# Patient Record
Sex: FEMALE | Race: WHITE | Employment: OTHER | URBAN - METROPOLITAN AREA
[De-identification: names, ages, dates, MRNs, and addresses within clinical notes are randomized per-mention and may not be internally consistent; named-entity substitution may affect disease eponyms.]

---

## 2018-09-10 ENCOUNTER — HOSPITAL ENCOUNTER (INPATIENT)
Age: 58
LOS: 2 days | Discharge: HOME | DRG: 189 | End: 2018-09-12
Attending: EMERGENCY MEDICINE | Admitting: INTERNAL MEDICINE
Payer: COMMERCIAL

## 2018-09-10 ENCOUNTER — APPOINTMENT (OUTPATIENT)
Dept: GENERAL RADIOLOGY | Age: 58
DRG: 189 | End: 2018-09-10
Payer: COMMERCIAL

## 2018-09-10 DIAGNOSIS — J96.01 ACUTE RESPIRATORY FAILURE WITH HYPOXIA AND HYPERCARBIA (HCC): ICD-10-CM

## 2018-09-10 DIAGNOSIS — J96.02 ACUTE RESPIRATORY FAILURE WITH HYPOXIA AND HYPERCARBIA (HCC): ICD-10-CM

## 2018-09-10 DIAGNOSIS — J44.1 COPD EXACERBATION (HCC): Primary | ICD-10-CM

## 2018-09-10 DIAGNOSIS — J44.9 CHRONIC OBSTRUCTIVE PULMONARY DISEASE, UNSPECIFIED COPD TYPE (HCC): ICD-10-CM

## 2018-09-10 PROBLEM — E03.9 ACQUIRED HYPOTHYROIDISM: Status: ACTIVE | Noted: 2018-09-10

## 2018-09-10 PROBLEM — Z72.0 TOBACCO ABUSE: Status: ACTIVE | Noted: 2018-09-10

## 2018-09-10 LAB
A/G RATIO: 1.6 (ref 1.1–2.2)
ALBUMIN SERPL-MCNC: 4.1 G/DL (ref 3.4–5)
ALP BLD-CCNC: 93 U/L (ref 40–129)
ALT SERPL-CCNC: 28 U/L (ref 10–40)
ANION GAP SERPL CALCULATED.3IONS-SCNC: 9 MMOL/L (ref 3–16)
AST SERPL-CCNC: 25 U/L (ref 15–37)
BASE EXCESS ARTERIAL: 3.2 MMOL/L (ref -3–3)
BASOPHILS ABSOLUTE: 0.1 K/UL (ref 0–0.2)
BASOPHILS RELATIVE PERCENT: 0.9 %
BILIRUB SERPL-MCNC: 0.4 MG/DL (ref 0–1)
BUN BLDV-MCNC: 17 MG/DL (ref 7–20)
CALCIUM SERPL-MCNC: 8.9 MG/DL (ref 8.3–10.6)
CARBOXYHEMOGLOBIN ARTERIAL: 1.8 % (ref 0–1.5)
CHLORIDE BLD-SCNC: 93 MMOL/L (ref 99–110)
CO2: 30 MMOL/L (ref 21–32)
CREAT SERPL-MCNC: <0.5 MG/DL (ref 0.6–1.2)
D DIMER: <200 NG/ML DDU (ref 0–229)
EOSINOPHILS ABSOLUTE: 0.4 K/UL (ref 0–0.6)
EOSINOPHILS RELATIVE PERCENT: 2.6 %
GFR AFRICAN AMERICAN: >60
GFR NON-AFRICAN AMERICAN: >60
GLOBULIN: 2.6 G/DL
GLUCOSE BLD-MCNC: 105 MG/DL (ref 70–99)
HCO3 ARTERIAL: 29.2 MMOL/L (ref 21–29)
HCT VFR BLD CALC: 37 % (ref 36–48)
HEMOGLOBIN, ART, EXTENDED: 12.9 G/DL (ref 12–16)
HEMOGLOBIN: 12.9 G/DL (ref 12–16)
LYMPHOCYTES ABSOLUTE: 2.6 K/UL (ref 1–5.1)
LYMPHOCYTES RELATIVE PERCENT: 17.2 %
MCH RBC QN AUTO: 32.8 PG (ref 26–34)
MCHC RBC AUTO-ENTMCNC: 34.9 G/DL (ref 31–36)
MCV RBC AUTO: 94.1 FL (ref 80–100)
METHEMOGLOBIN ARTERIAL: 0.3 %
MONOCYTES ABSOLUTE: 1 K/UL (ref 0–1.3)
MONOCYTES RELATIVE PERCENT: 6.6 %
NEUTROPHILS ABSOLUTE: 10.9 K/UL (ref 1.7–7.7)
NEUTROPHILS RELATIVE PERCENT: 72.7 %
O2 CONTENT ARTERIAL: 17 ML/DL
O2 SAT, ARTERIAL: 95.8 %
O2 THERAPY: ABNORMAL
PCO2 ARTERIAL: 50.4 MMHG (ref 35–45)
PDW BLD-RTO: 13.7 % (ref 12.4–15.4)
PH ARTERIAL: 7.38 (ref 7.35–7.45)
PLATELET # BLD: 275 K/UL (ref 135–450)
PMV BLD AUTO: 7.7 FL (ref 5–10.5)
PO2 ARTERIAL: 82.5 MMHG (ref 75–108)
POTASSIUM REFLEX MAGNESIUM: 3.6 MMOL/L (ref 3.5–5.1)
RBC # BLD: 3.93 M/UL (ref 4–5.2)
SODIUM BLD-SCNC: 132 MMOL/L (ref 136–145)
TCO2 ARTERIAL: 30.8 MMOL/L
TOTAL PROTEIN: 6.7 G/DL (ref 6.4–8.2)
TROPONIN: <0.01 NG/ML
WBC # BLD: 15 K/UL (ref 4–11)

## 2018-09-10 PROCEDURE — 2580000003 HC RX 258: Performed by: INTERNAL MEDICINE

## 2018-09-10 PROCEDURE — 36600 WITHDRAWAL OF ARTERIAL BLOOD: CPT

## 2018-09-10 PROCEDURE — 6360000002 HC RX W HCPCS: Performed by: INTERNAL MEDICINE

## 2018-09-10 PROCEDURE — 85025 COMPLETE CBC W/AUTO DIFF WBC: CPT

## 2018-09-10 PROCEDURE — 2700000000 HC OXYGEN THERAPY PER DAY

## 2018-09-10 PROCEDURE — 6370000000 HC RX 637 (ALT 250 FOR IP): Performed by: INTERNAL MEDICINE

## 2018-09-10 PROCEDURE — 94761 N-INVAS EAR/PLS OXIMETRY MLT: CPT

## 2018-09-10 PROCEDURE — 80053 COMPREHEN METABOLIC PANEL: CPT

## 2018-09-10 PROCEDURE — 96374 THER/PROPH/DIAG INJ IV PUSH: CPT

## 2018-09-10 PROCEDURE — 6360000002 HC RX W HCPCS: Performed by: EMERGENCY MEDICINE

## 2018-09-10 PROCEDURE — 84484 ASSAY OF TROPONIN QUANT: CPT

## 2018-09-10 PROCEDURE — 93005 ELECTROCARDIOGRAM TRACING: CPT | Performed by: EMERGENCY MEDICINE

## 2018-09-10 PROCEDURE — 99254 IP/OBS CNSLTJ NEW/EST MOD 60: CPT | Performed by: INTERNAL MEDICINE

## 2018-09-10 PROCEDURE — 94664 DEMO&/EVAL PT USE INHALER: CPT

## 2018-09-10 PROCEDURE — 94640 AIRWAY INHALATION TREATMENT: CPT

## 2018-09-10 PROCEDURE — 93010 ELECTROCARDIOGRAM REPORT: CPT | Performed by: INTERNAL MEDICINE

## 2018-09-10 PROCEDURE — 85379 FIBRIN DEGRADATION QUANT: CPT

## 2018-09-10 PROCEDURE — 99223 1ST HOSP IP/OBS HIGH 75: CPT | Performed by: INTERNAL MEDICINE

## 2018-09-10 PROCEDURE — 71046 X-RAY EXAM CHEST 2 VIEWS: CPT

## 2018-09-10 PROCEDURE — 82803 BLOOD GASES ANY COMBINATION: CPT

## 2018-09-10 PROCEDURE — 94150 VITAL CAPACITY TEST: CPT

## 2018-09-10 PROCEDURE — 99285 EMERGENCY DEPT VISIT HI MDM: CPT

## 2018-09-10 PROCEDURE — 6370000000 HC RX 637 (ALT 250 FOR IP): Performed by: EMERGENCY MEDICINE

## 2018-09-10 PROCEDURE — 1200000000 HC SEMI PRIVATE

## 2018-09-10 RX ORDER — PREDNISONE 10 MG/1
TABLET ORAL
Status: ON HOLD | COMMUNITY
Start: 2018-09-08 | End: 2018-09-12 | Stop reason: HOSPADM

## 2018-09-10 RX ORDER — AZITHROMYCIN 250 MG/1
250 TABLET, FILM COATED ORAL DAILY
Status: DISCONTINUED | OUTPATIENT
Start: 2018-09-11 | End: 2018-09-10

## 2018-09-10 RX ORDER — IPRATROPIUM BROMIDE AND ALBUTEROL SULFATE 2.5; .5 MG/3ML; MG/3ML
2 SOLUTION RESPIRATORY (INHALATION) ONCE
Status: COMPLETED | OUTPATIENT
Start: 2018-09-10 | End: 2018-09-10

## 2018-09-10 RX ORDER — METHYLPREDNISOLONE SODIUM SUCCINATE 40 MG/ML
40 INJECTION, POWDER, LYOPHILIZED, FOR SOLUTION INTRAMUSCULAR; INTRAVENOUS EVERY 12 HOURS
Status: DISCONTINUED | OUTPATIENT
Start: 2018-09-10 | End: 2018-09-12 | Stop reason: HOSPADM

## 2018-09-10 RX ORDER — SODIUM CHLORIDE 0.9 % (FLUSH) 0.9 %
10 SYRINGE (ML) INJECTION PRN
Status: DISCONTINUED | OUTPATIENT
Start: 2018-09-10 | End: 2018-09-12 | Stop reason: HOSPADM

## 2018-09-10 RX ORDER — IPRATROPIUM BROMIDE AND ALBUTEROL SULFATE 2.5; .5 MG/3ML; MG/3ML
1 SOLUTION RESPIRATORY (INHALATION)
Status: DISCONTINUED | OUTPATIENT
Start: 2018-09-10 | End: 2018-09-10

## 2018-09-10 RX ORDER — ALBUTEROL SULFATE 2.5 MG/3ML
2.5 SOLUTION RESPIRATORY (INHALATION)
Status: DISCONTINUED | OUTPATIENT
Start: 2018-09-10 | End: 2018-09-12 | Stop reason: HOSPADM

## 2018-09-10 RX ORDER — MULTIVITAMIN
1 TABLET ORAL
Status: ON HOLD | COMMUNITY
End: 2019-12-25

## 2018-09-10 RX ORDER — DOXYCYCLINE HYCLATE 100 MG
100 TABLET ORAL EVERY 12 HOURS SCHEDULED
Status: DISCONTINUED | OUTPATIENT
Start: 2018-09-10 | End: 2018-09-12 | Stop reason: HOSPADM

## 2018-09-10 RX ORDER — MONTELUKAST SODIUM 10 MG/1
10 TABLET ORAL NIGHTLY
Status: ON HOLD | COMMUNITY
End: 2019-12-25

## 2018-09-10 RX ORDER — ALBUTEROL SULFATE 90 UG/1
2 AEROSOL, METERED RESPIRATORY (INHALATION) EVERY 6 HOURS PRN
COMMUNITY
End: 2019-01-17 | Stop reason: SDUPTHER

## 2018-09-10 RX ORDER — SODIUM CHLORIDE 9 MG/ML
INJECTION, SOLUTION INTRAVENOUS CONTINUOUS
Status: DISCONTINUED | OUTPATIENT
Start: 2018-09-10 | End: 2018-09-10

## 2018-09-10 RX ORDER — SODIUM CHLORIDE 0.9 % (FLUSH) 0.9 %
10 SYRINGE (ML) INJECTION EVERY 12 HOURS SCHEDULED
Status: DISCONTINUED | OUTPATIENT
Start: 2018-09-10 | End: 2018-09-12 | Stop reason: HOSPADM

## 2018-09-10 RX ORDER — IPRATROPIUM BROMIDE AND ALBUTEROL SULFATE 2.5; .5 MG/3ML; MG/3ML
1 SOLUTION RESPIRATORY (INHALATION) EVERY 4 HOURS
Status: DISCONTINUED | OUTPATIENT
Start: 2018-09-10 | End: 2018-09-11

## 2018-09-10 RX ORDER — FLUTICASONE PROPIONATE 50 MCG
1 SPRAY, SUSPENSION (ML) NASAL DAILY
Status: ON HOLD | COMMUNITY
End: 2020-01-06 | Stop reason: HOSPADM

## 2018-09-10 RX ORDER — ACETAMINOPHEN 325 MG/1
650 TABLET ORAL EVERY 4 HOURS PRN
Status: DISCONTINUED | OUTPATIENT
Start: 2018-09-10 | End: 2018-09-12 | Stop reason: HOSPADM

## 2018-09-10 RX ORDER — AMOXICILLIN AND CLAVULANATE POTASSIUM 500; 125 MG/1; MG/1
1 TABLET, FILM COATED ORAL
Status: ON HOLD | COMMUNITY
Start: 2018-09-08 | End: 2018-09-12 | Stop reason: HOSPADM

## 2018-09-10 RX ORDER — METHYLPREDNISOLONE SODIUM SUCCINATE 125 MG/2ML
125 INJECTION, POWDER, LYOPHILIZED, FOR SOLUTION INTRAMUSCULAR; INTRAVENOUS ONCE
Status: COMPLETED | OUTPATIENT
Start: 2018-09-10 | End: 2018-09-10

## 2018-09-10 RX ORDER — NICOTINE 21 MG/24HR
1 PATCH, TRANSDERMAL 24 HOURS TRANSDERMAL DAILY
Status: DISCONTINUED | OUTPATIENT
Start: 2018-09-10 | End: 2018-09-12 | Stop reason: HOSPADM

## 2018-09-10 RX ADMIN — METHYLPREDNISOLONE SODIUM SUCCINATE 125 MG: 125 INJECTION, POWDER, FOR SOLUTION INTRAMUSCULAR; INTRAVENOUS at 05:48

## 2018-09-10 RX ADMIN — ENOXAPARIN SODIUM 40 MG: 100 INJECTION SUBCUTANEOUS at 09:47

## 2018-09-10 RX ADMIN — IPRATROPIUM BROMIDE AND ALBUTEROL SULFATE 1 AMPULE: .5; 3 SOLUTION RESPIRATORY (INHALATION) at 14:52

## 2018-09-10 RX ADMIN — Medication 10 ML: at 19:39

## 2018-09-10 RX ADMIN — LEVOTHYROXINE SODIUM 137 MCG: 112 TABLET ORAL at 09:47

## 2018-09-10 RX ADMIN — DOXYCYCLINE HYCLATE 100 MG: 100 TABLET, COATED ORAL at 09:47

## 2018-09-10 RX ADMIN — IPRATROPIUM BROMIDE AND ALBUTEROL SULFATE 1 AMPULE: .5; 3 SOLUTION RESPIRATORY (INHALATION) at 22:41

## 2018-09-10 RX ADMIN — METHYLPREDNISOLONE SODIUM SUCCINATE 40 MG: 40 INJECTION, POWDER, FOR SOLUTION INTRAMUSCULAR; INTRAVENOUS at 17:41

## 2018-09-10 RX ADMIN — IPRATROPIUM BROMIDE AND ALBUTEROL SULFATE 2 AMPULE: .5; 3 SOLUTION RESPIRATORY (INHALATION) at 05:49

## 2018-09-10 RX ADMIN — IPRATROPIUM BROMIDE AND ALBUTEROL SULFATE 1 AMPULE: .5; 3 SOLUTION RESPIRATORY (INHALATION) at 18:36

## 2018-09-10 RX ADMIN — Medication 10 ML: at 09:47

## 2018-09-10 RX ADMIN — DOXYCYCLINE HYCLATE 100 MG: 100 TABLET, COATED ORAL at 19:39

## 2018-09-10 RX ADMIN — IPRATROPIUM BROMIDE AND ALBUTEROL SULFATE 1 AMPULE: .5; 3 SOLUTION RESPIRATORY (INHALATION) at 10:44

## 2018-09-10 ASSESSMENT — PAIN SCALES - GENERAL: PAINLEVEL_OUTOF10: 0

## 2018-09-10 NOTE — CONSULTS
SYSTEMS:  Constitutional: Negative for fever  HENT: Negative for sore throat  Eyes: Negative for redness   Respiratory: + for dyspnea, cough  Cardiovascular: Negative for chest pain  Gastrointestinal: Negative for vomiting, diarrhea   Genitourinary: Negative for hematuria   Musculoskeletal: Negative for arthralgias   Skin: Negative for rash  Neurological: Negative for syncope  Hematological: Negative for adenopathy  Psychiatric/Behavorial: Negative for anxiety    PHYSICAL EXAM:  Blood pressure 116/63, pulse 90, temperature 96.9 °F (36.1 °C), temperature source Oral, resp. rate 16, height 5' 4\" (1.626 m), weight 110 lb (49.9 kg), SpO2 92 %.' on 2l NC  Gen: No distress. Eyes: PERRL. No sclera icterus. No conjunctival injection. ENT: No discharge. Pharynx clear. Neck: Trachea midline. No obvious mass. Resp: few accessory muscle use. No crackles. bilateral wheezes. No rhonchi. No dullness on percussion. CV: Regular rate. Regular rhythm. No murmur or rub. No edema. Peripheral pulses are 2+. Capillary refill is less than 3 seconds. GI: Non-tender. Non-distended. No hernia. Skin: Warm and dry. No nodule on exposed extremities. Lymph: No cervical LAD. No supraclavicular LAD. M/S: No cyanosis. No joint deformity. No clubbing. Neuro: Awake. Alert. Moves all four extremities. Psych: Oriented x 3. No anxiety. LABS:  CBC:   Recent Labs      09/10/18   0529   WBC  15.0*   HGB  12.9   HCT  37.0   MCV  94.1   PLT  275     BMP:   Recent Labs      09/10/18   0529   NA  132*   K  3.6   CL  93*   CO2  30   BUN  17   CREATININE  <0.5*     LIVER PROFILE:   Recent Labs      09/10/18   0529   AST  25   ALT  28   BILITOT  0.4   ALKPHOS  93     PT/INR: No results for input(s): PROTIME, INR in the last 72 hours. APTT: No results for input(s): APTT in the last 72 hours.   UA:No results for input(s): NITRITE, COLORU, PHUR, LABCAST, WBCUA, RBCUA, MUCUS, TRICHOMONAS, YEAST, BACTERIA, CLARITYU, SPECGRAV, LEUKOCYTESUR,

## 2018-09-10 NOTE — PROGRESS NOTES
Bedside report and transfer of care given to McConnells, [facility].

## 2018-09-10 NOTE — ED PROVIDER NOTES
 No narrative on file     Current Facility-Administered Medications   Medication Dose Route Frequency Provider Last Rate Last Dose    azithromycin (ZITHROMAX) 500 mg in D5W 250ml addavial  500 mg Intravenous Once Livier Roe MD         Current Outpatient Prescriptions   Medication Sig Dispense Refill    montelukast (SINGULAIR) 10 MG tablet Take 10 mg by mouth nightly      [facility] RX METHYLPREDNISOLONE, MEDROL DOSPACK, TAPER       albuterol sulfate  (90 Base) MCG/ACT inhaler Inhale 2 puffs into the lungs every 6 hours as needed for Wheezing      amoxicillin-clavulanate (AUGMENTIN) 500-125 MG per tablet Take 1 tablet by mouth      vitamin D-3 (CHOLECALCIFEROL) 5000 units TABS Take by mouth      fluticasone (FLONASE) 50 MCG/ACT nasal spray 1 spray by Nasal route      Multiple Vitamin (MULTIVITAMIN) tablet Take 1 tablet by mouth      predniSONE (DELTASONE) 10 MG tablet 6 TABS (ALL AT ONCE) FOR ONE DAY, THEN DECREASE ONE TAB/DAY UNTIL GONE      levothyroxine (SYNTHROID) 137 MCG tablet Take 137 mcg by mouth daily       Allergies   Allergen Reactions    Codeine        REVIEW OF SYSTEMS  10 systems reviewed, pertinent positives per HPI otherwise noted to be negative. PHYSICAL EXAM  BP (!) 155/72   Pulse 81   Temp 97.6 °F (36.4 °C) (Oral)   Resp 27   Ht 5' 4\" (1.626 m)   Wt 110 lb (49.9 kg)   LMP  (LMP Unknown)   SpO2 (!) 89%   BMI 18.88 kg/m²    GENERAL APPEARANCE: Awake and alert. Cooperative. Mild respiratory distress. HENT: Normocephalic. Atraumatic. Mucous membranes are moist.  No drooling or stridor. NECK: Supple. EYES: PERRL. EOM's grossly intact. HEART/CHEST: RRR. No murmurs. 2+ radial and DP pulses bilaterally. LUNGS: Respirations mildly labored. Diffuse expiratory wheezes throughout. .  Fair air exchange. ABDOMEN: No tenderness. Soft. Non-distended. No masses. No organomegaly. No guarding or rebound. MUSCULOSKELETAL: No extremity edema. Compartments soft. No deformity. 7.450    pCO2, Arterial 50.4 (H) 35.0 - 45.0 mmHg    pO2, Arterial 82.5 75.0 - 108.0 mmHg    HCO3, Arterial 29.2 (H) 21.0 - 29.0 mmol/L    Base Excess, Arterial 3.2 (H) -3.0 - 3.0 mmol/L    Hemoglobin, Art, Extended 12.9 12.0 - 16.0 g/dL    O2 Sat, Arterial 95.8 >92 %    Carboxyhgb, Arterial 1.8 (H) 0.0 - 1.5 %    Methemoglobin, Arterial 0.3 <1.5 %    TCO2, Arterial 30.8 Not Established mmol/L    O2 Content, Arterial 17 Not Established mL/dL    O2 Therapy Unknown        ECG  The Ekg interpreted by me shows  normal sinus rhythm with a rate of 72  Axis is   Normal  QTc is  normal  Intervals and Durations are unremarkable. ST Segments: nonspecific changes  No prior ECG available for comparison. RADIOLOGY  Xr Chest Standard (2 Vw)    Result Date: 9/10/2018  EXAMINATION: TWO VIEWS OF THE CHEST 9/10/2018 5:17 am COMPARISON: Chest radiograph 09/12/2014 HISTORY: ORDERING SYSTEM PROVIDED HISTORY: cough, dyspnea TECHNOLOGIST PROVIDED HISTORY: Reason for exam:->cough, dyspnea Ordering Physician Provided Reason for Exam: difficulty breathing Acuity: Acute Type of Exam: Subsequent/Follow-up Additional signs and symptoms: SOB, hx of COPD, emphysema, smoker, recent GB surg x 2 wks ago FINDINGS: Hyperinflated lungs with areas of decreased lung markings, such as in the right upper lung zone. Bronchial walls are thickened. Normal heart size and mediastinal contours. No pneumothorax or pleural effusion. No focal airspace consolidation. No acute osseous abnormality. Emphysema with COPD. Bronchial wall thickening is presumably smoking-related, although superimposed acute bronchitis could be present in the appropriate context. No consolidative airspace disease. ED COURSE/MDM  Patient seen and evaluated. Old records reviewed. Labs and imaging reviewed and results discussed with patient. Patient presenting for evaluation of symptoms was consistent with acute COPD exacerbation.   Initial oxygen saturations in the 70s which improved with 2 L of supplemental oxygen via nasal cannula. She feels subjectively improved after 2 DuoNeb breathing treatments. She is still requiring oxygen to maintain oxygen saturation in the 90s. She is also given IV Solu-Medrol will be continued on antibiotics and started on IV azithromycin. She previously been prescribed Augmentin by her PCP. Given her recent surgery did obtain a d-dimer although had a low suspicion for PE is underlying cause her symptoms. D-dimer is negative. EKG showed no acute ischemic changes and troponin was negative. I spoke with Dr. Sharda Benoit. We thoroughly discussed the history, physical exam, laboratory and imaging studies, as well as, emergency department course. Based upon that discussion, we've decided to admit Denae Corea for further observation and evaluation of Emily Mcneal's dyspnea. As I have deemed necessary from their history, physical, and studies, I have considered and evaluated Denae Corea for the following diagnoses:  ACUTE CORONARY SYNDROME, CHRONIC OBSTRUCTIVE PULMONARY DISEASE, CONGESTIVE HEART FAILURE, PERICARDIAL TAMPONADE, PNEUMONIA, PNEUMOTHORAX, PULMONARY EMBOLISM, SEPSIS, and THORACIC DISSECTION. During the patient's ED course, the patient was given:  Medications   azithromycin (ZITHROMAX) 500 mg in D5W 250ml addavial (not administered)   ipratropium-albuterol (DUONEB) nebulizer solution 2 ampule (2 ampules Inhalation Given 9/10/18 0549)   methylPREDNISolone sodium (SOLU-MEDROL) injection 125 mg (125 mg Intravenous Given 9/10/18 0548)        CLINICAL IMPRESSION  1. COPD exacerbation ([facility] [address]    2. Acute respiratory failure with hypoxia and hypercarbia (HCC)        Blood pressure (!) 155/72, pulse 81, temperature 97.6 °F (36.4 °C), temperature source Oral, resp. rate 27, height 5' 4\" (1.626 m), weight 110 lb (49.9 kg), SpO2 (!) 89 %. DISPOSITION  Denae Corea was admitted in stable condition.         DISCLAIMER: This chart was created using Dragon dictation software. Efforts were made by me to ensure accuracy, however some errors may be present due to limitations of this technology and occasionally words are not transcribed correctly.         Kim Narayanan MD  09/10/18 [id number]

## 2018-09-10 NOTE — FLOWSHEET NOTE
09/10/18 0713   Vital Signs   Temp 96.9 °F (36.1 °C)   Temp Source Oral   Pulse 90   Heart Rate Source Monitor   Resp 16   /63   Level of Consciousness 0   MEWS Score 1   Oxygen Therapy   SpO2 92 %   O2 Device Nasal cannula   O2 Flow Rate (L/min) 2 L/min   Patient admitted to  in stable condition. Oriented to room 222 bed 1 with family at side. Admissions questions to be complete by Zoë Donohue RN. Assessment and medications to follow by primary RN.

## 2018-09-11 LAB
ANION GAP SERPL CALCULATED.3IONS-SCNC: 6 MMOL/L (ref 3–16)
BASOPHILS ABSOLUTE: 0 K/UL (ref 0–0.2)
BASOPHILS RELATIVE PERCENT: 0.1 %
BUN BLDV-MCNC: 13 MG/DL (ref 7–20)
CALCIUM SERPL-MCNC: 9.1 MG/DL (ref 8.3–10.6)
CHLORIDE BLD-SCNC: 100 MMOL/L (ref 99–110)
CO2: 33 MMOL/L (ref 21–32)
CREAT SERPL-MCNC: <0.5 MG/DL (ref 0.6–1.2)
EOSINOPHILS ABSOLUTE: 0 K/UL (ref 0–0.6)
EOSINOPHILS RELATIVE PERCENT: 0 %
GFR AFRICAN AMERICAN: >60
GFR NON-AFRICAN AMERICAN: >60
GLUCOSE BLD-MCNC: 104 MG/DL (ref 70–99)
HCT VFR BLD CALC: 34.7 % (ref 36–48)
HEMOGLOBIN: 11.8 G/DL (ref 12–16)
LYMPHOCYTES ABSOLUTE: 1.5 K/UL (ref 1–5.1)
LYMPHOCYTES RELATIVE PERCENT: 8.6 %
MCH RBC QN AUTO: 32.3 PG (ref 26–34)
MCHC RBC AUTO-ENTMCNC: 34.1 G/DL (ref 31–36)
MCV RBC AUTO: 94.7 FL (ref 80–100)
MONOCYTES ABSOLUTE: 1 K/UL (ref 0–1.3)
MONOCYTES RELATIVE PERCENT: 5.9 %
NEUTROPHILS ABSOLUTE: 14.7 K/UL (ref 1.7–7.7)
NEUTROPHILS RELATIVE PERCENT: 85.4 %
PDW BLD-RTO: 14.2 % (ref 12.4–15.4)
PLATELET # BLD: 307 K/UL (ref 135–450)
PMV BLD AUTO: 7.8 FL (ref 5–10.5)
POTASSIUM REFLEX MAGNESIUM: 4.1 MMOL/L (ref 3.5–5.1)
RBC # BLD: 3.67 M/UL (ref 4–5.2)
SODIUM BLD-SCNC: 139 MMOL/L (ref 136–145)
WBC # BLD: 17.3 K/UL (ref 4–11)

## 2018-09-11 PROCEDURE — 94150 VITAL CAPACITY TEST: CPT

## 2018-09-11 PROCEDURE — 94640 AIRWAY INHALATION TREATMENT: CPT

## 2018-09-11 PROCEDURE — 94668 MNPJ CHEST WALL SBSQ: CPT

## 2018-09-11 PROCEDURE — 6370000000 HC RX 637 (ALT 250 FOR IP): Performed by: INTERNAL MEDICINE

## 2018-09-11 PROCEDURE — 6360000002 HC RX W HCPCS: Performed by: INTERNAL MEDICINE

## 2018-09-11 PROCEDURE — 94667 MNPJ CHEST WALL 1ST: CPT

## 2018-09-11 PROCEDURE — 85025 COMPLETE CBC W/AUTO DIFF WBC: CPT

## 2018-09-11 PROCEDURE — 80048 BASIC METABOLIC PNL TOTAL CA: CPT

## 2018-09-11 PROCEDURE — 36415 COLL VENOUS BLD VENIPUNCTURE: CPT

## 2018-09-11 PROCEDURE — 1200000000 HC SEMI PRIVATE

## 2018-09-11 PROCEDURE — 2580000003 HC RX 258: Performed by: INTERNAL MEDICINE

## 2018-09-11 PROCEDURE — 94761 N-INVAS EAR/PLS OXIMETRY MLT: CPT

## 2018-09-11 PROCEDURE — 99232 SBSQ HOSP IP/OBS MODERATE 35: CPT | Performed by: INTERNAL MEDICINE

## 2018-09-11 PROCEDURE — 99233 SBSQ HOSP IP/OBS HIGH 50: CPT | Performed by: INTERNAL MEDICINE

## 2018-09-11 PROCEDURE — 2700000000 HC OXYGEN THERAPY PER DAY

## 2018-09-11 RX ORDER — IPRATROPIUM BROMIDE AND ALBUTEROL SULFATE 2.5; .5 MG/3ML; MG/3ML
1 SOLUTION RESPIRATORY (INHALATION)
Status: DISCONTINUED | OUTPATIENT
Start: 2018-09-11 | End: 2018-09-12 | Stop reason: HOSPADM

## 2018-09-11 RX ADMIN — METHYLPREDNISOLONE SODIUM SUCCINATE 40 MG: 40 INJECTION, POWDER, FOR SOLUTION INTRAMUSCULAR; INTRAVENOUS at 17:12

## 2018-09-11 RX ADMIN — Medication 10 ML: at 08:42

## 2018-09-11 RX ADMIN — IPRATROPIUM BROMIDE AND ALBUTEROL SULFATE 1 AMPULE: .5; 3 SOLUTION RESPIRATORY (INHALATION) at 07:38

## 2018-09-11 RX ADMIN — LEVOTHYROXINE SODIUM 137 MCG: 112 TABLET ORAL at 05:29

## 2018-09-11 RX ADMIN — Medication 10 ML: at 19:45

## 2018-09-11 RX ADMIN — ENOXAPARIN SODIUM 40 MG: 100 INJECTION SUBCUTANEOUS at 08:42

## 2018-09-11 RX ADMIN — IPRATROPIUM BROMIDE AND ALBUTEROL SULFATE 1 AMPULE: .5; 3 SOLUTION RESPIRATORY (INHALATION) at 19:08

## 2018-09-11 RX ADMIN — IPRATROPIUM BROMIDE AND ALBUTEROL SULFATE 1 AMPULE: .5; 3 SOLUTION RESPIRATORY (INHALATION) at 11:29

## 2018-09-11 RX ADMIN — IPRATROPIUM BROMIDE AND ALBUTEROL SULFATE 1 AMPULE: .5; 3 SOLUTION RESPIRATORY (INHALATION) at 15:37

## 2018-09-11 RX ADMIN — DOXYCYCLINE HYCLATE 100 MG: 100 TABLET, COATED ORAL at 19:44

## 2018-09-11 RX ADMIN — IPRATROPIUM BROMIDE AND ALBUTEROL SULFATE 1 AMPULE: .5; 3 SOLUTION RESPIRATORY (INHALATION) at 22:46

## 2018-09-11 RX ADMIN — DOXYCYCLINE HYCLATE 100 MG: 100 TABLET, COATED ORAL at 08:42

## 2018-09-11 RX ADMIN — METHYLPREDNISOLONE SODIUM SUCCINATE 40 MG: 40 INJECTION, POWDER, FOR SOLUTION INTRAMUSCULAR; INTRAVENOUS at 05:29

## 2018-09-11 RX ADMIN — IPRATROPIUM BROMIDE AND ALBUTEROL SULFATE 1 AMPULE: .5; 3 SOLUTION RESPIRATORY (INHALATION) at 03:00

## 2018-09-11 ASSESSMENT — PAIN SCALES - GENERAL: PAINLEVEL_OUTOF10: 0

## 2018-09-11 NOTE — PLAN OF CARE
Problem:  Activity:  Goal: Fatigue will decrease  Fatigue will decrease   Outcome: Ongoing      Problem: Cardiac:  Goal: Hemodynamic stability will improve  Hemodynamic stability will improve   Outcome: Ongoing      Problem: Coping:  Goal: Level of anxiety will decrease  Level of anxiety will decrease   Outcome: Ongoing    Goal: Ability to cope will improve  Ability to cope will improve   Outcome: Ongoing    Goal: Ability to establish a method of communication will improve  Ability to establish a method of communication will improve   Outcome: Ongoing      Problem: Nutritional:  Goal: Consumption of the prescribed amount of daily calories will improve  Consumption of the prescribed amount of daily calories will improve   Outcome: Ongoing      Problem: Respiratory:  Goal: Ability to maintain a clear airway will improve  Ability to maintain a clear airway will improve   Outcome: Ongoing    Goal: Ability to maintain adequate ventilation will improve  Ability to maintain adequate ventilation will improve   Outcome: Ongoing    Goal: Complications related to the disease process, condition or treatment will be avoided or minimized  Complications related to the disease process, condition or treatment will be avoided or minimized   Outcome: Ongoing      Problem: Skin Integrity:  Goal: Risk for impaired skin integrity will decrease  Risk for impaired skin integrity will decrease   Outcome: Ongoing    Goal: Will show no infection signs and symptoms  Will show no infection signs and symptoms   Outcome: Ongoing    Goal: Absence of new skin breakdown  Absence of new skin breakdown   Outcome: Ongoing

## 2018-09-11 NOTE — PROGRESS NOTES
non-productive = 0    Vital Signs   /62   Pulse 102   Temp 97 °F (36.1 °C) (Oral)   Resp 18   Ht 5' 4\" (1.626 m)   Wt 110 lb (49.9 kg)   LMP  (LMP Unknown)   SpO2 96%   BMI 18.88 kg/m²   SPO2 (COPD values may differ): 88-89% on room air or greater than 92% on FiO2 28- 35% = 2    Peak Flow (asthma only): not applicable = 0    RSI: 57-30 = Q6H or QID and Q4HPRN for dyspnea        Plan       Goals: treat/prevent duspnea    Patient/caregiver was educated on the proper method of use for Respiratory Care Devices:  Yes      Level of patient/caregiver understanding able to:   [] Verbalize understanding   [] Demonstrate understanding       [] Teach back        [] Needs reinforcement       []  No available caregiver               []  Other:     Response to education:  Good     Is patient being placed on Home Treatment Regimen? No     Does the patient have everything they need prior to discharge? No     Comments: patient assessed    Plan of Care: change to q4 w/a    Electronically signed by Koko Neil RCP on 9/11/2018 at 3:58 PM    Respiratory Protocol Guidelines     1. Assessment and treatment by Respiratory Therapy will be initiated for medication and therapeutic interventions upon initiation of aerosolized medication. 2. Physician will be contacted for respiratory rate (RR) greater than 35 breaths per minute. Therapy will be held for heart rate (HR) greater than 140 beats per minute, pending direction from physician. 3. Bronchodilators will be administered via Metered Dose Inhaler (MDI) with spacer when the following criteria are met:  a. Alert and cooperative     b. HR < 140 bpm  c. RR < 30 bpm                d. Can demonstrate a 2-3 second inspiratory hold  4. Bronchodilators will be administered via Hand Held Nebulizer [facility]) to patients when ANY of the following criteria are met  a. Incognizant or uncooperative          b. Patients treated with HHN at Home        c.  Unable to demonstrate proper use

## 2018-09-11 NOTE — PROGRESS NOTES
Progress Note    Admit Date:  9/10/2018    Subjective:  Ms. Divya Walker feels much improved. Minimal cough. Requiring 3L O2. No other complaints at this time. Objective:   Patient Vitals for the past 4 hrs:   BP Temp Temp src Pulse Resp SpO2   09/11/18 0841 (!) 111/57 96.8 °F (36 °C) Oral 111 18 92 %   09/11/18 0740 - - - - 18 90 %          Intake/Output Summary (Last 24 hours) at 09/11/18 1112  Last data filed at 09/11/18 0338   Gross per 24 hour   Intake              750 ml   Output                0 ml   Net              750 ml       Physical Exam:  Gen: No distress. Alert. Pleasant  female, thin body habitus  Eyes: No sclera icterus. No conjunctival injection. Neck: Trachea midline. Resp: No accessory muscle use. No crackles. No wheezes. No rhonchi. Diminished BS throughout, On 3L NC  CV: Regular rate. Regular rhythm. No murmur. No rub. No edema. Capillary Refill: Brisk,< 3 seconds   Peripheral Pulses: +2 palpable, equal bilaterally   GI: Non-tender. Non-distended. No masses. No organomegaly. Normal bowel sounds. No hernia. Skin: Warm and dry. No nodule on exposed extremities. No rash on exposed extremities. M/S: No cyanosis. No joint deformity. No clubbing. Neuro: Awake. Grossly nonfocal    Psych: Oriented x 3. No anxiety or agitation.      Scheduled Meds:   levothyroxine  137 mcg Oral QAM AC    sodium chloride flush  10 mL Intravenous 2 times per day    enoxaparin  40 mg Subcutaneous Daily    methylPREDNISolone  40 mg Intravenous Q12H    nicotine  1 patch Transdermal Daily    doxycycline hyclate  100 mg Oral 2 times per day    ipratropium-albuterol  1 ampule Inhalation Q4H       Continuous Infusions:      PRN Meds:  sodium chloride flush, magnesium hydroxide, albuterol, prochlorperazine, acetaminophen      Data:  CBC: Recent Labs      09/10/18   0529  09/11/18   0546   WBC  15.0*  17.3*   HGB  12.9  11.8*   HCT  37.0  34.7*   MCV  94.1  94.7   PLT  275  307     BMP: Recent Labs reactive  - monitor --> repeat 17.3 (on steroids)     Elevated Blood Pressure  - does not take BP meds at home  - poss 2/2 above  - BP improving  - monitor     Hypothyroidism  - hx of Grave's disease s/p radioactive iodine  - cont Synthroid     S/p Cholecystectomy  - recently had on 8/28/2018     Tobacco Abuse  - Recommended cessation       DVT Prophylaxis: Lovenox  Diet: DIET GENERAL;  Code Status: Full Code      Lorena Canales PA-C 11:16 AM 9/11/2018    PARAMESWARAN Jewel Goltz 9/11/2018 11:33 AM

## 2018-09-12 ENCOUNTER — TELEPHONE (OUTPATIENT)
Dept: PULMONOLOGY | Age: 58
End: 2018-09-12

## 2018-09-12 VITALS
TEMPERATURE: 97.9 F | SYSTOLIC BLOOD PRESSURE: 110 MMHG | HEIGHT: 64 IN | DIASTOLIC BLOOD PRESSURE: 56 MMHG | WEIGHT: 110 LBS | RESPIRATION RATE: 18 BRPM | HEART RATE: 91 BPM | OXYGEN SATURATION: 94 % | BODY MASS INDEX: 18.78 KG/M2

## 2018-09-12 LAB
EKG ATRIAL RATE: 72 BPM
EKG DIAGNOSIS: NORMAL
EKG P AXIS: 89 DEGREES
EKG P-R INTERVAL: 126 MS
EKG Q-T INTERVAL: 412 MS
EKG QRS DURATION: 102 MS
EKG QTC CALCULATION (BAZETT): 451 MS
EKG R AXIS: 86 DEGREES
EKG T AXIS: 76 DEGREES
EKG VENTRICULAR RATE: 72 BPM

## 2018-09-12 PROCEDURE — 94640 AIRWAY INHALATION TREATMENT: CPT

## 2018-09-12 PROCEDURE — 6360000002 HC RX W HCPCS: Performed by: INTERNAL MEDICINE

## 2018-09-12 PROCEDURE — 6370000000 HC RX 637 (ALT 250 FOR IP): Performed by: INTERNAL MEDICINE

## 2018-09-12 PROCEDURE — 99232 SBSQ HOSP IP/OBS MODERATE 35: CPT | Performed by: INTERNAL MEDICINE

## 2018-09-12 PROCEDURE — 94761 N-INVAS EAR/PLS OXIMETRY MLT: CPT

## 2018-09-12 PROCEDURE — 2700000000 HC OXYGEN THERAPY PER DAY

## 2018-09-12 PROCEDURE — 94668 MNPJ CHEST WALL SBSQ: CPT

## 2018-09-12 PROCEDURE — 99239 HOSP IP/OBS DSCHRG MGMT >30: CPT | Performed by: INTERNAL MEDICINE

## 2018-09-12 PROCEDURE — 2580000003 HC RX 258: Performed by: INTERNAL MEDICINE

## 2018-09-12 RX ORDER — DOXYCYCLINE HYCLATE 100 MG
100 TABLET ORAL EVERY 12 HOURS SCHEDULED
Qty: 8 TABLET | Refills: 0 | Status: SHIPPED | OUTPATIENT
Start: 2018-09-12 | End: 2018-09-16

## 2018-09-12 RX ORDER — ALBUTEROL SULFATE 2.5 MG/3ML
2.5 SOLUTION RESPIRATORY (INHALATION) EVERY 6 HOURS PRN
Qty: 120 EACH | Refills: 3 | Status: SHIPPED | OUTPATIENT
Start: 2018-09-12 | End: 2019-01-18 | Stop reason: SDUPTHER

## 2018-09-12 RX ORDER — PREDNISONE 10 MG/1
TABLET ORAL
Qty: 30 TABLET | Refills: 0 | Status: SHIPPED | OUTPATIENT
Start: 2018-09-12 | End: 2018-12-25

## 2018-09-12 RX ADMIN — IPRATROPIUM BROMIDE AND ALBUTEROL SULFATE 1 AMPULE: .5; 3 SOLUTION RESPIRATORY (INHALATION) at 15:18

## 2018-09-12 RX ADMIN — IPRATROPIUM BROMIDE AND ALBUTEROL SULFATE 1 AMPULE: .5; 3 SOLUTION RESPIRATORY (INHALATION) at 06:45

## 2018-09-12 RX ADMIN — Medication 10 ML: at 08:55

## 2018-09-12 RX ADMIN — METHYLPREDNISOLONE SODIUM SUCCINATE 40 MG: 40 INJECTION, POWDER, FOR SOLUTION INTRAMUSCULAR; INTRAVENOUS at 05:51

## 2018-09-12 RX ADMIN — ENOXAPARIN SODIUM 40 MG: 100 INJECTION SUBCUTANEOUS at 08:54

## 2018-09-12 RX ADMIN — DOXYCYCLINE HYCLATE 100 MG: 100 TABLET, COATED ORAL at 08:54

## 2018-09-12 RX ADMIN — IPRATROPIUM BROMIDE AND ALBUTEROL SULFATE 1 AMPULE: .5; 3 SOLUTION RESPIRATORY (INHALATION) at 11:07

## 2018-09-12 RX ADMIN — LEVOTHYROXINE SODIUM 137 MCG: 112 TABLET ORAL at 05:50

## 2018-09-12 ASSESSMENT — PAIN SCALES - GENERAL: PAINLEVEL_OUTOF10: 0

## 2018-09-12 NOTE — PROGRESS NOTES
Pt resting. Resp e/e. Shift assessment completed and charted. No needs. Will monitor.  Cheryl Fernando

## 2018-09-12 NOTE — TELEPHONE ENCOUNTER
Inpatient Notes   Received:  Today   Message Contents   MD Marc Jameson MA             Please arrange f/u with PFTs in 2 months

## 2018-09-12 NOTE — PROGRESS NOTES
O2 Sat at rest on room air is 86  O2 Sat with activity on room air is 82 Just to sitting position  O2 Sat at rest with oxygen @ 2  lpm is 92%. O2 Sat with activity with oxygen @2 lpm is 91%.

## 2018-09-12 NOTE — PROGRESS NOTES
Pulmonary Progress Note    CC: shortness of breath     Subjective:   Patient feels better. She would like to go home. Intake/Output Summary (Last 24 hours) at 09/12/18 1123  Last data filed at 09/12/18 0836   Gross per 24 hour   Intake              860 ml   Output                0 ml   Net              860 ml       Exam:   /71   Pulse 100   Temp 97.5 °F (36.4 °C) (Oral)   Resp 16   Ht 5' 4\" (1.626 m)   Wt 110 lb (49.9 kg)   LMP  (LMP Unknown)   SpO2 94%   BMI 18.88 kg/m²  on 1L  Gen: No distress. Alert. Eyes: PERRL. No sclera icterus. No conjunctival injection. ENT: No discharge. Pharynx clear. Neck: Trachea midline. Normal thyroid. Resp: No accessory muscle use. No crackles. few wheezes. No rhonchi. No dullness on percussion. CV: Regular rate. Regular rhythm. No murmur or rub. No edema. GI: Non-tender. Non-distended. No masses. No organomegaly. Normal bowel sounds. No hernia. Skin: Warm and dry. No nodule on exposed extremities. No rash on exposed extremities. Lymph: No cervical LAD. No supraclavicular LAD. M/S: No cyanosis. No joint deformity. No clubbing. Neuro: Awake. Moves all extremities. CN grossly intact. Psych: Oriented x 3. No anxiety or agitation.      Scheduled Meds:   ipratropium-albuterol  1 ampule Inhalation Q4H WA    levothyroxine  137 mcg Oral QAM AC    sodium chloride flush  10 mL Intravenous 2 times per day    enoxaparin  40 mg Subcutaneous Daily    methylPREDNISolone  40 mg Intravenous Q12H    nicotine  1 patch Transdermal Daily    doxycycline hyclate  100 mg Oral 2 times per day     Continuous Infusions:    PRN Meds:  sodium chloride flush, magnesium hydroxide, albuterol, prochlorperazine, acetaminophen    Labs:  CBC:   Recent Labs      09/10/18   0529  09/11/18   0546   WBC  15.0*  17.3*   HGB  12.9  11.8*   HCT  37.0  34.7*   MCV  94.1  94.7   PLT  275  307     BMP:   Recent Labs      09/10/18   0529  09/11/18   0546   NA  132*  139   K  3.6  4.1

## 2018-09-12 NOTE — PLAN OF CARE
Problem:  Activity:  Goal: Fatigue will decrease  Fatigue will decrease   Outcome: Ongoing      Problem: Cardiac:  Goal: Hemodynamic stability will improve  Hemodynamic stability will improve   Outcome: Ongoing      Problem: Coping:  Goal: Level of anxiety will decrease  Level of anxiety will decrease   Outcome: Ongoing    Goal: Ability to cope will improve  Ability to cope will improve   Outcome: Ongoing    Goal: Ability to establish a method of communication will improve  Ability to establish a method of communication will improve   Outcome: Ongoing      Problem: Respiratory:  Goal: Ability to maintain a clear airway will improve  Ability to maintain a clear airway will improve   Outcome: Ongoing    Goal: Ability to maintain adequate ventilation will improve  Ability to maintain adequate ventilation will improve   Outcome: Ongoing    Goal: Complications related to the disease process, condition or treatment will be avoided or minimized  Complications related to the disease process, condition or treatment will be avoided or minimized   Outcome: Ongoing      Problem: Skin Integrity:  Goal: Risk for impaired skin integrity will decrease  Risk for impaired skin integrity will decrease   Outcome: Ongoing

## 2018-09-13 NOTE — DISCHARGE SUMMARY
Name:  Amina Akbar  Room:  6624/2833-78  MRN:    [id number]    Discharge Summary      This discharge summary is in conjunction with a complete physical exam done on the day of discharge. Attending Physician:  Rhoda Mcneill     Discharging Physician: Rhoda Mcneill       Admit: 9/10/2018  Discharge:  9/12/2018    Diagnoses this Admission    Principal Problem:    Acute respiratory failure with hypoxia and hypercarbia (HCC)  Active Problems:    COPD exacerbation ([address]    Tobacco abuse    Acquired hypothyroidism  Resolved Problems:    * No resolved hospital problems. *          Procedures (Please Review Full Report for Details)      none    Consults      Pulmonary    HPI:    The patient is a 59 y.o. female with a PMH of COPD and Hypothyroidism who presented to [facility] ED with complaint of worsening SOB and cough since Thursday. Pt states her brother in law was sick with bronchitis and she had been around him. She states her cough is non-productive. No hemoptysis or fevers. She did call her PCP who called in Prednisone and Augmentin over the weekend. She states she took this as well as used her Flonase and Mucinex with minimal relief. Pt decided to come to the ER because she was having worsening NICKERSON. She does not wear O2 at home. Endorses symptoms of cough, SOB, diarrhea and nasal congestion but denies any chest pain, abdominal pain, dysuria, and vomiting.     Pt does smoke and currently smokes under 1 ppd. She has been smoking in total for ~ 40 years. Physical Exam at Discharge:  BP (!) 110/56   Pulse 91   Temp 97.9 °F (36.6 °C) (Oral)   Resp 18   Ht 5' 4\" (1.626 m)   Wt 110 lb (49.9 kg)   LMP  (LMP Unknown)   SpO2 94%   BMI 18.88 kg/m²      Gen: No distress. Alert. Eyes: PERRL. No sclera icterus. No conjunctival injection. ENT: No discharge. Pharynx clear. Neck: Trachea midline. Normal thyroid. Resp: No accessory muscle use. No crackles. mild wheezes. No rhonchi.  No dullness on percussion. CV: Regular rate. Regular rhythm. No murmur or rub. No edema. Hospital Course    COPD AE  Acute Hypoxic Respiratory Failure  - c/o increasing SOB and cough  - does not wear O2, now requiring 2L to maintain sats (POA)  - CXR: bronchial wall thickening  - d- dimer <200  - Admitted to Med Surg  - supp O2, wean as tolerated  - resp cx  - Doxy D#3, IV Solumedrol, HHN - [address] duoneb & PRN albuterol  - Pulm consult - apprec recs  -  Sent home on PO prednisone/Doxy and 2 L of oxygen. I personally discussed oxygen requirements face to face with patient.     Leukocytosis  - 15  - likely reactive  - monitor --> repeat 17.3 (on steroids)     Elevated Blood Pressure  - does not take BP meds at home  - poss 2/2 above  - BP improving  - monitor     Hypothyroidism  - hx of Grave's disease s/p radioactive iodine  - cont Synthroid     S/p Cholecystectomy  - recently had on 8/28/2018     Tobacco Abuse  - Recommended cessation      5. O2 Sat at rest on room air is 86  O2 Sat with activity on room air is 82 Just to sitting position  O2 Sat at rest with oxygen @ 2  lpm is 92%. O2 Sat with activity with oxygen @2 lpm is 91%. CBC:   Recent Labs      09/11/18   0546   WBC  17.3*   HGB  11.8*   HCT  34.7*   MCV  94.7   PLT  307     BMP:   Recent Labs      09/11/18   0546   NA  139   K  4.1   CL  100   CO2  33*   BUN  13   CREATININE  <0.5*         XR CHEST STANDARD (2 VW)   Final Result   Emphysema with COPD. Bronchial wall thickening is presumably smoking-related, although   superimposed acute bronchitis could be present in the appropriate context. No consolidative airspace disease.               Discharge Medications     Medication List      START taking these medications    doxycycline hyclate 100 MG tablet  Commonly known as:  VIBRA-TABS  Take 1 tablet by mouth every 12 hours for 4 days     Fluticasone-Umeclidin-Vilant 100-62.5-25 MCG/INH Aepb  Commonly known as:  TRELEGY ELLIPTA  Inhale 1 Package into the lungs

## 2018-09-19 NOTE — TELEPHONE ENCOUNTER
Called pt, number has been d/c. Office has been unable to reach pt to schedule hospital f/u in 2 months with PFT.     Hospital consult Dr. Laina Burleson 9/12/18:    ASSESSMENT:  ·  Acute respiratory failure with hypoxemia  · COPD with AE  · Ongoing tobacco abuse     PLAN:  · Switch to oral prednisone 40 mg daily, then taper   · Inhaled bronchodilators   · Antibiotics (doxycycline D3)  · Supplemental oxygen to maintain SaO2 >92%; wean as tolerated  · Smoking cessation counseling  · Recommend outpatient PFTs

## 2018-12-25 ENCOUNTER — HOSPITAL ENCOUNTER (INPATIENT)
Age: 58
LOS: 9 days | Discharge: HOME HEALTH | DRG: 193 | End: 2019-01-03
Attending: EMERGENCY MEDICINE | Admitting: INTERNAL MEDICINE
Payer: COMMERCIAL

## 2018-12-25 ENCOUNTER — APPOINTMENT (OUTPATIENT)
Dept: GENERAL RADIOLOGY | Age: 58
DRG: 193 | End: 2018-12-25
Payer: COMMERCIAL

## 2018-12-25 DIAGNOSIS — J44.1 COPD EXACERBATION (HCC): ICD-10-CM

## 2018-12-25 DIAGNOSIS — J96.01 ACUTE RESPIRATORY FAILURE WITH HYPOXIA AND HYPERCARBIA (HCC): ICD-10-CM

## 2018-12-25 DIAGNOSIS — J18.9 PNEUMONIA DUE TO ORGANISM: Primary | ICD-10-CM

## 2018-12-25 DIAGNOSIS — J96.02 ACUTE RESPIRATORY FAILURE WITH HYPOXIA AND HYPERCARBIA (HCC): ICD-10-CM

## 2018-12-25 PROBLEM — J96.22 ACUTE ON CHRONIC RESPIRATORY FAILURE WITH HYPOXIA AND HYPERCAPNIA (HCC): Status: ACTIVE | Noted: 2018-12-25

## 2018-12-25 PROBLEM — J15.9 COMMUNITY ACQUIRED BACTERIAL PNEUMONIA: Status: ACTIVE | Noted: 2018-12-25

## 2018-12-25 PROBLEM — J96.21 ACUTE ON CHRONIC RESPIRATORY FAILURE WITH HYPOXIA AND HYPERCAPNIA (HCC): Status: ACTIVE | Noted: 2018-12-25

## 2018-12-25 LAB
A/G RATIO: 1.5 (ref 1.1–2.2)
ALBUMIN SERPL-MCNC: 4.2 G/DL (ref 3.4–5)
ALP BLD-CCNC: 94 U/L (ref 40–129)
ALT SERPL-CCNC: 126 U/L (ref 10–40)
ANION GAP SERPL CALCULATED.3IONS-SCNC: 9 MMOL/L (ref 3–16)
AST SERPL-CCNC: 94 U/L (ref 15–37)
BASE EXCESS ARTERIAL: -2 MMOL/L (ref -3–3)
BASOPHILS ABSOLUTE: 0.1 K/UL (ref 0–0.2)
BASOPHILS RELATIVE PERCENT: 1.1 %
BILIRUB SERPL-MCNC: <0.2 MG/DL (ref 0–1)
BUN BLDV-MCNC: 9 MG/DL (ref 7–20)
CALCIUM SERPL-MCNC: 9.1 MG/DL (ref 8.3–10.6)
CARBOXYHEMOGLOBIN ARTERIAL: 2.1 % (ref 0–1.5)
CHLORIDE BLD-SCNC: 101 MMOL/L (ref 99–110)
CO2: 27 MMOL/L (ref 21–32)
CREAT SERPL-MCNC: 0.6 MG/DL (ref 0.6–1.2)
EKG ATRIAL RATE: 95 BPM
EKG DIAGNOSIS: NORMAL
EKG P AXIS: 89 DEGREES
EKG P-R INTERVAL: 136 MS
EKG Q-T INTERVAL: 364 MS
EKG QRS DURATION: 90 MS
EKG QTC CALCULATION (BAZETT): 457 MS
EKG R AXIS: 85 DEGREES
EKG T AXIS: 76 DEGREES
EKG VENTRICULAR RATE: 95 BPM
EOSINOPHILS ABSOLUTE: 0.2 K/UL (ref 0–0.6)
EOSINOPHILS RELATIVE PERCENT: 2.4 %
GFR AFRICAN AMERICAN: >60
GFR NON-AFRICAN AMERICAN: >60
GLOBULIN: 2.8 G/DL
GLUCOSE BLD-MCNC: 113 MG/DL (ref 70–99)
HCO3 ARTERIAL: 25.7 MMOL/L (ref 21–29)
HCT VFR BLD CALC: 42.1 % (ref 36–48)
HEMOGLOBIN, ART, EXTENDED: 13.5 G/DL (ref 12–16)
HEMOGLOBIN: 14 G/DL (ref 12–16)
LACTIC ACID: 0.6 MMOL/L (ref 0.4–2)
LYMPHOCYTES ABSOLUTE: 1.4 K/UL (ref 1–5.1)
LYMPHOCYTES RELATIVE PERCENT: 14.3 %
MCH RBC QN AUTO: 32.3 PG (ref 26–34)
MCHC RBC AUTO-ENTMCNC: 33.3 G/DL (ref 31–36)
MCV RBC AUTO: 96.9 FL (ref 80–100)
METHEMOGLOBIN ARTERIAL: 0.4 %
MONOCYTES ABSOLUTE: 0.8 K/UL (ref 0–1.3)
MONOCYTES RELATIVE PERCENT: 8.2 %
NEUTROPHILS ABSOLUTE: 7.4 K/UL (ref 1.7–7.7)
NEUTROPHILS RELATIVE PERCENT: 74 %
O2 CONTENT ARTERIAL: 18 ML/DL
O2 SAT, ARTERIAL: 95.8 %
O2 THERAPY: ABNORMAL
PCO2 ARTERIAL: 56.9 MMHG (ref 35–45)
PDW BLD-RTO: 12.8 % (ref 12.4–15.4)
PH ARTERIAL: 7.27 (ref 7.35–7.45)
PLATELET # BLD: 211 K/UL (ref 135–450)
PMV BLD AUTO: 7.8 FL (ref 5–10.5)
PO2 ARTERIAL: 91 MMHG (ref 75–108)
POTASSIUM SERPL-SCNC: 3.8 MMOL/L (ref 3.5–5.1)
RAPID INFLUENZA  B AGN: NEGATIVE
RAPID INFLUENZA A AGN: NEGATIVE
RBC # BLD: 4.34 M/UL (ref 4–5.2)
SODIUM BLD-SCNC: 137 MMOL/L (ref 136–145)
TCO2 ARTERIAL: 27.5 MMOL/L
TOTAL PROTEIN: 7 G/DL (ref 6.4–8.2)
TROPONIN: <0.01 NG/ML
WBC # BLD: 10 K/UL (ref 4–11)

## 2018-12-25 PROCEDURE — 2580000003 HC RX 258: Performed by: INTERNAL MEDICINE

## 2018-12-25 PROCEDURE — 80053 COMPREHEN METABOLIC PANEL: CPT

## 2018-12-25 PROCEDURE — 99223 1ST HOSP IP/OBS HIGH 75: CPT | Performed by: INTERNAL MEDICINE

## 2018-12-25 PROCEDURE — 84484 ASSAY OF TROPONIN QUANT: CPT

## 2018-12-25 PROCEDURE — 94664 DEMO&/EVAL PT USE INHALER: CPT

## 2018-12-25 PROCEDURE — 94660 CPAP INITIATION&MGMT: CPT

## 2018-12-25 PROCEDURE — 99291 CRITICAL CARE FIRST HOUR: CPT | Performed by: INTERNAL MEDICINE

## 2018-12-25 PROCEDURE — 6370000000 HC RX 637 (ALT 250 FOR IP): Performed by: INTERNAL MEDICINE

## 2018-12-25 PROCEDURE — 96374 THER/PROPH/DIAG INJ IV PUSH: CPT

## 2018-12-25 PROCEDURE — 82803 BLOOD GASES ANY COMBINATION: CPT

## 2018-12-25 PROCEDURE — 87804 INFLUENZA ASSAY W/OPTIC: CPT

## 2018-12-25 PROCEDURE — 85025 COMPLETE CBC W/AUTO DIFF WBC: CPT

## 2018-12-25 PROCEDURE — 71045 X-RAY EXAM CHEST 1 VIEW: CPT

## 2018-12-25 PROCEDURE — 6360000002 HC RX W HCPCS: Performed by: EMERGENCY MEDICINE

## 2018-12-25 PROCEDURE — 6370000000 HC RX 637 (ALT 250 FOR IP): Performed by: EMERGENCY MEDICINE

## 2018-12-25 PROCEDURE — 99291 CRITICAL CARE FIRST HOUR: CPT

## 2018-12-25 PROCEDURE — 93005 ELECTROCARDIOGRAM TRACING: CPT | Performed by: EMERGENCY MEDICINE

## 2018-12-25 PROCEDURE — 6360000002 HC RX W HCPCS: Performed by: INTERNAL MEDICINE

## 2018-12-25 PROCEDURE — 94762 N-INVAS EAR/PLS OXIMTRY CONT: CPT

## 2018-12-25 PROCEDURE — 83605 ASSAY OF LACTIC ACID: CPT

## 2018-12-25 PROCEDURE — 2700000000 HC OXYGEN THERAPY PER DAY

## 2018-12-25 PROCEDURE — 2000000000 HC ICU R&B

## 2018-12-25 PROCEDURE — 94640 AIRWAY INHALATION TREATMENT: CPT

## 2018-12-25 PROCEDURE — 93010 ELECTROCARDIOGRAM REPORT: CPT | Performed by: INTERNAL MEDICINE

## 2018-12-25 RX ORDER — DEXAMETHASONE SODIUM PHOSPHATE 4 MG/ML
10 INJECTION, SOLUTION INTRA-ARTICULAR; INTRALESIONAL; INTRAMUSCULAR; INTRAVENOUS; SOFT TISSUE ONCE
Status: COMPLETED | OUTPATIENT
Start: 2018-12-25 | End: 2018-12-25

## 2018-12-25 RX ORDER — CHOLECALCIFEROL (VITAMIN D3) 125 MCG
500 CAPSULE ORAL DAILY
Status: ON HOLD | COMMUNITY
End: 2019-12-25

## 2018-12-25 RX ORDER — ONDANSETRON 2 MG/ML
4 INJECTION INTRAMUSCULAR; INTRAVENOUS EVERY 6 HOURS PRN
Status: DISCONTINUED | OUTPATIENT
Start: 2018-12-25 | End: 2019-01-03 | Stop reason: HOSPADM

## 2018-12-25 RX ORDER — ALBUTEROL SULFATE 2.5 MG/3ML
2.5 SOLUTION RESPIRATORY (INHALATION)
Status: DISCONTINUED | OUTPATIENT
Start: 2018-12-25 | End: 2019-01-03 | Stop reason: HOSPADM

## 2018-12-25 RX ORDER — SODIUM CHLORIDE 0.9 % (FLUSH) 0.9 %
10 SYRINGE (ML) INJECTION EVERY 12 HOURS SCHEDULED
Status: DISCONTINUED | OUTPATIENT
Start: 2018-12-25 | End: 2019-01-03 | Stop reason: HOSPADM

## 2018-12-25 RX ORDER — IPRATROPIUM BROMIDE AND ALBUTEROL SULFATE 2.5; .5 MG/3ML; MG/3ML
1 SOLUTION RESPIRATORY (INHALATION)
Status: DISCONTINUED | OUTPATIENT
Start: 2018-12-25 | End: 2019-01-03 | Stop reason: HOSPADM

## 2018-12-25 RX ORDER — SODIUM CHLORIDE 0.9 % (FLUSH) 0.9 %
10 SYRINGE (ML) INJECTION PRN
Status: DISCONTINUED | OUTPATIENT
Start: 2018-12-25 | End: 2019-01-03 | Stop reason: HOSPADM

## 2018-12-25 RX ORDER — ALBUTEROL SULFATE 2.5 MG/3ML
2.5 SOLUTION RESPIRATORY (INHALATION)
Status: DISCONTINUED | OUTPATIENT
Start: 2018-12-25 | End: 2018-12-25

## 2018-12-25 RX ORDER — NICOTINE 21 MG/24HR
1 PATCH, TRANSDERMAL 24 HOURS TRANSDERMAL DAILY
Status: DISCONTINUED | OUTPATIENT
Start: 2018-12-25 | End: 2019-01-03 | Stop reason: HOSPADM

## 2018-12-25 RX ORDER — SODIUM CHLORIDE 9 MG/ML
INJECTION, SOLUTION INTRAVENOUS CONTINUOUS
Status: DISCONTINUED | OUTPATIENT
Start: 2018-12-25 | End: 2018-12-27

## 2018-12-25 RX ORDER — HYDROCODONE BITARTRATE AND ACETAMINOPHEN 5; 325 MG/1; MG/1
1 TABLET ORAL EVERY 6 HOURS PRN
Status: DISCONTINUED | OUTPATIENT
Start: 2018-12-25 | End: 2019-01-03 | Stop reason: HOSPADM

## 2018-12-25 RX ORDER — FLUTICASONE PROPIONATE 50 MCG
1 SPRAY, SUSPENSION (ML) NASAL DAILY
Status: DISCONTINUED | OUTPATIENT
Start: 2018-12-25 | End: 2019-01-03 | Stop reason: HOSPADM

## 2018-12-25 RX ORDER — MONTELUKAST SODIUM 10 MG/1
10 TABLET ORAL NIGHTLY
Status: DISCONTINUED | OUTPATIENT
Start: 2018-12-25 | End: 2019-01-03 | Stop reason: HOSPADM

## 2018-12-25 RX ORDER — ACETAMINOPHEN 325 MG/1
650 TABLET ORAL EVERY 4 HOURS PRN
Status: DISCONTINUED | OUTPATIENT
Start: 2018-12-25 | End: 2019-01-03 | Stop reason: HOSPADM

## 2018-12-25 RX ORDER — LEVOFLOXACIN 5 MG/ML
500 INJECTION, SOLUTION INTRAVENOUS EVERY 24 HOURS
Status: DISCONTINUED | OUTPATIENT
Start: 2018-12-25 | End: 2019-01-03

## 2018-12-25 RX ORDER — IPRATROPIUM BROMIDE AND ALBUTEROL SULFATE 2.5; .5 MG/3ML; MG/3ML
1 SOLUTION RESPIRATORY (INHALATION)
Status: DISCONTINUED | OUTPATIENT
Start: 2018-12-25 | End: 2018-12-25

## 2018-12-25 RX ORDER — IPRATROPIUM BROMIDE AND ALBUTEROL SULFATE 2.5; .5 MG/3ML; MG/3ML
2 SOLUTION RESPIRATORY (INHALATION) ONCE
Status: COMPLETED | OUTPATIENT
Start: 2018-12-25 | End: 2018-12-25

## 2018-12-25 RX ORDER — IPRATROPIUM BROMIDE AND ALBUTEROL SULFATE 2.5; .5 MG/3ML; MG/3ML
1 SOLUTION RESPIRATORY (INHALATION) ONCE
Status: COMPLETED | OUTPATIENT
Start: 2018-12-25 | End: 2018-12-25

## 2018-12-25 RX ORDER — METHYLPREDNISOLONE SODIUM SUCCINATE 40 MG/ML
40 INJECTION, POWDER, LYOPHILIZED, FOR SOLUTION INTRAMUSCULAR; INTRAVENOUS EVERY 12 HOURS
Status: DISCONTINUED | OUTPATIENT
Start: 2018-12-25 | End: 2019-01-02

## 2018-12-25 RX ADMIN — DEXAMETHASONE SODIUM PHOSPHATE 10 MG: 4 INJECTION, SOLUTION INTRA-ARTICULAR; INTRALESIONAL; INTRAMUSCULAR; INTRAVENOUS; SOFT TISSUE at 02:10

## 2018-12-25 RX ADMIN — METHYLPREDNISOLONE SODIUM SUCCINATE 40 MG: 40 INJECTION, POWDER, FOR SOLUTION INTRAMUSCULAR; INTRAVENOUS at 06:46

## 2018-12-25 RX ADMIN — LEVOTHYROXINE SODIUM 137 MCG: 112 TABLET ORAL at 12:04

## 2018-12-25 RX ADMIN — SODIUM CHLORIDE: 9 INJECTION, SOLUTION INTRAVENOUS at 06:42

## 2018-12-25 RX ADMIN — MUPIROCIN: 20 OINTMENT TOPICAL at 19:54

## 2018-12-25 RX ADMIN — IPRATROPIUM BROMIDE AND ALBUTEROL SULFATE 1 AMPULE: .5; 3 SOLUTION RESPIRATORY (INHALATION) at 07:23

## 2018-12-25 RX ADMIN — Medication 10 ML: at 19:53

## 2018-12-25 RX ADMIN — IPRATROPIUM BROMIDE AND ALBUTEROL SULFATE 2 AMPULE: .5; 3 SOLUTION RESPIRATORY (INHALATION) at 02:10

## 2018-12-25 RX ADMIN — IPRATROPIUM BROMIDE AND ALBUTEROL SULFATE 1 AMPULE: .5; 3 SOLUTION RESPIRATORY (INHALATION) at 03:37

## 2018-12-25 RX ADMIN — ACETAMINOPHEN 650 MG: 325 TABLET ORAL at 18:58

## 2018-12-25 RX ADMIN — FLUTICASONE PROPIONATE 1 SPRAY: 50 SPRAY, METERED NASAL at 12:04

## 2018-12-25 RX ADMIN — MONTELUKAST SODIUM 10 MG: 10 TABLET, COATED ORAL at 19:54

## 2018-12-25 RX ADMIN — Medication 10 ML: at 07:40

## 2018-12-25 RX ADMIN — SODIUM CHLORIDE: 9 INJECTION, SOLUTION INTRAVENOUS at 19:52

## 2018-12-25 RX ADMIN — IPRATROPIUM BROMIDE AND ALBUTEROL SULFATE 1 AMPULE: .5; 3 SOLUTION RESPIRATORY (INHALATION) at 11:06

## 2018-12-25 RX ADMIN — IPRATROPIUM BROMIDE AND ALBUTEROL SULFATE 1 AMPULE: .5; 3 SOLUTION RESPIRATORY (INHALATION) at 19:28

## 2018-12-25 RX ADMIN — MUPIROCIN: 20 OINTMENT TOPICAL at 12:04

## 2018-12-25 RX ADMIN — METHYLPREDNISOLONE SODIUM SUCCINATE 40 MG: 40 INJECTION, POWDER, FOR SOLUTION INTRAMUSCULAR; INTRAVENOUS at 18:27

## 2018-12-25 RX ADMIN — IPRATROPIUM BROMIDE AND ALBUTEROL SULFATE 1 AMPULE: .5; 3 SOLUTION RESPIRATORY (INHALATION) at 14:30

## 2018-12-25 RX ADMIN — ENOXAPARIN SODIUM 40 MG: 100 INJECTION SUBCUTANEOUS at 12:04

## 2018-12-25 RX ADMIN — ACETAMINOPHEN 650 MG: 325 TABLET ORAL at 14:10

## 2018-12-25 RX ADMIN — LEVOFLOXACIN 500 MG: 5 INJECTION, SOLUTION INTRAVENOUS at 06:46

## 2018-12-25 ASSESSMENT — PAIN SCALES - GENERAL
PAINLEVEL_OUTOF10: 7
PAINLEVEL_OUTOF10: 0
PAINLEVEL_OUTOF10: 5
PAINLEVEL_OUTOF10: 5

## 2018-12-26 LAB
A/G RATIO: 1.9 (ref 1.1–2.2)
ALBUMIN SERPL-MCNC: 3.9 G/DL (ref 3.4–5)
ALP BLD-CCNC: 75 U/L (ref 40–129)
ALT SERPL-CCNC: 98 U/L (ref 10–40)
ANION GAP SERPL CALCULATED.3IONS-SCNC: 8 MMOL/L (ref 3–16)
AST SERPL-CCNC: 72 U/L (ref 15–37)
BASOPHILS ABSOLUTE: 0 K/UL (ref 0–0.2)
BASOPHILS RELATIVE PERCENT: 0.2 %
BILIRUB SERPL-MCNC: <0.2 MG/DL (ref 0–1)
BUN BLDV-MCNC: 16 MG/DL (ref 7–20)
CALCIUM SERPL-MCNC: 8.5 MG/DL (ref 8.3–10.6)
CHLORIDE BLD-SCNC: 97 MMOL/L (ref 99–110)
CO2: 26 MMOL/L (ref 21–32)
CREAT SERPL-MCNC: <0.5 MG/DL (ref 0.6–1.2)
EOSINOPHILS ABSOLUTE: 0 K/UL (ref 0–0.6)
EOSINOPHILS RELATIVE PERCENT: 0 %
GFR AFRICAN AMERICAN: >60
GFR NON-AFRICAN AMERICAN: >60
GLOBULIN: 2.1 G/DL
GLUCOSE BLD-MCNC: 123 MG/DL (ref 70–99)
HCT VFR BLD CALC: 37.8 % (ref 36–48)
HEMOGLOBIN: 12.6 G/DL (ref 12–16)
LYMPHOCYTES ABSOLUTE: 0.8 K/UL (ref 1–5.1)
LYMPHOCYTES RELATIVE PERCENT: 6.8 %
MCH RBC QN AUTO: 32.4 PG (ref 26–34)
MCHC RBC AUTO-ENTMCNC: 33.3 G/DL (ref 31–36)
MCV RBC AUTO: 97.5 FL (ref 80–100)
MONOCYTES ABSOLUTE: 0.7 K/UL (ref 0–1.3)
MONOCYTES RELATIVE PERCENT: 6.6 %
NEUTROPHILS ABSOLUTE: 9.7 K/UL (ref 1.7–7.7)
NEUTROPHILS RELATIVE PERCENT: 86.4 %
PDW BLD-RTO: 13 % (ref 12.4–15.4)
PLATELET # BLD: 197 K/UL (ref 135–450)
PMV BLD AUTO: 8.2 FL (ref 5–10.5)
POTASSIUM REFLEX MAGNESIUM: 4.8 MMOL/L (ref 3.5–5.1)
RBC # BLD: 3.87 M/UL (ref 4–5.2)
SODIUM BLD-SCNC: 131 MMOL/L (ref 136–145)
TOTAL PROTEIN: 6 G/DL (ref 6.4–8.2)
WBC # BLD: 11.2 K/UL (ref 4–11)

## 2018-12-26 PROCEDURE — 6360000002 HC RX W HCPCS: Performed by: INTERNAL MEDICINE

## 2018-12-26 PROCEDURE — 2700000000 HC OXYGEN THERAPY PER DAY

## 2018-12-26 PROCEDURE — 99291 CRITICAL CARE FIRST HOUR: CPT | Performed by: INTERNAL MEDICINE

## 2018-12-26 PROCEDURE — 6370000000 HC RX 637 (ALT 250 FOR IP): Performed by: INTERNAL MEDICINE

## 2018-12-26 PROCEDURE — 99232 SBSQ HOSP IP/OBS MODERATE 35: CPT | Performed by: INTERNAL MEDICINE

## 2018-12-26 PROCEDURE — 94640 AIRWAY INHALATION TREATMENT: CPT

## 2018-12-26 PROCEDURE — 80053 COMPREHEN METABOLIC PANEL: CPT

## 2018-12-26 PROCEDURE — 2000000000 HC ICU R&B

## 2018-12-26 PROCEDURE — 36415 COLL VENOUS BLD VENIPUNCTURE: CPT

## 2018-12-26 PROCEDURE — 85025 COMPLETE CBC W/AUTO DIFF WBC: CPT

## 2018-12-26 PROCEDURE — 2580000003 HC RX 258: Performed by: INTERNAL MEDICINE

## 2018-12-26 PROCEDURE — 94762 N-INVAS EAR/PLS OXIMTRY CONT: CPT

## 2018-12-26 PROCEDURE — 94660 CPAP INITIATION&MGMT: CPT

## 2018-12-26 RX ADMIN — IPRATROPIUM BROMIDE AND ALBUTEROL SULFATE 1 AMPULE: .5; 3 SOLUTION RESPIRATORY (INHALATION) at 00:17

## 2018-12-26 RX ADMIN — HYDROCODONE BITARTRATE AND ACETAMINOPHEN 1 TABLET: 5; 325 TABLET ORAL at 19:53

## 2018-12-26 RX ADMIN — FLUTICASONE PROPIONATE 1 SPRAY: 50 SPRAY, METERED NASAL at 08:28

## 2018-12-26 RX ADMIN — HYDROCODONE BITARTRATE AND ACETAMINOPHEN 1 TABLET: 5; 325 TABLET ORAL at 04:42

## 2018-12-26 RX ADMIN — METHYLPREDNISOLONE SODIUM SUCCINATE 40 MG: 40 INJECTION, POWDER, FOR SOLUTION INTRAMUSCULAR; INTRAVENOUS at 18:27

## 2018-12-26 RX ADMIN — IPRATROPIUM BROMIDE AND ALBUTEROL SULFATE 1 AMPULE: .5; 3 SOLUTION RESPIRATORY (INHALATION) at 19:20

## 2018-12-26 RX ADMIN — Medication 10 ML: at 08:28

## 2018-12-26 RX ADMIN — ENOXAPARIN SODIUM 40 MG: 100 INJECTION SUBCUTANEOUS at 08:28

## 2018-12-26 RX ADMIN — ONDANSETRON 4 MG: 2 INJECTION INTRAMUSCULAR; INTRAVENOUS at 19:53

## 2018-12-26 RX ADMIN — HYDROCODONE BITARTRATE AND ACETAMINOPHEN 1 TABLET: 5; 325 TABLET ORAL at 11:39

## 2018-12-26 RX ADMIN — LEVOFLOXACIN 500 MG: 5 INJECTION, SOLUTION INTRAVENOUS at 06:32

## 2018-12-26 RX ADMIN — METHYLPREDNISOLONE SODIUM SUCCINATE 40 MG: 40 INJECTION, POWDER, FOR SOLUTION INTRAMUSCULAR; INTRAVENOUS at 06:32

## 2018-12-26 RX ADMIN — LEVOTHYROXINE SODIUM 137 MCG: 112 TABLET ORAL at 08:28

## 2018-12-26 RX ADMIN — IPRATROPIUM BROMIDE AND ALBUTEROL SULFATE 1 AMPULE: .5; 3 SOLUTION RESPIRATORY (INHALATION) at 06:55

## 2018-12-26 RX ADMIN — MONTELUKAST SODIUM 10 MG: 10 TABLET, COATED ORAL at 19:53

## 2018-12-26 RX ADMIN — Medication 10 ML: at 19:55

## 2018-12-26 RX ADMIN — IPRATROPIUM BROMIDE AND ALBUTEROL SULFATE 1 AMPULE: .5; 3 SOLUTION RESPIRATORY (INHALATION) at 15:15

## 2018-12-26 RX ADMIN — IPRATROPIUM BROMIDE AND ALBUTEROL SULFATE 1 AMPULE: .5; 3 SOLUTION RESPIRATORY (INHALATION) at 11:03

## 2018-12-26 RX ADMIN — MUPIROCIN: 20 OINTMENT TOPICAL at 19:54

## 2018-12-26 RX ADMIN — SODIUM CHLORIDE: 9 INJECTION, SOLUTION INTRAVENOUS at 19:53

## 2018-12-26 RX ADMIN — MUPIROCIN: 20 OINTMENT TOPICAL at 08:29

## 2018-12-26 RX ADMIN — SODIUM CHLORIDE: 9 INJECTION, SOLUTION INTRAVENOUS at 06:33

## 2018-12-26 RX ADMIN — IPRATROPIUM BROMIDE AND ALBUTEROL SULFATE 1 AMPULE: .5; 3 SOLUTION RESPIRATORY (INHALATION) at 23:27

## 2018-12-26 ASSESSMENT — PAIN SCALES - GENERAL
PAINLEVEL_OUTOF10: 9
PAINLEVEL_OUTOF10: 9
PAINLEVEL_OUTOF10: 8
PAINLEVEL_OUTOF10: 7
PAINLEVEL_OUTOF10: 6

## 2018-12-26 ASSESSMENT — PAIN DESCRIPTION - ORIENTATION: ORIENTATION: LOWER

## 2018-12-26 ASSESSMENT — PAIN DESCRIPTION - DESCRIPTORS: DESCRIPTORS: ACHING

## 2018-12-26 ASSESSMENT — PAIN DESCRIPTION - PAIN TYPE: TYPE: CHRONIC PAIN

## 2018-12-26 ASSESSMENT — PAIN DESCRIPTION - LOCATION: LOCATION: BACK

## 2018-12-27 PROCEDURE — 6360000002 HC RX W HCPCS: Performed by: INTERNAL MEDICINE

## 2018-12-27 PROCEDURE — 99232 SBSQ HOSP IP/OBS MODERATE 35: CPT | Performed by: INTERNAL MEDICINE

## 2018-12-27 PROCEDURE — 2700000000 HC OXYGEN THERAPY PER DAY

## 2018-12-27 PROCEDURE — 97162 PT EVAL MOD COMPLEX 30 MIN: CPT

## 2018-12-27 PROCEDURE — 2000000000 HC ICU R&B

## 2018-12-27 PROCEDURE — 6370000000 HC RX 637 (ALT 250 FOR IP)

## 2018-12-27 PROCEDURE — 97530 THERAPEUTIC ACTIVITIES: CPT

## 2018-12-27 PROCEDURE — G8978 MOBILITY CURRENT STATUS: HCPCS

## 2018-12-27 PROCEDURE — 99291 CRITICAL CARE FIRST HOUR: CPT | Performed by: INTERNAL MEDICINE

## 2018-12-27 PROCEDURE — 6370000000 HC RX 637 (ALT 250 FOR IP): Performed by: INTERNAL MEDICINE

## 2018-12-27 PROCEDURE — 97535 SELF CARE MNGMENT TRAINING: CPT

## 2018-12-27 PROCEDURE — 2580000003 HC RX 258: Performed by: INTERNAL MEDICINE

## 2018-12-27 PROCEDURE — 94640 AIRWAY INHALATION TREATMENT: CPT

## 2018-12-27 PROCEDURE — G8979 MOBILITY GOAL STATUS: HCPCS

## 2018-12-27 PROCEDURE — 97166 OT EVAL MOD COMPLEX 45 MIN: CPT

## 2018-12-27 PROCEDURE — G8988 SELF CARE GOAL STATUS: HCPCS

## 2018-12-27 PROCEDURE — G8987 SELF CARE CURRENT STATUS: HCPCS

## 2018-12-27 PROCEDURE — 94660 CPAP INITIATION&MGMT: CPT

## 2018-12-27 RX ORDER — DIMETHICONE, OXYBENZONE, AND PADIMATE O 2; 2.5; 6.6 G/100G; G/100G; G/100G
STICK TOPICAL
Status: COMPLETED
Start: 2018-12-27 | End: 2018-12-27

## 2018-12-27 RX ADMIN — HYDROCODONE BITARTRATE AND ACETAMINOPHEN 1 TABLET: 5; 325 TABLET ORAL at 03:58

## 2018-12-27 RX ADMIN — LEVOTHYROXINE SODIUM 137 MCG: 112 TABLET ORAL at 08:58

## 2018-12-27 RX ADMIN — MUPIROCIN: 20 OINTMENT TOPICAL at 20:49

## 2018-12-27 RX ADMIN — Medication 10 ML: at 20:49

## 2018-12-27 RX ADMIN — IPRATROPIUM BROMIDE AND ALBUTEROL SULFATE 1 AMPULE: .5; 3 SOLUTION RESPIRATORY (INHALATION) at 07:53

## 2018-12-27 RX ADMIN — MUPIROCIN: 20 OINTMENT TOPICAL at 08:58

## 2018-12-27 RX ADMIN — ENOXAPARIN SODIUM 40 MG: 100 INJECTION SUBCUTANEOUS at 08:58

## 2018-12-27 RX ADMIN — HYDROCODONE BITARTRATE AND ACETAMINOPHEN 1 TABLET: 5; 325 TABLET ORAL at 23:05

## 2018-12-27 RX ADMIN — IPRATROPIUM BROMIDE AND ALBUTEROL SULFATE 1 AMPULE: .5; 3 SOLUTION RESPIRATORY (INHALATION) at 15:06

## 2018-12-27 RX ADMIN — ONDANSETRON 4 MG: 2 INJECTION INTRAMUSCULAR; INTRAVENOUS at 03:58

## 2018-12-27 RX ADMIN — LEVOFLOXACIN 500 MG: 5 INJECTION, SOLUTION INTRAVENOUS at 06:11

## 2018-12-27 RX ADMIN — HYDROCODONE BITARTRATE AND ACETAMINOPHEN 1 TABLET: 5; 325 TABLET ORAL at 11:16

## 2018-12-27 RX ADMIN — IPRATROPIUM BROMIDE AND ALBUTEROL SULFATE 1 AMPULE: .5; 3 SOLUTION RESPIRATORY (INHALATION) at 12:01

## 2018-12-27 RX ADMIN — IPRATROPIUM BROMIDE AND ALBUTEROL SULFATE 1 AMPULE: .5; 3 SOLUTION RESPIRATORY (INHALATION) at 19:51

## 2018-12-27 RX ADMIN — Medication 10 ML: at 08:58

## 2018-12-27 RX ADMIN — MONTELUKAST SODIUM 10 MG: 10 TABLET, COATED ORAL at 20:49

## 2018-12-27 RX ADMIN — ACETAMINOPHEN 650 MG: 325 TABLET ORAL at 07:07

## 2018-12-27 RX ADMIN — METHYLPREDNISOLONE SODIUM SUCCINATE 40 MG: 40 INJECTION, POWDER, FOR SOLUTION INTRAMUSCULAR; INTRAVENOUS at 18:11

## 2018-12-27 RX ADMIN — METHYLPREDNISOLONE SODIUM SUCCINATE 40 MG: 40 INJECTION, POWDER, FOR SOLUTION INTRAMUSCULAR; INTRAVENOUS at 06:11

## 2018-12-27 RX ADMIN — FLUTICASONE PROPIONATE 1 SPRAY: 50 SPRAY, METERED NASAL at 09:10

## 2018-12-27 RX ADMIN — Medication: at 09:09

## 2018-12-27 RX ADMIN — IPRATROPIUM BROMIDE AND ALBUTEROL SULFATE 1 AMPULE: .5; 3 SOLUTION RESPIRATORY (INHALATION) at 23:53

## 2018-12-27 ASSESSMENT — PAIN DESCRIPTION - PROGRESSION: CLINICAL_PROGRESSION: GRADUALLY WORSENING

## 2018-12-27 ASSESSMENT — PAIN DESCRIPTION - LOCATION: LOCATION: HIP;BACK

## 2018-12-27 ASSESSMENT — PAIN DESCRIPTION - DESCRIPTORS: DESCRIPTORS: ACHING

## 2018-12-27 ASSESSMENT — PAIN DESCRIPTION - PAIN TYPE: TYPE: CHRONIC PAIN

## 2018-12-27 ASSESSMENT — PAIN DESCRIPTION - ORIENTATION: ORIENTATION: RIGHT;LEFT;LOWER

## 2018-12-27 ASSESSMENT — PAIN SCALES - GENERAL
PAINLEVEL_OUTOF10: 9
PAINLEVEL_OUTOF10: 6
PAINLEVEL_OUTOF10: 9
PAINLEVEL_OUTOF10: 3
PAINLEVEL_OUTOF10: 7
PAINLEVEL_OUTOF10: 0
PAINLEVEL_OUTOF10: 0
PAINLEVEL_OUTOF10: 5

## 2018-12-27 ASSESSMENT — PAIN DESCRIPTION - ONSET: ONSET: ON-GOING

## 2018-12-27 ASSESSMENT — PAIN DESCRIPTION - FREQUENCY: FREQUENCY: CONTINUOUS

## 2018-12-28 ENCOUNTER — APPOINTMENT (OUTPATIENT)
Dept: GENERAL RADIOLOGY | Age: 58
DRG: 193 | End: 2018-12-28
Payer: COMMERCIAL

## 2018-12-28 LAB
ALBUMIN SERPL-MCNC: 3.5 G/DL (ref 3.4–5)
ALP BLD-CCNC: 65 U/L (ref 40–129)
ALT SERPL-CCNC: 76 U/L (ref 10–40)
ANION GAP SERPL CALCULATED.3IONS-SCNC: 5 MMOL/L (ref 3–16)
AST SERPL-CCNC: 45 U/L (ref 15–37)
BASE EXCESS ARTERIAL: 10 MMOL/L (ref -3–3)
BILIRUB SERPL-MCNC: <0.2 MG/DL (ref 0–1)
BILIRUBIN DIRECT: <0.2 MG/DL (ref 0–0.3)
BILIRUBIN, INDIRECT: ABNORMAL MG/DL (ref 0–1)
BUN BLDV-MCNC: 21 MG/DL (ref 7–20)
CALCIUM SERPL-MCNC: 8.6 MG/DL (ref 8.3–10.6)
CARBOXYHEMOGLOBIN ARTERIAL: 0.7 % (ref 0–1.5)
CHLORIDE BLD-SCNC: 95 MMOL/L (ref 99–110)
CO2: 33 MMOL/L (ref 21–32)
CREAT SERPL-MCNC: <0.5 MG/DL (ref 0.6–1.2)
EKG ATRIAL RATE: 117 BPM
EKG DIAGNOSIS: NORMAL
EKG P AXIS: 90 DEGREES
EKG P-R INTERVAL: 124 MS
EKG Q-T INTERVAL: 324 MS
EKG QRS DURATION: 84 MS
EKG QTC CALCULATION (BAZETT): 451 MS
EKG R AXIS: 89 DEGREES
EKG T AXIS: 84 DEGREES
EKG VENTRICULAR RATE: 117 BPM
GFR AFRICAN AMERICAN: >60
GFR NON-AFRICAN AMERICAN: >60
GLUCOSE BLD-MCNC: 123 MG/DL (ref 70–99)
HCO3 ARTERIAL: 38.1 MMOL/L (ref 21–29)
HCT VFR BLD CALC: 38.1 % (ref 36–48)
HEMOGLOBIN, ART, EXTENDED: 12.7 G/DL (ref 12–16)
HEMOGLOBIN: 12.7 G/DL (ref 12–16)
MAGNESIUM: 2.2 MG/DL (ref 1.8–2.4)
MCH RBC QN AUTO: 31.9 PG (ref 26–34)
MCHC RBC AUTO-ENTMCNC: 33.3 G/DL (ref 31–36)
MCV RBC AUTO: 95.7 FL (ref 80–100)
METHEMOGLOBIN ARTERIAL: 0.3 %
O2 CONTENT ARTERIAL: 18 ML/DL
O2 SAT, ARTERIAL: 98.1 %
O2 THERAPY: ABNORMAL
PCO2 ARTERIAL: 69.3 MMHG (ref 35–45)
PDW BLD-RTO: 12.7 % (ref 12.4–15.4)
PH ARTERIAL: 7.36 (ref 7.35–7.45)
PHOSPHORUS: 1.6 MG/DL (ref 2.5–4.9)
PLATELET # BLD: 193 K/UL (ref 135–450)
PMV BLD AUTO: 8.4 FL (ref 5–10.5)
PO2 ARTERIAL: 120.1 MMHG (ref 75–108)
POTASSIUM SERPL-SCNC: 4.2 MMOL/L (ref 3.5–5.1)
RBC # BLD: 3.98 M/UL (ref 4–5.2)
SODIUM BLD-SCNC: 133 MMOL/L (ref 136–145)
TCO2 ARTERIAL: 40.2 MMOL/L
TOTAL PROTEIN: 5.9 G/DL (ref 6.4–8.2)
WBC # BLD: 10.3 K/UL (ref 4–11)

## 2018-12-28 PROCEDURE — 80076 HEPATIC FUNCTION PANEL: CPT

## 2018-12-28 PROCEDURE — 6370000000 HC RX 637 (ALT 250 FOR IP): Performed by: INTERNAL MEDICINE

## 2018-12-28 PROCEDURE — 93010 ELECTROCARDIOGRAM REPORT: CPT | Performed by: INTERNAL MEDICINE

## 2018-12-28 PROCEDURE — 6360000002 HC RX W HCPCS: Performed by: INTERNAL MEDICINE

## 2018-12-28 PROCEDURE — 2700000000 HC OXYGEN THERAPY PER DAY

## 2018-12-28 PROCEDURE — 71045 X-RAY EXAM CHEST 1 VIEW: CPT

## 2018-12-28 PROCEDURE — 85027 COMPLETE CBC AUTOMATED: CPT

## 2018-12-28 PROCEDURE — 2580000003 HC RX 258: Performed by: INTERNAL MEDICINE

## 2018-12-28 PROCEDURE — 83735 ASSAY OF MAGNESIUM: CPT

## 2018-12-28 PROCEDURE — 97535 SELF CARE MNGMENT TRAINING: CPT

## 2018-12-28 PROCEDURE — 99232 SBSQ HOSP IP/OBS MODERATE 35: CPT | Performed by: INTERNAL MEDICINE

## 2018-12-28 PROCEDURE — 99291 CRITICAL CARE FIRST HOUR: CPT | Performed by: INTERNAL MEDICINE

## 2018-12-28 PROCEDURE — 2500000003 HC RX 250 WO HCPCS: Performed by: INTERNAL MEDICINE

## 2018-12-28 PROCEDURE — 2000000000 HC ICU R&B

## 2018-12-28 PROCEDURE — 97530 THERAPEUTIC ACTIVITIES: CPT

## 2018-12-28 PROCEDURE — 36415 COLL VENOUS BLD VENIPUNCTURE: CPT

## 2018-12-28 PROCEDURE — 94640 AIRWAY INHALATION TREATMENT: CPT

## 2018-12-28 PROCEDURE — 80048 BASIC METABOLIC PNL TOTAL CA: CPT

## 2018-12-28 PROCEDURE — 93005 ELECTROCARDIOGRAM TRACING: CPT | Performed by: INTERNAL MEDICINE

## 2018-12-28 PROCEDURE — 94762 N-INVAS EAR/PLS OXIMTRY CONT: CPT

## 2018-12-28 PROCEDURE — 94660 CPAP INITIATION&MGMT: CPT

## 2018-12-28 PROCEDURE — 84100 ASSAY OF PHOSPHORUS: CPT

## 2018-12-28 PROCEDURE — 82803 BLOOD GASES ANY COMBINATION: CPT

## 2018-12-28 RX ORDER — LORAZEPAM 2 MG/ML
0.5 INJECTION INTRAMUSCULAR EVERY 4 HOURS PRN
Status: DISCONTINUED | OUTPATIENT
Start: 2018-12-28 | End: 2019-01-02

## 2018-12-28 RX ORDER — DILTIAZEM HYDROCHLORIDE 5 MG/ML
10 INJECTION INTRAVENOUS ONCE
Status: DISCONTINUED | OUTPATIENT
Start: 2018-12-28 | End: 2018-12-28

## 2018-12-28 RX ADMIN — HYDROCODONE BITARTRATE AND ACETAMINOPHEN 1 TABLET: 5; 325 TABLET ORAL at 11:05

## 2018-12-28 RX ADMIN — IPRATROPIUM BROMIDE AND ALBUTEROL SULFATE 1 AMPULE: .5; 3 SOLUTION RESPIRATORY (INHALATION) at 19:37

## 2018-12-28 RX ADMIN — MUPIROCIN: 20 OINTMENT TOPICAL at 20:09

## 2018-12-28 RX ADMIN — SODIUM PHOSPHATE, MONOBASIC, MONOHYDRATE 30 MMOL: 276; 142 INJECTION, SOLUTION INTRAVENOUS at 11:01

## 2018-12-28 RX ADMIN — Medication 10 ML: at 07:48

## 2018-12-28 RX ADMIN — METHYLPREDNISOLONE SODIUM SUCCINATE 40 MG: 40 INJECTION, POWDER, FOR SOLUTION INTRAMUSCULAR; INTRAVENOUS at 18:55

## 2018-12-28 RX ADMIN — ENOXAPARIN SODIUM 40 MG: 100 INJECTION SUBCUTANEOUS at 07:47

## 2018-12-28 RX ADMIN — MUPIROCIN: 20 OINTMENT TOPICAL at 07:49

## 2018-12-28 RX ADMIN — LORAZEPAM 0.5 MG: 2 INJECTION INTRAMUSCULAR; INTRAVENOUS at 11:42

## 2018-12-28 RX ADMIN — DILTIAZEM HYDROCHLORIDE 5 MG/HR: 5 INJECTION INTRAVENOUS at 16:49

## 2018-12-28 RX ADMIN — IPRATROPIUM BROMIDE AND ALBUTEROL SULFATE 1 AMPULE: .5; 3 SOLUTION RESPIRATORY (INHALATION) at 07:34

## 2018-12-28 RX ADMIN — METHYLPREDNISOLONE SODIUM SUCCINATE 40 MG: 40 INJECTION, POWDER, FOR SOLUTION INTRAMUSCULAR; INTRAVENOUS at 06:06

## 2018-12-28 RX ADMIN — IPRATROPIUM BROMIDE AND ALBUTEROL SULFATE 1 AMPULE: .5; 3 SOLUTION RESPIRATORY (INHALATION) at 11:11

## 2018-12-28 RX ADMIN — Medication 10 ML: at 20:05

## 2018-12-28 RX ADMIN — MONTELUKAST SODIUM 10 MG: 10 TABLET, COATED ORAL at 20:04

## 2018-12-28 RX ADMIN — IPRATROPIUM BROMIDE AND ALBUTEROL SULFATE 1 AMPULE: .5; 3 SOLUTION RESPIRATORY (INHALATION) at 15:39

## 2018-12-28 RX ADMIN — LEVOFLOXACIN 500 MG: 5 INJECTION, SOLUTION INTRAVENOUS at 06:06

## 2018-12-28 RX ADMIN — HYDROCODONE BITARTRATE AND ACETAMINOPHEN 1 TABLET: 5; 325 TABLET ORAL at 20:04

## 2018-12-28 RX ADMIN — IPRATROPIUM BROMIDE AND ALBUTEROL SULFATE 1 AMPULE: .5; 3 SOLUTION RESPIRATORY (INHALATION) at 23:20

## 2018-12-28 RX ADMIN — FLUTICASONE PROPIONATE 1 SPRAY: 50 SPRAY, METERED NASAL at 07:49

## 2018-12-28 ASSESSMENT — PAIN DESCRIPTION - ONSET: ONSET: ON-GOING

## 2018-12-28 ASSESSMENT — PAIN SCALES - GENERAL
PAINLEVEL_OUTOF10: 0
PAINLEVEL_OUTOF10: 7
PAINLEVEL_OUTOF10: 6
PAINLEVEL_OUTOF10: 5
PAINLEVEL_OUTOF10: 0

## 2018-12-28 ASSESSMENT — PAIN DESCRIPTION - DESCRIPTORS: DESCRIPTORS: ACHING

## 2018-12-28 ASSESSMENT — PAIN DESCRIPTION - PROGRESSION: CLINICAL_PROGRESSION: GRADUALLY WORSENING

## 2018-12-28 ASSESSMENT — PAIN DESCRIPTION - PAIN TYPE: TYPE: CHRONIC PAIN

## 2018-12-28 ASSESSMENT — PAIN DESCRIPTION - LOCATION: LOCATION: HIP;BACK

## 2018-12-28 ASSESSMENT — PAIN DESCRIPTION - FREQUENCY: FREQUENCY: CONTINUOUS

## 2018-12-28 ASSESSMENT — PAIN DESCRIPTION - ORIENTATION: ORIENTATION: RIGHT;LEFT;LOWER

## 2018-12-29 LAB
ANION GAP SERPL CALCULATED.3IONS-SCNC: 7 MMOL/L (ref 3–16)
BUN BLDV-MCNC: 22 MG/DL (ref 7–20)
CALCIUM SERPL-MCNC: 8.7 MG/DL (ref 8.3–10.6)
CHLORIDE BLD-SCNC: 95 MMOL/L (ref 99–110)
CO2: 37 MMOL/L (ref 21–32)
CREAT SERPL-MCNC: <0.5 MG/DL (ref 0.6–1.2)
GFR AFRICAN AMERICAN: >60
GFR NON-AFRICAN AMERICAN: >60
GLUCOSE BLD-MCNC: 131 MG/DL (ref 70–99)
HCT VFR BLD CALC: 38.6 % (ref 36–48)
HEMOGLOBIN: 12.8 G/DL (ref 12–16)
MAGNESIUM: 2.3 MG/DL (ref 1.8–2.4)
MCH RBC QN AUTO: 32.3 PG (ref 26–34)
MCHC RBC AUTO-ENTMCNC: 33.1 G/DL (ref 31–36)
MCV RBC AUTO: 97.5 FL (ref 80–100)
PDW BLD-RTO: 12.8 % (ref 12.4–15.4)
PHOSPHORUS: 2.5 MG/DL (ref 2.5–4.9)
PLATELET # BLD: 224 K/UL (ref 135–450)
PMV BLD AUTO: 8.1 FL (ref 5–10.5)
POTASSIUM SERPL-SCNC: 4.4 MMOL/L (ref 3.5–5.1)
RBC # BLD: 3.96 M/UL (ref 4–5.2)
SODIUM BLD-SCNC: 139 MMOL/L (ref 136–145)
TROPONIN: <0.01 NG/ML
WBC # BLD: 9.9 K/UL (ref 4–11)

## 2018-12-29 PROCEDURE — 6360000002 HC RX W HCPCS: Performed by: INTERNAL MEDICINE

## 2018-12-29 PROCEDURE — 97530 THERAPEUTIC ACTIVITIES: CPT

## 2018-12-29 PROCEDURE — 99291 CRITICAL CARE FIRST HOUR: CPT | Performed by: INTERNAL MEDICINE

## 2018-12-29 PROCEDURE — 94640 AIRWAY INHALATION TREATMENT: CPT

## 2018-12-29 PROCEDURE — 6370000000 HC RX 637 (ALT 250 FOR IP): Performed by: INTERNAL MEDICINE

## 2018-12-29 PROCEDURE — 80048 BASIC METABOLIC PNL TOTAL CA: CPT

## 2018-12-29 PROCEDURE — 2500000003 HC RX 250 WO HCPCS: Performed by: INTERNAL MEDICINE

## 2018-12-29 PROCEDURE — 2580000003 HC RX 258: Performed by: INTERNAL MEDICINE

## 2018-12-29 PROCEDURE — 2700000000 HC OXYGEN THERAPY PER DAY

## 2018-12-29 PROCEDURE — 2000000000 HC ICU R&B

## 2018-12-29 PROCEDURE — 84100 ASSAY OF PHOSPHORUS: CPT

## 2018-12-29 PROCEDURE — 85027 COMPLETE CBC AUTOMATED: CPT

## 2018-12-29 PROCEDURE — 36415 COLL VENOUS BLD VENIPUNCTURE: CPT

## 2018-12-29 PROCEDURE — 83735 ASSAY OF MAGNESIUM: CPT

## 2018-12-29 PROCEDURE — 84484 ASSAY OF TROPONIN QUANT: CPT

## 2018-12-29 PROCEDURE — 94762 N-INVAS EAR/PLS OXIMTRY CONT: CPT

## 2018-12-29 PROCEDURE — 99232 SBSQ HOSP IP/OBS MODERATE 35: CPT | Performed by: INTERNAL MEDICINE

## 2018-12-29 RX ORDER — SODIUM CHLORIDE 9 MG/ML
INJECTION, SOLUTION INTRAVENOUS CONTINUOUS
Status: DISCONTINUED | OUTPATIENT
Start: 2018-12-29 | End: 2018-12-30

## 2018-12-29 RX ADMIN — Medication 10 ML: at 20:14

## 2018-12-29 RX ADMIN — LEVOTHYROXINE SODIUM 137 MCG: 112 TABLET ORAL at 08:59

## 2018-12-29 RX ADMIN — SODIUM CHLORIDE: 9 INJECTION, SOLUTION INTRAVENOUS at 13:58

## 2018-12-29 RX ADMIN — DILTIAZEM HYDROCHLORIDE 5 MG/HR: 5 INJECTION INTRAVENOUS at 08:44

## 2018-12-29 RX ADMIN — METHYLPREDNISOLONE SODIUM SUCCINATE 40 MG: 40 INJECTION, POWDER, FOR SOLUTION INTRAMUSCULAR; INTRAVENOUS at 05:45

## 2018-12-29 RX ADMIN — FLUTICASONE PROPIONATE 1 SPRAY: 50 SPRAY, METERED NASAL at 08:59

## 2018-12-29 RX ADMIN — HYDROCODONE BITARTRATE AND ACETAMINOPHEN 1 TABLET: 5; 325 TABLET ORAL at 19:15

## 2018-12-29 RX ADMIN — HYDROCODONE BITARTRATE AND ACETAMINOPHEN 1 TABLET: 5; 325 TABLET ORAL at 06:13

## 2018-12-29 RX ADMIN — LEVOFLOXACIN 500 MG: 5 INJECTION, SOLUTION INTRAVENOUS at 05:45

## 2018-12-29 RX ADMIN — MONTELUKAST SODIUM 10 MG: 10 TABLET, COATED ORAL at 20:14

## 2018-12-29 RX ADMIN — IPRATROPIUM BROMIDE AND ALBUTEROL SULFATE 1 AMPULE: .5; 3 SOLUTION RESPIRATORY (INHALATION) at 14:49

## 2018-12-29 RX ADMIN — LORAZEPAM 0.5 MG: 2 INJECTION INTRAMUSCULAR; INTRAVENOUS at 03:11

## 2018-12-29 RX ADMIN — IPRATROPIUM BROMIDE AND ALBUTEROL SULFATE 1 AMPULE: .5; 3 SOLUTION RESPIRATORY (INHALATION) at 19:03

## 2018-12-29 RX ADMIN — IPRATROPIUM BROMIDE AND ALBUTEROL SULFATE 1 AMPULE: .5; 3 SOLUTION RESPIRATORY (INHALATION) at 06:42

## 2018-12-29 RX ADMIN — Medication 10 ML: at 08:44

## 2018-12-29 RX ADMIN — ENOXAPARIN SODIUM 40 MG: 100 INJECTION SUBCUTANEOUS at 08:44

## 2018-12-29 RX ADMIN — METHYLPREDNISOLONE SODIUM SUCCINATE 40 MG: 40 INJECTION, POWDER, FOR SOLUTION INTRAMUSCULAR; INTRAVENOUS at 18:20

## 2018-12-29 RX ADMIN — MUPIROCIN: 20 OINTMENT TOPICAL at 08:46

## 2018-12-29 RX ADMIN — MUPIROCIN: 20 OINTMENT TOPICAL at 20:14

## 2018-12-29 RX ADMIN — IPRATROPIUM BROMIDE AND ALBUTEROL SULFATE 1 AMPULE: .5; 3 SOLUTION RESPIRATORY (INHALATION) at 23:11

## 2018-12-29 RX ADMIN — IPRATROPIUM BROMIDE AND ALBUTEROL SULFATE 1 AMPULE: .5; 3 SOLUTION RESPIRATORY (INHALATION) at 11:30

## 2018-12-29 RX ADMIN — HYDROCODONE BITARTRATE AND ACETAMINOPHEN 1 TABLET: 5; 325 TABLET ORAL at 12:17

## 2018-12-29 ASSESSMENT — PAIN SCALES - GENERAL
PAINLEVEL_OUTOF10: 8
PAINLEVEL_OUTOF10: 8
PAINLEVEL_OUTOF10: 0
PAINLEVEL_OUTOF10: 5
PAINLEVEL_OUTOF10: 7

## 2018-12-30 LAB
ANION GAP SERPL CALCULATED.3IONS-SCNC: 5 MMOL/L (ref 3–16)
BUN BLDV-MCNC: 24 MG/DL (ref 7–20)
CALCIUM SERPL-MCNC: 9 MG/DL (ref 8.3–10.6)
CHLORIDE BLD-SCNC: 95 MMOL/L (ref 99–110)
CO2: 35 MMOL/L (ref 21–32)
CREAT SERPL-MCNC: <0.5 MG/DL (ref 0.6–1.2)
GFR AFRICAN AMERICAN: >60
GFR NON-AFRICAN AMERICAN: >60
GLUCOSE BLD-MCNC: 115 MG/DL (ref 70–99)
GLUCOSE BLD-MCNC: 128 MG/DL (ref 70–99)
GLUCOSE BLD-MCNC: 136 MG/DL (ref 70–99)
GLUCOSE BLD-MCNC: 147 MG/DL (ref 70–99)
HCT VFR BLD CALC: 40.3 % (ref 36–48)
HEMOGLOBIN: 13.4 G/DL (ref 12–16)
MAGNESIUM: 2.1 MG/DL (ref 1.8–2.4)
MCH RBC QN AUTO: 32.2 PG (ref 26–34)
MCHC RBC AUTO-ENTMCNC: 33.1 G/DL (ref 31–36)
MCV RBC AUTO: 97.1 FL (ref 80–100)
PDW BLD-RTO: 12.6 % (ref 12.4–15.4)
PERFORMED ON: ABNORMAL
PHOSPHORUS: 2.8 MG/DL (ref 2.5–4.9)
PLATELET # BLD: 222 K/UL (ref 135–450)
PMV BLD AUTO: 7.9 FL (ref 5–10.5)
POTASSIUM SERPL-SCNC: 4.2 MMOL/L (ref 3.5–5.1)
RBC # BLD: 4.15 M/UL (ref 4–5.2)
SODIUM BLD-SCNC: 135 MMOL/L (ref 136–145)
WBC # BLD: 9.2 K/UL (ref 4–11)

## 2018-12-30 PROCEDURE — 94640 AIRWAY INHALATION TREATMENT: CPT

## 2018-12-30 PROCEDURE — 99232 SBSQ HOSP IP/OBS MODERATE 35: CPT | Performed by: INTERNAL MEDICINE

## 2018-12-30 PROCEDURE — 6360000002 HC RX W HCPCS: Performed by: INTERNAL MEDICINE

## 2018-12-30 PROCEDURE — 6370000000 HC RX 637 (ALT 250 FOR IP): Performed by: INTERNAL MEDICINE

## 2018-12-30 PROCEDURE — 83735 ASSAY OF MAGNESIUM: CPT

## 2018-12-30 PROCEDURE — 36415 COLL VENOUS BLD VENIPUNCTURE: CPT

## 2018-12-30 PROCEDURE — 99233 SBSQ HOSP IP/OBS HIGH 50: CPT | Performed by: INTERNAL MEDICINE

## 2018-12-30 PROCEDURE — 94762 N-INVAS EAR/PLS OXIMTRY CONT: CPT

## 2018-12-30 PROCEDURE — 84100 ASSAY OF PHOSPHORUS: CPT

## 2018-12-30 PROCEDURE — 80048 BASIC METABOLIC PNL TOTAL CA: CPT

## 2018-12-30 PROCEDURE — 97530 THERAPEUTIC ACTIVITIES: CPT

## 2018-12-30 PROCEDURE — 83036 HEMOGLOBIN GLYCOSYLATED A1C: CPT

## 2018-12-30 PROCEDURE — 85027 COMPLETE CBC AUTOMATED: CPT

## 2018-12-30 PROCEDURE — 2580000003 HC RX 258: Performed by: INTERNAL MEDICINE

## 2018-12-30 PROCEDURE — 94660 CPAP INITIATION&MGMT: CPT

## 2018-12-30 PROCEDURE — 2700000000 HC OXYGEN THERAPY PER DAY

## 2018-12-30 PROCEDURE — 2000000000 HC ICU R&B

## 2018-12-30 RX ORDER — DEXTROSE MONOHYDRATE 50 MG/ML
100 INJECTION, SOLUTION INTRAVENOUS PRN
Status: DISCONTINUED | OUTPATIENT
Start: 2018-12-30 | End: 2019-01-03 | Stop reason: HOSPADM

## 2018-12-30 RX ORDER — DEXTROSE MONOHYDRATE 25 G/50ML
12.5 INJECTION, SOLUTION INTRAVENOUS PRN
Status: DISCONTINUED | OUTPATIENT
Start: 2018-12-30 | End: 2019-01-03 | Stop reason: HOSPADM

## 2018-12-30 RX ORDER — NICOTINE POLACRILEX 4 MG
15 LOZENGE BUCCAL PRN
Status: DISCONTINUED | OUTPATIENT
Start: 2018-12-30 | End: 2019-01-03 | Stop reason: HOSPADM

## 2018-12-30 RX ADMIN — LORAZEPAM 0.5 MG: 2 INJECTION INTRAMUSCULAR; INTRAVENOUS at 10:40

## 2018-12-30 RX ADMIN — ENOXAPARIN SODIUM 40 MG: 100 INJECTION SUBCUTANEOUS at 08:13

## 2018-12-30 RX ADMIN — IPRATROPIUM BROMIDE AND ALBUTEROL SULFATE 1 AMPULE: .5; 3 SOLUTION RESPIRATORY (INHALATION) at 10:46

## 2018-12-30 RX ADMIN — METHYLPREDNISOLONE SODIUM SUCCINATE 40 MG: 40 INJECTION, POWDER, FOR SOLUTION INTRAMUSCULAR; INTRAVENOUS at 06:06

## 2018-12-30 RX ADMIN — LORAZEPAM 0.5 MG: 2 INJECTION INTRAMUSCULAR; INTRAVENOUS at 21:55

## 2018-12-30 RX ADMIN — SODIUM CHLORIDE: 9 INJECTION, SOLUTION INTRAVENOUS at 04:54

## 2018-12-30 RX ADMIN — HYDROCODONE BITARTRATE AND ACETAMINOPHEN 1 TABLET: 5; 325 TABLET ORAL at 04:54

## 2018-12-30 RX ADMIN — IPRATROPIUM BROMIDE AND ALBUTEROL SULFATE 1 AMPULE: .5; 3 SOLUTION RESPIRATORY (INHALATION) at 22:21

## 2018-12-30 RX ADMIN — IPRATROPIUM BROMIDE AND ALBUTEROL SULFATE 1 AMPULE: .5; 3 SOLUTION RESPIRATORY (INHALATION) at 18:00

## 2018-12-30 RX ADMIN — LEVOFLOXACIN 500 MG: 5 INJECTION, SOLUTION INTRAVENOUS at 06:06

## 2018-12-30 RX ADMIN — Medication 10 ML: at 08:12

## 2018-12-30 RX ADMIN — LEVOTHYROXINE SODIUM 137 MCG: 112 TABLET ORAL at 08:11

## 2018-12-30 RX ADMIN — LORAZEPAM 0.5 MG: 2 INJECTION INTRAMUSCULAR; INTRAVENOUS at 15:01

## 2018-12-30 RX ADMIN — IPRATROPIUM BROMIDE AND ALBUTEROL SULFATE 1 AMPULE: .5; 3 SOLUTION RESPIRATORY (INHALATION) at 06:48

## 2018-12-30 RX ADMIN — Medication 10 ML: at 21:46

## 2018-12-30 RX ADMIN — METHYLPREDNISOLONE SODIUM SUCCINATE 40 MG: 40 INJECTION, POWDER, FOR SOLUTION INTRAMUSCULAR; INTRAVENOUS at 18:45

## 2018-12-30 RX ADMIN — MONTELUKAST SODIUM 10 MG: 10 TABLET, COATED ORAL at 21:46

## 2018-12-30 RX ADMIN — IPRATROPIUM BROMIDE AND ALBUTEROL SULFATE 1 AMPULE: .5; 3 SOLUTION RESPIRATORY (INHALATION) at 14:57

## 2018-12-30 RX ADMIN — FLUTICASONE PROPIONATE 1 SPRAY: 50 SPRAY, METERED NASAL at 08:11

## 2018-12-30 ASSESSMENT — PAIN SCALES - GENERAL
PAINLEVEL_OUTOF10: 5
PAINLEVEL_OUTOF10: 0

## 2018-12-31 PROBLEM — E44.1 MILD PROTEIN-CALORIE MALNUTRITION (HCC): Status: ACTIVE | Noted: 2018-12-31

## 2018-12-31 LAB
ANION GAP SERPL CALCULATED.3IONS-SCNC: 4 MMOL/L (ref 3–16)
BASE EXCESS ARTERIAL: 12 MMOL/L (ref -3–3)
BUN BLDV-MCNC: 23 MG/DL (ref 7–20)
CALCIUM SERPL-MCNC: 9.2 MG/DL (ref 8.3–10.6)
CARBOXYHEMOGLOBIN ARTERIAL: 1.3 % (ref 0–1.5)
CHLORIDE BLD-SCNC: 96 MMOL/L (ref 99–110)
CO2: 37 MMOL/L (ref 21–32)
CREAT SERPL-MCNC: <0.5 MG/DL (ref 0.6–1.2)
ESTIMATED AVERAGE GLUCOSE: 114 MG/DL
GFR AFRICAN AMERICAN: >60
GFR NON-AFRICAN AMERICAN: >60
GLUCOSE BLD-MCNC: 109 MG/DL (ref 70–99)
GLUCOSE BLD-MCNC: 117 MG/DL (ref 70–99)
GLUCOSE BLD-MCNC: 126 MG/DL (ref 70–99)
GLUCOSE BLD-MCNC: 134 MG/DL (ref 70–99)
GLUCOSE BLD-MCNC: 140 MG/DL (ref 70–99)
HBA1C MFR BLD: 5.6 %
HCO3 ARTERIAL: 38.5 MMOL/L (ref 21–29)
HCT VFR BLD CALC: 41.2 % (ref 36–48)
HEMOGLOBIN, ART, EXTENDED: 13.7 G/DL (ref 12–16)
HEMOGLOBIN: 13.5 G/DL (ref 12–16)
LV EF: 58 %
LVEF MODALITY: NORMAL
MAGNESIUM: 2.1 MG/DL (ref 1.8–2.4)
MCH RBC QN AUTO: 31.8 PG (ref 26–34)
MCHC RBC AUTO-ENTMCNC: 32.7 G/DL (ref 31–36)
MCV RBC AUTO: 97.2 FL (ref 80–100)
METHEMOGLOBIN ARTERIAL: 0.3 %
O2 CONTENT ARTERIAL: 19 ML/DL
O2 SAT, ARTERIAL: 97.3 %
O2 THERAPY: ABNORMAL
PCO2 ARTERIAL: 57.4 MMHG (ref 35–45)
PDW BLD-RTO: 12.8 % (ref 12.4–15.4)
PERFORMED ON: ABNORMAL
PH ARTERIAL: 7.44 (ref 7.35–7.45)
PHOSPHORUS: 2.9 MG/DL (ref 2.5–4.9)
PLATELET # BLD: 241 K/UL (ref 135–450)
PMV BLD AUTO: 8.3 FL (ref 5–10.5)
PO2 ARTERIAL: 94.8 MMHG (ref 75–108)
POTASSIUM SERPL-SCNC: 4.4 MMOL/L (ref 3.5–5.1)
RBC # BLD: 4.24 M/UL (ref 4–5.2)
SODIUM BLD-SCNC: 137 MMOL/L (ref 136–145)
TCO2 ARTERIAL: 40.2 MMOL/L
WBC # BLD: 11.7 K/UL (ref 4–11)

## 2018-12-31 PROCEDURE — 99291 CRITICAL CARE FIRST HOUR: CPT | Performed by: INTERNAL MEDICINE

## 2018-12-31 PROCEDURE — 85027 COMPLETE CBC AUTOMATED: CPT

## 2018-12-31 PROCEDURE — 6360000002 HC RX W HCPCS: Performed by: INTERNAL MEDICINE

## 2018-12-31 PROCEDURE — 97110 THERAPEUTIC EXERCISES: CPT

## 2018-12-31 PROCEDURE — 6370000000 HC RX 637 (ALT 250 FOR IP): Performed by: INTERNAL MEDICINE

## 2018-12-31 PROCEDURE — 2000000000 HC ICU R&B

## 2018-12-31 PROCEDURE — 80048 BASIC METABOLIC PNL TOTAL CA: CPT

## 2018-12-31 PROCEDURE — 94660 CPAP INITIATION&MGMT: CPT

## 2018-12-31 PROCEDURE — 94664 DEMO&/EVAL PT USE INHALER: CPT

## 2018-12-31 PROCEDURE — 94762 N-INVAS EAR/PLS OXIMTRY CONT: CPT

## 2018-12-31 PROCEDURE — 84100 ASSAY OF PHOSPHORUS: CPT

## 2018-12-31 PROCEDURE — 83735 ASSAY OF MAGNESIUM: CPT

## 2018-12-31 PROCEDURE — 2580000003 HC RX 258: Performed by: INTERNAL MEDICINE

## 2018-12-31 PROCEDURE — 2700000000 HC OXYGEN THERAPY PER DAY

## 2018-12-31 PROCEDURE — 94640 AIRWAY INHALATION TREATMENT: CPT

## 2018-12-31 PROCEDURE — 82803 BLOOD GASES ANY COMBINATION: CPT

## 2018-12-31 PROCEDURE — 93306 TTE W/DOPPLER COMPLETE: CPT

## 2018-12-31 PROCEDURE — 99232 SBSQ HOSP IP/OBS MODERATE 35: CPT | Performed by: INTERNAL MEDICINE

## 2018-12-31 PROCEDURE — 36415 COLL VENOUS BLD VENIPUNCTURE: CPT

## 2018-12-31 RX ADMIN — IPRATROPIUM BROMIDE AND ALBUTEROL SULFATE 1 AMPULE: .5; 3 SOLUTION RESPIRATORY (INHALATION) at 11:20

## 2018-12-31 RX ADMIN — INSULIN LISPRO 2 UNITS: 100 INJECTION, SOLUTION INTRAVENOUS; SUBCUTANEOUS at 16:35

## 2018-12-31 RX ADMIN — Medication 10 ML: at 21:59

## 2018-12-31 RX ADMIN — IPRATROPIUM BROMIDE AND ALBUTEROL SULFATE 1 AMPULE: .5; 3 SOLUTION RESPIRATORY (INHALATION) at 22:50

## 2018-12-31 RX ADMIN — IPRATROPIUM BROMIDE AND ALBUTEROL SULFATE 1 AMPULE: .5; 3 SOLUTION RESPIRATORY (INHALATION) at 14:55

## 2018-12-31 RX ADMIN — IPRATROPIUM BROMIDE AND ALBUTEROL SULFATE 1 AMPULE: .5; 3 SOLUTION RESPIRATORY (INHALATION) at 19:17

## 2018-12-31 RX ADMIN — LORAZEPAM 0.5 MG: 2 INJECTION INTRAMUSCULAR; INTRAVENOUS at 18:01

## 2018-12-31 RX ADMIN — METHYLPREDNISOLONE SODIUM SUCCINATE 40 MG: 40 INJECTION, POWDER, FOR SOLUTION INTRAMUSCULAR; INTRAVENOUS at 07:18

## 2018-12-31 RX ADMIN — FLUTICASONE PROPIONATE 1 SPRAY: 50 SPRAY, METERED NASAL at 08:30

## 2018-12-31 RX ADMIN — METHYLPREDNISOLONE SODIUM SUCCINATE 40 MG: 40 INJECTION, POWDER, FOR SOLUTION INTRAMUSCULAR; INTRAVENOUS at 18:29

## 2018-12-31 RX ADMIN — MONTELUKAST SODIUM 10 MG: 10 TABLET, COATED ORAL at 20:24

## 2018-12-31 RX ADMIN — LEVOFLOXACIN 500 MG: 5 INJECTION, SOLUTION INTRAVENOUS at 07:17

## 2018-12-31 RX ADMIN — LORAZEPAM 0.5 MG: 2 INJECTION INTRAMUSCULAR; INTRAVENOUS at 21:59

## 2018-12-31 RX ADMIN — IPRATROPIUM BROMIDE AND ALBUTEROL SULFATE 1 AMPULE: .5; 3 SOLUTION RESPIRATORY (INHALATION) at 07:09

## 2018-12-31 RX ADMIN — ENOXAPARIN SODIUM 40 MG: 100 INJECTION SUBCUTANEOUS at 08:29

## 2018-12-31 RX ADMIN — LEVOTHYROXINE SODIUM 137 MCG: 112 TABLET ORAL at 08:29

## 2018-12-31 RX ADMIN — Medication 10 ML: at 08:29

## 2018-12-31 RX ADMIN — Medication 10 ML: at 20:24

## 2018-12-31 RX ADMIN — Medication 10 ML: at 07:18

## 2018-12-31 ASSESSMENT — PAIN DESCRIPTION - FREQUENCY: FREQUENCY: INTERMITTENT

## 2018-12-31 ASSESSMENT — PAIN DESCRIPTION - ONSET: ONSET: GRADUAL

## 2018-12-31 ASSESSMENT — PAIN DESCRIPTION - PAIN TYPE: TYPE: ACUTE PAIN

## 2018-12-31 ASSESSMENT — PAIN DESCRIPTION - DESCRIPTORS: DESCRIPTORS: SPASM

## 2018-12-31 ASSESSMENT — PAIN SCALES - GENERAL
PAINLEVEL_OUTOF10: 0
PAINLEVEL_OUTOF10: 2

## 2018-12-31 ASSESSMENT — PAIN DESCRIPTION - LOCATION: LOCATION: LEG

## 2018-12-31 ASSESSMENT — PAIN DESCRIPTION - ORIENTATION: ORIENTATION: UPPER;ANTERIOR

## 2019-01-01 LAB
ANION GAP SERPL CALCULATED.3IONS-SCNC: 4 MMOL/L (ref 3–16)
BUN BLDV-MCNC: 21 MG/DL (ref 7–20)
CALCIUM SERPL-MCNC: 9.2 MG/DL (ref 8.3–10.6)
CHLORIDE BLD-SCNC: 96 MMOL/L (ref 99–110)
CO2: 38 MMOL/L (ref 21–32)
CREAT SERPL-MCNC: <0.5 MG/DL (ref 0.6–1.2)
GFR AFRICAN AMERICAN: >60
GFR NON-AFRICAN AMERICAN: >60
GLUCOSE BLD-MCNC: 122 MG/DL (ref 70–99)
GLUCOSE BLD-MCNC: 136 MG/DL (ref 70–99)
GLUCOSE BLD-MCNC: 141 MG/DL (ref 70–99)
GLUCOSE BLD-MCNC: 148 MG/DL (ref 70–99)
GLUCOSE BLD-MCNC: 98 MG/DL (ref 70–99)
HCT VFR BLD CALC: 41.5 % (ref 36–48)
HEMOGLOBIN: 13.7 G/DL (ref 12–16)
MAGNESIUM: 2.1 MG/DL (ref 1.8–2.4)
MCH RBC QN AUTO: 31.5 PG (ref 26–34)
MCHC RBC AUTO-ENTMCNC: 33 G/DL (ref 31–36)
MCV RBC AUTO: 95.5 FL (ref 80–100)
PDW BLD-RTO: 12.8 % (ref 12.4–15.4)
PERFORMED ON: ABNORMAL
PERFORMED ON: NORMAL
PHOSPHORUS: 3.4 MG/DL (ref 2.5–4.9)
PLATELET # BLD: 229 K/UL (ref 135–450)
PMV BLD AUTO: 8.5 FL (ref 5–10.5)
POTASSIUM SERPL-SCNC: 3.9 MMOL/L (ref 3.5–5.1)
RBC # BLD: 4.35 M/UL (ref 4–5.2)
SODIUM BLD-SCNC: 138 MMOL/L (ref 136–145)
WBC # BLD: 11.1 K/UL (ref 4–11)

## 2019-01-01 PROCEDURE — 6370000000 HC RX 637 (ALT 250 FOR IP): Performed by: INTERNAL MEDICINE

## 2019-01-01 PROCEDURE — 80048 BASIC METABOLIC PNL TOTAL CA: CPT

## 2019-01-01 PROCEDURE — 6360000002 HC RX W HCPCS: Performed by: INTERNAL MEDICINE

## 2019-01-01 PROCEDURE — 2060000000 HC ICU INTERMEDIATE R&B

## 2019-01-01 PROCEDURE — 94667 MNPJ CHEST WALL 1ST: CPT

## 2019-01-01 PROCEDURE — 84100 ASSAY OF PHOSPHORUS: CPT

## 2019-01-01 PROCEDURE — 83735 ASSAY OF MAGNESIUM: CPT

## 2019-01-01 PROCEDURE — 99233 SBSQ HOSP IP/OBS HIGH 50: CPT | Performed by: INTERNAL MEDICINE

## 2019-01-01 PROCEDURE — 99232 SBSQ HOSP IP/OBS MODERATE 35: CPT | Performed by: INTERNAL MEDICINE

## 2019-01-01 PROCEDURE — 2580000003 HC RX 258: Performed by: INTERNAL MEDICINE

## 2019-01-01 PROCEDURE — 94640 AIRWAY INHALATION TREATMENT: CPT

## 2019-01-01 PROCEDURE — 36415 COLL VENOUS BLD VENIPUNCTURE: CPT

## 2019-01-01 PROCEDURE — 85027 COMPLETE CBC AUTOMATED: CPT

## 2019-01-01 PROCEDURE — 94668 MNPJ CHEST WALL SBSQ: CPT

## 2019-01-01 PROCEDURE — 94660 CPAP INITIATION&MGMT: CPT

## 2019-01-01 RX ORDER — GUAIFENESIN 600 MG/1
600 TABLET, EXTENDED RELEASE ORAL 2 TIMES DAILY
Status: DISCONTINUED | OUTPATIENT
Start: 2019-01-01 | End: 2019-01-03 | Stop reason: HOSPADM

## 2019-01-01 RX ADMIN — INSULIN LISPRO 2 UNITS: 100 INJECTION, SOLUTION INTRAVENOUS; SUBCUTANEOUS at 11:54

## 2019-01-01 RX ADMIN — Medication 10 ML: at 07:01

## 2019-01-01 RX ADMIN — GUAIFENESIN 600 MG: 600 TABLET, EXTENDED RELEASE ORAL at 22:40

## 2019-01-01 RX ADMIN — FLUTICASONE PROPIONATE 1 SPRAY: 50 SPRAY, METERED NASAL at 08:47

## 2019-01-01 RX ADMIN — ENOXAPARIN SODIUM 40 MG: 100 INJECTION SUBCUTANEOUS at 08:46

## 2019-01-01 RX ADMIN — IPRATROPIUM BROMIDE AND ALBUTEROL SULFATE 1 AMPULE: .5; 3 SOLUTION RESPIRATORY (INHALATION) at 11:09

## 2019-01-01 RX ADMIN — IPRATROPIUM BROMIDE AND ALBUTEROL SULFATE 1 AMPULE: .5; 3 SOLUTION RESPIRATORY (INHALATION) at 14:39

## 2019-01-01 RX ADMIN — ALBUTEROL SULFATE 2.5 MG: 2.5 SOLUTION RESPIRATORY (INHALATION) at 09:40

## 2019-01-01 RX ADMIN — HYDROCODONE BITARTRATE AND ACETAMINOPHEN 1 TABLET: 5; 325 TABLET ORAL at 19:49

## 2019-01-01 RX ADMIN — IPRATROPIUM BROMIDE AND ALBUTEROL SULFATE 1 AMPULE: .5; 3 SOLUTION RESPIRATORY (INHALATION) at 22:42

## 2019-01-01 RX ADMIN — LORAZEPAM 0.5 MG: 2 INJECTION INTRAMUSCULAR; INTRAVENOUS at 09:53

## 2019-01-01 RX ADMIN — LEVOTHYROXINE SODIUM 137 MCG: 112 TABLET ORAL at 08:46

## 2019-01-01 RX ADMIN — GUAIFENESIN 600 MG: 600 TABLET, EXTENDED RELEASE ORAL at 10:48

## 2019-01-01 RX ADMIN — METHYLPREDNISOLONE SODIUM SUCCINATE 40 MG: 40 INJECTION, POWDER, FOR SOLUTION INTRAMUSCULAR; INTRAVENOUS at 17:57

## 2019-01-01 RX ADMIN — Medication 10 ML: at 17:57

## 2019-01-01 RX ADMIN — IPRATROPIUM BROMIDE AND ALBUTEROL SULFATE 1 AMPULE: .5; 3 SOLUTION RESPIRATORY (INHALATION) at 06:44

## 2019-01-01 RX ADMIN — Medication 10 ML: at 08:46

## 2019-01-01 RX ADMIN — METHYLPREDNISOLONE SODIUM SUCCINATE 40 MG: 40 INJECTION, POWDER, FOR SOLUTION INTRAMUSCULAR; INTRAVENOUS at 07:01

## 2019-01-01 RX ADMIN — LORAZEPAM 0.5 MG: 2 INJECTION INTRAMUSCULAR; INTRAVENOUS at 22:43

## 2019-01-01 RX ADMIN — MONTELUKAST SODIUM 10 MG: 10 TABLET, COATED ORAL at 22:40

## 2019-01-01 RX ADMIN — LEVOFLOXACIN 500 MG: 5 INJECTION, SOLUTION INTRAVENOUS at 07:01

## 2019-01-01 RX ADMIN — IPRATROPIUM BROMIDE AND ALBUTEROL SULFATE 1 AMPULE: .5; 3 SOLUTION RESPIRATORY (INHALATION) at 19:35

## 2019-01-01 RX ADMIN — Medication 10 ML: at 22:44

## 2019-01-01 ASSESSMENT — PAIN SCALES - GENERAL
PAINLEVEL_OUTOF10: 3
PAINLEVEL_OUTOF10: 6

## 2019-01-02 LAB
ANION GAP SERPL CALCULATED.3IONS-SCNC: 5 MMOL/L (ref 3–16)
BUN BLDV-MCNC: 21 MG/DL (ref 7–20)
CALCIUM SERPL-MCNC: 8.9 MG/DL (ref 8.3–10.6)
CHLORIDE BLD-SCNC: 99 MMOL/L (ref 99–110)
CO2: 36 MMOL/L (ref 21–32)
CREAT SERPL-MCNC: <0.5 MG/DL (ref 0.6–1.2)
GFR AFRICAN AMERICAN: >60
GFR NON-AFRICAN AMERICAN: >60
GLUCOSE BLD-MCNC: 107 MG/DL (ref 70–99)
GLUCOSE BLD-MCNC: 110 MG/DL (ref 70–99)
GLUCOSE BLD-MCNC: 115 MG/DL (ref 70–99)
GLUCOSE BLD-MCNC: 144 MG/DL (ref 70–99)
GLUCOSE BLD-MCNC: 95 MG/DL (ref 70–99)
PERFORMED ON: ABNORMAL
PERFORMED ON: NORMAL
POTASSIUM SERPL-SCNC: 4.1 MMOL/L (ref 3.5–5.1)
SODIUM BLD-SCNC: 140 MMOL/L (ref 136–145)

## 2019-01-02 PROCEDURE — 6370000000 HC RX 637 (ALT 250 FOR IP): Performed by: INTERNAL MEDICINE

## 2019-01-02 PROCEDURE — 2060000000 HC ICU INTERMEDIATE R&B

## 2019-01-02 PROCEDURE — 94668 MNPJ CHEST WALL SBSQ: CPT

## 2019-01-02 PROCEDURE — 36415 COLL VENOUS BLD VENIPUNCTURE: CPT

## 2019-01-02 PROCEDURE — 6360000002 HC RX W HCPCS: Performed by: INTERNAL MEDICINE

## 2019-01-02 PROCEDURE — 94761 N-INVAS EAR/PLS OXIMETRY MLT: CPT

## 2019-01-02 PROCEDURE — 2580000003 HC RX 258: Performed by: INTERNAL MEDICINE

## 2019-01-02 PROCEDURE — 99232 SBSQ HOSP IP/OBS MODERATE 35: CPT | Performed by: INTERNAL MEDICINE

## 2019-01-02 PROCEDURE — 94640 AIRWAY INHALATION TREATMENT: CPT

## 2019-01-02 PROCEDURE — 2700000000 HC OXYGEN THERAPY PER DAY

## 2019-01-02 PROCEDURE — 97530 THERAPEUTIC ACTIVITIES: CPT

## 2019-01-02 PROCEDURE — 80048 BASIC METABOLIC PNL TOTAL CA: CPT

## 2019-01-02 RX ORDER — PREDNISONE 20 MG/1
40 TABLET ORAL DAILY
Status: DISCONTINUED | OUTPATIENT
Start: 2019-01-03 | End: 2019-01-03 | Stop reason: HOSPADM

## 2019-01-02 RX ORDER — SODIUM CHLORIDE 9 MG/ML
INJECTION, SOLUTION INTRAVENOUS
Status: DISPENSED
Start: 2019-01-02 | End: 2019-01-02

## 2019-01-02 RX ORDER — LORAZEPAM 2 MG/ML
0.5 INJECTION INTRAMUSCULAR EVERY 6 HOURS PRN
Status: DISCONTINUED | OUTPATIENT
Start: 2019-01-02 | End: 2019-01-03 | Stop reason: HOSPADM

## 2019-01-02 RX ADMIN — IPRATROPIUM BROMIDE AND ALBUTEROL SULFATE 1 AMPULE: .5; 3 SOLUTION RESPIRATORY (INHALATION) at 19:09

## 2019-01-02 RX ADMIN — Medication 10 ML: at 20:57

## 2019-01-02 RX ADMIN — IPRATROPIUM BROMIDE AND ALBUTEROL SULFATE 1 AMPULE: .5; 3 SOLUTION RESPIRATORY (INHALATION) at 23:21

## 2019-01-02 RX ADMIN — LEVOTHYROXINE SODIUM 137 MCG: 112 TABLET ORAL at 08:57

## 2019-01-02 RX ADMIN — GUAIFENESIN 600 MG: 600 TABLET, EXTENDED RELEASE ORAL at 20:57

## 2019-01-02 RX ADMIN — LORAZEPAM 0.5 MG: 2 INJECTION, SOLUTION INTRAMUSCULAR; INTRAVENOUS at 19:22

## 2019-01-02 RX ADMIN — GUAIFENESIN 600 MG: 600 TABLET, EXTENDED RELEASE ORAL at 08:57

## 2019-01-02 RX ADMIN — MONTELUKAST SODIUM 10 MG: 10 TABLET, COATED ORAL at 20:57

## 2019-01-02 RX ADMIN — IPRATROPIUM BROMIDE AND ALBUTEROL SULFATE 1 AMPULE: .5; 3 SOLUTION RESPIRATORY (INHALATION) at 15:41

## 2019-01-02 RX ADMIN — Medication 10 ML: at 08:58

## 2019-01-02 RX ADMIN — ENOXAPARIN SODIUM 40 MG: 100 INJECTION SUBCUTANEOUS at 08:57

## 2019-01-02 RX ADMIN — IPRATROPIUM BROMIDE AND ALBUTEROL SULFATE 1 AMPULE: .5; 3 SOLUTION RESPIRATORY (INHALATION) at 07:12

## 2019-01-02 RX ADMIN — METHYLPREDNISOLONE SODIUM SUCCINATE 40 MG: 40 INJECTION, POWDER, FOR SOLUTION INTRAMUSCULAR; INTRAVENOUS at 06:37

## 2019-01-02 RX ADMIN — IPRATROPIUM BROMIDE AND ALBUTEROL SULFATE 1 AMPULE: .5; 3 SOLUTION RESPIRATORY (INHALATION) at 10:30

## 2019-01-02 RX ADMIN — FLUTICASONE PROPIONATE 1 SPRAY: 50 SPRAY, METERED NASAL at 08:58

## 2019-01-02 RX ADMIN — INSULIN LISPRO 2 UNITS: 100 INJECTION, SOLUTION INTRAVENOUS; SUBCUTANEOUS at 08:58

## 2019-01-02 RX ADMIN — LEVOFLOXACIN 500 MG: 5 INJECTION, SOLUTION INTRAVENOUS at 06:37

## 2019-01-03 VITALS
OXYGEN SATURATION: 92 % | WEIGHT: 113.4 LBS | HEART RATE: 90 BPM | HEIGHT: 64 IN | RESPIRATION RATE: 20 BRPM | BODY MASS INDEX: 19.36 KG/M2 | TEMPERATURE: 97.9 F | DIASTOLIC BLOOD PRESSURE: 72 MMHG | SYSTOLIC BLOOD PRESSURE: 122 MMHG

## 2019-01-03 LAB
GLUCOSE BLD-MCNC: 108 MG/DL (ref 70–99)
GLUCOSE BLD-MCNC: 94 MG/DL (ref 70–99)
PERFORMED ON: ABNORMAL
PERFORMED ON: NORMAL

## 2019-01-03 PROCEDURE — 6370000000 HC RX 637 (ALT 250 FOR IP): Performed by: INTERNAL MEDICINE

## 2019-01-03 PROCEDURE — 94668 MNPJ CHEST WALL SBSQ: CPT

## 2019-01-03 PROCEDURE — 94761 N-INVAS EAR/PLS OXIMETRY MLT: CPT

## 2019-01-03 PROCEDURE — 94660 CPAP INITIATION&MGMT: CPT

## 2019-01-03 PROCEDURE — 99232 SBSQ HOSP IP/OBS MODERATE 35: CPT | Performed by: INTERNAL MEDICINE

## 2019-01-03 PROCEDURE — G0008 ADMIN INFLUENZA VIRUS VAC: HCPCS | Performed by: INTERNAL MEDICINE

## 2019-01-03 PROCEDURE — 90686 IIV4 VACC NO PRSV 0.5 ML IM: CPT | Performed by: INTERNAL MEDICINE

## 2019-01-03 PROCEDURE — 2700000000 HC OXYGEN THERAPY PER DAY

## 2019-01-03 PROCEDURE — 2580000003 HC RX 258: Performed by: INTERNAL MEDICINE

## 2019-01-03 PROCEDURE — 6360000002 HC RX W HCPCS: Performed by: INTERNAL MEDICINE

## 2019-01-03 PROCEDURE — 94640 AIRWAY INHALATION TREATMENT: CPT

## 2019-01-03 PROCEDURE — 99238 HOSP IP/OBS DSCHRG MGMT 30/<: CPT | Performed by: INTERNAL MEDICINE

## 2019-01-03 RX ORDER — NICOTINE 21 MG/24HR
1 PATCH, TRANSDERMAL 24 HOURS TRANSDERMAL DAILY
Qty: 30 PATCH | Refills: 0 | Status: SHIPPED | OUTPATIENT
Start: 2019-01-04 | End: 2019-05-07 | Stop reason: CLARIF

## 2019-01-03 RX ORDER — PREDNISONE 10 MG/1
TABLET ORAL
Qty: 30 TABLET | Refills: 0 | Status: SHIPPED | OUTPATIENT
Start: 2019-01-03 | End: 2019-02-12

## 2019-01-03 RX ORDER — GUAIFENESIN 600 MG/1
600 TABLET, EXTENDED RELEASE ORAL 2 TIMES DAILY
Qty: 14 TABLET | Refills: 0 | Status: SHIPPED | OUTPATIENT
Start: 2019-01-03 | End: 2019-01-10

## 2019-01-03 RX ADMIN — INFLUENZA A VIRUS A/MICHIGAN/45/2015 X-275 (H1N1) ANTIGEN (FORMALDEHYDE INACTIVATED), INFLUENZA A VIRUS A/SINGAPORE/INFIMH-16-0019/2016 IVR-186 (H3N2) ANTIGEN (FORMALDEHYDE INACTIVATED), INFLUENZA B VIRUS B/PHUKET/3073/2013 ANTIGEN (FORMALDEHYDE INACTIVATED), AND INFLUENZA B VIRUS B/MARYLAND/15/2016 BX-69A ANTIGEN (FORMALDEHYDE INACTIVATED) 0.5 ML: 15; 15; 15; 15 INJECTION, SUSPENSION INTRAMUSCULAR at 16:06

## 2019-01-03 RX ADMIN — LEVOFLOXACIN 500 MG: 5 INJECTION, SOLUTION INTRAVENOUS at 06:48

## 2019-01-03 RX ADMIN — PREDNISONE 40 MG: 20 TABLET ORAL at 09:25

## 2019-01-03 RX ADMIN — ACETAMINOPHEN 650 MG: 325 TABLET ORAL at 06:54

## 2019-01-03 RX ADMIN — IPRATROPIUM BROMIDE AND ALBUTEROL SULFATE 1 AMPULE: .5; 3 SOLUTION RESPIRATORY (INHALATION) at 10:50

## 2019-01-03 RX ADMIN — FLUTICASONE PROPIONATE 1 SPRAY: 50 SPRAY, METERED NASAL at 09:26

## 2019-01-03 RX ADMIN — Medication 10 ML: at 09:24

## 2019-01-03 RX ADMIN — IPRATROPIUM BROMIDE AND ALBUTEROL SULFATE 1 AMPULE: .5; 3 SOLUTION RESPIRATORY (INHALATION) at 07:21

## 2019-01-03 RX ADMIN — LEVOTHYROXINE SODIUM 137 MCG: 112 TABLET ORAL at 09:25

## 2019-01-03 RX ADMIN — ENOXAPARIN SODIUM 40 MG: 100 INJECTION SUBCUTANEOUS at 09:25

## 2019-01-03 RX ADMIN — GUAIFENESIN 600 MG: 600 TABLET, EXTENDED RELEASE ORAL at 09:25

## 2019-01-03 ASSESSMENT — PAIN SCALES - GENERAL: PAINLEVEL_OUTOF10: 3

## 2019-01-08 ENCOUNTER — OFFICE VISIT (OUTPATIENT)
Dept: PULMONOLOGY | Age: 59
End: 2019-01-08
Payer: COMMERCIAL

## 2019-01-08 VITALS
WEIGHT: 116 LBS | HEART RATE: 92 BPM | TEMPERATURE: 98.4 F | RESPIRATION RATE: 18 BRPM | HEIGHT: 64 IN | SYSTOLIC BLOOD PRESSURE: 110 MMHG | BODY MASS INDEX: 19.81 KG/M2 | DIASTOLIC BLOOD PRESSURE: 60 MMHG | OXYGEN SATURATION: 93 %

## 2019-01-08 DIAGNOSIS — J96.11 CHRONIC RESPIRATORY FAILURE WITH HYPOXIA (HCC): ICD-10-CM

## 2019-01-08 DIAGNOSIS — J44.9 COPD SUGGESTED BY INITIAL EVALUATION (HCC): ICD-10-CM

## 2019-01-08 DIAGNOSIS — Z72.0 TOBACCO ABUSE: Primary | ICD-10-CM

## 2019-01-08 PROCEDURE — 99214 OFFICE O/P EST MOD 30 MIN: CPT | Performed by: INTERNAL MEDICINE

## 2019-01-10 ENCOUNTER — HOSPITAL ENCOUNTER (OUTPATIENT)
Dept: PULMONOLOGY | Age: 59
Discharge: HOME | End: 2019-01-10
Payer: COMMERCIAL

## 2019-01-10 DIAGNOSIS — J44.9 COPD SUGGESTED BY INITIAL EVALUATION (HCC): ICD-10-CM

## 2019-01-10 PROCEDURE — 94729 DIFFUSING CAPACITY: CPT

## 2019-01-10 PROCEDURE — 94640 AIRWAY INHALATION TREATMENT: CPT

## 2019-01-10 PROCEDURE — 94726 PLETHYSMOGRAPHY LUNG VOLUMES: CPT

## 2019-01-10 PROCEDURE — 94664 DEMO&/EVAL PT USE INHALER: CPT

## 2019-01-10 PROCEDURE — 94618 PULMONARY STRESS TESTING: CPT

## 2019-01-10 PROCEDURE — 6360000002 HC RX W HCPCS: Performed by: INTERNAL MEDICINE

## 2019-01-10 PROCEDURE — 94060 EVALUATION OF WHEEZING: CPT

## 2019-01-10 RX ORDER — ALBUTEROL SULFATE 2.5 MG/3ML
2.5 SOLUTION RESPIRATORY (INHALATION) ONCE
Status: COMPLETED | OUTPATIENT
Start: 2019-01-10 | End: 2019-01-10

## 2019-01-10 RX ADMIN — ALBUTEROL SULFATE 2.5 MG: 2.5 SOLUTION RESPIRATORY (INHALATION) at 10:11

## 2019-01-17 ENCOUNTER — OFFICE VISIT (OUTPATIENT)
Dept: PULMONOLOGY | Age: 59
End: 2019-01-17
Payer: COMMERCIAL

## 2019-01-17 VITALS
TEMPERATURE: 97.6 F | HEIGHT: 64 IN | BODY MASS INDEX: 20.49 KG/M2 | HEART RATE: 83 BPM | OXYGEN SATURATION: 95 % | SYSTOLIC BLOOD PRESSURE: 100 MMHG | RESPIRATION RATE: 16 BRPM | DIASTOLIC BLOOD PRESSURE: 62 MMHG | WEIGHT: 120 LBS

## 2019-01-17 DIAGNOSIS — J96.11 CHRONIC RESPIRATORY FAILURE WITH HYPOXIA (HCC): ICD-10-CM

## 2019-01-17 DIAGNOSIS — J44.9 COPD, SEVERE (HCC): Primary | ICD-10-CM

## 2019-01-17 DIAGNOSIS — Z72.0 TOBACCO ABUSE: ICD-10-CM

## 2019-01-17 PROCEDURE — 99214 OFFICE O/P EST MOD 30 MIN: CPT | Performed by: INTERNAL MEDICINE

## 2019-01-17 RX ORDER — ALBUTEROL SULFATE 90 UG/1
2 AEROSOL, METERED RESPIRATORY (INHALATION) EVERY 6 HOURS PRN
Qty: 1 INHALER | Refills: 3 | Status: SHIPPED | OUTPATIENT
Start: 2019-01-17

## 2019-01-18 ENCOUNTER — TELEPHONE (OUTPATIENT)
Dept: PULMONOLOGY | Age: 59
End: 2019-01-18

## 2019-01-21 RX ORDER — ALBUTEROL SULFATE 2.5 MG/3ML
2.5 SOLUTION RESPIRATORY (INHALATION) EVERY 6 HOURS PRN
Qty: 120 EACH | Refills: 3 | Status: SHIPPED | OUTPATIENT
Start: 2019-01-21 | End: 2020-06-19 | Stop reason: SDUPTHER

## 2019-01-28 ENCOUNTER — TELEPHONE (OUTPATIENT)
Dept: PULMONOLOGY | Age: 59
End: 2019-01-28

## 2019-01-28 DIAGNOSIS — J44.9 CHRONIC OBSTRUCTIVE PULMONARY DISEASE, UNSPECIFIED COPD TYPE (HCC): Primary | ICD-10-CM

## 2019-01-30 ENCOUNTER — HOSPITAL ENCOUNTER (OUTPATIENT)
Dept: PULMONOLOGY | Age: 59
Discharge: HOME | End: 2019-01-30
Payer: COMMERCIAL

## 2019-01-30 DIAGNOSIS — J44.9 CHRONIC OBSTRUCTIVE PULMONARY DISEASE, UNSPECIFIED COPD TYPE (HCC): ICD-10-CM

## 2019-01-30 PROCEDURE — 94618 PULMONARY STRESS TESTING: CPT

## 2019-02-12 ENCOUNTER — HOSPITAL ENCOUNTER (EMERGENCY)
Age: 59
Discharge: HOME | End: 2019-02-12
Payer: COMMERCIAL

## 2019-02-12 ENCOUNTER — APPOINTMENT (OUTPATIENT)
Dept: GENERAL RADIOLOGY | Age: 59
End: 2019-02-12
Payer: COMMERCIAL

## 2019-02-12 ENCOUNTER — TELEPHONE (OUTPATIENT)
Dept: PULMONOLOGY | Age: 59
End: 2019-02-12

## 2019-02-12 VITALS
HEART RATE: 95 BPM | DIASTOLIC BLOOD PRESSURE: 64 MMHG | RESPIRATION RATE: 18 BRPM | TEMPERATURE: 98.5 F | SYSTOLIC BLOOD PRESSURE: 119 MMHG | BODY MASS INDEX: 20.49 KG/M2 | OXYGEN SATURATION: 94 % | WEIGHT: 120 LBS | HEIGHT: 64 IN

## 2019-02-12 DIAGNOSIS — J44.1 COPD EXACERBATION (HCC): Primary | ICD-10-CM

## 2019-02-12 LAB
A/G RATIO: 1.3 (ref 1.1–2.2)
ALBUMIN SERPL-MCNC: 4 G/DL (ref 3.4–5)
ALP BLD-CCNC: 79 U/L (ref 40–129)
ALT SERPL-CCNC: 30 U/L (ref 10–40)
ANION GAP SERPL CALCULATED.3IONS-SCNC: 12 MMOL/L (ref 3–16)
AST SERPL-CCNC: 36 U/L (ref 15–37)
BASE EXCESS VENOUS: 1.2 MMOL/L (ref -3–3)
BASOPHILS ABSOLUTE: 0.1 K/UL (ref 0–0.2)
BASOPHILS RELATIVE PERCENT: 1.1 %
BILIRUB SERPL-MCNC: 0.5 MG/DL (ref 0–1)
BUN BLDV-MCNC: 11 MG/DL (ref 7–20)
CALCIUM SERPL-MCNC: 9.4 MG/DL (ref 8.3–10.6)
CARBOXYHEMOGLOBIN: 3.1 % (ref 0–1.5)
CHLORIDE BLD-SCNC: 99 MMOL/L (ref 99–110)
CO2: 28 MMOL/L (ref 21–32)
CREAT SERPL-MCNC: 0.6 MG/DL (ref 0.6–1.2)
EKG ATRIAL RATE: 88 BPM
EKG DIAGNOSIS: NORMAL
EKG P AXIS: 84 DEGREES
EKG P-R INTERVAL: 136 MS
EKG Q-T INTERVAL: 364 MS
EKG QRS DURATION: 80 MS
EKG QTC CALCULATION (BAZETT): 440 MS
EKG R AXIS: 85 DEGREES
EKG T AXIS: 77 DEGREES
EKG VENTRICULAR RATE: 88 BPM
EOSINOPHILS ABSOLUTE: 0.5 K/UL (ref 0–0.6)
EOSINOPHILS RELATIVE PERCENT: 5.2 %
GFR AFRICAN AMERICAN: >60
GFR NON-AFRICAN AMERICAN: >60
GLOBULIN: 3.2 G/DL
GLUCOSE BLD-MCNC: 104 MG/DL (ref 70–99)
HCO3 VENOUS: 25 MMOL/L (ref 23–29)
HCT VFR BLD CALC: 37.7 % (ref 36–48)
HEMOGLOBIN: 12.8 G/DL (ref 12–16)
LYMPHOCYTES ABSOLUTE: 2.1 K/UL (ref 1–5.1)
LYMPHOCYTES RELATIVE PERCENT: 20.5 %
MCH RBC QN AUTO: 31.7 PG (ref 26–34)
MCHC RBC AUTO-ENTMCNC: 33.8 G/DL (ref 31–36)
MCV RBC AUTO: 93.5 FL (ref 80–100)
METHEMOGLOBIN VENOUS: 0.1 %
MONOCYTES ABSOLUTE: 1 K/UL (ref 0–1.3)
MONOCYTES RELATIVE PERCENT: 10 %
NEUTROPHILS ABSOLUTE: 6.4 K/UL (ref 1.7–7.7)
NEUTROPHILS RELATIVE PERCENT: 63.2 %
O2 CONTENT, VEN: 18 VOL %
O2 SAT, VEN: 98 %
O2 THERAPY: ABNORMAL
PCO2, VEN: 37 MMHG (ref 40–50)
PDW BLD-RTO: 13 % (ref 12.4–15.4)
PH VENOUS: 7.45 (ref 7.35–7.45)
PLATELET # BLD: 306 K/UL (ref 135–450)
PMV BLD AUTO: 7.7 FL (ref 5–10.5)
PO2, VEN: 109.7 MMHG (ref 25–40)
POTASSIUM REFLEX MAGNESIUM: 3.9 MMOL/L (ref 3.5–5.1)
PRO-BNP: 54 PG/ML (ref 0–124)
RBC # BLD: 4.04 M/UL (ref 4–5.2)
SODIUM BLD-SCNC: 139 MMOL/L (ref 136–145)
TCO2 CALC VENOUS: 26 MMOL/L
TOTAL PROTEIN: 7.2 G/DL (ref 6.4–8.2)
TROPONIN: <0.01 NG/ML
WBC # BLD: 10.1 K/UL (ref 4–11)

## 2019-02-12 PROCEDURE — 71046 X-RAY EXAM CHEST 2 VIEWS: CPT

## 2019-02-12 PROCEDURE — 82803 BLOOD GASES ANY COMBINATION: CPT

## 2019-02-12 PROCEDURE — 6360000002 HC RX W HCPCS: Performed by: PHYSICIAN ASSISTANT

## 2019-02-12 PROCEDURE — 85025 COMPLETE CBC W/AUTO DIFF WBC: CPT

## 2019-02-12 PROCEDURE — 93010 ELECTROCARDIOGRAM REPORT: CPT | Performed by: INTERNAL MEDICINE

## 2019-02-12 PROCEDURE — 83880 ASSAY OF NATRIURETIC PEPTIDE: CPT

## 2019-02-12 PROCEDURE — 6370000000 HC RX 637 (ALT 250 FOR IP): Performed by: PHYSICIAN ASSISTANT

## 2019-02-12 PROCEDURE — 96374 THER/PROPH/DIAG INJ IV PUSH: CPT

## 2019-02-12 PROCEDURE — 80053 COMPREHEN METABOLIC PANEL: CPT

## 2019-02-12 PROCEDURE — 84484 ASSAY OF TROPONIN QUANT: CPT

## 2019-02-12 PROCEDURE — 99285 EMERGENCY DEPT VISIT HI MDM: CPT

## 2019-02-12 PROCEDURE — 93005 ELECTROCARDIOGRAM TRACING: CPT | Performed by: PHYSICIAN ASSISTANT

## 2019-02-12 RX ORDER — IPRATROPIUM BROMIDE AND ALBUTEROL SULFATE 2.5; .5 MG/3ML; MG/3ML
1 SOLUTION RESPIRATORY (INHALATION) ONCE
Status: COMPLETED | OUTPATIENT
Start: 2019-02-12 | End: 2019-02-12

## 2019-02-12 RX ORDER — ALBUTEROL SULFATE 2.5 MG/3ML
5 SOLUTION RESPIRATORY (INHALATION) ONCE
Status: COMPLETED | OUTPATIENT
Start: 2019-02-12 | End: 2019-02-12

## 2019-02-12 RX ORDER — METHYLPREDNISOLONE SODIUM SUCCINATE 125 MG/2ML
80 INJECTION, POWDER, LYOPHILIZED, FOR SOLUTION INTRAMUSCULAR; INTRAVENOUS ONCE
Status: COMPLETED | OUTPATIENT
Start: 2019-02-12 | End: 2019-02-12

## 2019-02-12 RX ORDER — PREDNISONE 10 MG/1
40 TABLET ORAL DAILY
Qty: 20 TABLET | Refills: 0 | Status: SHIPPED | OUTPATIENT
Start: 2019-02-12 | End: 2019-02-17

## 2019-02-12 RX ADMIN — IPRATROPIUM BROMIDE AND ALBUTEROL SULFATE 1 AMPULE: .5; 3 SOLUTION RESPIRATORY (INHALATION) at 17:20

## 2019-02-12 RX ADMIN — ALBUTEROL SULFATE 5 MG: 2.5 SOLUTION RESPIRATORY (INHALATION) at 17:20

## 2019-02-12 RX ADMIN — METHYLPREDNISOLONE SODIUM SUCCINATE 80 MG: 125 INJECTION, POWDER, FOR SOLUTION INTRAMUSCULAR; INTRAVENOUS at 17:21

## 2019-02-12 ASSESSMENT — PAIN DESCRIPTION - LOCATION: LOCATION: BACK;CHEST

## 2019-02-12 ASSESSMENT — PAIN SCALES - GENERAL: PAINLEVEL_OUTOF10: 4

## 2019-02-15 ENCOUNTER — OFFICE VISIT (OUTPATIENT)
Dept: PULMONOLOGY | Age: 59
End: 2019-02-15
Payer: COMMERCIAL

## 2019-02-15 VITALS
RESPIRATION RATE: 18 BRPM | HEART RATE: 97 BPM | BODY MASS INDEX: 21.34 KG/M2 | WEIGHT: 125 LBS | OXYGEN SATURATION: 96 % | SYSTOLIC BLOOD PRESSURE: 110 MMHG | TEMPERATURE: 97.7 F | HEIGHT: 64 IN | DIASTOLIC BLOOD PRESSURE: 60 MMHG

## 2019-02-15 DIAGNOSIS — J96.11 CHRONIC RESPIRATORY FAILURE WITH HYPOXIA (HCC): ICD-10-CM

## 2019-02-15 DIAGNOSIS — J44.1 COPD EXACERBATION (HCC): Primary | ICD-10-CM

## 2019-02-15 DIAGNOSIS — Z72.0 TOBACCO ABUSE: ICD-10-CM

## 2019-02-15 PROCEDURE — 99214 OFFICE O/P EST MOD 30 MIN: CPT | Performed by: INTERNAL MEDICINE

## 2019-05-07 ENCOUNTER — OFFICE VISIT (OUTPATIENT)
Dept: PULMONOLOGY | Age: 59
End: 2019-05-07
Payer: COMMERCIAL

## 2019-05-07 VITALS
HEART RATE: 87 BPM | TEMPERATURE: 97.9 F | WEIGHT: 128 LBS | SYSTOLIC BLOOD PRESSURE: 106 MMHG | HEIGHT: 64 IN | OXYGEN SATURATION: 94 % | DIASTOLIC BLOOD PRESSURE: 62 MMHG | BODY MASS INDEX: 21.85 KG/M2 | RESPIRATION RATE: 18 BRPM

## 2019-05-07 DIAGNOSIS — J44.9 COPD, SEVERE (HCC): Primary | ICD-10-CM

## 2019-05-07 DIAGNOSIS — J96.11 CHRONIC RESPIRATORY FAILURE WITH HYPOXIA (HCC): ICD-10-CM

## 2019-05-07 DIAGNOSIS — Z72.0 TOBACCO ABUSE: ICD-10-CM

## 2019-05-07 PROCEDURE — 99214 OFFICE O/P EST MOD 30 MIN: CPT | Performed by: INTERNAL MEDICINE

## 2019-05-07 NOTE — PATIENT INSTRUCTIONS
Remember to bring all pulmonary medications to your next appointment with the office. Please keep all of your future appointments scheduled by [facility] office. Out of respect for other patients and providers, you may be asked to reschedule your appointment if you arrive later than your scheduled appointment time. Appointments cancelled less than 24hrs in advance will be considered a no show. Patients with three missed appointments within 1 year or four missed appointments within 2 years can be dismissed from the practice.

## 2019-05-07 NOTE — PROGRESS NOTES
[facility] Pulmonary and Critical Care Specialists    Outpatient Follow Up Note    CHIEF COMPLAINT : shortness of breath     HPI:  The patient is a 58 yo woman with hx of COPD, tobacco abuse and asthma who [facility] with progressively worsening shortness of breath and associated productive cough. The patient was hospitalized for a COPD exacerbation in 9/18. She smoked a pack per day x 40 years. Since last clinic visit, the patient went on vacation in [place] and well. Respiratory infection. She went to a local urgent care was given azithromycin and steroids. She is currently starting to feel better. She has not smoked in 5 months. There are no changes to past medical history, family history, social history or review of systems(except as noted in the history section) since prior note (all reviewed with patient).     Current Medications:    Current Outpatient Medications:     albuterol (PROVENTIL) (2.5 MG/3ML) 0.083% nebulizer solution, Take 3 mLs by nebulization every 6 hours as needed for Wheezing, Disp: 120 each, Rfl: 3    albuterol sulfate  (90 Base) MCG/ACT inhaler, Inhale 2 puffs into the lungs every 6 hours as needed for Wheezing, Disp: 1 Inhaler, Rfl: 3    tiotropium (SPIRIVA RESPIMAT) 2.5 MCG/ACT AERS inhaler, Inhale 2 puffs into the lungs daily, Disp: 4 g, Rfl: 3    vitamin B-12 (CYANOCOBALAMIN) 500 MCG tablet, Take 500 mcg by mouth daily, Disp: , Rfl:     montelukast (SINGULAIR) 10 MG tablet, Take 10 mg by mouth nightly, Disp: , Rfl:     vitamin D-3 (CHOLECALCIFEROL) 5000 units TABS, Take by mouth, Disp: , Rfl:     fluticasone (FLONASE) 50 MCG/ACT nasal spray, 1 spray by Nasal route, Disp: , Rfl:     Multiple Vitamin (MULTIVITAMIN) tablet, Take 1 tablet by mouth, Disp: , Rfl:     levothyroxine (SYNTHROID) 137 MCG tablet, Take 137 mcg by mouth daily, Disp: , Rfl:         Objective:   PHYSICAL EXAM:        VITALS:  /62   Pulse 87   Temp 97.9 °F (36.6 °C) (Oral) Resp 18   Ht 5' 4.25\" (1.632 m)   Wt 128 lb (58.1 kg)   LMP  (LMP Unknown)   SpO2 94% Comment: ra  BMI 21.80 kg/m²     Gen: In no acute distress. Alert. Thin. Comfortable. NCAT. Eyes: PERRL. No sclera icterus. No conjunctival injection. ENT: No ocular or auricular discharge. Oropharynx clear. Neck: Trachea midline. Normal thyroid. Resp: No accessory muscle use. No crackles. No wheezes. No rhonchi. No dullness on percussion. Clear to auscultation bilaterally. CV: Regular rate. Regular rhythm. No murmur or rub. Normal S1 and S2. No edema. GI: Abdomen non-tender. Non-distended. Skin: Warm and dry. No nodules on exposed extremities. No rash on exposed extremities. Lymph: No cervical LAD. No supraclavicular LAD. M/S: No cyanosis. No synovitis or joint deformity. No clubbing. Neuro: Cranial nerves are grossly intact. Moving all extremities. Motor and sensation grossly intact. Psych: Oriented x 3. No anxiety or agitation. DATA:    LABS:      PFTs 1/10/19  FVC 1.69 (51%) FEV1 0.68 (26%) FEV1/FVC ratio  40%  TLC 6.92 (133%) DLCO 8.70 (37%) + BD response  6mwt 1060 feet, low sat 89%    6mwt 1/31/19  980 feet, low sat 86% improved with 1l NC      IMAGING:      I personally reviewed and interpreted the following today in the office :     CXR (dated 12/28/18: The heart, mediastinum and pulmonary vascularity are normal.  The lungs are lucent and hyperinflated.  Pattern evidence of underlying COPD.  No acute airspace abnormality.  There are no significant skeletal findings          Assessment:   1. Tobacco abuse     2. COPD severe   3. Chronic respiratory failure with hypoxia (HCC)      Plan:   - Continue inhaled bronchodilator therapy. spiriva respimat; continue albuterol mdi/nebs-  portable neb machine  -1L of O2 with exertion based upon 6mwt 1/19; ok to not use with rest  - Patient is up to date with Pneumococcal vaccine and Influenza vaccines.   - Pulmonary rehab in future  - Advised to

## 2019-05-15 ENCOUNTER — HOSPITAL ENCOUNTER (EMERGENCY)
Age: 59
Discharge: HOME | End: 2019-05-15
Attending: EMERGENCY MEDICINE
Payer: COMMERCIAL

## 2019-05-15 ENCOUNTER — APPOINTMENT (OUTPATIENT)
Dept: GENERAL RADIOLOGY | Age: 59
End: 2019-05-15
Payer: COMMERCIAL

## 2019-05-15 VITALS
HEART RATE: 95 BPM | RESPIRATION RATE: 22 BRPM | SYSTOLIC BLOOD PRESSURE: 102 MMHG | OXYGEN SATURATION: 93 % | WEIGHT: 128 LBS | TEMPERATURE: 97.7 F | HEIGHT: 64 IN | DIASTOLIC BLOOD PRESSURE: 67 MMHG | BODY MASS INDEX: 21.85 KG/M2

## 2019-05-15 DIAGNOSIS — J44.1 COPD EXACERBATION (HCC): Primary | ICD-10-CM

## 2019-05-15 LAB
A/G RATIO: 1.4 (ref 1.1–2.2)
ALBUMIN SERPL-MCNC: 4.1 G/DL (ref 3.4–5)
ALP BLD-CCNC: 95 U/L (ref 40–129)
ALT SERPL-CCNC: 85 U/L (ref 10–40)
ANION GAP SERPL CALCULATED.3IONS-SCNC: 10 MMOL/L (ref 3–16)
AST SERPL-CCNC: 63 U/L (ref 15–37)
BASOPHILS ABSOLUTE: 0 K/UL (ref 0–0.2)
BASOPHILS RELATIVE PERCENT: 0.2 %
BILIRUB SERPL-MCNC: 0.3 MG/DL (ref 0–1)
BUN BLDV-MCNC: 10 MG/DL (ref 7–20)
CALCIUM SERPL-MCNC: 9 MG/DL (ref 8.3–10.6)
CHLORIDE BLD-SCNC: 100 MMOL/L (ref 99–110)
CO2: 26 MMOL/L (ref 21–32)
CREAT SERPL-MCNC: 0.6 MG/DL (ref 0.6–1.2)
EOSINOPHILS ABSOLUTE: 0.7 K/UL (ref 0–0.6)
EOSINOPHILS RELATIVE PERCENT: 7.3 %
GFR AFRICAN AMERICAN: >60
GFR NON-AFRICAN AMERICAN: >60
GLOBULIN: 3 G/DL
GLUCOSE BLD-MCNC: 113 MG/DL (ref 70–99)
HCT VFR BLD CALC: 42.3 % (ref 36–48)
HEMOGLOBIN: 14.2 G/DL (ref 12–16)
LYMPHOCYTES ABSOLUTE: 2.3 K/UL (ref 1–5.1)
LYMPHOCYTES RELATIVE PERCENT: 24.6 %
MCH RBC QN AUTO: 31.5 PG (ref 26–34)
MCHC RBC AUTO-ENTMCNC: 33.6 G/DL (ref 31–36)
MCV RBC AUTO: 93.8 FL (ref 80–100)
MONOCYTES ABSOLUTE: 0.8 K/UL (ref 0–1.3)
MONOCYTES RELATIVE PERCENT: 8.5 %
NEUTROPHILS ABSOLUTE: 5.6 K/UL (ref 1.7–7.7)
NEUTROPHILS RELATIVE PERCENT: 59.4 %
PDW BLD-RTO: 13.8 % (ref 12.4–15.4)
PLATELET # BLD: 264 K/UL (ref 135–450)
PMV BLD AUTO: 7.9 FL (ref 5–10.5)
POTASSIUM SERPL-SCNC: 4.1 MMOL/L (ref 3.5–5.1)
RBC # BLD: 4.5 M/UL (ref 4–5.2)
SODIUM BLD-SCNC: 136 MMOL/L (ref 136–145)
TOTAL PROTEIN: 7.1 G/DL (ref 6.4–8.2)
TROPONIN: <0.01 NG/ML
WBC # BLD: 9.5 K/UL (ref 4–11)

## 2019-05-15 PROCEDURE — 71045 X-RAY EXAM CHEST 1 VIEW: CPT

## 2019-05-15 PROCEDURE — 96361 HYDRATE IV INFUSION ADD-ON: CPT

## 2019-05-15 PROCEDURE — 84484 ASSAY OF TROPONIN QUANT: CPT

## 2019-05-15 PROCEDURE — 94644 CONT INHLJ TX 1ST HOUR: CPT

## 2019-05-15 PROCEDURE — 93005 ELECTROCARDIOGRAM TRACING: CPT | Performed by: EMERGENCY MEDICINE

## 2019-05-15 PROCEDURE — 96375 TX/PRO/DX INJ NEW DRUG ADDON: CPT

## 2019-05-15 PROCEDURE — 2580000003 HC RX 258: Performed by: EMERGENCY MEDICINE

## 2019-05-15 PROCEDURE — 85025 COMPLETE CBC W/AUTO DIFF WBC: CPT

## 2019-05-15 PROCEDURE — 96365 THER/PROPH/DIAG IV INF INIT: CPT

## 2019-05-15 PROCEDURE — 99285 EMERGENCY DEPT VISIT HI MDM: CPT

## 2019-05-15 PROCEDURE — 80053 COMPREHEN METABOLIC PANEL: CPT

## 2019-05-15 PROCEDURE — 6360000002 HC RX W HCPCS: Performed by: EMERGENCY MEDICINE

## 2019-05-15 RX ORDER — MAGNESIUM SULFATE 1 G/100ML
1 INJECTION INTRAVENOUS ONCE
Status: COMPLETED | OUTPATIENT
Start: 2019-05-15 | End: 2019-05-15

## 2019-05-15 RX ORDER — ALBUTEROL SULFATE 2.5 MG/3ML
15 SOLUTION RESPIRATORY (INHALATION) ONCE
Status: COMPLETED | OUTPATIENT
Start: 2019-05-15 | End: 2019-05-15

## 2019-05-15 RX ORDER — PREDNISONE 20 MG/1
40 TABLET ORAL DAILY
Qty: 14 TABLET | Refills: 0 | Status: SHIPPED | OUTPATIENT
Start: 2019-05-15 | End: 2019-05-22

## 2019-05-15 RX ORDER — DEXAMETHASONE SODIUM PHOSPHATE 10 MG/ML
10 INJECTION INTRAMUSCULAR; INTRAVENOUS ONCE
Status: COMPLETED | OUTPATIENT
Start: 2019-05-15 | End: 2019-05-15

## 2019-05-15 RX ORDER — 0.9 % SODIUM CHLORIDE 0.9 %
1000 INTRAVENOUS SOLUTION INTRAVENOUS ONCE
Status: COMPLETED | OUTPATIENT
Start: 2019-05-15 | End: 2019-05-15

## 2019-05-15 RX ORDER — MAGNESIUM SULFATE 1 G/100ML
INJECTION INTRAVENOUS
Status: COMPLETED
Start: 2019-05-15 | End: 2019-05-15

## 2019-05-15 RX ADMIN — SODIUM CHLORIDE 1000 ML: 9 INJECTION, SOLUTION INTRAVENOUS at 04:57

## 2019-05-15 RX ADMIN — DEXAMETHASONE SODIUM PHOSPHATE 10 MG: 10 INJECTION INTRAMUSCULAR; INTRAVENOUS at 04:57

## 2019-05-15 RX ADMIN — ALBUTEROL SULFATE 15 MG/HR: 2.5 SOLUTION RESPIRATORY (INHALATION) at 05:09

## 2019-05-15 RX ADMIN — MAGNESIUM SULFATE HEPTAHYDRATE 1 G: 1 INJECTION, SOLUTION INTRAVENOUS at 06:01

## 2019-05-15 ASSESSMENT — PAIN DESCRIPTION - LOCATION: LOCATION: CHEST

## 2019-05-15 ASSESSMENT — PAIN SCALES - GENERAL: PAINLEVEL_OUTOF10: 4

## 2019-05-15 ASSESSMENT — PAIN DESCRIPTION - DESCRIPTORS: DESCRIPTORS: BURNING

## 2019-05-15 NOTE — ED TRIAGE NOTES
Pt brought in by ems on 4 lpm, turned down to 2 lpm nc 02 on transfer as per home baseline but pt only running 87% on 2ltr, increased back to 4 lpm nc02 and md notified.

## 2019-05-15 NOTE — ED NOTES
Patient reports good improvement of s/s after medication administration. Findings and plan of care discussed with patient at bedside by provider. Pt verbalizes understanding of instructions as discussed by provider. Written discharge instructions, rx x 1 provided, pt in agreement with plan of care, denies other needs at this time.      Duncan Ho RN  05/15/19 0798

## 2019-05-15 NOTE — ED PROVIDER NOTES
Triage Chief Complaint:   Shortness of Breath (wears 2 tr nc02 per home routine, 94% on ems arrival while wearing home 02.  ems gave 1 duoneb for wheezing.)      VAMSI Lopez is a 59 y.o. female that presents with shortness of breath. Patient has long-standing COPD and is on 2 L of oxygen at home but was unable to keep her oxygen saturation above 90 and was symptomatic. She's just off prednisone for 2 weeks. She has not had a fever but she did have a burning sensation in her chest and epigastrium. She does not have a history of heart disease and her only other ongoing medical problem is hypothyroidism on L thyroxine. ROS:  Review of systems was reviewed for 10 systems and is otherwise negative except as in the [address]    Past Medical History:   Diagnosis Date    Asthma     COPD (chronic obstructive pulmonary disease) ([facility] [address]     Thyroid disease      Past Surgical History:   Procedure Laterality Date     SECTION      x2    CHOLECYSTECTOMY       History reviewed. No pertinent family history.   Social History     Socioeconomic History    Marital status:      Spouse name: Not on file    Number of children: Not on file    Years of education: Not on file    Highest education level: Not on file   Occupational History    Not on file   Social Needs    Financial resource strain: Not on file    Food insecurity:     Worry: Not on file     Inability: Not on file    Transportation needs:     Medical: Not on file     Non-medical: Not on file   Tobacco Use    Smoking status: Former Smoker     Packs/day: 0.50     Years: 44.00     Pack years: 22.00     Types: Cigarettes     Last attempt to quit: 2018     Years since quittin.3    Smokeless tobacco: Never Used   Substance and Sexual Activity    Alcohol use: Yes     Comment: rarely    Drug use: No    Sexual activity: Not Currently   Lifestyle    Physical activity:     Days per week: Not on file     Minutes per session: Not on file    Stress: Not on file   Relationships    Social connections:     Talks on phone: Not on file     Gets together: Not on file     Attends Alevism service: Not on file     Active member of club or organization: Not on file     Attends meetings of clubs or organizations: Not on file     Relationship status: Not on file    Intimate partner violence:     Fear of current or ex partner: Not on file     Emotionally abused: Not on file     Physically abused: Not on file     Forced sexual activity: Not on file   Other Topics Concern    Not on file   Social History Narrative    Not on file     Current Facility-Administered Medications   Medication Dose Route Frequency Provider Last Rate Last Dose    magnesium sulfate 1 g in dextrose 5% 100 mL IVPB  1 g Intravenous Once Justus Hernandez  mL/hr at 05/15/19 0601 1 g at 05/15/19 0601     Current Outpatient Medications   Medication Sig Dispense Refill    predniSONE (DELTASONE) 20 MG tablet Take 2 tablets by mouth daily for 7 days 14 tablet 0    albuterol (PROVENTIL) (2.5 MG/3ML) 0.083% nebulizer solution Take 3 mLs by nebulization every 6 hours as needed for Wheezing 120 each 3    albuterol sulfate  (90 Base) MCG/ACT inhaler Inhale 2 puffs into the lungs every 6 hours as needed for Wheezing 1 Inhaler 3    montelukast (SINGULAIR) 10 MG tablet Take 10 mg by mouth nightly      tiotropium (SPIRIVA RESPIMAT) 2.5 MCG/ACT AERS inhaler Inhale 2 puffs into the lungs daily 4 g 3    vitamin B-12 (CYANOCOBALAMIN) 500 MCG tablet Take 500 mcg by mouth daily      vitamin D-3 (CHOLECALCIFEROL) 5000 units TABS Take by mouth      fluticasone (FLONASE) 50 MCG/ACT nasal spray 1 spray by Nasal route      Multiple Vitamin (MULTIVITAMIN) tablet Take 1 tablet by mouth      levothyroxine (SYNTHROID) 137 MCG tablet Take 137 mcg by mouth daily       Allergies   Allergen Reactions    Codeine Swelling     Nursing Notes Reviewed    Physical Exam:  ED Triage Vitals [05/15/19 0418]   Enc Vitals Group      BP (!) 148/80      Pulse 109      Resp 20      Temp 97.7 °F (36.5 °C)      Temp Source Oral      SpO2 92 %      Weight 128 lb (58.1 kg)      Height 5' 4\" (1.626 m)      Head Circumference       Peak Flow       Pain Score       Pain Loc       Pain Edu? Excl. in [address]? GENERAL APPEARANCE: A well-developed well-nourished pleasant elderly edentulous female in moderate respiratory distress   HEAD: Normocephalic, atraumatic  EYES: Sclera anicteric.no conjunctival injection,  ENT: Mucous membranes moist, no nasal discharge, pharynx clear, no stridor, NECK: Supple, no meningismus, no adenopathy, no JVD  HEART: RRR without rubs murmurs or gallops  PULSES: 2+ and equal carotid, radial, femoral, dorsalis pedis  CHEST: No chest wall pain to palpation  LUNGS:  End expiratory wheezing with only fair overall air movement and some retraction and accessory muscle use   ABDOMEN: Soft, non-tender to palpation, no guarding or rebound. , no mass or distention and no hepatosplenomegaly. EXTREMITIES: No acute deformities, no peripheral edema, no cord no erythema no unilateral leg swelling  SKIN: Warm and dry.  Normal color, no rash,  capillary refill less than 2 seconds  MENTAL STATUS: Alert, oriented, interactive,       Nursing note and vital signs reviewed     I have reviewed and interpreted all of the currently available lab results from this visit (if applicable):  Results for orders placed or performed during the hospital encounter of 05/15/19   CBC Auto Differential   Result Value Ref Range    WBC 9.5 4.0 - 11.0 K/uL    RBC 4.50 4.00 - 5.20 M/uL    Hemoglobin 14.2 12.0 - 16.0 g/dL    Hematocrit 42.3 36.0 - 48.0 %    MCV 93.8 80.0 - 100.0 fL    MCH 31.5 26.0 - 34.0 pg    MCHC 33.6 31.0 - 36.0 g/dL    RDW 13.8 12.4 - 15.4 %    Platelets 625 858 - 594 K/uL    MPV 7.9 5.0 - 10.5 fL    Neutrophils % 59.4 %    Lymphocytes % 24.6 %    Monocytes % 8.5 %    Eosinophils % 7.3 %    Basophils % 0.2 % Neutrophils # 5.6 1.7 - 7.7 K/uL    Lymphocytes # 2.3 1.0 - 5.1 K/uL    Monocytes # 0.8 0.0 - 1.3 K/uL    Eosinophils # 0.7 (H) 0.0 - 0.6 K/uL    Basophils # 0.0 0.0 - 0.2 K/uL   Comprehensive Metabolic Panel   Result Value Ref Range    Sodium 136 136 - 145 mmol/L    Potassium 4.1 3.5 - 5.1 mmol/L    Chloride 100 99 - 110 mmol/L    CO2 26 21 - 32 mmol/L    Anion Gap 10 3 - 16    Glucose 113 (H) 70 - 99 mg/dL    BUN 10 7 - 20 mg/dL    CREATININE 0.6 0.6 - 1.2 mg/dL    GFR Non-African American >60 >60    GFR African American >60 >60    Calcium 9.0 8.3 - 10.6 mg/dL    Total Protein 7.1 6.4 - 8.2 g/dL    Alb 4.1 3.4 - 5.0 g/dL    Albumin/Globulin Ratio 1.4 1.1 - 2.2    Total Bilirubin 0.3 0.0 - 1.0 mg/dL    Alkaline Phosphatase 95 40 - 129 U/L    ALT 85 (H) 10 - 40 U/L    AST 63 (H) 15 - 37 U/L    Globulin 3.0 g/dL   Troponin   Result Value Ref Range    Troponin <0.01 <0.01 ng/mL        Radiographs (if obtained):  ? Radiologist's Report Reviewed:  XR CHEST PORTABLE   Final Result   Emphysema with COPD. Bronchial wall thickening is presumably smoking-related although could   reflect acute bronchitis. No focal airspace consolidation. ? Discussed with Radiologist:     ? The following radiograph was interpreted by myself in the absence of a radiologist:     EKG (if obtained): (All EKG's are interpreted by myself in the absence of a cardiologist)  EKG demonstrates a normal sinus rhythm with normal axis intervals and ST-T wave segments and no evidence of ischemia infarction or arrhythmia heart rate of 98 and much like her previous EKGs    MDM:   Patient with a COPD exacerbation presents to the ER for evaluation. I have offered her admission but she prefers to be treated as an outpatient. She has improved in the emergency department with steroids and a hour long albuterol treatment.   She'll be discharged with prednisone and I have instructed her to follow-up with her family physician in one to 2 days but she is to return to the emergency department if problems develop as evidenced by worsening shortness of breath fever or chest pain or other concerning symptoms. Patient is reliable has long-standing COPD and understands these instructions    Final Impression:  1. COPD exacerbation [facility])        Critical Care:       Disposition referral (if applicable):  No follow-up provider specified. Disposition medications (if applicable):  New Prescriptions    PREDNISONE (DELTASONE) 20 MG TABLET    Take 2 tablets by mouth daily for 7 days       Comment: Please note this report has been produced using speech recognition software and may contain errors related to that system including errors in grammar, punctuation, and spelling, as well as words and phrases that may be inappropriate. If there are any questions or concerns please feel free to contact the dictating provider for clarification.       (Please note that portions of this note may have been completed with a voice recognition program. Efforts were made to edit the dictations but occasionally words are mis-transcribed.)    MD Ronald Barrientos., MD  05/15/19 6101

## 2019-05-16 LAB
EKG ATRIAL RATE: 98 BPM
EKG DIAGNOSIS: NORMAL
EKG P AXIS: 83 DEGREES
EKG P-R INTERVAL: 128 MS
EKG Q-T INTERVAL: 348 MS
EKG QRS DURATION: 84 MS
EKG QTC CALCULATION (BAZETT): 444 MS
EKG R AXIS: 88 DEGREES
EKG T AXIS: 65 DEGREES
EKG VENTRICULAR RATE: 98 BPM

## 2019-05-16 PROCEDURE — 93010 ELECTROCARDIOGRAM REPORT: CPT | Performed by: INTERNAL MEDICINE

## 2019-06-18 ENCOUNTER — TELEPHONE (OUTPATIENT)
Dept: PULMONOLOGY | Age: 59
End: 2019-06-18

## 2019-06-18 RX ORDER — DOXYCYCLINE HYCLATE 100 MG/1
100 CAPSULE ORAL 2 TIMES DAILY
Qty: 14 CAPSULE | Refills: 0 | Status: SHIPPED | OUTPATIENT
Start: 2019-06-18 | End: 2019-06-25

## 2019-06-18 RX ORDER — PREDNISONE 10 MG/1
TABLET ORAL
Qty: 30 TABLET | Refills: 0 | Status: SHIPPED | OUTPATIENT
Start: 2019-06-18 | End: 2019-06-28

## 2019-06-18 NOTE — TELEPHONE ENCOUNTER
9/13/2017  left  implanted port accessed using a 20 gauge non-coring needle primed with 0.9% sodium chloride.  Good blood return obtained.  Blood obtained and sent to lab. Flushed with 20ml 0.9% sodium chloride. Implanted port site is not sensitive to touch, not swollen, not warm and not reddened.  Patient tolerated procedure well.     OK for prednisone taper and doxy x 7 days. Call back if not better.

## 2019-07-01 RX ORDER — TIOTROPIUM BROMIDE INHALATION SPRAY 3.12 UG/1
SPRAY, METERED RESPIRATORY (INHALATION)
Qty: 1 INHALER | Refills: 5 | Status: SHIPPED | OUTPATIENT
Start: 2019-07-01 | End: 2019-07-24 | Stop reason: ALTCHOICE

## 2019-07-23 ENCOUNTER — TELEPHONE (OUTPATIENT)
Dept: PULMONOLOGY | Age: 59
End: 2019-07-23

## 2019-07-23 DIAGNOSIS — R05.9 COUGH: ICD-10-CM

## 2019-07-23 DIAGNOSIS — R06.02 SHORTNESS OF BREATH: Primary | ICD-10-CM

## 2019-07-24 ENCOUNTER — HOSPITAL ENCOUNTER (OUTPATIENT)
Age: 59
Discharge: HOME | End: 2019-07-24
Payer: COMMERCIAL

## 2019-07-24 ENCOUNTER — OFFICE VISIT (OUTPATIENT)
Dept: PULMONOLOGY | Age: 59
End: 2019-07-24
Payer: COMMERCIAL

## 2019-07-24 ENCOUNTER — HOSPITAL ENCOUNTER (OUTPATIENT)
Dept: GENERAL RADIOLOGY | Age: 59
Discharge: HOME | End: 2019-07-24
Payer: COMMERCIAL

## 2019-07-24 VITALS
HEIGHT: 64 IN | HEART RATE: 98 BPM | SYSTOLIC BLOOD PRESSURE: 128 MMHG | WEIGHT: 134 LBS | BODY MASS INDEX: 22.88 KG/M2 | DIASTOLIC BLOOD PRESSURE: 66 MMHG | OXYGEN SATURATION: 93 %

## 2019-07-24 DIAGNOSIS — J96.11 CHRONIC RESPIRATORY FAILURE WITH HYPOXIA (HCC): ICD-10-CM

## 2019-07-24 DIAGNOSIS — J44.9 COPD, SEVERE (HCC): Primary | ICD-10-CM

## 2019-07-24 DIAGNOSIS — R05.9 COUGH: ICD-10-CM

## 2019-07-24 DIAGNOSIS — R06.02 SHORTNESS OF BREATH: ICD-10-CM

## 2019-07-24 DIAGNOSIS — Z72.0 TOBACCO ABUSE: ICD-10-CM

## 2019-07-24 DIAGNOSIS — J44.1 COPD EXACERBATION (HCC): ICD-10-CM

## 2019-07-24 PROCEDURE — 99214 OFFICE O/P EST MOD 30 MIN: CPT | Performed by: INTERNAL MEDICINE

## 2019-07-24 PROCEDURE — 71046 X-RAY EXAM CHEST 2 VIEWS: CPT

## 2019-07-24 RX ORDER — DOXYCYCLINE HYCLATE 100 MG
100 TABLET ORAL 2 TIMES DAILY
Qty: 14 TABLET | Refills: 0 | Status: SHIPPED | OUTPATIENT
Start: 2019-07-24 | End: 2019-07-31

## 2019-07-24 RX ORDER — IPRATROPIUM BROMIDE AND ALBUTEROL SULFATE 2.5; .5 MG/3ML; MG/3ML
3 SOLUTION RESPIRATORY (INHALATION) PRN
Status: ON HOLD | COMMUNITY
Start: 2019-05-31 | End: 2019-12-25

## 2019-07-24 RX ORDER — PREDNISONE 10 MG/1
TABLET ORAL
Qty: 30 TABLET | Refills: 0 | Status: SHIPPED | OUTPATIENT
Start: 2019-07-24 | End: 2019-08-05

## 2019-07-24 NOTE — PROGRESS NOTES
[facility] Pulmonary and Critical Care Specialists    Outpatient Follow Up Note    CHIEF COMPLAINT : shortness of breath     HPI:  The patient is a 58 yo woman with hx of COPD, tobacco abuse and asthma who presented to [facility] ER in 12/18 with progressively worsening shortness of breath and associated productive cough. The patient was hospitalized for a COPD exacerbation in 9/18. She smoked a pack per day x 40 years. Since last clinic visit, the patient has felt terrible over the last 1 to 2 weeks. She has worsening shortness of breath associated with wheezing and a productive cough. Her sputum is described as being white-yellow in color. She denies any associated hemoptysis. There are no changes to past medical history, family history, social history or review of systems(except as noted in the history section) since prior note (all reviewed with patient).     Current Medications:    Current Outpatient Medications:     Fluticasone-Umeclidin-Vilant (TRELEGY ELLIPTA) 100-62.5-25 MCG/INH AEPB, Inhale 1 puff into the lungs daily, Disp: , Rfl:     ipratropium-albuterol (DUONEB) 0.5-2.5 (3) MG/3ML SOLN nebulizer solution, Inhale 3 mLs into the lungs as needed, Disp: , Rfl:     albuterol sulfate  (90 Base) MCG/ACT inhaler, Inhale 2 puffs into the lungs every 6 hours as needed for Wheezing, Disp: 1 Inhaler, Rfl: 3    vitamin B-12 (CYANOCOBALAMIN) 500 MCG tablet, Take 500 mcg by mouth daily, Disp: , Rfl:     montelukast (SINGULAIR) 10 MG tablet, Take 10 mg by mouth nightly, Disp: , Rfl:     vitamin D-3 (CHOLECALCIFEROL) 5000 units TABS, Take by mouth, Disp: , Rfl:     fluticasone (FLONASE) 50 MCG/ACT nasal spray, 1 spray by Nasal route, Disp: , Rfl:     Multiple Vitamin (MULTIVITAMIN) tablet, Take 1 tablet by mouth, Disp: , Rfl:     levothyroxine (SYNTHROID) 137 MCG tablet, Take 137 mcg by mouth daily, Disp: , Rfl:     albuterol (PROVENTIL) (2.5 MG/3ML) 0.083% nebulizer solution, Take 3 mLs by nebulization every 6 hours as needed for Wheezing (Patient not taking: Reported on 7/24/2019), Disp: 120 each, Rfl: 3        Objective:   PHYSICAL EXAM:        VITALS:  /66 (Site: Left Upper Arm, Position: Sitting, Cuff Size: Medium Adult)   Pulse 98   Ht 5' 4\" (1.626 m)   Wt 134 lb (60.8 kg)   LMP  (LMP Unknown)   SpO2 93% Comment: 2 L O2  BMI 23.00 kg/m²     Gen: In no acute distress. Alert. Thin. Comfortable. NCAT. Eyes: PERRL. No sclera icterus. No conjunctival injection. ENT: No ocular or auricular discharge. Oropharynx clear. Neck: Trachea midline. Normal thyroid. Resp: No accessory muscle use. No crackles. Scattered bilateral wheezes. No rhonchi. No dullness on percussion. Clear to auscultation bilaterally. CV: Regular rate. Regular rhythm. No murmur or rub. Normal S1 and S2. No edema. GI: Abdomen non-tender. Non-distended. Skin: Warm and dry. No nodules on exposed extremities. No rash on exposed extremities. Lymph: No cervical LAD. No supraclavicular LAD. M/S: No cyanosis. No synovitis or joint deformity. No clubbing. Neuro: Cranial nerves are grossly intact. Moving all extremities. Motor and sensation grossly intact. Psych: Oriented x 3. No anxiety or agitation. DATA:    LABS:      PFTs 1/10/19  FVC 1.69 (51%) FEV1 0.68 (26%) FEV1/FVC ratio  40%  TLC 6.92 (133%) DLCO 8.70 (37%) + BD response  6mwt 1060 feet, low sat 89%    6mwt 1/31/19  980 feet, low sat 86% improved with 1l NC      IMAGING:      I personally reviewed and interpreted the following today in the office :     CXR 7/24/19: hyperinflated, no focal airspace disease    CXR (dated 12/28/18: The heart, mediastinum and pulmonary vascularity are normal.  The lungs are lucent and hyperinflated.  Pattern evidence of underlying COPD.  No acute airspace abnormality.  There are no significant skeletal findings          Assessment:   1. Tobacco abuse     2. COPD severe   3.  Chronic respiratory failure with hypoxia (HCC)    - COPD

## 2019-08-20 ENCOUNTER — TELEPHONE (OUTPATIENT)
Dept: PULMONOLOGY | Age: 59
End: 2019-08-20

## 2019-09-13 ENCOUNTER — HOSPITAL ENCOUNTER (EMERGENCY)
Age: 59
Discharge: HOME | End: 2019-09-13
Attending: EMERGENCY MEDICINE
Payer: COMMERCIAL

## 2019-09-13 ENCOUNTER — APPOINTMENT (OUTPATIENT)
Dept: GENERAL RADIOLOGY | Age: 59
End: 2019-09-13
Payer: COMMERCIAL

## 2019-09-13 VITALS
WEIGHT: 134 LBS | BODY MASS INDEX: 22.88 KG/M2 | RESPIRATION RATE: 23 BRPM | HEART RATE: 101 BPM | TEMPERATURE: 98.4 F | HEIGHT: 64 IN | OXYGEN SATURATION: 96 % | DIASTOLIC BLOOD PRESSURE: 70 MMHG | SYSTOLIC BLOOD PRESSURE: 121 MMHG

## 2019-09-13 DIAGNOSIS — J44.1 COPD EXACERBATION (HCC): Primary | ICD-10-CM

## 2019-09-13 LAB
A/G RATIO: 1.7 (ref 1.1–2.2)
ALBUMIN SERPL-MCNC: 4.3 G/DL (ref 3.4–5)
ALP BLD-CCNC: 63 U/L (ref 40–129)
ALT SERPL-CCNC: 20 U/L (ref 10–40)
ANION GAP SERPL CALCULATED.3IONS-SCNC: 10 MMOL/L (ref 3–16)
AST SERPL-CCNC: 31 U/L (ref 15–37)
BASOPHILS ABSOLUTE: 0 K/UL (ref 0–0.2)
BASOPHILS RELATIVE PERCENT: 0.3 %
BILIRUB SERPL-MCNC: 0.4 MG/DL (ref 0–1)
BUN BLDV-MCNC: 13 MG/DL (ref 7–20)
CALCIUM SERPL-MCNC: 9.9 MG/DL (ref 8.3–10.6)
CHLORIDE BLD-SCNC: 96 MMOL/L (ref 99–110)
CO2: 28 MMOL/L (ref 21–32)
CREAT SERPL-MCNC: 0.6 MG/DL (ref 0.6–1.2)
EOSINOPHILS ABSOLUTE: 0.7 K/UL (ref 0–0.6)
EOSINOPHILS RELATIVE PERCENT: 7.1 %
GFR AFRICAN AMERICAN: >60
GFR NON-AFRICAN AMERICAN: >60
GLOBULIN: 2.5 G/DL
GLUCOSE BLD-MCNC: 124 MG/DL (ref 70–99)
HCT VFR BLD CALC: 38.9 % (ref 36–48)
HEMOGLOBIN: 13.2 G/DL (ref 12–16)
LACTIC ACID: 0.7 MMOL/L (ref 0.4–2)
LYMPHOCYTES ABSOLUTE: 2.2 K/UL (ref 1–5.1)
LYMPHOCYTES RELATIVE PERCENT: 22.2 %
MCH RBC QN AUTO: 31.3 PG (ref 26–34)
MCHC RBC AUTO-ENTMCNC: 33.9 G/DL (ref 31–36)
MCV RBC AUTO: 92.6 FL (ref 80–100)
MONOCYTES ABSOLUTE: 0.8 K/UL (ref 0–1.3)
MONOCYTES RELATIVE PERCENT: 7.8 %
NEUTROPHILS ABSOLUTE: 6.2 K/UL (ref 1.7–7.7)
NEUTROPHILS RELATIVE PERCENT: 62.6 %
PDW BLD-RTO: 13.4 % (ref 12.4–15.4)
PLATELET # BLD: 240 K/UL (ref 135–450)
PMV BLD AUTO: 7.9 FL (ref 5–10.5)
POTASSIUM SERPL-SCNC: 3.9 MMOL/L (ref 3.5–5.1)
RBC # BLD: 4.2 M/UL (ref 4–5.2)
SODIUM BLD-SCNC: 134 MMOL/L (ref 136–145)
TOTAL PROTEIN: 6.8 G/DL (ref 6.4–8.2)
TROPONIN: <0.01 NG/ML
WBC # BLD: 9.9 K/UL (ref 4–11)

## 2019-09-13 PROCEDURE — 83605 ASSAY OF LACTIC ACID: CPT

## 2019-09-13 PROCEDURE — 6370000000 HC RX 637 (ALT 250 FOR IP): Performed by: EMERGENCY MEDICINE

## 2019-09-13 PROCEDURE — 85025 COMPLETE CBC W/AUTO DIFF WBC: CPT

## 2019-09-13 PROCEDURE — 6360000002 HC RX W HCPCS: Performed by: EMERGENCY MEDICINE

## 2019-09-13 PROCEDURE — 99285 EMERGENCY DEPT VISIT HI MDM: CPT

## 2019-09-13 PROCEDURE — 6370000000 HC RX 637 (ALT 250 FOR IP)

## 2019-09-13 PROCEDURE — 84484 ASSAY OF TROPONIN QUANT: CPT

## 2019-09-13 PROCEDURE — 80053 COMPREHEN METABOLIC PANEL: CPT

## 2019-09-13 PROCEDURE — 96374 THER/PROPH/DIAG INJ IV PUSH: CPT

## 2019-09-13 PROCEDURE — 71045 X-RAY EXAM CHEST 1 VIEW: CPT

## 2019-09-13 RX ORDER — PREDNISONE 20 MG/1
TABLET ORAL
Qty: 27 TABLET | Refills: 0 | Status: ON HOLD | OUTPATIENT
Start: 2019-09-13 | End: 2019-12-25

## 2019-09-13 RX ORDER — IPRATROPIUM BROMIDE AND ALBUTEROL SULFATE 2.5; .5 MG/3ML; MG/3ML
SOLUTION RESPIRATORY (INHALATION)
Status: COMPLETED
Start: 2019-09-13 | End: 2019-09-13

## 2019-09-13 RX ORDER — DEXAMETHASONE SODIUM PHOSPHATE 10 MG/ML
10 INJECTION INTRAMUSCULAR; INTRAVENOUS ONCE
Status: COMPLETED | OUTPATIENT
Start: 2019-09-13 | End: 2019-09-13

## 2019-09-13 RX ORDER — IPRATROPIUM BROMIDE AND ALBUTEROL SULFATE 2.5; .5 MG/3ML; MG/3ML
1 SOLUTION RESPIRATORY (INHALATION) ONCE
Status: COMPLETED | OUTPATIENT
Start: 2019-09-13 | End: 2019-09-13

## 2019-09-13 RX ORDER — DOXYCYCLINE 100 MG/1
100 TABLET ORAL 2 TIMES DAILY
Qty: 20 TABLET | Refills: 0 | Status: SHIPPED | OUTPATIENT
Start: 2019-09-13 | End: 2019-09-23

## 2019-09-13 RX ADMIN — DEXAMETHASONE SODIUM PHOSPHATE 10 MG: 10 INJECTION, SOLUTION INTRAMUSCULAR; INTRAVENOUS at 20:54

## 2019-09-13 RX ADMIN — IPRATROPIUM BROMIDE AND ALBUTEROL SULFATE 3 ML: .5; 3 SOLUTION RESPIRATORY (INHALATION) at 23:11

## 2019-09-13 RX ADMIN — IPRATROPIUM BROMIDE AND ALBUTEROL SULFATE 1 AMPULE: .5; 3 SOLUTION RESPIRATORY (INHALATION) at 20:54

## 2019-09-14 NOTE — ED NOTES
Patient provided with discharge instructions and any prescriptions. Follow-up reviewed with patient/family. No further questions verbalized at this time. Vital signs and patient stable upon discharge.         Steven Cameron RN  09/13/19 [id number]

## 2019-11-12 ENCOUNTER — OFFICE VISIT (OUTPATIENT)
Dept: PULMONOLOGY | Age: 59
End: 2019-11-12
Payer: COMMERCIAL

## 2019-11-12 VITALS
BODY MASS INDEX: 23.18 KG/M2 | DIASTOLIC BLOOD PRESSURE: 64 MMHG | RESPIRATION RATE: 16 BRPM | HEART RATE: 94 BPM | HEIGHT: 64 IN | OXYGEN SATURATION: 94 % | WEIGHT: 135.8 LBS | SYSTOLIC BLOOD PRESSURE: 126 MMHG

## 2019-11-12 DIAGNOSIS — Z72.0 TOBACCO ABUSE: ICD-10-CM

## 2019-11-12 DIAGNOSIS — J44.9 COPD, SEVERE (HCC): Primary | ICD-10-CM

## 2019-11-12 DIAGNOSIS — J96.11 CHRONIC RESPIRATORY FAILURE WITH HYPOXIA (HCC): ICD-10-CM

## 2019-11-12 PROCEDURE — 99213 OFFICE O/P EST LOW 20 MIN: CPT | Performed by: INTERNAL MEDICINE

## 2019-11-12 RX ORDER — PREDNISONE 1 MG/1
5 TABLET ORAL DAILY
Status: ON HOLD | COMMUNITY
End: 2020-01-06 | Stop reason: HOSPADM

## 2019-12-25 ENCOUNTER — HOSPITAL ENCOUNTER (INPATIENT)
Age: 59
LOS: 12 days | Discharge: HOME | DRG: 207 | End: 2020-01-06
Attending: EMERGENCY MEDICINE | Admitting: INTERNAL MEDICINE
Payer: COMMERCIAL

## 2019-12-25 ENCOUNTER — APPOINTMENT (OUTPATIENT)
Dept: GENERAL RADIOLOGY | Age: 59
DRG: 207 | End: 2019-12-25
Payer: COMMERCIAL

## 2019-12-25 LAB
A/G RATIO: 1.9 (ref 1.1–2.2)
ALBUMIN SERPL-MCNC: 4.5 G/DL (ref 3.4–5)
ALP BLD-CCNC: 70 U/L (ref 40–129)
ALT SERPL-CCNC: 17 U/L (ref 10–40)
ANION GAP SERPL CALCULATED.3IONS-SCNC: 10 MMOL/L (ref 3–16)
AST SERPL-CCNC: 27 U/L (ref 15–37)
BASE EXCESS ARTERIAL: 1.1 MMOL/L (ref -3–3)
BASE EXCESS VENOUS: 1.4 MMOL/L (ref -3–3)
BASOPHILS ABSOLUTE: 0.2 K/UL (ref 0–0.2)
BASOPHILS RELATIVE PERCENT: 1.1 %
BILIRUB SERPL-MCNC: 0.4 MG/DL (ref 0–1)
BUN BLDV-MCNC: 11 MG/DL (ref 7–20)
CALCIUM SERPL-MCNC: 10.4 MG/DL (ref 8.3–10.6)
CARBOXYHEMOGLOBIN ARTERIAL: 1.8 % (ref 0–1.5)
CARBOXYHEMOGLOBIN: 3.3 % (ref 0–1.5)
CHLORIDE BLD-SCNC: 95 MMOL/L (ref 99–110)
CO2: 28 MMOL/L (ref 21–32)
CREAT SERPL-MCNC: <0.5 MG/DL (ref 0.6–1.2)
EOSINOPHILS ABSOLUTE: 0.1 K/UL (ref 0–0.6)
EOSINOPHILS RELATIVE PERCENT: 0.6 %
GFR AFRICAN AMERICAN: >60
GFR NON-AFRICAN AMERICAN: >60
GLOBULIN: 2.4 G/DL
GLUCOSE BLD-MCNC: 144 MG/DL (ref 70–99)
HCO3 ARTERIAL: 28.7 MMOL/L (ref 21–29)
HCO3 VENOUS: 29.6 MMOL/L (ref 23–29)
HCT VFR BLD CALC: 38.2 % (ref 36–48)
HEMOGLOBIN, ART, EXTENDED: 12.8 G/DL (ref 12–16)
HEMOGLOBIN: 12.9 G/DL (ref 12–16)
LACTIC ACID, SEPSIS: 0.5 MMOL/L (ref 0.4–1.9)
LYMPHOCYTES ABSOLUTE: 1.6 K/UL (ref 1–5.1)
LYMPHOCYTES RELATIVE PERCENT: 11.6 %
MCH RBC QN AUTO: 31.4 PG (ref 26–34)
MCHC RBC AUTO-ENTMCNC: 33.8 G/DL (ref 31–36)
MCV RBC AUTO: 92.9 FL (ref 80–100)
METHEMOGLOBIN ARTERIAL: 0.4 %
METHEMOGLOBIN VENOUS: 0.3 %
MONOCYTES ABSOLUTE: 0.4 K/UL (ref 0–1.3)
MONOCYTES RELATIVE PERCENT: 2.8 %
NEUTROPHILS ABSOLUTE: 12 K/UL (ref 1.7–7.7)
NEUTROPHILS RELATIVE PERCENT: 83.9 %
O2 CONTENT ARTERIAL: 18 ML/DL
O2 CONTENT, VEN: 19 VOL %
O2 SAT, ARTERIAL: 97 %
O2 SAT, VEN: 99 %
O2 THERAPY: ABNORMAL
O2 THERAPY: ABNORMAL
PCO2 ARTERIAL: 59.5 MMHG (ref 35–45)
PCO2, VEN: 62.9 MMHG (ref 40–50)
PDW BLD-RTO: 13.2 % (ref 12.4–15.4)
PH ARTERIAL: 7.3 (ref 7.35–7.45)
PH VENOUS: 7.29 (ref 7.35–7.45)
PLATELET # BLD: 292 K/UL (ref 135–450)
PMV BLD AUTO: 8 FL (ref 5–10.5)
PO2 ARTERIAL: 102.8 MMHG (ref 75–108)
PO2, VEN: 193.1 MMHG (ref 25–40)
POTASSIUM REFLEX MAGNESIUM: 4.1 MMOL/L (ref 3.5–5.1)
PRO-BNP: 93 PG/ML (ref 0–124)
RBC # BLD: 4.11 M/UL (ref 4–5.2)
SODIUM BLD-SCNC: 133 MMOL/L (ref 136–145)
TCO2 ARTERIAL: 30.5 MMOL/L
TCO2 CALC VENOUS: 32 MMOL/L
TOTAL PROTEIN: 6.9 G/DL (ref 6.4–8.2)
TROPONIN: <0.01 NG/ML
WBC # BLD: 14.2 K/UL (ref 4–11)

## 2019-12-25 PROCEDURE — 94660 CPAP INITIATION&MGMT: CPT

## 2019-12-25 PROCEDURE — 6370000000 HC RX 637 (ALT 250 FOR IP): Performed by: INTERNAL MEDICINE

## 2019-12-25 PROCEDURE — 87040 BLOOD CULTURE FOR BACTERIA: CPT

## 2019-12-25 PROCEDURE — 6360000002 HC RX W HCPCS: Performed by: EMERGENCY MEDICINE

## 2019-12-25 PROCEDURE — 93005 ELECTROCARDIOGRAM TRACING: CPT | Performed by: EMERGENCY MEDICINE

## 2019-12-25 PROCEDURE — 2500000003 HC RX 250 WO HCPCS: Performed by: INTERNAL MEDICINE

## 2019-12-25 PROCEDURE — 71045 X-RAY EXAM CHEST 1 VIEW: CPT

## 2019-12-25 PROCEDURE — 2000000000 HC ICU R&B

## 2019-12-25 PROCEDURE — 99223 1ST HOSP IP/OBS HIGH 75: CPT | Performed by: INTERNAL MEDICINE

## 2019-12-25 PROCEDURE — 36600 WITHDRAWAL OF ARTERIAL BLOOD: CPT

## 2019-12-25 PROCEDURE — 96365 THER/PROPH/DIAG IV INF INIT: CPT

## 2019-12-25 PROCEDURE — 99285 EMERGENCY DEPT VISIT HI MDM: CPT

## 2019-12-25 PROCEDURE — 80053 COMPREHEN METABOLIC PANEL: CPT

## 2019-12-25 PROCEDURE — 99291 CRITICAL CARE FIRST HOUR: CPT | Performed by: INTERNAL MEDICINE

## 2019-12-25 PROCEDURE — 85025 COMPLETE CBC W/AUTO DIFF WBC: CPT

## 2019-12-25 PROCEDURE — 82803 BLOOD GASES ANY COMBINATION: CPT

## 2019-12-25 PROCEDURE — 2580000003 HC RX 258: Performed by: INTERNAL MEDICINE

## 2019-12-25 PROCEDURE — 87150 DNA/RNA AMPLIFIED PROBE: CPT

## 2019-12-25 PROCEDURE — 2500000003 HC RX 250 WO HCPCS: Performed by: EMERGENCY MEDICINE

## 2019-12-25 PROCEDURE — 2700000000 HC OXYGEN THERAPY PER DAY

## 2019-12-25 PROCEDURE — 96361 HYDRATE IV INFUSION ADD-ON: CPT

## 2019-12-25 PROCEDURE — 96375 TX/PRO/DX INJ NEW DRUG ADDON: CPT

## 2019-12-25 PROCEDURE — 94761 N-INVAS EAR/PLS OXIMETRY MLT: CPT

## 2019-12-25 PROCEDURE — 84484 ASSAY OF TROPONIN QUANT: CPT

## 2019-12-25 PROCEDURE — 83880 ASSAY OF NATRIURETIC PEPTIDE: CPT

## 2019-12-25 PROCEDURE — 2580000003 HC RX 258: Performed by: EMERGENCY MEDICINE

## 2019-12-25 PROCEDURE — 6360000002 HC RX W HCPCS: Performed by: INTERNAL MEDICINE

## 2019-12-25 PROCEDURE — 83605 ASSAY OF LACTIC ACID: CPT

## 2019-12-25 PROCEDURE — 94640 AIRWAY INHALATION TREATMENT: CPT

## 2019-12-25 RX ORDER — SODIUM CHLORIDE 0.9 % (FLUSH) 0.9 %
10 SYRINGE (ML) INJECTION EVERY 12 HOURS SCHEDULED
Status: DISCONTINUED | OUTPATIENT
Start: 2019-12-25 | End: 2019-12-25 | Stop reason: SDUPTHER

## 2019-12-25 RX ORDER — IPRATROPIUM BROMIDE AND ALBUTEROL SULFATE 2.5; .5 MG/3ML; MG/3ML
1 SOLUTION RESPIRATORY (INHALATION)
Status: DISCONTINUED | OUTPATIENT
Start: 2019-12-25 | End: 2019-12-25

## 2019-12-25 RX ORDER — ACETAMINOPHEN 325 MG/1
650 TABLET ORAL EVERY 4 HOURS PRN
Status: DISCONTINUED | OUTPATIENT
Start: 2019-12-25 | End: 2020-01-06 | Stop reason: HOSPADM

## 2019-12-25 RX ORDER — DOXYCYCLINE HYCLATE 100 MG
100 TABLET ORAL EVERY 12 HOURS
Status: DISCONTINUED | OUTPATIENT
Start: 2019-12-26 | End: 2019-12-27

## 2019-12-25 RX ORDER — 0.9 % SODIUM CHLORIDE 0.9 %
1000 INTRAVENOUS SOLUTION INTRAVENOUS ONCE
Status: COMPLETED | OUTPATIENT
Start: 2019-12-25 | End: 2019-12-25

## 2019-12-25 RX ORDER — ALBUTEROL SULFATE 2.5 MG/3ML
7.5 SOLUTION RESPIRATORY (INHALATION) ONCE
Status: COMPLETED | OUTPATIENT
Start: 2019-12-25 | End: 2019-12-25

## 2019-12-25 RX ORDER — IPRATROPIUM BROMIDE AND ALBUTEROL SULFATE 2.5; .5 MG/3ML; MG/3ML
1 SOLUTION RESPIRATORY (INHALATION) EVERY 4 HOURS PRN
Status: DISCONTINUED | OUTPATIENT
Start: 2019-12-25 | End: 2020-01-01

## 2019-12-25 RX ORDER — SODIUM CHLORIDE 0.9 % (FLUSH) 0.9 %
10 SYRINGE (ML) INJECTION EVERY 12 HOURS SCHEDULED
Status: DISCONTINUED | OUTPATIENT
Start: 2019-12-25 | End: 2020-01-04 | Stop reason: SDUPTHER

## 2019-12-25 RX ORDER — METHYLPREDNISOLONE SODIUM SUCCINATE 40 MG/ML
40 INJECTION, POWDER, LYOPHILIZED, FOR SOLUTION INTRAMUSCULAR; INTRAVENOUS EVERY 12 HOURS
Status: COMPLETED | OUTPATIENT
Start: 2019-12-26 | End: 2019-12-27

## 2019-12-25 RX ORDER — PREDNISONE 20 MG/1
40 TABLET ORAL DAILY
Status: DISCONTINUED | OUTPATIENT
Start: 2019-12-28 | End: 2019-12-28

## 2019-12-25 RX ORDER — IPRATROPIUM BROMIDE AND ALBUTEROL SULFATE 2.5; .5 MG/3ML; MG/3ML
1 SOLUTION RESPIRATORY (INHALATION) EVERY 4 HOURS
Status: DISCONTINUED | OUTPATIENT
Start: 2019-12-25 | End: 2019-12-26

## 2019-12-25 RX ORDER — NICOTINE 21 MG/24HR
1 PATCH, TRANSDERMAL 24 HOURS TRANSDERMAL DAILY
Status: DISCONTINUED | OUTPATIENT
Start: 2019-12-25 | End: 2020-01-06 | Stop reason: HOSPADM

## 2019-12-25 RX ORDER — ONDANSETRON 2 MG/ML
4 INJECTION INTRAMUSCULAR; INTRAVENOUS EVERY 6 HOURS PRN
Status: DISCONTINUED | OUTPATIENT
Start: 2019-12-25 | End: 2020-01-06 | Stop reason: HOSPADM

## 2019-12-25 RX ORDER — MONTELUKAST SODIUM 10 MG/1
10 TABLET ORAL NIGHTLY
Status: DISCONTINUED | OUTPATIENT
Start: 2019-12-25 | End: 2020-01-06 | Stop reason: HOSPADM

## 2019-12-25 RX ORDER — METHYLPREDNISOLONE SODIUM SUCCINATE 125 MG/2ML
125 INJECTION, POWDER, LYOPHILIZED, FOR SOLUTION INTRAMUSCULAR; INTRAVENOUS ONCE
Status: COMPLETED | OUTPATIENT
Start: 2019-12-25 | End: 2019-12-25

## 2019-12-25 RX ORDER — LEVOTHYROXINE SODIUM 0.12 MG/1
125 TABLET ORAL DAILY
Status: ON HOLD | COMMUNITY
End: 2020-01-06 | Stop reason: HOSPADM

## 2019-12-25 RX ORDER — SODIUM CHLORIDE 0.9 % (FLUSH) 0.9 %
10 SYRINGE (ML) INJECTION PRN
Status: DISCONTINUED | OUTPATIENT
Start: 2019-12-25 | End: 2020-01-04 | Stop reason: SDUPTHER

## 2019-12-25 RX ORDER — SODIUM CHLORIDE 0.9 % (FLUSH) 0.9 %
10 SYRINGE (ML) INJECTION PRN
Status: DISCONTINUED | OUTPATIENT
Start: 2019-12-25 | End: 2019-12-25 | Stop reason: SDUPTHER

## 2019-12-25 RX ADMIN — MONTELUKAST SODIUM 10 MG: 10 TABLET, COATED ORAL at 21:06

## 2019-12-25 RX ADMIN — DOXYCYCLINE HYCLATE 100 MG: 100 TABLET, COATED ORAL at 23:37

## 2019-12-25 RX ADMIN — IPRATROPIUM BROMIDE AND ALBUTEROL SULFATE 1 AMPULE: .5; 3 SOLUTION RESPIRATORY (INHALATION) at 19:07

## 2019-12-25 RX ADMIN — ACETAMINOPHEN 650 MG: 325 TABLET ORAL at 21:06

## 2019-12-25 RX ADMIN — DEXMEDETOMIDINE 0.2 MCG/KG/HR: 100 INJECTION, SOLUTION, CONCENTRATE INTRAVENOUS at 20:29

## 2019-12-25 RX ADMIN — Medication 10 ML: at 21:10

## 2019-12-25 RX ADMIN — IPRATROPIUM BROMIDE 0.5 MG: 0.5 SOLUTION RESPIRATORY (INHALATION) at 10:12

## 2019-12-25 RX ADMIN — IPRATROPIUM BROMIDE AND ALBUTEROL SULFATE 1 AMPULE: .5; 3 SOLUTION RESPIRATORY (INHALATION) at 11:46

## 2019-12-25 RX ADMIN — IPRATROPIUM BROMIDE AND ALBUTEROL SULFATE 1 AMPULE: .5; 3 SOLUTION RESPIRATORY (INHALATION) at 14:30

## 2019-12-25 RX ADMIN — ALBUTEROL SULFATE 7.5 MG: 2.5 SOLUTION RESPIRATORY (INHALATION) at 09:56

## 2019-12-25 RX ADMIN — ENOXAPARIN SODIUM 40 MG: 40 INJECTION SUBCUTANEOUS at 13:43

## 2019-12-25 RX ADMIN — SODIUM CHLORIDE 1000 ML: 9 INJECTION, SOLUTION INTRAVENOUS at 10:22

## 2019-12-25 RX ADMIN — ACETAMINOPHEN 650 MG: 325 TABLET ORAL at 15:57

## 2019-12-25 RX ADMIN — IPRATROPIUM BROMIDE AND ALBUTEROL SULFATE 1 AMPULE: .5; 3 SOLUTION RESPIRATORY (INHALATION) at 14:31

## 2019-12-25 RX ADMIN — Medication 10 ML: at 16:50

## 2019-12-25 RX ADMIN — IPRATROPIUM BROMIDE AND ALBUTEROL SULFATE 1 AMPULE: .5; 3 SOLUTION RESPIRATORY (INHALATION) at 22:54

## 2019-12-25 RX ADMIN — DOXYCYCLINE 100 MG: 100 INJECTION, POWDER, LYOPHILIZED, FOR SOLUTION INTRAVENOUS at 11:03

## 2019-12-25 RX ADMIN — NITROGLYCERIN 0.5 INCH: 20 OINTMENT TOPICAL at 23:37

## 2019-12-25 RX ADMIN — METHYLPREDNISOLONE SODIUM SUCCINATE 125 MG: 125 INJECTION, POWDER, FOR SOLUTION INTRAMUSCULAR; INTRAVENOUS at 09:56

## 2019-12-25 RX ADMIN — MUPIROCIN: 20 OINTMENT TOPICAL at 21:16

## 2019-12-25 ASSESSMENT — PAIN DESCRIPTION - ONSET
ONSET: PROGRESSIVE
ONSET: ON-GOING

## 2019-12-25 ASSESSMENT — PAIN DESCRIPTION - LOCATION
LOCATION: CHEST
LOCATION: BACK
LOCATION: CHEST;BACK

## 2019-12-25 ASSESSMENT — PAIN DESCRIPTION - ORIENTATION
ORIENTATION: MID
ORIENTATION: UPPER;MID;LOWER

## 2019-12-25 ASSESSMENT — PAIN SCALES - GENERAL
PAINLEVEL_OUTOF10: 7
PAINLEVEL_OUTOF10: 10
PAINLEVEL_OUTOF10: 0
PAINLEVEL_OUTOF10: 7
PAINLEVEL_OUTOF10: 3
PAINLEVEL_OUTOF10: 4
PAINLEVEL_OUTOF10: 5

## 2019-12-25 ASSESSMENT — PAIN DESCRIPTION - DESCRIPTORS
DESCRIPTORS: ACHING
DESCRIPTORS: BURNING

## 2019-12-25 ASSESSMENT — PAIN DESCRIPTION - FREQUENCY
FREQUENCY: INTERMITTENT
FREQUENCY: INTERMITTENT

## 2019-12-25 ASSESSMENT — PAIN DESCRIPTION - PROGRESSION
CLINICAL_PROGRESSION: GRADUALLY WORSENING
CLINICAL_PROGRESSION: GRADUALLY WORSENING

## 2019-12-25 ASSESSMENT — PAIN DESCRIPTION - PAIN TYPE
TYPE: ACUTE PAIN

## 2019-12-25 NOTE — PROGRESS NOTES
3    Breath Sounds: Absent bilaterally and/or with wheezes = 3    Sputum   ,  ,    Cough: Strong, productive = 1    Vital Signs   BP (!) 153/77   Pulse 107   Temp 98.2 °F (36.8 °C)   Resp 19   Ht 5' 4\" (1.626 m)   Wt 130 lb (59 kg)   LMP  (LMP Unknown)   SpO2 98%   BMI 22.31 kg/m²   SPO2 (COPD values may differ): 86-87% on room air or greater than 92% on FiO2 35- 50% = 3    Peak Flow (asthma only): not applicable = 0    RSI: 52-93 = Q4WA (every four hours while awake) and Q4hrs PRN        Plan       Goals: medication delivery    Patient/caregiver was educated on the proper method of use for Respiratory Care Devices:  Yes      Level of patient/caregiver understanding able to:   ? Verbalize understanding   ? Demonstrate understanding       ? Teach back        ? Needs reinforcement       ? No available caregiver               ? Other:     Response to education:  Excellent     Is patient being placed on Home Treatment Regimen? Yes    Does the patient have everything they need prior to discharge? NA     Comments: Chart and home meds reviewed    Plan of Care: Change the patient to [facility]    Electronically signed by Chester Robles RCP on 12/25/2019 at 11:49 AM    Respiratory Protocol Guidelines     1. Assessment and treatment by Respiratory Therapy will be initiated for medication and therapeutic interventions upon initiation of aerosolized medication. 2. Physician will be contacted for respiratory rate (RR) greater than 35 breaths per minute. Therapy will be held for heart rate (HR) greater than 140 beats per minute, pending direction from physician. 3. Bronchodilators will be administered via Metered Dose Inhaler (MDI) with spacer when the following criteria are met:  a. Alert and cooperative     b. HR < 140 bpm  c. RR < 30 bpm                d. Can demonstrate a 2-3 second inspiratory hold  4.  Bronchodilators will be administered via Hand Held Nebulizer [facility]) to patients when ANY of the following criteria are met  a. Incognizant or uncooperative          b. Patients treated with HHN at Home        c. Unable to demonstrate proper use of MDI with spacer     d. RR > 30 bpm   5. Bronchodilators will be delivered via Metered Dose Inhaler (MDI), HHN, Aerogen to intubated patients on mechanical ventilation. 6. Inhalation medication orders will be delivered and/or substituted as outlined below. Aerosolized Medications Ordering and Administration Guidelines:    1. All Medications will be ordered by a physician, and their frequency and/or modality will be adjusted as defined by the patients Respiratory Severity Index (RSI) score. 2. If the patient does not have documented COPD, consider discontinuing anticholinergics when RSI is less than 9.  3. If the bronchospasm worsens (increased RSI), then the bronchodilator frequency can be increased to a maximum of every 4 hours. If greater than every 4 hours is required, the physician will be contacted. 4. If the bronchospasm improves, the frequency of the bronchodilator can be decreased, based on the patient's RSI, but not less than home treatment regimen frequency. 5. Bronchodilator(s) will be discontinued if patient has a RSI less than 9 and has received no scheduled or as needed treatment for 72  Hrs. Patients Ordered on a Mucolytic Agent:    1. Must always be administered with a bronchodilator. 2. Discontinue if patient experiences worsened bronchospasm, or secretions have lessened to the point that the patient is able to clear them with a cough. Anti-inflammatory and Combination Medications:    1. If the patient lacks prior history of lung disease, is not using inhaled anti-inflammatory medication at home, and lacks wheezing by examination or by history for at least 24 hours, contact physician for possible discontinuation.

## 2019-12-25 NOTE — H&P
Negative for fever, chills + fatigue   HENT: Negative for sore throat   Eyes: Negative for redness   Respiratory: + dyspnea, cough   Cardiovascular: + chest pain   Gastrointestinal: Negative for vomiting, + diarrhea   Genitourinary: Negative for hematuria, dysuria  Musculoskeletal: Negative for arthralgias   Skin: Negative for rash   Neurological: Negative for syncope       PHYSICAL EXAM:    BP (!) 153/77   Pulse 107   Temp 98.2 °F (36.8 °C)   Resp 25   Ht 5' 4\" (1.626 m)   Wt 130 lb (59 kg)   LMP  (LMP Unknown)   SpO2 99%   BMI 22.31 kg/m²     Gen: In distress. Alert. Eyes: PERRL. No sclera icterus. No conjunctival injection. ENT: No discharge. Pharynx clear. Neck: No JVD. No Carotid Bruit. Trachea midline. Resp: + accessory muscle use, tachypnea. No crackles. + prolonged expiratory wheezes. No rhonchi. CV: Tachycardic rate. Regular rhythm. No murmur. No rub. Trace BLE edema. GI: Non-tender. Non-distended. Normal bowel sounds. No hernia. Skin: Warm and dry. No nodule on exposed extremities. No rash on exposed extremities. M/S: No cyanosis. No joint deformity. No clubbing. Neuro: Awake. Grossly nonfocal    Psych: Oriented x 3. No anxiety or agitation. I Pamela Stuart have reviewed the chart on Jeny Rothman and personally interviewed and examined patient, reviewed the data (labs and imaging) and after discussion with my PA formulated the plan. Agree with note with the following edits. HPI:     I reviewed the patient's Past Medical History, Past Surgical History, Medications, and Allergies. 62 y.o. female with severe COPD, asthma, chronic hypoxic respiratory failure (on 2 L O2) and hypothyroidism who presents to [facility] with c/o shortness of breath. She states she was not feeling well last night and felt like she was coming down with something. She took some Mucinex and woke up feeling short of breath this morning at approximately 0230.   She reports her cough was productive, now non-productive. Denies fevers, chills, abdominal pain, nausea, vomiting. Presented with severe sob , asked for bipap and then transferred to iCU     Pt seen in resp distress on bipap but awake and oriented      General:   Elderly female, up in bed, Awake, alert and oriented. Appears to be not in moderate distress   Accessory muscle use noted   Mucous Membranes:  Pink , anicteric  Neck: No JVD, no carotid bruit, no thyromegaly  Chest: diminished in bases, scattered wheeze  Cardiovascular:  RRR S1S2 heard, no murmurs or gallops  Abdomen:  Soft, undistended, non tender, no organomegaly, BS present  Extremities:trace edema  Distal pulses well felt  Neurological : grossly normal                  CBC:   Recent Labs     12/25/19  1010   WBC 14.2*   HGB 12.9   HCT 38.2   MCV 92.9        BMP:   Recent Labs     12/25/19  1010   *   K 4.1   CL 95*   CO2 28   BUN 11   CREATININE <0.5*     LIVER PROFILE:   Recent Labs     12/25/19  1010   AST 27   ALT 17   BILITOT 0.4   ALKPHOS 70       CARDIAC ENZYMES  Recent Labs     12/25/19  1010   TROPONINI <0.01       CULTURES  Blood- pending  Sputum- pending    EKG:  I have reviewed the EKG with the following interpretation:   N/A    RADIOLOGY  XR CHEST PORTABLE   Final Result   COPD               Active Problems:    COPD exacerbation (HCC)  Resolved Problems:    * No resolved hospital problems. *        ASSESSMENT/PLAN:    Acute on chronic hypoxic/hypercapnic respiratory failure  - patient presented with shortness of breath. Also c/o chest pain  - workup consisted with COPD exacerbation  - admitted to ICU on BiPAP. Pulmonology consultation obtained  - repeat ABG in 2 hours. COPD exacerbation  - IV solu-medrol. Doxy D#1.  - Scheduled/PRN Duonebs  - continue Singulair  - F/w Dr. Desi Horner.      SIRS  - due to above  - leukocytosis, tachypnea, tachycardia  - treat as above and monitor for improvement    Hypothyroidism  - home dose of synthroid 137 mcg Tobacco Dependence  - Recommended cessation    DVT Prophylaxis: Lovenox    Diet: Diet NPO Effective Now  Code Status: Full Code    Avis MACIELP-C  12/25/2019     Agree with above  Changes made to note    Ayaan Ramsay MD 12/25/2019 12:15 PM

## 2019-12-25 NOTE — PROGRESS NOTES
4 Eyes Skin Assessment     The patient is being assess for   Admission    I agree that 2 RN's have performed a thorough Head to Toe Skin Assessment on the patient. ALL assessment sites listed below have been assessed. Areas assessed by both nurses:   [x]   Head, Face, and Ears   [x]   Shoulders, Back, and Chest, Abdomen  [x]   Arms, Elbows, and Hands   [x]   Coccyx, Sacrum, and Ischium  [x]   Legs, Feet, and Heels        Scattered bruising     **SHARE this note so that the co-signing nurse is able to place an eSignature**    Co-signer eSignature: {Esignature:[id number]}    Does the Patient have Skin Breakdown?   No          Ron Prevention initiated:  NA   Wound Care Orders initiated:  NA      [facility] nurse consulted for Pressure Injury (Stage 3,4, Unstageable, DTI, NWPT, Complex wounds)and New or Established Ostomies:  NA      Primary Nurse eSignature: Electronically signed by Janes Whitmore RN on 12/25/19 at 12:23 PM

## 2019-12-25 NOTE — ED PROVIDER NOTES
[address] ED  EMERGENCY DEPARTMENT ENCOUNTER      Pt Name: Satira Meigs  MRN: [id number]  [place] 1960  Date of evaluation: 2019  Provider: Angelita Macdonald DO    CHIEF COMPLAINT       Chief Complaint   Patient presents with    Shortness of Breath     Arrived via EMS for shortness of breath. Usually on 2L at home. Arrived on 4L and HHN treatment. Has been on predinsone but not improving. Requesting Bipap. HISTORY OF PRESENT ILLNESS   (Location/Symptom, Timing/Onset, Context/Setting, Quality, Duration, Modifying Factors, Severity)  Note limiting factors. Satira Meigs is a 62 y.o. female COPD on 2 L nasal cannula at home and smokes 5 cigarettes a day who presents to the emergency department with complaint of worsening shortness of breath for the past 2 days. She reports a productive cough but denies fever, night sweats, chills. Reports some chest tightness but denies any chest pain. Denies nausea or diaphoresis. Has been using inhalers at home with little relief. Was given nebulizer treatments and increase oxygen to 4 L nasal cannula upon arrival.  When asking patient about BiPAP she reports she would like it at this time for her work of breathing. Denies history of intubations for breathing difficulty. Nursing Notes were reviewed.       PAST MEDICAL HISTORY     Past Medical History:   Diagnosis Date    Asthma     COPD (chronic obstructive pulmonary disease) ([facility] [address]     Thyroid disease          SURGICAL HISTORY       Past Surgical History:   Procedure Laterality Date     SECTION      x2    CHOLECYSTECTOMY           CURRENT MEDICATIONS       Previous Medications    ALBUTEROL (PROVENTIL) (2.5 MG/3ML) 0.083% NEBULIZER SOLUTION    Take 3 mLs by nebulization every 6 hours as needed for Wheezing    ALBUTEROL SULFATE  (90 BASE) MCG/ACT INHALER    Inhale 2 puffs into the lungs every 6 hours as needed for Wheezing    FLUTICASONE (FLONASE) 50 MCG/ACT NASAL SPRAY    1 spray by Nasal route    FLUTICASONE PROPIONATE (FLOVENT DISKUS) 250 MCG/BLIST AEPB INHALER    Inhale 2 puffs into the lungs daily    FLUTICASONE-UMECLIDIN-VILANT (TRELEGY ELLIPTA) 100-62.5-25 MCG/INH AEPB    Inhale 1 puff into the lungs daily    IPRATROPIUM-ALBUTEROL (DUONEB) 0.5-2.5 (3) MG/3ML SOLN NEBULIZER SOLUTION    Inhale 3 mLs into the lungs as needed    LEVOTHYROXINE (SYNTHROID) 137 MCG TABLET    Take 137 mcg by mouth daily    MONTELUKAST (SINGULAIR) 10 MG TABLET    Take 10 mg by mouth nightly    MULTIPLE VITAMIN (MULTIVITAMIN) TABLET    Take 1 tablet by mouth    PREDNISONE (DELTASONE) 20 MG TABLET    Take 3 tablets daily for 6 days, then 2 tablets daily for 3 days, then 1 tablets daily for 3 days    PREDNISONE (DELTASONE) 5 MG TABLET    Take 5 mg by mouth daily    UMECLIDINIUM-VILANTEROL (ANORO ELLIPTA) 62.5-25 MCG/INH AEPB INHALER    Inhale 1 puff into the lungs daily    VITAMIN B-12 (CYANOCOBALAMIN) 500 MCG TABLET    Take 500 mcg by mouth daily    VITAMIN D-3 (CHOLECALCIFEROL) 5000 UNITS TABS    Take by mouth       ALLERGIES     Codeine    FAMILY HISTORY     History reviewed. No pertinent family history.        SOCIAL HISTORY       Social History     Socioeconomic History    Marital status:      Spouse name: None    Number of children: None    Years of education: None    Highest education level: None   Occupational History    None   Social Needs    Financial resource strain: None    Food insecurity:     Worry: None     Inability: None    Transportation needs:     Medical: None     Non-medical: None   Tobacco Use    Smoking status: Former Smoker     Packs/day: 0.25     Years: 44.00     Pack years: 11.00     Types: Cigarettes     Last attempt to quit: 2018     Years since quittin.0    Smokeless tobacco: Never Used   Substance and Sexual Activity    Alcohol use: Yes     Comment: rarely    Drug use: No    Sexual activity: Not Currently   Lifestyle    Physical activity:     Days per week: None     Minutes per session: None    Stress: None   Relationships    Social connections:     Talks on phone: None     Gets together: None     Attends [employer] service: None     Active member of club or organization: None     Attends meetings of clubs or organizations: None     Relationship status: None    Intimate partner violence:     Fear of current or ex partner: None     Emotionally abused: None     Physically abused: None     Forced sexual activity: None   Other Topics Concern    None   Social History Narrative    None       SCREENINGS               Review of Systems  Constitutional:  Denies fever, chills  Eyes: denies eye problems  HEENT: denies sore throat or ear pain  Respiratory: Reports cough, shortness of breath, wheezing. Cardiovascular: denies chest pain, palpitations  GI: denies abdominal pain, nausea, vomiting, or diarrhea  Musculoskeletal: denies joint pain  Skin: denies rash  Neurologic: denies focal weakness or sensory changes    Except as noted above the remainder of the review of systems was reviewed and negative. PHYSICAL EXAM    (up to 7 for level 4, 8 or more for level 5)     ED Triage Vitals [12/25/19 0952]   BP Temp Temp src Pulse Resp SpO2 Height Weight   (!) 206/86 98.2 °F (36.8 °C) -- 112 20 96 % 5' 4\" (1.626 m) 130 lb (59 kg)       General appearance: well-developed, well-nourished, no acute distress, nontoxic appearance  HENT: normocephalic, atraumatic, oropharynx moist, nares patent  Eyes: PERRLA, EOMI, conjunctiva normal  Neck: normal range of motion, no tenderness, trachea midline, no stridor  Respiratory: Mild to moderate accessory muscle use, expiratory wheezing bilaterally  Cardiovascular: normal heart rate, normal rhythm  GI: nontender, bowel sounds normal, soft, nondistended, no pulsatile masses  Musculoskeletal: intact distal pulses, no clubbing, cyanosis, or edema.   Good range of motion  Integument: warm, dry, no erythema, no rash, < 2 second cap refill  Neurologic: alert and oriented ×3, no focal deficits appreciated    DIAGNOSTIC RESULTS     EKG: Sinus tachycardia, rate 103, normal QRS, normal QT, no ST depression or elevation, normal T wave    All EKG's are interpreted by the Emergency Department Physician who either signs or Co-signs this chart in the absence of a cardiologist.      RADIOLOGY:   Interpretation per the Radiologist below, if available at the time of this note:    XR CHEST PORTABLE   Final Result   COPD               LABS:  Labs Reviewed   CBC WITH AUTO DIFFERENTIAL - Abnormal; Notable for the following components:       Result Value    WBC 14.2 (*)     Neutrophils Absolute 12.0 (*)     All other components within normal limits    Narrative:     Performed at:  [place] [address],  [facility]   Phone [phone]   COMPREHENSIVE METABOLIC PANEL W/ REFLEX TO MG FOR LOW K - Abnormal; Notable for the following components:    Sodium 133 (*)     Chloride 95 (*)     Glucose 144 (*)     CREATININE <0.5 (*)     All other components within normal limits    Narrative:     Performed at:  [place] [address],  [facility]   Phone [phone]   BLOOD GAS, VENOUS - Abnormal; Notable for the following components:    pH, Melquiades 7.290 (*)     pCO2, Melquiades 62.9 (*)     pO2, Melquiades 193.1 (*)     HCO3, Venous 29.6 (*)     Carboxyhemoglobin 3.3 (*)     All other components within normal limits    Narrative:     Performed at:  [place] [address],  [facility]   Phone [phone]   CULTURE BLOOD #1   CULTURE BLOOD #2   TROPONIN    Narrative:     Performed at:  [place] [address],  [facility]   Phone [phone]   BRAIN NATRIURETIC PEPTIDE    Narrative:     Performed at:  [facility]) - [place] [id number],  [facility] signed)  Attending Emergency Physician      Rebeca Landis,   12/25/19 1109

## 2019-12-25 NOTE — PROGRESS NOTES
Admission assessment complete. Patient is A/Ox4. VSS. Patient is awake in bed. Family is at bedside. Patient is currently on BiPAP, 16/5 40%. Lung sounds with expiratory wheezes. Patient tachypneic at rest, RRs high 20s to 30s. Patient states she feels comfortable on BiPAP. Heart rhythm ST on bedside monitor, HRs low to mid 100s. Bowel sounds active x4. Abdomen is soft and non tender. Scattered bruising present. +1 pitting edema to BLE. Patient with no complaints of pain at this time. Patient and family deny any further needs. Call light explained and in reach. Bed alarm set. Will continue to monitor. Quinten Primrose, SHAYYN, RN.

## 2019-12-25 NOTE — PROGRESS NOTES
Patient arrived to ICU-6 in stable condition on BiPAP 16/5 40%. Patient wiped with chlorhexidine wipes per protocol, repositioned in bed HOB 90 per patient request for comfort.

## 2019-12-25 NOTE — CONSULTS
Patient is being seen at the request of Dr. Ty Sharma for a consultation for Acute on chronic respiratory failure with hypoxemia and hypercapnia      HISTORY OF PRESENT ILLNESS: Patient is a 55-year-old woman with a past medical history of severe COPD, chronic respiratory failure with hypoxemia, asthma, tobacco abuse who presented to [facility] emergency department with worsening shortness of breath over the last 2 days. Yesterday evening she woke up with a productive cough and took Mucinex. She continued to have difficulty breathing and thus was brought to the emergency department. She typically wears 2 L of supplemental oxygen but when she arrived to the ER she was noted to be hypoxemic. She was placed on noninvasive positive pressure ventilation and admitted to the ICU for further care. Patient continues to smoke approximately 5 cigarettes a day. She is currently a limited historian secondary to respiratory distress. PAST MEDICAL HISTORY:  Past Medical History:   Diagnosis Date    Asthma     COPD (chronic obstructive pulmonary disease) ([address]     Thyroid disease      PAST SURGICAL HISTORY:  Past Surgical History:   Procedure Laterality Date     SECTION      x2    CHOLECYSTECTOMY         FAMILY HISTORY:  family history is not on file. SOCIAL HISTORY:   reports that she has been smoking cigarettes. She has a 22.00 pack-year smoking history. She has never used smokeless tobacco.    Scheduled Meds:   sodium chloride flush  10 mL Intravenous 2 times per day    doxycycline (VIBRAMYCIN) IV  100 mg Intravenous Once     Continuous Infusions:    PRN Meds:  sodium chloride flush    ALLERGIES:  Patient is allergic to codeine.     REVIEW OF SYSTEMS:  Constitutional: Negative for fever  HENT: Negative for sore throat  Eyes: Negative for redness   Respiratory: + for dyspnea, + cough  Cardiovascular: Negative for chest pain  Gastrointestinal: Negative for vomiting, diarrhea   Genitourinary: Negative for hematuria   Musculoskeletal: Negative for arthralgias   Skin: Negative for rash  Neurological: Negative for syncope  Hematological: Negative for adenopathy  Psychiatric/Behavorial: Negative for anxiety    PHYSICAL EXAM:  Blood pressure (!) 153/77, pulse 107, temperature 98.2 °F (36.8 °C), resp. rate 25, height 5' 4\" (1.626 m), weight 130 lb (59 kg), SpO2 99 %.' on bipap 40%  Gen: Moderate respiratory distress. Normocephalic, atraumatic. Eyes: PERRL. No sclera icterus. No conjunctival injection. ENT: No discharge. Pharynx clear. Neck: Trachea midline. No obvious mass. Resp: + accessory muscle use. No crackles. few wheezes. No rhonchi. No dullness on percussion. Poor air movement  CV: Tachy rate. Regular rhythm. No murmur or rub. +1 bilateral LE edema. GI: Non-tender. Non-distended. No hernia. Skin: Warm and dry. No nodule on exposed extremities. Lymph: No cervical LAD. No supraclavicular LAD. M/S: No cyanosis. No joint deformity. No clubbing. Neuro: Awake. Alert. Moves all four extremities. LABS:  CBC:   Recent Labs     12/25/19  1010   WBC 14.2*   HGB 12.9   HCT 38.2   MCV 92.9        BMP:   Recent Labs     12/25/19  1010   *   K 4.1   CL 95*   CO2 28   BUN 11   CREATININE <0.5*     LIVER PROFILE:   Recent Labs     12/25/19  1010   AST 27   ALT 17   BILITOT 0.4   ALKPHOS 70     PT/INR: No results for input(s): PROTIME, INR in the last 72 hours. APTT: No results for input(s): APTT in the last 72 hours. UA:No results for input(s): NITRITE, COLORU, PHUR, LABCAST, WBCUA, RBCUA, MUCUS, TRICHOMONAS, YEAST, BACTERIA, CLARITYU, SPECGRAV, LEUKOCYTESUR, UROBILINOGEN, BILIRUBINUR, BLOODU, GLUCOSEU, AMORPHOUS in the last 72 hours. Invalid input(s): KETONESU  No results for input(s): PHART, PLU9DDI, PO2ART in the last 72 hours.     Chest imaging was reviewed by me and showed:  CXR: Hyperinflated without focal airspace consolidation    ASSESSMENT:    Acute on chronic respiratory failure with hypoxemia and hypercapnia  · COPD with acute exacerbation  · Ongoing tobacco abuse    PLAN:  · Non-invasive positive pressure ventilation per my orders. The ventilator was adjusted by me at the bedside for unstable, life threatening respiratory failure. Wean oxygen as tolerated. · IV steroids  · Doxycycline  · Inhaled bronchodilators  · Nicotine patch 14 mg  · Smoking cessation counseling  · icu care- bactroban and lovenox  · D/w family at bedside      Patient is critically ill with acute respiratory failure. We will adjust NIPPV as above. Critical care time spent reviewing labs/films, examining patient, collaborating with other physicians but excluding procedures for life threatening organ failure is 32 minutes.

## 2019-12-26 ENCOUNTER — APPOINTMENT (OUTPATIENT)
Dept: GENERAL RADIOLOGY | Age: 59
DRG: 207 | End: 2019-12-26
Payer: COMMERCIAL

## 2019-12-26 LAB
ANION GAP SERPL CALCULATED.3IONS-SCNC: 9 MMOL/L (ref 3–16)
BASE EXCESS ARTERIAL: 0.9 MMOL/L (ref -3–3)
BASE EXCESS VENOUS: 0.8 MMOL/L (ref -3–3)
BASOPHILS ABSOLUTE: 0 K/UL (ref 0–0.2)
BASOPHILS RELATIVE PERCENT: 0.2 %
BUN BLDV-MCNC: 22 MG/DL (ref 7–20)
CALCIUM SERPL-MCNC: 9.1 MG/DL (ref 8.3–10.6)
CARBOXYHEMOGLOBIN ARTERIAL: 1.2 % (ref 0–1.5)
CARBOXYHEMOGLOBIN: 1.8 % (ref 0–1.5)
CHLORIDE BLD-SCNC: 96 MMOL/L (ref 99–110)
CO2: 28 MMOL/L (ref 21–32)
CREAT SERPL-MCNC: 0.7 MG/DL (ref 0.6–1.2)
EKG ATRIAL RATE: 103 BPM
EKG ATRIAL RATE: 76 BPM
EKG ATRIAL RATE: 84 BPM
EKG ATRIAL RATE: 96 BPM
EKG DIAGNOSIS: NORMAL
EKG P AXIS: 87 DEGREES
EKG P AXIS: 87 DEGREES
EKG P AXIS: 89 DEGREES
EKG P AXIS: 90 DEGREES
EKG P-R INTERVAL: 124 MS
EKG P-R INTERVAL: 134 MS
EKG P-R INTERVAL: 138 MS
EKG P-R INTERVAL: 142 MS
EKG Q-T INTERVAL: 350 MS
EKG Q-T INTERVAL: 372 MS
EKG Q-T INTERVAL: 402 MS
EKG Q-T INTERVAL: 402 MS
EKG QRS DURATION: 88 MS
EKG QRS DURATION: 92 MS
EKG QRS DURATION: 94 MS
EKG QRS DURATION: 96 MS
EKG QTC CALCULATION (BAZETT): 452 MS
EKG QTC CALCULATION (BAZETT): 458 MS
EKG QTC CALCULATION (BAZETT): 469 MS
EKG QTC CALCULATION (BAZETT): 475 MS
EKG R AXIS: 84 DEGREES
EKG R AXIS: 85 DEGREES
EKG R AXIS: 86 DEGREES
EKG R AXIS: 87 DEGREES
EKG T AXIS: 70 DEGREES
EKG T AXIS: 72 DEGREES
EKG T AXIS: 79 DEGREES
EKG T AXIS: 85 DEGREES
EKG VENTRICULAR RATE: 103 BPM
EKG VENTRICULAR RATE: 76 BPM
EKG VENTRICULAR RATE: 84 BPM
EKG VENTRICULAR RATE: 96 BPM
EOSINOPHILS ABSOLUTE: 0 K/UL (ref 0–0.6)
EOSINOPHILS RELATIVE PERCENT: 0 %
GFR AFRICAN AMERICAN: >60
GFR NON-AFRICAN AMERICAN: >60
GLUCOSE BLD-MCNC: 141 MG/DL (ref 70–99)
GLUCOSE BLD-MCNC: 145 MG/DL (ref 70–99)
HCO3 ARTERIAL: 29.2 MMOL/L (ref 21–29)
HCO3 VENOUS: 28.3 MMOL/L (ref 23–29)
HCT VFR BLD CALC: 37.6 % (ref 36–48)
HEMOGLOBIN, ART, EXTENDED: 12.4 G/DL (ref 12–16)
HEMOGLOBIN: 12.3 G/DL (ref 12–16)
INR BLD: 1.03 (ref 0.86–1.14)
LV EF: 58 %
LVEF MODALITY: NORMAL
LYMPHOCYTES ABSOLUTE: 1.8 K/UL (ref 1–5.1)
LYMPHOCYTES RELATIVE PERCENT: 10.3 %
MCH RBC QN AUTO: 31 PG (ref 26–34)
MCHC RBC AUTO-ENTMCNC: 32.7 G/DL (ref 31–36)
MCV RBC AUTO: 94.9 FL (ref 80–100)
METHEMOGLOBIN ARTERIAL: 0.3 %
METHEMOGLOBIN VENOUS: 0.2 %
MONOCYTES ABSOLUTE: 1.3 K/UL (ref 0–1.3)
MONOCYTES RELATIVE PERCENT: 7.4 %
NEUTROPHILS ABSOLUTE: 14.4 K/UL (ref 1.7–7.7)
NEUTROPHILS RELATIVE PERCENT: 82.1 %
O2 CONTENT ARTERIAL: 17 ML/DL
O2 CONTENT, VEN: 17 VOL %
O2 SAT, ARTERIAL: 94.9 %
O2 SAT, VEN: 97 %
O2 THERAPY: ABNORMAL
O2 THERAPY: ABNORMAL
PCO2 ARTERIAL: 65.1 MMHG (ref 35–45)
PCO2, VEN: 59.6 MMHG (ref 40–50)
PDW BLD-RTO: 13.1 % (ref 12.4–15.4)
PERFORMED ON: ABNORMAL
PH ARTERIAL: 7.27 (ref 7.35–7.45)
PH VENOUS: 7.29 (ref 7.35–7.45)
PLATELET # BLD: 284 K/UL (ref 135–450)
PMV BLD AUTO: 8.5 FL (ref 5–10.5)
PO2 ARTERIAL: 85.9 MMHG (ref 75–108)
PO2, VEN: 108.3 MMHG (ref 25–40)
POTASSIUM REFLEX MAGNESIUM: 5 MMOL/L (ref 3.5–5.1)
PROTHROMBIN TIME: 12 SEC (ref 10–13.2)
RBC # BLD: 3.96 M/UL (ref 4–5.2)
SODIUM BLD-SCNC: 133 MMOL/L (ref 136–145)
TCO2 ARTERIAL: 31.2 MMOL/L
TCO2 CALC VENOUS: 30 MMOL/L
TROPONIN: 0.03 NG/ML
TROPONIN: 0.06 NG/ML
TROPONIN: 0.07 NG/ML
WBC # BLD: 17.5 K/UL (ref 4–11)

## 2019-12-26 PROCEDURE — 82803 BLOOD GASES ANY COMBINATION: CPT

## 2019-12-26 PROCEDURE — 5A1955Z RESPIRATORY VENTILATION, GREATER THAN 96 CONSECUTIVE HOURS: ICD-10-PCS | Performed by: INTERNAL MEDICINE

## 2019-12-26 PROCEDURE — 93010 ELECTROCARDIOGRAM REPORT: CPT | Performed by: INTERNAL MEDICINE

## 2019-12-26 PROCEDURE — 31500 INSERT EMERGENCY AIRWAY: CPT | Performed by: INTERNAL MEDICINE

## 2019-12-26 PROCEDURE — C1751 CATH, INF, PER/CENT/MIDLINE: HCPCS

## 2019-12-26 PROCEDURE — 6370000000 HC RX 637 (ALT 250 FOR IP): Performed by: INTERNAL MEDICINE

## 2019-12-26 PROCEDURE — 85610 PROTHROMBIN TIME: CPT

## 2019-12-26 PROCEDURE — 85025 COMPLETE CBC W/AUTO DIFF WBC: CPT

## 2019-12-26 PROCEDURE — 36415 COLL VENOUS BLD VENIPUNCTURE: CPT

## 2019-12-26 PROCEDURE — 0BH18EZ INSERTION OF ENDOTRACHEAL AIRWAY INTO TRACHEA, VIA NATURAL OR ARTIFICIAL OPENING ENDOSCOPIC: ICD-10-PCS | Performed by: INTERNAL MEDICINE

## 2019-12-26 PROCEDURE — 99291 CRITICAL CARE FIRST HOUR: CPT | Performed by: INTERNAL MEDICINE

## 2019-12-26 PROCEDURE — 94750 HC PULMONARY COMPLIANCE STUDY: CPT

## 2019-12-26 PROCEDURE — 2700000000 HC OXYGEN THERAPY PER DAY

## 2019-12-26 PROCEDURE — 94640 AIRWAY INHALATION TREATMENT: CPT

## 2019-12-26 PROCEDURE — 6360000002 HC RX W HCPCS: Performed by: INTERNAL MEDICINE

## 2019-12-26 PROCEDURE — 94002 VENT MGMT INPAT INIT DAY: CPT

## 2019-12-26 PROCEDURE — 2580000003 HC RX 258: Performed by: INTERNAL MEDICINE

## 2019-12-26 PROCEDURE — 99233 SBSQ HOSP IP/OBS HIGH 50: CPT | Performed by: INTERNAL MEDICINE

## 2019-12-26 PROCEDURE — 93005 ELECTROCARDIOGRAM TRACING: CPT | Performed by: INTERNAL MEDICINE

## 2019-12-26 PROCEDURE — 36573 INSJ PICC RS&I 5 YR+: CPT

## 2019-12-26 PROCEDURE — 2500000003 HC RX 250 WO HCPCS: Performed by: INTERNAL MEDICINE

## 2019-12-26 PROCEDURE — 84484 ASSAY OF TROPONIN QUANT: CPT

## 2019-12-26 PROCEDURE — 71045 X-RAY EXAM CHEST 1 VIEW: CPT

## 2019-12-26 PROCEDURE — 99254 IP/OBS CNSLTJ NEW/EST MOD 60: CPT | Performed by: INTERNAL MEDICINE

## 2019-12-26 PROCEDURE — 2000000000 HC ICU R&B

## 2019-12-26 PROCEDURE — 94761 N-INVAS EAR/PLS OXIMETRY MLT: CPT

## 2019-12-26 PROCEDURE — 80048 BASIC METABOLIC PNL TOTAL CA: CPT

## 2019-12-26 PROCEDURE — 02HV33Z INSERTION OF INFUSION DEVICE INTO SUPERIOR VENA CAVA, PERCUTANEOUS APPROACH: ICD-10-PCS | Performed by: INTERNAL MEDICINE

## 2019-12-26 PROCEDURE — 93306 TTE W/DOPPLER COMPLETE: CPT

## 2019-12-26 RX ORDER — KETOROLAC TROMETHAMINE 30 MG/ML
30 INJECTION, SOLUTION INTRAMUSCULAR; INTRAVENOUS EVERY 6 HOURS PRN
Status: ACTIVE | OUTPATIENT
Start: 2019-12-26 | End: 2019-12-31

## 2019-12-26 RX ORDER — MIDAZOLAM HYDROCHLORIDE 1 MG/ML
2 INJECTION INTRAMUSCULAR; INTRAVENOUS
Status: DISCONTINUED | OUTPATIENT
Start: 2019-12-26 | End: 2020-01-02

## 2019-12-26 RX ORDER — NITROGLYCERIN 0.4 MG/1
0.4 TABLET SUBLINGUAL EVERY 5 MIN PRN
Status: DISCONTINUED | OUTPATIENT
Start: 2019-12-26 | End: 2020-01-06 | Stop reason: HOSPADM

## 2019-12-26 RX ORDER — ALBUTEROL SULFATE 90 UG/1
6 AEROSOL, METERED RESPIRATORY (INHALATION) EVERY 4 HOURS
Status: DISCONTINUED | OUTPATIENT
Start: 2019-12-26 | End: 2020-01-01

## 2019-12-26 RX ORDER — MIDAZOLAM HYDROCHLORIDE 1 MG/ML
2 INJECTION INTRAMUSCULAR; INTRAVENOUS EVERY 10 MIN PRN
Status: DISPENSED | OUTPATIENT
Start: 2019-12-26 | End: 2019-12-26

## 2019-12-26 RX ORDER — SODIUM CHLORIDE 0.9 % (FLUSH) 0.9 %
10 SYRINGE (ML) INJECTION EVERY 12 HOURS SCHEDULED
Status: DISCONTINUED | OUTPATIENT
Start: 2019-12-26 | End: 2020-01-06 | Stop reason: HOSPADM

## 2019-12-26 RX ORDER — PROPOFOL 10 MG/ML
10 INJECTION, EMULSION INTRAVENOUS CONTINUOUS
Status: DISCONTINUED | OUTPATIENT
Start: 2019-12-26 | End: 2020-01-02

## 2019-12-26 RX ORDER — SODIUM CHLORIDE 9 MG/ML
INJECTION, SOLUTION INTRAVENOUS ONCE
Status: COMPLETED | OUTPATIENT
Start: 2019-12-26 | End: 2019-12-26

## 2019-12-26 RX ORDER — PROPOFOL 10 MG/ML
INJECTION, EMULSION INTRAVENOUS
Status: DISPENSED
Start: 2019-12-26 | End: 2019-12-26

## 2019-12-26 RX ORDER — SODIUM CHLORIDE 0.9 % (FLUSH) 0.9 %
SYRINGE (ML) INJECTION
Status: DISPENSED
Start: 2019-12-26 | End: 2019-12-26

## 2019-12-26 RX ORDER — CHLORHEXIDINE GLUCONATE 0.12 MG/ML
15 RINSE ORAL 2 TIMES DAILY
Status: DISCONTINUED | OUTPATIENT
Start: 2019-12-26 | End: 2020-01-02

## 2019-12-26 RX ORDER — ALBUTEROL SULFATE 90 UG/1
4 AEROSOL, METERED RESPIRATORY (INHALATION) EVERY 4 HOURS PRN
Status: DISCONTINUED | OUTPATIENT
Start: 2019-12-26 | End: 2019-12-26

## 2019-12-26 RX ORDER — LIDOCAINE HYDROCHLORIDE 10 MG/ML
5 INJECTION, SOLUTION EPIDURAL; INFILTRATION; INTRACAUDAL; PERINEURAL ONCE
Status: DISCONTINUED | OUTPATIENT
Start: 2019-12-26 | End: 2020-01-06 | Stop reason: HOSPADM

## 2019-12-26 RX ORDER — ATORVASTATIN CALCIUM 40 MG/1
40 TABLET, FILM COATED ORAL NIGHTLY
Status: DISCONTINUED | OUTPATIENT
Start: 2019-12-26 | End: 2020-01-06 | Stop reason: HOSPADM

## 2019-12-26 RX ORDER — FENTANYL CITRATE 50 UG/ML
25 INJECTION, SOLUTION INTRAMUSCULAR; INTRAVENOUS
Status: DISCONTINUED | OUTPATIENT
Start: 2019-12-26 | End: 2020-01-02

## 2019-12-26 RX ORDER — KETOROLAC TROMETHAMINE 30 MG/ML
INJECTION, SOLUTION INTRAMUSCULAR; INTRAVENOUS
Status: DISPENSED
Start: 2019-12-26 | End: 2019-12-26

## 2019-12-26 RX ORDER — SODIUM CHLORIDE 0.9 % (FLUSH) 0.9 %
10 SYRINGE (ML) INJECTION PRN
Status: DISCONTINUED | OUTPATIENT
Start: 2019-12-26 | End: 2020-01-06 | Stop reason: HOSPADM

## 2019-12-26 RX ADMIN — CHLORHEXIDINE GLUCONATE 0.12% ORAL RINSE 15 ML: 1.2 LIQUID ORAL at 11:12

## 2019-12-26 RX ADMIN — Medication 10 ML: at 20:27

## 2019-12-26 RX ADMIN — Medication 6 PUFF: at 10:34

## 2019-12-26 RX ADMIN — SODIUM CHLORIDE: 9 INJECTION, SOLUTION INTRAVENOUS at 11:13

## 2019-12-26 RX ADMIN — ASPIRIN 325 MG: 325 TABLET, DELAYED RELEASE ORAL at 07:39

## 2019-12-26 RX ADMIN — DOXYCYCLINE HYCLATE 100 MG: 100 TABLET, COATED ORAL at 16:00

## 2019-12-26 RX ADMIN — FENTANYL CITRATE 25 MCG: 50 INJECTION INTRAMUSCULAR; INTRAVENOUS at 22:16

## 2019-12-26 RX ADMIN — DEXMEDETOMIDINE 1.1 MCG/KG/HR: 100 INJECTION, SOLUTION, CONCENTRATE INTRAVENOUS at 02:30

## 2019-12-26 RX ADMIN — Medication 6 PUFF: at 19:00

## 2019-12-26 RX ADMIN — CHLORHEXIDINE GLUCONATE 0.12% ORAL RINSE 15 ML: 1.2 LIQUID ORAL at 20:26

## 2019-12-26 RX ADMIN — DEXMEDETOMIDINE 1.2 MCG/KG/HR: 100 INJECTION, SOLUTION, CONCENTRATE INTRAVENOUS at 21:59

## 2019-12-26 RX ADMIN — Medication 6 PUFF: at 15:32

## 2019-12-26 RX ADMIN — MIDAZOLAM HYDROCHLORIDE 2 MG: 1 INJECTION, SOLUTION INTRAMUSCULAR; INTRAVENOUS at 21:36

## 2019-12-26 RX ADMIN — Medication 6 PUFF: at 22:48

## 2019-12-26 RX ADMIN — PROPOFOL 20 MCG/KG/MIN: 10 INJECTION, EMULSION INTRAVENOUS at 16:00

## 2019-12-26 RX ADMIN — KETOROLAC TROMETHAMINE 30 MG: 30 INJECTION, SOLUTION INTRAMUSCULAR at 05:09

## 2019-12-26 RX ADMIN — METHYLPREDNISOLONE SODIUM SUCCINATE 40 MG: 40 INJECTION, POWDER, FOR SOLUTION INTRAMUSCULAR; INTRAVENOUS at 07:59

## 2019-12-26 RX ADMIN — DEXMEDETOMIDINE 1.4 MCG/KG/HR: 100 INJECTION, SOLUTION, CONCENTRATE INTRAVENOUS at 10:12

## 2019-12-26 RX ADMIN — PROPOFOL 20 MCG/KG/MIN: 10 INJECTION, EMULSION INTRAVENOUS at 10:12

## 2019-12-26 RX ADMIN — MUPIROCIN: 20 OINTMENT TOPICAL at 20:26

## 2019-12-26 RX ADMIN — MUPIROCIN: 20 OINTMENT TOPICAL at 08:00

## 2019-12-26 RX ADMIN — MIDAZOLAM HYDROCHLORIDE 2 MG: 1 INJECTION, SOLUTION INTRAMUSCULAR; INTRAVENOUS at 22:45

## 2019-12-26 RX ADMIN — METHYLPREDNISOLONE SODIUM SUCCINATE 40 MG: 40 INJECTION, POWDER, FOR SOLUTION INTRAMUSCULAR; INTRAVENOUS at 20:27

## 2019-12-26 RX ADMIN — DEXMEDETOMIDINE 1.4 MCG/KG/HR: 100 INJECTION, SOLUTION, CONCENTRATE INTRAVENOUS at 16:00

## 2019-12-26 RX ADMIN — IPRATROPIUM BROMIDE AND ALBUTEROL SULFATE 1 AMPULE: .5; 3 SOLUTION RESPIRATORY (INHALATION) at 02:44

## 2019-12-26 RX ADMIN — LEVOTHYROXINE SODIUM 137 MCG: 25 TABLET ORAL at 06:26

## 2019-12-26 RX ADMIN — NITROGLYCERIN 0.4 MG: 0.4 TABLET SUBLINGUAL at 07:39

## 2019-12-26 RX ADMIN — DOXYCYCLINE HYCLATE 100 MG: 100 TABLET, COATED ORAL at 23:34

## 2019-12-26 RX ADMIN — ENOXAPARIN SODIUM 60 MG: 60 INJECTION SUBCUTANEOUS at 20:27

## 2019-12-26 RX ADMIN — MONTELUKAST SODIUM 10 MG: 10 TABLET, COATED ORAL at 20:28

## 2019-12-26 RX ADMIN — IPRATROPIUM BROMIDE AND ALBUTEROL SULFATE 1 AMPULE: .5; 3 SOLUTION RESPIRATORY (INHALATION) at 07:12

## 2019-12-26 RX ADMIN — ENOXAPARIN SODIUM 40 MG: 40 INJECTION SUBCUTANEOUS at 07:59

## 2019-12-26 RX ADMIN — Medication 10 ML: at 07:58

## 2019-12-26 RX ADMIN — ATORVASTATIN CALCIUM 40 MG: 40 TABLET, FILM COATED ORAL at 20:28

## 2019-12-26 RX ADMIN — MIDAZOLAM HYDROCHLORIDE 2 MG: 1 INJECTION, SOLUTION INTRAMUSCULAR; INTRAVENOUS at 10:54

## 2019-12-26 ASSESSMENT — PAIN SCALES - GENERAL
PAINLEVEL_OUTOF10: 0
PAINLEVEL_OUTOF10: 8
PAINLEVEL_OUTOF10: 4

## 2019-12-26 ASSESSMENT — PULMONARY FUNCTION TESTS
PIF_VALUE: 34
PIF_VALUE: 32
PIF_VALUE: 34
PIF_VALUE: 42

## 2019-12-26 NOTE — CONSULTS
5 mg by mouth daily   Yes Historical Provider, MD   fluticasone propionate (FLOVENT DISKUS) 250 MCG/BLIST AEPB inhaler Inhale 2 puffs into the lungs 2 times daily    Yes Historical Provider, MD   umeclidinium-vilanterol (ANORO ELLIPTA) 62.5-25 MCG/INH AEPB inhaler Inhale 1 puff into the lungs daily   Yes Historical Provider, MD   Fluticasone-Umeclidin-Vilant (TRELEGY ELLIPTA) 100-62.5-25 MCG/INH AEPB Inhale 1 puff into the lungs daily 7/5/19  Yes Historical Provider, MD   albuterol (PROVENTIL) (2.5 MG/3ML) 0.083% nebulizer solution Take 3 mLs by nebulization every 6 hours as needed for Wheezing 1/21/19  Yes PAN Dominguez - CNP   albuterol sulfate  (90 Base) MCG/ACT inhaler Inhale 2 puffs into the lungs every 6 hours as needed for Wheezing 1/17/19  Yes Bekah Ornelas MD   fluticasone (FLONASE) 50 MCG/ACT nasal spray 1 spray by Nasal route daily    Yes Historical Provider, MD        Allergies:  Codeine     Review of Systems:   All 14 point review of symptoms completed. Pertinent positives identified in the HPI, all other review of symptoms negative as below.       Physical Examination:    Vitals:    12/26/19 1000   BP: 99/60   Pulse: 67   Resp: 12   Temp:    SpO2: 100%    Weight: 130 lb (59 kg)         General Appearance:  Intubated and sedated   Head:  Normocephalic, without obvious abnormality, atraumatic   Eyes:  PERRL, conjunctiva/corneas clear       Nose: Nares normal, no drainage or sinus tenderness   Throat: Lips, mucosa, and tongue normal   Neck: Supple, symmetrical, trachea midline, no adenopathy, thyroid: not enlarged, symmetric, no tenderness/mass/nodules, no carotid bruit or JVD       Lungs:   +diffuse expiratory wheezing   Chest Wall:  No tenderness or deformity   Heart:  Regular rate and rhythm, S1, S2 SOFT, no murmur, rub or gallop   Abdomen:   Soft, non-tender, bowel sounds active all four quadrants,  no masses, no organomegaly           Extremities: Extremities normal, atraumatic, no cyanosis or edema   Pulses: 2+ and symmetric   Skin: Skin color, texture, turgor normal, no rashes or lesions   Pysch: N/A   Neurologic: Non-focal        Labs  CBC:   Lab Results   Component Value Date    WBC 17.5 12/26/2019    RBC 3.96 12/26/2019    HGB 12.3 12/26/2019    HCT 37.6 12/26/2019    MCV 94.9 12/26/2019    RDW 13.1 12/26/2019     12/26/2019     CMP:    Lab Results   Component Value Date     12/26/2019    K 5.0 12/26/2019    CL 96 12/26/2019    CO2 28 12/26/2019    BUN 22 12/26/2019    CREATININE 0.7 12/26/2019    GFRAA >60 12/26/2019    AGRATIO 1.9 12/25/2019    LABGLOM >60 12/26/2019    GLUCOSE 145 12/26/2019    PROT 6.9 12/25/2019    CALCIUM 9.1 12/26/2019    BILITOT 0.4 12/25/2019    ALKPHOS 70 12/25/2019    AST 27 12/25/2019    ALT 17 12/25/2019     PT/INR:  No results found for: PTINR  Lab Results   Component Value Date    TROPONINI 0.07 (H) 12/26/2019   EKG 12/26/19   Normal sinus rhythmST elevation, consider early repolarizationAbnormal ECGWhen compared with ECG of 26-DEC-2019 07:41, (unconfirmed)No significant change was foundConfirmed by OZIEL Spencer MD ([id number]) on 12/26/2019 8:00:26 AM    EKG 12/26/19   Normal sinus rhythmST elevation, consider early repolarizationAbnormal ECGConfirmed by Terry FAJARDO MD ([id number]) on 12/26/2019 7:59:21 AM      ECHO 12/31/18 Summary   TDS   Global left ventricular systolic function is normal with ejection fraction   estimated from 55 % to 60 %. No regional wall motion abnormalities are noted. Mild-to-moderate tricuspid regurgitation. IVC is normal in size (< 2.1 cm) and collapses > 50% with respiration   consistent with normal RA pressure (3 mmHg    Assessment:  Rhett Ramsey is a 62 y.o. patient who presented to [facility] 12/25/19 with c/o SOB. Not history obtained from chart as patient intubated/sedated and no family at bedside. She has PMH of severe COPD on 2L home [place] O2, Asthma, thyroid disease, and tobacco abuse.  No cardiac history to my

## 2019-12-26 NOTE — PROGRESS NOTES
Pulmonary & Critical Care Medicine ICU Progress Note  CC:Acute respiratory failure with hypoxemia    Events of Last 24 hours: Patient wore bipap overnight, started on precedex, complained of chest pain. Invasive Lines:   IV Line: pivs    MV:  NIPPV     / / /FiO2 : 35 %  Recent Labs     12/25/19  1425   PHART 7.301*   FWT3MRZ 59.5*   PO2ART 102.8       IV:   dexmedetomidine (PRECEDEX) IV infusion 1.2 mcg/kg/hr (12/26/19 0532)       EXAM:  /74   Pulse 74   Temp 98.3 °F (36.8 °C) (Axillary)   Resp 20   Ht 5' 4\" (1.626 m)   Wt 130 lb (59 kg)   LMP  (LMP Unknown)   SpO2 98%   BMI 22.31 kg/m²  on bipap 35%  Tmax: 98.3F  CVP:      Intake/Output Summary (Last 24 hours) at 12/26/2019 0641  Last data filed at 12/26/2019 0000  Gross per 24 hour   Intake 52 ml   Output 125 ml   Net -73 ml     Gen: Moderate respiratory distress. Normocephalic, atraumatic. Eyes: PERRL. No sclera icterus. No conjunctival injection. ENT: No discharge. Pharynx clear. Neck: Trachea midline. No obvious mass. Resp: + accessory muscle use. No crackles. Scattered bilateral wheezes. No rhonchi. No dullness on percussion. CV: Tachy rate. Regular rhythm. No murmur or rub. +1 bilateral LE edema. GI: Non-tender. Non-distended. No hernia. Skin: Warm and dry. No nodule on exposed extremities. Lymph: No cervical LAD. No supraclavicular LAD. M/S: No cyanosis. No joint deformity. No clubbing. Neuro: Fatigued appearing. Moves all four extremities.      Medications:   ketorolac        levothyroxine  137 mcg Oral Daily    montelukast  10 mg Oral Nightly    sodium chloride flush  10 mL Intravenous 2 times per day    enoxaparin  40 mg Subcutaneous Daily    methylPREDNISolone  40 mg Intravenous Q12H    Followed by   Arabella Estrada ON 12/28/2019] predniSONE  40 mg Oral Daily    doxycycline hyclate  100 mg Oral Q12H    ipratropium-albuterol  1 ampule Inhalation Q4H    mupirocin   Nasal BID    nicotine  1 patch Transdermal Daily PRN Meds:  ketorolac, sodium chloride flush, magnesium hydroxide, ondansetron, acetaminophen, ipratropium-albuterol    Results:  CBC:   Recent Labs     12/25/19  1010 12/26/19  0431   WBC 14.2* 17.5*   HGB 12.9 12.3   HCT 38.2 37.6   MCV 92.9 94.9    284     BMP:   Recent Labs     12/25/19  1010 12/26/19  0431   * 133*   K 4.1 5.0   CL 95* 96*   CO2 28 28   BUN 11 22*   CREATININE <0.5* 0.7     LIVER PROFILE:   Recent Labs     12/25/19  1010   AST 27   ALT 17   BILITOT 0.4   ALKPHOS 70     PT/INR: No results for input(s): PROTIME, INR in the last 72 hours. APTT: No results for input(s): APTT in the last 72 hours. UA:No results for input(s): NITRITE, COLORU, PHUR, LABCAST, WBCUA, RBCUA, MUCUS, TRICHOMONAS, YEAST, BACTERIA, CLARITYU, SPECGRAV, LEUKOCYTESUR, UROBILINOGEN, BILIRUBINUR, BLOODU, GLUCOSEU, AMORPHOUS in the last 72 hours. Invalid input(s): KETONESU    Cultures:  Bld: ngtd    Films:  CXR: Hyperinflated without focal airspace consolidation       Ekgs reviewed and showed new twi in leads v1-v2, early repolarization similar in other leads; no clear ST segment deviation    ASSESSMENT:  ·   Acute on chronic respiratory failure with hypoxemia and hypercapnia  · COPD with acute exacerbation  · Ongoing tobacco abuse  · Chest pain- troponin mildly elevate at 0.06     PLAN:  · Patient intubated during rounds for worsening hypercarbia. · Mechanical ventilation per my orders. The ventilator was adjusted by me at the bedside for unstable, life threatening respiratory failure. Wean oxygen as tolerated.    · Propofol gtt for sedation  · Daily SAT/SBT when improved and meets criteria per protocol  · IV steroids  · Doxycycline  · Inhaled bronchodilators  · Nicotine patch 14 mg  · Smoking cessation counseling  · Curly Verona given  · Serial troponin  · Cardiology consulted  · icu care- bactroban and lovenox      Critical care time spent reviewing labs/films, examining patient, collaborating with other

## 2019-12-26 NOTE — PROGRESS NOTES
Nutrition Assessment    Type and Reason for Visit: Initial, Consult(consult for TF ordering and management)    Nutrition Recommendations:   1. TF recommendations - Vital AF 1.2 (\"semi-elemental\" TF formula name in Epic) with a goal rate of 50 ml/hr x 20 hours. Start with 20 ml/hr and increase by 15 ml every 4 hours, as tolerated by patient, until goal rate can be achieved and maintained. Water flushes, 30 ml every 6 hours or per MD guidance. 2. Monitor TF start, rate, intake, and tolerance + water flushes. 3. Monitor vent status, sedation type/amount, and plan of care. 4. Monitor nutrition-related labs, bowel function, and weight trends - please obtain an actual, current weight for this patient. Nutrition Assessment: patient is nutritionally compromised AEB SOB and decreased appetite/po intake PTA and she is at risk for further compromise d/t NPO status r/t being intubated this am after rounds; will provide EN recommendations so TF can be started when appropriate    Malnutrition Assessment:  · Malnutrition Status: At risk for malnutrition  · Context: Acute illness or injury  · Findings of the 6 clinical characteristics of malnutrition (Minimum of 2 out of 6 clinical characteristics is required to make the diagnosis of moderate or severe Protein Calorie Malnutrition based on AND/ASPEN Guidelines):  1. Energy Intake-Less than or equal to 75% of estimated energy requirement, (x 2 days PTA and 1 day admission)    2. Weight Loss-Unable to assess, in 1 year  3. Fat Loss-Unable to assess,    4. Muscle Loss-Unable to assess,    5. Fluid Accumulation-Mild fluid accumulation, Extremities(BLE + 1 pitting edema )  6.   Strength-Not measured    Nutrition Risk Level: High    Nutrient Needs:  · Estimated Daily Total Kcal: 1180 - 1475 kcals based on 20-25 kcals/kg/CBW  · Estimated Daily Protein (g): 71 - 77 g protein based on 1.2-1.3 g/kg/CBW  · Estimated Daily Total Fluid (ml/day): 1100 - 1500 ml     Nutrition Diagnosis:   · Problem: Inadequate oral intake  · Etiology: related to Impaired respiratory function-inability to consume food, Insufficient energy/nutrient consumption     Signs and symptoms:  as evidenced by NPO status due to medical condition, Intubation, Lab values    Objective Information:  · Nutrition-Focused Physical Findings: patient was sitting up in her bed and supported on her right side with pillows and the back of the bed this am; she had on bipap but still had SOB/difficulty breathing so she was intubated after rounds today; abdomen is soft, non-tender, and bowel sounds are active x 4; she c/o diffuse chest pains today; BLE + 1 pitting edema noted; patient was diagnosed with mild PCM in Dec. 2018; patient's skin color is appropriate for ethnicity; patient has upper dentures; + scattered bruising noted  · Wound Type: None  · Current Nutrition Therapies:  · Oral Diet Orders: NPO   · Oral Diet intake: NPO  · Oral Nutrition Supplement (ONS) Orders: None  · ONS intake: NPO  · Anthropometric Measures:  · Ht: 5' 4\" (162.6 cm)   · Current Body Wt: 130 lb (59 kg)(unsure whether actual or stated)  · Admission Body Wt: 130 lb (59 kg)(unsure whether actual or stated)  · Usual Body Wt: (120's to 130's more recently)  · Weight Change:  unable to assess - unsure whether CBW is actual or stated weight   · Ideal Body Wt: 120 lb (54.4 kg), % Ideal Body 108%  · BMI Classification: BMI 18.5 - 24.9 Normal Weight    Nutrition Interventions:   Continue NPO, Start Tube Feeding  Continued Inpatient Monitoring, Coordination of Care, Coordination of Community Care    Nutrition Evaluation:   · Evaluation: Goals set   · Goals: patient will tolerate Vital AF 1.2 at goal rate of 50 ml/hr x 20 hours without GI distress, without s/s of aspiration, and without additional lab/fluid disturbances; weight will remain stable during remainder of admission    · Monitoring: Nutrition Progression, TF Intake, TF Tolerance, Skin Integrity,

## 2019-12-26 NOTE — PROCEDURES
ENDOTRACHEAL INTUBATION    INDICATION: Life threatening respiratory failure    TIME OUT: taken    SEDATION: etomidate and fentanyl, succinylcholine    PROCEDURE: Using video laryngoscopy, the vocal cords were well visualized and a 7.5 mm endotracheal tube was place directly through the cords. Good breath sounds auscultated bilaterally without sounds over abdomen. Good return of CO2 on the monitor. CXR is pending.

## 2019-12-26 NOTE — CARE COORDINATION
Case Management Assessment  Initial Evaluation    Date/Time of Evaluation: 12/26/2019 10:34 AM  Assessment Completed by: Milagro Antunez    Patient Name: Monica Damon  YOB: 1960  Diagnosis: COPD exacerbation ([address] [J44.1]  Date / Time: 12/25/2019  9:47 AM  Admission status/Date:12/25/19 Chart Reviewed: Yes      Patient Interviewed: No   Family Interviewed:  Yes - souse      Hospitalization in the last 30 days:  No    Contacts  :     Relationship to Patient:   Phone Number:    Alternate Contact:     Relationship to Patient:     Phone Number:    Met with:    Current PCP Patsy [address] required for SNF : Y, N        3 night stay required - Y, N    ADLS  Support Systems: Spouse/Significant Other, Children, Family Members  Transportation: self    Meal Preparation: self    Housing  Home Environment: house  Steps: 3  Plans to Return to Present Housing: Yes  Other Identified Issues: [employer]  Currently active with [address] : No  Type of Home Care Services: None  Passport/Waiver : No  :                      Phone Number:    Passport/Waiver Services: [address] Provider: Mariah  Equipment: n/aWalker___Cane___RTS___ BSC___Shower Chair___  02_x_ at ___2_Liter(s)---Uses__Con't______HHN_x__ CPAP___ BiPap___ Hospital Bed___W/C____Other________      Has Home O2 in place on admit:  Yes  Informed of need to bring portable home O2 tank on day of discharge for nursing to connect prior to leaving:   Yes  Verbalized agreement/Understanding:   Yes    Community Service Affiliation  Dialysis:  No    · Name:  · Location  · Dialysis Schedule:  · Phone:   · Fax: Outpatient PT/OT: No    Cancer Center: No     CHF Clinic: No     Pulmonary Rehab: No  Pain Clinic: No  Community Mental Health: No    Wound Clinic: No     Other: n/a    DISCHARGE PLAN:   Intubated on vent today. from home with spouse. Plan return.  Has

## 2019-12-26 NOTE — PROGRESS NOTES
IM Progress Note    Admit Date:  12/25/2019  1    Interval history:  Admitted for copd exacerbation   Placed on bipap, troponin bump overnight, intubated this am for worsening dyspnea and abg     Subjective:  Ms. Arabella Morris seen on vent, sedated     Objective:   /74   Pulse 74   Temp 98.3 °F (36.8 °C) (Axillary)   Resp 22   Ht 5' 4\" (1.626 m)   Wt 130 lb (59 kg)   LMP  (LMP Unknown)   SpO2 96%   BMI 22.31 kg/m²       Intake/Output Summary (Last 24 hours) at 12/26/2019 0756  Last data filed at 12/26/2019 7972  Gross per 24 hour   Intake 178 ml   Output 125 ml   Net 53 ml       Physical Exam:    General:   Elderly female,sedated on vent,   Oral ett and OG noted  Mucous Membranes:  Pink , anicteric  Neck: No JVD, no carotid bruit, no thyromegaly  Chest: diminished in bases, scattered wheeze  Cardiovascular:  RRR S1S2 heard, no murmurs or gallops  Abdomen:  Soft, undistended, non tender, no organomegaly, BS present  Extremities:no edema  Distal pulses well felt  Neurological : sedated        Medications:   Scheduled Medications:    ketorolac        aspirin  325 mg Oral Daily    levothyroxine  137 mcg Oral Daily    montelukast  10 mg Oral Nightly    sodium chloride flush  10 mL Intravenous 2 times per day    enoxaparin  40 mg Subcutaneous Daily    methylPREDNISolone  40 mg Intravenous Q12H    Followed by   Chele Chávez ON 12/28/2019] predniSONE  40 mg Oral Daily    doxycycline hyclate  100 mg Oral Q12H    ipratropium-albuterol  1 ampule Inhalation Q4H    mupirocin   Nasal BID    nicotine  1 patch Transdermal Daily     I   dexmedetomidine (PRECEDEX) IV infusion 1.2 mcg/kg/hr (12/26/19 0532)     ketorolac, nitroGLYCERIN, sodium chloride flush, magnesium hydroxide, ondansetron, acetaminophen, ipratropium-albuterol    Lab Data:  Recent Labs     12/25/19  1010 12/26/19  0431   WBC 14.2* 17.5*   HGB 12.9 12.3   HCT 38.2 37.6   MCV 92.9 94.9    284     Recent Labs     12/25/19  1010 12/26/19  0431   NA

## 2019-12-26 NOTE — PROGRESS NOTES
12/25/19 1912   NIV Type   $NIV $Daily Charge   Skin Protection for O2 Device Yes   Equipment Type v60   Mode Bilevel   Mask Type Full face mask   Mask Size Medium   Settings/Measurements   IPAP 18 cmH20   CPAP/EPAP 5 cmH2O   Rate Ordered 12   Resp 29   FiO2  35 %   I Time/ I Time % 1 s   Vt Exhaled 564 mL   Minute Volume 20 Liters   Mask Leak (lpm) 45 lpm   Comfort Level Poor   Using Accessory Muscles Yes   SpO2 93   Alarm Settings   Alarms On Y   Press Low Alarm 12 cmH2O   High Pressure Alarm 22 cmH2O   Apnea (secs) 20 secs   Resp Rate Low Alarm 12   High Respiratory Rate 40 br/min

## 2019-12-26 NOTE — PLAN OF CARE
Nutrition Problem: Inadequate oral intake  Intervention: Food and/or Nutrient Delivery: Continue NPO, Start Tube Feeding  Nutritional Goals: patient will tolerate Vital AF 1.2 at goal rate of 50 ml/hr x 20 hours without GI distress, without s/s of aspiration, and without additional lab/fluid disturbances; weight will remain stable during remainder of admission

## 2019-12-26 NOTE — PROGRESS NOTES
Discussed PICC line placement with spouse, discussed risk vs benefits. Spouse agreeable at this time. Consent obtained at this time.

## 2019-12-27 ENCOUNTER — APPOINTMENT (OUTPATIENT)
Dept: GENERAL RADIOLOGY | Age: 59
DRG: 207 | End: 2019-12-27
Payer: COMMERCIAL

## 2019-12-27 LAB
ANION GAP SERPL CALCULATED.3IONS-SCNC: 6 MMOL/L (ref 3–16)
BASE EXCESS ARTERIAL: 0.1 MMOL/L (ref -3–3)
BUN BLDV-MCNC: 29 MG/DL (ref 7–20)
CALCIUM SERPL-MCNC: 8.1 MG/DL (ref 8.3–10.6)
CARBOXYHEMOGLOBIN ARTERIAL: 0.1 % (ref 0–1.5)
CHLORIDE BLD-SCNC: 98 MMOL/L (ref 99–110)
CO2: 28 MMOL/L (ref 21–32)
CREAT SERPL-MCNC: <0.5 MG/DL (ref 0.6–1.2)
GFR AFRICAN AMERICAN: >60
GFR NON-AFRICAN AMERICAN: >60
GLUCOSE BLD-MCNC: 134 MG/DL (ref 70–99)
GLUCOSE BLD-MCNC: 139 MG/DL (ref 70–99)
HCO3 ARTERIAL: 27.6 MMOL/L (ref 21–29)
HCT VFR BLD CALC: 38 % (ref 36–48)
HEMOGLOBIN, ART, EXTENDED: 13.1 G/DL (ref 12–16)
HEMOGLOBIN: 12.4 G/DL (ref 12–16)
MCH RBC QN AUTO: 31.3 PG (ref 26–34)
MCHC RBC AUTO-ENTMCNC: 32.7 G/DL (ref 31–36)
MCV RBC AUTO: 95.8 FL (ref 80–100)
METHEMOGLOBIN ARTERIAL: 0.3 %
O2 CONTENT ARTERIAL: 17 ML/DL
O2 SAT, ARTERIAL: 92.6 %
O2 THERAPY: ABNORMAL
PCO2 ARTERIAL: 57.6 MMHG (ref 35–45)
PDW BLD-RTO: 13.9 % (ref 12.4–15.4)
PERFORMED ON: ABNORMAL
PH ARTERIAL: 7.3 (ref 7.35–7.45)
PLATELET # BLD: 243 K/UL (ref 135–450)
PMV BLD AUTO: 8.3 FL (ref 5–10.5)
PO2 ARTERIAL: 72 MMHG (ref 75–108)
POTASSIUM SERPL-SCNC: 4.9 MMOL/L (ref 3.5–5.1)
RBC # BLD: 3.97 M/UL (ref 4–5.2)
REPORT: NORMAL
SODIUM BLD-SCNC: 132 MMOL/L (ref 136–145)
TCO2 ARTERIAL: 29.3 MMOL/L
WBC # BLD: 17.2 K/UL (ref 4–11)

## 2019-12-27 PROCEDURE — 2580000003 HC RX 258: Performed by: INTERNAL MEDICINE

## 2019-12-27 PROCEDURE — 36592 COLLECT BLOOD FROM PICC: CPT

## 2019-12-27 PROCEDURE — 85027 COMPLETE CBC AUTOMATED: CPT

## 2019-12-27 PROCEDURE — 87070 CULTURE OTHR SPECIMN AEROBIC: CPT

## 2019-12-27 PROCEDURE — 94750 HC PULMONARY COMPLIANCE STUDY: CPT

## 2019-12-27 PROCEDURE — 87205 SMEAR GRAM STAIN: CPT

## 2019-12-27 PROCEDURE — 6360000002 HC RX W HCPCS: Performed by: INTERNAL MEDICINE

## 2019-12-27 PROCEDURE — 99291 CRITICAL CARE FIRST HOUR: CPT | Performed by: INTERNAL MEDICINE

## 2019-12-27 PROCEDURE — 6370000000 HC RX 637 (ALT 250 FOR IP): Performed by: INTERNAL MEDICINE

## 2019-12-27 PROCEDURE — 71045 X-RAY EXAM CHEST 1 VIEW: CPT

## 2019-12-27 PROCEDURE — 36600 WITHDRAWAL OF ARTERIAL BLOOD: CPT

## 2019-12-27 PROCEDURE — 94761 N-INVAS EAR/PLS OXIMETRY MLT: CPT

## 2019-12-27 PROCEDURE — 99232 SBSQ HOSP IP/OBS MODERATE 35: CPT | Performed by: INTERNAL MEDICINE

## 2019-12-27 PROCEDURE — 2700000000 HC OXYGEN THERAPY PER DAY

## 2019-12-27 PROCEDURE — 99233 SBSQ HOSP IP/OBS HIGH 50: CPT | Performed by: INTERNAL MEDICINE

## 2019-12-27 PROCEDURE — 2500000003 HC RX 250 WO HCPCS: Performed by: INTERNAL MEDICINE

## 2019-12-27 PROCEDURE — 2000000000 HC ICU R&B

## 2019-12-27 PROCEDURE — 94003 VENT MGMT INPAT SUBQ DAY: CPT

## 2019-12-27 PROCEDURE — 94640 AIRWAY INHALATION TREATMENT: CPT

## 2019-12-27 PROCEDURE — 80048 BASIC METABOLIC PNL TOTAL CA: CPT

## 2019-12-27 PROCEDURE — 82803 BLOOD GASES ANY COMBINATION: CPT

## 2019-12-27 RX ORDER — ASPIRIN 81 MG/1
81 TABLET ORAL DAILY
Status: DISCONTINUED | OUTPATIENT
Start: 2019-12-28 | End: 2020-01-06 | Stop reason: HOSPADM

## 2019-12-27 RX ORDER — SODIUM CHLORIDE, SODIUM LACTATE, POTASSIUM CHLORIDE, CALCIUM CHLORIDE 600; 310; 30; 20 MG/100ML; MG/100ML; MG/100ML; MG/100ML
INJECTION, SOLUTION INTRAVENOUS CONTINUOUS
Status: DISCONTINUED | OUTPATIENT
Start: 2019-12-27 | End: 2019-12-30

## 2019-12-27 RX ORDER — 0.9 % SODIUM CHLORIDE 0.9 %
1000 INTRAVENOUS SOLUTION INTRAVENOUS ONCE
Status: COMPLETED | OUTPATIENT
Start: 2019-12-27 | End: 2019-12-27

## 2019-12-27 RX ORDER — SODIUM CHLORIDE 9 MG/ML
INJECTION, SOLUTION INTRAVENOUS
Status: DISPENSED
Start: 2019-12-27 | End: 2019-12-27

## 2019-12-27 RX ADMIN — Medication 10 ML: at 08:00

## 2019-12-27 RX ADMIN — SODIUM CHLORIDE, POTASSIUM CHLORIDE, SODIUM LACTATE AND CALCIUM CHLORIDE: 600; 310; 30; 20 INJECTION, SOLUTION INTRAVENOUS at 16:56

## 2019-12-27 RX ADMIN — Medication 6 PUFF: at 22:43

## 2019-12-27 RX ADMIN — Medication 6 PUFF: at 07:14

## 2019-12-27 RX ADMIN — MIDAZOLAM HYDROCHLORIDE 2 MG: 1 INJECTION, SOLUTION INTRAMUSCULAR; INTRAVENOUS at 09:30

## 2019-12-27 RX ADMIN — DEXMEDETOMIDINE 1.4 MCG/KG/HR: 100 INJECTION, SOLUTION, CONCENTRATE INTRAVENOUS at 18:07

## 2019-12-27 RX ADMIN — SODIUM CHLORIDE, POTASSIUM CHLORIDE, SODIUM LACTATE AND CALCIUM CHLORIDE: 600; 310; 30; 20 INJECTION, SOLUTION INTRAVENOUS at 06:18

## 2019-12-27 RX ADMIN — DOXYCYCLINE 100 MG: 100 INJECTION, POWDER, LYOPHILIZED, FOR SOLUTION INTRAVENOUS at 21:46

## 2019-12-27 RX ADMIN — ATORVASTATIN CALCIUM 40 MG: 40 TABLET, FILM COATED ORAL at 21:42

## 2019-12-27 RX ADMIN — DEXMEDETOMIDINE 1.4 MCG/KG/HR: 100 INJECTION, SOLUTION, CONCENTRATE INTRAVENOUS at 12:41

## 2019-12-27 RX ADMIN — Medication 6 PUFF: at 18:56

## 2019-12-27 RX ADMIN — MONTELUKAST SODIUM 10 MG: 10 TABLET, COATED ORAL at 21:42

## 2019-12-27 RX ADMIN — ENOXAPARIN SODIUM 60 MG: 60 INJECTION SUBCUTANEOUS at 21:42

## 2019-12-27 RX ADMIN — ENOXAPARIN SODIUM 60 MG: 60 INJECTION SUBCUTANEOUS at 08:06

## 2019-12-27 RX ADMIN — CHLORHEXIDINE GLUCONATE 0.12% ORAL RINSE 15 ML: 1.2 LIQUID ORAL at 08:01

## 2019-12-27 RX ADMIN — DEXMEDETOMIDINE 1.4 MCG/KG/HR: 100 INJECTION, SOLUTION, CONCENTRATE INTRAVENOUS at 07:18

## 2019-12-27 RX ADMIN — Medication 6 PUFF: at 03:16

## 2019-12-27 RX ADMIN — DEXMEDETOMIDINE 1.4 MCG/KG/HR: 100 INJECTION, SOLUTION, CONCENTRATE INTRAVENOUS at 01:56

## 2019-12-27 RX ADMIN — PROPOFOL 35 MCG/KG/MIN: 10 INJECTION, EMULSION INTRAVENOUS at 02:05

## 2019-12-27 RX ADMIN — MIDAZOLAM HYDROCHLORIDE 2 MG: 1 INJECTION, SOLUTION INTRAMUSCULAR; INTRAVENOUS at 05:05

## 2019-12-27 RX ADMIN — SODIUM CHLORIDE 1000 ML: 9 INJECTION, SOLUTION INTRAVENOUS at 00:34

## 2019-12-27 RX ADMIN — DEXMEDETOMIDINE 1.4 MCG/KG/HR: 100 INJECTION, SOLUTION, CONCENTRATE INTRAVENOUS at 23:21

## 2019-12-27 RX ADMIN — MIDAZOLAM HYDROCHLORIDE 2 MG: 1 INJECTION, SOLUTION INTRAMUSCULAR; INTRAVENOUS at 02:17

## 2019-12-27 RX ADMIN — CHLORHEXIDINE GLUCONATE 0.12% ORAL RINSE 15 ML: 1.2 LIQUID ORAL at 21:41

## 2019-12-27 RX ADMIN — IPRATROPIUM BROMIDE AND ALBUTEROL SULFATE 1 AMPULE: .5; 3 SOLUTION RESPIRATORY (INHALATION) at 11:46

## 2019-12-27 RX ADMIN — PROPOFOL 45 MCG/KG/MIN: 10 INJECTION, EMULSION INTRAVENOUS at 18:07

## 2019-12-27 RX ADMIN — Medication 6 PUFF: at 11:49

## 2019-12-27 RX ADMIN — METHYLPREDNISOLONE SODIUM SUCCINATE 40 MG: 40 INJECTION, POWDER, FOR SOLUTION INTRAMUSCULAR; INTRAVENOUS at 08:07

## 2019-12-27 RX ADMIN — LEVOTHYROXINE SODIUM 137 MCG: 25 TABLET ORAL at 08:08

## 2019-12-27 RX ADMIN — MIDAZOLAM HYDROCHLORIDE 2 MG: 1 INJECTION, SOLUTION INTRAMUSCULAR; INTRAVENOUS at 00:03

## 2019-12-27 RX ADMIN — Medication 6 PUFF: at 15:28

## 2019-12-27 RX ADMIN — PROPOFOL 45 MCG/KG/MIN: 10 INJECTION, EMULSION INTRAVENOUS at 06:27

## 2019-12-27 RX ADMIN — MUPIROCIN: 20 OINTMENT TOPICAL at 08:01

## 2019-12-27 RX ADMIN — Medication 6 PUFF: at 11:48

## 2019-12-27 RX ADMIN — MIDAZOLAM HYDROCHLORIDE 2 MG: 1 INJECTION, SOLUTION INTRAMUSCULAR; INTRAVENOUS at 01:17

## 2019-12-27 RX ADMIN — ASPIRIN 325 MG: 325 TABLET, DELAYED RELEASE ORAL at 08:07

## 2019-12-27 RX ADMIN — MUPIROCIN: 20 OINTMENT TOPICAL at 21:42

## 2019-12-27 RX ADMIN — FENTANYL CITRATE 50 MCG/HR: 0.05 INJECTION, SOLUTION INTRAMUSCULAR; INTRAVENOUS at 10:12

## 2019-12-27 RX ADMIN — METHYLPREDNISOLONE SODIUM SUCCINATE 40 MG: 40 INJECTION, POWDER, FOR SOLUTION INTRAMUSCULAR; INTRAVENOUS at 21:41

## 2019-12-27 RX ADMIN — DOXYCYCLINE HYCLATE 100 MG: 100 TABLET, COATED ORAL at 11:43

## 2019-12-27 RX ADMIN — Medication 10 ML: at 21:47

## 2019-12-27 RX ADMIN — FENTANYL CITRATE 25 MCG: 50 INJECTION INTRAMUSCULAR; INTRAVENOUS at 02:13

## 2019-12-27 ASSESSMENT — PULMONARY FUNCTION TESTS
PIF_VALUE: 35
PIF_VALUE: 40
PIF_VALUE: 39
PIF_VALUE: 35
PIF_VALUE: 35
PIF_VALUE: 36
PIF_VALUE: 37
PIF_VALUE: 33
PIF_VALUE: 34
PIF_VALUE: 33
PIF_VALUE: 34
PIF_VALUE: 39
PIF_VALUE: 33
PIF_VALUE: 38
PIF_VALUE: 37
PIF_VALUE: 36
PIF_VALUE: 38
PIF_VALUE: 37
PIF_VALUE: 37

## 2019-12-27 ASSESSMENT — PAIN SCALES - GENERAL
PAINLEVEL_OUTOF10: 0
PAINLEVEL_OUTOF10: 0
PAINLEVEL_OUTOF10: 2
PAINLEVEL_OUTOF10: 0
PAINLEVEL_OUTOF10: 0
PAINLEVEL_OUTOF10: 2

## 2019-12-27 NOTE — PROGRESS NOTES
Pt resting quietly in bed with all lines and monitoring devices in place. Vitals and SpO2 stable.   No changes noted in exam. Continue current plan of care Deshawn Ibarra RN

## 2019-12-27 NOTE — PROGRESS NOTES
Shift assessment completed and documented in doc flow sheet. Pt is sedated on vent with a RASS of -2 resting quietly in bed. VSS, SB in the 50's on monitor. PIV's and PICC WNL. Baugh catheter with stat lock draining yellow urine. OG WNL and clamped. Bilateral soft wrist restraints in place for patient safety. Bed is in lowest position with side rails up and wheels locked. Will monitor.

## 2019-12-27 NOTE — PROGRESS NOTES
IM Progress Note    Admit Date:  12/25/2019  2    Interval history:  Admitted for copd exacerbation   Placed on bipap, troponin bump overnight, intubated for worsening dyspnea and abg   picc placed     Subjective:  Ms. Noa Sidhu seen on vent, sedated     Objective:   BP (!) 100/57   Pulse 65   Temp 98.4 °F (36.9 °C) (Oral)   Resp 16   Ht 5' 4\" (1.626 m)   Wt 130 lb (59 kg)   LMP  (LMP Unknown)   SpO2 98%   BMI 22.31 kg/m²       Intake/Output Summary (Last 24 hours) at 12/27/2019 0753  Last data filed at 12/27/2019 0600  Gross per 24 hour   Intake 2763 ml   Output 1322 ml   Net 1441 ml       Physical Exam:    General:   Elderly female,sedated on vent,   Oral ett and OG noted  Mucous Membranes:  Pink , anicteric  Neck: No JVD, no carotid bruit, no thyromegaly  Chest: diminished in bases, scattered wheeze exp  Cardiovascular:  RRR S1S2 heard, no murmurs or gallops  Abdomen:  Soft, undistended, non tender, no organomegaly, BS present  Extremities:no edema  Distal pulses well felt  Neurological : sedated        Medications:   Scheduled Medications:    aspirin  325 mg Oral Daily    chlorhexidine  15 mL Mouth/Throat BID    lidocaine 1 % injection  5 mL Intradermal Once    sodium chloride flush  10 mL Intravenous 2 times per day    albuterol sulfate HFA  6 puff Inhalation Q4H    And    ipratropium  6 puff Inhalation Q4H    enoxaparin  1 mg/kg Subcutaneous BID    atorvastatin  40 mg Oral Nightly    levothyroxine  137 mcg Oral Daily    montelukast  10 mg Oral Nightly    sodium chloride flush  10 mL Intravenous 2 times per day    methylPREDNISolone  40 mg Intravenous Q12H    Followed by   Mohamud Porter ON 12/28/2019] predniSONE  40 mg Oral Daily    doxycycline hyclate  100 mg Oral Q12H    mupirocin   Nasal BID    nicotine  1 patch Transdermal Daily     I   lactated ringers 100 mL/hr at 12/27/19 0618    propofol 45 mcg/kg/min (12/27/19 0627)    dexmedetomidine (PRECEDEX) IV infusion 1.4 mcg/kg/hr (12/27/19 vent issues resolve     SIRS  - due to above  - leukocytosis, tachypnea, tachycardia  - treat as above and monitor for improvement     Hypothyroidism  - home dose of synthroid 137 mcg      Tobacco Dependence  - Recommended cessation     DVT Prophylaxis: Lovenox     Diet: Diet NPO Effective Now  Can start TF  Code Status: Full Code      Mojgan Fuentes MD 12/27/2019 7:53 AM

## 2019-12-27 NOTE — PROGRESS NOTES
Shift assessment completed per flowsheet. Pt on bipap but very anxious, redirected to keep bipap on.  at bedside. VSS at this time. Monitoring closely.

## 2019-12-27 NOTE — PROGRESS NOTES
[address] Daily Progress Note      Admit Date:  2019    Subjective:  Ms. Doren Crigler is seen for the first time by me today   She is sedated and on ventilator with ET tube in place  Unable to provide history, she however is active smoker with no prior heart disease. [place]: per Dr Katalina Sanchez who did initial consult as follows  History of Present Illness:  Ronni Figueroa is a 62 y.o. patient who presented to [facility] with c/o SOB. history obtained from chart as patient intubated/sedated and no family at bedside. She has PMH of severe COPD on 2L home NC O2, Asthma, thyroid disease, and tobacco abuse. No cardiac history to my knowledge. Most recent ECHO 18 showed normal EF=55-60%; no wall abnls; Mild-to-mod TR. Presented with worsening SOB and wearing 4L NC oxygen. Did not feel good and took OTC mucinex. Woke up with cough, diarrhea, and worsened SOB. Initially placed on Bipap but eventually intubated  AM and admitted to ICU with respiratory failure. I reviewed 4 EKG's which show NSR and ST elevation c/w early repolarization. Note Kapil <0.01, 0.06, 0.07; BNP=93. Note initial BP in ER significantly elevated 206/86mmHg. She cannot give ROS.  I have been asked to provide consultation regarding further management and testing.     ROS:  NA    Past Medical History:   Diagnosis Date    Asthma     COPD (chronic obstructive pulmonary disease) [address] [address]     Thyroid disease      Past Surgical History:   Procedure Laterality Date     SECTION      x2    CHOLECYSTECTOMY         Objective:   BP (!) 100/57   Pulse 65   Temp 98.4 °F (36.9 °C) (Oral)   Resp 16   Ht 5' 4\" (1.626 m)   Wt 130 lb (59 kg)   LMP  (LMP Unknown)   SpO2 98%   BMI 22.31 kg/m²       Intake/Output Summary (Last 24 hours) at 2019 0804  Last data filed at 2019 0752  Gross per 24 hour   Intake 2763 ml   Output 1352 ml   Net 1411 ml       TELEMETRY:sinus     Physical Exam:  General: No Respiratory distress, appears well developed and well nourished. Eyes:  Sclera nonicteric  Nose/Sinuses:  negative findings: nose shows no deformity, asymmetry, or inflammation, nasal mucosa normal, septum midline with no perforation or bleeding  Back:  no pain to palpation  Joint:  no active joint inflammation  Musculoskeletal:  negative  Skin:  Warm and dry  Neck:  Negative for JVD and Carotid Bruits. Chest:  bilat diffuse exp rhonchi  to auscultation, respiration easy  Cardiovascular:  RRR, S1S2 normal, no murmur, no rub or thrill.   Abdomen:  Soft normal liver and spleen  Extremities:   No edema, clubbing, cyanosis,  Pulses:  pedal pulses are normal.  Neuro: grossly normal    Medications:    aspirin  325 mg Oral Daily    chlorhexidine  15 mL Mouth/Throat BID    lidocaine 1 % injection  5 mL Intradermal Once    sodium chloride flush  10 mL Intravenous 2 times per day    albuterol sulfate HFA  6 puff Inhalation Q4H    And    ipratropium  6 puff Inhalation Q4H    enoxaparin  1 mg/kg Subcutaneous BID    atorvastatin  40 mg Oral Nightly    levothyroxine  137 mcg Oral Daily    montelukast  10 mg Oral Nightly    sodium chloride flush  10 mL Intravenous 2 times per day    methylPREDNISolone  40 mg Intravenous Q12H    Followed by   Romayne Daring ON 12/28/2019] predniSONE  40 mg Oral Daily    doxycycline hyclate  100 mg Oral Q12H    mupirocin   Nasal BID    nicotine  1 patch Transdermal Daily      lactated ringers 100 mL/hr at 12/27/19 0618    propofol 45 mcg/kg/min (12/27/19 0627)    dexmedetomidine (PRECEDEX) IV infusion 1.4 mcg/kg/hr (12/27/19 0718)     ketorolac, nitroGLYCERIN, midazolam, fentanNYL, sodium chloride flush, perflutren lipid microspheres, sodium chloride flush, magnesium hydroxide, ondansetron, acetaminophen, ipratropium-albuterol    Lab Data:  CBC:   Recent Labs     12/25/19  1010 12/26/19  0431 12/27/19  0435   WBC 14.2* 17.5* 17.2*   HGB 12.9 12.3 12.4   HCT 38.2 37.6 38.0   MCV 92.9 94.9 95.8    284

## 2019-12-27 NOTE — PROGRESS NOTES
Patient double stacking vent, peak pressuring and shaking head. PRN Fentanyl given and Propofol gtt increased to 25 mcg/kg/min. Will monitor.

## 2019-12-27 NOTE — PROGRESS NOTES
Pulmonary & Critical Care Medicine ICU Progress Note  CC:Acute respiratory failure with hypoxemia    Events of Last 24 hours: Patient with decreased urine output. Precedex 1.4 and propofol 45. She required frequent prn sedation/analgesia. Invasive Lines:   IV Line: PICC 12/26    MV:  12/26  Vent Mode: AC/VC Rate Set: 16 bmp/Vt Ordered: 500 mL/ /FiO2 : 30 %  Recent Labs     12/26/19  0930 12/27/19  0438   PHART 7.270* 7.298*   [id number] 65.1* 57.6*   PO2ART 85.9 72.0*       IV:   propofol 45 mcg/kg/min (12/27/19 0508)    dexmedetomidine (PRECEDEX) IV infusion 1.4 mcg/kg/hr (12/27/19 0156)       EXAM:  BP 95/68   Pulse 66   Temp 97.8 °F (36.6 °C) (Axillary)   Resp 16   Ht 5' 4\" (1.626 m)   Wt 130 lb (59 kg)   LMP  (LMP Unknown)   SpO2 96%   BMI 22.31 kg/m²  on vent  Tmax: 97.8F  CVP:      Intake/Output Summary (Last 24 hours) at 12/27/2019 0609  Last data filed at 12/27/2019 0400  Gross per 24 hour   Intake 2810 ml   Output 1172 ml   Net 1638 ml     Gen: MIld distress. Normocephalic, atraumatic. Eyes: PERRL. No sclera icterus. No conjunctival injection. ENT: No discharge. Pharynx clear. Neck: Trachea midline. No obvious mass. Resp: + accessory muscle use. No crackles. Scattered bilateral wheezes. No rhonchi. No dullness on percussion. CV: Tachy rate. Regular rhythm. No murmur or rub. +1 bilateral LE edema. GI: Non-tender. Non-distended. No hernia. Skin: Warm and dry. No nodule on exposed extremities. Lymph: No cervical LAD. No supraclavicular LAD. M/S: No cyanosis. No joint deformity. No clubbing. Neuro: sedated. Moves all four extremities.  Does not follow commands    Medications:   aspirin  325 mg Oral Daily    chlorhexidine  15 mL Mouth/Throat BID    lidocaine 1 % injection  5 mL Intradermal Once    sodium chloride flush  10 mL Intravenous 2 times per day    albuterol sulfate HFA  6 puff Inhalation Q4H    And    ipratropium  6 puff Inhalation Q4H    enoxaparin  1 mg/kg

## 2019-12-27 NOTE — PROGRESS NOTES
BP has been running low 40/38'L systolic throughout day, she received a 1L NS bolus around 11 am. Attempted to wean sedation down to help BP and patient became agitated and alarming vent so increased sedation back up. UOP low only 60 ml out last 5hrs. Dr. Salinas Frank. 1L bolus over 2 hrs ordered. Will monitor.

## 2019-12-27 NOTE — PROGRESS NOTES
Patient continues to alarm vent, Peak pressures and double stacking. PRN Versed and Fentanyl given and Propofol gtt increased to 40 mcg/kg/min. Will monitor.

## 2019-12-27 NOTE — PROGRESS NOTES
Precedex gtt decreased to 1.3 mcg/kg/hr for bradycardia and hypotention. RASS -2. Will continue to decreased gtt till improved.

## 2019-12-27 NOTE — PROGRESS NOTES
12/27/19 0317   Vent Information   Vent Type 840   Vent Mode AC/VC   Vt Ordered 500 mL   Rate Set 16 bmp   Peak Flow 55 L/min   FiO2  30 %   Sensitivity 2   PEEP/CPAP 5   Humidification Source Heated wire   Humidification Temp 36.9   Circuit Condensation Drained   Vent Patient Data   Peak Inspiratory Pressure 40 cmH2O   Mean Airway Pressure 14 cmH20   Rate Measured 16 br/min   Vt Exhaled 528 mL   Minute Volume 8.45 Liters   I:E Ratio 1:2.8   Cough/Sputum   Sputum How Obtained Endotracheal;Suctioned   Cough Non-productive   Sputum Amount None   Spontaneous Breathing Trial (SBT) RT Doc   Pulse 63   SpO2 97 %   Breath Sounds   Right Upper Lobe Inspiratory Wheezes; Expiratory Wheezes   Right Middle Lobe Inspiratory Wheezes; Expiratory Wheezes   Right Lower Lobe Inspiratory Wheezes; Expiratory Wheezes   Left Upper Lobe Inspiratory Wheezes; Expiratory Wheezes   Left Lower Lobe Inspiratory Wheezes; Expiratory Wheezes   Additional Respiratory  Assessments   Resp 16   Position Semi-Woods's   Alarm Settings   High Pressure Alarm 45 cmH2O   Low Minute Volume Alarm 5 L/min   Apnea (secs) 20 secs   High Respiratory Rate 35 br/min   Low Exhaled Vt  400 mL   Patient Observation   Observations ETT SIZE 7.5, SECURED AT 23 LIP LINE. AMBU BAG AT HEAD OF BED. WATER BAG GOOD.

## 2019-12-27 NOTE — PROGRESS NOTES
Pt medicated with PRN versed dose for sl restlessness and she is not synchronous with cyrus Tello RN

## 2019-12-28 ENCOUNTER — APPOINTMENT (OUTPATIENT)
Dept: GENERAL RADIOLOGY | Age: 59
DRG: 207 | End: 2019-12-28
Payer: COMMERCIAL

## 2019-12-28 LAB
ANION GAP SERPL CALCULATED.3IONS-SCNC: 8 MMOL/L (ref 3–16)
BASE EXCESS ARTERIAL: 4.7 MMOL/L (ref -3–3)
BASOPHILS ABSOLUTE: 0 K/UL (ref 0–0.2)
BASOPHILS RELATIVE PERCENT: 0.2 %
BUN BLDV-MCNC: 26 MG/DL (ref 7–20)
CALCIUM SERPL-MCNC: 8.1 MG/DL (ref 8.3–10.6)
CARBOXYHEMOGLOBIN ARTERIAL: 1 % (ref 0–1.5)
CHLORIDE BLD-SCNC: 97 MMOL/L (ref 99–110)
CO2: 28 MMOL/L (ref 21–32)
CREAT SERPL-MCNC: <0.5 MG/DL (ref 0.6–1.2)
EOSINOPHILS ABSOLUTE: 0 K/UL (ref 0–0.6)
EOSINOPHILS RELATIVE PERCENT: 0 %
GFR AFRICAN AMERICAN: >60
GFR NON-AFRICAN AMERICAN: >60
GLUCOSE BLD-MCNC: 128 MG/DL (ref 70–99)
GLUCOSE BLD-MCNC: 152 MG/DL (ref 70–99)
HCO3 ARTERIAL: 29.8 MMOL/L (ref 21–29)
HCT VFR BLD CALC: 38.3 % (ref 36–48)
HEMOGLOBIN, ART, EXTENDED: 13.2 G/DL (ref 12–16)
HEMOGLOBIN: 13.1 G/DL (ref 12–16)
LYMPHOCYTES ABSOLUTE: 0.7 K/UL (ref 1–5.1)
LYMPHOCYTES RELATIVE PERCENT: 5.2 %
MCH RBC QN AUTO: 32.5 PG (ref 26–34)
MCHC RBC AUTO-ENTMCNC: 34.1 G/DL (ref 31–36)
MCV RBC AUTO: 95.3 FL (ref 80–100)
METHEMOGLOBIN ARTERIAL: 0.2 %
MONOCYTES ABSOLUTE: 0.5 K/UL (ref 0–1.3)
MONOCYTES RELATIVE PERCENT: 4.2 %
NEUTROPHILS ABSOLUTE: 11.4 K/UL (ref 1.7–7.7)
NEUTROPHILS RELATIVE PERCENT: 90.4 %
O2 CONTENT ARTERIAL: 18 ML/DL
O2 SAT, ARTERIAL: 96.7 %
O2 THERAPY: ABNORMAL
PCO2 ARTERIAL: 46.2 MMHG (ref 35–45)
PDW BLD-RTO: 13.7 % (ref 12.4–15.4)
PERFORMED ON: ABNORMAL
PH ARTERIAL: 7.43 (ref 7.35–7.45)
PLATELET # BLD: 234 K/UL (ref 135–450)
PMV BLD AUTO: 8.6 FL (ref 5–10.5)
PO2 ARTERIAL: 86.4 MMHG (ref 75–108)
POTASSIUM SERPL-SCNC: 4.7 MMOL/L (ref 3.5–5.1)
RBC # BLD: 4.02 M/UL (ref 4–5.2)
SODIUM BLD-SCNC: 133 MMOL/L (ref 136–145)
TCO2 ARTERIAL: 31.3 MMOL/L
WBC # BLD: 12.6 K/UL (ref 4–11)

## 2019-12-28 PROCEDURE — 6370000000 HC RX 637 (ALT 250 FOR IP): Performed by: INTERNAL MEDICINE

## 2019-12-28 PROCEDURE — 94750 HC PULMONARY COMPLIANCE STUDY: CPT

## 2019-12-28 PROCEDURE — 94761 N-INVAS EAR/PLS OXIMETRY MLT: CPT

## 2019-12-28 PROCEDURE — 80048 BASIC METABOLIC PNL TOTAL CA: CPT

## 2019-12-28 PROCEDURE — 2580000003 HC RX 258: Performed by: INTERNAL MEDICINE

## 2019-12-28 PROCEDURE — 6360000002 HC RX W HCPCS: Performed by: INTERNAL MEDICINE

## 2019-12-28 PROCEDURE — 2500000003 HC RX 250 WO HCPCS: Performed by: INTERNAL MEDICINE

## 2019-12-28 PROCEDURE — 36592 COLLECT BLOOD FROM PICC: CPT

## 2019-12-28 PROCEDURE — 94003 VENT MGMT INPAT SUBQ DAY: CPT

## 2019-12-28 PROCEDURE — 94640 AIRWAY INHALATION TREATMENT: CPT

## 2019-12-28 PROCEDURE — C9113 INJ PANTOPRAZOLE SODIUM, VIA: HCPCS | Performed by: INTERNAL MEDICINE

## 2019-12-28 PROCEDURE — 82803 BLOOD GASES ANY COMBINATION: CPT

## 2019-12-28 PROCEDURE — 99291 CRITICAL CARE FIRST HOUR: CPT | Performed by: INTERNAL MEDICINE

## 2019-12-28 PROCEDURE — 2700000000 HC OXYGEN THERAPY PER DAY

## 2019-12-28 PROCEDURE — 71045 X-RAY EXAM CHEST 1 VIEW: CPT

## 2019-12-28 PROCEDURE — 2000000000 HC ICU R&B

## 2019-12-28 PROCEDURE — 85025 COMPLETE CBC W/AUTO DIFF WBC: CPT

## 2019-12-28 RX ORDER — PANTOPRAZOLE SODIUM 40 MG/10ML
40 INJECTION, POWDER, LYOPHILIZED, FOR SOLUTION INTRAVENOUS DAILY
Status: DISCONTINUED | OUTPATIENT
Start: 2019-12-28 | End: 2020-01-06 | Stop reason: HOSPADM

## 2019-12-28 RX ORDER — METHYLPREDNISOLONE SODIUM SUCCINATE 40 MG/ML
40 INJECTION, POWDER, LYOPHILIZED, FOR SOLUTION INTRAMUSCULAR; INTRAVENOUS EVERY 12 HOURS
Status: DISCONTINUED | OUTPATIENT
Start: 2019-12-28 | End: 2020-01-05 | Stop reason: CLARIF

## 2019-12-28 RX ADMIN — CHLORHEXIDINE GLUCONATE 0.12% ORAL RINSE 15 ML: 1.2 LIQUID ORAL at 21:16

## 2019-12-28 RX ADMIN — LEVOTHYROXINE SODIUM 137 MCG: 25 TABLET ORAL at 07:56

## 2019-12-28 RX ADMIN — Medication 6 PUFF: at 19:06

## 2019-12-28 RX ADMIN — Medication 6 PUFF: at 23:19

## 2019-12-28 RX ADMIN — Medication 10 ML: at 21:30

## 2019-12-28 RX ADMIN — Medication 6 PUFF: at 12:03

## 2019-12-28 RX ADMIN — Medication 10 ML: at 21:17

## 2019-12-28 RX ADMIN — MUPIROCIN: 20 OINTMENT TOPICAL at 21:25

## 2019-12-28 RX ADMIN — MUPIROCIN: 20 OINTMENT TOPICAL at 08:05

## 2019-12-28 RX ADMIN — PROPOFOL 45 MCG/KG/MIN: 10 INJECTION, EMULSION INTRAVENOUS at 01:14

## 2019-12-28 RX ADMIN — Medication 6 PUFF: at 15:35

## 2019-12-28 RX ADMIN — ATORVASTATIN CALCIUM 40 MG: 40 TABLET, FILM COATED ORAL at 21:17

## 2019-12-28 RX ADMIN — Medication 6 PUFF: at 15:36

## 2019-12-28 RX ADMIN — METHYLPREDNISOLONE SODIUM SUCCINATE 40 MG: 40 INJECTION, POWDER, FOR SOLUTION INTRAMUSCULAR; INTRAVENOUS at 21:17

## 2019-12-28 RX ADMIN — FENTANYL CITRATE 50 MCG/HR: 0.05 INJECTION, SOLUTION INTRAMUSCULAR; INTRAVENOUS at 20:03

## 2019-12-28 RX ADMIN — MONTELUKAST SODIUM 10 MG: 10 TABLET, COATED ORAL at 21:16

## 2019-12-28 RX ADMIN — PROPOFOL 45 MCG/KG/MIN: 10 INJECTION, EMULSION INTRAVENOUS at 06:21

## 2019-12-28 RX ADMIN — SODIUM CHLORIDE, POTASSIUM CHLORIDE, SODIUM LACTATE AND CALCIUM CHLORIDE: 600; 310; 30; 20 INJECTION, SOLUTION INTRAVENOUS at 04:11

## 2019-12-28 RX ADMIN — FENTANYL CITRATE 50 MCG/HR: 0.05 INJECTION, SOLUTION INTRAMUSCULAR; INTRAVENOUS at 00:17

## 2019-12-28 RX ADMIN — DEXMEDETOMIDINE 1.4 MCG/KG/HR: 100 INJECTION, SOLUTION, CONCENTRATE INTRAVENOUS at 04:00

## 2019-12-28 RX ADMIN — PROPOFOL 45 MCG/KG/MIN: 10 INJECTION, EMULSION INTRAVENOUS at 12:50

## 2019-12-28 RX ADMIN — Medication 6 PUFF: at 03:06

## 2019-12-28 RX ADMIN — PROPOFOL 45 MCG/KG/MIN: 10 INJECTION, EMULSION INTRAVENOUS at 16:55

## 2019-12-28 RX ADMIN — DOXYCYCLINE 100 MG: 100 INJECTION, POWDER, LYOPHILIZED, FOR SOLUTION INTRAVENOUS at 09:40

## 2019-12-28 RX ADMIN — DEXMEDETOMIDINE 1.4 MCG/KG/HR: 100 INJECTION, SOLUTION, CONCENTRATE INTRAVENOUS at 10:54

## 2019-12-28 RX ADMIN — DOXYCYCLINE 100 MG: 100 INJECTION, POWDER, LYOPHILIZED, FOR SOLUTION INTRAVENOUS at 21:36

## 2019-12-28 RX ADMIN — METHYLPREDNISOLONE SODIUM SUCCINATE 40 MG: 40 INJECTION, POWDER, FOR SOLUTION INTRAMUSCULAR; INTRAVENOUS at 09:40

## 2019-12-28 RX ADMIN — ASPIRIN 81 MG: 81 TABLET, COATED ORAL at 07:52

## 2019-12-28 RX ADMIN — PREDNISONE 40 MG: 20 TABLET ORAL at 07:52

## 2019-12-28 RX ADMIN — Medication 6 PUFF: at 07:40

## 2019-12-28 RX ADMIN — DEXMEDETOMIDINE 1.4 MCG/KG/HR: 100 INJECTION, SOLUTION, CONCENTRATE INTRAVENOUS at 16:14

## 2019-12-28 RX ADMIN — Medication 10 ML: at 08:05

## 2019-12-28 RX ADMIN — CHLORHEXIDINE GLUCONATE 0.12% ORAL RINSE 15 ML: 1.2 LIQUID ORAL at 08:05

## 2019-12-28 RX ADMIN — ENOXAPARIN SODIUM 60 MG: 60 INJECTION SUBCUTANEOUS at 07:52

## 2019-12-28 RX ADMIN — MIDAZOLAM HYDROCHLORIDE 2 MG: 1 INJECTION, SOLUTION INTRAMUSCULAR; INTRAVENOUS at 10:05

## 2019-12-28 RX ADMIN — DEXMEDETOMIDINE 1.4 MCG/KG/HR: 100 INJECTION, SOLUTION, CONCENTRATE INTRAVENOUS at 22:32

## 2019-12-28 RX ADMIN — Medication 6 PUFF: at 07:41

## 2019-12-28 RX ADMIN — Medication 10 ML: at 07:53

## 2019-12-28 RX ADMIN — ENOXAPARIN SODIUM 60 MG: 60 INJECTION SUBCUTANEOUS at 21:17

## 2019-12-28 RX ADMIN — SODIUM CHLORIDE, POTASSIUM CHLORIDE, SODIUM LACTATE AND CALCIUM CHLORIDE: 600; 310; 30; 20 INJECTION, SOLUTION INTRAVENOUS at 14:46

## 2019-12-28 RX ADMIN — FENTANYL CITRATE 25 MCG: 50 INJECTION INTRAMUSCULAR; INTRAVENOUS at 10:33

## 2019-12-28 RX ADMIN — Medication 6 PUFF: at 19:07

## 2019-12-28 RX ADMIN — PANTOPRAZOLE SODIUM 40 MG: 40 INJECTION, POWDER, FOR SOLUTION INTRAVENOUS at 09:40

## 2019-12-28 ASSESSMENT — PULMONARY FUNCTION TESTS
PIF_VALUE: 41
PIF_VALUE: 35
PIF_VALUE: 39
PIF_VALUE: 36
PIF_VALUE: 35
PIF_VALUE: 34
PIF_VALUE: 39
PIF_VALUE: 35
PIF_VALUE: 35
PIF_VALUE: 37
PIF_VALUE: 35
PIF_VALUE: 41
PIF_VALUE: 37
PIF_VALUE: 38
PIF_VALUE: 37
PIF_VALUE: 32
PIF_VALUE: 40
PIF_VALUE: 31
PIF_VALUE: 33
PIF_VALUE: 34
PIF_VALUE: 33
PIF_VALUE: 35
PIF_VALUE: 34
PIF_VALUE: 41
PIF_VALUE: 39
PIF_VALUE: 34

## 2019-12-28 ASSESSMENT — PAIN SCALES - GENERAL
PAINLEVEL_OUTOF10: 0

## 2019-12-28 NOTE — PROGRESS NOTES
consisted with COPD exacerbation  - admitted to ICU on BiPAP. worsened with dyspnea and hypercarbic acidosis, now on vent.  Pulmonology consultation obtained  - imaging with no acute infiltrates  - blood cx with likely contamination    COPD exacerbation  - IV solu-medrol to prednisone .  Doxy D3  - Scheduled/PRN Duonebs  - continue Singulair  - F/w Dr. Patt Spurling.      Chest pain and abn EKG  - elevated troponins  - likely demand ischemia   - started on lovenox full dose, BB and nitrates if BP tolerates  - start ASA and statins  - ECHO with normal EF and no RWMA , cards consulted, planned for stress test once vent issues resolve     SIRS  - due to above  - leukocytosis, tachypnea, tachycardia  - treat as above and monitor for improvement     Hypothyroidism  - home dose of synthroid 137 mcg      Tobacco Dependence  - Recommended cessation       DVT Prophylaxis: Lovenox  Diet: Diet NPO Effective Now  Code Status: Full Code    PT/OT Eval Status: ordered    Dispo - cont care in ICU    Sonja De La Paz MD

## 2019-12-28 NOTE — PROGRESS NOTES
Shift assessment done and documented. Patient resting quietly on ventilator sedated with fentanyl,propofol and precedex gtt's with RASS -2. Vitals and SpO2 are stable. Family at bedside updated. Continues with bilateral soft wrist restraints to protect all lines and tubes. Will continue to monitor.

## 2019-12-28 NOTE — PROGRESS NOTES
Pulmonary & Critical Care Medicine ICU Progress Note  CC:Acute respiratory failure with hypoxemia    Events of Last 24 hours: Patient on SBT. OGT to suction. Precedex 1.4, fentanyl 50 and propofol 45. Invasive Lines:   IV Line: PICC 12/26    MV:  12/26  Vent Mode: AC/VC Rate Set: 16 bmp/Vt Ordered: 550 mL/ /FiO2 : 30 %  Recent Labs     12/27/19  0438 12/28/19  0450   PHART 7.298* 7.428   NTS7MCM 57.6* 46.2*   PO2ART 72.0* 86.4       IV:   lactated ringers 100 mL/hr at 12/28/19 0411    fentaNYL (SUBLIMAZE) infusion 50 mcg/hr (12/28/19 0017)    propofol 45 mcg/kg/min (12/28/19 0621)    dexmedetomidine (PRECEDEX) IV infusion 1.4 mcg/kg/hr (12/28/19 0400)       EXAM:  /75   Pulse 52   Temp 98.6 °F (37 °C) (Oral)   Resp 16   Ht 5' 4\" (1.626 m)   Wt 138 lb 10.7 oz (62.9 kg)   LMP  (LMP Unknown)   SpO2 99%   BMI 23.80 kg/m²  on vent  Tmax: 98.6F  CVP:      Intake/Output Summary (Last 24 hours) at 12/28/2019 0626  Last data filed at 12/28/2019 0500  Gross per 24 hour   Intake 3030 ml   Output 1635 ml   Net 1395 ml     Gen: MIld distress. Normocephalic, atraumatic. Eyes: PERRL. No sclera icterus. No conjunctival injection. ENT: No discharge. Pharynx clear. Neck: Trachea midline. No obvious mass. Resp: + accessory muscle use. No crackles. Scattered bilateral wheezes. No rhonchi. No dullness on percussion. Prolonged expiration. CV: Tachy rate. Regular rhythm. No murmur or rub. +1 bilateral LE edema. GI: Non-tender. Non-distended. No hernia. Skin: Warm and dry. No nodule on exposed extremities. Lymph: No cervical LAD. No supraclavicular LAD. M/S: No cyanosis. No joint deformity. No clubbing. Neuro: awake, eyes open. Moves all four extremities.  t follows commands    Medications:   aspirin  81 mg Oral Daily    doxycycline (VIBRAMYCIN) IV  100 mg Intravenous Q12H    chlorhexidine  15 mL Mouth/Throat BID    lidocaine 1 % injection  5 mL Intradermal Once    sodium chloride flush

## 2019-12-28 NOTE — PROGRESS NOTES
sp02 down to 81%, 100% boost and increased to 40%.  Patient back to a/c with previous settings and 30%

## 2019-12-28 NOTE — PROGRESS NOTES
Report given to Esau Curran and care of this patient transferred. 4 Eyes Skin Assessment     The patient is being assess for   Shift Handoff    I agree that 2 RN's have performed a thorough Head to Toe Skin Assessment on the patient. ALL assessment sites listed below have been assessed. Areas assessed by both nurses:   [x]   Head, Face, and Ears   [x]   Shoulders, Back, and Chest, Abdomen  [x]   Arms, Elbows, and Hands   [x]   Coccyx, Sacrum, and Ischium  [x]   Legs, Feet, and Heels  Scattered bruising          Co-signer eSignature: Electronically signed by El Joe RN on 12/29/19 at 12:14 PM    Does the Patient have Skin Breakdown?   No          Ron Prevention initiated:  Yes   Wound Care Orders initiated:  NA      [facility] nurse consulted for Pressure Injury (Stage 3,4, Unstageable, DTI, NWPT, Complex wounds)and New or Established Ostomies:  CARIN      Primary Nurse eSignature: Electronically signed by Daya Ontiveros RN on 12/28/19 at 7:24 AM

## 2019-12-28 NOTE — PLAN OF CARE
Nonviolent/Non-Self-Destructive Behavior:  Goal: Absence of restraint indications  Description  Absence of restraint indications  Outcome: Ongoing  Goal: Absence of restraint-related injury  Description  Absence of restraint-related injury  Outcome: Ongoing

## 2019-12-28 NOTE — PROGRESS NOTES
Fentanyl 15 cc wasted due to bag change witnesses by Ian Tarango.    Electronically signed by Estanislao Hamman, RN on 12/28/2019 at 7:25 AM

## 2019-12-28 NOTE — PROGRESS NOTES
Care rounds completed with Dr Christie Kc and multidisciplinary team.  Reviewed labs, meds, VS (temp/HR/RR), I/O's, assessment, & plan of care for today. See progress note & new orders for details. Pt with episodes of desaturation and increased WOB. She is alert and following commands. VO to place back on A/C and Diprivan and fentanyl gtts restarted- see MAR. Dr Christie Kc updates family at bedside.   Mg Fish RN

## 2019-12-29 ENCOUNTER — APPOINTMENT (OUTPATIENT)
Dept: GENERAL RADIOLOGY | Age: 59
DRG: 207 | End: 2019-12-29
Payer: COMMERCIAL

## 2019-12-29 LAB
ANION GAP SERPL CALCULATED.3IONS-SCNC: 8 MMOL/L (ref 3–16)
BASE EXCESS ARTERIAL: 7 (ref -3–3)
BASOPHILS ABSOLUTE: 0 K/UL (ref 0–0.2)
BASOPHILS RELATIVE PERCENT: 0.1 %
BLOOD CULTURE, ROUTINE: NORMAL
BUN BLDV-MCNC: 21 MG/DL (ref 7–20)
CALCIUM SERPL-MCNC: 8.7 MG/DL (ref 8.3–10.6)
CHLORIDE BLD-SCNC: 100 MMOL/L (ref 99–110)
CO2: 30 MMOL/L (ref 21–32)
CREAT SERPL-MCNC: <0.5 MG/DL (ref 0.6–1.2)
CULTURE, BLOOD 2: ABNORMAL
CULTURE, BLOOD 2: ABNORMAL
CULTURE, RESPIRATORY: NORMAL
EOSINOPHILS ABSOLUTE: 0 K/UL (ref 0–0.6)
EOSINOPHILS RELATIVE PERCENT: 0 %
GFR AFRICAN AMERICAN: >60
GFR NON-AFRICAN AMERICAN: >60
GLUCOSE BLD-MCNC: 125 MG/DL (ref 70–99)
GLUCOSE BLD-MCNC: 136 MG/DL (ref 70–99)
GLUCOSE BLD-MCNC: 145 MG/DL (ref 70–99)
GRAM STAIN RESULT: NORMAL
HCO3 ARTERIAL: 31 MMOL/L (ref 21–29)
HCT VFR BLD CALC: 37.8 % (ref 36–48)
HEMOGLOBIN: 12.5 G/DL (ref 12–16)
LYMPHOCYTES ABSOLUTE: 0.7 K/UL (ref 1–5.1)
LYMPHOCYTES RELATIVE PERCENT: 5.8 %
MCH RBC QN AUTO: 31.4 PG (ref 26–34)
MCHC RBC AUTO-ENTMCNC: 33.1 G/DL (ref 31–36)
MCV RBC AUTO: 94.9 FL (ref 80–100)
MONOCYTES ABSOLUTE: 0.4 K/UL (ref 0–1.3)
MONOCYTES RELATIVE PERCENT: 3.1 %
NEUTROPHILS ABSOLUTE: 11.1 K/UL (ref 1.7–7.7)
NEUTROPHILS RELATIVE PERCENT: 91 %
O2 SAT, ARTERIAL: 98 % (ref 93–100)
ORGANISM: ABNORMAL
PCO2 ARTERIAL: 47.3 MM HG (ref 35–45)
PDW BLD-RTO: 14.1 % (ref 12.4–15.4)
PERFORMED ON: ABNORMAL
PH ARTERIAL: 7.42 (ref 7.35–7.45)
PLATELET # BLD: 235 K/UL (ref 135–450)
PMV BLD AUTO: 8.5 FL (ref 5–10.5)
PO2 ARTERIAL: 106.5 MM HG (ref 75–108)
POC SAMPLE TYPE: ABNORMAL
POTASSIUM SERPL-SCNC: 4.4 MMOL/L (ref 3.5–5.1)
RBC # BLD: 3.98 M/UL (ref 4–5.2)
SODIUM BLD-SCNC: 138 MMOL/L (ref 136–145)
WBC # BLD: 12.1 K/UL (ref 4–11)

## 2019-12-29 PROCEDURE — 2500000003 HC RX 250 WO HCPCS: Performed by: INTERNAL MEDICINE

## 2019-12-29 PROCEDURE — 94750 HC PULMONARY COMPLIANCE STUDY: CPT

## 2019-12-29 PROCEDURE — 6370000000 HC RX 637 (ALT 250 FOR IP): Performed by: INTERNAL MEDICINE

## 2019-12-29 PROCEDURE — 2580000003 HC RX 258: Performed by: INTERNAL MEDICINE

## 2019-12-29 PROCEDURE — 99291 CRITICAL CARE FIRST HOUR: CPT | Performed by: INTERNAL MEDICINE

## 2019-12-29 PROCEDURE — 80048 BASIC METABOLIC PNL TOTAL CA: CPT

## 2019-12-29 PROCEDURE — 71045 X-RAY EXAM CHEST 1 VIEW: CPT

## 2019-12-29 PROCEDURE — 85025 COMPLETE CBC W/AUTO DIFF WBC: CPT

## 2019-12-29 PROCEDURE — 2000000000 HC ICU R&B

## 2019-12-29 PROCEDURE — 94640 AIRWAY INHALATION TREATMENT: CPT

## 2019-12-29 PROCEDURE — C9113 INJ PANTOPRAZOLE SODIUM, VIA: HCPCS | Performed by: INTERNAL MEDICINE

## 2019-12-29 PROCEDURE — 94761 N-INVAS EAR/PLS OXIMETRY MLT: CPT

## 2019-12-29 PROCEDURE — 94003 VENT MGMT INPAT SUBQ DAY: CPT

## 2019-12-29 PROCEDURE — 2700000000 HC OXYGEN THERAPY PER DAY

## 2019-12-29 PROCEDURE — 82803 BLOOD GASES ANY COMBINATION: CPT

## 2019-12-29 PROCEDURE — 6360000002 HC RX W HCPCS: Performed by: INTERNAL MEDICINE

## 2019-12-29 RX ADMIN — Medication 6 PUFF: at 22:57

## 2019-12-29 RX ADMIN — CHLORHEXIDINE GLUCONATE 0.12% ORAL RINSE 15 ML: 1.2 LIQUID ORAL at 07:39

## 2019-12-29 RX ADMIN — METHYLPREDNISOLONE SODIUM SUCCINATE 40 MG: 40 INJECTION, POWDER, FOR SOLUTION INTRAMUSCULAR; INTRAVENOUS at 09:22

## 2019-12-29 RX ADMIN — Medication 10 ML: at 21:03

## 2019-12-29 RX ADMIN — PROPOFOL 1.4 MCG/KG/MIN: 10 INJECTION, EMULSION INTRAVENOUS at 00:00

## 2019-12-29 RX ADMIN — SODIUM CHLORIDE, POTASSIUM CHLORIDE, SODIUM LACTATE AND CALCIUM CHLORIDE: 600; 310; 30; 20 INJECTION, SOLUTION INTRAVENOUS at 01:50

## 2019-12-29 RX ADMIN — PROPOFOL 45 MCG/KG/MIN: 10 INJECTION, EMULSION INTRAVENOUS at 12:27

## 2019-12-29 RX ADMIN — DEXMEDETOMIDINE 1.4 MCG/KG/HR: 100 INJECTION, SOLUTION, CONCENTRATE INTRAVENOUS at 09:21

## 2019-12-29 RX ADMIN — IPRATROPIUM BROMIDE AND ALBUTEROL SULFATE 1 AMPULE: .5; 3 SOLUTION RESPIRATORY (INHALATION) at 15:05

## 2019-12-29 RX ADMIN — LEVOTHYROXINE SODIUM 137 MCG: 25 TABLET ORAL at 07:41

## 2019-12-29 RX ADMIN — ASPIRIN 81 MG: 81 TABLET, COATED ORAL at 07:41

## 2019-12-29 RX ADMIN — Medication 10 ML: at 07:43

## 2019-12-29 RX ADMIN — Medication 6 PUFF: at 03:59

## 2019-12-29 RX ADMIN — Medication 10 ML: at 21:04

## 2019-12-29 RX ADMIN — DOXYCYCLINE 100 MG: 100 INJECTION, POWDER, LYOPHILIZED, FOR SOLUTION INTRAVENOUS at 09:24

## 2019-12-29 RX ADMIN — IPRATROPIUM BROMIDE AND ALBUTEROL SULFATE 1 AMPULE: .5; 3 SOLUTION RESPIRATORY (INHALATION) at 11:03

## 2019-12-29 RX ADMIN — MONTELUKAST SODIUM 10 MG: 10 TABLET, COATED ORAL at 21:04

## 2019-12-29 RX ADMIN — FENTANYL CITRATE 25 MCG: 50 INJECTION INTRAMUSCULAR; INTRAVENOUS at 10:24

## 2019-12-29 RX ADMIN — DEXMEDETOMIDINE 1.4 MCG/KG/HR: 100 INJECTION, SOLUTION, CONCENTRATE INTRAVENOUS at 20:21

## 2019-12-29 RX ADMIN — METHYLPREDNISOLONE SODIUM SUCCINATE 40 MG: 40 INJECTION, POWDER, FOR SOLUTION INTRAMUSCULAR; INTRAVENOUS at 21:04

## 2019-12-29 RX ADMIN — ENOXAPARIN SODIUM 40 MG: 40 INJECTION SUBCUTANEOUS at 07:42

## 2019-12-29 RX ADMIN — PROPOFOL 45 MCG/KG/MIN: 10 INJECTION, EMULSION INTRAVENOUS at 03:49

## 2019-12-29 RX ADMIN — MUPIROCIN: 20 OINTMENT TOPICAL at 07:42

## 2019-12-29 RX ADMIN — CHLORHEXIDINE GLUCONATE 0.12% ORAL RINSE 15 ML: 1.2 LIQUID ORAL at 21:04

## 2019-12-29 RX ADMIN — MIDAZOLAM HYDROCHLORIDE 2 MG: 1 INJECTION, SOLUTION INTRAMUSCULAR; INTRAVENOUS at 10:24

## 2019-12-29 RX ADMIN — DOXYCYCLINE 100 MG: 100 INJECTION, POWDER, LYOPHILIZED, FOR SOLUTION INTRAVENOUS at 22:12

## 2019-12-29 RX ADMIN — ATORVASTATIN CALCIUM 40 MG: 40 TABLET, FILM COATED ORAL at 21:04

## 2019-12-29 RX ADMIN — Medication 6 PUFF: at 18:58

## 2019-12-29 RX ADMIN — MUPIROCIN: 20 OINTMENT TOPICAL at 21:04

## 2019-12-29 RX ADMIN — FENTANYL CITRATE 50 MCG/HR: 0.05 INJECTION, SOLUTION INTRAMUSCULAR; INTRAVENOUS at 19:22

## 2019-12-29 RX ADMIN — SODIUM CHLORIDE, POTASSIUM CHLORIDE, SODIUM LACTATE AND CALCIUM CHLORIDE: 600; 310; 30; 20 INJECTION, SOLUTION INTRAVENOUS at 17:09

## 2019-12-29 RX ADMIN — PANTOPRAZOLE SODIUM 40 MG: 40 INJECTION, POWDER, FOR SOLUTION INTRAVENOUS at 07:41

## 2019-12-29 RX ADMIN — DEXMEDETOMIDINE 1.4 MCG/KG/HR: 100 INJECTION, SOLUTION, CONCENTRATE INTRAVENOUS at 03:38

## 2019-12-29 RX ADMIN — DEXMEDETOMIDINE 1.4 MCG/KG/HR: 100 INJECTION, SOLUTION, CONCENTRATE INTRAVENOUS at 14:36

## 2019-12-29 RX ADMIN — IPRATROPIUM BROMIDE AND ALBUTEROL SULFATE 1 AMPULE: .5; 3 SOLUTION RESPIRATORY (INHALATION) at 07:16

## 2019-12-29 ASSESSMENT — PULMONARY FUNCTION TESTS
PIF_VALUE: 32
PIF_VALUE: 33
PIF_VALUE: 34
PIF_VALUE: 31
PIF_VALUE: 31
PIF_VALUE: 12
PIF_VALUE: 32
PIF_VALUE: 11
PIF_VALUE: 31
PIF_VALUE: 31
PIF_VALUE: 11
PIF_VALUE: 32
PIF_VALUE: 32
PIF_VALUE: 33
PIF_VALUE: 39
PIF_VALUE: 11
PIF_VALUE: 26
PIF_VALUE: 29
PIF_VALUE: 32
PIF_VALUE: 33
PIF_VALUE: 30
PIF_VALUE: 41
PIF_VALUE: 34
PIF_VALUE: 31
PIF_VALUE: 31
PIF_VALUE: 34
PIF_VALUE: 35
PIF_VALUE: 6.8
PIF_VALUE: 32
PIF_VALUE: 35

## 2019-12-29 ASSESSMENT — PAIN SCALES - GENERAL
PAINLEVEL_OUTOF10: 0

## 2019-12-29 NOTE — PROGRESS NOTES
Pt placed on SBT5/5 12/29/19 0844   Vent Information   Vt Ordered 550 mL   Rate Set 0 bmp   Peak Flow 65 L/min   Pressure Support 5 cmH20   FiO2  30 %   Sensitivity 2   PEEP/CPAP 5   I Time/ I Time % 0 s   Vent Patient Data   High Peep/I Pressure 0   Peak Inspiratory Pressure 11 cmH2O   Mean Airway Pressure 7.5 cmH20   Rate Measured 17 br/min   Vt Exhaled 368 mL   Minute Volume 6.36 Liters   I:E Ratio 1:2.30   Spontaneous Breathing Trial (SBT) RT Doc   Pulse 65   SpO2 93 %   Additional Respiratory  Assessments   Resp 14   Alarm Settings   High Pressure Alarm 50 cmH2O   Low Minute Volume Alarm 4 L/min   High Respiratory Rate 35 br/min

## 2019-12-29 NOTE — PROGRESS NOTES
Care rounds completed with Dr Mariela Herrera and multidisciplinary team.  Reviewed labs, meds, VS (temp/HR/RR), I/O's, assessment, & plan of care for today. See progress note & new orders for details. Pt currently on SBT and RR 24-26 min. O2 had to be increased to 45% for desaturation to 84-85%.  Tootie Cramer RN

## 2019-12-29 NOTE — PROGRESS NOTES
High risk vesicant drug infusing:  ___n_______    Multiple incompatible medications infusing:  __y_______    CVP Monitoring:  ______n___    Extremely difficult IV access challenge:  _____y___    Continued need for central line access:  _____y_____    Addressed with physician:  _____y___    RIGHT PATIENT, RIGHT TIME, RIGHT LINE    Stefany Martin RN

## 2019-12-29 NOTE — PROGRESS NOTES
Pulmonary & Critical Care Medicine ICU Progress Note  CC:Acute respiratory failure with hypoxemia    Events of Last 24 hours: Patient doing well on SBT. Precedex 1.4, fentanyl 50 and propofol 45. Invasive Lines:   IV Line: PICC 12/26    MV:  12/26  Vent Mode: AC/VC Rate Set: 16 bmp/Vt Ordered: 550 mL/ /FiO2 : 30 %  Recent Labs     12/28/19  0450 12/29/19  0449   PHART 7.428 7.425   LFT6ISF 46.2* 47.3*   PO2ART 86.4 106.5       IV:   lactated ringers 100 mL/hr at 12/29/19 0150    fentaNYL (SUBLIMAZE) infusion 50 mcg/hr (12/28/19 2003)    propofol 45 mcg/kg/min (12/29/19 0349)    dexmedetomidine (PRECEDEX) IV infusion 1.4 mcg/kg/hr (12/29/19 0338)       EXAM:  /73   Pulse 50   Temp 97.7 °F (36.5 °C) (Axillary)   Resp 19   Ht 5' 4\" (1.626 m)   Wt 136 lb 3.9 oz (61.8 kg)   LMP  (LMP Unknown)   SpO2 95%   BMI 23.39 kg/m²  on vent  Tmax: 98.7F  CVP:      Intake/Output Summary (Last 24 hours) at 12/29/2019 0643  Last data filed at 12/29/2019 0000  Gross per 24 hour   Intake 2694 ml   Output 1718 ml   Net 976 ml     Gen: MIld distress. Normocephalic, atraumatic. Eyes: PERRL. No sclera icterus. No conjunctival injection. ENT: No discharge. Pharynx clear. Neck: Trachea midline. No obvious mass. Resp: No accessory muscle use. No crackles. few bilateral wheezes. No rhonchi. No dullness on percussion. CV: Tachy rate. Regular rhythm. No murmur or rub. +1 bilateral LE edema. GI: Non-tender. Non-distended. No hernia. Skin: Warm and dry. No nodule on exposed extremities. Lymph: No cervical LAD. No supraclavicular LAD. M/S: No cyanosis. No joint deformity. No clubbing. Neuro: awake, eyes open. Moves all four extremities.  she follows commands    Medications:   pantoprazole  40 mg Intravenous Daily    methylPREDNISolone  40 mg Intravenous Q12H    aspirin  81 mg Oral Daily    doxycycline (VIBRAMYCIN) IV  100 mg Intravenous Q12H    chlorhexidine  15 mL Mouth/Throat BID    lidocaine 1 % injection  5 mL Intradermal Once    sodium chloride flush  10 mL Intravenous 2 times per day    albuterol sulfate HFA  6 puff Inhalation Q4H    And    ipratropium  6 puff Inhalation Q4H    enoxaparin  1 mg/kg Subcutaneous BID    atorvastatin  40 mg Oral Nightly    levothyroxine  137 mcg Oral Daily    montelukast  10 mg Oral Nightly    sodium chloride flush  10 mL Intravenous 2 times per day    mupirocin   Nasal BID    nicotine  1 patch Transdermal Daily     PRN Meds:  ketorolac, nitroGLYCERIN, midazolam, fentanNYL, sodium chloride flush, perflutren lipid microspheres, sodium chloride flush, magnesium hydroxide, ondansetron, acetaminophen, ipratropium-albuterol    Results:  CBC:   Recent Labs     12/27/19 0435 12/28/19 0455 12/29/19 0455   WBC 17.2* 12.6* 12.1*   HGB 12.4 13.1 12.5   HCT 38.0 38.3 37.8   MCV 95.8 95.3 94.9    234 235     BMP:   Recent Labs     12/27/19 0435 12/28/19 0455 12/29/19 0455   * 133* 138   K 4.9 4.7 4.4   CL 98* 97* 100   CO2 28 28 30   BUN 29* 26* 21*   CREATININE <0.5* <0.5* <0.5*     LIVER PROFILE:   No results for input(s): AST, ALT, LIPASE, BILIDIR, BILITOT, ALKPHOS in the last 72 hours. Invalid input(s): AMYLASE,  ALB  PT/INR:   No results for input(s): PROTIME, INR in the last 72 hours. APTT: No results for input(s): APTT in the last 72 hours. UA:No results for input(s): NITRITE, COLORU, PHUR, LABCAST, WBCUA, RBCUA, MUCUS, TRICHOMONAS, YEAST, BACTERIA, CLARITYU, SPECGRAV, LEUKOCYTESUR, UROBILINOGEN, BILIRUBINUR, BLOODU, GLUCOSEU, AMORPHOUS in the last 72 hours.     Invalid input(s): KETONESU    Cultures:  Bld: GPC 1/2    Films:  CXR 12/28: stable; PICC in place,  Hyperinflated without focal airspace consolidation       ASSESSMENT:  ·   Acute on chronic respiratory failure with hypoxemia and hypercapnia  · COPD with acute exacerbation  · Ongoing tobacco abuse  · Chest pain- troponin mildly elevate at 0.07  · Oliguria - improving     PLAN:  · Patient

## 2019-12-29 NOTE — PROGRESS NOTES
Pt with eyes closed after fentanyl and diprivan gtts restarted but she is not synched with vent and is frequently exhaling during vent breath causing high PIP alarms.  Pt will be medicated with PRN versed and fentanyl - see BRIEN Woodruff RN

## 2019-12-29 NOTE — PROGRESS NOTES
FiO2 increased to 45% for desat to 85%       12/29/19 0855   Vent Information   Vt Ordered 550 mL   Rate Set 0 bmp   Peak Flow 65 L/min   Pressure Support 5 cmH20   FiO2  45 %   Sensitivity 2   PEEP/CPAP 5   I Time/ I Time % 0 s   Vent Patient Data   High Peep/I Pressure 0   Peak Inspiratory Pressure 11 cmH2O   Mean Airway Pressure 7.7 cmH20   Rate Measured 19 br/min   Vt Exhaled 442 mL   Minute Volume 7.93 Liters   I:E Ratio 1:2.30   Spontaneous Breathing Trial (SBT) RT Doc   Pulse 71   SpO2 97 %   Additional Respiratory  Assessments   Resp 19   Alarm Settings   High Pressure Alarm 50 cmH2O   Low Minute Volume Alarm 4 L/min   High Respiratory Rate 35 br/min

## 2019-12-29 NOTE — PROGRESS NOTES
Pt continues to rest quietly and no further changes noted in exam. Vitals and SpO2 stable. All lines and monitoring devices remain in place. Pt repositioned in bed. Continue current plan of care.  Trenton Johns RN

## 2019-12-29 NOTE — PROGRESS NOTES
Pt now synchronized with vent after PRN doses of versed and fentanyl given. Vitals and SpO2 stable.  Continue current plan of care Mitchel Payan RN

## 2019-12-29 NOTE — PROGRESS NOTES
Shift assessment done and documented. Patient is resting quietly on ventilator with no distress noted sedated with fentanyl,precedex,and propofol. RASS-2. Sinus tania on monitor b/p stable. SpO2 100%  on ventilator on 30 % oxygen. OG to low intermittent suction draining brown/green fluid. cdontinues with bilateral soft wrist restraints to protect all lines and tubes, will continue to monitor current plans of care and goals.

## 2019-12-29 NOTE — PROGRESS NOTES
Report given to Esau Curran and care of this patient transferred. 4 Eyes Skin Assessment     The patient is being assess for   Shift Handoff    I agree that 2 RN's have performed a thorough Head to Toe Skin Assessment on the patient. ALL assessment sites listed below have been assessed. Areas assessed by both nurses:   [x]   Head, Face, and Ears   [x]   Shoulders, Back, and Chest, Abdomen  [x]   Arms, Elbows, and Hands   [x]   Coccyx, Sacrum, and Ischium  [x]   Legs, Feet, and Heels      Co-signer eSignature: Electronically signed by Sarah Pandya RN on 12/29/19 at 9:55 AM    Does the Patient have Skin Breakdown?   No          Ron Prevention initiated:  Yes   Wound Care Orders initiated:  No      WOC nurse consulted for Pressure Injury (Stage 3,4, Unstageable, DTI, NWPT, Complex wounds)and New or Established Ostomies:  NA      Primary Nurse eSignature: Electronically signed by Rudy Pierre RN on 12/29/19 at 7:10 AM

## 2019-12-29 NOTE — PROGRESS NOTES
Reassessment done with no changes noted from previous assessment. Vitals and SpO2 are stable. Daughter at bedside now and update was given.

## 2019-12-30 ENCOUNTER — APPOINTMENT (OUTPATIENT)
Dept: GENERAL RADIOLOGY | Age: 59
DRG: 207 | End: 2019-12-30
Payer: COMMERCIAL

## 2019-12-30 PROBLEM — E44.1 MILD PROTEIN-CALORIE MALNUTRITION (HCC): Chronic | Status: ACTIVE | Noted: 2018-12-31

## 2019-12-30 LAB
ANION GAP SERPL CALCULATED.3IONS-SCNC: 4 MMOL/L (ref 3–16)
BASE EXCESS ARTERIAL: 6.1 MMOL/L (ref -3–3)
BASOPHILS ABSOLUTE: 0 K/UL (ref 0–0.2)
BASOPHILS RELATIVE PERCENT: 0.3 %
BUN BLDV-MCNC: 20 MG/DL (ref 7–20)
CALCIUM SERPL-MCNC: 8.4 MG/DL (ref 8.3–10.6)
CARBOXYHEMOGLOBIN ARTERIAL: 0.5 % (ref 0–1.5)
CHLORIDE BLD-SCNC: 101 MMOL/L (ref 99–110)
CO2: 31 MMOL/L (ref 21–32)
CREAT SERPL-MCNC: <0.5 MG/DL (ref 0.6–1.2)
EOSINOPHILS ABSOLUTE: 0 K/UL (ref 0–0.6)
EOSINOPHILS RELATIVE PERCENT: 0 %
GFR AFRICAN AMERICAN: >60
GFR NON-AFRICAN AMERICAN: >60
GLUCOSE BLD-MCNC: 108 MG/DL (ref 70–99)
GLUCOSE BLD-MCNC: 119 MG/DL (ref 70–99)
GLUCOSE BLD-MCNC: 127 MG/DL (ref 70–99)
GLUCOSE BLD-MCNC: 135 MG/DL (ref 70–99)
GLUCOSE BLD-MCNC: 143 MG/DL (ref 70–99)
GLUCOSE BLD-MCNC: 144 MG/DL (ref 70–99)
GLUCOSE BLD-MCNC: 92 MG/DL (ref 70–99)
HCO3 ARTERIAL: 30.7 MMOL/L (ref 21–29)
HCT VFR BLD CALC: 37.2 % (ref 36–48)
HEMOGLOBIN, ART, EXTENDED: 12.6 G/DL (ref 12–16)
HEMOGLOBIN: 12.4 G/DL (ref 12–16)
LYMPHOCYTES ABSOLUTE: 0.6 K/UL (ref 1–5.1)
LYMPHOCYTES RELATIVE PERCENT: 5.5 %
MCH RBC QN AUTO: 31.8 PG (ref 26–34)
MCHC RBC AUTO-ENTMCNC: 33.4 G/DL (ref 31–36)
MCV RBC AUTO: 95.2 FL (ref 80–100)
METHEMOGLOBIN ARTERIAL: 0.3 %
MONOCYTES ABSOLUTE: 0.4 K/UL (ref 0–1.3)
MONOCYTES RELATIVE PERCENT: 3.2 %
NEUTROPHILS ABSOLUTE: 10.5 K/UL (ref 1.7–7.7)
NEUTROPHILS RELATIVE PERCENT: 91 %
O2 CONTENT ARTERIAL: 17 ML/DL
O2 SAT, ARTERIAL: 96.8 %
O2 THERAPY: ABNORMAL
PCO2 ARTERIAL: 44.6 MMHG (ref 35–45)
PDW BLD-RTO: 13.7 % (ref 12.4–15.4)
PERFORMED ON: ABNORMAL
PERFORMED ON: NORMAL
PH ARTERIAL: 7.46 (ref 7.35–7.45)
PLATELET # BLD: 211 K/UL (ref 135–450)
PMV BLD AUTO: 8.8 FL (ref 5–10.5)
PO2 ARTERIAL: 85.6 MMHG (ref 75–108)
POTASSIUM SERPL-SCNC: 4.3 MMOL/L (ref 3.5–5.1)
RBC # BLD: 3.91 M/UL (ref 4–5.2)
SODIUM BLD-SCNC: 136 MMOL/L (ref 136–145)
TCO2 ARTERIAL: 32.1 MMOL/L
TRIGL SERPL-MCNC: 184 MG/DL (ref 0–150)
WBC # BLD: 11.6 K/UL (ref 4–11)

## 2019-12-30 PROCEDURE — 2700000000 HC OXYGEN THERAPY PER DAY

## 2019-12-30 PROCEDURE — 2500000003 HC RX 250 WO HCPCS: Performed by: INTERNAL MEDICINE

## 2019-12-30 PROCEDURE — 2000000000 HC ICU R&B

## 2019-12-30 PROCEDURE — 85025 COMPLETE CBC W/AUTO DIFF WBC: CPT

## 2019-12-30 PROCEDURE — 6370000000 HC RX 637 (ALT 250 FOR IP): Performed by: INTERNAL MEDICINE

## 2019-12-30 PROCEDURE — 99232 SBSQ HOSP IP/OBS MODERATE 35: CPT | Performed by: INTERNAL MEDICINE

## 2019-12-30 PROCEDURE — 84478 ASSAY OF TRIGLYCERIDES: CPT

## 2019-12-30 PROCEDURE — 94003 VENT MGMT INPAT SUBQ DAY: CPT

## 2019-12-30 PROCEDURE — 94750 HC PULMONARY COMPLIANCE STUDY: CPT

## 2019-12-30 PROCEDURE — 94640 AIRWAY INHALATION TREATMENT: CPT

## 2019-12-30 PROCEDURE — 2580000003 HC RX 258: Performed by: INTERNAL MEDICINE

## 2019-12-30 PROCEDURE — 80048 BASIC METABOLIC PNL TOTAL CA: CPT

## 2019-12-30 PROCEDURE — 36600 WITHDRAWAL OF ARTERIAL BLOOD: CPT

## 2019-12-30 PROCEDURE — 82803 BLOOD GASES ANY COMBINATION: CPT

## 2019-12-30 PROCEDURE — 99291 CRITICAL CARE FIRST HOUR: CPT | Performed by: INTERNAL MEDICINE

## 2019-12-30 PROCEDURE — 71045 X-RAY EXAM CHEST 1 VIEW: CPT

## 2019-12-30 PROCEDURE — C9113 INJ PANTOPRAZOLE SODIUM, VIA: HCPCS | Performed by: INTERNAL MEDICINE

## 2019-12-30 PROCEDURE — 94761 N-INVAS EAR/PLS OXIMETRY MLT: CPT

## 2019-12-30 PROCEDURE — 6360000002 HC RX W HCPCS: Performed by: INTERNAL MEDICINE

## 2019-12-30 PROCEDURE — 36415 COLL VENOUS BLD VENIPUNCTURE: CPT

## 2019-12-30 PROCEDURE — 36592 COLLECT BLOOD FROM PICC: CPT

## 2019-12-30 RX ORDER — POLYETHYLENE GLYCOL 3350 17 G/17G
17 POWDER, FOR SOLUTION ORAL EVERY EVENING
Status: COMPLETED | OUTPATIENT
Start: 2019-12-30 | End: 2019-12-30

## 2019-12-30 RX ORDER — DEXTROSE MONOHYDRATE 25 G/50ML
12.5 INJECTION, SOLUTION INTRAVENOUS PRN
Status: DISCONTINUED | OUTPATIENT
Start: 2019-12-30 | End: 2020-01-06 | Stop reason: HOSPADM

## 2019-12-30 RX ORDER — DEXTROSE MONOHYDRATE 50 MG/ML
100 INJECTION, SOLUTION INTRAVENOUS PRN
Status: DISCONTINUED | OUTPATIENT
Start: 2019-12-30 | End: 2020-01-06 | Stop reason: HOSPADM

## 2019-12-30 RX ORDER — NICOTINE POLACRILEX 4 MG
15 LOZENGE BUCCAL PRN
Status: DISCONTINUED | OUTPATIENT
Start: 2019-12-30 | End: 2020-01-06 | Stop reason: HOSPADM

## 2019-12-30 RX ORDER — POLYETHYLENE GLYCOL 3350 17 G/17G
17 POWDER, FOR SOLUTION ORAL DAILY
Status: DISCONTINUED | OUTPATIENT
Start: 2019-12-30 | End: 2020-01-06 | Stop reason: HOSPADM

## 2019-12-30 RX ORDER — FUROSEMIDE 10 MG/ML
20 INJECTION INTRAMUSCULAR; INTRAVENOUS ONCE
Status: COMPLETED | OUTPATIENT
Start: 2019-12-30 | End: 2019-12-30

## 2019-12-30 RX ADMIN — Medication 10 ML: at 10:19

## 2019-12-30 RX ADMIN — MUPIROCIN: 20 OINTMENT TOPICAL at 10:31

## 2019-12-30 RX ADMIN — MONTELUKAST SODIUM 10 MG: 10 TABLET, COATED ORAL at 20:25

## 2019-12-30 RX ADMIN — Medication 6 PUFF: at 15:27

## 2019-12-30 RX ADMIN — PROPOFOL 45 MCG/KG/MIN: 10 INJECTION, EMULSION INTRAVENOUS at 03:43

## 2019-12-30 RX ADMIN — METHYLPREDNISOLONE SODIUM SUCCINATE 40 MG: 40 INJECTION, POWDER, FOR SOLUTION INTRAMUSCULAR; INTRAVENOUS at 20:25

## 2019-12-30 RX ADMIN — LEVOTHYROXINE SODIUM 137 MCG: 25 TABLET ORAL at 07:50

## 2019-12-30 RX ADMIN — ATORVASTATIN CALCIUM 40 MG: 40 TABLET, FILM COATED ORAL at 20:25

## 2019-12-30 RX ADMIN — POLYETHYLENE GLYCOL (3350) 17 G: 17 POWDER, FOR SOLUTION ORAL at 18:23

## 2019-12-30 RX ADMIN — DOXYCYCLINE 100 MG: 100 INJECTION, POWDER, LYOPHILIZED, FOR SOLUTION INTRAVENOUS at 10:19

## 2019-12-30 RX ADMIN — FENTANYL CITRATE 25 MCG/HR: 0.05 INJECTION, SOLUTION INTRAMUSCULAR; INTRAVENOUS at 23:30

## 2019-12-30 RX ADMIN — Medication 6 PUFF: at 22:58

## 2019-12-30 RX ADMIN — Medication 6 PUFF: at 11:29

## 2019-12-30 RX ADMIN — Medication 10 ML: at 20:25

## 2019-12-30 RX ADMIN — POLYETHYLENE GLYCOL (3350) 17 G: 17 POWDER, FOR SOLUTION ORAL at 10:19

## 2019-12-30 RX ADMIN — CHLORHEXIDINE GLUCONATE 0.12% ORAL RINSE 15 ML: 1.2 LIQUID ORAL at 20:25

## 2019-12-30 RX ADMIN — DEXMEDETOMIDINE 1.2 MCG/KG/HR: 100 INJECTION, SOLUTION, CONCENTRATE INTRAVENOUS at 08:11

## 2019-12-30 RX ADMIN — ASPIRIN 81 MG: 81 TABLET, COATED ORAL at 08:04

## 2019-12-30 RX ADMIN — FUROSEMIDE 20 MG: 10 INJECTION, SOLUTION INTRAMUSCULAR; INTRAVENOUS at 10:19

## 2019-12-30 RX ADMIN — Medication 6 PUFF: at 02:45

## 2019-12-30 RX ADMIN — Medication 6 PUFF: at 07:04

## 2019-12-30 RX ADMIN — CHLORHEXIDINE GLUCONATE 0.12% ORAL RINSE 15 ML: 1.2 LIQUID ORAL at 07:59

## 2019-12-30 RX ADMIN — DEXMEDETOMIDINE 1.2 MCG/KG/HR: 100 INJECTION, SOLUTION, CONCENTRATE INTRAVENOUS at 14:16

## 2019-12-30 RX ADMIN — DEXMEDETOMIDINE 1.2 MCG/KG/HR: 100 INJECTION, SOLUTION, CONCENTRATE INTRAVENOUS at 20:26

## 2019-12-30 RX ADMIN — FENTANYL CITRATE 25 MCG/HR: 0.05 INJECTION, SOLUTION INTRAMUSCULAR; INTRAVENOUS at 17:43

## 2019-12-30 RX ADMIN — DEXMEDETOMIDINE 1.4 MCG/KG/HR: 100 INJECTION, SOLUTION, CONCENTRATE INTRAVENOUS at 01:48

## 2019-12-30 RX ADMIN — MUPIROCIN: 20 OINTMENT TOPICAL at 20:29

## 2019-12-30 RX ADMIN — Medication 10 ML: at 10:20

## 2019-12-30 RX ADMIN — Medication 6 PUFF: at 19:02

## 2019-12-30 RX ADMIN — PANTOPRAZOLE SODIUM 40 MG: 40 INJECTION, POWDER, FOR SOLUTION INTRAVENOUS at 07:59

## 2019-12-30 RX ADMIN — DOXYCYCLINE 100 MG: 100 INJECTION, POWDER, LYOPHILIZED, FOR SOLUTION INTRAVENOUS at 21:50

## 2019-12-30 RX ADMIN — ENOXAPARIN SODIUM 40 MG: 40 INJECTION SUBCUTANEOUS at 07:59

## 2019-12-30 RX ADMIN — MIDAZOLAM HYDROCHLORIDE 2 MG: 1 INJECTION, SOLUTION INTRAMUSCULAR; INTRAVENOUS at 21:59

## 2019-12-30 RX ADMIN — METHYLPREDNISOLONE SODIUM SUCCINATE 40 MG: 40 INJECTION, POWDER, FOR SOLUTION INTRAMUSCULAR; INTRAVENOUS at 07:58

## 2019-12-30 ASSESSMENT — PULMONARY FUNCTION TESTS
PIF_VALUE: 29
PIF_VALUE: 36
PIF_VALUE: 34
PIF_VALUE: 35
PIF_VALUE: 37
PIF_VALUE: 26
PIF_VALUE: 34
PIF_VALUE: 29
PIF_VALUE: 26
PIF_VALUE: 41
PIF_VALUE: 32
PIF_VALUE: 11
PIF_VALUE: 33
PIF_VALUE: 33
PIF_VALUE: 30
PIF_VALUE: 36
PIF_VALUE: 26
PIF_VALUE: 35
PIF_VALUE: 37
PIF_VALUE: 27
PIF_VALUE: 35
PIF_VALUE: 34
PIF_VALUE: 34
PIF_VALUE: 25

## 2019-12-30 ASSESSMENT — PAIN SCALES - GENERAL
PAINLEVEL_OUTOF10: 0

## 2019-12-30 NOTE — FLOWSHEET NOTE
Attempted to visit with Pt and/or family. Pt intubated and not awake; no family present. Left Spiritual Care note, informing them of our availability for support.     Gregor Hatch   Associate       12/30/19 6424   Encounter Summary   Services provided to: Patient not available   Referral/Consult From: Donte Perera Visiting   (12/30 Pt intubated, no family, left BC)

## 2019-12-30 NOTE — PROGRESS NOTES
RESPIRATORY THERAPY ASSESSMENT    Name:  Silvia Gonsalez  Medical Record Number:  [id number]  Age: 62 y.o. Gender: female  : 1960  Today's Date:  2019  Room:  [facility]-    Assessment     Is the patient being admitted for a COPD or Asthma exacerbation? Yes   (If yes the patient will be seen every 4 hours for the first 24 hours and then reassessed)    Patient Admission Diagnosis      Allergies  Allergies   Allergen Reactions    Codeine Swelling       Minimum Predicted Vital Capacity:                Actual Vital Capacity:                     Pulmonary History:COPD and Asthma  Home Oxygen Therapy:  2 liters/min via nasal cannula  Home Respiratory Therapy:Albuterol, Fluticasone Furoate , Umeclidinium Bromide and Vilanterol/Fluticasone Furoate   Current Respiratory Therapy:  Alb/atrovent q 4  Treatment Type: MDI  Medications: Albuterol    Respiratory Severity Index(RSI)   Patients with orders for inhalation medications, oxygen, or any therapeutic treatment modality will be placed on Respiratory Protocol. They will be assessed with the first treatment and at least every 72 hours thereafter. The following severity scale will be used to determine frequency of treatment intervention.     Smoking History: Severe Exacerbation = 4    Social History  Social History     Tobacco Use    Smoking status: Current Every Day Smoker     Packs/day: 0.50     Years: 44.00     Pack years: 22.00     Types: Cigarettes    Smokeless tobacco: Never Used   Substance Use Topics    Alcohol use: Yes     Comment: rarely    Drug use: No       Recent Surgical History: None = 0  Past Surgical History  Past Surgical History:   Procedure Laterality Date     SECTION      x2    CHOLECYSTECTOMY         Level of Consciousness: Comatose = 4    Level of Activity: Bedridden, unresponsive or quadriplegic = 4    Respiratory Pattern: Regular Pattern; RR 8-20 = 0    Breath Sounds: Absent bilaterally and/or with wheezes = (HHN) to patients when ANY of the following criteria are met  a. Incognizant or uncooperative          b. Patients treated with HHN at Home        c. Unable to demonstrate proper use of MDI with spacer     d. RR > 30 bpm   5. Bronchodilators will be delivered via Metered Dose Inhaler (MDI), HHN, Aerogen to intubated patients on mechanical ventilation. 6. Inhalation medication orders will be delivered and/or substituted as outlined below. Aerosolized Medications Ordering and Administration Guidelines:    1. All Medications will be ordered by a physician, and their frequency and/or modality will be adjusted as defined by the patients Respiratory Severity Index (RSI) score. 2. If the patient does not have documented COPD, consider discontinuing anticholinergics when RSI is less than 9.  3. If the bronchospasm worsens (increased RSI), then the bronchodilator frequency can be increased to a maximum of every 4 hours. If greater than every 4 hours is required, the physician will be contacted. 4. If the bronchospasm improves, the frequency of the bronchodilator can be decreased, based on the patient's RSI, but not less than home treatment regimen frequency. 5. Bronchodilator(s) will be discontinued if patient has a RSI less than 9 and has received no scheduled or as needed treatment for 72  Hrs. Patients Ordered on a Mucolytic Agent:    1. Must always be administered with a bronchodilator. 2. Discontinue if patient experiences worsened bronchospasm, or secretions have lessened to the point that the patient is able to clear them with a cough. Anti-inflammatory and Combination Medications:    1. If the patient lacks prior history of lung disease, is not using inhaled anti-inflammatory medication at home, and lacks wheezing by examination or by history for at least 24 hours, contact physician for possible discontinuation.

## 2019-12-30 NOTE — CARE COORDINATION
INTERDISCIPLINARY PLAN OF CARE CONFERENCE    Date/Time: 12/30/2019 2:29 PM  Completed by: Johnice Schlatter, Case Management      Patient Name:  Melly Alvarez  YOB: 1960  Admitting Diagnosis: COPD exacerbation ([address] [J44.1]     Admit Date/Time:  12/25/2019  9:47 AM    Chart reviewed. Interdisciplinary team met to discuss patient progress and discharge plans. Disciplines included Case Management, Nursing, and Dietitian. Current Status:Inpt status    PT/OT recommendation:n/a    Anticipated Discharge Date: TBD  Expected D/C Disposition:  Home  Confirmed plan with patient and/or family Yes  Discharge Plan Comments: Lives with spouse and planned to return home. + Home O2 -[facility]. PT/OT when able.  +CM following       Home O2 in place on admit: Yes  Pt informed of need to bring portable home O2 tank on day of discharge for nursing to connect prior to leaving:  reinforce  Verbalized agreement/Understanding:  reinforce

## 2019-12-30 NOTE — PROGRESS NOTES
4 Eyes Skin Assessment     The patient is being assess for   Shift Handoff    I agree that 2 RN's have performed a thorough Head to Toe Skin Assessment on the patient. ALL assessment sites listed below have been assessed. Areas assessed by both nurses:   [x]   Head, Face, and Ears   [x]   Shoulders, Back, and Chest, Abdomen  [x]   Arms, Elbows, and Hands   [x]   Coccyx, Sacrum, and Ischium  [x]   Legs, Feet, and Heels        Few scattered bruises     Co-signer eSignature: Electronically signed by Mayra Salvador RN on 12/29/19 at 9:42 PM    Does the Patient have Skin Breakdown?   No          Ron Prevention initiated:  Yes   Wound Care Orders initiated:  No      WOC nurse consulted for Pressure Injury (Stage 3,4, Unstageable, DTI, NWPT, Complex wounds)and New or Established Ostomies:  NA      Primary Nurse eSignature: Electronically signed by Winnie Tello RN on 12/29/19 at 7:26 PM

## 2019-12-30 NOTE — PROGRESS NOTES
12/29/19 1858   Vent Information   $Ventilation $Subsequent Day   Skin Assessment Clean, dry, & intact   Vent Type 840   Vent Mode AC/VC   Vt Ordered 550 mL   Rate Set 16 bmp   Peak Flow 70 L/min   Pressure Support 0 cmH20   FiO2  30 %   Sensitivity 2   PEEP/CPAP 5   I Time/ I Time % 0 s   Cuff Pressure (cm H2O) 35 cm H2O   Humidification Source Heated wire   Humidification Temp 36.9   Circuit Condensation Drained   Vent Patient Data   High Peep/I Pressure 0   Peak Inspiratory Pressure 31 cmH2O   Mean Airway Pressure 11 cmH20   Rate Measured 16 br/min   Vt Exhaled 581 mL   Minute Volume 9.29 Liters   I:E Ratio 1:3.40   Plateau Pressure 20 ESR11   Static Compliance 39 mL/cmH2O   Dynamic Compliance 22 mL/cmH2O   Cough/Sputum   Sputum How Obtained Endotracheal;Suctioned   Cough Productive   Sputum Amount Moderate   Sputum Color Creamy   Tenacity Thick   Spontaneous Breathing Trial (SBT) RT Doc   Pulse 55   SpO2 97 %   Breath Sounds   Right Upper Lobe Expiratory Wheezes   Right Middle Lobe Expiratory Wheezes   Right Lower Lobe Expiratory Wheezes   Left Upper Lobe Expiratory Wheezes   Left Lower Lobe Expiratory Wheezes   Additional Respiratory  Assessments   Resp 16   Position Semi-Woods's   Alarm Settings   High Pressure Alarm 50 cmH2O   Low Minute Volume Alarm 5 L/min   Apnea (secs) 20 secs   High Respiratory Rate 35 br/min   Patient Observation   Observations ETT SIZE 7.5, SECURED AT 23 LIP LINE. AMBU BAG AT HEAD OF BED. WATER BAG GOOD. ETT (adult)   No Placement Date or Time found.    Tube Size: 7.5 mm  Location: Oral  Secured at: 23 cm   Secured at 23 cm   Measured From Lips   ET Placement Right   Secured By Commercial tube mccarthy   Site Condition Dry

## 2019-12-30 NOTE — PROGRESS NOTES
ABG drawn from R radial. x1 attempt(s). Site prepped per policy and procedure. Modified Chung's test positive. Pressure held to site after procedure for five minutes. No hematoma present. Pulse present after procedure. Pt tolerated procedure very well. Specimen sent to lab.

## 2019-12-30 NOTE — PROGRESS NOTES
Patient back to a/c due to sp02 dropping and hr increasing.  Patient was placed on 45% fi02 and +ps but sp02 remained below 85%, back to a/c and recovered

## 2019-12-30 NOTE — PROGRESS NOTES
hypoxia and hypercapnia (HCC) [J96.01, J96.02]    Tobacco abuse [Z72.0]       Acute on chronic hypoxic/hypercapnic respiratory failure  - patient presented with shortness of breath.  Also c/o chest pain  - workup consisted with COPD exacerbation  - admitted to ICU on BiPAP. worsened with dyspnea and hypercarbic acidosis, now on vent.  Pulmonology consultation obtained  - imaging with no acute infiltrates     COPD exacerbation  - IV solu-medrol to prednisone .  On Doxy   - Scheduled/PRN Duonebs  - continue Singulair  - F/w Dr. Tiff Alcantar.      Chest pain and abn EKG  - elevated troponins  - likely demand ischemia   - started on lovenox full dose, BB and nitrates if BP tolerates  - start ASA and statins  - ECHO with normal EF and no RWMA , cards consulted, planned for stress test once extubated     SIRS  - due to above  - leukocytosis, tachypnea, tachycardia  - treat as above and monitor for improvement     Hypothyroidism  - home dose of synthroid 137 mcg      Tobacco Dependence  - Recommend cessation after extubation        DVT Prophylaxis: Lovenox  Diet: DIET TUBE FEED CONTINUOUS/CYCLIC NPO; Semi-elemental; Orogastric; Continuous; 10  Code Status: Full Code    PT/OT Eval Status: ordered    Dispo - cont care in ICU    Debby Kincaid MD

## 2019-12-30 NOTE — FLOWSHEET NOTE
12/30/19 0748   Vitals   Temp 96.2 °F (35.7 °C)   Temp Source Axillary   Heart Rate Source Monitor   /79   BP Location Left upper arm   BP Upper/Lower Upper   Patient Position Semi fowlers   Level of Consciousness 2   Cardiac Rhythm SB   Pain Assessment   RASS Score -2   CPOT (Critical Patient)   CPOT (Critical Patient) Facial Expression 0   CPOT (Critical Patient) Body Movement 0   CPOT (Critical Patient) Muscle Tension 0   CPOT (Critical Patient) Compiance with Vent 0   Score CPOT (Vent) 0   CPOT (Critial Patient) Vent Status Intubated   Patient resting quietly in bed. No s/s of distress noted. Shift assessment complete, see flow sheet. Denies needs at this time. Call light within reach. Will continue to monitor.

## 2019-12-30 NOTE — PLAN OF CARE
Problem: Falls - Risk of:  Goal: Will remain free from falls  Description  Will remain free from falls  Outcome: Ongoing  Goal: Absence of physical injury  Description  Absence of physical injury  Outcome: Ongoing     Problem: Risk for Impaired Skin Integrity  Goal: Tissue integrity - skin and mucous membranes  Description  Structural intactness and normal physiological function of skin and  mucous membranes. Outcome: Ongoing     Problem: Discharge Planning:  Goal: Discharged to appropriate level of care  Description  Discharged to appropriate level of care  Outcome: Ongoing     Problem:  Activity Intolerance:  Goal: Ability to tolerate increased activity will improve  Description  Ability to tolerate increased activity will improve  Outcome: Ongoing     Problem: Airway Clearance - Ineffective:  Goal: Ability to maintain a clear airway will improve  Description  Ability to maintain a clear airway will improve  Outcome: Ongoing     Problem: Breathing Pattern - Ineffective:  Goal: Ability to achieve and maintain a regular respiratory rate will improve  Description  Ability to achieve and maintain a regular respiratory rate will improve  Outcome: Ongoing     Problem: Gas Exchange - Impaired:  Goal: Levels of oxygenation will improve  Description  Levels of oxygenation will improve  Outcome: Ongoing     Problem: Tobacco Use:  Goal: Inpatient tobacco use cessation counseling participation  Description  Inpatient tobacco use cessation counseling participation  Outcome: Ongoing     Problem: Pain:  Goal: Pain level will decrease  Description  Pain level will decrease  Outcome: Ongoing  Goal: Control of acute pain  Description  Control of acute pain  Outcome: Ongoing  Goal: Control of chronic pain  Description  Control of chronic pain  Outcome: Ongoing     Problem: Nutrition  Goal: Optimal nutrition therapy  12/30/2019 1706 by Dino Cline RN  Outcome: Ongoing  12/30/2019 1218 by Dinh Resendez RD,

## 2019-12-30 NOTE — PROGRESS NOTES
Pulmonary & Critical Care Medicine ICU Progress Note    CC: Acute respiratory failure with hypoxemia, COPD, intubated in ICU after failing BiPAP    Events of Last 24 hours: Failed SBT with increased desaturation  after 30 minutes   Fentanyl, propofol, precedex    Invasive Lines: IV: 2019 PICC right brachial    MV: 2019  Vent Mode: AC/VC Rate Set: 16 bmp/Vt Ordered: 550 mL/ /FiO2 : 30 %  Recent Labs     19  0449 19  0540   PHART 7.425 7.456*   NSH9AVV 47.3* 44.6   PO2ART 106.5 85.6       IV:   lactated ringers 50 mL/hr at 19 1709    fentaNYL (SUBLIMAZE) infusion 50 mcg/hr (19 192)    propofol 45 mcg/kg/min (19 0343)    dexmedetomidine (PRECEDEX) IV infusion 1.2 mcg/kg/hr (19 5542)       Vitals:  Blood pressure (!) 150/80, pulse (!) 45, temperature 97.7 °F (36.5 °C), temperature source Axillary, resp. rate 16, height 5' 4\" (1.626 m), weight 136 lb 3.9 oz (61.8 kg), SpO2 99 %. on vent  Temp  Av.7 °F (36.5 °C)  Min: 97.2 °F (36.2 °C)  Max: 98 °F (36.7 °C)    Intake/Output Summary (Last 24 hours) at 2019 0643  Last data filed at 2019 0500  Gross per 24 hour   Intake 1661 ml   Output 1958 ml   Net -297 ml     EXAM:  General: intubated, ill appearing    Eyes: PERRL. No sclera icterus. No conjunctival injection. ENT: No discharge. Pharynx with ETT. Neck: Trachea midline. Normal thyroid. Resp: No accessory muscle use. No crackles. No wheezing. No rhonchi. No dullness on percussion. CV: Regular rate. Regular rhythm. No mumur or rub. All 4 extremity 1+ edema. GI: Non-tender. Mildly distended abdomen. No masses. No organomegaly. Normal bowel sounds. No hernia. Skin: Warm and dry. No nodule on exposed extremities. No rash on exposed extremities. Lymph: No cervical LAD. No supraclavicular LAD. M/S: No cyanosis. No joint deformity. No clubbing. Neuro: Sedated, following commands.  Patellar reflexes are symmetric  Psych: Unable to obtain because the patient is non-communicative     Scheduled Meds:   enoxaparin  40 mg Subcutaneous Daily    pantoprazole  40 mg Intravenous Daily    methylPREDNISolone  40 mg Intravenous Q12H    aspirin  81 mg Oral Daily    doxycycline (VIBRAMYCIN) IV  100 mg Intravenous Q12H    chlorhexidine  15 mL Mouth/Throat BID    lidocaine 1 % injection  5 mL Intradermal Once    sodium chloride flush  10 mL Intravenous 2 times per day    albuterol sulfate HFA  6 puff Inhalation Q4H    And    ipratropium  6 puff Inhalation Q4H    atorvastatin  40 mg Oral Nightly    levothyroxine  137 mcg Oral Daily    montelukast  10 mg Oral Nightly    sodium chloride flush  10 mL Intravenous 2 times per day    mupirocin   Nasal BID    nicotine  1 patch Transdermal Daily     PRN Meds:  ketorolac, nitroGLYCERIN, midazolam, fentanNYL, sodium chloride flush, perflutren lipid microspheres, sodium chloride flush, magnesium hydroxide, ondansetron, acetaminophen, ipratropium-albuterol    Results:  CBC:   Recent Labs     12/28/19  0455 12/29/19  0455 12/30/19  0530   WBC 12.6* 12.1* 11.6*   HGB 13.1 12.5 12.4   HCT 38.3 37.8 37.2   MCV 95.3 94.9 95.2    235 211     BMP:   Recent Labs     12/28/19  0455 12/29/19  0455 12/30/19  0530   * 138 136   K 4.7 4.4 4.3   CL 97* 100 101   CO2 28 30 31   BUN 26* 21* 20   CREATININE <0.5* <0.5* <0.5*     LIVER PROFILE: No results for input(s): AST, ALT, LIPASE, BILIDIR, BILITOT, ALKPHOS in the last 72 hours. Invalid input(s): AMYLASE,  ALB    Cultures:  12/25/2019 blood no growth  12/25/2019 blood micrococcus  12/27/2019 tracheal aspirate NRF  Films:  CXR was reviewed by me and it showed   12/30/2019 ET tube and PICC okay, no focal airspace disease    ASSESSMENT:  · Acute on chronic hypoxemic and hypercapnic respiratory failure, normally on 2 L home oxygen  · COPD with acute exacerbation  · Chest pain  · Ongoing tobacco abuse    PLAN:  Mechanical ventilation as per my orders.   The ventilator was adjusted by me at the bedside for unstable, life threatening respiratory failure. IV Propofol for sedation, target RASS -2, with daily spontaneous awakening trial.  Fentanyl for pain  Head of bed 30 degrees or higher at all times  · Daily spontaneous breathing trial once PEEP less than 8, FiO2 less than 55%. Supplemental oxygen to maintain SaO2 >92%; wean as tolerated    IV solumedrol with plan to convert to oral prednisone with improvement  Inhaled bronchodilators   Doxycycline D#6  Cardiology has seen, recommended Duane Halon after resolution of acute illness  Tobacco cessation  Prophylaxis: Bactroban, Lovenox    Total critical care time caring for this patient with life threatening, unstable organ failure, including direct patient contact, management of life support systems, review of data including imaging and labs, discussions with other team members and physicians is 34 minutes so far today, excluding procedures.

## 2019-12-30 NOTE — PROGRESS NOTES
Malnutrition based on AND/ASPEN Guidelines):  1. Energy Intake-Less than or equal to 50% of estimated energy requirement, Greater than or equal to 5 days    2. Weight Loss-1-2% loss(- 2# or 1.4% weight loss ), in 1 week  3. Fat Loss-No significant subcutaneous fat loss,    4. Muscle Loss-No significant muscle mass loss,    5. Fluid Accumulation-No significant fluid accumulation, Extremities(BLE + 1 pitting edema )  6.   Strength-Not measured    Nutrition Risk Level: High    Nutrition Needs:  · Estimated Daily Total Kcal: 1180 - 1475 kcals based on 20-25 kcals/kg/CBW  · Estimated Daily Protein (g): 71 - 77 g protein based on 1.2-1.3 g/kg/CBW  · Estimated Daily Fluid (ml/day): 1100 - 1500 ml     Nutrition Diagnosis:   · Problem: Inadequate oral intake  · Etiology: related to Impaired respiratory function-inability to consume food, Insufficient energy/nutrient consumption     Signs and symptoms:  as evidenced by NPO status due to medical condition, Intubation, Lab values    Objective Information:  · Nutrition-Focused Physical Findings: patient remains intubated and sedated on propofol at 20 mcg x 24 hours; bowel regimen added this am - miralax daily but 2x today; patient was calm, appropriately sedated, and able to follow commands this am during rounds; her abdomen is a little distended - she has not had a BM x 5 days; TG check ordered; low-dose SSI started this am; patient was resting upon entering her room this afternoon but she woke up when this RD touched her hand for NFPE; both hands were cold so this RD covered them up with her blankets; patient reported that she did not feel constipated denied nausea when asked; explained that a bowel regimen was started this am since patient had not had a BM x 5 days admission; bilateral hands appeared swollen and bruised; patient's eyes kept rolling back in her head when she had her eyes open during assessment  · Wound Type: None  · Current Nutrition Therapies:  · Oral

## 2019-12-30 NOTE — PROGRESS NOTES
Hospitalist Progress Note      PCP: Cris Mary MD    Date of Admission: 12/25/2019    Subjective: cont to be intubated, sedated , failed SBT    Medications:  Reviewed    Infusion Medications    lactated ringers 50 mL/hr at 12/29/19 1709    fentaNYL (SUBLIMAZE) infusion 50 mcg/hr (12/29/19 1922)    propofol 45 mcg/kg/min (12/30/19 0343)    dexmedetomidine (PRECEDEX) IV infusion 1.2 mcg/kg/hr (12/30/19 0616)     Scheduled Medications    enoxaparin  40 mg Subcutaneous Daily    pantoprazole  40 mg Intravenous Daily    methylPREDNISolone  40 mg Intravenous Q12H    aspirin  81 mg Oral Daily    doxycycline (VIBRAMYCIN) IV  100 mg Intravenous Q12H    chlorhexidine  15 mL Mouth/Throat BID    lidocaine 1 % injection  5 mL Intradermal Once    sodium chloride flush  10 mL Intravenous 2 times per day    albuterol sulfate HFA  6 puff Inhalation Q4H    And    ipratropium  6 puff Inhalation Q4H    atorvastatin  40 mg Oral Nightly    levothyroxine  137 mcg Oral Daily    montelukast  10 mg Oral Nightly    sodium chloride flush  10 mL Intravenous 2 times per day    mupirocin   Nasal BID    nicotine  1 patch Transdermal Daily     PRN Meds: ketorolac, nitroGLYCERIN, midazolam, fentanNYL, sodium chloride flush, perflutren lipid microspheres, sodium chloride flush, magnesium hydroxide, ondansetron, acetaminophen, ipratropium-albuterol      Intake/Output Summary (Last 24 hours) at 12/30/2019 0748  Last data filed at 12/30/2019 0748  Gross per 24 hour   Intake 2603 ml   Output 2145 ml   Net 458 ml       Physical Exam Performed:    /79   Pulse (!) 45   Temp 97.7 °F (36.5 °C) (Axillary)   Resp 16   Ht 5' 4\" (1.626 m)   Wt 136 lb 3.9 oz (61.8 kg)   LMP  (LMP Unknown)   SpO2 99%   BMI 23.39 kg/m²     General:   Elderly female,sedated on vent,   Oral ett and OG noted  Mucous Membranes:  Pink , anicteric  Neck: No JVD, no carotid bruit, no thyromegaly  Chest: diminished in bases, scattered hypercapnia (HCC) [J96.21, J96.22]    COPD exacerbation (HCC) [J44.1]    Acute respiratory failure with hypoxia and hypercapnia (HCC) [J96.01, J96.02]    Tobacco abuse [Z72.0]       Acute on chronic hypoxic/hypercapnic respiratory failure  - patient presented with shortness of breath.  Also c/o chest pain  - workup consisted with COPD exacerbation  - admitted to ICU on BiPAP. worsened with dyspnea and hypercarbic acidosis, now on vent.  Pulmonology consultation obtained  - imaging with no acute infiltrates     COPD exacerbation  - IV solu-medrol to prednisone .  On Doxy   - Scheduled/PRN Duonebs, remains on vent  - continue Singulair  - F/w Dr. Desi Horner.   - ongoing weaning trials     Chest pain and abn EKG  - elevated troponins  - likely demand ischemia   - started on lovenox full dose, BB and nitrates if BP tolerates  - start ASA and statins  - ECHO with normal EF and no RWMA , cards consulted, planned for stress test once extubated     SIRS  - due to above  - leukocytosis, tachypnea, tachycardia  - treat as above and monitor for improvement     Hypothyroidism  - home dose of synthroid 137 mcg      Tobacco Dependence  - Recommend cessation after extubation        DVT Prophylaxis: Lovenox  Diet: DIET TUBE FEED CONTINUOUS/CYCLIC NPO; Semi-elemental; Orogastric; Continuous; 10  Code Status: Full Code    PT/OT Eval Status: ordered    Dispo - cont care in ICU    Eleazar Thurman MD

## 2019-12-30 NOTE — PROGRESS NOTES
42 cc fentanyl wasted w/ Attila Kapoor RN.   Quynh Zaman RN, BSN  Electronically signed by Marino Stout RN on 12/30/2019 at 5:52 PM

## 2019-12-30 NOTE — PLAN OF CARE
Nutrition Problem: Inadequate oral intake  Intervention: Food and/or Nutrient Delivery: Continue NPO, Continue current Tube Feeding  Nutritional Goals: patient will tolerate Vital AF 1.2 at goal rate of 50 ml/hr x 20 hours without GI distress, without s/s of aspiration, and without additional lab/fluid disturbances; weight will remain stable during remainder of admission

## 2019-12-30 NOTE — PROGRESS NOTES
12/29/19 2257   Vent Information   Skin Assessment Clean, dry, & intact   Vent Type 840   Vent Mode AC/VC   Vt Ordered 550 mL   Rate Set 16 bmp   Peak Flow 70 L/min   Pressure Support 0 cmH20   FiO2  30 %   Sensitivity 2   PEEP/CPAP 5   I Time/ I Time % 0 s   Humidification Source Heated wire   Humidification Temp 36.9   Circuit Condensation Drained   Vent Patient Data   High Peep/I Pressure 0   Peak Inspiratory Pressure 35 cmH2O   Mean Airway Pressure 12 cmH20   Rate Measured 16 br/min   Vt Exhaled 580 mL   Minute Volume 9.29 Liters   I:E Ratio 1:3.40   Cough/Sputum   Sputum How Obtained Endotracheal;Suctioned   Cough Productive   Sputum Amount Moderate   Sputum Color Creamy   Tenacity Thick   Spontaneous Breathing Trial (SBT) RT Doc   Pulse (!) 47   SpO2 98 %   Breath Sounds   Right Upper Lobe Expiratory Wheezes   Right Middle Lobe Expiratory Wheezes   Right Lower Lobe Expiratory Wheezes   Left Upper Lobe Expiratory Wheezes   Left Lower Lobe Expiratory Wheezes   Additional Respiratory  Assessments   Resp 16   Position Semi-Woods's   Alarm Settings   High Pressure Alarm 50 cmH2O   Low Minute Volume Alarm 5 L/min   Apnea (secs) 20 secs   High Respiratory Rate 35 br/min   Patient Observation   Observations ETT SIZE 7.5, SECURED AT 23 LIP LINE. AMBU BAG AT HEAD OF BED. WATER BAG GOOD. ETT (adult)   No Placement Date or Time found.    Tube Size: 7.5 mm  Location: Oral  Secured at: 23 cm   Secured at 23 cm   Measured From Lips   ET Placement Right   Secured By Commercial tube [email or web address]   Site Condition Dry

## 2019-12-31 ENCOUNTER — APPOINTMENT (OUTPATIENT)
Dept: GENERAL RADIOLOGY | Age: 59
DRG: 207 | End: 2019-12-31
Payer: COMMERCIAL

## 2019-12-31 LAB
ANION GAP SERPL CALCULATED.3IONS-SCNC: 6 MMOL/L (ref 3–16)
BASE EXCESS ARTERIAL: 8.1 MMOL/L (ref -3–3)
BASOPHILS ABSOLUTE: 0.1 K/UL (ref 0–0.2)
BASOPHILS RELATIVE PERCENT: 0.8 %
BUN BLDV-MCNC: 29 MG/DL (ref 7–20)
CALCIUM SERPL-MCNC: 8.5 MG/DL (ref 8.3–10.6)
CARBOXYHEMOGLOBIN ARTERIAL: 0.8 % (ref 0–1.5)
CHLORIDE BLD-SCNC: 96 MMOL/L (ref 99–110)
CO2: 32 MMOL/L (ref 21–32)
CREAT SERPL-MCNC: <0.5 MG/DL (ref 0.6–1.2)
EOSINOPHILS ABSOLUTE: 0 K/UL (ref 0–0.6)
EOSINOPHILS RELATIVE PERCENT: 0.1 %
GFR AFRICAN AMERICAN: >60
GFR NON-AFRICAN AMERICAN: >60
GLUCOSE BLD-MCNC: 114 MG/DL (ref 70–99)
GLUCOSE BLD-MCNC: 120 MG/DL (ref 70–99)
GLUCOSE BLD-MCNC: 122 MG/DL (ref 70–99)
GLUCOSE BLD-MCNC: 138 MG/DL (ref 70–99)
GLUCOSE BLD-MCNC: 143 MG/DL (ref 70–99)
HCO3 ARTERIAL: 34.2 MMOL/L (ref 21–29)
HCT VFR BLD CALC: 35.4 % (ref 36–48)
HEMOGLOBIN, ART, EXTENDED: 12 G/DL (ref 12–16)
HEMOGLOBIN: 11.6 G/DL (ref 12–16)
LYMPHOCYTES ABSOLUTE: 1.4 K/UL (ref 1–5.1)
LYMPHOCYTES RELATIVE PERCENT: 8.3 %
MCH RBC QN AUTO: 31.3 PG (ref 26–34)
MCHC RBC AUTO-ENTMCNC: 32.7 G/DL (ref 31–36)
MCV RBC AUTO: 95.7 FL (ref 80–100)
METHEMOGLOBIN ARTERIAL: 0.3 %
MONOCYTES ABSOLUTE: 1 K/UL (ref 0–1.3)
MONOCYTES RELATIVE PERCENT: 6.3 %
NEUTROPHILS ABSOLUTE: 13.9 K/UL (ref 1.7–7.7)
NEUTROPHILS RELATIVE PERCENT: 84.5 %
O2 CONTENT ARTERIAL: 16 ML/DL
O2 SAT, ARTERIAL: 94.2 %
O2 THERAPY: ABNORMAL
PCO2 ARTERIAL: 54.5 MMHG (ref 35–45)
PDW BLD-RTO: 13.9 % (ref 12.4–15.4)
PERFORMED ON: ABNORMAL
PH ARTERIAL: 7.42 (ref 7.35–7.45)
PLATELET # BLD: 231 K/UL (ref 135–450)
PMV BLD AUTO: 9.1 FL (ref 5–10.5)
PO2 ARTERIAL: 71.2 MMHG (ref 75–108)
POTASSIUM SERPL-SCNC: 4.2 MMOL/L (ref 3.5–5.1)
RBC # BLD: 3.7 M/UL (ref 4–5.2)
SODIUM BLD-SCNC: 134 MMOL/L (ref 136–145)
TCO2 ARTERIAL: 35.8 MMOL/L
WBC # BLD: 16.4 K/UL (ref 4–11)

## 2019-12-31 PROCEDURE — 6360000002 HC RX W HCPCS: Performed by: INTERNAL MEDICINE

## 2019-12-31 PROCEDURE — 2700000000 HC OXYGEN THERAPY PER DAY

## 2019-12-31 PROCEDURE — 6370000000 HC RX 637 (ALT 250 FOR IP): Performed by: INTERNAL MEDICINE

## 2019-12-31 PROCEDURE — 2580000003 HC RX 258: Performed by: INTERNAL MEDICINE

## 2019-12-31 PROCEDURE — 2500000003 HC RX 250 WO HCPCS: Performed by: INTERNAL MEDICINE

## 2019-12-31 PROCEDURE — 87077 CULTURE AEROBIC IDENTIFY: CPT

## 2019-12-31 PROCEDURE — 99291 CRITICAL CARE FIRST HOUR: CPT | Performed by: INTERNAL MEDICINE

## 2019-12-31 PROCEDURE — 99232 SBSQ HOSP IP/OBS MODERATE 35: CPT | Performed by: INTERNAL MEDICINE

## 2019-12-31 PROCEDURE — 82803 BLOOD GASES ANY COMBINATION: CPT

## 2019-12-31 PROCEDURE — 94761 N-INVAS EAR/PLS OXIMETRY MLT: CPT

## 2019-12-31 PROCEDURE — 85025 COMPLETE CBC W/AUTO DIFF WBC: CPT

## 2019-12-31 PROCEDURE — 94640 AIRWAY INHALATION TREATMENT: CPT

## 2019-12-31 PROCEDURE — C9113 INJ PANTOPRAZOLE SODIUM, VIA: HCPCS | Performed by: INTERNAL MEDICINE

## 2019-12-31 PROCEDURE — 87070 CULTURE OTHR SPECIMN AEROBIC: CPT

## 2019-12-31 PROCEDURE — 2000000000 HC ICU R&B

## 2019-12-31 PROCEDURE — 87205 SMEAR GRAM STAIN: CPT

## 2019-12-31 PROCEDURE — 94003 VENT MGMT INPAT SUBQ DAY: CPT

## 2019-12-31 PROCEDURE — 71045 X-RAY EXAM CHEST 1 VIEW: CPT

## 2019-12-31 PROCEDURE — 87186 SC STD MICRODIL/AGAR DIL: CPT

## 2019-12-31 PROCEDURE — 80048 BASIC METABOLIC PNL TOTAL CA: CPT

## 2019-12-31 RX ORDER — FUROSEMIDE 10 MG/ML
20 INJECTION INTRAMUSCULAR; INTRAVENOUS ONCE
Status: COMPLETED | OUTPATIENT
Start: 2019-12-31 | End: 2019-12-31

## 2019-12-31 RX ADMIN — DOXYCYCLINE 100 MG: 100 INJECTION, POWDER, LYOPHILIZED, FOR SOLUTION INTRAVENOUS at 22:44

## 2019-12-31 RX ADMIN — CEFEPIME 2 G: 2 INJECTION, POWDER, FOR SOLUTION INTRAVENOUS at 20:51

## 2019-12-31 RX ADMIN — CHLORHEXIDINE GLUCONATE 0.12% ORAL RINSE 15 ML: 1.2 LIQUID ORAL at 20:51

## 2019-12-31 RX ADMIN — POLYETHYLENE GLYCOL (3350) 17 G: 17 POWDER, FOR SOLUTION ORAL at 07:59

## 2019-12-31 RX ADMIN — Medication 10 ML: at 08:59

## 2019-12-31 RX ADMIN — MUPIROCIN: 20 OINTMENT TOPICAL at 08:59

## 2019-12-31 RX ADMIN — IPRATROPIUM BROMIDE AND ALBUTEROL SULFATE 1 AMPULE: .5; 3 SOLUTION RESPIRATORY (INHALATION) at 11:34

## 2019-12-31 RX ADMIN — PROPOFOL 35 MCG/KG/MIN: 10 INJECTION, EMULSION INTRAVENOUS at 22:44

## 2019-12-31 RX ADMIN — Medication 10 ML: at 20:53

## 2019-12-31 RX ADMIN — MUPIROCIN: 20 OINTMENT TOPICAL at 21:04

## 2019-12-31 RX ADMIN — VANCOMYCIN HYDROCHLORIDE 1000 MG: 1 INJECTION, POWDER, LYOPHILIZED, FOR SOLUTION INTRAVENOUS at 13:42

## 2019-12-31 RX ADMIN — DEXMEDETOMIDINE 1.2 MCG/KG/HR: 100 INJECTION, SOLUTION, CONCENTRATE INTRAVENOUS at 09:01

## 2019-12-31 RX ADMIN — IPRATROPIUM BROMIDE AND ALBUTEROL SULFATE 1 AMPULE: .5; 3 SOLUTION RESPIRATORY (INHALATION) at 15:28

## 2019-12-31 RX ADMIN — ENOXAPARIN SODIUM 40 MG: 40 INJECTION SUBCUTANEOUS at 07:58

## 2019-12-31 RX ADMIN — METHYLPREDNISOLONE SODIUM SUCCINATE 40 MG: 40 INJECTION, POWDER, FOR SOLUTION INTRAMUSCULAR; INTRAVENOUS at 07:57

## 2019-12-31 RX ADMIN — Medication 6 PUFF: at 22:58

## 2019-12-31 RX ADMIN — MONTELUKAST SODIUM 10 MG: 10 TABLET, COATED ORAL at 20:53

## 2019-12-31 RX ADMIN — CHLORHEXIDINE GLUCONATE 0.12% ORAL RINSE 15 ML: 1.2 LIQUID ORAL at 07:58

## 2019-12-31 RX ADMIN — DEXMEDETOMIDINE 1.2 MCG/KG/HR: 100 INJECTION, SOLUTION, CONCENTRATE INTRAVENOUS at 21:51

## 2019-12-31 RX ADMIN — Medication 6 PUFF: at 02:30

## 2019-12-31 RX ADMIN — METHYLPREDNISOLONE SODIUM SUCCINATE 40 MG: 40 INJECTION, POWDER, FOR SOLUTION INTRAMUSCULAR; INTRAVENOUS at 20:51

## 2019-12-31 RX ADMIN — DEXMEDETOMIDINE 1.2 MCG/KG/HR: 100 INJECTION, SOLUTION, CONCENTRATE INTRAVENOUS at 02:58

## 2019-12-31 RX ADMIN — IPRATROPIUM BROMIDE AND ALBUTEROL SULFATE 1 AMPULE: .5; 3 SOLUTION RESPIRATORY (INHALATION) at 07:27

## 2019-12-31 RX ADMIN — MIDAZOLAM HYDROCHLORIDE 2 MG: 1 INJECTION, SOLUTION INTRAMUSCULAR; INTRAVENOUS at 21:30

## 2019-12-31 RX ADMIN — FUROSEMIDE 20 MG: 10 INJECTION, SOLUTION INTRAMUSCULAR; INTRAVENOUS at 10:45

## 2019-12-31 RX ADMIN — DEXMEDETOMIDINE 1.2 MCG/KG/HR: 100 INJECTION, SOLUTION, CONCENTRATE INTRAVENOUS at 15:34

## 2019-12-31 RX ADMIN — DOXYCYCLINE 100 MG: 100 INJECTION, POWDER, LYOPHILIZED, FOR SOLUTION INTRAVENOUS at 09:04

## 2019-12-31 RX ADMIN — ASPIRIN 81 MG: 81 TABLET, COATED ORAL at 07:57

## 2019-12-31 RX ADMIN — PROPOFOL 30 MCG/KG/MIN: 10 INJECTION, EMULSION INTRAVENOUS at 01:08

## 2019-12-31 RX ADMIN — LEVOTHYROXINE SODIUM 137 MCG: 25 TABLET ORAL at 07:57

## 2019-12-31 RX ADMIN — ATORVASTATIN CALCIUM 40 MG: 40 TABLET, FILM COATED ORAL at 20:53

## 2019-12-31 RX ADMIN — PANTOPRAZOLE SODIUM 40 MG: 40 INJECTION, POWDER, FOR SOLUTION INTRAVENOUS at 07:57

## 2019-12-31 RX ADMIN — IPRATROPIUM BROMIDE AND ALBUTEROL SULFATE 1 AMPULE: .5; 3 SOLUTION RESPIRATORY (INHALATION) at 19:28

## 2019-12-31 RX ADMIN — CEFEPIME 2 G: 2 INJECTION, POWDER, FOR SOLUTION INTRAVENOUS at 12:59

## 2019-12-31 ASSESSMENT — PULMONARY FUNCTION TESTS
PIF_VALUE: 27
PIF_VALUE: 22
PIF_VALUE: 36
PIF_VALUE: 35
PIF_VALUE: 24
PIF_VALUE: 27
PIF_VALUE: 25
PIF_VALUE: 27
PIF_VALUE: 29
PIF_VALUE: 23
PIF_VALUE: 25
PIF_VALUE: 11
PIF_VALUE: 23
PIF_VALUE: 35
PIF_VALUE: 37
PIF_VALUE: 24
PIF_VALUE: 27
PIF_VALUE: 26
PIF_VALUE: 23
PIF_VALUE: 25
PIF_VALUE: 41
PIF_VALUE: 42
PIF_VALUE: 24
PIF_VALUE: 28
PIF_VALUE: 44
PIF_VALUE: 24
PIF_VALUE: 23

## 2019-12-31 ASSESSMENT — PAIN SCALES - GENERAL: PAINLEVEL_OUTOF10: 0

## 2019-12-31 NOTE — PROGRESS NOTES
Report given to [facility] for transfer of pt care.   Addie Abbott RN, BSN

## 2019-12-31 NOTE — PROGRESS NOTES
Auto peep 8cwp noted, Total peeep 13cwp         12/31/19 1530   Vent Information   Vt Ordered 450 mL   Rate Set 16 bmp   Peak Flow 70 L/min   Pressure Support 0 cmH20   FiO2  35 %   Sensitivity 2   PEEP/CPAP 5   I Time/ I Time % 0 s   Humidification Temp 37   Vent Patient Data   High Peep/I Pressure 0   Peak Inspiratory Pressure 25 cmH2O   Mean Airway Pressure 9.4 cmH20   Rate Measured 17 br/min   Vt Exhaled 466 mL   Minute Volume 7.66 Liters   I:E Ratio 1:4.00   Total PEEP 13 cmH20   Auto PEEP 8 cmH20   Cough/Sputum   Sputum How Obtained Suctioned;Endotracheal   Cough Productive   Sputum Amount Moderate   Sputum Color Yellow   Tenacity Purulent   Spontaneous Breathing Trial (SBT) RT Doc   Pulse 71   SpO2 98 %   Breath Sounds   Right Upper Lobe End Expiratory Wheezes   Right Middle Lobe Diminished   Right Lower Lobe Diminished   Left Upper Lobe End Expiratory Wheezes   Left Lower Lobe Diminished   Additional Respiratory  Assessments   Resp 16   Position Semi-Woods's   Oral Care Completed? Yes   Oral Care Mouth suctioned   Subglottic Suction Done? Yes   Alarm Settings   High Pressure Alarm 50 cmH2O   Low Minute Volume Alarm 5 L/min   High Respiratory Rate 35 br/min   ETT (adult)   No Placement Date or Time found.    Tube Size: 7.5 mm  Location: Oral  Secured at: 23 cm   Secured at 23 cm   Measured From [address] By Commercial tube [email or web address]   Site Condition Dry

## 2019-12-31 NOTE — FLOWSHEET NOTE
12/31/19 1041   Encounter Summary   Services provided to: Patient and family together   Referral/Consult From: Rounding   Complexity of Encounter Moderate   Length of Encounter 15 minutes   Spiritual Assessment Completed Yes   Spiritual/Temple   Type Spiritual support   Assessment Calm; Approachable   Intervention Active listening;Explored feelings, thoughts, concerns;Explored coping resources;Nurtured hope   Outcome Comfort;Expressed gratitude     Routine visit to Pt and family member; coping well; no ongoing spiritual care needs at this time.

## 2019-12-31 NOTE — PLAN OF CARE
Problem: Falls - Risk of:  Goal: Will remain free from falls  Description  Will remain free from falls  12/31/2019 0411 by Carlos Manuel Hicks RN  Outcome: Ongoing  12/30/2019 1706 by Sage You RN  Outcome: Ongoing  Goal: Absence of physical injury  Description  Absence of physical injury  12/31/2019 0411 by Carlos Manuel Hicks RN  Outcome: Ongoing  12/30/2019 1706 by Sage You RN  Outcome: Ongoing     Problem: Risk for Impaired Skin Integrity  Goal: Tissue integrity - skin and mucous membranes  Description  Structural intactness and normal physiological function of skin and  mucous membranes. 12/31/2019 0411 by Carlos Manuel Hicks RN  Outcome: Ongoing  12/30/2019 1706 by Sage You RN  Outcome: Ongoing     Problem: Discharge Planning:  Goal: Discharged to appropriate level of care  Description  Discharged to appropriate level of care  12/31/2019 0411 by Carlos Manuel Hicks RN  Outcome: Ongoing  12/30/2019 1706 by Sage You RN  Outcome: Ongoing     Problem:  Activity Intolerance:  Goal: Ability to tolerate increased activity will improve  Description  Ability to tolerate increased activity will improve  12/31/2019 0411 by Carlos Manuel Hicks RN  Outcome: Ongoing  12/30/2019 1706 by Sage You RN  Outcome: Ongoing     Problem: Airway Clearance - Ineffective:  Goal: Ability to maintain a clear airway will improve  Description  Ability to maintain a clear airway will improve  12/31/2019 0411 by Carlos Manuel Hicks RN  Outcome: Ongoing  12/30/2019 1706 by Sage You RN  Outcome: Ongoing     Problem: Breathing Pattern - Ineffective:  Goal: Ability to achieve and maintain a regular respiratory rate will improve  Description  Ability to achieve and maintain a regular respiratory rate will improve  12/31/2019 0411 by Carlos Manuel Hicks RN  Outcome: Ongoing  12/30/2019 1706 by Sage You RN  Outcome: Ongoing     Problem: Gas Exchange - Impaired:  Goal: Levels of oxygenation will improve  Description  Levels of oxygenation will improve  12/31/2019 0411 by Alesha Valdivia RN  Outcome: Ongoing  12/30/2019 1706 by William Moise RN  Outcome: Ongoing     Problem: Tobacco Use:  Goal: Inpatient tobacco use cessation counseling participation  Description  Inpatient tobacco use cessation counseling participation  12/30/2019 1706 by William Moise RN  Outcome: Ongoing     Problem: Pain:  Goal: Pain level will decrease  Description  Pain level will decrease  12/31/2019 0411 by Alesha Valdivia RN  Outcome: Ongoing  12/30/2019 1706 by William Moise RN  Outcome: Ongoing  Goal: Control of acute pain  Description  Control of acute pain  12/31/2019 0411 by Alesha Valdivia RN  Outcome: Ongoing  12/30/2019 1706 by William Moise RN  Outcome: Ongoing  Goal: Control of chronic pain  Description  Control of chronic pain  12/31/2019 0411 by Alesha Valdivia RN  Outcome: Ongoing  12/30/2019 1706 by William Moise RN  Outcome: Ongoing     Problem: Nutrition  Goal: Optimal nutrition therapy  12/31/2019 0411 by Alesha Valdivia RN  Outcome: Ongoing  12/30/2019 1706 by William Moise RN  Outcome: Ongoing  Goal: Understanding of nutritional guidelines  12/31/2019 0411 by Alesha Valdivia RN  Outcome: Ongoing  12/30/2019 1706 by William Moise RN  Outcome: Ongoing     Problem: Restraint Use - Nonviolent/Non-Self-Destructive Behavior:  Goal: Absence of restraint indications  Description  Absence of restraint indications  12/31/2019 0411 by Alesha Valdivia RN  Outcome: Ongoing  12/30/2019 1706 by William Moise RN  Outcome: Ongoing  Goal: Absence of restraint-related injury  Description  Absence of restraint-related injury  12/31/2019 0411 by Alesha Valdivia RN  Outcome: Ongoing  12/30/2019 1706 by William Moise RN  Outcome: Ongoing

## 2019-12-31 NOTE — PROGRESS NOTES
obtain because the patient is non-communicative     Scheduled Meds:   polyethylene glycol  17 g Oral Daily    insulin lispro  0-6 Units Subcutaneous TID WC    enoxaparin  40 mg Subcutaneous Daily    pantoprazole  40 mg Intravenous Daily    methylPREDNISolone  40 mg Intravenous Q12H    aspirin  81 mg Oral Daily    doxycycline (VIBRAMYCIN) IV  100 mg Intravenous Q12H    chlorhexidine  15 mL Mouth/Throat BID    lidocaine 1 % injection  5 mL Intradermal Once    sodium chloride flush  10 mL Intravenous 2 times per day    albuterol sulfate HFA  6 puff Inhalation Q4H    And    ipratropium  6 puff Inhalation Q4H    atorvastatin  40 mg Oral Nightly    levothyroxine  137 mcg Oral Daily    montelukast  10 mg Oral Nightly    sodium chloride flush  10 mL Intravenous 2 times per day    mupirocin   Nasal BID    nicotine  1 patch Transdermal Daily     PRN Meds:  glucose, dextrose, glucagon (rDNA), dextrose, nitroGLYCERIN, midazolam, fentanNYL, sodium chloride flush, perflutren lipid microspheres, sodium chloride flush, magnesium hydroxide, ondansetron, acetaminophen, ipratropium-albuterol    Results:  CBC:   Recent Labs     12/29/19  0455 12/30/19  0530   WBC 12.1* 11.6*   HGB 12.5 12.4   HCT 37.8 37.2   MCV 94.9 95.2    211     BMP:   Recent Labs     12/29/19  0455 12/30/19  0530    136   K 4.4 4.3    101   CO2 30 31   BUN 21* 20   CREATININE <0.5* <0.5*     LIVER PROFILE: No results for input(s): AST, ALT, LIPASE, BILIDIR, BILITOT, ALKPHOS in the last 72 hours. Invalid input(s):   AMYLASE,  ALB    Cultures:  12/25/2019 blood no growth  12/25/2019 blood micrococcus  12/27/2019 tracheal aspirate NRF  Films:  CXR was reviewed by me and it showed   12/30/2019 ET tube and PICC okay, no focal airspace disease    ASSESSMENT:  · Acute on chronic hypoxemic and hypercapnic respiratory failure, normally on 2 L home oxygen  · COPD with acute exacerbation  · HCAP  · Chest pain  · Ongoing tobacco abuse    PLAN:  Mechanical ventilation as per my orders. The ventilator was adjusted by me at the bedside for unstable, life threatening respiratory failure. IV Propofol for sedation, target RASS -2, with daily spontaneous awakening trial.  Fentanyl for pain  Head of bed 30 degrees or higher at all times  · Daily spontaneous breathing trial   IV solumedrol   Inhaled bronchodilators   Doxycycline D#7/7; Change to CEF/VANC based upon rising WBC and worsening secretions  Tracheal aspirate today was obtained prior to starting antibiotics  Cardiology has seen, recommended Amaryllis Galeazzi after resolution of acute illness  Tobacco cessation  Prophylaxis: Bactroban, Lovenox    Total critical care time caring for this patient with life threatening, unstable organ failure, including direct patient contact, management of life support systems, review of data including imaging and labs, discussions with other team members and physicians is 35 minutes so far today, excluding procedures.

## 2019-12-31 NOTE — PROGRESS NOTES
4 Eyes Skin Assessment     The patient is being assess for   Shift Handoff    I agree that 2 RN's have performed a thorough Head to Toe Skin Assessment on the patient. ALL assessment sites listed below have been assessed. Areas assessed by both nurses:   [x]   Head, Face, and Ears   [x]   Shoulders, Back, and Chest, Abdomen  [x]   Arms, Elbows, and Hands   [x]   Coccyx, Sacrum, and Ischium  [x]   Legs, Feet, and Heels        Scattered bruising   Heel boots on    **SHARE this note so that the co-signing nurse is able to place an eSignature**    Co-signer eSignature: Electronically signed by Claude Mahmood RN on 12/30/19 at 11:52 PM    Does the Patient have Skin Breakdown?   No          Ron Prevention initiated:  Yes   Wound Care Orders initiated:  No      [facility] nurse consulted for Pressure Injury (Stage 3,4, Unstageable, DTI, NWPT, Complex wounds)and New or Established Ostomies:  No      Primary Nurse eSignature: Electronically signed by Pilar Billy RN on 12/30/19 at 10:59 PM

## 2019-12-31 NOTE — PROGRESS NOTES
Wasted 70ml fentanyl gtt with Roma Bernal RN.    Electronically signed by Nisha Sunshine RN on 12/31/2019 at 12:13 AM

## 2019-12-31 NOTE — PROGRESS NOTES
Assessment unchanged. VSS. Pt remains intubated and sedated. PICC WNL. TF infusing. Baugh in place for strict I/o. See MAR for fentanyl, propofol & precedex rates. Pts family at bedside. Pt repositioned q2h & lateral rotation therapy on. Will closely monitor.   Kyara Yuen RN, BSN

## 2019-12-31 NOTE — PROGRESS NOTES
12/30/19 1903   Vent Information   Vent Mode AC/VC   Vt Ordered 450 mL   Rate Set 16 bmp   Peak Flow 70 L/min   FiO2  30 %   PEEP/CPAP 5   Vent Patient Data   Peak Inspiratory Pressure 26 cmH2O   Mean Airway Pressure 9.7 cmH20   Rate Measured 18 br/min   Vt Exhaled 413 mL   Minute Volume 8.44 Liters   I:E Ratio 1:3.30   Plateau Pressure 23 [id number]   Static Compliance 23 mL/cmH2O   Dynamic Compliance 20 mL/cmH2O   Cough/Sputum   Cough Productive   Sputum Amount Small   Sputum Color Yellow   Tenacity Thick   Spontaneous Breathing Trial (SBT) RT Doc   Pulse 69   SpO2 97 %

## 2019-12-31 NOTE — PROGRESS NOTES
vent,   Oral ett and OG noted  Mucous Membranes:  Pink , anicteric  Neck: No JVD, no carotid bruit, no thyromegaly  Chest: diminished in bases, no wheeze  Cardiovascular:  RRR S1S2 heard, no murmurs or gallops  Abdomen:  Soft, undistended, non tender, no organomegaly, BS present  Extremities:no edema  Distal pulses well felt  Neurological : sedated    Labs:   Recent Labs     12/29/19  0455 12/30/19  0530 12/31/19  0627   WBC 12.1* 11.6* 16.4*   HGB 12.5 12.4 11.6*   HCT 37.8 37.2 35.4*    211 231     Recent Labs     12/29/19  0455 12/30/19  0530 12/31/19  0627    136 134*   K 4.4 4.3 4.2    101 96*   CO2 30 31 32   BUN 21* 20 29*   CREATININE <0.5* <0.5* <0.5*   CALCIUM 8.7 8.4 8.5     No results for input(s): AST, ALT, BILIDIR, BILITOT, ALKPHOS in the last 72 hours. No results for input(s): INR in the last 72 hours. No results for input(s): Tray Emily in the last 72 hours. Urinalysis:    No results found for: Ashely Pitch, BACTERIA, RBCUA, BLOODU, [address], [address] [place] [address]    Radiology:  XR CHEST PORTABLE   Final Result   Supportive tubing is stable. Remain clear. Removed lungs remain clear. XR CHEST PORTABLE   Final Result   Stable life support device positioning. Clear lungs. XR CHEST PORTABLE   Final Result   Stable life support device positioning. Blunted costophrenic sulci may represent pleural reflections as opposed to   small effusions given hyperinflation. No focal airspace disease. XR CHEST PORTABLE   Final Result   Stable appearance of the chest with no acute finding. XR CHEST PORTABLE   Final Result   Interval placement of right-sided PICC line. The lungs remain clear. IR PICC WO SQ PORT/PUMP > 5 YEARS   Final Result      XR CHEST PORTABLE   Final Result   1. Endotracheal tube terminating 7.5 cm the alley.          XR CHEST PORTABLE   Final Result   COPD         XR CHEST PORTABLE    (Results Pending) Assessment/Plan:    Active Hospital Problems    Diagnosis    Chest pain on breathing [R07.1]    Mild protein-calorie malnutrition (HCC) [E44.1]    Acute exacerbation of chronic obstructive pulmonary disease (COPD) (HCC) [J44.1]    Elevated troponin [R79.89]    Acute on chronic respiratory failure with hypoxia and hypercapnia (HCC) [J96.21, J96.22]    COPD exacerbation (HCC) [J44.1]    Acute respiratory failure with hypoxia and hypercapnia (HCC) [J96.01, J96.02]    Tobacco abuse [Z72.0]       Acute on chronic hypoxic/hypercapnic respiratory failure  - patient presented with shortness of breath.  Also c/o chest pain  - workup consisted with COPD exacerbation  - admitted to ICU on BiPAP. worsened with dyspnea and hypercarbic acidosis, now on vent.  Pulmonology consultation obtained  - imaging with no acute infiltrates     COPD exacerbation  - IV solu-medrol to prednisone . On Doxy   - Scheduled/PRN Duonebs, remains on vent  - continue Singulair  - F/w Dr. Jennifer Lang.   - ongoing weaning trials     Chest pain and abn EKG  - elevated troponins  - likely demand ischemia   - started on lovenox full dose, BB and nitrates if BP tolerates  - start ASA and statins  - ECHO with normal EF and no RWMA , cards consulted, planned for stress test once extubated     SIRS  - due to above  - leukocytosis, tachypnea, tachycardia  - treat as above and monitor for improvement     Hypothyroidism  - home dose of synthroid 137 mcg      Tobacco Dependence  - Recommend cessation after extubation       DVT Prophylaxis: Lovenox  Diet: DIET TUBE FEED CONTINUOUS/CYCLIC NPO; Semi-elemental (Vital AF 1.2 );  Orogastric; Continuous; 10; 50; 20  Code Status: Full Code    PT/OT Eval Status: ordered    Dispo - cont care in ICU   weaning per April Ron MD

## 2019-12-31 NOTE — PROGRESS NOTES
Pt wide awake and restless. Propofol gtt increased up to 30 mcg/kg/min. Mouth care performed, pt motioning mouth is dry. Pt repositioned in bed. No further needs at this time.  Norberto Collins RN

## 2019-12-31 NOTE — PROGRESS NOTES
Care rounds completed with Dr Abena Flynn and multidisciplinary team. Pt awake, following commands and relays that she still feels SOB. Reviewed labs, meds, VS (temp/HR/RR), I/O's, assessment, & plan of care for today. See progress note & new orders for details.  Deshawn Search RN

## 2020-01-01 ENCOUNTER — APPOINTMENT (OUTPATIENT)
Dept: GENERAL RADIOLOGY | Age: 60
DRG: 207 | End: 2020-01-01
Payer: COMMERCIAL

## 2020-01-01 LAB
ANION GAP SERPL CALCULATED.3IONS-SCNC: 4 MMOL/L (ref 3–16)
BASE EXCESS ARTERIAL: 9.4 MMOL/L (ref -3–3)
BASOPHILS ABSOLUTE: 0 K/UL (ref 0–0.2)
BASOPHILS RELATIVE PERCENT: 0 %
BUN BLDV-MCNC: 27 MG/DL (ref 7–20)
CALCIUM SERPL-MCNC: 8.7 MG/DL (ref 8.3–10.6)
CARBOXYHEMOGLOBIN ARTERIAL: 0.6 % (ref 0–1.5)
CHLORIDE BLD-SCNC: 93 MMOL/L (ref 99–110)
CO2: 36 MMOL/L (ref 21–32)
CREAT SERPL-MCNC: <0.5 MG/DL (ref 0.6–1.2)
EOSINOPHILS ABSOLUTE: 0 K/UL (ref 0–0.6)
EOSINOPHILS RELATIVE PERCENT: 0 %
GFR AFRICAN AMERICAN: >60
GFR NON-AFRICAN AMERICAN: >60
GLUCOSE BLD-MCNC: 104 MG/DL (ref 70–99)
GLUCOSE BLD-MCNC: 130 MG/DL (ref 70–99)
GLUCOSE BLD-MCNC: 133 MG/DL (ref 70–99)
GLUCOSE BLD-MCNC: 147 MG/DL (ref 70–99)
HCO3 ARTERIAL: 36.2 MMOL/L (ref 21–29)
HCT VFR BLD CALC: 33.1 % (ref 36–48)
HEMOGLOBIN, ART, EXTENDED: 11.4 G/DL (ref 12–16)
HEMOGLOBIN: 11 G/DL (ref 12–16)
LYMPHOCYTES ABSOLUTE: 0.8 K/UL (ref 1–5.1)
LYMPHOCYTES RELATIVE PERCENT: 5 %
MCH RBC QN AUTO: 31.7 PG (ref 26–34)
MCHC RBC AUTO-ENTMCNC: 33.3 G/DL (ref 31–36)
MCV RBC AUTO: 95.3 FL (ref 80–100)
METHEMOGLOBIN ARTERIAL: 0.2 %
MONOCYTES ABSOLUTE: 1.1 K/UL (ref 0–1.3)
MONOCYTES RELATIVE PERCENT: 7 %
NEUTROPHILS ABSOLUTE: 14.3 K/UL (ref 1.7–7.7)
NEUTROPHILS RELATIVE PERCENT: 88 %
O2 CONTENT ARTERIAL: 15 ML/DL
O2 SAT, ARTERIAL: 95.3 %
O2 THERAPY: ABNORMAL
PCO2 ARTERIAL: 61.1 MMHG (ref 35–45)
PDW BLD-RTO: 13.8 % (ref 12.4–15.4)
PERFORMED ON: ABNORMAL
PH ARTERIAL: 7.39 (ref 7.35–7.45)
PLATELET # BLD: 203 K/UL (ref 135–450)
PLATELET SLIDE REVIEW: ADEQUATE
PMV BLD AUTO: 8.4 FL (ref 5–10.5)
PO2 ARTERIAL: 79.3 MMHG (ref 75–108)
POTASSIUM SERPL-SCNC: 4.5 MMOL/L (ref 3.5–5.1)
RBC # BLD: 3.48 M/UL (ref 4–5.2)
SLIDE REVIEW: ABNORMAL
SODIUM BLD-SCNC: 133 MMOL/L (ref 136–145)
TCO2 ARTERIAL: 38.1 MMOL/L
WBC # BLD: 16.2 K/UL (ref 4–11)

## 2020-01-01 PROCEDURE — 6360000002 HC RX W HCPCS: Performed by: INTERNAL MEDICINE

## 2020-01-01 PROCEDURE — 2580000003 HC RX 258: Performed by: INTERNAL MEDICINE

## 2020-01-01 PROCEDURE — 99291 CRITICAL CARE FIRST HOUR: CPT | Performed by: INTERNAL MEDICINE

## 2020-01-01 PROCEDURE — C9113 INJ PANTOPRAZOLE SODIUM, VIA: HCPCS | Performed by: INTERNAL MEDICINE

## 2020-01-01 PROCEDURE — 99232 SBSQ HOSP IP/OBS MODERATE 35: CPT | Performed by: INTERNAL MEDICINE

## 2020-01-01 PROCEDURE — 6370000000 HC RX 637 (ALT 250 FOR IP): Performed by: INTERNAL MEDICINE

## 2020-01-01 PROCEDURE — 94750 HC PULMONARY COMPLIANCE STUDY: CPT

## 2020-01-01 PROCEDURE — 94640 AIRWAY INHALATION TREATMENT: CPT

## 2020-01-01 PROCEDURE — 94761 N-INVAS EAR/PLS OXIMETRY MLT: CPT

## 2020-01-01 PROCEDURE — 85025 COMPLETE CBC W/AUTO DIFF WBC: CPT

## 2020-01-01 PROCEDURE — 71045 X-RAY EXAM CHEST 1 VIEW: CPT

## 2020-01-01 PROCEDURE — 94003 VENT MGMT INPAT SUBQ DAY: CPT

## 2020-01-01 PROCEDURE — 2500000003 HC RX 250 WO HCPCS: Performed by: INTERNAL MEDICINE

## 2020-01-01 PROCEDURE — 82803 BLOOD GASES ANY COMBINATION: CPT

## 2020-01-01 PROCEDURE — 2000000000 HC ICU R&B

## 2020-01-01 PROCEDURE — 80048 BASIC METABOLIC PNL TOTAL CA: CPT

## 2020-01-01 PROCEDURE — 2700000000 HC OXYGEN THERAPY PER DAY

## 2020-01-01 RX ORDER — IPRATROPIUM BROMIDE AND ALBUTEROL SULFATE 2.5; .5 MG/3ML; MG/3ML
1 SOLUTION RESPIRATORY (INHALATION) EVERY 4 HOURS
Status: DISCONTINUED | OUTPATIENT
Start: 2020-01-01 | End: 2020-01-04

## 2020-01-01 RX ORDER — FUROSEMIDE 10 MG/ML
20 INJECTION INTRAMUSCULAR; INTRAVENOUS ONCE
Status: COMPLETED | OUTPATIENT
Start: 2020-01-01 | End: 2020-01-01

## 2020-01-01 RX ADMIN — POLYETHYLENE GLYCOL (3350) 17 G: 17 POWDER, FOR SOLUTION ORAL at 07:51

## 2020-01-01 RX ADMIN — ATORVASTATIN CALCIUM 40 MG: 40 TABLET, FILM COATED ORAL at 20:15

## 2020-01-01 RX ADMIN — IPRATROPIUM BROMIDE AND ALBUTEROL SULFATE 1 AMPULE: .5; 3 SOLUTION RESPIRATORY (INHALATION) at 02:54

## 2020-01-01 RX ADMIN — IPRATROPIUM BROMIDE AND ALBUTEROL SULFATE 1 AMPULE: .5; 3 SOLUTION RESPIRATORY (INHALATION) at 18:47

## 2020-01-01 RX ADMIN — CEFEPIME 2 G: 2 INJECTION, POWDER, FOR SOLUTION INTRAVENOUS at 14:01

## 2020-01-01 RX ADMIN — MONTELUKAST SODIUM 10 MG: 10 TABLET, COATED ORAL at 20:16

## 2020-01-01 RX ADMIN — ASPIRIN 81 MG: 81 TABLET, COATED ORAL at 07:50

## 2020-01-01 RX ADMIN — IPRATROPIUM BROMIDE AND ALBUTEROL SULFATE 1 AMPULE: .5; 3 SOLUTION RESPIRATORY (INHALATION) at 10:51

## 2020-01-01 RX ADMIN — IPRATROPIUM BROMIDE AND ALBUTEROL SULFATE 1 AMPULE: .5; 3 SOLUTION RESPIRATORY (INHALATION) at 06:50

## 2020-01-01 RX ADMIN — CHLORHEXIDINE GLUCONATE 0.12% ORAL RINSE 15 ML: 1.2 LIQUID ORAL at 20:16

## 2020-01-01 RX ADMIN — MUPIROCIN: 20 OINTMENT TOPICAL at 07:52

## 2020-01-01 RX ADMIN — METHYLPREDNISOLONE SODIUM SUCCINATE 40 MG: 40 INJECTION, POWDER, FOR SOLUTION INTRAMUSCULAR; INTRAVENOUS at 07:51

## 2020-01-01 RX ADMIN — DEXMEDETOMIDINE 1.2 MCG/KG/HR: 100 INJECTION, SOLUTION, CONCENTRATE INTRAVENOUS at 10:22

## 2020-01-01 RX ADMIN — Medication 10 ML: at 20:16

## 2020-01-01 RX ADMIN — ENOXAPARIN SODIUM 40 MG: 40 INJECTION SUBCUTANEOUS at 07:51

## 2020-01-01 RX ADMIN — PROPOFOL 35 MCG/KG/MIN: 10 INJECTION, EMULSION INTRAVENOUS at 22:34

## 2020-01-01 RX ADMIN — DEXMEDETOMIDINE 1.2 MCG/KG/HR: 100 INJECTION, SOLUTION, CONCENTRATE INTRAVENOUS at 04:49

## 2020-01-01 RX ADMIN — DEXMEDETOMIDINE 1.2 MCG/KG/HR: 100 INJECTION, SOLUTION, CONCENTRATE INTRAVENOUS at 16:14

## 2020-01-01 RX ADMIN — IPRATROPIUM BROMIDE AND ALBUTEROL SULFATE 1 AMPULE: .5; 3 SOLUTION RESPIRATORY (INHALATION) at 22:56

## 2020-01-01 RX ADMIN — LEVOTHYROXINE SODIUM 137 MCG: 25 TABLET ORAL at 07:50

## 2020-01-01 RX ADMIN — PANTOPRAZOLE SODIUM 40 MG: 40 INJECTION, POWDER, FOR SOLUTION INTRAVENOUS at 07:51

## 2020-01-01 RX ADMIN — CHLORHEXIDINE GLUCONATE 0.12% ORAL RINSE 15 ML: 1.2 LIQUID ORAL at 07:50

## 2020-01-01 RX ADMIN — Medication 10 ML: at 07:51

## 2020-01-01 RX ADMIN — VANCOMYCIN HYDROCHLORIDE 1000 MG: 1 INJECTION, POWDER, LYOPHILIZED, FOR SOLUTION INTRAVENOUS at 00:56

## 2020-01-01 RX ADMIN — PROPOFOL 35 MCG/KG/MIN: 10 INJECTION, EMULSION INTRAVENOUS at 16:53

## 2020-01-01 RX ADMIN — CEFEPIME 2 G: 2 INJECTION, POWDER, FOR SOLUTION INTRAVENOUS at 20:16

## 2020-01-01 RX ADMIN — METHYLPREDNISOLONE SODIUM SUCCINATE 40 MG: 40 INJECTION, POWDER, FOR SOLUTION INTRAMUSCULAR; INTRAVENOUS at 20:16

## 2020-01-01 RX ADMIN — Medication 10 ML: at 20:17

## 2020-01-01 RX ADMIN — FUROSEMIDE 20 MG: 10 INJECTION, SOLUTION INTRAMUSCULAR; INTRAVENOUS at 09:25

## 2020-01-01 RX ADMIN — VANCOMYCIN HYDROCHLORIDE 1000 MG: 1 INJECTION, POWDER, LYOPHILIZED, FOR SOLUTION INTRAVENOUS at 14:01

## 2020-01-01 RX ADMIN — FENTANYL CITRATE 50 MCG/HR: 0.05 INJECTION, SOLUTION INTRAMUSCULAR; INTRAVENOUS at 02:11

## 2020-01-01 RX ADMIN — IPRATROPIUM BROMIDE AND ALBUTEROL SULFATE 1 AMPULE: .5; 3 SOLUTION RESPIRATORY (INHALATION) at 14:48

## 2020-01-01 RX ADMIN — PROPOFOL 35 MCG/KG/MIN: 10 INJECTION, EMULSION INTRAVENOUS at 07:41

## 2020-01-01 RX ADMIN — MUPIROCIN: 20 OINTMENT TOPICAL at 20:16

## 2020-01-01 RX ADMIN — CEFEPIME 2 G: 2 INJECTION, POWDER, FOR SOLUTION INTRAVENOUS at 05:53

## 2020-01-01 ASSESSMENT — PULMONARY FUNCTION TESTS
PIF_VALUE: 26
PIF_VALUE: 31
PIF_VALUE: 36
PIF_VALUE: 25
PIF_VALUE: 29
PIF_VALUE: 34
PIF_VALUE: 27
PIF_VALUE: 25
PIF_VALUE: 30
PIF_VALUE: 32
PIF_VALUE: 31
PIF_VALUE: 26
PIF_VALUE: 37
PIF_VALUE: 29
PIF_VALUE: 27
PIF_VALUE: 13
PIF_VALUE: 24
PIF_VALUE: 26
PIF_VALUE: 21
PIF_VALUE: 22
PIF_VALUE: 28
PIF_VALUE: 28
PIF_VALUE: 35
PIF_VALUE: 15
PIF_VALUE: 26
PIF_VALUE: 26

## 2020-01-01 NOTE — PROGRESS NOTES
Pt alarming ventilator. HR elevated. Pt c/o SOB- RT called to bedside. Pt switched back to [facility] [place] & sedation resumed.   Victor Manuel Vasquez RN, BSN

## 2020-01-01 NOTE — PROGRESS NOTES
Pt resting comfortably. VSS. OCTAVIO PICC WNL. Baugh in place for strict I/o. Lateral rotation therapy on on bed. Pt appears comfortable.   Fanicolle Tavarez RN, BSN

## 2020-01-01 NOTE — PROGRESS NOTES
noted  Mucous Membranes:  Pink , anicteric  Neck: No JVD, no carotid bruit, no thyromegaly  Chest: diminished in bases, no wheeze  Cardiovascular:  RRR S1S2 heard, no murmurs or gallops  Abdomen:  Soft, undistended, non tender, no organomegaly, BS present  Extremities:no edema  Distal pulses well felt  Neurological : sedated    Labs:   Recent Labs     12/30/19  0530 12/31/19  0627 01/01/20  0535   WBC 11.6* 16.4* 16.2*   HGB 12.4 11.6* 11.0*   HCT 37.2 35.4* 33.1*    231 203     Recent Labs     12/30/19  0530 12/31/19  0627 01/01/20  0535    134* 133*   K 4.3 4.2 4.5    96* 93*   CO2 31 32 36*   BUN 20 29* 27*   CREATININE <0.5* <0.5* <0.5*   CALCIUM 8.4 8.5 8.7     No results for input(s): AST, ALT, BILIDIR, BILITOT, ALKPHOS in the last 72 hours. No results for input(s): INR in the last 72 hours. No results for input(s): [place] in the last 72 hours. Urinalysis:    No results found for: Mark Michael, BACTERIA, RBCUA, BLOODU, SPECGRAV, [address] [place] [address]    Radiology:  XR CHEST PORTABLE   Final Result   Stable life support device positioning. No focal airspace disease. XR CHEST PORTABLE   Final Result   Supportive tubing is stable. Remain clear. Removed lungs remain clear. XR CHEST PORTABLE   Final Result   Stable life support device positioning. Clear lungs. XR CHEST PORTABLE   Final Result   Stable life support device positioning. Blunted costophrenic sulci may represent pleural reflections as opposed to   small effusions given hyperinflation. No focal airspace disease. XR CHEST PORTABLE   Final Result   Stable appearance of the chest with no acute finding. XR CHEST PORTABLE   Final Result   Interval placement of right-sided PICC line. The lungs remain clear. IR PICC WO SQ PORT/PUMP > 5 YEARS   Final Result      XR CHEST PORTABLE   Final Result   1. Endotracheal tube terminating 7.5 cm the alley.          XR CHEST cont care in ICU   weaning per Ember Samano MD

## 2020-01-01 NOTE — PROGRESS NOTES
01/01/20 1851   Vent Information   $Ventilation $Subsequent Day   Skin Assessment Clean, dry, & intact   Vent Type 840   Vent Mode AC/VC   Vt Ordered 450 mL   Rate Set 16 bmp   Peak Flow 70 L/min   Pressure Support 0 cmH20   FiO2  35 %   Sensitivity 2   PEEP/CPAP 5   I Time/ I Time % 0 s   Cuff Pressure (cm H2O) 30 cm H2O   Humidification Source Heated wire   Humidification Temp 37   Humidification Temp Measured 37   Circuit Condensation Drained   Vent Patient Data   High Peep/I Pressure 0   Peak Inspiratory Pressure 35 cmH2O   Mean Airway Pressure 11 cmH20   Rate Measured 15 br/min   Vt Exhaled 492 mL   Minute Volume 7.59 Liters   I:E Ratio 1:4.40   Plateau Pressure 21 [id number]   Static Compliance 31 mL/cmH2O   Dynamic Compliance 16 mL/cmH2O   Cough/Sputum   Sputum How Obtained Suctioned;Endotracheal   Sputum Amount Small   Sputum Color Cloudy; Yellow   Tenacity Thick   Spontaneous Breathing Trial (SBT) RT Doc   Pulse 52   SpO2 99 %   Breath Sounds   Right Upper Lobe Diminished   Right Middle Lobe Diminished   Right Lower Lobe Diminished   Left Upper Lobe End Expiratory Wheezes   Left Lower Lobe Diminished   Additional Respiratory  Assessments   Resp 16   Position Semi-Woods's   Alarm Settings   High Pressure Alarm 50 cmH2O   Low Minute Volume Alarm 5 L/min   High Respiratory Rate 35 br/min   Patient Observation   Observations ETT 7.5 / 23@ Lip   ETT (adult)   No Placement Date or Time found.    Tube Size: 7.5 mm  Location: Oral  Secured at: 23 cm   Secured at 23 cm   Measured From Lips   ET Placement Right   Secured By Commercial tube mccarthy

## 2020-01-01 NOTE — PROGRESS NOTES
AM assessment completed. AM labs reviewed. Pt currently on SBT- awake & following commands. VSS. Pt in bilateral soft wrist restraints for safety. Baugh in place for strict I/o. AM meds administered via OG. TF residuals 100's- returned to patient. No new orders at present time. Awaiting care rounds.    Miriam Martinez RN, BSN

## 2020-01-01 NOTE — PROGRESS NOTES
Pt resting quietly and no further changes noted in exam. Vitals and SpO2 stable. All lines and monitoring devices remain in place. Pt repositioned in bed. Continue current plan of care.  El Joe RN

## 2020-01-01 NOTE — PROGRESS NOTES
12/31/19 1922   Vent Information   $Ventilation $Subsequent Day   Skin Assessment Clean, dry, & intact   Vent Type 840   Vent Mode AC/VC   Vt Ordered 450 mL   Rate Set 16 bmp   Peak Flow 70 L/min   Pressure Support 0 cmH20   FiO2  35 %   Sensitivity 2   PEEP/CPAP 5   I Time/ I Time % 0 s   Cuff Pressure (cm H2O) 28 cm H2O   Humidification Source Heated wire   Humidification Temp 37   Humidification Temp Measured 36.9   Circuit Condensation Drained   Vent Patient Data   High Peep/I Pressure 0   Peak Inspiratory Pressure 35 cmH2O   Mean Airway Pressure 10 cmH20   Rate Measured 17 br/min   Vt Exhaled 509 mL   Minute Volume 8.03 Liters   I:E Ratio 1:4.40   Plateau Pressure 19 [id number]   Static Compliance 36 mL/cmH2O   Dynamic Compliance 17 mL/cmH2O   Cough/Sputum   Sputum How Obtained Endotracheal;Suctioned   Sputum Amount Moderate   Sputum Color Yellow   Tenacity Thick   Spontaneous Breathing Trial (SBT) RT Doc   Pulse 69   SpO2 97 %   Breath Sounds   Right Upper Lobe Expiratory Wheezes   Right Middle Lobe Diminished   Right Lower Lobe Expiratory Wheezes   Left Upper Lobe Expiratory Wheezes   Left Lower Lobe Diminished   Additional Respiratory  Assessments   Resp 17   Position Semi-Woods's   Alarm Settings   High Pressure Alarm 50 cmH2O   Low Minute Volume Alarm 5 L/min   High Respiratory Rate 35 br/min   Patient Observation   Observations ETT 7.5 / 23 @ Lip   ETT (adult)   No Placement Date or Time found.    Tube Size: 7.5 mm  Location: Oral  Secured at: 23 cm   Secured at 23 cm   Measured From Lips   ET Placement Right   Secured By Commercial tube mccarthy   Site Condition Dry

## 2020-01-02 ENCOUNTER — APPOINTMENT (OUTPATIENT)
Dept: GENERAL RADIOLOGY | Age: 60
DRG: 207 | End: 2020-01-02
Payer: COMMERCIAL

## 2020-01-02 LAB
ANION GAP SERPL CALCULATED.3IONS-SCNC: 6 MMOL/L (ref 3–16)
BASE EXCESS ARTERIAL: 11.8 MMOL/L (ref -3–3)
BASE EXCESS ARTERIAL: 8.9 MMOL/L (ref -3–3)
BASOPHILS ABSOLUTE: 0.1 K/UL (ref 0–0.2)
BASOPHILS RELATIVE PERCENT: 0.4 %
BUN BLDV-MCNC: 20 MG/DL (ref 7–20)
CALCIUM SERPL-MCNC: 8.7 MG/DL (ref 8.3–10.6)
CARBOXYHEMOGLOBIN ARTERIAL: 0.2 % (ref 0–1.5)
CARBOXYHEMOGLOBIN ARTERIAL: 0.5 % (ref 0–1.5)
CHLORIDE BLD-SCNC: 95 MMOL/L (ref 99–110)
CO2: 36 MMOL/L (ref 21–32)
CREAT SERPL-MCNC: <0.5 MG/DL (ref 0.6–1.2)
CULTURE, RESPIRATORY: ABNORMAL
CULTURE, RESPIRATORY: ABNORMAL
EOSINOPHILS ABSOLUTE: 0 K/UL (ref 0–0.6)
EOSINOPHILS RELATIVE PERCENT: 0.2 %
GFR AFRICAN AMERICAN: >60
GFR NON-AFRICAN AMERICAN: >60
GLUCOSE BLD-MCNC: 112 MG/DL (ref 70–99)
GLUCOSE BLD-MCNC: 128 MG/DL (ref 70–99)
GLUCOSE BLD-MCNC: 132 MG/DL (ref 70–99)
GLUCOSE BLD-MCNC: 90 MG/DL (ref 70–99)
GRAM STAIN RESULT: ABNORMAL
HCO3 ARTERIAL: 36.3 MMOL/L (ref 21–29)
HCO3 ARTERIAL: 37.5 MMOL/L (ref 21–29)
HCT VFR BLD CALC: 33.5 % (ref 36–48)
HEMOGLOBIN, ART, EXTENDED: 11.6 G/DL (ref 12–16)
HEMOGLOBIN, ART, EXTENDED: 13 G/DL (ref 12–16)
HEMOGLOBIN: 11.1 G/DL (ref 12–16)
LYMPHOCYTES ABSOLUTE: 0.8 K/UL (ref 1–5.1)
LYMPHOCYTES RELATIVE PERCENT: 4.8 %
MCH RBC QN AUTO: 31.5 PG (ref 26–34)
MCHC RBC AUTO-ENTMCNC: 33 G/DL (ref 31–36)
MCV RBC AUTO: 95.3 FL (ref 80–100)
METHEMOGLOBIN ARTERIAL: 0.3 %
METHEMOGLOBIN ARTERIAL: 0.4 %
MONOCYTES ABSOLUTE: 1.1 K/UL (ref 0–1.3)
MONOCYTES RELATIVE PERCENT: 6.5 %
NEUTROPHILS ABSOLUTE: 14.2 K/UL (ref 1.7–7.7)
NEUTROPHILS RELATIVE PERCENT: 88.1 %
O2 CONTENT ARTERIAL: 16 ML/DL
O2 CONTENT ARTERIAL: 17 ML/DL
O2 SAT, ARTERIAL: 94.6 %
O2 SAT, ARTERIAL: 97.6 %
O2 THERAPY: ABNORMAL
O2 THERAPY: ABNORMAL
ORGANISM: ABNORMAL
PCO2 ARTERIAL: 54.5 MMHG (ref 35–45)
PCO2 ARTERIAL: 62.9 MMHG (ref 35–45)
PDW BLD-RTO: 13.9 % (ref 12.4–15.4)
PERFORMED ON: ABNORMAL
PERFORMED ON: ABNORMAL
PERFORMED ON: NORMAL
PH ARTERIAL: 7.38 (ref 7.35–7.45)
PH ARTERIAL: 7.46 (ref 7.35–7.45)
PLATELET # BLD: 211 K/UL (ref 135–450)
PMV BLD AUTO: 8.4 FL (ref 5–10.5)
PO2 ARTERIAL: 76 MMHG (ref 75–108)
PO2 ARTERIAL: 98.3 MMHG (ref 75–108)
POTASSIUM SERPL-SCNC: 4.6 MMOL/L (ref 3.5–5.1)
RBC # BLD: 3.51 M/UL (ref 4–5.2)
SODIUM BLD-SCNC: 137 MMOL/L (ref 136–145)
TCO2 ARTERIAL: 38.2 MMOL/L
TCO2 ARTERIAL: 39.2 MMOL/L
VANCOMYCIN TROUGH: 9.6 UG/ML (ref 10–20)
WBC # BLD: 16.2 K/UL (ref 4–11)

## 2020-01-02 PROCEDURE — 6370000000 HC RX 637 (ALT 250 FOR IP): Performed by: INTERNAL MEDICINE

## 2020-01-02 PROCEDURE — 94761 N-INVAS EAR/PLS OXIMETRY MLT: CPT

## 2020-01-02 PROCEDURE — C9113 INJ PANTOPRAZOLE SODIUM, VIA: HCPCS | Performed by: INTERNAL MEDICINE

## 2020-01-02 PROCEDURE — 94750 HC PULMONARY COMPLIANCE STUDY: CPT

## 2020-01-02 PROCEDURE — 71045 X-RAY EXAM CHEST 1 VIEW: CPT

## 2020-01-02 PROCEDURE — 2500000003 HC RX 250 WO HCPCS: Performed by: INTERNAL MEDICINE

## 2020-01-02 PROCEDURE — 94003 VENT MGMT INPAT SUBQ DAY: CPT

## 2020-01-02 PROCEDURE — 94640 AIRWAY INHALATION TREATMENT: CPT

## 2020-01-02 PROCEDURE — 2580000003 HC RX 258: Performed by: INTERNAL MEDICINE

## 2020-01-02 PROCEDURE — 82803 BLOOD GASES ANY COMBINATION: CPT

## 2020-01-02 PROCEDURE — 99291 CRITICAL CARE FIRST HOUR: CPT | Performed by: INTERNAL MEDICINE

## 2020-01-02 PROCEDURE — 80048 BASIC METABOLIC PNL TOTAL CA: CPT

## 2020-01-02 PROCEDURE — 99232 SBSQ HOSP IP/OBS MODERATE 35: CPT | Performed by: INTERNAL MEDICINE

## 2020-01-02 PROCEDURE — 2700000000 HC OXYGEN THERAPY PER DAY

## 2020-01-02 PROCEDURE — 6360000002 HC RX W HCPCS: Performed by: INTERNAL MEDICINE

## 2020-01-02 PROCEDURE — 36600 WITHDRAWAL OF ARTERIAL BLOOD: CPT

## 2020-01-02 PROCEDURE — 85025 COMPLETE CBC W/AUTO DIFF WBC: CPT

## 2020-01-02 PROCEDURE — 2000000000 HC ICU R&B

## 2020-01-02 PROCEDURE — 80202 ASSAY OF VANCOMYCIN: CPT

## 2020-01-02 RX ORDER — FLUTICASONE PROPIONATE 50 MCG
2 SPRAY, SUSPENSION (ML) NASAL DAILY
Status: DISCONTINUED | OUTPATIENT
Start: 2020-01-02 | End: 2020-01-06 | Stop reason: HOSPADM

## 2020-01-02 RX ADMIN — DEXMEDETOMIDINE 1.2 MCG/KG/HR: 100 INJECTION, SOLUTION, CONCENTRATE INTRAVENOUS at 00:07

## 2020-01-02 RX ADMIN — METHYLPREDNISOLONE SODIUM SUCCINATE 40 MG: 40 INJECTION, POWDER, FOR SOLUTION INTRAMUSCULAR; INTRAVENOUS at 21:32

## 2020-01-02 RX ADMIN — Medication 10 ML: at 21:33

## 2020-01-02 RX ADMIN — Medication 10 ML: at 07:46

## 2020-01-02 RX ADMIN — IPRATROPIUM BROMIDE AND ALBUTEROL SULFATE 1 AMPULE: .5; 3 SOLUTION RESPIRATORY (INHALATION) at 12:01

## 2020-01-02 RX ADMIN — IPRATROPIUM BROMIDE AND ALBUTEROL SULFATE 1 AMPULE: .5; 3 SOLUTION RESPIRATORY (INHALATION) at 02:56

## 2020-01-02 RX ADMIN — Medication 10 ML: at 07:45

## 2020-01-02 RX ADMIN — ASPIRIN 81 MG: 81 TABLET, COATED ORAL at 07:45

## 2020-01-02 RX ADMIN — CEFEPIME 2 G: 2 INJECTION, POWDER, FOR SOLUTION INTRAVENOUS at 04:53

## 2020-01-02 RX ADMIN — DEXMEDETOMIDINE 1.2 MCG/KG/HR: 100 INJECTION, SOLUTION, CONCENTRATE INTRAVENOUS at 04:53

## 2020-01-02 RX ADMIN — PROPOFOL 35 MCG/KG/MIN: 10 INJECTION, EMULSION INTRAVENOUS at 04:30

## 2020-01-02 RX ADMIN — VANCOMYCIN HYDROCHLORIDE 1000 MG: 1 INJECTION, POWDER, LYOPHILIZED, FOR SOLUTION INTRAVENOUS at 01:38

## 2020-01-02 RX ADMIN — CHLORHEXIDINE GLUCONATE 0.12% ORAL RINSE 15 ML: 1.2 LIQUID ORAL at 07:45

## 2020-01-02 RX ADMIN — DEXMEDETOMIDINE 1.2 MCG/KG/HR: 100 INJECTION, SOLUTION, CONCENTRATE INTRAVENOUS at 12:24

## 2020-01-02 RX ADMIN — LEVOTHYROXINE SODIUM 137 MCG: 25 TABLET ORAL at 06:46

## 2020-01-02 RX ADMIN — CEFEPIME 2 G: 2 INJECTION, POWDER, FOR SOLUTION INTRAVENOUS at 13:43

## 2020-01-02 RX ADMIN — DEXMEDETOMIDINE 1.2 MCG/KG/HR: 100 INJECTION, SOLUTION, CONCENTRATE INTRAVENOUS at 17:15

## 2020-01-02 RX ADMIN — FENTANYL CITRATE 50 MCG/HR: 0.05 INJECTION, SOLUTION INTRAMUSCULAR; INTRAVENOUS at 00:07

## 2020-01-02 RX ADMIN — POLYETHYLENE GLYCOL (3350) 17 G: 17 POWDER, FOR SOLUTION ORAL at 07:45

## 2020-01-02 RX ADMIN — MUPIROCIN: 20 OINTMENT TOPICAL at 07:45

## 2020-01-02 RX ADMIN — VANCOMYCIN HYDROCHLORIDE 1250 MG: 10 INJECTION, POWDER, LYOPHILIZED, FOR SOLUTION INTRAVENOUS at 12:25

## 2020-01-02 RX ADMIN — PANTOPRAZOLE SODIUM 40 MG: 40 INJECTION, POWDER, FOR SOLUTION INTRAVENOUS at 07:45

## 2020-01-02 RX ADMIN — IPRATROPIUM BROMIDE AND ALBUTEROL SULFATE 1 AMPULE: .5; 3 SOLUTION RESPIRATORY (INHALATION) at 07:32

## 2020-01-02 RX ADMIN — CEFEPIME 2 G: 2 INJECTION, POWDER, FOR SOLUTION INTRAVENOUS at 21:32

## 2020-01-02 RX ADMIN — METHYLPREDNISOLONE SODIUM SUCCINATE 40 MG: 40 INJECTION, POWDER, FOR SOLUTION INTRAMUSCULAR; INTRAVENOUS at 08:21

## 2020-01-02 RX ADMIN — Medication 10 ML: at 21:32

## 2020-01-02 RX ADMIN — IPRATROPIUM BROMIDE AND ALBUTEROL SULFATE 1 AMPULE: .5; 3 SOLUTION RESPIRATORY (INHALATION) at 23:40

## 2020-01-02 RX ADMIN — ENOXAPARIN SODIUM 40 MG: 40 INJECTION SUBCUTANEOUS at 07:46

## 2020-01-02 RX ADMIN — FLUTICASONE PROPIONATE 2 SPRAY: 50 SPRAY, METERED NASAL at 17:14

## 2020-01-02 RX ADMIN — IPRATROPIUM BROMIDE AND ALBUTEROL SULFATE 1 AMPULE: .5; 3 SOLUTION RESPIRATORY (INHALATION) at 19:19

## 2020-01-02 RX ADMIN — IPRATROPIUM BROMIDE AND ALBUTEROL SULFATE 1 AMPULE: .5; 3 SOLUTION RESPIRATORY (INHALATION) at 16:17

## 2020-01-02 ASSESSMENT — PULMONARY FUNCTION TESTS
PIF_VALUE: 12
PIF_VALUE: 31
PIF_VALUE: 21
PIF_VALUE: 27
PIF_VALUE: 36
PIF_VALUE: 27
PIF_VALUE: 32
PIF_VALUE: 30
PIF_VALUE: 29
PIF_VALUE: 10
PIF_VALUE: 31
PIF_VALUE: 31
PIF_VALUE: 27

## 2020-01-02 ASSESSMENT — PAIN SCALES - GENERAL: PAINLEVEL_OUTOF10: 0

## 2020-01-02 NOTE — PROGRESS NOTES
Care rounds completed with Dr Mendel Mcknight and multidisciplinary team.  Reviewed labs, meds, VS (temp/HR/RR), I/O's, assessment, & plan of care for today. See progress note & new orders for details. Pt remains on SBT and appears relaxed and respirations are easy.  RT has drawn ABG per Dr Bond Nearing order  Alis Woodruff RN

## 2020-01-02 NOTE — CARE COORDINATION
INTERDISCIPLINARY PLAN OF CARE CONFERENCE    Date/Time: 1/2/2020 1:17 PM  Completed by: Constance Steiner Case Management      Patient Name:  oJcelyn Westbrook  YOB: 1960  Admitting Diagnosis: COPD exacerbation ([facility] [address] [J44.1]     Admit Date/Time:  12/25/2019  9:47 AM    Chart reviewed. Interdisciplinary team met to discuss patient progress and discharge plans. Disciplines included Case Management, Nursing, and Dietitian. Current Status:Inpt status    PT/OT recommendation:n/a    Anticipated Discharge Date: TBD  Expected D/C Disposition:  Home  Confirmed plan with patient and/or family Yes  Discharge Plan Comments: Extubated. On bipap. Lives with spouse and planned to return home. + Barbara 7. PT/OT when able.  +CM following     Home O2 in place on admit: Yes  Pt informed of need to bring portable home O2 tank on day of discharge for nursing to connect prior to leaving:  Reinstate  Verbalized agreement/Understanding:  Reinstate

## 2020-01-02 NOTE — PROGRESS NOTES
3    Sputum  Sputum Color: Creamy, Yellow, Tenacity: Thick, Sputum How Obtained: Spontaneous cough  Cough: Weak, productive = 2    Vital Signs   /64   Pulse 78   Temp 97.8 °F (36.6 °C) (Axillary)   Resp 18   Ht 5' 4\" (1.626 m)   Wt 136 lb 3.9 oz (61.8 kg)   LMP  (LMP Unknown)   SpO2 95%   BMI 23.39 kg/m²   SPO2 (COPD values may differ): Less than 86% on room air or greater than 92% on FiO2 greater than 50% = 4    Peak Flow (asthma only): not applicable = 0    RSI: 47-09 = Q4WA (every four hours while awake) and Q4hrs PRN        Plan       Goals: medication delivery, mobilize retained secretions, volume expansion and improve oxygenation    Patient/caregiver was educated on the proper method of use for Respiratory Care Devices:  Yes      Level of patient/caregiver understanding able to:   ? Verbalize understanding   ? Demonstrate understanding       ? Teach back        ? Needs reinforcement       ? No available caregiver               ? Other:     Response to education:  Very Good     Is patient being placed on Home Treatment Regimen? No     Does the patient have everything they need prior to discharge? NA     Comments: chart reviewed and patient assessed    Plan of Care: remain as ordered, patient extubated today    Electronically signed by Alexandra Ross RCP on 1/2/2020 at 5:27 PM    Respiratory Protocol Guidelines     1. Assessment and treatment by Respiratory Therapy will be initiated for medication and therapeutic interventions upon initiation of aerosolized medication. 2. Physician will be contacted for respiratory rate (RR) greater than 35 breaths per minute. Therapy will be held for heart rate (HR) greater than 140 beats per minute, pending direction from physician. 3. Bronchodilators will be administered via Metered Dose Inhaler (MDI) with spacer when the following criteria are met:  a.  Alert and cooperative     b. HR < 140 bpm  c. RR < 30 bpm                d. Can demonstrate a 2-3 second inspiratory hold  4. Bronchodilators will be administered via Hand Held Nebulizer [facility]) to patients when ANY of the following criteria are met  a. Incognizant or uncooperative          b. Patients treated with HHN at Home        c. Unable to demonstrate proper use of MDI with spacer     d. RR > 30 bpm   5. Bronchodilators will be delivered via Metered Dose Inhaler (MDI), HHN, Aerogen to intubated patients on mechanical ventilation. 6. Inhalation medication orders will be delivered and/or substituted as outlined below. Aerosolized Medications Ordering and Administration Guidelines:    1. All Medications will be ordered by a physician, and their frequency and/or modality will be adjusted as defined by the patients Respiratory Severity Index (RSI) score. 2. If the patient does not have documented COPD, consider discontinuing anticholinergics when RSI is less than 9.  3. If the bronchospasm worsens (increased RSI), then the bronchodilator frequency can be increased to a maximum of every 4 hours. If greater than every 4 hours is required, the physician will be contacted. 4. If the bronchospasm improves, the frequency of the bronchodilator can be decreased, based on the patient's RSI, but not less than home treatment regimen frequency. 5. Bronchodilator(s) will be discontinued if patient has a RSI less than 9 and has received no scheduled or as needed treatment for 72  Hrs. Patients Ordered on a Mucolytic Agent:    1. Must always be administered with a bronchodilator. 2. Discontinue if patient experiences worsened bronchospasm, or secretions have lessened to the point that the patient is able to clear them with a cough. Anti-inflammatory and Combination Medications:    1. If the patient lacks prior history of lung disease, is not using inhaled anti-inflammatory medication at home, and lacks wheezing by examination or by history for at least 24 hours, contact physician for possible discontinuation.

## 2020-01-02 NOTE — PROGRESS NOTES
01/02/20 0257   Vent Information   Vent Type 840   Vent Mode AC/VC   Vt Ordered 450 mL   Rate Set 16 bmp   Peak Flow 70 L/min   Pressure Support 0 cmH20   FiO2  35 %   Sensitivity 2   PEEP/CPAP 5   I Time/ I Time % 0 s   Vent Patient Data   High Peep/I Pressure 0   Peak Inspiratory Pressure 29 cmH2O   Mean Airway Pressure 9.5 cmH20   Rate Measured 16 br/min   Vt Exhaled 518 mL   Minute Volume 8.02 Liters   I:E Ratio 1:4.40   Cough/Sputum   Sputum How Obtained Suctioned;Endotracheal   Sputum Amount None   Spontaneous Breathing Trial (SBT) RT Doc   Pulse 63   SpO2 97 %   Breath Sounds   Right Upper Lobe End Expiratory Wheezes   Right Middle Lobe Diminished   Right Lower Lobe Diminished   Left Upper Lobe End Expiratory Wheezes   Left Lower Lobe Diminished   Additional Respiratory  Assessments   Resp 16   Position Semi-Woods's   Alarm Settings   High Pressure Alarm 50 cmH2O   Low Minute Volume Alarm 5 L/min   High Respiratory Rate 35 br/min

## 2020-01-02 NOTE — PROGRESS NOTES
Pulmonary & Critical Care Medicine ICU Progress Note    CC: Acute respiratory failure with hypoxemia, COPD, intubated in ICU after failing BiPAP    Events of Last 24 hours: Failed SBT with tachypnea looks much better today on SBT    Invasive Lines: IV: 2019 PICC right brachial    MV: 2019  Vent Mode: AC/VC Rate Set: 16 bmp/Vt Ordered: 450 mL/ /FiO2 : 35 %  Recent Labs     20  0545 20  0725   PHART 7.391 7.456*   ZOH4NRO 61.1* 54.5*   PO2ART 79.3 98.3       IV:   dextrose      fentaNYL (SUBLIMAZE) infusion 50 mcg/hr (20 000)    propofol 35 mcg/kg/min (20)    dexmedetomidine (PRECEDEX) IV infusion 1.2 mcg/kg/hr (20 0453)       Vitals:  Blood pressure 118/68, pulse 59, temperature 98.4 °F (36.9 °C), temperature source Axillary, resp. rate 16, height 5' 4\" (1.626 m), weight 136 lb 3.9 oz (61.8 kg), SpO2 97 %. on vent 35%  Temp  Av.4 °F (36.9 °C)  Min: 97.8 °F (36.6 °C)  Max: 98.6 °F (37 °C)    Intake/Output Summary (Last 24 hours) at 2020 0817  Last data filed at 2020 0738  Gross per 24 hour   Intake 2418 ml   Output 4405 ml   Net -1987 ml     EXAM:  General: intubated, NAD  Eyes: PERRL. No sclera icterus. No conjunctival injection. ENT: No discharge. Pharynx with ETT. Neck: Trachea midline. Normal thyroid. Resp: No accessory muscle use. No crackles. Diffuse wheezing. No rhonchi. No dullness on percussion. CV: Regular rate. Regular rhythm. No mumur or rub. All 4 extremity 1+ edema. GI: Non-tender. Mildly distended abdomen. No masses. No organomegaly. Normal bowel sounds. No hernia. Skin: Warm and dry. No nodule on exposed extremities. No rash on exposed extremities. Lymph: No cervical LAD. No supraclavicular LAD. M/S: No cyanosis. No joint deformity. No clubbing. Neuro: Awake, following commands all 4 extremities.  Patellar reflexes are symmetric  Psych: Unable to obtain because the patient is non-communicative     Scheduled Meds:   vancomycin  1,250 mg Intravenous Q12H    ipratropium-albuterol  1 ampule Inhalation Q4H    cefepime  2 g Intravenous Q8H    polyethylene glycol  17 g Oral Daily    insulin lispro  0-6 Units Subcutaneous TID     enoxaparin  40 mg Subcutaneous Daily    pantoprazole  40 mg Intravenous Daily    methylPREDNISolone  40 mg Intravenous Q12H    aspirin  81 mg Oral Daily    chlorhexidine  15 mL Mouth/Throat BID    lidocaine 1 % injection  5 mL Intradermal Once    sodium chloride flush  10 mL Intravenous 2 times per day    atorvastatin  40 mg Oral Nightly    levothyroxine  137 mcg Oral Daily    montelukast  10 mg Oral Nightly    sodium chloride flush  10 mL Intravenous 2 times per day    mupirocin   Nasal BID    nicotine  1 patch Transdermal Daily     PRN Meds:  glucose, dextrose, glucagon (rDNA), dextrose, nitroGLYCERIN, midazolam, fentanNYL, sodium chloride flush, perflutren lipid microspheres, sodium chloride flush, magnesium hydroxide, ondansetron, acetaminophen    Results:  CBC:   Recent Labs     12/31/19  0627 01/01/20  0535 01/02/20  0547   WBC 16.4* 16.2* 16.2*   HGB 11.6* 11.0* 11.1*   HCT 35.4* 33.1* 33.5*   MCV 95.7 95.3 95.3    203 211     BMP:   Recent Labs     12/31/19  0627 01/01/20  0535 01/02/20  0547   * 133* 137   K 4.2 4.5 4.6   CL 96* 93* 95*   CO2 32 36* 36*   BUN 29* 27* 20   CREATININE <0.5* <0.5* <0.5*     LIVER PROFILE: No results for input(s): AST, ALT, LIPASE, BILIDIR, BILITOT, ALKPHOS in the last 72 hours. Invalid input(s):   AMYLASE,  ALB    Cultures:  12/25/2019 blood no growth  12/25/2019 blood micrococcus  12/27/2019 tracheal aspirate NRF  12/31/2019 tracheal aspirate Serratia, sensitivities pending    Films:  CXR was reviewed by me and it showed   1/2/2019 RLL infiltrate, ETT slightly high    ASSESSMENT:  · Acute on chronic hypoxemic and hypercapnic respiratory failure, normally on 2 L home oxygen  · COPD with acute exacerbation  · HCAP/VAP, new on 12/31/19  · Chest pain  · Ongoing tobacco abuse    PLAN:  Mechanical ventilation as per my orders. Based upon her SBT and clinical improvement, as well as severe underlying COPD and worsened outcomes with prolonged mechanical ventilation, I elected to extubate with plan to initiate BiPAP at extubation. Patient has ongoing life-threatening respiratory failure. Head of bed 30 degrees or higher at all times  IV solumedrol twice daily  Inhaled bronchodilators   Cefepime and vancomycin day #3 ;completed 7 days doxycycline  Cardiology has seen and signed off; recommended Homa Conte after resolution of acute illness  Tobacco cessation  Prophylaxis: Bactroban, Lovenox    ADDENDUM:  After extubation, I was called to the bedside urgently to see the patient. She had a period of desaturation after extubation. She responded well to BiPAP. She was subsequently placed on 4 L and I saw her on 4 L. She was saturating well without increased work of breathing. Later this morning, she was placed back on BiPAP for intermittent oxygen desaturations. Total critical care time caring for this patient with life threatening, unstable organ failure, including direct patient contact, management of life support systems, review of data including imaging and labs, discussions with other team members and physicians is 45 minutes so far today, excluding procedures.

## 2020-01-02 NOTE — PROGRESS NOTES
Patient extubated to 3lpm nc desat at first to 79%, 100% nrb, 15 lpm hf, sp02 rebounded and is at 100% weaned down to 3lpm

## 2020-01-02 NOTE — PROGRESS NOTES
01/02/20 1207   NIV Type   Equipment Type V60   Mode Bilevel   Mask Type Full face mask   Mask Size Medium   Settings/Measurements   IPAP 16 cmH20   CPAP/EPAP 8 cmH2O   Rate Ordered 12   Resp 15   FiO2  40 %   Vt Exhaled 744 mL   Minute Volume 11.5 Liters   Mask Leak (lpm) 19 lpm   Comfort Level Fair   Using Accessory Muscles No   SpO2 98   Breath Sounds   Right Upper Lobe Expiratory Wheezes   Right Middle Lobe Diminished   Right Lower Lobe Expiratory Wheezes   Left Upper Lobe Diminished   Left Lower Lobe Diminished   Alarm Settings   Alarms On Y   Press Low Alarm 8 cmH2O   High Pressure Alarm 30 cmH2O   Resp Rate Low Alarm 12   High Respiratory Rate 40 br/min

## 2020-01-02 NOTE — PROGRESS NOTES
Dr Mendel cMknight updated on ABG's and see orders to extubate.  He would like precedex gtt to continue after extubation and use BiPAP PRN if needed Alis Woodruff RN

## 2020-01-02 NOTE — PROGRESS NOTES
Initial exam completed- See doc flowsheet for assessment findings. Pt resting quietly in bed with eyes closed and she opens eyes briefly to stimulation but does not follow commands. Precedex gtt at 1.2 mcg. Fentanyl gtt at 50 mcg and Diprivan at 35 mcg. All lines and monitoring devices are in place . Vitals and SpO2 stable. Call light is within easy reach. Plan of care and goals reviewed.  Damian Chicas RN

## 2020-01-02 NOTE — PROGRESS NOTES
terminating 7.5 cm the alley. XR CHEST PORTABLE   Final Result   COPD         XR CHEST PORTABLE    (Results Pending)           Assessment/Plan:    Active Hospital Problems    Diagnosis    HCAP (healthcare-associated pneumonia) [J18.9]    Chest pain on breathing [R07.1]    Mild protein-calorie malnutrition (HCC) [E44.1]    Acute exacerbation of chronic obstructive pulmonary disease (COPD) (HCC) [J44.1]    Elevated troponin [R79.89]    Acute on chronic respiratory failure with hypoxia and hypercapnia (HCC) [J96.21, J96.22]    COPD exacerbation (HCC) [J44.1]    Acute respiratory failure with hypoxia and hypercapnia (HCC) [J96.01, J96.02]    Tobacco abuse [Z72.0]       Acute on chronic hypoxic/hypercapnic respiratory failure  - patient presented with shortness of breath.  Also c/o chest pain  - workup consisted with COPD exacerbation  - admitted to ICU on BiPAP. worsened with dyspnea and hypercarbic acidosis,   - intubated and was on vent, now extubated to bipap    Pulmonology consultation obtained  - imaging with no acute infiltrates     COPD exacerbation  - IV solu-medrol to prednisone . Was On Doxy   - broadened abx as there is not improvement, currently on cefepime and vanc  - sputum with serratia   - Scheduled/PRN Duonebs, off vent today   - continue Singulair     Chest pain and abn EKG  - elevated troponins  - likely demand ischemia   - started on lovenox , BB and nitrates if BP tolerates  - started ASA and statins  - ECHO with normal EF and no RWMA , cards consulted, planned for stress test once extubated     SIRS  - due to above  - leukocytosis, tachypnea, tachycardia  - treat as above and monitor for improvement     Hypothyroidism  - home dose of synthroid 137 mcg      Tobacco Dependence  - Recommend cessation after extubation       DVT Prophylaxis: Lovenox  Diet: DIET TUBE FEED CONTINUOUS/CYCLIC NPO; Semi-elemental (Vital AF 1.2 );  Orogastric; Continuous; 10; 50; 20  Code Status: Full

## 2020-01-02 NOTE — PROGRESS NOTES
Shift assessment completed and documented. Pt sedated on the ventilator, tolerating well. Pt opens eyes and nods appropriately. Pt checked for incontinence and repositioned for comfort. No needs at this time. Daughter at bedside, all questions answered.  Daryle Proper RN

## 2020-01-02 NOTE — PLAN OF CARE
Problem: Falls - Risk of:  Goal: Will remain free from falls  Description  Will remain free from falls  Outcome: Ongoing  Goal: Absence of physical injury  Description  Absence of physical injury  Outcome: Ongoing     Problem: Risk for Impaired Skin Integrity  Goal: Tissue integrity - skin and mucous membranes  Description  Structural intactness and normal physiological function of skin and  mucous membranes. Outcome: Ongoing     Problem: Discharge Planning:  Goal: Discharged to appropriate level of care  Description  Discharged to appropriate level of care  Outcome: Ongoing     Problem:  Activity Intolerance:  Goal: Ability to tolerate increased activity will improve  Description  Ability to tolerate increased activity will improve  Outcome: Ongoing     Problem: Breathing Pattern - Ineffective:  Goal: Ability to achieve and maintain a regular respiratory rate will improve  Description  Ability to achieve and maintain a regular respiratory rate will improve  Outcome: Ongoing     Problem: Gas Exchange - Impaired:  Goal: Levels of oxygenation will improve  Description  Levels of oxygenation will improve  Outcome: Ongoing     Problem: Tobacco Use:  Goal: Inpatient tobacco use cessation counseling participation  Description  Inpatient tobacco use cessation counseling participation  Outcome: Ongoing     Problem: Pain:  Goal: Pain level will decrease  Description  Pain level will decrease  Outcome: Ongoing  Goal: Control of acute pain  Description  Control of acute pain  Outcome: Ongoing  Goal: Control of chronic pain  Description  Control of chronic pain  Outcome: Ongoing     Problem: Restraint Use - Nonviolent/Non-Self-Destructive Behavior:  Goal: Absence of restraint indications  Description  Absence of restraint indications  Outcome: Ongoing  Goal: Absence of restraint-related injury  Description  Absence of restraint-related injury  Outcome: Ongoing

## 2020-01-03 PROBLEM — E44.0 MODERATE PROTEIN-CALORIE MALNUTRITION (HCC): Chronic | Status: ACTIVE | Noted: 2018-12-31

## 2020-01-03 LAB
ANION GAP SERPL CALCULATED.3IONS-SCNC: 8 MMOL/L (ref 3–16)
BASOPHILS ABSOLUTE: 0 K/UL (ref 0–0.2)
BASOPHILS RELATIVE PERCENT: 0 %
BUN BLDV-MCNC: 21 MG/DL (ref 7–20)
CALCIUM SERPL-MCNC: 8.6 MG/DL (ref 8.3–10.6)
CHLORIDE BLD-SCNC: 99 MMOL/L (ref 99–110)
CO2: 34 MMOL/L (ref 21–32)
CREAT SERPL-MCNC: <0.5 MG/DL (ref 0.6–1.2)
EOSINOPHILS ABSOLUTE: 0 K/UL (ref 0–0.6)
EOSINOPHILS RELATIVE PERCENT: 0 %
GFR AFRICAN AMERICAN: >60
GFR NON-AFRICAN AMERICAN: >60
GLUCOSE BLD-MCNC: 125 MG/DL (ref 70–99)
GLUCOSE BLD-MCNC: 128 MG/DL (ref 70–99)
GLUCOSE BLD-MCNC: 129 MG/DL (ref 70–99)
GLUCOSE BLD-MCNC: 91 MG/DL (ref 70–99)
GLUCOSE BLD-MCNC: 95 MG/DL (ref 70–99)
HCT VFR BLD CALC: 34 % (ref 36–48)
HEMOGLOBIN: 11.3 G/DL (ref 12–16)
LYMPHOCYTES ABSOLUTE: 0.8 K/UL (ref 1–5.1)
LYMPHOCYTES RELATIVE PERCENT: 5 %
MCH RBC QN AUTO: 31.6 PG (ref 26–34)
MCHC RBC AUTO-ENTMCNC: 33.2 G/DL (ref 31–36)
MCV RBC AUTO: 95.1 FL (ref 80–100)
MONOCYTES ABSOLUTE: 0.8 K/UL (ref 0–1.3)
MONOCYTES RELATIVE PERCENT: 5 %
NEUTROPHILS ABSOLUTE: 15.2 K/UL (ref 1.7–7.7)
NEUTROPHILS RELATIVE PERCENT: 90 %
PDW BLD-RTO: 13.7 % (ref 12.4–15.4)
PERFORMED ON: ABNORMAL
PERFORMED ON: ABNORMAL
PERFORMED ON: NORMAL
PERFORMED ON: NORMAL
PLATELET # BLD: 254 K/UL (ref 135–450)
PMV BLD AUTO: 8.3 FL (ref 5–10.5)
POTASSIUM SERPL-SCNC: 4.1 MMOL/L (ref 3.5–5.1)
RBC # BLD: 3.58 M/UL (ref 4–5.2)
SLIDE REVIEW: ABNORMAL
SODIUM BLD-SCNC: 141 MMOL/L (ref 136–145)
WBC # BLD: 16.9 K/UL (ref 4–11)

## 2020-01-03 PROCEDURE — 92610 EVALUATE SWALLOWING FUNCTION: CPT

## 2020-01-03 PROCEDURE — 6370000000 HC RX 637 (ALT 250 FOR IP): Performed by: INTERNAL MEDICINE

## 2020-01-03 PROCEDURE — 94640 AIRWAY INHALATION TREATMENT: CPT

## 2020-01-03 PROCEDURE — 97530 THERAPEUTIC ACTIVITIES: CPT

## 2020-01-03 PROCEDURE — C9113 INJ PANTOPRAZOLE SODIUM, VIA: HCPCS | Performed by: INTERNAL MEDICINE

## 2020-01-03 PROCEDURE — 80048 BASIC METABOLIC PNL TOTAL CA: CPT

## 2020-01-03 PROCEDURE — 2580000003 HC RX 258: Performed by: INTERNAL MEDICINE

## 2020-01-03 PROCEDURE — 6360000002 HC RX W HCPCS: Performed by: INTERNAL MEDICINE

## 2020-01-03 PROCEDURE — 97162 PT EVAL MOD COMPLEX 30 MIN: CPT

## 2020-01-03 PROCEDURE — 2500000003 HC RX 250 WO HCPCS: Performed by: INTERNAL MEDICINE

## 2020-01-03 PROCEDURE — 97166 OT EVAL MOD COMPLEX 45 MIN: CPT

## 2020-01-03 PROCEDURE — 99233 SBSQ HOSP IP/OBS HIGH 50: CPT | Performed by: INTERNAL MEDICINE

## 2020-01-03 PROCEDURE — 99232 SBSQ HOSP IP/OBS MODERATE 35: CPT | Performed by: INTERNAL MEDICINE

## 2020-01-03 PROCEDURE — 92526 ORAL FUNCTION THERAPY: CPT

## 2020-01-03 PROCEDURE — 85025 COMPLETE CBC W/AUTO DIFF WBC: CPT

## 2020-01-03 PROCEDURE — 2000000000 HC ICU R&B

## 2020-01-03 RX ORDER — PROMETHAZINE HYDROCHLORIDE 25 MG/ML
6.25 INJECTION, SOLUTION INTRAMUSCULAR; INTRAVENOUS ONCE
Status: COMPLETED | OUTPATIENT
Start: 2020-01-03 | End: 2020-01-03

## 2020-01-03 RX ADMIN — MAGNESIUM HYDROXIDE 30 ML: 400 SUSPENSION ORAL at 14:10

## 2020-01-03 RX ADMIN — LEVOTHYROXINE SODIUM 137 MCG: 25 TABLET ORAL at 15:51

## 2020-01-03 RX ADMIN — MONTELUKAST SODIUM 10 MG: 10 TABLET, COATED ORAL at 22:00

## 2020-01-03 RX ADMIN — IPRATROPIUM BROMIDE AND ALBUTEROL SULFATE 1 AMPULE: .5; 3 SOLUTION RESPIRATORY (INHALATION) at 03:30

## 2020-01-03 RX ADMIN — PROMETHAZINE HYDROCHLORIDE 6.25 MG: 25 INJECTION INTRAMUSCULAR; INTRAVENOUS at 06:04

## 2020-01-03 RX ADMIN — ENOXAPARIN SODIUM 40 MG: 40 INJECTION SUBCUTANEOUS at 08:11

## 2020-01-03 RX ADMIN — ATORVASTATIN CALCIUM 40 MG: 40 TABLET, FILM COATED ORAL at 22:00

## 2020-01-03 RX ADMIN — CEFEPIME 2 G: 2 INJECTION, POWDER, FOR SOLUTION INTRAVENOUS at 14:10

## 2020-01-03 RX ADMIN — CEFEPIME 2 G: 2 INJECTION, POWDER, FOR SOLUTION INTRAVENOUS at 05:55

## 2020-01-03 RX ADMIN — IPRATROPIUM BROMIDE AND ALBUTEROL SULFATE 1 AMPULE: .5; 3 SOLUTION RESPIRATORY (INHALATION) at 11:08

## 2020-01-03 RX ADMIN — METHYLPREDNISOLONE SODIUM SUCCINATE 40 MG: 40 INJECTION, POWDER, FOR SOLUTION INTRAMUSCULAR; INTRAVENOUS at 21:59

## 2020-01-03 RX ADMIN — CEFEPIME 2 G: 2 INJECTION, POWDER, FOR SOLUTION INTRAVENOUS at 21:59

## 2020-01-03 RX ADMIN — Medication 10 ML: at 11:30

## 2020-01-03 RX ADMIN — DEXMEDETOMIDINE 1 MCG/KG/HR: 100 INJECTION, SOLUTION, CONCENTRATE INTRAVENOUS at 06:51

## 2020-01-03 RX ADMIN — PANTOPRAZOLE SODIUM 40 MG: 40 INJECTION, POWDER, FOR SOLUTION INTRAVENOUS at 11:25

## 2020-01-03 RX ADMIN — VANCOMYCIN HYDROCHLORIDE 1250 MG: 10 INJECTION, POWDER, LYOPHILIZED, FOR SOLUTION INTRAVENOUS at 00:47

## 2020-01-03 RX ADMIN — IPRATROPIUM BROMIDE AND ALBUTEROL SULFATE 1 AMPULE: .5; 3 SOLUTION RESPIRATORY (INHALATION) at 07:32

## 2020-01-03 RX ADMIN — Medication 10 ML: at 11:31

## 2020-01-03 RX ADMIN — FLUTICASONE PROPIONATE 2 SPRAY: 50 SPRAY, METERED NASAL at 11:33

## 2020-01-03 RX ADMIN — ONDANSETRON HYDROCHLORIDE 4 MG: 2 INJECTION, SOLUTION INTRAMUSCULAR; INTRAVENOUS at 11:21

## 2020-01-03 RX ADMIN — Medication 10 ML: at 22:00

## 2020-01-03 RX ADMIN — Medication 10 ML: at 01:08

## 2020-01-03 RX ADMIN — Medication 10 ML: at 16:12

## 2020-01-03 RX ADMIN — ONDANSETRON HYDROCHLORIDE 4 MG: 2 INJECTION, SOLUTION INTRAMUSCULAR; INTRAVENOUS at 21:59

## 2020-01-03 RX ADMIN — METHYLPREDNISOLONE SODIUM SUCCINATE 40 MG: 40 INJECTION, POWDER, FOR SOLUTION INTRAMUSCULAR; INTRAVENOUS at 11:26

## 2020-01-03 RX ADMIN — ONDANSETRON HYDROCHLORIDE 4 MG: 2 INJECTION, SOLUTION INTRAMUSCULAR; INTRAVENOUS at 01:08

## 2020-01-03 RX ADMIN — ASPIRIN 81 MG: 81 TABLET, COATED ORAL at 15:52

## 2020-01-03 RX ADMIN — VANCOMYCIN HYDROCHLORIDE 1250 MG: 10 INJECTION, POWDER, LYOPHILIZED, FOR SOLUTION INTRAVENOUS at 14:25

## 2020-01-03 RX ADMIN — ACETAMINOPHEN 650 MG: 325 TABLET ORAL at 22:00

## 2020-01-03 RX ADMIN — DEXMEDETOMIDINE 1 MCG/KG/HR: 100 INJECTION, SOLUTION, CONCENTRATE INTRAVENOUS at 00:47

## 2020-01-03 ASSESSMENT — PAIN SCALES - GENERAL
PAINLEVEL_OUTOF10: 0
PAINLEVEL_OUTOF10: 4
PAINLEVEL_OUTOF10: 0

## 2020-01-03 ASSESSMENT — PULMONARY FUNCTION TESTS
PIF_VALUE: 33
PIF_VALUE: 33
PIF_VALUE: 35
PIF_VALUE: 0

## 2020-01-03 NOTE — PROGRESS NOTES
Nutrition Assessment    Type and Reason for Visit: Reassess    Nutrition Recommendations:   1. Continue Minced and moist diet     Nutrition Assessment: pt is improving recent extubation and started and minced and moist diet with 100% consumed. Weight is stable   Malnutrition Assessment:  · Malnutrition Status: Meets the criteria for moderate malnutrition  · Context: Acute illness or injury  · Findings of the 6 clinical characteristics of malnutrition (Minimum of 2 out of 6 clinical characteristics is required to make the diagnosis of moderate or severe Protein Calorie Malnutrition based on AND/ASPEN Guidelines):  1. Energy Intake-Less than or equal to 50% of estimated energy requirement, Greater than or equal to 7 days    2. Weight Loss-1-2% loss(- 2# or 1.4% weight loss ), in 1 week  3. Fat Loss-No significant subcutaneous fat loss,    4. Muscle Loss-Mild muscle mass loss, Clavicles (pectoralis and deltoids), Temples (temporalis muscle), Interosseous  5. Fluid Accumulation-No significant fluid accumulation, Extremities(BLE + 1 pitting edema )  6.  Strength-Not measured    Nutrition Risk Level: Moderate    Nutrient Needs:  · Estimated Daily Total Kcal: 1180 - 1475 kcals based on 20-25 kcals/kg/CBW  · Estimated Daily Protein (g): 71 - 77 g protein based on 1.2-1.3 g/kg/CBW  · Estimated Daily Total Fluid (ml/day): 1100 - 1500 ml     Nutrition Diagnosis:   · Problem:  Moderate malnutrition  · Etiology: related to Impaired respiratory function-inability to consume food, Insufficient energy/nutrient consumption     Signs and symptoms:  as evidenced by NPO status due to medical condition, Intubation, Lab values    Objective Information:  · Nutrition-Focused Physical Findings: pt was extubated 1/2 and currenlty accepting minced and mosit diet   · Wound Type: None  · Current Nutrition Therapies:  · Oral Diet Orders: Dysphagia Minced and Moist (Dysphagia 2)   · Oral Diet intake: %  · Oral Nutrition Supplement

## 2020-01-03 NOTE — PROGRESS NOTES
Patient c/o \"stomach feeling queezy\" . Zofran not due again until 0708 this AM. Message sent to Dr Roxy Hoff. Awaiting reply.  Anais Styles RN

## 2020-01-03 NOTE — PROGRESS NOTES
Pt continues to rest quietly on BiPAP and no further changes noted in exam. Vitals and SpO2 stable. All lines and monitoring devices remain in place. Pt repositioned in bed. Continue current plan of care.  Clarissa Gowers RN

## 2020-01-03 NOTE — PROGRESS NOTES
Has had two episodes of emesis, each 100 cc, thin bile colored fluid this afternoon. Denies pain or abdominal symptoms other than nausea that follows a deep coughing episode and attempting to expectorate sputum. Has remained alert and oriented all day.

## 2020-01-03 NOTE — PROGRESS NOTES
with frequent rest breaks. Uses Rollator or shopping cart when out in community or at stores. History of falls          No    Pain  Yes  Rating:mild  Location:\"belly\"  Pain Medicine Status: No request made      Patient reported heartburn at end of session, RN notified. Cognition    A&O Person, Place and Time   Able to follow 2 step commands    Subjective  Patient lying supine in bed with family present-  Pt agreeable to this OT eval & tx. Upper Extremity ROM:    Impaired B UE AROM, unable to flex shoulders against gravity during initiation of testing; full PROM noted in R shoulder, limited assessment as patient too fatigued to participate after sitting EOB, \"I need to go to sleep\"    Upper Extremity Strength:    BUE strength impaired but not formally assessed w/ MMT    Upper Extremity Sensation    NT    Upper Extremity Proprioception:   NT    Coordination and Tone  WFL    Balance  Functional Sitting Balance:  Impaired-MAX A at EOB  Functional Standing Balance:NT    Bed mobility:    Supine to sit: Max A of 2  Sit to supine:   Max A of 2  Scooting to head of bed:   Max A of 2  Scooting in sitting: Max A of 2  Rolling: Max A  Bridging:   Not Tested    Transfers:    Sit to stand:  Not Tested  Stand to sit:  Not Tested  Bed to [facility]:  Not Tested  Bed to chair:   Not Tested  Standard toilet: Not Tested    Activity Tolerance   Pt completed therapy session with SOB noted with bed mobility and sitting EOB. Pt able to control with cues for PLB.     Supine (at rest) SpO2: 88% on 3L  HR: 118  BP: 152/76    Sitting EOB SpO2: 77% on 3L with poor recovery - O2 increased to 7L and required ~3 minutes for recovery >90%  HR:  130 bpm    After 10 min EOB SpO2: 90% on 7L with PLB  HR: 135 bpm     After Rolling SpO2: 84% on 3L  HR: 122 bpm    Supine (end of session) SpO2: 86% on 3L after several minutes rest  HR: 120 bpm     Dressing:      UE:   Not Tested  LE:    Total A     Bathing:    UE:  Not Tested  LE:  Not Tested    Eating: Max A beverage management    Toileting:  Not Tested    Positioning Needs: In bed, call light and needs in reach. No alarm per RN approval.    Exercise / Activities Initiated:   Scapular retraction:  x3    Patient/Family Education:   Role of OT  Recommendations for DC  Energy conservation techniques   PLB    Assessment of Deficits: Pt seen for Occupational therapy evaluation in acute care setting. Pt demonstrated decreased Activity Tolerance, ADL's, Balance, Bed Mobility, ROM, Safety Awareness, Strength and Transfers. Pt functioning below baseline and will likely benefit from skilled occupational therapy services to maximize safety and independence. Goal(s) : To be met in 3 Visits:  1). Bed to toilet/BSC: Max A    To be met in 5 Visits:  1). Supine to Sit: Mod A  2). Upper Body Bathing: Mod A  3). Lower Body Bathing: Mod A  4). Upper Body Dressing: Mod A  5). Lower Body Dressing: Mod A  6). Pt to lolita UE exs x 15 reps    Rehabilitation Potential:  Good for goals listed above. Strengths for achieving goals include: Pt motivated, PLOF, Family Support and Pt cooperative  Barriers to achieving goals include:  Complexity of condition, Pain  and Weakness     Plan: To be seen 3-5 x/wk while in acute care setting for therapeutic exercises, bed mobility, transfers, dressing, bathing, family/patient education with adaptive equipment, breathing technique instruction.      Phill Barajas, OTR/L [id number]            If patient discharges from this facility prior to next visit, this note will serve as the Discharge Summary

## 2020-01-03 NOTE — PROGRESS NOTES
0108: Repositioned patient in bed to right side with pillows support. After turning she began coughing and then vomited. She had just recently c/o upper abdomen feeling like it was burning. Patient had been NPO except for a few ice chips. Explained to patient that she will need to remain strict NPO. Medicated with Zofran 4 mg IVP. Elevated head of bed to 60 degrees. She refused gown change at this time. Provided emesis bag and cool, damp wash cloth. Will follow up. Her daughter remains at the bedside. 0145: Reassessed patient. She denies any further nausea. She did say she was \"burping\". Changed gown and bathed upper torso. Provided warm blanket. She denied further needs at this time. Call light in reach and patient has demonstrated appropriate use for calling for assistance. Family remains at bedside. Will continue to monitor.  Christina Vicente RN

## 2020-01-03 NOTE — PLAN OF CARE
Problem: Nutrition  Intervention: Swallowing evaluation  Note:   SLP completed evaluation. Please refer to notes in EMR. Intervention: Aspiration precautions  Note:   SLP completed evaluation. Please refer to notes in EMR.     Patricia Valladares M.A., [address] [place] [address] #[phone]  Speech-Language Pathologist

## 2020-01-03 NOTE — PROGRESS NOTES
Shift assessment completed and documented. Peripheral IV's outdated and will be removed today. Is drowsy and hallucinating since having phenergan earlier for nausea. Will monitor. Titrating precidex to off if tolerates.

## 2020-01-03 NOTE — PROGRESS NOTES
trimethoprim-sulfamethoxazole Sensitive <=20 mcg/mL         Films:  CXR was reviewed by me and it showed   1/2/2019 RLL infiltrate, ETT slightly high    ASSESSMENT:  · Acute on chronic hypoxemic and hypercapnic respiratory failure, normally on 2 L home oxygen  · COPD with acute exacerbation  · HCAP/VAP, new on 12/31/19  · Chest pain  · Ongoing tobacco abuse    PLAN:  Bilevel non-invasive positive pressure ventilation for life threatening respiratory failure - must be available quickly as respiratory failure has recurred quickly  Head of bed 30 degrees or higher at all times  IV solumedrol twice daily  Inhaled bronchodilators   Cefepime and vancomycin day #4;completed 7 days doxycycline --with relatively poor response in WBC count and tenuous clinical condition, I do not favor narrowing antibiotic spectrum at this point. Monitoring of vancomycin levels to prevent toxicity.    Cardiology has seen and signed off; recommended Amaryllis Galeazzi after resolution of acute illness  Tobacco cessation  Prophylaxis: Bactroban, Lovenox  PT/OT   ICU today

## 2020-01-03 NOTE — PROGRESS NOTES
Speech Language Pathology  Facility/Department: [facility] ICU   CLINICAL BEDSIDE SWALLOW EVALUATION    SLP recommends IDDSI level 5 minced and moist solids and thin liquids. Straws OK. Meds whole with thin liquids as tolerated. Hold PO and contact SLP if s/s of aspiration develop. SLP to follow. NAME: Richmond Salgado  : 1960  MRN: [id number]    ADMISSION DATE: 2019  ADMITTING DIAGNOSIS: has Acute respiratory failure with hypoxia and hypercapnia (HCC); COPD exacerbation ([address] Tobacco abuse; Hypothyroidism; Acute on chronic respiratory failure with hypoxia and hypercapnia ([address] Community acquired bacterial pneumonia; Pneumonia due to organism; Acute exacerbation of chronic obstructive pulmonary disease (COPD) ([address] Elevated troponin; SVT (supraventricular tachycardia) ([address] Mild protein-calorie malnutrition ([address] COPD, severe ([address] Chronic respiratory failure with hypoxia ([address] Chest pain on breathing; and HCAP (healthcare-associated pneumonia) on their problem list.  ONSET DATE: The patient was admitted to [facility] on 2019    Recent Chest Xray/CT of Chest: (2019)  Impression   Stable life support device positioning.       No focal airspace disease. Date of Eval: 1/3/2020  Evaluating Therapist: Rico Dunham    Current Diet level:  Current Diet : NPO  Current Liquid Diet : NPO      Primary Complaint  Patient Complaint: Per MD H&P \"The patient is a 62 y.o. female with severe COPD, asthma, chronic hypoxic respiratory failure (on 2 L O2) and hypothyroidism who presents to [facility] with c/o shortness of breath. She states she was not feeling well last night and felt like she was coming down with something. She took some Mucinex and woke up feeling short of breath this morning at approximately 0230. She reports her cough was productive, now non-productive. Denies fevers, chills, abdominal pain, nausea, vomiting.   She reports having a burning sensation in her chest prior to with no clinical s/s of aspiration and functional swallow 5/5  Goal 3: The patient/caregivers will demonstrate understanding of recommended compensatory strategies, for improved swallow safety  Long-term Goals  Timeframe for Long-term Goals: 7 days (01/10/2020)  Goal 1: The patient will tolerate least restrictive oral intake with no clinical s/s of aspiration for optimal nutrition and hydration    General  Chart Reviewed: Yes  Comments: Chart reviewed for completion of BSE  Subjective  Subjective: The patient is seen in room at bedside with RN permission. Spouse present initially and leaving at time of eval. The patient is alert with some confusion. Able to respon to yes/no and some open-ended questions. On 3L of O2 via NC  Behavior/Cognition: Alert; Cooperative;Confused;Pleasant mood  Respiratory Status: O2 via nasual cannula  O2 Device: Nasal cannula  Liters of Oxygen: 3 L  Communication Observation: Functional  Follows Directions: Simple  Dentition: Edentulous  Patient Positioning: Upright in bed  Baseline Vocal Quality: Normal  Volitional Cough: Weak;Congested;Strong  Prior Dysphagia History: The patient has no prior dysphagia hx per chart. Does not report dysphagia hx in the past  Consistencies Administered: Reg solid; Dysphagia Minced and Moist (Dysphagia II); Dysphagia Pureed (Dysphagia I); Thin - cup; Thin - straw;Dysphagia Soft and Bite-Sized (Dysphagia III)       Vision/Hearing  Vision  Vision: Within Functional Limits  Hearing  Hearing: Within functional limits    Oral Motor Deficits  Oral/Motor  Oral Motor: Within functional limits(Reduced cervical ROM noted)    Oral Phase Dysfunction  Oral Phase  Oral Phase: Exceptions  Oral Phase Dysfunction  Impaired Mastication: Reg Solid; Soft Solid  Decreased Anterior to Posterior Transit: Soft solid;Reg solid  Lingual/Palatal Residue: Soft solid;Reg solid     Indicators of Pharyngeal Phase Dysfunction   Pharyngeal Phase  Pharyngeal Phase: Exceptions  Indicators of Pharyngeal Phase Dysfunction  Decreased Laryngeal Elevation: All  Cough - Delayed: Reg Solid    Prognosis  Prognosis  Prognosis for safe diet advancement: good  Individuals consulted  Consulted and agree with results and recommendations: Patient;RN;Family member  Family member consulted: Spouse    Education  Patient Education: SLP re: Role of SLP, rationale for completion of BSE, anatomical components of swallowing as they pertain to airway protection, results of BSE, recommendations and POC  Patient Education Response: Demonstrated understanding;Verbalizes understanding  Safety Devices in place: Yes  Type of devices: All fall risk precautions in place; Left in bed;Call light within reach;Nurse notified         Therapy Time  SLP Individual Minutes  Time In: 0805  Time Out: 0845  Minutes: 144 Dennis Sargent, SLP  1/3/2020 9:08 AM  Oneil Flores M.A., Lynn Actis #[id number]  Speech-Language Pathologist  Phone: [phone]

## 2020-01-03 NOTE — PROGRESS NOTES
Inpatient Physical Therapy Evaluation and Treatment    Unit: ICU  Date:  1/3/2020  Patient Name:    Cordelia Maza  Admitting diagnosis:  COPD exacerbation ([facility] [address] [facility] [J44.1]  Admit Date:  12/25/2019  Precautions/Restrictions/WB Status/ Lines/ Wounds/ Oxygen: fall risk, bed/chair alarm, mitchell catheter , supplemental O2 (3L), telemetry, ICU monitoring and continuous pulse ox, R brachial PICC    Per EMR: 60y.o. female with severe COPD, asthma, chronic hypoxic respiratory failure (on 2 L O2) and hypothyroidism who presents to [facility] with c/o shortness of breath. Extubated on 1/2/19 and now on BiPap. Treatment Time:  13:10-14:00  Treatment Number:  1   Timed Code Treatment Minutes: 40 minutes  Total Treatment Minutes:  50  minutes    Patient Goals for Therapy: \" go home \"          Discharge Recommendations: SNF (pt may be good ARU candidate pending further progress and tolerance to therapy)  DME needs for discharge: defer to facility       Therapy recommendation for EMS Transport: requires transport by cot due to poor sitting balance and quick to fatigue    Therapy recommendations for staff:   Assist of 2 with use of Leobardo-Lift for all transfers to/from chair   Or Assist of 2 to [address] S4 Level Recommendation:  NA  AM-PAC Mobility Score    AM-PAC Inpatient Mobility Raw Score : 8       Preadmission Environment    Pt.  Lives with spouse Rubens Hood, works part time) and daughter (works) and her boyfriend  Home environment:  mobile home/trailer (double wide)  Steps to enter first floor: 3 steps to enter and bilateral hand rails  Bathroom: Tub/Shower unit, Walk-in Shower, Grab bars, Shower Chair  and Standard height toilet (able to push up from sink if needed)  Equipment owned: Rollator , Shower Chair, pulse ox, reacher, inhaler, nebulizer and home O2 (2L) PRN but pt pretty much wears it all the time, concentrator  Sleeps in flat bed with wedge    Preadmission Status:  Pt. Able to drive: Yes  Pt Fully independent with while in acute care setting for therapeutic exercises, bed mobility, transfers, progressive gait training, balance training, and family/patient education. Signature: Leigh Ann Wood PT, DPT #[id number]      If patient discharges from this facility prior to next visit, this note will serve as the Discharge Summary.

## 2020-01-04 LAB
ANION GAP SERPL CALCULATED.3IONS-SCNC: 10 MMOL/L (ref 3–16)
BASOPHILS ABSOLUTE: 0 K/UL (ref 0–0.2)
BASOPHILS RELATIVE PERCENT: 0 %
BUN BLDV-MCNC: 25 MG/DL (ref 7–20)
CALCIUM SERPL-MCNC: 8.2 MG/DL (ref 8.3–10.6)
CHLORIDE BLD-SCNC: 102 MMOL/L (ref 99–110)
CO2: 31 MMOL/L (ref 21–32)
CREAT SERPL-MCNC: <0.5 MG/DL (ref 0.6–1.2)
EOSINOPHILS ABSOLUTE: 0 K/UL (ref 0–0.6)
EOSINOPHILS RELATIVE PERCENT: 0 %
GFR AFRICAN AMERICAN: >60
GFR NON-AFRICAN AMERICAN: >60
GLUCOSE BLD-MCNC: 110 MG/DL (ref 70–99)
GLUCOSE BLD-MCNC: 112 MG/DL (ref 70–99)
GLUCOSE BLD-MCNC: 122 MG/DL (ref 70–99)
GLUCOSE BLD-MCNC: 79 MG/DL (ref 70–99)
GLUCOSE BLD-MCNC: 99 MG/DL (ref 70–99)
HCT VFR BLD CALC: 37.2 % (ref 36–48)
HEMOGLOBIN: 12.4 G/DL (ref 12–16)
LYMPHOCYTES ABSOLUTE: 1.8 K/UL (ref 1–5.1)
LYMPHOCYTES RELATIVE PERCENT: 9 %
MCH RBC QN AUTO: 31.3 PG (ref 26–34)
MCHC RBC AUTO-ENTMCNC: 33.4 G/DL (ref 31–36)
MCV RBC AUTO: 93.7 FL (ref 80–100)
MONOCYTES ABSOLUTE: 1.8 K/UL (ref 0–1.3)
MONOCYTES RELATIVE PERCENT: 9 %
NEUTROPHILS ABSOLUTE: 16.2 K/UL (ref 1.7–7.7)
NEUTROPHILS RELATIVE PERCENT: 82 %
PDW BLD-RTO: 13.9 % (ref 12.4–15.4)
PERFORMED ON: ABNORMAL
PERFORMED ON: ABNORMAL
PERFORMED ON: NORMAL
PERFORMED ON: NORMAL
PLATELET # BLD: 315 K/UL (ref 135–450)
PLATELET SLIDE REVIEW: ADEQUATE
PMV BLD AUTO: 7.6 FL (ref 5–10.5)
POTASSIUM SERPL-SCNC: 3.9 MMOL/L (ref 3.5–5.1)
RBC # BLD: 3.97 M/UL (ref 4–5.2)
SLIDE REVIEW: ABNORMAL
SODIUM BLD-SCNC: 143 MMOL/L (ref 136–145)
VANCOMYCIN TROUGH: 12.5 UG/ML (ref 10–20)
WBC # BLD: 19.8 K/UL (ref 4–11)

## 2020-01-04 PROCEDURE — 2700000000 HC OXYGEN THERAPY PER DAY

## 2020-01-04 PROCEDURE — 6370000000 HC RX 637 (ALT 250 FOR IP): Performed by: INTERNAL MEDICINE

## 2020-01-04 PROCEDURE — 6360000002 HC RX W HCPCS: Performed by: INTERNAL MEDICINE

## 2020-01-04 PROCEDURE — 2580000003 HC RX 258: Performed by: INTERNAL MEDICINE

## 2020-01-04 PROCEDURE — C9113 INJ PANTOPRAZOLE SODIUM, VIA: HCPCS | Performed by: INTERNAL MEDICINE

## 2020-01-04 PROCEDURE — 2580000003 HC RX 258

## 2020-01-04 PROCEDURE — 2060000000 HC ICU INTERMEDIATE R&B

## 2020-01-04 PROCEDURE — 94660 CPAP INITIATION&MGMT: CPT

## 2020-01-04 PROCEDURE — 99233 SBSQ HOSP IP/OBS HIGH 50: CPT | Performed by: INTERNAL MEDICINE

## 2020-01-04 PROCEDURE — 80202 ASSAY OF VANCOMYCIN: CPT

## 2020-01-04 PROCEDURE — 80048 BASIC METABOLIC PNL TOTAL CA: CPT

## 2020-01-04 PROCEDURE — 94640 AIRWAY INHALATION TREATMENT: CPT

## 2020-01-04 PROCEDURE — 36415 COLL VENOUS BLD VENIPUNCTURE: CPT

## 2020-01-04 PROCEDURE — 94761 N-INVAS EAR/PLS OXIMETRY MLT: CPT

## 2020-01-04 PROCEDURE — 85025 COMPLETE CBC W/AUTO DIFF WBC: CPT

## 2020-01-04 RX ORDER — ALBUTEROL SULFATE 90 UG/1
2 AEROSOL, METERED RESPIRATORY (INHALATION) EVERY 4 HOURS PRN
Status: DISCONTINUED | OUTPATIENT
Start: 2020-01-04 | End: 2020-01-06 | Stop reason: HOSPADM

## 2020-01-04 RX ORDER — ALBUTEROL SULFATE 90 UG/1
2 AEROSOL, METERED RESPIRATORY (INHALATION)
Status: DISCONTINUED | OUTPATIENT
Start: 2020-01-04 | End: 2020-01-06 | Stop reason: HOSPADM

## 2020-01-04 RX ORDER — ALBUTEROL SULFATE 90 UG/1
2 AEROSOL, METERED RESPIRATORY (INHALATION) 3 TIMES DAILY
Status: DISCONTINUED | OUTPATIENT
Start: 2020-01-04 | End: 2020-01-04

## 2020-01-04 RX ORDER — ALBUTEROL SULFATE 90 UG/1
2 AEROSOL, METERED RESPIRATORY (INHALATION) EVERY 6 HOURS PRN
Status: DISCONTINUED | OUTPATIENT
Start: 2020-01-04 | End: 2020-01-04

## 2020-01-04 RX ORDER — SODIUM CHLORIDE 9 MG/ML
INJECTION, SOLUTION INTRAVENOUS
Status: COMPLETED
Start: 2020-01-04 | End: 2020-01-04

## 2020-01-04 RX ORDER — ALBUTEROL SULFATE 90 UG/1
AEROSOL, METERED RESPIRATORY (INHALATION)
Status: DISPENSED
Start: 2020-01-04 | End: 2020-01-05

## 2020-01-04 RX ADMIN — ENOXAPARIN SODIUM 40 MG: 40 INJECTION SUBCUTANEOUS at 08:34

## 2020-01-04 RX ADMIN — ASPIRIN 81 MG: 81 TABLET, COATED ORAL at 08:34

## 2020-01-04 RX ADMIN — Medication 2 PUFF: at 20:25

## 2020-01-04 RX ADMIN — Medication 2 PUFF: at 23:39

## 2020-01-04 RX ADMIN — Medication 2 PUFF: at 08:07

## 2020-01-04 RX ADMIN — Medication 10 ML: at 23:17

## 2020-01-04 RX ADMIN — VANCOMYCIN HYDROCHLORIDE 1250 MG: 10 INJECTION, POWDER, LYOPHILIZED, FOR SOLUTION INTRAVENOUS at 12:50

## 2020-01-04 RX ADMIN — Medication 10 ML: at 08:35

## 2020-01-04 RX ADMIN — POLYETHYLENE GLYCOL (3350) 17 G: 17 POWDER, FOR SOLUTION ORAL at 08:34

## 2020-01-04 RX ADMIN — LEVOTHYROXINE SODIUM 137 MCG: 25 TABLET ORAL at 08:34

## 2020-01-04 RX ADMIN — ATORVASTATIN CALCIUM 40 MG: 40 TABLET, FILM COATED ORAL at 23:17

## 2020-01-04 RX ADMIN — SODIUM CHLORIDE 250 ML: 9 INJECTION, SOLUTION INTRAVENOUS at 23:18

## 2020-01-04 RX ADMIN — Medication 10 ML: at 04:15

## 2020-01-04 RX ADMIN — Medication 2 PUFF: at 11:41

## 2020-01-04 RX ADMIN — VANCOMYCIN HYDROCHLORIDE 1250 MG: 10 INJECTION, POWDER, LYOPHILIZED, FOR SOLUTION INTRAVENOUS at 02:24

## 2020-01-04 RX ADMIN — FLUTICASONE PROPIONATE 2 SPRAY: 50 SPRAY, METERED NASAL at 09:13

## 2020-01-04 RX ADMIN — Medication 2 PUFF: at 11:37

## 2020-01-04 RX ADMIN — METHYLPREDNISOLONE SODIUM SUCCINATE 40 MG: 40 INJECTION, POWDER, FOR SOLUTION INTRAMUSCULAR; INTRAVENOUS at 23:38

## 2020-01-04 RX ADMIN — CEFEPIME 2 G: 2 INJECTION, POWDER, FOR SOLUTION INTRAVENOUS at 11:47

## 2020-01-04 RX ADMIN — Medication 2 PUFF: at 08:06

## 2020-01-04 RX ADMIN — Medication 2 PUFF: at 20:24

## 2020-01-04 RX ADMIN — METHYLPREDNISOLONE SODIUM SUCCINATE 40 MG: 40 INJECTION, POWDER, FOR SOLUTION INTRAMUSCULAR; INTRAVENOUS at 08:34

## 2020-01-04 RX ADMIN — CEFEPIME 2 G: 2 INJECTION, POWDER, FOR SOLUTION INTRAVENOUS at 06:04

## 2020-01-04 RX ADMIN — ONDANSETRON HYDROCHLORIDE 4 MG: 2 INJECTION, SOLUTION INTRAMUSCULAR; INTRAVENOUS at 04:15

## 2020-01-04 RX ADMIN — Medication 2 PUFF: at 16:02

## 2020-01-04 RX ADMIN — PANTOPRAZOLE SODIUM 40 MG: 40 INJECTION, POWDER, FOR SOLUTION INTRAVENOUS at 08:34

## 2020-01-04 RX ADMIN — CEFEPIME 2 G: 2 INJECTION, POWDER, FOR SOLUTION INTRAVENOUS at 23:18

## 2020-01-04 RX ADMIN — MONTELUKAST SODIUM 10 MG: 10 TABLET, COATED ORAL at 23:17

## 2020-01-04 ASSESSMENT — PAIN SCALES - GENERAL
PAINLEVEL_OUTOF10: 0

## 2020-01-04 NOTE — PROGRESS NOTES
Shift assessment complete. Patient is A&O x4. Appears well. States she feels as if she is moving better air. Lungs are diminished throughout. RN reduced O2 to 4L NC.     NSR to ST on monitor. VSS. Bowels are active x4. Abdomen is flat and soft. Baugh in place. Central line dressing is clean, dry and intact with no signs of drainage. All tubing is current and dated. IPA caps applied to all access hubs.      Electronically signed by Samia Jernigan RN on 1/4/2020 at 8:47 AM    Vitals:    01/04/20 0800   BP: (!) 142/66   Pulse: 108   Resp: 19   Temp: 98.8 °F (37.1 °C)   SpO2: 97%

## 2020-01-04 NOTE — PROGRESS NOTES
8928: Patient c/o feeling nauseated again. She is coughing. Daughter said patient spit up some but wasn't sure if phlem or emesis. Medicated with zofran 4 mg IVP. Will follow up. 8766: Patient denies further nausea at this time. Will continue to monitor.  Joseph Swanson RN

## 2020-01-04 NOTE — PROGRESS NOTES
Pulmonary & Critical Care Medicine ICU Progress Note    CC: Acute respiratory failure with hypoxemia, COPD, intubated in ICU after failing BiPAP    Events of Last 24 hours:   Recurrent emesis, seems to be related to deep coughing spells    Invasive Lines: IV: 2019 PICC right brachial    MV: 2019 -2020  Vent Mode: AC/VC Rate Set: 14 bmp/Vt Ordered: 500 mL/ /FiO2 : 75 %  Recent Labs     20  0725 20  1030   PHART 7.456* 7.379   [id number] 54.5* 62.9*   PO2ART 98.3 76.0       IV:   dextrose      dexmedetomidine (PRECEDEX) IV infusion Stopped (20 1010)       Vitals:  Blood pressure 137/72, pulse 104, temperature 99 °F (37.2 °C), temperature source Oral, resp. rate 25, height 5' 4\" (1.626 m), weight 136 lb 3.9 oz (61.8 kg), SpO2 96 %. on 2 L  Temp  Av.7 °F (37.1 °C)  Min: 98 °F (36.7 °C)  Max: 99.2 °F (37.3 °C)    Intake/Output Summary (Last 24 hours) at 2020 0647  Last data filed at 1/3/2020 1800  Gross per 24 hour   Intake 365.28 ml   Output 810 ml   Net -444.72 ml     EXAM:  General: ill appearing. Eyes: PERRL. No sclera icterus. No conjunctival injection. ENT: No discharge. Pharynx clear. Neck: Trachea midline. Normal thyroid. Resp: No accessory muscle use. RLL crackles. No wheezing. No rhonchi. No dullness on percussion. Very poor air movement  CV: Regular rate. Regular rhythm. No mumur or rub. No edema. Peripheral pulses are 2+. Capillary refill is less than 3 seconds. GI: Non-tender. Non-distended. No masses. No organomegaly. Normal bowel sounds. No hernia. Skin: Warm and dry. No nodule on exposed extremities. No rash on exposed extremities. Lymph: No cervical LAD. No supraclavicular LAD. M/S: No cyanosis. No joint deformity. No clubbing. Neuro: Alert and oriented x3. Patellar reflexes are symmetric. Psych: No agitation, no anxiety, affect is full.     Scheduled Meds:   vancomycin  1,250 mg Intravenous Q12H    fluticasone  2 spray Each Nostril Daily    infiltrate, ETT slightly high    ASSESSMENT:  · Acute on chronic hypoxemic and hypercapnic respiratory failure, normally on 2 L home oxygen  · Leukocytosis is worsening  · COPD with acute exacerbation  · HCAP/VAP, new on 12/31/19  · Chest pain  · Ongoing tobacco abuse    PLAN:  · BiPAP PRN, has had some nausea and is resistant to wearing at THE [facility] of bed 30 degrees or higher at all times  IV solumedrol twice daily  Inhaled bronchodilators   Cefepime and vancomycin day #5;completed 7 days doxycycline --with relatively poor response in WBC count and tenuous clinical condition, I do not favor narrowing antibiotic spectrum at this point. Monitoring of vancomycin levels to prevent toxicity.    Cardiology has seen and signed off; recommended [address] after resolution of acute illness  D/C CVC and place peripheral   Tobacco cessation  Prophylaxis: Bactroban, Lovenox  PT/OT   PCU today with continuous pulse oximetry

## 2020-01-04 NOTE — PROGRESS NOTES
Hospitalist Progress Note      PCP: Caro Justice MD    Date of Admission: 12/25/2019    Subjective: off vent, extubated to bipap - then to NC oxygen    Very weak appearing.  at bedside.          Medications:  Reviewed    Infusion Medications    dextrose      dexmedetomidine (PRECEDEX) IV infusion Stopped (01/03/20 1010)     Scheduled Medications    ipratropium  2 puff Inhalation 4x daily    albuterol sulfate HFA  2 puff Inhalation TID    vancomycin  1,250 mg Intravenous Q12H    fluticasone  2 spray Each Nostril Daily    cefepime  2 g Intravenous Q8H    polyethylene glycol  17 g Oral Daily    insulin lispro  0-6 Units Subcutaneous TID WC    enoxaparin  40 mg Subcutaneous Daily    pantoprazole  40 mg Intravenous Daily    methylPREDNISolone  40 mg Intravenous Q12H    aspirin  81 mg Oral Daily    lidocaine 1 % injection  5 mL Intradermal Once    sodium chloride flush  10 mL Intravenous 2 times per day    atorvastatin  40 mg Oral Nightly    levothyroxine  137 mcg Oral Daily    montelukast  10 mg Oral Nightly    nicotine  1 patch Transdermal Daily     PRN Meds: glucose, dextrose, glucagon (rDNA), dextrose, nitroGLYCERIN, sodium chloride flush, perflutren lipid microspheres, magnesium hydroxide, ondansetron, acetaminophen      Intake/Output Summary (Last 24 hours) at 1/4/2020 1227  Last data filed at 1/4/2020 1208  Gross per 24 hour   Intake 814.83 ml   Output 1765 ml   Net -950.17 ml       Physical Exam Performed:    BP (!) 143/69   Pulse 96   Temp 98.8 °F (37.1 °C) (Oral)   Resp 27   Ht 5' 4\" (1.626 m)   Wt 136 lb 3.9 oz (61.8 kg)   LMP  (LMP Unknown)   SpO2 95%   BMI 23.39 kg/m²     General:   Elderly female,comfortable off vent   Mucous Membranes:  Pink , anicteric  Neck: No JVD, no carotid bruit, no thyromegaly  Chest: diminished in bases, no wheeze  Cardiovascular:  RRR S1S2 heard, no murmurs or gallops  Abdomen:  Soft, undistended, non tender, no organomegaly, BS

## 2020-01-05 LAB
ANION GAP SERPL CALCULATED.3IONS-SCNC: 10 MMOL/L (ref 3–16)
BASOPHILS ABSOLUTE: 0.1 K/UL (ref 0–0.2)
BASOPHILS RELATIVE PERCENT: 0.5 %
BUN BLDV-MCNC: 25 MG/DL (ref 7–20)
CALCIUM SERPL-MCNC: 9 MG/DL (ref 8.3–10.6)
CHLORIDE BLD-SCNC: 95 MMOL/L (ref 99–110)
CO2: 32 MMOL/L (ref 21–32)
CREAT SERPL-MCNC: <0.5 MG/DL (ref 0.6–1.2)
EOSINOPHILS ABSOLUTE: 0 K/UL (ref 0–0.6)
EOSINOPHILS RELATIVE PERCENT: 0 %
GFR AFRICAN AMERICAN: >60
GFR NON-AFRICAN AMERICAN: >60
GLUCOSE BLD-MCNC: 108 MG/DL (ref 70–99)
GLUCOSE BLD-MCNC: 126 MG/DL (ref 70–99)
GLUCOSE BLD-MCNC: 137 MG/DL (ref 70–99)
GLUCOSE BLD-MCNC: 140 MG/DL (ref 70–99)
GLUCOSE BLD-MCNC: 160 MG/DL (ref 70–99)
GLUCOSE BLD-MCNC: 165 MG/DL (ref 70–99)
HCT VFR BLD CALC: 41.1 % (ref 36–48)
HEMOGLOBIN: 13.3 G/DL (ref 12–16)
LYMPHOCYTES ABSOLUTE: 1.3 K/UL (ref 1–5.1)
LYMPHOCYTES RELATIVE PERCENT: 7.1 %
MCH RBC QN AUTO: 31.1 PG (ref 26–34)
MCHC RBC AUTO-ENTMCNC: 32.4 G/DL (ref 31–36)
MCV RBC AUTO: 96.1 FL (ref 80–100)
MONOCYTES ABSOLUTE: 0.5 K/UL (ref 0–1.3)
MONOCYTES RELATIVE PERCENT: 2.8 %
NEUTROPHILS ABSOLUTE: 16.8 K/UL (ref 1.7–7.7)
NEUTROPHILS RELATIVE PERCENT: 89.6 %
PDW BLD-RTO: 13.4 % (ref 12.4–15.4)
PERFORMED ON: ABNORMAL
PLATELET # BLD: 336 K/UL (ref 135–450)
PMV BLD AUTO: 7.9 FL (ref 5–10.5)
POTASSIUM SERPL-SCNC: 3.9 MMOL/L (ref 3.5–5.1)
RBC # BLD: 4.28 M/UL (ref 4–5.2)
SODIUM BLD-SCNC: 137 MMOL/L (ref 136–145)
WBC # BLD: 18.7 K/UL (ref 4–11)

## 2020-01-05 PROCEDURE — 2060000000 HC ICU INTERMEDIATE R&B

## 2020-01-05 PROCEDURE — C9113 INJ PANTOPRAZOLE SODIUM, VIA: HCPCS | Performed by: INTERNAL MEDICINE

## 2020-01-05 PROCEDURE — 6370000000 HC RX 637 (ALT 250 FOR IP): Performed by: INTERNAL MEDICINE

## 2020-01-05 PROCEDURE — 2580000003 HC RX 258: Performed by: INTERNAL MEDICINE

## 2020-01-05 PROCEDURE — 94150 VITAL CAPACITY TEST: CPT

## 2020-01-05 PROCEDURE — 36415 COLL VENOUS BLD VENIPUNCTURE: CPT

## 2020-01-05 PROCEDURE — 99233 SBSQ HOSP IP/OBS HIGH 50: CPT | Performed by: INTERNAL MEDICINE

## 2020-01-05 PROCEDURE — 6360000002 HC RX W HCPCS: Performed by: INTERNAL MEDICINE

## 2020-01-05 PROCEDURE — 2700000000 HC OXYGEN THERAPY PER DAY

## 2020-01-05 PROCEDURE — 85025 COMPLETE CBC W/AUTO DIFF WBC: CPT

## 2020-01-05 PROCEDURE — 80048 BASIC METABOLIC PNL TOTAL CA: CPT

## 2020-01-05 PROCEDURE — 94761 N-INVAS EAR/PLS OXIMETRY MLT: CPT

## 2020-01-05 PROCEDURE — 94640 AIRWAY INHALATION TREATMENT: CPT

## 2020-01-05 RX ORDER — PREDNISONE 20 MG/1
40 TABLET ORAL DAILY
Status: DISCONTINUED | OUTPATIENT
Start: 2020-01-06 | End: 2020-01-06 | Stop reason: HOSPADM

## 2020-01-05 RX ORDER — METHYLPREDNISOLONE SODIUM SUCCINATE 125 MG/2ML
40 INJECTION, POWDER, LYOPHILIZED, FOR SOLUTION INTRAMUSCULAR; INTRAVENOUS EVERY 12 HOURS
Status: DISCONTINUED | OUTPATIENT
Start: 2020-01-05 | End: 2020-01-05

## 2020-01-05 RX ADMIN — METHYLPREDNISOLONE SODIUM SUCCINATE 40 MG: 125 INJECTION, POWDER, FOR SOLUTION INTRAMUSCULAR; INTRAVENOUS at 11:05

## 2020-01-05 RX ADMIN — ATORVASTATIN CALCIUM 40 MG: 40 TABLET, FILM COATED ORAL at 22:38

## 2020-01-05 RX ADMIN — LEVOTHYROXINE SODIUM 137 MCG: 25 TABLET ORAL at 05:50

## 2020-01-05 RX ADMIN — Medication 10 ML: at 10:44

## 2020-01-05 RX ADMIN — Medication 10 ML: at 22:39

## 2020-01-05 RX ADMIN — Medication 2 PUFF: at 15:10

## 2020-01-05 RX ADMIN — PANTOPRAZOLE SODIUM 40 MG: 40 INJECTION, POWDER, FOR SOLUTION INTRAVENOUS at 10:39

## 2020-01-05 RX ADMIN — Medication 2 PUFF: at 15:09

## 2020-01-05 RX ADMIN — Medication 2 PUFF: at 23:30

## 2020-01-05 RX ADMIN — CEFEPIME 2 G: 2 INJECTION, POWDER, FOR SOLUTION INTRAVENOUS at 22:10

## 2020-01-05 RX ADMIN — CEFEPIME 2 G: 2 INJECTION, POWDER, FOR SOLUTION INTRAVENOUS at 05:50

## 2020-01-05 RX ADMIN — Medication 2 PUFF: at 19:51

## 2020-01-05 RX ADMIN — Medication 2 PUFF: at 07:10

## 2020-01-05 RX ADMIN — ENOXAPARIN SODIUM 40 MG: 40 INJECTION SUBCUTANEOUS at 10:58

## 2020-01-05 RX ADMIN — ASPIRIN 81 MG: 81 TABLET, COATED ORAL at 10:39

## 2020-01-05 RX ADMIN — MONTELUKAST SODIUM 10 MG: 10 TABLET, COATED ORAL at 22:39

## 2020-01-05 RX ADMIN — Medication 2 PUFF: at 07:09

## 2020-01-05 RX ADMIN — CEFEPIME 2 G: 2 INJECTION, POWDER, FOR SOLUTION INTRAVENOUS at 12:41

## 2020-01-05 RX ADMIN — Medication 2 PUFF: at 11:39

## 2020-01-05 RX ADMIN — VANCOMYCIN HYDROCHLORIDE 1250 MG: 10 INJECTION, POWDER, LYOPHILIZED, FOR SOLUTION INTRAVENOUS at 13:26

## 2020-01-05 RX ADMIN — VANCOMYCIN HYDROCHLORIDE 1250 MG: 10 INJECTION, POWDER, LYOPHILIZED, FOR SOLUTION INTRAVENOUS at 01:23

## 2020-01-05 ASSESSMENT — PAIN SCALES - GENERAL: PAINLEVEL_OUTOF10: 0

## 2020-01-05 NOTE — PROGRESS NOTES
RESPIRATORY THERAPY ASSESSMENT    Name:  Deya Dupont  Medical Record Number:  [id number]  Age: 62 y.o. Gender: female  : 1960  Today's Date:  2020  Room:  /0321-02    Assessment     Is the patient being admitted for a COPD or Asthma exacerbation? Yes   (If yes the patient will be seen every 4 hours for the first 24 hours and then reassessed)    Patient Admission Diagnosis      Allergies  Allergies   Allergen Reactions    Promethazine Other (See Comments)     Extreme confusion (1/3/20)    Codeine Swelling       Minimum Predicted Vital Capacity:     816          Actual Vital Capacity:      1190              Pulmonary History: Asthma, copd  Home Oxygen Therapy:  2 liters/min via nasal cannula  Home Respiratory Therapy: anoro daily, albuterol qid and prn, trelegy, flovent   Current Respiratory Therapy:  Albuterol/atrovent mdi q4 w/a and prn  Treatment Type: MDI  Medications: Ipratropium Bromide    Respiratory Severity Index(RSI)   Patients with orders for inhalation medications, oxygen, or any therapeutic treatment modality will be placed on Respiratory Protocol. They will be assessed with the first treatment and at least every 72 hours thereafter. The following severity scale will be used to determine frequency of treatment intervention.     Smoking History: Severe Exacerbation = 4    Social History  Social History     Tobacco Use    Smoking status: Current Every Day Smoker     Packs/day: 0.50     Years: 44.00     Pack years: 22.00     Types: Cigarettes    Smokeless tobacco: Never Used   Substance Use Topics    Alcohol use: Yes     Comment: rarely    Drug use: No       Recent Surgical History: None = 0  Past Surgical History  Past Surgical History:   Procedure Laterality Date     SECTION      x2    CHOLECYSTECTOMY         Level of Consciousness: Alert, Oriented, and Cooperative = 0    Level of Activity: Walking with assistance = 1    Respiratory Pattern: Dyspnea with bpm  c. RR < 30 bpm                d. Can demonstrate a 2-3 second inspiratory hold  4. Bronchodilators will be administered via Hand Held Nebulizer CUR[facility]) to patients when ANY of the following criteria are met  a. Incognizant or uncooperative          b. Patients treated with HHN at Home        c. Unable to demonstrate proper use of MDI with spacer     d. RR > 30 bpm   5. Bronchodilators will be delivered via Metered Dose Inhaler (MDI), HHN, Aerogen to intubated patients on mechanical ventilation. 6. Inhalation medication orders will be delivered and/or substituted as outlined below. Aerosolized Medications Ordering and Administration Guidelines:    1. All Medications will be ordered by a physician, and their frequency and/or modality will be adjusted as defined by the patients Respiratory Severity Index (RSI) score. 2. If the patient does not have documented COPD, consider discontinuing anticholinergics when RSI is less than 9.  3. If the bronchospasm worsens (increased RSI), then the bronchodilator frequency can be increased to a maximum of every 4 hours. If greater than every 4 hours is required, the physician will be contacted. 4. If the bronchospasm improves, the frequency of the bronchodilator can be decreased, based on the patient's RSI, but not less than home treatment regimen frequency. 5. Bronchodilator(s) will be discontinued if patient has a RSI less than 9 and has received no scheduled or as needed treatment for 72  Hrs. Patients Ordered on a Mucolytic Agent:    1. Must always be administered with a bronchodilator. 2. Discontinue if patient experiences worsened bronchospasm, or secretions have lessened to the point that the patient is able to clear them with a cough. Anti-inflammatory and Combination Medications:    1.  If the patient lacks prior history of lung disease, is not using inhaled anti-inflammatory medication at home, and lacks wheezing by examination or by history for at least 24 hours, contact physician for possible discontinuation.

## 2020-01-05 NOTE — PROGRESS NOTES
Report received from Nghia Crowley [facility]. Patient transferred to PCU room 321-02 via bed from ICU at this time. Tele monitor in place, NSR/ST. Assessment complete as charted. VSS. A/Ox4. Unlabored breathing at rest on 4L NC. See MAR for med administration. Baugh in place draining ramona colored urine. Safety measures in place and will continue to monitor.

## 2020-01-05 NOTE — PROGRESS NOTES
Baugh catheter removed per protocol and MD order at this time. Patient tolerated without complaint. Placed pull-up per patient request, and requested that patient notify staff at first void. S/S to monitor for post removal discussed with patient. Call light in reach. Bed alarm on. Will continue to monitor.

## 2020-01-05 NOTE — PROGRESS NOTES
g Intravenous Q8H    polyethylene glycol  17 g Oral Daily    insulin lispro  0-6 Units Subcutaneous TID     enoxaparin  40 mg Subcutaneous Daily    pantoprazole  40 mg Intravenous Daily    aspirin  81 mg Oral Daily    lidocaine 1 % injection  5 mL Intradermal Once    sodium chloride flush  10 mL Intravenous 2 times per day    atorvastatin  40 mg Oral Nightly    levothyroxine  137 mcg Oral Daily    montelukast  10 mg Oral Nightly    nicotine  1 patch Transdermal Daily     PRN Meds:  albuterol sulfate HFA, ipratropium, glucose, dextrose, glucagon (rDNA), dextrose, nitroGLYCERIN, sodium chloride flush, perflutren lipid microspheres, magnesium hydroxide, ondansetron, acetaminophen    Results:  CBC:   Recent Labs     01/03/20  0603 01/04/20  0609 01/05/20  0454   WBC 16.9* 19.8* 18.7*   HGB 11.3* 12.4 13.3   HCT 34.0* 37.2 41.1   MCV 95.1 93.7 96.1    315 336     BMP:   Recent Labs     01/03/20  0603 01/04/20  0609 01/05/20  0454    143 137   K 4.1 3.9 3.9   CL 99 102 95*   CO2 34* 31 32   BUN 21* 25* 25*   CREATININE <0.5* <0.5* <0.5*     LIVER PROFILE: No results for input(s): AST, ALT, LIPASE, BILIDIR, BILITOT, ALKPHOS in the last 72 hours. Invalid input(s):   AMYLASE,  ALB    Cultures:  12/25/2019 blood no growth  12/25/2019 blood micrococcus  12/27/2019 tracheal aspirate NRF  12/31/2019 tracheal aspirate Serratia  Serratia marcescens (1)     Antibiotic Interpretation MUSA Status    ceFAZolin Resistant >=64 mcg/mL     cefepime Sensitive <=0.12 mcg/mL     cefTRIAXone Sensitive <=0.25 mcg/mL     ciprofloxacin Sensitive <=0.25 mcg/mL     ertapenem Sensitive <=0.12 mcg/mL     gentamicin Sensitive <=1 mcg/mL     levofloxacin Sensitive <=0.12 mcg/mL     trimethoprim-sulfamethoxazole Sensitive <=20 mcg/mL         Films:  CXR was reviewed by me and it showed   1/2/2019 RLL infiltrate, ETT slightly high    ASSESSMENT:  · Acute on chronic hypoxemic and hypercapnic respiratory failure, normally on 2 L home oxygen  · Leukocytosis - slightly improved  · COPD with acute exacerbation  · HCAP/VAP, new on 12/31/19  · Chest pain  · Ongoing tobacco abuse    PLAN:  · BiPAP PRN, has had some nausea and is resistant to wearing at THE [facility] of bed 30 degrees or higher at all times  Change to prednisone   Inhaled bronchodilators   Cefepime and vancomycin day #6/7;completed 7 days doxycycline --with relatively poor response in WBC count and tenuous clinical condition, I chose to not narrow abx spectrum. Monitoring of vancomycin levels to prevent toxicity.    Cardiology has seen and signed off; recommended [facility] after resolution of acute illness  CVC is out   PT/OT  Tobacco cessation  D/C planning with close outpatient cardiology follow up

## 2020-01-05 NOTE — PROGRESS NOTES
4 Eyes Skin Assessment     The patient is being assess for   Shift Handoff    I agree that 2 RN's have performed a thorough Head to Toe Skin Assessment on the patient. ALL assessment sites listed below have been assessed. Areas assessed by both nurses:   [x]   Head, Face, and Ears   [x]   Shoulders, Back, and Chest, Abdomen  [x]   Arms, Elbows, and Hands   [x]   Coccyx, Sacrum, and Ischium  [x]   Legs, Feet, and Heels        Blanching redness to heels and sacrum. Scattered bruising. No open areas noted. **SHARE this note so that the co-signing nurse is able to place an eSignature**    Co-signer eSignature: Electronically signed by Valentino Curling, RN on 1/5/20 at 1:43 PM    Does the Patient have Skin Breakdown?   No          Ron Prevention initiated:  Yes   Wound Care Orders initiated:  NA      [facility] nurse consulted for Pressure Injury (Stage 3,4, Unstageable, DTI, NWPT, Complex wounds)and New or Established Ostomies:  NA      Primary Nurse eSignature: Electronically signed by Melida Holley on 1/5/20 at 7:49 AM

## 2020-01-05 NOTE — PLAN OF CARE
Problem: Falls - Risk of:  Goal: Will remain free from falls  Description  Will remain free from falls  1/5/2020 0050 by Concepcion Reina  Outcome: Ongoing  1/4/2020 1104 by Ksenia Mallory RN  Outcome: Ongoing  Goal: Absence of physical injury  Description  Absence of physical injury  1/5/2020 0050 by Concepcion Reina  Outcome: Ongoing  1/4/2020 1104 by Ksenia Mallory RN  Outcome: Ongoing     Problem: Risk for Impaired Skin Integrity  Goal: Tissue integrity - skin and mucous membranes  Description  Structural intactness and normal physiological function of skin and  mucous membranes. 1/5/2020 0050 by Concepcion Reina  Outcome: Ongoing  1/4/2020 1104 by Ksenia Mallory RN  Outcome: Ongoing     Problem: Discharge Planning:  Goal: Discharged to appropriate level of care  Description  Discharged to appropriate level of care  Outcome: Ongoing     Problem:  Activity Intolerance:  Goal: Ability to tolerate increased activity will improve  Description  Ability to tolerate increased activity will improve  1/5/2020 0050 by Concepcion Reina  Outcome: Ongoing  1/4/2020 1104 by Ksenia Mallory RN  Outcome: Ongoing     Problem: Airway Clearance - Ineffective:  Goal: Ability to maintain a clear airway will improve  Description  Ability to maintain a clear airway will improve  1/5/2020 0050 by Concepcion Reina  Outcome: Ongoing  1/4/2020 1104 by Ksenia Mallory RN  Outcome: Ongoing     Problem: Breathing Pattern - Ineffective:  Goal: Ability to achieve and maintain a regular respiratory rate will improve  Description  Ability to achieve and maintain a regular respiratory rate will improve  1/5/2020 0050 by Concepcion Reina  Outcome: Ongoing  1/4/2020 1104 by Ksenia Mallory RN  Outcome: Ongoing     Problem: Gas Exchange - Impaired:  Goal: Levels of oxygenation will improve  Description  Levels of oxygenation will improve  1/5/2020 0050 by Concepcion Reina  Outcome: Ongoing  1/4/2020 1104 by Ksenia Mallory

## 2020-01-05 NOTE — PROGRESS NOTES
Shift report given to Masoud Meza RN  at bedside. Patient care handed off in stable condition at this time.    Electronically signed by Kelly Mckenzie RN on 1/4/2020 at 7:12 PM

## 2020-01-06 VITALS
WEIGHT: 127.1 LBS | DIASTOLIC BLOOD PRESSURE: 62 MMHG | OXYGEN SATURATION: 93 % | TEMPERATURE: 97.3 F | RESPIRATION RATE: 18 BRPM | HEIGHT: 64 IN | HEART RATE: 84 BPM | SYSTOLIC BLOOD PRESSURE: 116 MMHG | BODY MASS INDEX: 21.7 KG/M2

## 2020-01-06 LAB
ANION GAP SERPL CALCULATED.3IONS-SCNC: 8 MMOL/L (ref 3–16)
BASOPHILS ABSOLUTE: 0.2 K/UL (ref 0–0.2)
BASOPHILS RELATIVE PERCENT: 1 %
BUN BLDV-MCNC: 16 MG/DL (ref 7–20)
CALCIUM SERPL-MCNC: 8.8 MG/DL (ref 8.3–10.6)
CHLORIDE BLD-SCNC: 96 MMOL/L (ref 99–110)
CO2: 32 MMOL/L (ref 21–32)
CREAT SERPL-MCNC: <0.5 MG/DL (ref 0.6–1.2)
EOSINOPHILS ABSOLUTE: 0.4 K/UL (ref 0–0.6)
EOSINOPHILS RELATIVE PERCENT: 2 %
GFR AFRICAN AMERICAN: >60
GFR NON-AFRICAN AMERICAN: >60
GLUCOSE BLD-MCNC: 131 MG/DL (ref 70–99)
GLUCOSE BLD-MCNC: 85 MG/DL (ref 70–99)
GLUCOSE BLD-MCNC: 95 MG/DL (ref 70–99)
HCT VFR BLD CALC: 37.3 % (ref 36–48)
HEMATOLOGY PATH CONSULT: NORMAL
HEMATOLOGY PATH CONSULT: YES
HEMOGLOBIN: 12.1 G/DL (ref 12–16)
LYMPHOCYTES ABSOLUTE: 5.6 K/UL (ref 1–5.1)
LYMPHOCYTES RELATIVE PERCENT: 28 %
MCH RBC QN AUTO: 30.9 PG (ref 26–34)
MCHC RBC AUTO-ENTMCNC: 32.6 G/DL (ref 31–36)
MCV RBC AUTO: 94.9 FL (ref 80–100)
MONOCYTES ABSOLUTE: 0.8 K/UL (ref 0–1.3)
MONOCYTES RELATIVE PERCENT: 4 %
NEUTROPHILS ABSOLUTE: 13.1 K/UL (ref 1.7–7.7)
NEUTROPHILS RELATIVE PERCENT: 65 %
PDW BLD-RTO: 13.6 % (ref 12.4–15.4)
PERFORMED ON: ABNORMAL
PERFORMED ON: NORMAL
PLATELET # BLD: 335 K/UL (ref 135–450)
PLATELET SLIDE REVIEW: ADEQUATE
PMV BLD AUTO: 8.2 FL (ref 5–10.5)
POTASSIUM SERPL-SCNC: 3.3 MMOL/L (ref 3.5–5.1)
RBC # BLD: 3.93 M/UL (ref 4–5.2)
SLIDE REVIEW: ABNORMAL
SODIUM BLD-SCNC: 136 MMOL/L (ref 136–145)
VANCOMYCIN TROUGH: 13.8 UG/ML (ref 10–20)
WBC # BLD: 20.1 K/UL (ref 4–11)

## 2020-01-06 PROCEDURE — 99238 HOSP IP/OBS DSCHRG MGMT 30/<: CPT | Performed by: INTERNAL MEDICINE

## 2020-01-06 PROCEDURE — 99232 SBSQ HOSP IP/OBS MODERATE 35: CPT | Performed by: INTERNAL MEDICINE

## 2020-01-06 PROCEDURE — 97116 GAIT TRAINING THERAPY: CPT

## 2020-01-06 PROCEDURE — 6370000000 HC RX 637 (ALT 250 FOR IP): Performed by: INTERNAL MEDICINE

## 2020-01-06 PROCEDURE — 2580000003 HC RX 258: Performed by: INTERNAL MEDICINE

## 2020-01-06 PROCEDURE — 97535 SELF CARE MNGMENT TRAINING: CPT

## 2020-01-06 PROCEDURE — 6360000002 HC RX W HCPCS: Performed by: INTERNAL MEDICINE

## 2020-01-06 PROCEDURE — 36415 COLL VENOUS BLD VENIPUNCTURE: CPT

## 2020-01-06 PROCEDURE — C9113 INJ PANTOPRAZOLE SODIUM, VIA: HCPCS | Performed by: INTERNAL MEDICINE

## 2020-01-06 PROCEDURE — 85025 COMPLETE CBC W/AUTO DIFF WBC: CPT

## 2020-01-06 PROCEDURE — 80048 BASIC METABOLIC PNL TOTAL CA: CPT

## 2020-01-06 PROCEDURE — 97530 THERAPEUTIC ACTIVITIES: CPT

## 2020-01-06 PROCEDURE — 80202 ASSAY OF VANCOMYCIN: CPT

## 2020-01-06 PROCEDURE — 94640 AIRWAY INHALATION TREATMENT: CPT

## 2020-01-06 RX ORDER — MONTELUKAST SODIUM 10 MG/1
10 TABLET ORAL NIGHTLY
Qty: 30 TABLET | Refills: 3 | Status: SHIPPED | OUTPATIENT
Start: 2020-01-06

## 2020-01-06 RX ORDER — POLYETHYLENE GLYCOL 3350 17 G/17G
17 POWDER, FOR SOLUTION ORAL DAILY
Qty: 527 G | Refills: 1 | Status: SHIPPED | OUTPATIENT
Start: 2020-01-07 | End: 2020-02-06

## 2020-01-06 RX ORDER — ATORVASTATIN CALCIUM 40 MG/1
40 TABLET, FILM COATED ORAL NIGHTLY
Qty: 30 TABLET | Refills: 3 | Status: ON HOLD | OUTPATIENT
Start: 2020-01-06 | End: 2022-01-30

## 2020-01-06 RX ORDER — POTASSIUM CHLORIDE 750 MG/1
20 TABLET, EXTENDED RELEASE ORAL ONCE
Status: COMPLETED | OUTPATIENT
Start: 2020-01-06 | End: 2020-01-06

## 2020-01-06 RX ORDER — MULTIVITAMIN
1 TABLET ORAL DAILY
Qty: 30 TABLET | Refills: 3 | Status: SHIPPED | OUTPATIENT
Start: 2020-01-06

## 2020-01-06 RX ORDER — ASPIRIN 81 MG/1
81 TABLET ORAL DAILY
Qty: 30 TABLET | Refills: 3 | Status: SHIPPED | OUTPATIENT
Start: 2020-01-07

## 2020-01-06 RX ORDER — PREDNISONE 10 MG/1
TABLET ORAL
Qty: 18 TABLET | Refills: 0 | Status: ON HOLD | OUTPATIENT
Start: 2020-01-06 | End: 2020-08-26 | Stop reason: SDUPTHER

## 2020-01-06 RX ORDER — NICOTINE 21 MG/24HR
1 PATCH, TRANSDERMAL 24 HOURS TRANSDERMAL DAILY
Qty: 30 PATCH | Refills: 0 | Status: SHIPPED | OUTPATIENT
Start: 2020-01-07 | End: 2020-08-23

## 2020-01-06 RX ORDER — LEVOTHYROXINE SODIUM 137 UG/1
137 TABLET ORAL DAILY
Qty: 30 TABLET | Refills: 3 | Status: ON HOLD | OUTPATIENT
Start: 2020-01-07 | End: 2020-08-26 | Stop reason: HOSPADM

## 2020-01-06 RX ADMIN — Medication 2 PUFF: at 10:29

## 2020-01-06 RX ADMIN — Medication 2 PUFF: at 06:53

## 2020-01-06 RX ADMIN — PANTOPRAZOLE SODIUM 40 MG: 40 INJECTION, POWDER, FOR SOLUTION INTRAVENOUS at 08:17

## 2020-01-06 RX ADMIN — CEFEPIME 2 G: 2 INJECTION, POWDER, FOR SOLUTION INTRAVENOUS at 06:10

## 2020-01-06 RX ADMIN — ENOXAPARIN SODIUM 40 MG: 40 INJECTION SUBCUTANEOUS at 08:17

## 2020-01-06 RX ADMIN — CEFEPIME 2 G: 2 INJECTION, POWDER, FOR SOLUTION INTRAVENOUS at 12:45

## 2020-01-06 RX ADMIN — LEVOTHYROXINE SODIUM 137 MCG: 25 TABLET ORAL at 06:11

## 2020-01-06 RX ADMIN — Medication 2 PUFF: at 06:54

## 2020-01-06 RX ADMIN — VANCOMYCIN HYDROCHLORIDE 1250 MG: 10 INJECTION, POWDER, LYOPHILIZED, FOR SOLUTION INTRAVENOUS at 01:05

## 2020-01-06 RX ADMIN — Medication 10 ML: at 08:26

## 2020-01-06 RX ADMIN — FLUTICASONE PROPIONATE 2 SPRAY: 50 SPRAY, METERED NASAL at 08:19

## 2020-01-06 RX ADMIN — VANCOMYCIN HYDROCHLORIDE 1250 MG: 10 INJECTION, POWDER, LYOPHILIZED, FOR SOLUTION INTRAVENOUS at 13:25

## 2020-01-06 RX ADMIN — POTASSIUM CHLORIDE 20 MEQ: 750 TABLET, EXTENDED RELEASE ORAL at 12:06

## 2020-01-06 RX ADMIN — PREDNISONE 40 MG: 20 TABLET ORAL at 08:18

## 2020-01-06 RX ADMIN — ASPIRIN 81 MG: 81 TABLET, COATED ORAL at 08:17

## 2020-01-06 ASSESSMENT — PAIN SCALES - GENERAL: PAINLEVEL_OUTOF10: 0

## 2020-01-06 NOTE — PROGRESS NOTES
Meds:  albuterol sulfate HFA, ipratropium, glucose, dextrose, glucagon (rDNA), dextrose, nitroGLYCERIN, sodium chloride flush, perflutren lipid microspheres, magnesium hydroxide, ondansetron, acetaminophen    Labs:  CBC:   Recent Labs     01/04/20  0609 01/05/20  0454 01/06/20  0528   WBC 19.8* 18.7* 20.1*   HGB 12.4 13.3 12.1   HCT 37.2 41.1 37.3   MCV 93.7 96.1 94.9    336 335     BMP:   Recent Labs     01/04/20  0609 01/05/20  0454 01/06/20  0528    137 136   K 3.9 3.9 3.3*    95* 96*   CO2 31 32 32   BUN 25* 25* 16   CREATININE <0.5* <0.5* <0.5*     LIVER PROFILE: No results for input(s): AST, ALT, LIPASE, BILIDIR, BILITOT, ALKPHOS in the last 72 hours. Invalid input(s): AMYLASE,  ALB  PT/INR: No results for input(s): PROTIME, INR in the last 72 hours. APTT: No results for input(s): APTT in the last 72 hours. UA:No results for input(s): NITRITE, COLORU, PHUR, LABCAST, WBCUA, RBCUA, MUCUS, TRICHOMONAS, YEAST, BACTERIA, CLARITYU, SPECGRAV, LEUKOCYTESUR, UROBILINOGEN, BILIRUBINUR, BLOODU, GLUCOSEU, AMORPHOUS in the last 72 hours. Invalid input(s): KETONESU  No results for input(s): PHART, DEP6DTH, PO2ART in the last 72 hours.       Assessment:   Acute on chronic respiratory failure with hypoxemia and hypercapnia  · COPD with AE  · HCAP  · Tobacco abuse      Plan:  · Pred taper  · Inhaled bronchodilators  · Cefepime and vanc D7/7  · D/c ok from my perspective

## 2020-01-06 NOTE — PROGRESS NOTES
Occupational Therapy Daily Treatment Note    Unit: PCU  Date:  1/6/2020  Patient Name:    Jeny Rothman  Admitting diagnosis:  COPD exacerbation ([facility] [J44.1]  Admit Date:  12/25/2019  Precautions/Restrictions:   fall risk, IV, mitchell catheter , supplemental O2 (3L), telemetry, PICC R brachial      Discharge Recommendations: Acute Rehab (ARU)/ Inpatient Rehab Facility (IRF)  DME needs for discharge: defer to facility       Therapy recommendations for staff:   Assist of 1 (minimal assist) with use of rolling walker (RW) for all transfers to/from BSC/chair    AM-PAC Score: [address] S4 Level: Level 3- Safety        Treatment Time:  8:33-9:10  Treatment number:  2    Total Treatment Time:  37 minutes      Subjective:  Patient supine in bed and requesting to get up upon therapist arrival.     Pain   No  Rating: NA  Location:  Pain Medicine Status: Denies need      Bed Mobility:   Supine to Sit:  SBA  Sit to Supine:  Not Tested  Rolling:           Not Tested  Scooting:        SBA    Transfer Training:   Sit to stand: Mod A from EOB to STEDY and from chair to RW  Stand to sit:  Min A  Bed to Chair:  Total A via Stedy. Patient able to take 5 steps using RW  Bed to [facility]:   Not Tested  Standard toilet:   Not Tested    Activity Tolerance   Pt completed therapy session with SOB noted with activity  SpO2: 89-92%  HR: 110-154  BP:     ADL Training:   Upper body dressing:  Min A  Upper body bathing:  SBA  Lower body dressing:  Not Tested  Lower body bathing:  Not Tested  Toileting:   Not Tested  Grooming/Hygiene:  Min A    Therapeutic Exercise:   Scapular retraction:  x10    Patient Education:   Role of OT  Recommendations for DC    Positioning Needs:   Up in chair, call light and needs in reach. Alarm Set    Family Present:  Yes    Assessment: Patient demonstrates significant improvements from previous session. However, continues to require assistance for mobility and ADLs. Patient would benefit from SNF prior to DC home.

## 2020-01-06 NOTE — PROGRESS NOTES
backwards  Deviations (firm surface/linoleum): decreased adelfo, Decreased step length on R, Decreased step length on L, Decreased step height on R, Decreased step height on L, lack heel strike on L  and lack heel strike on R   Assistive Device Used:  rolling walker (RW)  Level of Assist: Min A  Comment: VC for RW management    Stair Training deferred, pt unsafe/not appropriate to complete stairs at this time  Pt ascended/descended  stairs with N/A with N/A, and use of N/A. Pattern: N/A    Therapeutic Exercise all completed bilaterally unless indicated   LAQ: x 2      Balance  Static Sitting:  Good -    Tolerance: ~ 10 min with intermittent UE support  Dynamic Sitting:  Fair +  Static Standing: Fair    Tolerance: ~ 2 min   Dynamic Standing: Fair     Patient Education      Role of PT, POC, Discharge recommendations, safety awareness, transfer techniques, HEP and calling for assist with mobility. Positioning Needs       Pt sitting up in chair, call light and needs in reach, alarm set and pillows placed for support/comfort. Activity Tolerance   Pt completed therapy session with SOB noted with ambulation, able to recover with seated rest.  SpO2: 89-93% on 3L O2  HR: 100s at rest   154 bpm with ambulation, recovered 118 bpm once seated  BP:     RN notified of pt's vitals with mobility    Other  None. Assessment :  Patient demonstrates improved functional activity tolerance demonstrated by ability to participate in ambulation. Pt requiring mod A to control sit to stand transfer. Increased HR and O2 desaturation with < 5 ft of ambulation. Pt very motivated performing UE exercises outside of therapy sessions and supportive family to provided assistance. Recommend intensive inpatient rehab at discharge to safely progress IND with functional mobility. If declining will require strict 24/7 assist, RW and home PT at discharge. Goals (all goals ongoing unless otherwise indicated)  To be met in 3 visits:  1).

## 2020-01-06 NOTE — PROGRESS NOTES
Hospitalist Progress Note      PCP: Amena Gomez MD    Date of Admission: 12/25/2019    Hospital Course  Acute respiratory failure with hypoxemia, COPD, intubated in ICU after failing BiPAP   :  Extubated and transfered out     Subjective: In bed feels less sob  Cough   Wants to  Go home   No chestpain       Medications:  Reviewed    Infusion Medications    dextrose       Scheduled Medications    [START ON 1/6/2020] predniSONE  40 mg Oral Daily    albuterol sulfate HFA  2 puff Inhalation Q4H WA    ipratropium  2 puff Inhalation Q4H WA    vancomycin  1,250 mg Intravenous Q12H    fluticasone  2 spray Each Nostril Daily    cefepime  2 g Intravenous Q8H    polyethylene glycol  17 g Oral Daily    insulin lispro  0-6 Units Subcutaneous TID WC    enoxaparin  40 mg Subcutaneous Daily    pantoprazole  40 mg Intravenous Daily    aspirin  81 mg Oral Daily    lidocaine 1 % injection  5 mL Intradermal Once    sodium chloride flush  10 mL Intravenous 2 times per day    atorvastatin  40 mg Oral Nightly    levothyroxine  137 mcg Oral Daily    montelukast  10 mg Oral Nightly    nicotine  1 patch Transdermal Daily     PRN Meds: albuterol sulfate HFA, ipratropium, glucose, dextrose, glucagon (rDNA), dextrose, nitroGLYCERIN, sodium chloride flush, perflutren lipid microspheres, magnesium hydroxide, ondansetron, acetaminophen      Intake/Output Summary (Last 24 hours) at 1/5/2020 2118  Last data filed at 1/5/2020 1900  Gross per 24 hour   Intake 610 ml   Output 1975 ml   Net -1365 ml       Physical Exam Performed:    BP (!) 144/80   Pulse 87   Temp 97.7 °F (36.5 °C) (Oral)   Resp 18   Ht 5' 4\" (1.626 m)   Wt 126 lb 6.4 oz (57.3 kg)   LMP  (LMP Unknown)   SpO2 92%   BMI 21.70 kg/m²     General appearance: mod resp  distress, appears stated age and cooperative. HEENT: Pupils equal, round, and reactive to light. Conjunctivae/corneas clear. Neck: Supple, with full range of motion.  No jugular venous distention. Trachea midline. Respiratory:  Normal respiratory effort. Clear to auscultation, bilaterally with h scattered s/Wheezes/Rhonchi. Cardiovascular: Regular rate and rhythm with normal S1/S2 without murmurs, rubs or gallops. Abdomen: Soft, non-tender, non-distended with normal bowel sounds. Musculoskeletal: No clubbing, cyanosis or edema bilaterally. Full range of motion without deformity. Skin: Skin color, texture, turgor normal.  No rashes or lesions. Neurologic:  Neurovascularly intact without any focal sensory/motor deficits. Cranial nerves: II-XII intact, grossly non-focal.  Psychiatric: Alert and oriented,       Labs:   Recent Labs     01/03/20  0603 01/04/20  0609 01/05/20  0454   WBC 16.9* 19.8* 18.7*   HGB 11.3* 12.4 13.3   HCT 34.0* 37.2 41.1    315 336     Recent Labs     01/03/20  0603 01/04/20  0609 01/05/20  0454    143 137   K 4.1 3.9 3.9   CL 99 102 95*   CO2 34* 31 32   BUN 21* 25* 25*   CREATININE <0.5* <0.5* <0.5*   CALCIUM 8.6 8.2* 9.0       Radiology:  XR CHEST PORTABLE   Final Result   Stable life support device positioning. No focal airspace disease. XR CHEST PORTABLE   Final Result   Stable life support device positioning. No focal airspace disease. XR CHEST PORTABLE   Final Result   Supportive tubing is stable. Remain clear. Removed lungs remain clear. XR CHEST PORTABLE   Final Result   Stable life support device positioning. Clear lungs. XR CHEST PORTABLE   Final Result   Stable life support device positioning. Blunted costophrenic sulci may represent pleural reflections as opposed to   small effusions given hyperinflation. No focal airspace disease. XR CHEST PORTABLE   Final Result   Stable appearance of the chest with no acute finding. XR CHEST PORTABLE   Final Result   Interval placement of right-sided PICC line. The lungs remain clear.          IR PICC WO SQ PORT/PUMP > 5 YEARS   Final

## 2020-01-06 NOTE — DISCHARGE INSTR - COC
Continuity of Care Form    Patient Name: Deya Dupont   :  1960  MRN:  [id number]    [facility] date:  2019  Discharge date:  2020    Code Status Order: Full Code   Advance Directives:   [address] [facility] Documentation     Date/Time Healthcare Directive Type of Healthcare Directive Copy in [address] Agent's Name Healthcare Agent's Phone Number    [phone]  No, patient does not have an advance directive for healthcare treatment -- -- -- -- --          Admitting Physician:  Zachariah Proctor MD  PCP: Sandy Schneider MD    Discharging Nurse: Jose Cuello Unit/Room#: /4102-30  Discharging Unit Phone Number: [phone]    Emergency Contact:   Extended Emergency Contact Information  Primary Emergency Contact: [facility]  Address: Patsy Mcknight [address] [place] [address] Phone: [phone]  Mobile Phone: [phone]  Relation: Spouse  Secondary Emergency Contact: Leta Shastamontana Philip [address] Phone: [phone]  Relation: Child    Past Surgical History:  Past Surgical History:   Procedure Laterality Date     SECTION      x2    CHOLECYSTECTOMY         Immunization History:   Immunization History   Administered Date(s) Administered    Influenza Vaccine, unspecified formulation 2015, 2017, 2017    Influenza Virus Vaccine 2015, 2017, 2017, 2019    Influenza, Quadv, 6 mo and older, IM, PF (Flulaval, Fluarix) 2019    Pneumococcal Polysaccharide ([id number]) 2018    Tdap (Boostrix, Adacel) 2014       Active Problems:  Patient Active Problem List   Diagnosis Code    Acute respiratory failure with hypoxia and hypercapnia (HCC) J96.01, J96.02    COPD exacerbation ([facility] J44.1    Tobacco abuse Z72.0    Hypothyroidism E03.9    Acute on chronic respiratory failure with hypoxia and hypercapnia ([facility]

## 2020-01-06 NOTE — DISCHARGE SUMMARY
Physician Discharge Summary     Patient ID:  Monique Sage  [id number]  4 y.o.  1960    Admit date: 12/25/2019    Discharge date and time: 1/6/2020  3:20 PM     Admitting Physician: Carmelina Qiu MD     Discharge Physician: Brie Jeong    Admission Diagnoses: COPD exacerbation [facility]) [J44.1]    Discharge Diagnoses: Active Problems:    Acute respiratory failure with hypoxia and hypercapnia (HCC)    COPD exacerbation (HCC)    Tobacco abuse    Acute on chronic respiratory failure with hypoxia and hypercapnia (HCC)    Acute exacerbation of chronic obstructive pulmonary disease (COPD) (HCC)    Elevated troponin    Moderate protein-calorie malnutrition (HCC)    Chest pain on breathing    HCAP (healthcare-associated pneumonia)  Resolved Problems:    * No resolved hospital problems. *      Admission Condition: fair    Discharged Condition: stable    Indication for Admission:    per HPI  The patient is a 62 y.o. female with severe COPD, asthma, chronic hypoxic respiratory failure (on 2 L O2) and hypothyroidism who presents to [facility] with c/o shortness of breath. She states she was not feeling well last night and felt like she was coming down with something. She took some Mucinex and woke up feeling short of breath this morning at approximately 0230. She reports her cough was productive, now non-productive. Denies fevers, chills, abdominal pain, nausea, vomiting. She reports having a burning sensation in her chest prior to arrival.  This is now resolved. She has also had some diarrhea today. She f/w Dr. Sergio Gary. She is compliant with her inhalers. She wears 2 L O2 continuously at home. She continues to smoke 5-6 cigarettes a day. Labs with leukocytosis. She is tachypneic and tachycardic. BP is elevated. CXR with COPD. Patient admitted to ICU on BiPAP.   Pulmonology consulted.         Hospital Course:     Acute on chronic resp failure with hypoxia   -due to copd exacerbation/HCAP  Patient admitted with COPD exacerbation and acute respiratory failure,  failed BiPAP,  status post intubation and mechanical ventilation. -  stable transferred out of ICU.   - Currently O2 sats is stable on 3 L.    - Seen by PT OT - > rehab recommended- >  patient prefers to go home with home health care , she states she has enough help at home. She will be discharged home with home health care,  on home oxygen 3 L      COPD exacerbation   - continue nebulizer treatments and steroid taper. HCAP - Healthcare associated pneumonia  - gram negative infection   - She has completed 1 week of IV antibiotics vancomycin and cefepime day 7/7  -  sputum cultures grew Serratia. Tobacco abuse   - patient has been advised to quit smoking.  - she was placed on nicotine patch and she has been continued on that on DC      Leukocytosis   - due to PNA, steroids   - White count 20,000. Davonmontana Humphreycrystal She needs close follow-up with her primary care physician . Chest pain/abn EKG  Elevated troponin  - Echo normal   - she was seen by cardiologist while in the ICU she needs outpatient cardiac stress testing once her pulmonary status is stabilized.          DVT Prophylaxis: lovonex  Diet: DIET DYSPHAGIA MINCED AND MOIST; Dysphagia Minced and Moist  Code Status: Full Code         Consults: pulmonary/intensive care, Cardiology      Significant Diagnostic Studies:   Data:    Radiology:  XR CHEST PORTABLE   Final Result   Stable life support device positioning.       No focal airspace disease.           XR CHEST PORTABLE   Final Result   Stable life support device positioning.       No focal airspace disease.           XR CHEST PORTABLE   Final Result   Supportive tubing is stable.       Remain clear.   Removed lungs remain clear.           XR CHEST PORTABLE   Final Result   Stable life support device positioning.       Clear lungs.           XR CHEST PORTABLE   Final Result   Stable life support device positioning.       Blunted costophrenic sulci may represent pleural reflections as opposed to   small effusions given hyperinflation. No focal airspace disease.           XR CHEST PORTABLE   Final Result   Stable appearance of the chest with no acute finding.           XR CHEST PORTABLE   Final Result   Interval placement of right-sided PICC line. The lungs remain clear.           IR PICC WO SQ PORT/PUMP > 5 YEARS   Final Result       XR CHEST PORTABLE   Final Result   1. Endotracheal tube terminating 7.5 cm the alley.           XR CHEST PORTABLE   Final Result   COPD               CBC:   Recent Labs     01/04/20  0609 01/05/20  0454 01/06/20 0528   WBC 19.8* 18.7* 20.1*   RBC 3.97* 4.28 3.93*   HGB 12.4 13.3 12.1   HCT 37.2 41.1 37.3   MCV 93.7 96.1 94.9   RDW 13.9 13.4 13.6    336 335     BMP:   Recent Labs     01/04/20  0609 01/05/20 0454 01/06/20 0528    137 136   K 3.9 3.9 3.3*    95* 96*   CO2 31 32 32   BUN 25* 25* 16   CREATININE <0.5* <0.5* <0.5*     FASTING LIPID PANEL:  Lab Results   Component Value Date    TRIG 184 (H) 12/30/2019       Treatments: as above      Discharge Exam:  Blood pressure 116/62, pulse 84, temperature 97.3 °F (36.3 °C), temperature source Oral, resp. rate 18, height 5' 4\" (1.626 m), weight 127 lb 1.6 oz (57.7 kg), SpO2 93 %. General appearance:  No resp distress, appears stated age and cooperative. HEENT: Pupils equal, round, and reactive to light. Conjunctivae/corneas clear. Neck: Supple, with full range of motion. No jugular venous distention. Trachea midline. Respiratory:  Normal respiratory effort. Clear to auscultation, bilaterally with h scattered s/Wheezes/Rhonchi. Cardiovascular: Regular rate and rhythm with normal S1/S2 without murmurs, rubs or gallops. Abdomen: Soft, non-tender, non-distended with normal bowel sounds. Musculoskeletal: No clubbing, cyanosis or edema bilaterally. Full range of motion without deformity.   Skin: Skin color, texture, turgor normal.  No rashes or lesions. Neurologic:  Neurovascularly intact without any focal sensory/motor deficits. Cranial nerves: II-XII intact, grossly non-focal.  Psychiatric: Alert and oriented        Disposition: home with Neal Bueno      Patient Instructions:      Medication List      START taking these medications    aspirin 81 MG EC tablet  Take 1 tablet by mouth daily  Start taking on:  January 7, 2020     atorvastatin 40 MG tablet  Commonly known as:  LIPITOR  Take 1 tablet by mouth nightly     nicotine 14 MG/24HR  Commonly known as:  NICODERM CQ  Place 1 patch onto the skin daily  Start taking on:  January 7, 2020     polyethylene glycol packet  Commonly known as:  GLYCOLAX  Take 17 g by mouth daily  Start taking on:  January 7, 2020        CHANGE how you take these medications    levothyroxine 137 MCG tablet  Commonly known as:  SYNTHROID  Take 1 tablet by mouth daily  Start taking on:  January 7, 2020  What changed:  Another medication with the same name was removed. Continue taking this medication, and follow the directions you see here. multivitamin tablet  Take 1 tablet by mouth daily  What changed:  when to take this     predniSONE 10 MG tablet  Commonly known as:  DELTASONE  Take 3 tabs a day for 3 days,  Then 2 tabs a day for 3 days ,  Then 1 tab a day for 3 days. then back to 5 mg daily as before  What changed:    · medication strength  · additional instructions  · Another medication with the same name was removed. Continue taking this medication, and follow the directions you see here.         CONTINUE taking these medications    * albuterol sulfate  (90 Base) MCG/ACT inhaler  Inhale 2 puffs into the lungs every 6 hours as needed for Wheezing     * albuterol (2.5 MG/3ML) 0.083% nebulizer solution  Commonly known as:  PROVENTIL  Take 3 mLs by nebulization every 6 hours as needed for Wheezing     ANORO ELLIPTA 62.5-25 MCG/INH Aepb inhaler  Generic drug:  umeclidinium-vilanterol     montelukast 10 MG tablet  Commonly known as:  LUIZIR  Take 1 tablet by mouth nightly     TRELEGY ELLIPTA 100-62.5-25 MCG/INH Aepb  Generic drug:  fluticasone-umeclidin-vilant         * This list has 2 medication(s) that are the same as other medications prescribed for you. Read the directions carefully, and ask your doctor or other care provider to review them with you. STOP taking these medications    FLOVENT DISKUS 250 MCG/BLIST Aepb inhaler  Generic drug:  fluticasone propionate     fluticasone 50 MCG/ACT nasal spray  Commonly known as:  FLONASE           Where to Get Your Medications      These medications were sent to [facility], [address], [address] [address]    Phone:  [phone]   · aspirin 81 MG EC tablet  · atorvastatin 40 MG tablet  · levothyroxine 137 MCG tablet  · montelukast 10 MG tablet  · multivitamin tablet  · nicotine 14 MG/24HR  · polyethylene glycol packet     You can get these medications from any pharmacy    Bring a paper prescription for each of these medications  · predniSONE 10 MG tablet       Activity: activity as tolerated    Follow-up with PCP in 1 week.       Signed:  Nam Yu  1/6/2020  10:25 AM

## 2020-01-06 NOTE — ADT AUTH CERT
MV: 2019 -2020   Vent Mode: AC/VC Rate Set: 14 bmp/Vt Ordered: 500 mL/ /FiO2 : 75 %      Recent Labs     20   0725 20   1030   PHART 7.456* 7.379   HIC6JGI 54.5* 62.9*   PO2ART 98.3 76.0           IV:      Infusions Meds   · dextrose    · fentaNYL (SUBLIMAZE) infusion Stopped (20 4052)   · dexmedetomidine (PRECEDEX) IV infusion 1 mcg/kg/hr (20 7840)              Vitals:   Blood pressure (!) 108/57, pulse 76, temperature 98 °F (36.7 °C), temperature source Axillary, resp. rate 11, height 5' 4\" (1.626 m), weight 136 lb 3.9 oz (61.8 kg), SpO2 98 %.  on BiPAP   Temp  Av.1 °F (36.7 °C)  Min: 97.8 °F (36.6 °C)  Max: 98.3 °F (36.8 °C)       Intake/Output Summary (Last 24 hours) at 1/3/2020 1672   Last data filed at 1/3/2020 0441      Gross per 24 hour   Intake 1191 ml   Output 3110 ml   Net -1919 ml       EXAM:   General: ill appearing.     Eyes: PERRL. No sclera icterus. No conjunctival injection. ENT: No discharge. Pharynx clear. Neck: Trachea midline. Normal thyroid. Resp: No accessory muscle use. RLL crackles. No wheezing. No rhonchi. No dullness on percussion. CV: Regular rate. Regular rhythm. No mumur or rub. No edema. Peripheral pulses are 2+.  Capillary refill is less than 3 seconds. GI: Non-tender. Non-distended. No masses. No organomegaly. Normal bowel sounds. No hernia. Skin: Warm and dry. No nodule on exposed extremities. No rash on exposed extremities. Lymph: No cervical LAD. No supraclavicular LAD. M/S: No cyanosis. No joint deformity. No clubbing. Neuro: Alert and oriented x3. Patellar reflexes are symmetric.    Psych: No agitation, no anxiety, affect is full.       Scheduled Meds:      Scheduled Medications   · vancomycin 1,250 mg Intravenous Q12H   · fluticasone 2 spray Each Nostril Daily   · ipratropium-albuterol 1 ampule Inhalation Q4H   · cefepime 2 g Intravenous Q8H   · polyethylene glycol 17 g Oral Daily   · insulin lispro 0-6 Units Intravenous Q12H   · aspirin 81 mg Oral Daily   · doxycycline (VIBRAMYCIN)  mg Intravenous Q12H   · chlorhexidine 15 mL Mouth/Throat BID   · lidocaine 1 % injection 5 mL Intradermal Once   · sodium chloride flush 10 mL Intravenous 2 times per day   · albuterol sulfate HFA 6 puff Inhalation Q4H     And   · ipratropium 6 puff Inhalation Q4H   · atorvastatin 40 mg Oral Nightly   · levothyroxine 137 mcg Oral Daily   · montelukast 10 mg Oral Nightly   · sodium chloride flush 10 mL Intravenous 2 times per day   · mupirocin Nasal BID   · nicotine 1 patch Transdermal Daily          PRN Meds:   PRN Medications   ketorolac, nitroGLYCERIN, midazolam, fentanNYL, sodium chloride flush, perflutren lipid microspheres, sodium chloride flush, magnesium hydroxide, ondansetron, acetaminophen, ipratropium-albuterol          Results:   CBC:    Recent Labs     12/28/19   0455 12/29/19   0455 12/30/19   0530   WBC 12.6* 12.1* 11.6*   HGB 13.1 12.5 12.4   HCT 38.3 37.8 37.2   MCV 95.3 94.9 95.2    235 211       BMP:    Recent Labs     12/28/19   0455 12/29/19   0455 12/30/19   0530   * 138 136   K 4.7 4.4 4.3   CL 97* 100 101   CO2 28 30 31   BUN 26* 21* 20   CREATININE <0.5* <0.5* <0.5*       LIVER PROFILE: No results for input(s): AST, ALT, LIPASE, BILIDIR, BILITOT, ALKPHOS in the last 72 hours.       Invalid input(s):  AMYLASE,  ALB       Cultures:   12/25/2019 blood no growth   12/25/2019 blood micrococcus   12/27/2019 tracheal aspirate NRF   Films:   CXR was reviewed by me and it showed    12/30/2019 ET tube and PICC okay, no focal airspace disease       ASSESSMENT:   · Acute on chronic hypoxemic and hypercapnic respiratory failure, normally on 2 L home oxygen   · COPD with acute exacerbation   · Chest pain   · Ongoing tobacco abuse       PLAN:   · Mechanical ventilation as per my orders.  The ventilator was adjusted by me at the bedside for unstable, life threatening respiratory failure.    · IV Propofol for

## 2020-01-06 NOTE — PLAN OF CARE
Problem: Falls - Risk of:  Goal: Will remain free from falls  Description  Will remain free from falls  Outcome: Ongoing  Goal: Absence of physical injury  Description  Absence of physical injury  Outcome: Ongoing     Problem: Risk for Impaired Skin Integrity  Goal: Tissue integrity - skin and mucous membranes  Description  Structural intactness and normal physiological function of skin and  mucous membranes. Outcome: Ongoing     Problem: Discharge Planning:  Goal: Discharged to appropriate level of care  Description  Discharged to appropriate level of care  Outcome: Ongoing     Problem: Activity Intolerance:  Goal: Ability to tolerate increased activity will improve  Description  Ability to tolerate increased activity will improve  Outcome: Ongoing     Problem: Airway Clearance - Ineffective:  Goal: Ability to maintain a clear airway will improve  Description  Ability to maintain a clear airway will improve  Outcome: Ongoing     Problem: Breathing Pattern - Ineffective:  Goal: Ability to achieve and maintain a regular respiratory rate will improve  Description  Ability to achieve and maintain a regular respiratory rate will improve  Outcome: Ongoing     Problem: Gas Exchange - Impaired:  Goal: Levels of oxygenation will improve  Description  Levels of oxygenation will improve  Outcome: Ongoing     Problem: Tobacco Use:  Goal: Inpatient tobacco use cessation counseling participation  Description  Inpatient tobacco use cessation counseling participation  Outcome: Ongoing     Problem: Pain:  Description  Pain management should include both nonpharmacologic and pharmacologic interventions.   Goal: Pain level will decrease  Description  Pain level will decrease  Outcome: Ongoing  Goal: Control of acute pain  Description  Control of acute pain  Outcome: Ongoing  Goal: Control of chronic pain  Description  Control of chronic pain  Outcome: Ongoing     Problem: Nutrition  Goal: Optimal nutrition therapy  Outcome: Ongoing  Goal: Understanding of nutritional guidelines  Outcome: Ongoing

## 2020-06-19 ENCOUNTER — TELEPHONE (OUTPATIENT)
Dept: PULMONOLOGY | Age: 60
End: 2020-06-19

## 2020-06-19 RX ORDER — ALBUTEROL SULFATE 2.5 MG/3ML
2.5 SOLUTION RESPIRATORY (INHALATION) EVERY 6 HOURS PRN
Qty: 120 EACH | Refills: 5 | Status: SHIPPED | OUTPATIENT
Start: 2020-06-19 | End: 2020-12-28

## 2020-08-23 ENCOUNTER — APPOINTMENT (OUTPATIENT)
Dept: GENERAL RADIOLOGY | Age: 60
DRG: 189 | End: 2020-08-23
Payer: COMMERCIAL

## 2020-08-23 ENCOUNTER — HOSPITAL ENCOUNTER (INPATIENT)
Age: 60
LOS: 3 days | Discharge: HOME | DRG: 189 | End: 2020-08-26
Attending: EMERGENCY MEDICINE | Admitting: INTERNAL MEDICINE
Payer: COMMERCIAL

## 2020-08-23 PROBLEM — J44.9 COPD (CHRONIC OBSTRUCTIVE PULMONARY DISEASE) (HCC): Status: ACTIVE | Noted: 2020-08-23

## 2020-08-23 LAB
A/G RATIO: 2.1 (ref 1.1–2.2)
ALBUMIN SERPL-MCNC: 4.6 G/DL (ref 3.4–5)
ALP BLD-CCNC: 71 U/L (ref 40–129)
ALT SERPL-CCNC: 33 U/L (ref 10–40)
ANION GAP SERPL CALCULATED.3IONS-SCNC: 9 MMOL/L (ref 3–16)
AST SERPL-CCNC: 36 U/L (ref 15–37)
BASOPHILS ABSOLUTE: 0.1 K/UL (ref 0–0.2)
BASOPHILS RELATIVE PERCENT: 0.6 %
BILIRUB SERPL-MCNC: 0.3 MG/DL (ref 0–1)
BUN BLDV-MCNC: 14 MG/DL (ref 7–20)
CALCIUM SERPL-MCNC: 9.1 MG/DL (ref 8.3–10.6)
CHLORIDE BLD-SCNC: 99 MMOL/L (ref 99–110)
CO2: 26 MMOL/L (ref 21–32)
CREAT SERPL-MCNC: 0.6 MG/DL (ref 0.6–1.2)
EKG ATRIAL RATE: 91 BPM
EKG DIAGNOSIS: NORMAL
EKG P AXIS: 88 DEGREES
EKG P-R INTERVAL: 134 MS
EKG Q-T INTERVAL: 370 MS
EKG QRS DURATION: 92 MS
EKG QTC CALCULATION (BAZETT): 455 MS
EKG R AXIS: 81 DEGREES
EKG T AXIS: 63 DEGREES
EKG VENTRICULAR RATE: 91 BPM
EOSINOPHILS ABSOLUTE: 0.1 K/UL (ref 0–0.6)
EOSINOPHILS RELATIVE PERCENT: 0.7 %
GFR AFRICAN AMERICAN: >60
GFR NON-AFRICAN AMERICAN: >60
GLOBULIN: 2.2 G/DL
GLUCOSE BLD-MCNC: 130 MG/DL (ref 70–99)
HCT VFR BLD CALC: 36.8 % (ref 36–48)
HEMOGLOBIN: 12.2 G/DL (ref 12–16)
LYMPHOCYTES ABSOLUTE: 1.8 K/UL (ref 1–5.1)
LYMPHOCYTES RELATIVE PERCENT: 9.9 %
MCH RBC QN AUTO: 31.6 PG (ref 26–34)
MCHC RBC AUTO-ENTMCNC: 33 G/DL (ref 31–36)
MCV RBC AUTO: 95.7 FL (ref 80–100)
MONOCYTES ABSOLUTE: 0.8 K/UL (ref 0–1.3)
MONOCYTES RELATIVE PERCENT: 4.4 %
NEUTROPHILS ABSOLUTE: 15.3 K/UL (ref 1.7–7.7)
NEUTROPHILS RELATIVE PERCENT: 84.4 %
PDW BLD-RTO: 13.7 % (ref 12.4–15.4)
PLATELET # BLD: 281 K/UL (ref 135–450)
PMV BLD AUTO: 7.7 FL (ref 5–10.5)
POTASSIUM REFLEX MAGNESIUM: 4 MMOL/L (ref 3.5–5.1)
PROCALCITONIN: 0.05 NG/ML (ref 0–0.15)
RBC # BLD: 3.85 M/UL (ref 4–5.2)
SODIUM BLD-SCNC: 134 MMOL/L (ref 136–145)
TOTAL PROTEIN: 6.8 G/DL (ref 6.4–8.2)
TROPONIN: <0.01 NG/ML
WBC # BLD: 18.1 K/UL (ref 4–11)

## 2020-08-23 PROCEDURE — 71045 X-RAY EXAM CHEST 1 VIEW: CPT

## 2020-08-23 PROCEDURE — 84484 ASSAY OF TROPONIN QUANT: CPT

## 2020-08-23 PROCEDURE — 84145 PROCALCITONIN (PCT): CPT

## 2020-08-23 PROCEDURE — 6370000000 HC RX 637 (ALT 250 FOR IP): Performed by: INTERNAL MEDICINE

## 2020-08-23 PROCEDURE — 96374 THER/PROPH/DIAG INJ IV PUSH: CPT

## 2020-08-23 PROCEDURE — 94640 AIRWAY INHALATION TREATMENT: CPT

## 2020-08-23 PROCEDURE — 6370000000 HC RX 637 (ALT 250 FOR IP): Performed by: EMERGENCY MEDICINE

## 2020-08-23 PROCEDURE — 1200000000 HC SEMI PRIVATE

## 2020-08-23 PROCEDURE — 2700000000 HC OXYGEN THERAPY PER DAY

## 2020-08-23 PROCEDURE — 93005 ELECTROCARDIOGRAM TRACING: CPT | Performed by: EMERGENCY MEDICINE

## 2020-08-23 PROCEDURE — 2580000003 HC RX 258: Performed by: INTERNAL MEDICINE

## 2020-08-23 PROCEDURE — U0003 INFECTIOUS AGENT DETECTION BY NUCLEIC ACID (DNA OR RNA); SEVERE ACUTE RESPIRATORY SYNDROME CORONAVIRUS 2 (SARS-COV-2) (CORONAVIRUS DISEASE [COVID-19]), AMPLIFIED PROBE TECHNIQUE, MAKING USE OF HIGH THROUGHPUT TECHNOLOGIES AS DESCRIBED BY CMS-2020-01-R: HCPCS

## 2020-08-23 PROCEDURE — 6360000002 HC RX W HCPCS: Performed by: INTERNAL MEDICINE

## 2020-08-23 PROCEDURE — 94761 N-INVAS EAR/PLS OXIMETRY MLT: CPT

## 2020-08-23 PROCEDURE — 99285 EMERGENCY DEPT VISIT HI MDM: CPT

## 2020-08-23 PROCEDURE — U0002 COVID-19 LAB TEST NON-CDC: HCPCS

## 2020-08-23 PROCEDURE — 93010 ELECTROCARDIOGRAM REPORT: CPT | Performed by: INTERNAL MEDICINE

## 2020-08-23 PROCEDURE — 85025 COMPLETE CBC W/AUTO DIFF WBC: CPT

## 2020-08-23 PROCEDURE — 6360000002 HC RX W HCPCS: Performed by: EMERGENCY MEDICINE

## 2020-08-23 PROCEDURE — 96375 TX/PRO/DX INJ NEW DRUG ADDON: CPT

## 2020-08-23 PROCEDURE — 80053 COMPREHEN METABOLIC PANEL: CPT

## 2020-08-23 RX ORDER — LANOLIN ALCOHOL/MO/W.PET/CERES
3 CREAM (GRAM) TOPICAL NIGHTLY PRN
Status: DISCONTINUED | OUTPATIENT
Start: 2020-08-24 | End: 2020-08-23

## 2020-08-23 RX ORDER — KETOROLAC TROMETHAMINE 30 MG/ML
30 INJECTION, SOLUTION INTRAMUSCULAR; INTRAVENOUS ONCE
Status: COMPLETED | OUTPATIENT
Start: 2020-08-23 | End: 2020-08-23

## 2020-08-23 RX ORDER — POLYETHYLENE GLYCOL 3350 17 G/17G
17 POWDER, FOR SOLUTION ORAL DAILY PRN
Status: DISCONTINUED | OUTPATIENT
Start: 2020-08-23 | End: 2020-08-26 | Stop reason: HOSPADM

## 2020-08-23 RX ORDER — ALBUTEROL SULFATE 90 UG/1
2 AEROSOL, METERED RESPIRATORY (INHALATION) 4 TIMES DAILY
Status: DISCONTINUED | OUTPATIENT
Start: 2020-08-23 | End: 2020-08-24

## 2020-08-23 RX ORDER — FUROSEMIDE 10 MG/ML
40 INJECTION INTRAMUSCULAR; INTRAVENOUS 2 TIMES DAILY
Status: DISCONTINUED | OUTPATIENT
Start: 2020-08-23 | End: 2020-08-24

## 2020-08-23 RX ORDER — M-VIT,TX,IRON,MINS/CALC/FOLIC 27MG-0.4MG
1 TABLET ORAL
Status: DISCONTINUED | OUTPATIENT
Start: 2020-08-24 | End: 2020-08-26 | Stop reason: HOSPADM

## 2020-08-23 RX ORDER — LANOLIN ALCOHOL/MO/W.PET/CERES
3 CREAM (GRAM) TOPICAL NIGHTLY PRN
Status: DISCONTINUED | OUTPATIENT
Start: 2020-08-23 | End: 2020-08-26 | Stop reason: HOSPADM

## 2020-08-23 RX ORDER — IPRATROPIUM BROMIDE AND ALBUTEROL SULFATE 2.5; .5 MG/3ML; MG/3ML
1 SOLUTION RESPIRATORY (INHALATION) ONCE
Status: COMPLETED | OUTPATIENT
Start: 2020-08-23 | End: 2020-08-23

## 2020-08-23 RX ORDER — ALBUTEROL SULFATE 2.5 MG/3ML
2.5 SOLUTION RESPIRATORY (INHALATION) ONCE
Status: COMPLETED | OUTPATIENT
Start: 2020-08-23 | End: 2020-08-23

## 2020-08-23 RX ORDER — ACETAMINOPHEN 500 MG
1000 TABLET ORAL ONCE
Status: COMPLETED | OUTPATIENT
Start: 2020-08-23 | End: 2020-08-23

## 2020-08-23 RX ORDER — MONTELUKAST SODIUM 10 MG/1
10 TABLET ORAL NIGHTLY
Status: DISCONTINUED | OUTPATIENT
Start: 2020-08-23 | End: 2020-08-26 | Stop reason: HOSPADM

## 2020-08-23 RX ORDER — PROMETHAZINE HYDROCHLORIDE 25 MG/1
12.5 TABLET ORAL EVERY 6 HOURS PRN
Status: DISCONTINUED | OUTPATIENT
Start: 2020-08-23 | End: 2020-08-23

## 2020-08-23 RX ORDER — NICOTINE 21 MG/24HR
1 PATCH, TRANSDERMAL 24 HOURS TRANSDERMAL DAILY
Status: DISCONTINUED | OUTPATIENT
Start: 2020-08-24 | End: 2020-08-26 | Stop reason: HOSPADM

## 2020-08-23 RX ORDER — METHYLPREDNISOLONE SODIUM SUCCINATE 40 MG/ML
40 INJECTION, POWDER, LYOPHILIZED, FOR SOLUTION INTRAMUSCULAR; INTRAVENOUS EVERY 8 HOURS
Status: DISCONTINUED | OUTPATIENT
Start: 2020-08-24 | End: 2020-08-24

## 2020-08-23 RX ORDER — ATORVASTATIN CALCIUM 40 MG/1
40 TABLET, FILM COATED ORAL NIGHTLY
Status: DISCONTINUED | OUTPATIENT
Start: 2020-08-23 | End: 2020-08-26 | Stop reason: HOSPADM

## 2020-08-23 RX ORDER — ASPIRIN 81 MG/1
81 TABLET ORAL DAILY
Status: DISCONTINUED | OUTPATIENT
Start: 2020-08-24 | End: 2020-08-26 | Stop reason: HOSPADM

## 2020-08-23 RX ORDER — SODIUM CHLORIDE 0.9 % (FLUSH) 0.9 %
10 SYRINGE (ML) INJECTION EVERY 12 HOURS SCHEDULED
Status: DISCONTINUED | OUTPATIENT
Start: 2020-08-23 | End: 2020-08-26 | Stop reason: HOSPADM

## 2020-08-23 RX ORDER — ONDANSETRON 2 MG/ML
4 INJECTION INTRAMUSCULAR; INTRAVENOUS EVERY 6 HOURS PRN
Status: DISCONTINUED | OUTPATIENT
Start: 2020-08-23 | End: 2020-08-26 | Stop reason: HOSPADM

## 2020-08-23 RX ORDER — SODIUM CHLORIDE 0.9 % (FLUSH) 0.9 %
10 SYRINGE (ML) INJECTION PRN
Status: DISCONTINUED | OUTPATIENT
Start: 2020-08-23 | End: 2020-08-26 | Stop reason: HOSPADM

## 2020-08-23 RX ORDER — ACETAMINOPHEN, ASPIRIN AND CAFFEINE 250; 250; 65 MG/1; MG/1; MG/1
1 TABLET, FILM COATED ORAL EVERY 6 HOURS PRN
Status: DISCONTINUED | OUTPATIENT
Start: 2020-08-23 | End: 2020-08-26 | Stop reason: HOSPADM

## 2020-08-23 RX ORDER — ACETAMINOPHEN 325 MG/1
650 TABLET ORAL EVERY 6 HOURS PRN
Status: DISCONTINUED | OUTPATIENT
Start: 2020-08-23 | End: 2020-08-26 | Stop reason: HOSPADM

## 2020-08-23 RX ORDER — LEVOFLOXACIN 5 MG/ML
500 INJECTION, SOLUTION INTRAVENOUS EVERY 24 HOURS
Status: DISCONTINUED | OUTPATIENT
Start: 2020-08-23 | End: 2020-08-26 | Stop reason: HOSPADM

## 2020-08-23 RX ORDER — ACETAMINOPHEN 650 MG/1
650 SUPPOSITORY RECTAL EVERY 6 HOURS PRN
Status: DISCONTINUED | OUTPATIENT
Start: 2020-08-23 | End: 2020-08-26 | Stop reason: HOSPADM

## 2020-08-23 RX ORDER — IPRATROPIUM BROMIDE AND ALBUTEROL SULFATE 2.5; .5 MG/3ML; MG/3ML
2 SOLUTION RESPIRATORY (INHALATION) ONCE
Status: COMPLETED | OUTPATIENT
Start: 2020-08-23 | End: 2020-08-23

## 2020-08-23 RX ORDER — METHYLPREDNISOLONE SODIUM SUCCINATE 125 MG/2ML
125 INJECTION, POWDER, LYOPHILIZED, FOR SOLUTION INTRAMUSCULAR; INTRAVENOUS ONCE
Status: COMPLETED | OUTPATIENT
Start: 2020-08-23 | End: 2020-08-23

## 2020-08-23 RX ADMIN — FUROSEMIDE 40 MG: 10 INJECTION, SOLUTION INTRAMUSCULAR; INTRAVENOUS at 21:45

## 2020-08-23 RX ADMIN — IPRATROPIUM BROMIDE AND ALBUTEROL SULFATE 1 AMPULE: .5; 3 SOLUTION RESPIRATORY (INHALATION) at 19:20

## 2020-08-23 RX ADMIN — Medication 10 ML: at 21:44

## 2020-08-23 RX ADMIN — MELATONIN TAB 3 MG 3 MG: 3 TAB at 23:24

## 2020-08-23 RX ADMIN — METHYLPREDNISOLONE SODIUM SUCCINATE 40 MG: 40 INJECTION, POWDER, FOR SOLUTION INTRAMUSCULAR; INTRAVENOUS at 23:24

## 2020-08-23 RX ADMIN — ACETAMINOPHEN 650 MG: 325 TABLET ORAL at 21:44

## 2020-08-23 RX ADMIN — Medication 2 PUFF: at 23:26

## 2020-08-23 RX ADMIN — ACETAMINOPHEN, ASPIRIN AND CAFFEINE 1 TABLET: 250; 250; 65 TABLET, FILM COATED ORAL at 23:24

## 2020-08-23 RX ADMIN — KETOROLAC TROMETHAMINE 30 MG: 30 INJECTION, SOLUTION INTRAMUSCULAR at 15:49

## 2020-08-23 RX ADMIN — ENOXAPARIN SODIUM 40 MG: 40 INJECTION SUBCUTANEOUS at 21:45

## 2020-08-23 RX ADMIN — LEVOFLOXACIN 500 MG: 5 INJECTION, SOLUTION INTRAVENOUS at 21:45

## 2020-08-23 RX ADMIN — MONTELUKAST SODIUM 10 MG: 10 TABLET, COATED ORAL at 21:45

## 2020-08-23 RX ADMIN — ATORVASTATIN CALCIUM 40 MG: 40 TABLET, FILM COATED ORAL at 21:45

## 2020-08-23 RX ADMIN — METHYLPREDNISOLONE SODIUM SUCCINATE 125 MG: 125 INJECTION, POWDER, FOR SOLUTION INTRAMUSCULAR; INTRAVENOUS at 10:37

## 2020-08-23 RX ADMIN — ALBUTEROL SULFATE 2.5 MG: 2.5 SOLUTION RESPIRATORY (INHALATION) at 12:19

## 2020-08-23 RX ADMIN — ACETAMINOPHEN 1000 MG: 500 TABLET ORAL at 14:11

## 2020-08-23 RX ADMIN — IPRATROPIUM BROMIDE AND ALBUTEROL SULFATE 1 AMPULE: .5; 3 SOLUTION RESPIRATORY (INHALATION) at 12:19

## 2020-08-23 RX ADMIN — IPRATROPIUM BROMIDE AND ALBUTEROL SULFATE 2 AMPULE: .5; 3 SOLUTION RESPIRATORY (INHALATION) at 10:37

## 2020-08-23 RX ADMIN — ALBUTEROL SULFATE 2.5 MG: 2.5 SOLUTION RESPIRATORY (INHALATION) at 15:49

## 2020-08-23 ASSESSMENT — PAIN SCALES - GENERAL
PAINLEVEL_OUTOF10: 6
PAINLEVEL_OUTOF10: 5
PAINLEVEL_OUTOF10: 0
PAINLEVEL_OUTOF10: 7
PAINLEVEL_OUTOF10: 0
PAINLEVEL_OUTOF10: 5
PAINLEVEL_OUTOF10: 7
PAINLEVEL_OUTOF10: 3

## 2020-08-23 ASSESSMENT — PAIN DESCRIPTION - LOCATION: LOCATION: CHEST;BACK

## 2020-08-23 ASSESSMENT — PAIN DESCRIPTION - FREQUENCY: FREQUENCY: CONTINUOUS

## 2020-08-23 ASSESSMENT — PAIN DESCRIPTION - PROGRESSION: CLINICAL_PROGRESSION: GRADUALLY WORSENING

## 2020-08-23 ASSESSMENT — PAIN DESCRIPTION - ONSET: ONSET: ON-GOING

## 2020-08-23 ASSESSMENT — PAIN DESCRIPTION - DESCRIPTORS: DESCRIPTORS: ACHING

## 2020-08-23 ASSESSMENT — PAIN - FUNCTIONAL ASSESSMENT: PAIN_FUNCTIONAL_ASSESSMENT: PREVENTS OR INTERFERES SOME ACTIVE ACTIVITIES AND ADLS

## 2020-08-23 ASSESSMENT — PAIN DESCRIPTION - ORIENTATION: ORIENTATION: MID

## 2020-08-23 ASSESSMENT — PAIN DESCRIPTION - PAIN TYPE: TYPE: ACUTE PAIN

## 2020-08-23 NOTE — ED NOTES
After getting back into bed from the bedside commode, pt's o2 sat dropped to 86, increased pt's oxygen to 5L per nc. Sat increased to 91%.      Janette Gunn RN  08/23/20 8882

## 2020-08-23 NOTE — ED PROVIDER NOTES
[address] ED      CHIEF COMPLAINT  Shortness of Breath (Increase  in SOB last 2 days, c/o chest burning yesterday evening lasted throughout night, c/o of new cough. Hx of COPD wears 2L O2 at home. )       HISTORY OF PRESENT ILLNESS  Anthoney Nyhan is a 56 y.o. female  who presents to the ED complaining of shortness of breath. She states that is kept her up all night. Started yesterday especially and progressed throughout the evening. She states that she has been using her breathing treatments with very minimal initial improvement and then is worse. She has been coughing productive of clear sputum. She has a history of COPD and states that she usually wears 2-1/2 to 3 L of oxygen at home and is currently on her baseline oxygen. No known fevers. No known exposures COVID. No nausea vomiting or diarrhea. She does have a family member that had a recent \"cold\" about 3 to 4 days ago but that seemed to resolve after about a day and a half for that person. She was not tested for COVID at all. Patient follows with Dr. Simona Jeffery for her pulmonary care. No recent antibiotics or steroid burst.  She is on a low-dose steroid at baseline. She describes as a burning type of chest discomfort associated with this. No other complaints, modifying factors or associated symptoms. I have reviewed the following from the nursing documentation. Past Medical History:   Diagnosis Date    Asthma     COPD [facility]chronic obstructive pulmonary disease) [facility] [address]     Thyroid disease      Past Surgical History:   Procedure Laterality Date     SECTION      x2    CHOLECYSTECTOMY       History reviewed. No pertinent family history.   Social History     Socioeconomic History    Marital status:      Spouse name: Not on file    Number of children: Not on file    Years of education: Not on file    Highest education level: Not on file   Occupational History    Not on file   Social Needs    Financial resource strain: Not on file    Food insecurity     Worry: Not on file     Inability: Not on file    Transportation needs     Medical: Not on file     Non-medical: Not on file   Tobacco Use    Smoking status: Current Every Day Smoker     Packs/day: 0.50     Years: 44.00     Pack years: 22.00     Types: Cigarettes    Smokeless tobacco: Never Used   Substance and Sexual Activity    Alcohol use: Yes     Comment: rarely    Drug use: No    Sexual activity: Not Currently   Lifestyle    Physical activity     Days per week: Not on file     Minutes per session: Not on file    Stress: Not on file   Relationships    Social connections     Talks on phone: Not on file     Gets together: Not on file     Attends Orthodoxy service: Not on file     Active member of club or organization: Not on file     Attends meetings of clubs or organizations: Not on file     Relationship status: Not on file    Intimate partner violence     Fear of current or ex partner: Not on file     Emotionally abused: Not on file     Physically abused: Not on file     Forced sexual activity: Not on file   Other Topics Concern    Not on file   Social History Narrative    Not on file     No current facility-administered medications for this encounter. Current Outpatient Medications   Medication Sig Dispense Refill    albuterol (PROVENTIL) (2.5 MG/3ML) 0.083% nebulizer solution Take 3 mLs by nebulization every 6 hours as needed for Wheezing or Shortness of Breath 120 each 5    atorvastatin (LIPITOR) 40 MG tablet Take 1 tablet by mouth nightly 30 tablet 3    levothyroxine (SYNTHROID) 137 MCG tablet Take 1 tablet by mouth daily 30 tablet 3    montelukast (SINGULAIR) 10 MG tablet Take 1 tablet by mouth nightly 30 tablet 3    Multiple Vitamin (MULTIVITAMIN) tablet Take 1 tablet by mouth daily 30 tablet 3    predniSONE (DELTASONE) 10 MG tablet Take 3 tabs a day for 3 days,  Then 2 tabs a day for 3 days ,  Then 1 tab a day for 3 days.    then back to 5 mg daily as before 18 tablet 0    umeclidinium-vilanterol (ANORO ELLIPTA) 62.5-25 MCG/INH AEPB inhaler Inhale 1 puff into the lungs daily      Fluticasone-Umeclidin-Vilant (TRELEGY ELLIPTA) 100-62.5-25 MCG/INH AEPB Inhale 1 puff into the lungs daily      albuterol sulfate  (90 Base) MCG/ACT inhaler Inhale 2 puffs into the lungs every 6 hours as needed for Wheezing 1 Inhaler 3    aspirin 81 MG EC tablet Take 1 tablet by mouth daily 30 tablet 3     Allergies   Allergen Reactions    Promethazine Other (See Comments)     Extreme confusion (1/3/20)    Codeine Swelling       REVIEW OF SYSTEMS  10 systems reviewed, pertinent positives per HPI otherwise noted to be negative. PHYSICAL EXAM  /75   Pulse 95   Temp 97.7 °F (36.5 °C) (Oral)   Resp 25   Ht 5' 4\" (1.626 m)   Wt 140 lb (63.5 kg)   LMP  (LMP Unknown)   SpO2 98%   BMI 24.03 kg/m²    GENERAL APPEARANCE: Awake and alert. Cooperative. Mild respiratory distress. HENT: Normocephalic. Atraumatic. Mucous membranes are moist.  No drooling or stridor. NECK: Supple. EYES: PERRL. EOM's grossly intact. HEART/CHEST: RRR. No murmurs. 2+ radial and DP pulses bilaterally. LUNGS: Respirations moderately labored. Diffuse expiratory wheezes throughout. .  Fair air exchange. Unable to speak comfortably in full sentences. ABDOMEN: No tenderness. Soft. Non-distended. No masses. No organomegaly. No guarding or rebound. MUSCULOSKELETAL: No extremity edema. Compartments soft. No deformity. No tenderness in the extremities. All extremities neurovascularly intact. SKIN: Warm and dry. No acute rashes. NEUROLOGICAL: Alert and oriented. CN's 2-12 intact. No gross facial drooping. Strength 5/5, sensation intact. No gross focal deficits. PSYCHIATRIC: Normal mood and affect. LABS  I have reviewed all labs for this visit.    Results for orders placed or performed during the hospital encounter of 08/23/20   CBC Auto Differential   Result Value Ref Normal  QTc is  within an acceptable range  Intervals and Durations are unremarkable. ST Segments: no acute change  No significant change from prior EKG dated 12/26/19    RADIOLOGY  Xr Chest Portable    Result Date: 8/23/2020  EXAMINATION: ONE X-RAY VIEW OF THE CHEST 8/23/2020 9:26 am COMPARISON: None HISTORY: ORDERING SYSTEM PROVIDED HISTORY: SOB TECHNOLOGIST PROVIDED HISTORY: Reason for exam:-> SOB Reason for Exam: SOB Acuity: Acute Type of Exam: Initial FINDINGS: The cardiac silhouette and mediastinal contours are normal.  The endotracheal tube has been removed as well as the orogastric tube and right PICC line. Pulmonary vascular congestion is noted. The lungs are hyperinflated. There are probable small bilateral pleural effusions with associated bibasilar atelectasis. 1. Pulmonary vascular congestion with suspected small bilateral pleural effusions and associated bibasilar atelectasis. ED COURSE/MDM  Patient seen and evaluated. Old records reviewed. Labs and imaging reviewed and results discussed with patient. Patient presenting with symptoms most consistent with COPD exacerbation. Even with minimal exertion such as trying to walk across the room or get up to the bedside commode her oxygen saturations drop into the mid 80s despite being on her home oxygen of 2-1/2 to 3 L. I am very concerned with patient's persistent wheezing and exertional hypoxia despite treatment with steroids and breathing treatments in the ER. We will plan for admission. I consulted Dr. Ophelia Felix. We've decided to admit Antionette Trinidad for further observation and evaluation of Emily Mcneal's dyspnea and COPD.   As I have deemed necessary from their history, physical, and studies, I have considered and evaluated Antionette Trinidad for the following diagnoses:  ACUTE CORONARY SYNDROME, CHRONIC OBSTRUCTIVE PULMONARY DISEASE, CONGESTIVE HEART FAILURE, PERICARDIAL TAMPONADE, PNEUMONIA, PNEUMOTHORAX, PULMONARY EMBOLISM, SEPSIS, and THORACIC DISSECTION. During the patient's ED course, the patient was given:  Medications   ipratropium-albuterol (DUONEB) nebulizer solution 2 ampule (2 ampules Inhalation Given 8/23/20 1037)   methylPREDNISolone sodium (SOLU-MEDROL) injection 125 mg (125 mg Intravenous Given 8/23/20 1037)   ipratropium-albuterol (DUONEB) nebulizer solution 1 ampule (1 ampule Inhalation Given 8/23/20 1219)   albuterol (PROVENTIL) nebulizer solution 2.5 mg (2.5 mg Nebulization Given 8/23/20 1219)   acetaminophen (TYLENOL) tablet 1,000 mg (1,000 mg Oral Given 8/23/20 1411)   albuterol (PROVENTIL) nebulizer solution 2.5 mg (2.5 mg Nebulization Given 8/23/20 1549)   ketorolac (TORADOL) injection 30 mg (30 mg Intravenous Given 8/23/20 1549)        CLINICAL IMPRESSION  1. COPD exacerbation ([address]    2. Acute on chronic respiratory failure with hypoxia (HCC)        Blood pressure 139/75, pulse 95, temperature 97.7 °F (36.5 °C), temperature source Oral, resp. rate 25, height 5' 4\" (1.626 m), weight 140 lb (63.5 kg), SpO2 98 %. DISPOSITION  Heath Sabillon was admitted in stable condition. DISCLAIMER: This chart was created using Dragon dictation software. Efforts were made by me to ensure accuracy, however some errors may be present due to limitations of this technology and occasionally words are not transcribed correctly.         Megan Red MD  08/23/20 5983

## 2020-08-23 NOTE — H&P
Hospital Medicine History & Physical      PCP: Stephania Waldron MD    Date of Admission: 2020    Date of Service: Pt seen/examined on 2020 and Admitted to Inpatient with expected LOS greater than two midnights due to medical therapy. Chief Complaint: Shortness of breath      History Of Present Illness:      56 y.o. female history of COPD, asthma who presented with creasing shortness of breath. Patient states last night she was very dyspneic. She has been using her breathing treatments without much improvement. She has also developed a productive cough with clear sputum. She uses 2-4 L of oxygen at home. She is on long-term prednisone 5 mg daily. She has a family member who had URI symptoms 3 to 4 days prior. She denies any contact with known COVID 19 diagnosis. In ER, /77 and afebrile. She had oxygen increased to 4 L due to hypoxia on ambulation. Lab work pertinent for sodium 134, WBC 18.1. Chest x-ray shows pulmonary vascular congestion with suspected small bilateral pleural effusions and associated bibasilar atelectasis. Past Medical History:          Diagnosis Date    Asthma     COPD [facility]chronic obstructive [facility] disease) [facility] [address]     Thyroid disease        Past Surgical History:          Procedure Laterality Date     SECTION      x2    CHOLECYSTECTOMY         Medications Prior to Admission:      Prior to Admission medications    Medication Sig Start Date End Date Taking?  Authorizing Provider   albuterol (PROVENTIL) (2.5 MG/3ML) 0.083% nebulizer solution Take 3 mLs by nebulization every 6 hours as needed for Wheezing or Shortness of Breath 20  Yes Kevin Crowder MD   atorvastatin (LIPITOR) 40 MG tablet Take 1 tablet by mouth nightly 20  Yes Luke Balderas MD   levothyroxine (SYNTHROID) 137 MCG tablet Take 1 tablet by mouth daily 20  Yes Luke Balderas MD   montelukast (SINGULAIR) 10 MG tablet Take 1 tablet by mouth nightly 20  Yes [facility] Galdame[facility]   Multiple Vitamin (MULTIVITAMIN) tablet Take 1 tablet by mouth daily 1/6/20  Yes Cecilia Bell MD   predniSONE (DELTASONE) 10 MG tablet Take 3 tabs a day for 3 days,  Then 2 tabs a day for 3 days ,  Then 1 tab a day for 3 days. then back to 5 mg daily as before 1/6/20  Yes Cecilia Bell MD   umeclidinium-vilanterol (ANORO ELLIPTA) 62.5-25 MCG/INH AEPB inhaler Inhale 1 puff into the lungs daily   Yes Historical Provider, MD   Fluticasone-Umeclidin-Vilant (TRELEGY ELLIPTA) 100-62.5-25 MCG/INH AEPB Inhale 1 puff into the lungs daily 7/5/19  Yes Historical Provider, MD   albuterol sulfate  (90 Base) MCG/ACT inhaler Inhale 2 puffs into the lungs every 6 hours as needed for Wheezing 1/17/19  Yes Yohan Franklin MD   aspirin 81 MG EC tablet Take 1 tablet by mouth daily 1/7/20   Cecilia Bell MD       Allergies:  Promethazine and Codeine    Social History:      The patient currently lives at home with her     TOBACCO:   reports that she has been smoking cigarettes. She has a 22.00 pack-year smoking history. She has never used smokeless tobacco.  ETOH:   reports current alcohol use. E-Cigarettes Vaping or Juuling     Questions Responses    Vaping Use Never User    Start Date     Does device contain nicotine? Never    Quit Date     Vaping Type             Family History:       Reviewed in detail and negative for DM, CAD, Cancer, CVA. Positive as follows:    History reviewed. No pertinent family history. REVIEW OF SYSTEMS:   Pertinent positives as noted in the HPI. All other systems reviewed and negative. PHYSICAL EXAM PERFORMED:    /74   Pulse 95   Temp 97.9 °F (36.6 °C) (Oral)   Resp 23   Ht 5' 4\" (1.626 m)   Wt 140 lb (63.5 kg)   LMP  (LMP Unknown)   SpO2 97%   BMI 24.03 kg/m²     General appearance:  No apparent distress, appears stated age and cooperative. On nasal cannula  HEENT:  Normal cephalic, atraumatic without obvious deformity.  Pupils equal, round, and Currently on 4 L nasal cannula. -Continue inhalers and steroids  -Follow-up COVID-19 testing    #COPD exacerbation  -Start IV steroids  -Continue inhalers    #Acute CHF EF 55 to 82%, no diastolic dysfunction on last echocardiogram  -Start Lasix 40 mg IV twice daily  -Repeat echocardiogram    #Leukocytosis-likely due to chronic steroids  -Trend WBC  -Check procalcitonin    #Hypertension  -BP initially elevated but now controlled. Continue home antihypertensives. #Tobacco use  -Nicotine patch    #Insomnia  -Start melatonin nightly as needed    DVT Prophylaxis: Lovenox  Diet: No diet orders on file  Code Status: Prior    PT/OT Eval Status: As needed    Dispo -home in 2 to 3 days       José Miguel Wise MD    Thank you Adrian Gray MD for the opportunity to be involved in this patient's care. If you have any questions or concerns please feel free to contact me at [phone].

## 2020-08-24 LAB
PROCALCITONIN: 0.05 NG/ML (ref 0–0.15)
SARS-COV-2, PCR: NOT DETECTED

## 2020-08-24 PROCEDURE — 6360000002 HC RX W HCPCS: Performed by: INTERNAL MEDICINE

## 2020-08-24 PROCEDURE — 6370000000 HC RX 637 (ALT 250 FOR IP): Performed by: INTERNAL MEDICINE

## 2020-08-24 PROCEDURE — 36415 COLL VENOUS BLD VENIPUNCTURE: CPT

## 2020-08-24 PROCEDURE — 99255 IP/OBS CONSLTJ NEW/EST HI 80: CPT | Performed by: INTERNAL MEDICINE

## 2020-08-24 PROCEDURE — 6370000000 HC RX 637 (ALT 250 FOR IP): Performed by: PHYSICIAN ASSISTANT

## 2020-08-24 PROCEDURE — 1200000000 HC SEMI PRIVATE

## 2020-08-24 PROCEDURE — 94761 N-INVAS EAR/PLS OXIMETRY MLT: CPT

## 2020-08-24 PROCEDURE — 84145 PROCALCITONIN (PCT): CPT

## 2020-08-24 PROCEDURE — 94640 AIRWAY INHALATION TREATMENT: CPT

## 2020-08-24 PROCEDURE — 2580000003 HC RX 258: Performed by: INTERNAL MEDICINE

## 2020-08-24 PROCEDURE — 2700000000 HC OXYGEN THERAPY PER DAY

## 2020-08-24 PROCEDURE — 99232 SBSQ HOSP IP/OBS MODERATE 35: CPT | Performed by: INTERNAL MEDICINE

## 2020-08-24 RX ORDER — FUROSEMIDE 10 MG/ML
40 INJECTION INTRAMUSCULAR; INTRAVENOUS DAILY
Status: DISCONTINUED | OUTPATIENT
Start: 2020-08-25 | End: 2020-08-26

## 2020-08-24 RX ORDER — TRAZODONE HYDROCHLORIDE 50 MG/1
50 TABLET ORAL NIGHTLY PRN
Status: DISCONTINUED | OUTPATIENT
Start: 2020-08-24 | End: 2020-08-26 | Stop reason: HOSPADM

## 2020-08-24 RX ORDER — IPRATROPIUM BROMIDE AND ALBUTEROL SULFATE 2.5; .5 MG/3ML; MG/3ML
1 SOLUTION RESPIRATORY (INHALATION) EVERY 4 HOURS
Status: DISCONTINUED | OUTPATIENT
Start: 2020-08-24 | End: 2020-08-25

## 2020-08-24 RX ORDER — FLUTICASONE PROPIONATE 50 MCG
1 SPRAY, SUSPENSION (ML) NASAL DAILY
Status: DISCONTINUED | OUTPATIENT
Start: 2020-08-24 | End: 2020-08-26 | Stop reason: HOSPADM

## 2020-08-24 RX ORDER — ALBUTEROL SULFATE 90 UG/1
2 AEROSOL, METERED RESPIRATORY (INHALATION) EVERY 4 HOURS
Status: DISCONTINUED | OUTPATIENT
Start: 2020-08-24 | End: 2020-08-24

## 2020-08-24 RX ORDER — METHYLPREDNISOLONE SODIUM SUCCINATE 40 MG/ML
40 INJECTION, POWDER, LYOPHILIZED, FOR SOLUTION INTRAMUSCULAR; INTRAVENOUS EVERY 12 HOURS
Status: DISCONTINUED | OUTPATIENT
Start: 2020-08-24 | End: 2020-08-25

## 2020-08-24 RX ADMIN — IPRATROPIUM BROMIDE AND ALBUTEROL SULFATE 1 AMPULE: .5; 3 SOLUTION RESPIRATORY (INHALATION) at 15:44

## 2020-08-24 RX ADMIN — ENOXAPARIN SODIUM 40 MG: 40 INJECTION SUBCUTANEOUS at 21:40

## 2020-08-24 RX ADMIN — LEVOTHYROXINE SODIUM 137 MCG: 25 TABLET ORAL at 06:18

## 2020-08-24 RX ADMIN — METHYLPREDNISOLONE SODIUM SUCCINATE 40 MG: 40 INJECTION, POWDER, FOR SOLUTION INTRAMUSCULAR; INTRAVENOUS at 11:50

## 2020-08-24 RX ADMIN — MONTELUKAST SODIUM 10 MG: 10 TABLET, COATED ORAL at 21:40

## 2020-08-24 RX ADMIN — Medication 10 ML: at 21:40

## 2020-08-24 RX ADMIN — ACETAMINOPHEN, ASPIRIN AND CAFFEINE 1 TABLET: 250; 250; 65 TABLET, FILM COATED ORAL at 14:18

## 2020-08-24 RX ADMIN — ATORVASTATIN CALCIUM 40 MG: 40 TABLET, FILM COATED ORAL at 21:40

## 2020-08-24 RX ADMIN — IPRATROPIUM BROMIDE AND ALBUTEROL SULFATE 1 AMPULE: .5; 3 SOLUTION RESPIRATORY (INHALATION) at 22:52

## 2020-08-24 RX ADMIN — Medication 2 PUFF: at 06:44

## 2020-08-24 RX ADMIN — Medication 10 ML: at 08:22

## 2020-08-24 RX ADMIN — ONDANSETRON HYDROCHLORIDE 4 MG: 2 INJECTION, SOLUTION INTRAMUSCULAR; INTRAVENOUS at 14:20

## 2020-08-24 RX ADMIN — Medication 2 PUFF: at 11:14

## 2020-08-24 RX ADMIN — FUROSEMIDE 40 MG: 10 INJECTION, SOLUTION INTRAMUSCULAR; INTRAVENOUS at 08:22

## 2020-08-24 RX ADMIN — FLUTICASONE PROPIONATE 1 SPRAY: 50 SPRAY, METERED NASAL at 11:50

## 2020-08-24 RX ADMIN — ACETAMINOPHEN, ASPIRIN AND CAFFEINE 1 TABLET: 250; 250; 65 TABLET, FILM COATED ORAL at 08:22

## 2020-08-24 RX ADMIN — METHYLPREDNISOLONE SODIUM SUCCINATE 40 MG: 40 INJECTION, POWDER, FOR SOLUTION INTRAMUSCULAR; INTRAVENOUS at 23:44

## 2020-08-24 RX ADMIN — ACETAMINOPHEN 650 MG: 325 TABLET ORAL at 18:00

## 2020-08-24 RX ADMIN — TRAZODONE HYDROCHLORIDE 50 MG: 50 TABLET ORAL at 21:40

## 2020-08-24 RX ADMIN — IPRATROPIUM BROMIDE AND ALBUTEROL SULFATE 1 AMPULE: .5; 3 SOLUTION RESPIRATORY (INHALATION) at 19:24

## 2020-08-24 RX ADMIN — ASPIRIN 81 MG: 81 TABLET, COATED ORAL at 08:22

## 2020-08-24 RX ADMIN — LEVOFLOXACIN 500 MG: 5 INJECTION, SOLUTION INTRAVENOUS at 21:40

## 2020-08-24 RX ADMIN — ACETAMINOPHEN, ASPIRIN AND CAFFEINE 1 TABLET: 250; 250; 65 TABLET, FILM COATED ORAL at 21:40

## 2020-08-24 RX ADMIN — MULTIPLE VITAMINS W/ MINERALS TAB 1 TABLET: TAB at 11:49

## 2020-08-24 ASSESSMENT — PAIN SCALES - GENERAL
PAINLEVEL_OUTOF10: 5
PAINLEVEL_OUTOF10: 4
PAINLEVEL_OUTOF10: 8
PAINLEVEL_OUTOF10: 4
PAINLEVEL_OUTOF10: 8
PAINLEVEL_OUTOF10: 0
PAINLEVEL_OUTOF10: 4
PAINLEVEL_OUTOF10: 4

## 2020-08-24 ASSESSMENT — PAIN DESCRIPTION - LOCATION: LOCATION: BACK;CHEST;HEAD

## 2020-08-24 ASSESSMENT — PAIN DESCRIPTION - PAIN TYPE: TYPE: ACUTE PAIN

## 2020-08-24 NOTE — PROGRESS NOTES
PRN melatonin and excedrine migraine ordered. Administered both for pt r/t sleeplessness and headache pain 7/10 not relieved by tylenol.

## 2020-08-24 NOTE — PROGRESS NOTES
PHARMACY NOTE  Jd Ambrocio was ordered Trelegy Ellipta. Per the [address], this medication is non-formulary and not stocked by pharmacy. Patient is on Albuterol and Atrovent MDIs and IVP Methylprednisolone. The medication can be reordered at discharge.    RAVIN SaldanaPh.8/23/20209:25 PM

## 2020-08-24 NOTE — CARE COORDINATION
Case Management Assessment  Initial Evaluation      Patient Name: Torres Layne  YOB: 1960  Diagnosis: COPD (chronic obstructive pulmonary disease) ([facility] [address] [J44.9]  Date / Time: 8/23/2020  9:03 AM    Admission status/Date: 8/23/20 inpatient  Chart Reviewed: Yes      Patient Interviewed: Yes   Family Interviewed:  No      Hospitalization in the last 30 days:  No    Primary Contact Information:   Who do you trust or have selected to make healthcare decisions for you? Name:    Nuzhat Elizabeth spouse  Phone  Number:  [phone]  Can this person be reached and be able to respond quickly, such as within a few minutes or hours? Yes  Who would be your back-up decision maker?   Name Antwon Montalvo  Phone Number: [phone]    Met with:  Spoke with pt via phone  Raul Okeefe conducted  (bedside/phone):    Current PCP:  [facility] EH required for SNF : Y          3 night stay required - [employer]  Support Systems/Care Needs: Spouse/Significant Other, Family Members  Transportation: self    Meal Preparation: self    Housing  Living Arrangements:  Lives 1 story home  Steps: 2-3  Intent for return to present living arrangements: No  Identified Issues:     [address] with Home Health Care : No Agency:(Services)  Type of Home Care Services: None  Passport/Waiver : No  :                      Phone Number:    Passport/Waiver Services: N/A          Durable Medical Equiptment   DME Provider:  Mariah  Equipment:   Walker_x__Cane___RTS___ BSC___Shower Chair___Hospital Bed___W/C____Other________  02 at _2-4___Liter(s)---wears(frequency)_continuous______ HHN _x__ CPAP___ BiPap___   N/A____      Home O2 Use :  Yes    If No for home O2---if presently on O2 during hospitalization:    if yes CM to follow for potential DC O2 need  Informed of need for care provider to bring portable home O2 tank on day of discharge for nursing to connect prior to leaving: Yes  Verbalized agreement/Understanding:   Yes    Community Service Affiliation  Dialysis:  No    · Agency:  · Location:  · Dialysis Schedule:  · Phone:   · Fax: Other Community Services: None (ex:PT/OT,Mental Health,Wound Clinic, Cardio/Pul [address])    DISCHARGE PLAN: Explained Case Management role/services. Reviewed chart and spoke with pt via phone D/T C-19 R/O.  lives in 1 story home with spouse and avtar. Plan is to return at dc. [facility] is IPTA with ADL's. [facility] is active with [facility] for home O2 and HHN. Denies other services or needs. Pt with audiable wheezing via phone. Will discus HHC needs prior to dc. Will cont to follow.

## 2020-08-24 NOTE — FLOWSHEET NOTE
08/24/20 0745   Vital Signs   Temp 97.7 °F (36.5 °C)   Temp Source Oral   Pulse 88   Heart Rate Source Monitor   Resp 18   /68   BP Location Right upper arm   BP Upper/Lower Upper   Patient Position High fowlers   Level of Consciousness 0   MEWS Score 1   Oxygen Therapy   SpO2 97 %   O2 Device Nasal cannula   O2 Flow Rate (L/min) 5 L/min        08/24/20 0745   Vital Signs   Temp 97.7 °F (36.5 °C)   Temp Source Oral   Pulse 88   Heart Rate Source Monitor   Resp 18   /68   BP Location Right upper arm   BP Upper/Lower Upper   Patient Position High fowlers   Level of Consciousness 0   MEWS Score 1   Oxygen Therapy   SpO2 97 %   O2 Device Nasal cannula   O2 Flow Rate (L/min) 5 L/min   Pt resting in bed, c/o pain to head and burning pain to chest that is not new. Gave prn pain meds per order. Cough noted, none productive. AM assessment complete. Call light in reach.

## 2020-08-24 NOTE — PLAN OF CARE
Problem: Pain:  Goal: Pain level will decrease  Description: Pain level will decrease  8/24/2020 0831 by Kaylee Dumont RN  Outcome: Ongoing  8/23/2020 2223 by Dina Puckett RN  Outcome: Ongoing     Problem: Infection:  Goal: Will remain free from infection  Description: Will remain free from infection  8/24/2020 0831 by Kaylee Dumont RN  Outcome: Ongoing  8/23/2020 2223 by Dina Puckett RN  Outcome: Ongoing     Problem: Safety:  Goal: Free from accidental physical injury  Description: Free from accidental physical injury  8/24/2020 0831 by Kaylee Dumont RN  Outcome: Ongoing  8/23/2020 2223 by Dina Puckett RN  Outcome: Ongoing     Problem: Daily Care:  Goal: Daily care needs are met  Description: Daily care needs are met  8/24/2020 0831 by Kaylee Dumont RN  Outcome: Ongoing  8/23/2020 2223 by Dina Puckett RN  Outcome: Ongoing

## 2020-08-24 NOTE — PLAN OF CARE
Care plan ongoing    Problem: Pain:  Goal: Pain level will decrease  Description: Pain level will decrease  Outcome: Ongoing     Problem: Pain:  Goal: Control of acute pain  Description: Control of acute pain  Outcome: Ongoing     Problem: Daily Care:  Goal: Daily care needs are met  Description: Daily care needs are met  Outcome: Ongoing

## 2020-08-24 NOTE — CARE COORDINATION
Advance Care Planning   Advance Care Planning Clinical Specialist  Conversation Note      Date of ACP Conversation: 8/24/2020    Conversation Conducted with:   Patient with [address]: Next of Kin by law (only applies in absence of above)  [facility]    ACP Clinical Specialist: Sage Covarrubias RN      *When Decision Maker makes decisions on behalf of the incapacitated patient: Decision Maker is asked to consider and make decisions based on patient values, known preferences, or best interests. Current Designated Health Care Decision Maker:   (as entered in [facility] field. Validate  this information as still accurate & up-to-date; edit [facility] field as needed.)    If no Decision Maker listed above or available through scanned documents, then:    [address]   Who do you trust to make healthcare decisions for you? Name:    Jessica Morales Spouse  Phone  Number:  [phone]  Can this person be reached and be able to respond quickly, such as within a few minutes or hours? Yes    Who would be your back-up decision maker? Name  Marco Madsen  Phone Number: [phone]    For below questions, when conducting conversation with [facility], substitute \"she\" and \"her\" for \"you\" and \"your\". Hospitalization  If your health were to worsen and it became clear that your chance of recovery was unlikely, what would your preferences be regarding hospitalization?:    Choice:  [x]  The patient would want hospitalization  []  The patient would prefer comfort-focused treatment without hospitalization. Ventilation  If you were in your present state of health and suddenly became very ill and were unable to breathe on your own, what would your preference be about the use of a ventilator (breathing machine) if it were available to you?       If patient would desire the use of a ventilator (breathing machine), answer \"yes\", if not \"no\":yes    If your health were to worsen and it became clear that your chance of recovery was unlikely, would that change your answer? No    Resuscitation  CPR works best to restart the heart when there is a sudden event, like a heart attack, in someone who is otherwise healthy. Unfortunately, CPR does not typically restart the heart for people who have serious health conditions or who are very sick. In the event your heart stopped, would you want attempts to restart your heart (answer \"yes\") or would you prefer a natural death (answer \"no\")? yes    If your health were to worsen and it became clear that your chance of recovery was unlikely, would that change your answer? No    [] Yes  [] No   Educated Patient / Modesta Montes regarding differences between Advance Directives and portable DNR orders.     Length of ACP Conversation in minutes:  10 minutes    Conversation Outcomes:  [x] ACP discussion completed  [] Existing advance directive reviewed with patient; no changes to patient's previously recorded wishes   [] New Advance Directive completed   [] Portable Do Not Rescitate prepared for Provider review and signature  [] POLST/POST/MOLST/MOST prepared for Provider review and signature      Follow-up plan:    [] Schedule follow-up conversation to continue planning  [] Referred individual to Provider for additional questions/concerns   [] Advised patient/agent/surrogate to review completed ACP document and update if needed with changes in condition, patient preferences or care setting     [] This note routed to one or more involved healthcare providers

## 2020-08-24 NOTE — PROGRESS NOTES
Pt arrived to unit via stretcher. Admission assessment complete. Pt is alert and oriented. Pt is slightly hypertensive. Respirations are even but labored. Pt c/o headache, chest and back pain, prn tylenol given. Coffee and water provided to pt. BSC next to bed. SR up x 2 and bed in low position. Call light is within reach.

## 2020-08-24 NOTE — PROGRESS NOTES
Messaged doctor. Pt experiencing chest pain (not new), headache and non productive cough. Have administered Excedrin twice with no relief. Will wait for call back from doctor and continue to monitor.

## 2020-08-24 NOTE — PROGRESS NOTES
RESPIRATORY THERAPY ASSESSMENT    Name:  Jenny Barnhart  Medical Record Number:  [id number]  Age: 56 y.o. Gender: female  : 1960  Today's Date:  2020  Room:  0201/0201-01    Assessment     Is the patient being admitted for a COPD or Asthma exacerbation? Yes   (If yes the patient will be seen every 4 hours for the first 24 hours and then reassessed)    Patient Admission Diagnosis      Allergies  Allergies   Allergen Reactions    Promethazine Other (See Comments)     Extreme confusion (1/3/20)    Codeine Swelling       Minimum Predicted Vital Capacity:               Actual Vital Capacity:                    Pulmonary History:copd/asthma  Home Oxygen Therapy:  2.5L-4L  Home Respiratory Therapy:albuterol/trelegy ellipta/anoro ellipta (says she takes treatments q4wa)  Current Respiratory Therapy:  Albuterol/atrovent4 times daily  Treatment Type: MDI  Medications: Ipratropium Bromide    Respiratory Severity Index(RSI)   Patients with orders for inhalation medications, oxygen, or any therapeutic treatment modality will be placed on Respiratory Protocol. They will be assessed with the first treatment and at least every 72 hours thereafter. The following severity scale will be used to determine frequency of treatment intervention.     Smoking History: Pulmonary Disease or Smoking History, Greater than 15 pack year = 2    Social History  Social History     Tobacco Use    Smoking status: Current Every Day Smoker     Packs/day: 0.50     Years: 44.00     Pack years: 22.00     Types: Cigarettes    Smokeless tobacco: Never Used   Substance Use Topics    Alcohol use: Yes     Comment: rarely    Drug use: No       Recent Surgical History: None = 0  Past Surgical History  Past Surgical History:   Procedure Laterality Date     SECTION      x2    CHOLECYSTECTOMY         Level of Consciousness: Alert, Oriented, and Cooperative = 0    Level of Activity: Mostly sedentary, minimal walking = 2    Respiratory Pattern: Regular Pattern; RR 8-20 = 0    Breath Sounds: Diminshed bilaterally and/or crackles = 2    Sputum   ,  ,    Cough: Strong, spontaneous, non-productive = 0    Vital Signs   /69   Pulse 93   Temp 97.4 °F (36.3 °C) (Oral)   Resp 18   Ht 5' 4\" (1.626 m)   Wt 144 lb (65.3 kg)   LMP  (LMP Unknown)   SpO2 98%   Breastfeeding No   BMI 24.72 kg/m²   SPO2 (COPD values may differ): 86-87% on room air or greater than 92% on FiO2 35- 50% = 3    Peak Flow (asthma only): not applicable = 0    RSI: 3-60 = TID (three times daily) and Q4hr PRN for dyspnea        Plan       Goals: medication delivery and improve oxygenation    Patient/caregiver was educated on the proper method of use for Respiratory Care Devices:  Yes      Level of patient/caregiver understanding able to:   ? Verbalize understanding   ? Demonstrate understanding       ? Teach back        ? Needs reinforcement       ? No available caregiver               ? Other:     Response to education:  Good     Is patient being placed on Home Treatment Regimen? No     Does the patient have everything they need prior to discharge? NA     Comments: chart reviewed and patient assessed    Plan of Care: change albuterol/atrovent 4 times daily to q4(copd exac x 24 hours)    Electronically signed by Lazaro Bhakta RCP on 8/24/2020 at 4:47 AM    Respiratory Protocol Guidelines     1. Assessment and treatment by Respiratory Therapy will be initiated for medication and therapeutic interventions upon initiation of aerosolized medication. 2. Physician will be contacted for respiratory rate (RR) greater than 35 breaths per minute. Therapy will be held for heart rate (HR) greater than 140 beats per minute, pending direction from physician. 3. Bronchodilators will be administered via Metered Dose Inhaler (MDI) with spacer when the following criteria are met:  a.  Alert and cooperative     b. HR < 140 bpm  c. RR < 30 bpm                d. Can demonstrate a 2-3 second inspiratory hold  4. Bronchodilators will be administered via Hand Held Nebulizer [facility]) to patients when ANY of the following criteria are met  a. Incognizant or uncooperative          b. Patients treated with HHN at Home        c. Unable to demonstrate proper use of MDI with spacer     d. RR > 30 bpm   5. Bronchodilators will be delivered via Metered Dose Inhaler (MDI), HHN, Aerogen to intubated patients on mechanical ventilation. 6. Inhalation medication orders will be delivered and/or substituted as outlined below. Aerosolized Medications Ordering and Administration Guidelines:    1. All Medications will be ordered by a physician, and their frequency and/or modality will be adjusted as defined by the patients Respiratory Severity Index (RSI) score. 2. If the patient does not have documented COPD, consider discontinuing anticholinergics when RSI is less than 9.  3. If the bronchospasm worsens (increased RSI), then the bronchodilator frequency can be increased to a maximum of every 4 hours. If greater than every 4 hours is required, the physician will be contacted. 4. If the bronchospasm improves, the frequency of the bronchodilator can be decreased, based on the patient's RSI, but not less than home treatment regimen frequency. 5. Bronchodilator(s) will be discontinued if patient has a RSI less than 9 and has received no scheduled or as needed treatment for 72  Hrs. Patients Ordered on a Mucolytic Agent:    1. Must always be administered with a bronchodilator. 2. Discontinue if patient experiences worsened bronchospasm, or secretions have lessened to the point that the patient is able to clear them with a cough. Anti-inflammatory and Combination Medications:    1. If the patient lacks prior history of lung disease, is not using inhaled anti-inflammatory medication at home, and lacks wheezing by examination or by history for at least 24 hours, contact physician for possible discontinuation.

## 2020-08-24 NOTE — CONSULTS
Reason for referral and CC: SOB, COPD    HISTORY OF PRESENT ILLNESS: 18-year-old female with a history of COPD was admitted for shortness of breath. COVID test negative. She admits to wheezing associated dry cough. She reports feeling somewhat better today after being allowed to use a nebulizer with a negative COVID test.  She is still actively wheezing. Past Medical History:   Diagnosis Date    Asthma     COPD (chronic obstructive pulmonary disease) ([address]     Thyroid disease      Past Surgical History:   Procedure Laterality Date     SECTION      x2    CHOLECYSTECTOMY       Family History  family history is not on file. Social History:  reports that she has been smoking cigarettes. She has a 22.00 pack-year smoking history. She has never used smokeless tobacco.   reports current alcohol use. ALLERGIES:  Patient is allergic to promethazine and codeine.   Continuous Infusions:    Scheduled Meds:   methylPREDNISolone  40 mg Intravenous Q12H    [START ON 2020] furosemide  40 mg Intravenous Daily    fluticasone  1 spray Each Nostril Daily    ipratropium-albuterol  1 ampule Inhalation Q4H    aspirin  81 mg Oral Daily    atorvastatin  40 mg Oral Nightly    levothyroxine  137 mcg Oral QAM AC    montelukast  10 mg Oral Nightly    therapeutic multivitamin-minerals  1 tablet Oral Lunch    levofloxacin  500 mg Intravenous Q24H    sodium chloride flush  10 mL Intravenous 2 times per day    enoxaparin  40 mg Subcutaneous Nightly    nicotine  1 patch Transdermal Daily     PRN Meds:  traZODone, sodium chloride flush, acetaminophen **OR** acetaminophen, polyethylene glycol, [DISCONTINUED] promethazine **OR** ondansetron, perflutren lipid microspheres, aspirin-acetaminophen-caffeine, melatonin    REVIEW OF SYSTEMS:  Constitutional: Negative for fever  HENT: Negative for sore throat  Eyes: Negative for redness   Respiratory: + for dyspnea, cough  Cardiovascular: Negative for chest pain  Gastrointestinal: Negative for vomiting, diarrhea   Genitourinary: Negative for hematuria   Musculoskeletal: Negative for arthralgias   Skin: Negative for rash  Neurological: Negative for syncope  Hematological: Negative for adenopathy  Psychiatric/Behavorial: Negative for anxiety    PHYSICAL EXAM: BP (!) 154/72   Pulse 98   Temp 97.4 °F (36.3 °C) (Oral)   Resp 20   Ht 5' 4\" (1.626 m)   Wt 144 lb (65.3 kg)   LMP  (LMP Unknown)   SpO2 98%   Breastfeeding No   BMI 24.72 kg/m²  on 3lpm  Constitutional:  No acute distress. Eyes: PERRL. Conjunctivae anicteric. ENT: Normal nose. Normal tongue. Neck:  Trachea is midline. No thyroid tenderness. Respiratory: No accessory muscle usage. + wheezes. No rales. No Rhonchi. Cardiovascular: Normal S1S2. No digit clubbing. No digit cyanosis. No LE edema. Gastrointestinal: No mass palpated. No tenderness palpated. No umbilical hernia. Lymphatic: No cervical or supraclavicular adenopathy. Skin: No rash on the exposed extremities. No Nodules or induration on exposed extremities. Psychiatric: No anxiety or Agitation. Alert and Oriented to person, place and time. CBC:   Recent Labs     08/23/20  0913   WBC 18.1*   HGB 12.2   HCT 36.8   MCV 95.7        BMP:   Recent Labs     08/23/20  0913   *   K 4.0   CL 99   CO2 26   BUN 14   CREATININE 0.6        Recent Labs     08/23/20  0913   AST 36   ALT 33   BILITOT 0.3   ALKPHOS 71     PCT 0.05    Chest imaging was reviewed by me and showed   1. Pulmonary vascular congestion with suspected small bilateral pleural    effusions and associated bibasilar atelectasis. ASSESSMENT:  · COPD AE  · Acute on chronic hypoxic respiratory failure - now improved to baseline  · Chronic prednisone use  · Cigarette smoker  · Chronic Leukocytosis    PLAN:  Repeat CXR tomorrow  Supplemental oxygen to maintain SaO2 >92%; wean as tolerated  Intensive inhaled bronchodilator therapy. IV solumedrol 40 mg IV Q12 hrs. Plan to switch to oral prednisone taper when improved. Sputum GS&C.   Smoking cessation advised    Thank you Kristal Connelly, * for this consult

## 2020-08-24 NOTE — PROGRESS NOTES
CO2 26   BUN 14   CREATININE 0.6     Recent Labs     08/23/20  0913   TROPONINI <0.01       Coagulation:   Lab Results   Component Value Date    INR 1.03 12/26/2019     Cardiac markers:   Lab Results   Component Value Date    TROPONINI <0.01 08/23/2020         Lab Results   Component Value Date    ALT 33 08/23/2020    AST 36 08/23/2020    ALKPHOS 71 08/23/2020    BILITOT 0.3 08/23/2020       Lab Results   Component Value Date    INR 1.03 12/26/2019    PROTIME 12.0 12/26/2019       Radiology    Chest xray   Pulmonary vascular congestion with suspected small bilateral pleural   effusions and associated bibasilar atelectasis. Cultures:   covid pending    Assessment & Plan:    #Acute on chronic hypoxic respiratory failure-due to COPD exacerbation  Patient desatted to mid [de-identified] on ambulation on 3 L nasal cannula  -Continue supplemental oxygen. Currently on 4 L nasal cannula. -Continue inhalers and steroids  -pending COVID-19 testing     #COPD exacerbation  -Started IV steroids  -Continue inhalers     # pulmonary vascular congestion and effusions  - unsure if she has acute CHF, previous ECHo last year with normal EF and normal Diastolic function   - started diuresis   - pending repeat echo      #Leukocytosis-likely due to chronic steroids  -Trend WBC  -Check procalcitonin     #Hypertension  -BP initially elevated but now controlled.   Continue home antihypertensives.     #Tobacco use  -Nicotine patch     #Insomnia  -Start melatonin nightly as needed     DVT Prophylaxis: Lovenox  Diet: regular   Code Status: full                 Adele Werner MD 8/24/2020 7:29 AM

## 2020-08-25 ENCOUNTER — APPOINTMENT (OUTPATIENT)
Dept: GENERAL RADIOLOGY | Age: 60
DRG: 189 | End: 2020-08-25
Payer: COMMERCIAL

## 2020-08-25 LAB
ANION GAP SERPL CALCULATED.3IONS-SCNC: 8 MMOL/L (ref 3–16)
BASOPHILS ABSOLUTE: 0 K/UL (ref 0–0.2)
BASOPHILS RELATIVE PERCENT: 0.1 %
BUN BLDV-MCNC: 27 MG/DL (ref 7–20)
CALCIUM SERPL-MCNC: 9.3 MG/DL (ref 8.3–10.6)
CHLORIDE BLD-SCNC: 91 MMOL/L (ref 99–110)
CO2: 31 MMOL/L (ref 21–32)
CREAT SERPL-MCNC: 0.7 MG/DL (ref 0.6–1.2)
EOSINOPHILS ABSOLUTE: 0 K/UL (ref 0–0.6)
EOSINOPHILS RELATIVE PERCENT: 0 %
GFR AFRICAN AMERICAN: >60
GFR NON-AFRICAN AMERICAN: >60
GLUCOSE BLD-MCNC: 128 MG/DL (ref 70–99)
HCT VFR BLD CALC: 37.9 % (ref 36–48)
HEMOGLOBIN: 12.7 G/DL (ref 12–16)
LV EF: 58 %
LVEF MODALITY: NORMAL
LYMPHOCYTES ABSOLUTE: 0.5 K/UL (ref 1–5.1)
LYMPHOCYTES RELATIVE PERCENT: 3.1 %
MCH RBC QN AUTO: 31.9 PG (ref 26–34)
MCHC RBC AUTO-ENTMCNC: 33.5 G/DL (ref 31–36)
MCV RBC AUTO: 95.2 FL (ref 80–100)
MONOCYTES ABSOLUTE: 0.3 K/UL (ref 0–1.3)
MONOCYTES RELATIVE PERCENT: 1.7 %
NEUTROPHILS ABSOLUTE: 15.1 K/UL (ref 1.7–7.7)
NEUTROPHILS RELATIVE PERCENT: 95.1 %
PDW BLD-RTO: 13.4 % (ref 12.4–15.4)
PLATELET # BLD: 290 K/UL (ref 135–450)
PMV BLD AUTO: 7.9 FL (ref 5–10.5)
POTASSIUM SERPL-SCNC: 4.9 MMOL/L (ref 3.5–5.1)
RBC # BLD: 3.99 M/UL (ref 4–5.2)
SODIUM BLD-SCNC: 130 MMOL/L (ref 136–145)
WBC # BLD: 15.8 K/UL (ref 4–11)

## 2020-08-25 PROCEDURE — 6370000000 HC RX 637 (ALT 250 FOR IP): Performed by: INTERNAL MEDICINE

## 2020-08-25 PROCEDURE — 93306 TTE W/DOPPLER COMPLETE: CPT

## 2020-08-25 PROCEDURE — 1200000000 HC SEMI PRIVATE

## 2020-08-25 PROCEDURE — 85025 COMPLETE CBC W/AUTO DIFF WBC: CPT

## 2020-08-25 PROCEDURE — 2580000003 HC RX 258: Performed by: INTERNAL MEDICINE

## 2020-08-25 PROCEDURE — 6360000002 HC RX W HCPCS: Performed by: INTERNAL MEDICINE

## 2020-08-25 PROCEDURE — 94640 AIRWAY INHALATION TREATMENT: CPT

## 2020-08-25 PROCEDURE — 80048 BASIC METABOLIC PNL TOTAL CA: CPT

## 2020-08-25 PROCEDURE — 71046 X-RAY EXAM CHEST 2 VIEWS: CPT

## 2020-08-25 PROCEDURE — 2700000000 HC OXYGEN THERAPY PER DAY

## 2020-08-25 PROCEDURE — 99232 SBSQ HOSP IP/OBS MODERATE 35: CPT | Performed by: INTERNAL MEDICINE

## 2020-08-25 PROCEDURE — 36415 COLL VENOUS BLD VENIPUNCTURE: CPT

## 2020-08-25 PROCEDURE — 94761 N-INVAS EAR/PLS OXIMETRY MLT: CPT

## 2020-08-25 PROCEDURE — 99233 SBSQ HOSP IP/OBS HIGH 50: CPT | Performed by: INTERNAL MEDICINE

## 2020-08-25 RX ORDER — IPRATROPIUM BROMIDE AND ALBUTEROL SULFATE 2.5; .5 MG/3ML; MG/3ML
1 SOLUTION RESPIRATORY (INHALATION) EVERY 4 HOURS PRN
Status: DISCONTINUED | OUTPATIENT
Start: 2020-08-25 | End: 2020-08-26 | Stop reason: HOSPADM

## 2020-08-25 RX ORDER — PREDNISONE 20 MG/1
40 TABLET ORAL DAILY
Status: DISCONTINUED | OUTPATIENT
Start: 2020-08-26 | End: 2020-08-26 | Stop reason: HOSPADM

## 2020-08-25 RX ORDER — IPRATROPIUM BROMIDE AND ALBUTEROL SULFATE 2.5; .5 MG/3ML; MG/3ML
1 SOLUTION RESPIRATORY (INHALATION)
Status: DISCONTINUED | OUTPATIENT
Start: 2020-08-25 | End: 2020-08-26 | Stop reason: HOSPADM

## 2020-08-25 RX ADMIN — IPRATROPIUM BROMIDE AND ALBUTEROL SULFATE 1 AMPULE: .5; 3 SOLUTION RESPIRATORY (INHALATION) at 19:49

## 2020-08-25 RX ADMIN — LEVOFLOXACIN 500 MG: 5 INJECTION, SOLUTION INTRAVENOUS at 21:08

## 2020-08-25 RX ADMIN — IPRATROPIUM BROMIDE AND ALBUTEROL SULFATE 1 AMPULE: .5; 3 SOLUTION RESPIRATORY (INHALATION) at 15:55

## 2020-08-25 RX ADMIN — Medication 10 ML: at 08:15

## 2020-08-25 RX ADMIN — LEVOTHYROXINE SODIUM 137 MCG: 25 TABLET ORAL at 08:14

## 2020-08-25 RX ADMIN — ASPIRIN 81 MG: 81 TABLET, COATED ORAL at 08:14

## 2020-08-25 RX ADMIN — METHYLPREDNISOLONE SODIUM SUCCINATE 40 MG: 40 INJECTION, POWDER, FOR SOLUTION INTRAMUSCULAR; INTRAVENOUS at 11:33

## 2020-08-25 RX ADMIN — Medication 10 ML: at 21:07

## 2020-08-25 RX ADMIN — FUROSEMIDE 40 MG: 10 INJECTION, SOLUTION INTRAMUSCULAR; INTRAVENOUS at 08:15

## 2020-08-25 RX ADMIN — IPRATROPIUM BROMIDE AND ALBUTEROL SULFATE 1 AMPULE: .5; 3 SOLUTION RESPIRATORY (INHALATION) at 02:56

## 2020-08-25 RX ADMIN — MULTIPLE VITAMINS W/ MINERALS TAB 1 TABLET: TAB at 11:33

## 2020-08-25 RX ADMIN — ENOXAPARIN SODIUM 40 MG: 40 INJECTION SUBCUTANEOUS at 21:07

## 2020-08-25 RX ADMIN — ACETAMINOPHEN, ASPIRIN AND CAFFEINE 1 TABLET: 250; 250; 65 TABLET, FILM COATED ORAL at 08:14

## 2020-08-25 RX ADMIN — ATORVASTATIN CALCIUM 40 MG: 40 TABLET, FILM COATED ORAL at 21:07

## 2020-08-25 RX ADMIN — IPRATROPIUM BROMIDE AND ALBUTEROL SULFATE 1 AMPULE: .5; 3 SOLUTION RESPIRATORY (INHALATION) at 10:38

## 2020-08-25 RX ADMIN — IPRATROPIUM BROMIDE AND ALBUTEROL SULFATE 1 AMPULE: .5; 3 SOLUTION RESPIRATORY (INHALATION) at 06:45

## 2020-08-25 RX ADMIN — FLUTICASONE PROPIONATE 1 SPRAY: 50 SPRAY, METERED NASAL at 09:49

## 2020-08-25 RX ADMIN — MONTELUKAST SODIUM 10 MG: 10 TABLET, COATED ORAL at 21:08

## 2020-08-25 RX ADMIN — ACETAMINOPHEN 650 MG: 325 TABLET ORAL at 14:54

## 2020-08-25 ASSESSMENT — PAIN SCALES - GENERAL
PAINLEVEL_OUTOF10: 6
PAINLEVEL_OUTOF10: 6

## 2020-08-25 NOTE — PROGRESS NOTES
AM assessment completed. Scheduled medications given per MAR. VSS on 4 lpm. A/O x4. Denies any needs, call light in reach. Will monitor.

## 2020-08-25 NOTE — PROGRESS NOTES
Pulmonary Progress Note  CC: SOB, copd    Subjective: Does not feel improved today      EXAM: /74   Pulse 99   Temp 97 °F (36.1 °C) (Oral)   Resp 18   Ht 5' 4\" (1.626 m)   Wt 139 lb (63 kg)   LMP  (LMP Unknown)   SpO2 92%   Breastfeeding No   BMI 23.86 kg/m²  on 4lpm  Constitutional:  No acute distress. Eyes: PERRL. Conjunctivae anicteric. ENT: Normal nose. Normal tongue. Neck:  Trachea is midline. No thyroid tenderness. Respiratory: No accessory muscle usage. decreased breath sounds. No wheezes. No rales. No Rhonchi. Cardiovascular: Normal S1S2. No digit clubbing. No digit cyanosis. No LE edema. Psychiatric: No anxiety or Agitation. Alert and Oriented to person, place and time.     Scheduled Meds:   ipratropium-albuterol  1 ampule Inhalation Q4H [place]    methylPREDNISolone  40 mg Intravenous Q12H    furosemide  40 mg Intravenous Daily    fluticasone  1 spray Each Nostril Daily    aspirin  81 mg Oral Daily    atorvastatin  40 mg Oral Nightly    levothyroxine  137 mcg Oral QAM AC    montelukast  10 mg Oral Nightly    therapeutic multivitamin-minerals  1 tablet Oral Lunch    levofloxacin  500 mg Intravenous Q24H    sodium chloride flush  10 mL Intravenous 2 times per day    enoxaparin  40 mg Subcutaneous Nightly    nicotine  1 patch Transdermal Daily     Continuous Infusions:    PRN Meds:  ipratropium-albuterol, traZODone, sodium chloride flush, acetaminophen **OR** acetaminophen, polyethylene glycol, [DISCONTINUED] promethazine **OR** ondansetron, perflutren lipid microspheres, aspirin-acetaminophen-caffeine, melatonin    Labs:  CBC:   Recent Labs     08/23/20  0913 08/25/20  0508   WBC 18.1* 15.8*   HGB 12.2 12.7   HCT 36.8 37.9   MCV 95.7 95.2    290     BMP:   Recent Labs     08/23/20  0913 08/25/20  0508   * 130*   K 4.0 4.9   CL 99 91*   CO2 26 31   BUN 14 27*   CREATININE 0.6 0.7     PCT 0.05     Chest imaging was reviewed by me and showed 8/25/20  Lungs are hyperinflated and there are emphysematous changes suggestive of    COPD in the correct clinical setting.         There is more focal consolidation in the left lung which may be related to    superimposed pneumonia.  Follow-up to resolution recommended.           ASSESSMENT:  · COPD AE  · :LLL Pneumonia, suspect gram +  · Acute on chronic hypoxic respiratory failure - now improved to baseline  · Chronic prednisone use  · Cigarette smoker  · Chronic Leukocytosis     PLAN:  · Supplemental oxygen to maintain SaO2 >92%; wean as tolerated  · Intensive inhaled bronchodilator therapy. · IV solumedrol 40 mg IV Q12 hrs. Plan to switch to oral prednisone taper when improved. · Sputum GS&C.   · Smoking cessation advised  · Levaquin

## 2020-08-25 NOTE — PROGRESS NOTES
IM Progress Note    Admit Date:  8/23/2020  2    Interval history:  Admitted for copd exacerbation     Subjective:  Ms. Nata Riddle seen up in bed, coughing after lunch. Feels short of breath  Uses 2-4 L at home and currently on 4 L. Objective:   /63   Pulse 101   Temp 98.1 °F (36.7 °C) (Oral)   Resp 18   Ht 5' 4\" (1.626 m)   Wt 139 lb (63 kg)   LMP  (LMP Unknown)   SpO2 91%   Breastfeeding No   BMI 23.86 kg/m²       Intake/Output Summary (Last 24 hours) at 8/25/2020 1210  Last data filed at 8/25/2020 0536  Gross per 24 hour   Intake --   Output 800 ml   Net -800 ml       Physical Exam:    General:  Middle aged female, Awake, alert and oriented. Appears to be not in any distress  Mucous Membranes:  Pink , anicteric  Neck: No JVD, no carotid bruit, no thyromegaly  Chest: diminished in bases, scattered wheeze   Cardiovascular:  RRR S1S2 heard, no murmurs or gallops  Abdomen:  Soft, undistended, non tender, no organomegaly, BS present  Extremities: No edema or cyanosis.  Distal pulses well felt  Neurological : grossly normal      Medications:   Scheduled Medications:    ipratropium-albuterol  1 ampule Inhalation Q4H WA    methylPREDNISolone  40 mg Intravenous Q12H    furosemide  40 mg Intravenous Daily    fluticasone  1 spray Each Nostril Daily    aspirin  81 mg Oral Daily    atorvastatin  40 mg Oral Nightly    levothyroxine  137 mcg Oral QAM AC    montelukast  10 mg Oral Nightly    therapeutic multivitamin-minerals  1 tablet Oral Lunch    levofloxacin  500 mg Intravenous Q24H    sodium chloride flush  10 mL Intravenous 2 times per day    enoxaparin  40 mg Subcutaneous Nightly    nicotine  1 patch Transdermal Daily     I  ipratropium-albuterol, traZODone, sodium chloride flush, acetaminophen **OR** acetaminophen, polyethylene glycol, [DISCONTINUED] promethazine **OR** ondansetron, perflutren lipid microspheres, aspirin-acetaminophen-caffeine, melatonin    Lab Data:  Recent Labs 08/23/20  0913 08/25/20  0508   WBC 18.1* 15.8*   HGB 12.2 12.7   HCT 36.8 37.9   MCV 95.7 95.2    290     Recent Labs     08/23/20  0913 08/25/20  0508   * 130*   K 4.0 4.9   CL 99 91*   CO2 26 31   BUN 14 27*   CREATININE 0.6 0.7     Recent Labs     08/23/20  0913   TROPONINI <0.01       Coagulation:   Lab Results   Component Value Date    INR 1.03 12/26/2019     Cardiac markers:   Lab Results   Component Value Date    TROPONINI <0.01 08/23/2020         Lab Results   Component Value Date    ALT 33 08/23/2020    AST 36 08/23/2020    ALKPHOS 71 08/23/2020    BILITOT 0.3 08/23/2020       Lab Results   Component Value Date    INR 1.03 12/26/2019    PROTIME 12.0 12/26/2019       Radiology  XR CHEST (2 VW)   Final Result   Lungs are hyperinflated and there are emphysematous changes suggestive of   COPD in the correct clinical setting. There is more focal consolidation in the left lung which may be related to   superimposed pneumonia. Follow-up to resolution recommended. XR CHEST PORTABLE   Final Result   1. Pulmonary vascular congestion with suspected small bilateral pleural   effusions and associated bibasilar atelectasis. Echo 8/25/2020   Summary    Limited imaging due to lung interface. Parasternal images are unobtainable.    Normal left ventricular systolic function with an estimated ejection    fraction of 55-60%.    No regional wall motion abnormalities are seen.    Normal left ventricular diastolic filling pressure.    Normal systolic pulmonary artery pressure (SPAP) estimated at 20 mmHg (RA    pressure 3 mmHg).    No significant valvular heart disease. Cultures:   covid not detected      JOSE Telles have reviewed the chart on [facility] Looney and personally interviewed and examined patient, reviewed the data (labs and imaging) and after discussion with my PA formulated the plan. Agree with note with the following edits.     HPI:     Patient admitted to hospital for shortness of breath. She has COPD and chronic respiratory failure. She uses between 2 and 4 L at home. She feels she is short of breath. I reviewed the patient's Past Medical History, Past Surgical History, Medications, and Allergies. Physical exam:    /63   Pulse 101   Temp 98.1 °F (36.7 °C) (Oral)   Resp 18   Ht 5' 4\" (1.626 m)   Wt 139 lb (63 kg)   LMP  (LMP Unknown)   SpO2 91%   Breastfeeding No   BMI 23.86 kg/m²     Gen: chronically sick appearing  Eyes: PERRL. No sclera icterus. No conjunctival injection. ENT: No discharge. Pharynx clear. Neck: Trachea midline. Normal thyroid. Resp: No accessory muscle use. No crackles. + wheezes. No rhonchi. No dullness on percussion. CV: Regular rate. Regular rhythm. No murmur or rub. No edema. Assessment & Plan:    #Acute on chronic hypoxic respiratory failure-due to COPD exacerbation  Patient desatted to mid [de-identified] on ambulation on 3 L nasal cannula  -Continue supplemental oxygen. Currently on 4 L nasal cannula. -Continue inhalers and steroids  -negative COVID-19 testing     #COPD exacerbation  -Started IV steroids, Levaquin D#2  -Continue inhalers     # pulmonary vascular congestion and effusions  - unsure if she has acute CHF, previous ECHo last year with normal EF and normal Diastolic function   - started diuresis   - repeat Echo normal.      #Leukocytosis-likely due to chronic steroids  -Trend WBC, improved  -Check procalcitonin: 0.05     #Hypertension  -BP initially elevated but now controlled.   Continue home antihypertensives.     #Tobacco use  -Nicotine patch     #Insomnia  -Start melatonin nightly as needed     DVT Prophylaxis: Lovenox  Diet: regular   Code Status: full   Rhonda Portillo FNP-C  8/25/2020      ROB Newton 8/25/2020 2:27 PM

## 2020-08-25 NOTE — PROGRESS NOTES
Patient admitted to room 322 from [address] [place] [address]. Patient oriented to room, call light, bed rails, phone, lights and bathroom. Patient instructed about the schedule of the day including: vital sign frequency, lab draws, possible tests, frequency of MD and staff rounds, daily weights, I &O's and prescribed diet. Yes bed alarm in place, patient aware of placement and reason. Yes Telemetry box in place, patient aware of placement and reason. Bed locked, in lowest position, side rails up 2/4, call light within reach. Recliner Assessment  Patient is able to demonstrated the ability to move from a reclining position to an upright position within the recliner. 4 Eyes Skin Assessment     The patient is being assess for   Transfer to New Unit    I agree that 2 RN's have performed a thorough Head to Toe Skin Assessment on the patient. ALL assessment sites listed below have been assessed. Areas assessed by both nurses:   [x]   Head, Face, and Ears   [x]   Shoulders, Back, and Chest, Abdomen  [x]   Arms, Elbows, and Hands   [x]   Coccyx, Sacrum, and Ischium  [x]   Legs, Feet, and Heels        Scattered Bruising    **SHARE this note so that the co-signing nurse is able to place an eSignature**    Co-signer eSignature: {Esignature:[id number]}    Does the Patient have Skin Breakdown?   No          Ron Prevention initiated:  No   Wound Care Orders initiated:  No      [facility] nurse consulted for Pressure Injury (Stage 3,4, Unstageable, DTI, NWPT, Complex wounds)and New or Established Ostomies:  No      Primary Nurse eSignature: {Esignature:[id number]}

## 2020-08-25 NOTE — PROGRESS NOTES
RESPIRATORY THERAPY ASSESSMENT    Name:  Cande Munson  Medical Record Number:  [id number]  Age: 56 y.o. Gender: female  : 1960  Today's Date:  2020  Room:  /0322-02    Assessment     Is the patient being admitted for a COPD or Asthma exacerbation? Yes   (If yes the patient will be seen every 4 hours for the first 24 hours and then reassessed)    Patient Admission Diagnosis      Allergies  Allergies   Allergen Reactions    Promethazine Other (See Comments)     Extreme confusion (1/3/20)    Codeine Swelling       Minimum Predicted Vital Capacity:               Actual Vital Capacity:                    Pulmonary History:COPD and Asthma  Home Oxygen Therapy:  5 liters/min via nasal cannula  Home Respiratory Therapy:Albuterol and Umeclidinium Bromide/Vilanterol   Current Respiratory Therapy:  duoneb q4  Treatment Type: HHN  Medications: Albuterol/Ipratropium    Respiratory Severity Index(RSI)   Patients with orders for inhalation medications, oxygen, or any therapeutic treatment modality will be placed on Respiratory Protocol. They will be assessed with the first treatment and at least every 72 hours thereafter. The following severity scale will be used to determine frequency of treatment intervention.     Smoking History: Pulmonary Disease or Smoking History, Greater than 15 pack year = 2    Social History  Social History     Tobacco Use    Smoking status: Current Every Day Smoker     Packs/day: 0.50     Years: 44.00     Pack years: 22.00     Types: Cigarettes    Smokeless tobacco: Never Used   Substance Use Topics    Alcohol use: Yes     Comment: rarely    Drug use: No       Recent Surgical History: None = 0  Past Surgical History  Past Surgical History:   Procedure Laterality Date     SECTION      x2    CHOLECYSTECTOMY         Level of Consciousness: Alert, Oriented, and Cooperative = 0    Level of Activity: Walking with assistance = 1    Respiratory Pattern: Dyspnea with hold  4. Bronchodilators will be administered via Hand Held Nebulizer [facility]) to patients when ANY of the following criteria are met  a. Incognizant or uncooperative          b. Patients treated with HHN at Home        c. Unable to demonstrate proper use of MDI with spacer     d. RR > 30 bpm   5. Bronchodilators will be delivered via Metered Dose Inhaler (MDI), HHN, Aerogen to intubated patients on mechanical ventilation. 6. Inhalation medication orders will be delivered and/or substituted as outlined below. Aerosolized Medications Ordering and Administration Guidelines:    1. All Medications will be ordered by a physician, and their frequency and/or modality will be adjusted as defined by the patients Respiratory Severity Index (RSI) score. 2. If the patient does not have documented COPD, consider discontinuing anticholinergics when RSI is less than 9.  3. If the bronchospasm worsens (increased RSI), then the bronchodilator frequency can be increased to a maximum of every 4 hours. If greater than every 4 hours is required, the physician will be contacted. 4. If the bronchospasm improves, the frequency of the bronchodilator can be decreased, based on the patient's RSI, but not less than home treatment regimen frequency. 5. Bronchodilator(s) will be discontinued if patient has a RSI less than 9 and has received no scheduled or as needed treatment for 72  Hrs. Patients Ordered on a Mucolytic Agent:    1. Must always be administered with a bronchodilator. 2. Discontinue if patient experiences worsened bronchospasm, or secretions have lessened to the point that the patient is able to clear them with a cough. Anti-inflammatory and Combination Medications:    1. If the patient lacks prior history of lung disease, is not using inhaled anti-inflammatory medication at home, and lacks wheezing by examination or by history for at least 24 hours, contact physician for possible discontinuation.

## 2020-08-26 VITALS
OXYGEN SATURATION: 94 % | HEIGHT: 64 IN | TEMPERATURE: 97.1 F | WEIGHT: 135.1 LBS | BODY MASS INDEX: 23.06 KG/M2 | DIASTOLIC BLOOD PRESSURE: 60 MMHG | RESPIRATION RATE: 18 BRPM | HEART RATE: 92 BPM | SYSTOLIC BLOOD PRESSURE: 103 MMHG

## 2020-08-26 LAB
ANION GAP SERPL CALCULATED.3IONS-SCNC: 5 MMOL/L (ref 3–16)
BASOPHILS ABSOLUTE: 0.1 K/UL (ref 0–0.2)
BASOPHILS RELATIVE PERCENT: 0.8 %
BUN BLDV-MCNC: 26 MG/DL (ref 7–20)
CALCIUM SERPL-MCNC: 9.3 MG/DL (ref 8.3–10.6)
CHLORIDE BLD-SCNC: 87 MMOL/L (ref 99–110)
CO2: 37 MMOL/L (ref 21–32)
CREAT SERPL-MCNC: 0.7 MG/DL (ref 0.6–1.2)
EOSINOPHILS ABSOLUTE: 0 K/UL (ref 0–0.6)
EOSINOPHILS RELATIVE PERCENT: 0.1 %
GFR AFRICAN AMERICAN: >60
GFR NON-AFRICAN AMERICAN: >60
GLUCOSE BLD-MCNC: 112 MG/DL (ref 70–99)
HCT VFR BLD CALC: 39.4 % (ref 36–48)
HEMOGLOBIN: 13.2 G/DL (ref 12–16)
LYMPHOCYTES ABSOLUTE: 2.5 K/UL (ref 1–5.1)
LYMPHOCYTES RELATIVE PERCENT: 14.8 %
MCH RBC QN AUTO: 31.9 PG (ref 26–34)
MCHC RBC AUTO-ENTMCNC: 33.4 G/DL (ref 31–36)
MCV RBC AUTO: 95.3 FL (ref 80–100)
MONOCYTES ABSOLUTE: 1.5 K/UL (ref 0–1.3)
MONOCYTES RELATIVE PERCENT: 9 %
NEUTROPHILS ABSOLUTE: 12.6 K/UL (ref 1.7–7.7)
NEUTROPHILS RELATIVE PERCENT: 75.3 %
PDW BLD-RTO: 13.6 % (ref 12.4–15.4)
PLATELET # BLD: 282 K/UL (ref 135–450)
PMV BLD AUTO: 7.4 FL (ref 5–10.5)
POTASSIUM SERPL-SCNC: 3.6 MMOL/L (ref 3.5–5.1)
RBC # BLD: 4.13 M/UL (ref 4–5.2)
SODIUM BLD-SCNC: 129 MMOL/L (ref 136–145)
WBC # BLD: 16.8 K/UL (ref 4–11)

## 2020-08-26 PROCEDURE — 6370000000 HC RX 637 (ALT 250 FOR IP): Performed by: INTERNAL MEDICINE

## 2020-08-26 PROCEDURE — 85025 COMPLETE CBC W/AUTO DIFF WBC: CPT

## 2020-08-26 PROCEDURE — 2580000003 HC RX 258: Performed by: INTERNAL MEDICINE

## 2020-08-26 PROCEDURE — 36415 COLL VENOUS BLD VENIPUNCTURE: CPT

## 2020-08-26 PROCEDURE — 2700000000 HC OXYGEN THERAPY PER DAY

## 2020-08-26 PROCEDURE — 80048 BASIC METABOLIC PNL TOTAL CA: CPT

## 2020-08-26 PROCEDURE — 94669 MECHANICAL CHEST WALL OSCILL: CPT

## 2020-08-26 PROCEDURE — 6370000000 HC RX 637 (ALT 250 FOR IP): Performed by: NURSE PRACTITIONER

## 2020-08-26 PROCEDURE — 99232 SBSQ HOSP IP/OBS MODERATE 35: CPT | Performed by: INTERNAL MEDICINE

## 2020-08-26 PROCEDURE — 99238 HOSP IP/OBS DSCHRG MGMT 30/<: CPT | Performed by: INTERNAL MEDICINE

## 2020-08-26 PROCEDURE — 94640 AIRWAY INHALATION TREATMENT: CPT

## 2020-08-26 RX ORDER — FLUTICASONE PROPIONATE 50 MCG
1 SPRAY, SUSPENSION (ML) NASAL DAILY
Qty: 1 BOTTLE | Refills: 3 | Status: SHIPPED | OUTPATIENT
Start: 2020-08-27

## 2020-08-26 RX ORDER — GUAIFENESIN 600 MG/1
600 TABLET, EXTENDED RELEASE ORAL 2 TIMES DAILY
Status: DISCONTINUED | OUTPATIENT
Start: 2020-08-26 | End: 2020-08-26 | Stop reason: HOSPADM

## 2020-08-26 RX ORDER — LEVOTHYROXINE SODIUM 0.12 MG/1
125 TABLET ORAL
Qty: 30 TABLET | Refills: 3 | Status: SHIPPED | OUTPATIENT
Start: 2020-08-27

## 2020-08-26 RX ORDER — LEVOFLOXACIN 250 MG/1
500 TABLET ORAL DAILY
Qty: 8 TABLET | Refills: 0 | Status: SHIPPED | OUTPATIENT
Start: 2020-08-26 | End: 2020-08-30

## 2020-08-26 RX ORDER — GUAIFENESIN 600 MG/1
600 TABLET, EXTENDED RELEASE ORAL 2 TIMES DAILY
Qty: 30 TABLET | Refills: 0 | Status: SHIPPED | OUTPATIENT
Start: 2020-08-26

## 2020-08-26 RX ORDER — PREDNISONE 10 MG/1
TABLET ORAL
Qty: 18 TABLET | Refills: 0 | Status: ON HOLD
Start: 2020-08-26 | End: 2021-05-14 | Stop reason: HOSPADM

## 2020-08-26 RX ORDER — NICOTINE 21 MG/24HR
1 PATCH, TRANSDERMAL 24 HOURS TRANSDERMAL DAILY
Qty: 30 PATCH | Refills: 3 | Status: SHIPPED | OUTPATIENT
Start: 2020-08-27 | End: 2020-11-12 | Stop reason: CLARIF

## 2020-08-26 RX ORDER — LEVOTHYROXINE SODIUM 0.12 MG/1
125 TABLET ORAL
Status: DISCONTINUED | OUTPATIENT
Start: 2020-08-27 | End: 2020-08-26 | Stop reason: HOSPADM

## 2020-08-26 RX ORDER — FUROSEMIDE 20 MG/1
20 TABLET ORAL DAILY
Qty: 30 TABLET | Refills: 0 | Status: ON HOLD | OUTPATIENT
Start: 2020-08-26 | End: 2022-01-30

## 2020-08-26 RX ADMIN — IPRATROPIUM BROMIDE AND ALBUTEROL SULFATE 1 AMPULE: .5; 3 SOLUTION RESPIRATORY (INHALATION) at 15:10

## 2020-08-26 RX ADMIN — MULTIPLE VITAMINS W/ MINERALS TAB 1 TABLET: TAB at 13:22

## 2020-08-26 RX ADMIN — ACETAMINOPHEN 650 MG: 325 TABLET ORAL at 09:52

## 2020-08-26 RX ADMIN — IPRATROPIUM BROMIDE AND ALBUTEROL SULFATE 1 AMPULE: .5; 3 SOLUTION RESPIRATORY (INHALATION) at 10:58

## 2020-08-26 RX ADMIN — Medication 10 ML: at 09:47

## 2020-08-26 RX ADMIN — FLUTICASONE PROPIONATE 1 SPRAY: 50 SPRAY, METERED NASAL at 06:59

## 2020-08-26 RX ADMIN — ASPIRIN 81 MG: 81 TABLET, COATED ORAL at 09:46

## 2020-08-26 RX ADMIN — PREDNISONE 40 MG: 20 TABLET ORAL at 09:45

## 2020-08-26 RX ADMIN — LEVOTHYROXINE SODIUM 137 MCG: 25 TABLET ORAL at 06:55

## 2020-08-26 RX ADMIN — GUAIFENESIN 600 MG: 600 TABLET, EXTENDED RELEASE ORAL at 13:22

## 2020-08-26 RX ADMIN — IPRATROPIUM BROMIDE AND ALBUTEROL SULFATE 1 AMPULE: .5; 3 SOLUTION RESPIRATORY (INHALATION) at 02:13

## 2020-08-26 RX ADMIN — IPRATROPIUM BROMIDE AND ALBUTEROL SULFATE 1 AMPULE: .5; 3 SOLUTION RESPIRATORY (INHALATION) at 07:01

## 2020-08-26 ASSESSMENT — PAIN SCALES - GENERAL: PAINLEVEL_OUTOF10: 3

## 2020-08-26 NOTE — CARE COORDINATION
DISCHARGE ORDER  Date/Time 2020 1:04 PM  Completed by: Karthik Sanz, Case Management    Patient Name: Christen Webber      : 1960  Admitting Diagnosis: COPD (chronic obstructive pulmonary disease) ([facility] [address] [J44.9]      Admit order Date and Status:2020 0903 inpt  (verify MD's last order for status of admission)      Noted discharge order. Confirmed discharge plan  (pt): Yes  with whom_________pt______  If pt confirmed DC plan does family need to be contacted by CM No if yes who__no____  Discharge Plan: Reviewed chart. Role of discharge planner explained and patient verbalized understanding. Discharge order is noted. Pt is being d/c'd to home today. Pt's O2 sats are 93% on 3L. Pt states that she is active with [facility] for home O2 (2-4L baseline). Pt declines HHC. No further discharge needs needed or noted.]          Reviewed chart. Role of discharge planner explained and patient verbalized understanding. Discharge order is noted. Has Home O2 in place on admit:  Yes  Informed of need to bring portable home O2 tank on day of discharge for nursing to connect prior to leaving:   Yes  Verbalized agreement/Understanding:   Yes    Discharge timeout done with Hailey Heath RN. All discharge needs and concerns addressed.

## 2020-08-26 NOTE — PROGRESS NOTES
Patient appears to be resting well, no s/s distress, had SOB with awakened this am for VS earlier but otherwise no s/s distress, frequent coughing.

## 2020-08-26 NOTE — DISCHARGE SUMMARY
Name:  Sophy Matias  Room:  /4434-44  MRN:    [id number]    Discharge Summary      This discharge summary is in conjunction with a complete physical exam done on the day of discharge. Attending Physician: Dr. Edmund Ford  Discharging Physician: Dr. Joey Benson: 8/23/2020  Discharge:   8/26/2020    HPI:  56 y.o. female history of COPD, asthma who presented with creasing shortness of breath. Patient states last night she was very dyspneic. She has been using her breathing treatments without much improvement. She has also developed a productive cough with clear sputum. She uses 2-4 L of oxygen at home. She is on long-term prednisone 5 mg daily. She has a family member who had URI symptoms 3 to 4 days prior. She denies any contact with known COVID 19 diagnosis.     In ER, /77 and afebrile. She had oxygen increased to 4 L due to hypoxia on ambulation. Lab work pertinent for sodium 134, WBC 18.1. Chest x-ray shows pulmonary vascular congestion with suspected small bilateral pleural effusions and associated bibasilar atelectasis. Diagnoses this Admission and Hospital Course   #Acute on chronic hypoxic respiratory failure-due to COPD exacerbation  Patient desatted to mid [de-identified] on ambulation on 3 L nasal cannula  -Continued supplemental oxygen.  Currently on 3 L nasal cannula. -Continued inhalers and steroids  -negative COVID-19 testing   - patient is improved. D/c home on Prednisone taper, Levaquin.      #COPD exacerbation  -Started IV steroids--> PO prednisone, Levaquin D#3  -Continued inhalers  D/c home on prednisone taper, Levaquin.      # pulmonary vascular congestion and effusions  - unsure if she has acute CHF, previous ECHo last year with normal EF and normal Diastolic function   - started diuresis   - repeat Echo normal.   - stopped IV Lasix today. - Started on lasix 20 mg daily.  F/w PCP.      #Leukocytosis-likely due to chronic steroids  -Trend WBC, improved  -Check procalcitonin: 0.05     #Hypertension  -BP initially elevated but now controlled.  Continued home antihypertensives.     #Tobacco use  -Nicotine patch     #Insomnia  -Started melatonin nightly as needed     DVT Prophylaxis: Lovenox    Procedures (Please Review Full Report for Details)  N/A    Consults    Pulmonology       Physical Exam at Discharge:    /60   Pulse 92   Temp 97.1 °F (36.2 °C) (Oral)   Resp 18   Ht 5' 4\" (1.626 m)   Wt 135 lb 1.6 oz (61.3 kg)   LMP  (LMP Unknown)   SpO2 94%   Breastfeeding No   BMI 23.19 kg/m²   \  Gen: chronically sick appearing  Eyes: PERRL. No sclera icterus. No conjunctival injection. ENT: No discharge. Pharynx clear. Neck: Trachea midline. Normal thyroid. Resp: No accessory muscle use. No crackles. minimal wheezes. No rhonchi. No dullness on percussion. CV: Regular rate. Regular rhythm. No murmur or rub. No edema. CBC:   Recent Labs     08/25/20  0508 08/26/20  0459   WBC 15.8* 16.8*   HGB 12.7 13.2   HCT 37.9 39.4   MCV 95.2 95.3    282     BMP:   Recent Labs     08/25/20  0508 08/26/20  0459   * 129*   K 4.9 3.6   CL 91* 87*   CO2 31 37*   BUN 27* 26*   CREATININE 0.7 0.7       ABG    Lab Results   Component Value Date    NPA2MIY 36.3 01/02/2020    BEART 8.9 01/02/2020    T7MDLTOY 94.6 01/02/2020    PHART 7.379 01/02/2020    ZRX5NWG 62.9 01/02/2020    PO2ART 76.0 01/02/2020    JCK8EDF 38.2 01/02/2020         CULTURES  COVID: not detected    RADIOLOGY  XR CHEST (2 VW)   Final Result   Lungs are hyperinflated and there are emphysematous changes suggestive of   COPD in the correct clinical setting. There is more focal consolidation in the left lung which may be related to   superimposed pneumonia. Follow-up to resolution recommended. XR CHEST PORTABLE   Final Result   1. Pulmonary vascular congestion with suspected small bilateral pleural   effusions and associated bibasilar atelectasis.                 Echo 8/25/2020   Summary    Limited imaging due to lung interface. Parasternal images are unobtainable.    Normal left ventricular systolic function with an estimated ejection    fraction of 55-60%.    No regional wall motion abnormalities are seen.    Normal left ventricular diastolic filling pressure.    Normal systolic pulmonary artery pressure (SPAP) estimated at 20 mmHg (RA    pressure 3 mmHg).    No significant valvular heart disease. Discharge Medications     Medication List      START taking these medications    fluticasone 50 MCG/ACT nasal spray  Commonly known as:  FLONASE  1 spray by Each Nostril route daily  Start taking on:  August 27, 2020     furosemide 20 MG tablet  Commonly known as:  Lasix  Take 1 tablet by mouth daily     guaiFENesin 600 MG extended release tablet  Commonly known as:  MUCINEX  Take 1 tablet by mouth 2 times daily     levoFLOXacin 250 MG tablet  Commonly known as:  Levaquin  Take 2 tablets by mouth daily for 4 days        CHANGE how you take these medications    levothyroxine 125 MCG tablet  Commonly known as:  SYNTHROID  Take 1 tablet by mouth every morning (before breakfast)  Start taking on:  August 27, 2020  What changed:    · medication strength  · how much to take  · when to take this     nicotine 14 MG/24HR  Commonly known as:  NICODERM CQ  Place 1 patch onto the skin daily  Start taking on:  August 27, 2020  What changed:  how long to infuse this     predniSONE 10 MG tablet  Commonly known as:  DELTASONE  Take 4 tabs for 2 days, 3 tabs a day for 3 days, 2 tab a day for 2 days, 1 tab a day for 2 days.  Then back to 5 mg daily as before  What changed:  additional instructions        CONTINUE taking these medications    * albuterol sulfate  (90 Base) MCG/ACT inhaler  Inhale 2 puffs into the lungs every 6 hours as needed for Wheezing     * albuterol (2.5 MG/3ML) 0.083% nebulizer solution  Commonly known as:  PROVENTIL  Take 3 mLs by nebulization every 6 hours as needed for Wheezing or Shortness of

## 2020-08-26 NOTE — PROGRESS NOTES
Pulmonary Progress Note  CC: SOB, copd    Subjective: Does  feel improved today      EXAM: /60   Pulse 92   Temp 97.1 °F (36.2 °C) (Oral)   Resp 18   Ht 5' 4\" (1.626 m)   Wt 135 lb 1.6 oz (61.3 kg)   LMP  (LMP Unknown)   SpO2 94%   Breastfeeding No   BMI 23.19 kg/m²  on 3lpm  Constitutional:  No acute distress. Eyes: PERRL. Conjunctivae anicteric. ENT: Normal nose. Normal tongue. Neck:  Trachea is midline. No thyroid tenderness. Respiratory: No accessory muscle usage. decreased breath sounds. No wheezes. No rales. No Rhonchi. Cardiovascular: Normal S1S2. No digit clubbing. No digit cyanosis. No LE edema. Psychiatric: No anxiety or Agitation. Alert and Oriented to person, place and time.     Scheduled Meds:   guaiFENesin  600 mg Oral BID    [START ON 8/27/2020] levothyroxine  125 mcg Oral QAM AC    ipratropium-albuterol  1 ampule Inhalation Q4H WA    predniSONE  40 mg Oral Daily    fluticasone  1 spray Each Nostril Daily    aspirin  81 mg Oral Daily    atorvastatin  40 mg Oral Nightly    montelukast  10 mg Oral Nightly    therapeutic multivitamin-minerals  1 tablet Oral Lunch    levofloxacin  500 mg Intravenous Q24H    sodium chloride flush  10 mL Intravenous 2 times per day    enoxaparin  40 mg Subcutaneous Nightly    nicotine  1 patch Transdermal Daily     Continuous Infusions:    PRN Meds:  sodium chloride, ipratropium-albuterol, traZODone, sodium chloride flush, acetaminophen **OR** acetaminophen, polyethylene glycol, [DISCONTINUED] promethazine **OR** ondansetron, perflutren lipid microspheres, aspirin-acetaminophen-caffeine, melatonin    Labs:  CBC:   Recent Labs     08/25/20  0508 08/26/20  0459   WBC 15.8* 16.8*   HGB 12.7 13.2   HCT 37.9 39.4   MCV 95.2 95.3    282     BMP:   Recent Labs     08/25/20  0508 08/26/20  0459   * 129*   K 4.9 3.6   CL 91* 87*   CO2 31 37*   BUN 27* 26*   CREATININE 0.7 0.7     PCT 0.05     Chest imaging was reviewed by me and showed 8/25/20  Lungs are hyperinflated and there are emphysematous changes suggestive of    COPD in the correct clinical setting.         There is more focal consolidation in the left lung which may be related to    superimposed pneumonia.  Follow-up to resolution recommended.           ASSESSMENT:  · COPD AE  · LLL Pneumonia, suspect gram +  · Acute on chronic hypoxic respiratory failure - now improved to baseline  · Chronic prednisone use  · Cigarette smoker  · Chronic Leukocytosis     PLAN:  · Supplemental oxygen to maintain SaO2 >92%; wean as tolerated  · Intensive inhaled bronchodilator therapy. · IV solumedrol 40 mg IV Q12 hrs. Plan to switch to oral prednisone taper when improved. · Sputum GS&C.   · Smoking cessation advised  · Levaquin

## 2020-08-27 ENCOUNTER — CARE COORDINATION (OUTPATIENT)
Dept: CASE MANAGEMENT | Age: 60
End: 2020-08-27

## 2020-08-27 NOTE — CARE COORDINATION
Patient contacted regarding Hannah Lee COVID-19 not detected on 8/23/2020. Patient has following risk factors of: heart failure, COPD, asthma, acute respiratory failure, immunocompromised and smoker. 1st attempt - Unable to reach patient by phone. Message left stating purpose of call with contact information requesting return call.      Destiny Tirado RN  Care Transition Nurse  [phone] mobile    Future Appointments   Date Time Provider Luba Willett   11/12/2020 11:30 AM Lindsey Hager MD [facility]

## 2020-08-27 NOTE — PROGRESS NOTES
Patient educated on discharge instructions as well as new medications use, dosage, administration and possible side effects. Patient verified knowledge. IV removed without difficulty and dry dressing in place. Telemetry monitor removed and returned to [facility]. Pt left facility in stable condition to Home with all of their personal belongings. Deanne [address] Patient/Family

## 2020-08-28 ENCOUNTER — CARE COORDINATION (OUTPATIENT)
Dept: CASE MANAGEMENT | Age: 60
End: 2020-08-28

## 2020-08-28 NOTE — CARE COORDINATION
is brief, denies needs/concerns. Declines HC. Plan for follow-up call in 5-7 days based on severity of symptoms and risk factors.     Aminah Downey RN  Care Transition Nurse  [phone] mobile

## 2020-09-03 ENCOUNTER — CARE COORDINATION (OUTPATIENT)
Dept: CASE MANAGEMENT | Age: 60
End: 2020-09-03

## 2020-09-03 NOTE — CARE COORDINATION
Patient contacted regarding COVID-19 risk and screening. COVID-19 Not Detected on 8/23/2020 - patient aware of result. Patient has following risk factors for COVID-19: COPD, acute respiratory failure, immunocompromised and smoker. Unable to reach patient by phone. Message left stating purpose of call with contact information requesting return call.       Serge Franklin, RN  Care Transition Nurse  [phone] mobile    Future Appointments   Date Time Provider Luba Willett   11/12/2020 11:30 AM Adam Bustos MD [facility]

## 2020-09-09 ENCOUNTER — CARE COORDINATION (OUTPATIENT)
Dept: CASE MANAGEMENT | Age: 60
End: 2020-09-09

## 2020-11-03 PROBLEM — J44.9 COPD (CHRONIC OBSTRUCTIVE PULMONARY DISEASE) (HCC): Status: RESOLVED | Noted: 2020-08-23 | Resolved: 2020-11-03

## 2020-11-03 PROBLEM — J18.9 PNEUMONIA DUE TO ORGANISM: Status: RESOLVED | Noted: 2020-11-03 | Resolved: 2020-11-03

## 2020-11-12 ENCOUNTER — TELEPHONE (OUTPATIENT)
Dept: PULMONOLOGY | Age: 60
End: 2020-11-12

## 2020-11-12 ENCOUNTER — VIRTUAL VISIT (OUTPATIENT)
Dept: PULMONOLOGY | Age: 60
End: 2020-11-12
Payer: COMMERCIAL

## 2020-11-12 PROCEDURE — 99214 OFFICE O/P EST MOD 30 MIN: CPT | Performed by: INTERNAL MEDICINE

## 2020-11-12 NOTE — PATIENT INSTRUCTIONS
Remember to bring a list of pulmonary medications and any CPAP or BiPAP machines to your next appointment with the office. Please keep all of your future appointments scheduled by [address], [facility] office. Out of respect for other patients and providers, you may be asked to reschedule your appointment if you arrive later than your scheduled appointment time. Appointments cancelled less than 24hrs in advance will be considered a no show. Patients with three missed appointments within 1 year or four missed appointments within 2 years can be dismissed from the practice. You may receive a survey regarding the care you received during your visit. Your input is valuable to us. We encourage you to complete and return your survey. We hope you will choose us in the future for your healthcare needs. Pt instructed of all future appointment dates & times, including radiology, labs, procedures & referrals. If procedures were scheduled preparation instructions provided. Instructions on future appointments with [facility] were given.

## 2020-11-12 NOTE — PROGRESS NOTES
2020    TELEHEALTH EVALUATION -- Audio/Visual (During [facility]-43 public health emergency)    HPI:    Emma Jackson (:  1960) has requested an audio/video evaluation for the following concern(s): COPD    Since last clinic visit, the patient was admitted for acute respiratory failure due to COPD exacerbation in 2020. Currently she is doing better. Dyspnea is back to baseline. She is using her trelegy and albuterol as prescribed. She has not smoked since . Prior to Visit Medications    Medication Sig Taking? Authorizing Provider   predniSONE (DELTASONE) 10 MG tablet Take 4 tabs for 2 days, 3 tabs a day for 3 days, 2 tab a day for 2 days, 1 tab a day for 2 days.  Then back to 5 mg daily as before  PAN Oakley CNP   guaiFENesin (MUCINEX) 600 MG extended release tablet Take 1 tablet by mouth 2 times daily  PAN Oakley CNP   fluticasone (FLONASE) 50 MCG/ACT nasal spray 1 spray by Each Nostril route daily  PAN Oakley CNP   nicotine (NICODERM CQ) 14 MG/24HR Place 1 patch onto the skin daily  PAN Oakley CNP   levothyroxine (SYNTHROID) 125 MCG tablet Take 1 tablet by mouth every morning (before breakfast)  PAN Oakley CNP   furosemide (LASIX) 20 MG tablet Take 1 tablet by mouth daily  PAN Oakley CNP   albuterol (PROVENTIL) (2.5 MG/3ML) 0.083% nebulizer solution Take 3 mLs by nebulization every 6 hours as needed for Wheezing or Shortness of Breath  Pearl Leahy MD   aspirin 81 MG EC tablet Take 1 tablet by mouth daily  Marco Marr MD   atorvastatin (LIPITOR) 40 MG tablet Take 1 tablet by mouth nightly  Marco Marr MD   montelukast (SINGULAIR) 10 MG tablet Take 1 tablet by mouth nightly  Marco Marr MD   Multiple Vitamin (MULTIVITAMIN) tablet Take 1 tablet by mouth daily  Marco Marr MD   umeclidinium-vilanterol (ANORO ELLIPTA) 62.5-25 MCG/INH AEPB inhaler Inhale 1 puff into the lungs daily  Historical Provider, MD   Fluticasone-Umeclidin-Vilant (Patrick Hawks) 100-62.5-25 MCG/INH AEPB Inhale 1 puff into the lungs daily  Historical Provider, MD   albuterol sulfate  (90 Base) MCG/ACT inhaler Inhale 2 puffs into the lungs every 6 hours as needed for Wheezing  Theodor MD Narcisa       Social History     Tobacco Use    Smoking status: Current Every Day Smoker     Packs/day: 0.50     Years: 44.00     Pack years: 22.00     Types: Cigarettes    Smokeless tobacco: Never Used   Substance Use Topics    Alcohol use: Yes     Comment: rarely    Drug use: No            PHYSICAL EXAMINATION:  [ INSTRUCTIONS:  \"[x]\" Indicates a positive item  \"[]\" Indicates a negative item  -- DELETE ALL ITEMS NOT EXAMINED]  Vital Signs: (As obtained by patient/caregiver or practitioner observation)    Blood pressure-  Heart rate-    Respiratory rate-    Temperature-  Pulse oximetry-     Constitutional: [x] Appears well-developed and well-nourished [x] No apparent distress      [] Abnormal-   Mental status  [x] Alert and awake  [x] Oriented to person/place/time [x]Able to follow commands      Eyes:  EOM    [x]  Normal  [] Abnormal-  Sclera  [x]  Normal  [] Abnormal -         Discharge [x]  None visible  [] Abnormal -    HENT:   [x] Normocephalic, atraumatic.   [] Abnormal   [x] Mouth/Throat: Mucous membranes are moist.     External Ears [x] Normal  [] Abnormal-     Neck: [x] No visualized mass     Pulmonary/Chest: [x] Respiratory effort normal.  [x] No visualized signs of difficulty breathing or respiratory distress        [] Abnormal-      Musculoskeletal:   [] Normal gait with no signs of ataxia         [x] Normal range of motion of neck        [] Abnormal-       Neurological:        [x] No Facial Asymmetry (Cranial nerve 7 motor function) (limited exam to video visit)          [x] No gaze palsy        [] Abnormal-         Skin:        [x] No significant exanthematous lesions or discoloration noted on facial skin         [] Abnormal-            Psychiatric:       [x] Normal Affect [x] No Hallucinations        [] Abnormal-     Other pertinent observable physical exam findings-          Assessment:   1. Tobacco abuse              2.         COPD severe    3. Chronic respiratory failure with hypoxia (HCC)                  Plan:   - Continue inhaled bronchodilator therapy. trelegy and flovent per PMD; continue albuterol mdi/nebs-  portable neb machine  - advised not to use duonebs with anora  -1L of O2 with exertion based upon 6mwt 1/19; ok to not use with rest  - Patient is up to date with Pneumococcal vaccine and Influenza vaccines. - Advised to avoid smoking. No smoking in 2.5 months  - Counseled regarding the prognosis of this disease  - on prednisone 5 mg daily per PMD-I advised against; recommend weaning  - flu vaccine 11/20        Fozia Kelley is a 56 y.o. female being evaluated by a Virtual Visit (video visit) encounter to address concerns as mentioned above. A caregiver was present when appropriate. Due to this being a TeleHealth encounter (During [facility]-88 public health emergency), evaluation of the following organ systems was limited: Vitals/Constitutional/EENT/Resp/CV/GI//MS/Neuro/Skin/Heme-Lymph-Imm. Pursuant to the emergency declaration under the [address] [place] [address] authority and the [employer] Act, this Virtual Visit was conducted with patient's (and/or legal guardian's) consent, to reduce the patient's risk of exposure to COVID-19 and provide necessary medical care. The patient (and/or legal guardian) has also been advised to contact this office for worsening conditions or problems, and seek emergency medical treatment and/or call 911 if deemed necessary.      Patient identification was verified at the start of the visit: Yes    Total time spent on this encounter: Not billed by time    Services were provided through a video synchronous discussion virtually to substitute for in-person clinic visit. Patient and provider were located at their individual homes. --Ericka De Souza MD on 11/12/2020 at 11:13 AM    An electronic signature was used to authenticate this note.       Orders  -Follow-up in 6 months

## 2020-12-28 RX ORDER — ALBUTEROL SULFATE 2.5 MG/3ML
SOLUTION RESPIRATORY (INHALATION)
Qty: 120 EACH | Refills: 5 | Status: SHIPPED | OUTPATIENT
Start: 2020-12-28 | End: 2021-06-14 | Stop reason: SDUPTHER

## 2021-02-08 ENCOUNTER — TELEPHONE (OUTPATIENT)
Dept: PULMONOLOGY | Age: 61
End: 2021-02-08

## 2021-02-08 DIAGNOSIS — J44.9 COPD, SEVERE (HCC): Primary | ICD-10-CM

## 2021-02-08 NOTE — TELEPHONE ENCOUNTER
Pt asking for \"something to be sent to [facility] to inform that pt needs O2\". Okay to order 6MW for pt? Last 6MW was 1/10/19.

## 2021-02-08 NOTE — TELEPHONE ENCOUNTER
Pt is requesting Dr. Bernard Patton to send something to [facility] to inform them that she needs her oxygen all the time. She said she needs this in order for her insurance to pay for it. Last OV 11/12/20  Assessment:   1.         Tobacco abuse              2.         COPD severe    3.         Chronic respiratory failure with hypoxia ([facility] [address]                  Plan:   - Continue inhaled bronchodilator therapy.  trelegy and flovent per PMD; continue albuterol mdi/nebs-  portable neb machine  - advised not to use duonebs with anora  -1L of O2 with exertion based upon 6mwt 1/19; ok to not use with rest  - Patient is up to date with Pneumococcal vaccine and Influenza vaccines.   - Advised to avoid smoking.  No smoking in 2.5 months  - Counseled regarding the prognosis of this disease  - on prednisone 5 mg daily per PMD-I advised against; recommend weaning  - flu vaccine 11/20

## 2021-02-22 ENCOUNTER — OFFICE VISIT (OUTPATIENT)
Dept: PRIMARY CARE CLINIC | Age: 61
End: 2021-02-22
Payer: COMMERCIAL

## 2021-02-22 DIAGNOSIS — Z01.818 PREOP TESTING: Primary | ICD-10-CM

## 2021-02-22 LAB — SARS-COV-2: NOT DETECTED

## 2021-02-22 PROCEDURE — 99211 OFF/OP EST MAY X REQ PHY/QHP: CPT | Performed by: NURSE PRACTITIONER

## 2021-02-22 NOTE — PATIENT INSTRUCTIONS

## 2021-02-22 NOTE — PROGRESS NOTES
Joselito Sheffield received a viral test for COVID-19. They were educated on isolation and quarantine as appropriate. For any symptoms, they were directed to seek care from their PCP, given contact information to establish with a doctor, directed to an urgent care or the emergency room.

## 2021-02-25 ENCOUNTER — HOSPITAL ENCOUNTER (OUTPATIENT)
Dept: PULMONOLOGY | Age: 61
Discharge: HOME | End: 2021-02-25
Payer: MEDICARE

## 2021-02-25 DIAGNOSIS — J44.9 COPD, SEVERE (HCC): ICD-10-CM

## 2021-02-25 PROCEDURE — 94618 PULMONARY STRESS TESTING: CPT

## 2021-02-26 NOTE — PROCEDURES
[address]                 [address] [address]                               PULMONARY FUNCTION    PATIENT NAME: Kim Anderson                      :        1960  MED REC NO:   [id number]                          ROOM:  ACCOUNT NO:   [de-identified]                           ADMIT DATE: 2021  PROVIDER:     Abdi Hough MD    DATE OF PROCEDURE:  2021    SIX-MINUTE WALK    INDICATION:  COPD. Six-minute walk was done per [address] protocol. The  patient was able to walk 460 feet, paused due to fatigue and dyspnea. Saturation on room air at rest was 88% and placed on 1 liter which was  adjusted up to 3 liters on exertion to keep sat above 88%. Resting  heart rate of 93, increased to 112 on exertion. CONCLUSION:  Hypoxemia at rest and exertion, requires 1 liter at rest  and 3 liters on exertion to keep sat above 88%.         Marnell Kayser, MD    D: 2021 16:32:17       T: 2021 21:54:16     [id number]  Job#: [id number]     Doc#: [id number]    CC:

## 2021-04-25 ENCOUNTER — HOSPITAL ENCOUNTER (INPATIENT)
Age: 61
LOS: 19 days | Discharge: NURSING, REHAB OR LONG-TERM CARE | DRG: 870 | End: 2021-05-14
Attending: STUDENT IN AN ORGANIZED HEALTH CARE EDUCATION/TRAINING PROGRAM | Admitting: INTERNAL MEDICINE
Payer: COMMERCIAL

## 2021-04-25 ENCOUNTER — APPOINTMENT (OUTPATIENT)
Dept: GENERAL RADIOLOGY | Age: 61
DRG: 870 | End: 2021-04-25
Payer: COMMERCIAL

## 2021-04-25 DIAGNOSIS — R65.20 SEPSIS WITH ACUTE HYPERCAPNIC RESPIRATORY FAILURE WITHOUT SEPTIC SHOCK, DUE TO UNSPECIFIED ORGANISM (HCC): ICD-10-CM

## 2021-04-25 DIAGNOSIS — J81.0 ACUTE PULMONARY EDEMA (HCC): ICD-10-CM

## 2021-04-25 DIAGNOSIS — J96.02 SEPSIS WITH ACUTE HYPERCAPNIC RESPIRATORY FAILURE WITHOUT SEPTIC SHOCK, DUE TO UNSPECIFIED ORGANISM (HCC): ICD-10-CM

## 2021-04-25 DIAGNOSIS — J44.1 COPD EXACERBATION (HCC): Primary | ICD-10-CM

## 2021-04-25 DIAGNOSIS — J96.02 ACUTE RESPIRATORY FAILURE WITH HYPERCAPNIA (HCC): ICD-10-CM

## 2021-04-25 DIAGNOSIS — A41.9 SEPSIS WITH ACUTE HYPERCAPNIC RESPIRATORY FAILURE WITHOUT SEPTIC SHOCK, DUE TO UNSPECIFIED ORGANISM (HCC): ICD-10-CM

## 2021-04-25 DIAGNOSIS — J18.9 PNEUMONIA DUE TO ORGANISM: ICD-10-CM

## 2021-04-25 DIAGNOSIS — J44.9 COPD, SEVERE (HCC): ICD-10-CM

## 2021-04-25 PROBLEM — J96.20 ACUTE ON CHRONIC RESPIRATORY FAILURE (HCC): Status: ACTIVE | Noted: 2021-04-25

## 2021-04-25 LAB
A/G RATIO: 1.8 (ref 1.1–2.2)
ALBUMIN SERPL-MCNC: 4.6 G/DL (ref 3.4–5)
ALP BLD-CCNC: 77 U/L (ref 40–129)
ALT SERPL-CCNC: 29 U/L (ref 10–40)
ANION GAP SERPL CALCULATED.3IONS-SCNC: 6 MMOL/L (ref 3–16)
AST SERPL-CCNC: 39 U/L (ref 15–37)
ATYPICAL LYMPHOCYTE RELATIVE PERCENT: 1 % (ref 0–6)
BASE EXCESS ARTERIAL: 6.2 MMOL/L (ref -3–3)
BASE EXCESS ARTERIAL: 6.7 MMOL/L (ref -3–3)
BASE EXCESS VENOUS: 4.6 MMOL/L (ref -3–3)
BASE EXCESS VENOUS: 5.4 MMOL/L (ref -3–3)
BASOPHILS ABSOLUTE: 0 K/UL (ref 0–0.2)
BASOPHILS RELATIVE PERCENT: 0 %
BILIRUB SERPL-MCNC: <0.2 MG/DL (ref 0–1)
BUN BLDV-MCNC: 17 MG/DL (ref 7–20)
CALCIUM SERPL-MCNC: 9.7 MG/DL (ref 8.3–10.6)
CARBOXYHEMOGLOBIN ARTERIAL: 0.3 % (ref 0–1.5)
CARBOXYHEMOGLOBIN ARTERIAL: 0.3 % (ref 0–1.5)
CARBOXYHEMOGLOBIN: 2.4 % (ref 0–1.5)
CARBOXYHEMOGLOBIN: 2.6 % (ref 0–1.5)
CHLORIDE BLD-SCNC: 97 MMOL/L (ref 99–110)
CO2: 35 MMOL/L (ref 21–32)
CREAT SERPL-MCNC: 0.6 MG/DL (ref 0.6–1.2)
EKG ATRIAL RATE: 118 BPM
EKG DIAGNOSIS: NORMAL
EKG P AXIS: 86 DEGREES
EKG P-R INTERVAL: 150 MS
EKG Q-T INTERVAL: 320 MS
EKG QRS DURATION: 90 MS
EKG QTC CALCULATION (BAZETT): 448 MS
EKG R AXIS: 89 DEGREES
EKG T AXIS: 71 DEGREES
EKG VENTRICULAR RATE: 118 BPM
EOSINOPHILS ABSOLUTE: 0 K/UL (ref 0–0.6)
EOSINOPHILS RELATIVE PERCENT: 0 %
GFR AFRICAN AMERICAN: >60
GFR NON-AFRICAN AMERICAN: >60
GLOBULIN: 2.6 G/DL
GLUCOSE BLD-MCNC: 163 MG/DL (ref 70–99)
HCO3 ARTERIAL: 34.7 MMOL/L (ref 21–29)
HCO3 ARTERIAL: 37.5 MMOL/L (ref 21–29)
HCO3 VENOUS: 34.4 MMOL/L (ref 23–29)
HCO3 VENOUS: 35.6 MMOL/L (ref 23–29)
HCT VFR BLD CALC: 35.9 % (ref 36–48)
HEMATOLOGY PATH CONSULT: YES
HEMOGLOBIN, ART, EXTENDED: 12.1 G/DL (ref 12–16)
HEMOGLOBIN, ART, EXTENDED: 12.3 G/DL (ref 12–16)
HEMOGLOBIN: 11.9 G/DL (ref 12–16)
LYMPHOCYTES ABSOLUTE: 5.6 K/UL (ref 1–5.1)
LYMPHOCYTES RELATIVE PERCENT: 23 %
MCH RBC QN AUTO: 32.1 PG (ref 26–34)
MCHC RBC AUTO-ENTMCNC: 33 G/DL (ref 31–36)
MCV RBC AUTO: 97.3 FL (ref 80–100)
METHEMOGLOBIN ARTERIAL: 0.2 %
METHEMOGLOBIN ARTERIAL: 0.2 %
METHEMOGLOBIN VENOUS: 0 %
METHEMOGLOBIN VENOUS: 0.3 %
MONOCYTES ABSOLUTE: 2.1 K/UL (ref 0–1.3)
MONOCYTES RELATIVE PERCENT: 9 %
NEUTROPHILS ABSOLUTE: 15.7 K/UL (ref 1.7–7.7)
NEUTROPHILS RELATIVE PERCENT: 67 %
O2 CONTENT ARTERIAL: 15 ML/DL
O2 CONTENT ARTERIAL: 17 ML/DL
O2 CONTENT, VEN: 15 VOL %
O2 CONTENT, VEN: 18 VOL %
O2 SAT, ARTERIAL: 84.1 %
O2 SAT, ARTERIAL: 98.7 %
O2 SAT, VEN: 84 %
O2 SAT, VEN: 99 %
O2 THERAPY: ABNORMAL
PCO2 ARTERIAL: 68.2 MMHG (ref 35–45)
PCO2 ARTERIAL: 99.2 MMHG (ref 35–45)
PCO2, VEN: 73.7 MMHG (ref 40–50)
PCO2, VEN: 92.2 MMHG (ref 40–50)
PDW BLD-RTO: 13.6 % (ref 12.4–15.4)
PH ARTERIAL: 7.2 (ref 7.35–7.45)
PH ARTERIAL: 7.33 (ref 7.35–7.45)
PH VENOUS: 7.2 (ref 7.35–7.45)
PH VENOUS: 7.29 (ref 7.35–7.45)
PLATELET # BLD: 322 K/UL (ref 135–450)
PLATELET SLIDE REVIEW: ADEQUATE
PMV BLD AUTO: 7.5 FL (ref 5–10.5)
PO2 ARTERIAL: 150.2 MMHG (ref 75–108)
PO2 ARTERIAL: 61.7 MMHG (ref 75–108)
PO2, VEN: 160.9 MMHG (ref 25–40)
PO2, VEN: 55.3 MMHG (ref 25–40)
POTASSIUM REFLEX MAGNESIUM: 4.2 MMOL/L (ref 3.5–5.1)
PRO-BNP: 101 PG/ML (ref 0–124)
RBC # BLD: 3.69 M/UL (ref 4–5.2)
RBC # BLD: NORMAL 10*6/UL
SARS-COV-2, NAAT: NOT DETECTED
SLIDE REVIEW: ABNORMAL
SODIUM BLD-SCNC: 138 MMOL/L (ref 136–145)
TCO2 ARTERIAL: 36.8 MMOL/L
TCO2 ARTERIAL: 40.5 MMOL/L
TCO2 CALC VENOUS: 37 MMOL/L
TCO2 CALC VENOUS: 38 MMOL/L
TOTAL PROTEIN: 7.2 G/DL (ref 6.4–8.2)
TROPONIN: <0.01 NG/ML
WBC # BLD: 23.5 K/UL (ref 4–11)

## 2021-04-25 PROCEDURE — 85025 COMPLETE CBC W/AUTO DIFF WBC: CPT

## 2021-04-25 PROCEDURE — 6360000002 HC RX W HCPCS: Performed by: STUDENT IN AN ORGANIZED HEALTH CARE EDUCATION/TRAINING PROGRAM

## 2021-04-25 PROCEDURE — 93010 ELECTROCARDIOGRAM REPORT: CPT | Performed by: INTERNAL MEDICINE

## 2021-04-25 PROCEDURE — 96365 THER/PROPH/DIAG IV INF INIT: CPT

## 2021-04-25 PROCEDURE — 99223 1ST HOSP IP/OBS HIGH 75: CPT | Performed by: INTERNAL MEDICINE

## 2021-04-25 PROCEDURE — 99285 EMERGENCY DEPT VISIT HI MDM: CPT

## 2021-04-25 PROCEDURE — 6370000000 HC RX 637 (ALT 250 FOR IP): Performed by: INTERNAL MEDICINE

## 2021-04-25 PROCEDURE — 87635 SARS-COV-2 COVID-19 AMP PRB: CPT

## 2021-04-25 PROCEDURE — 82803 BLOOD GASES ANY COMBINATION: CPT

## 2021-04-25 PROCEDURE — 87040 BLOOD CULTURE FOR BACTERIA: CPT

## 2021-04-25 PROCEDURE — 2000000000 HC ICU R&B

## 2021-04-25 PROCEDURE — 96375 TX/PRO/DX INJ NEW DRUG ADDON: CPT

## 2021-04-25 PROCEDURE — 94640 AIRWAY INHALATION TREATMENT: CPT

## 2021-04-25 PROCEDURE — 2500000003 HC RX 250 WO HCPCS

## 2021-04-25 PROCEDURE — 94660 CPAP INITIATION&MGMT: CPT

## 2021-04-25 PROCEDURE — 2700000000 HC OXYGEN THERAPY PER DAY

## 2021-04-25 PROCEDURE — 2580000003 HC RX 258

## 2021-04-25 PROCEDURE — 6370000000 HC RX 637 (ALT 250 FOR IP): Performed by: STUDENT IN AN ORGANIZED HEALTH CARE EDUCATION/TRAINING PROGRAM

## 2021-04-25 PROCEDURE — 93005 ELECTROCARDIOGRAM TRACING: CPT | Performed by: STUDENT IN AN ORGANIZED HEALTH CARE EDUCATION/TRAINING PROGRAM

## 2021-04-25 PROCEDURE — 6360000002 HC RX W HCPCS

## 2021-04-25 PROCEDURE — 94761 N-INVAS EAR/PLS OXIMETRY MLT: CPT

## 2021-04-25 PROCEDURE — 83880 ASSAY OF NATRIURETIC PEPTIDE: CPT

## 2021-04-25 PROCEDURE — 71045 X-RAY EXAM CHEST 1 VIEW: CPT

## 2021-04-25 PROCEDURE — 6360000002 HC RX W HCPCS: Performed by: INTERNAL MEDICINE

## 2021-04-25 PROCEDURE — 2580000003 HC RX 258: Performed by: STUDENT IN AN ORGANIZED HEALTH CARE EDUCATION/TRAINING PROGRAM

## 2021-04-25 PROCEDURE — 2580000003 HC RX 258: Performed by: INTERNAL MEDICINE

## 2021-04-25 PROCEDURE — 99291 CRITICAL CARE FIRST HOUR: CPT | Performed by: INTERNAL MEDICINE

## 2021-04-25 PROCEDURE — 84484 ASSAY OF TROPONIN QUANT: CPT

## 2021-04-25 PROCEDURE — 80053 COMPREHEN METABOLIC PANEL: CPT

## 2021-04-25 PROCEDURE — 2500000003 HC RX 250 WO HCPCS: Performed by: INTERNAL MEDICINE

## 2021-04-25 RX ORDER — KETOROLAC TROMETHAMINE 30 MG/ML
15 INJECTION, SOLUTION INTRAMUSCULAR; INTRAVENOUS ONCE
Status: COMPLETED | OUTPATIENT
Start: 2021-04-25 | End: 2021-04-25

## 2021-04-25 RX ORDER — ATORVASTATIN CALCIUM 40 MG/1
40 TABLET, FILM COATED ORAL NIGHTLY
Status: DISCONTINUED | OUTPATIENT
Start: 2021-04-25 | End: 2021-05-14 | Stop reason: HOSPADM

## 2021-04-25 RX ORDER — METHYLPREDNISOLONE SODIUM SUCCINATE 125 MG/2ML
INJECTION, POWDER, LYOPHILIZED, FOR SOLUTION INTRAMUSCULAR; INTRAVENOUS
Status: COMPLETED
Start: 2021-04-25 | End: 2021-04-25

## 2021-04-25 RX ORDER — MONTELUKAST SODIUM 10 MG/1
10 TABLET ORAL NIGHTLY
Status: DISCONTINUED | OUTPATIENT
Start: 2021-04-25 | End: 2021-05-14 | Stop reason: HOSPADM

## 2021-04-25 RX ORDER — ACETAMINOPHEN 650 MG/1
650 SUPPOSITORY RECTAL EVERY 6 HOURS PRN
Status: DISCONTINUED | OUTPATIENT
Start: 2021-04-25 | End: 2021-05-14 | Stop reason: HOSPADM

## 2021-04-25 RX ORDER — ONDANSETRON 2 MG/ML
4 INJECTION INTRAMUSCULAR; INTRAVENOUS EVERY 6 HOURS PRN
Status: DISCONTINUED | OUTPATIENT
Start: 2021-04-25 | End: 2021-05-14 | Stop reason: HOSPADM

## 2021-04-25 RX ORDER — IPRATROPIUM BROMIDE AND ALBUTEROL SULFATE 2.5; .5 MG/3ML; MG/3ML
1 SOLUTION RESPIRATORY (INHALATION) ONCE
Status: COMPLETED | OUTPATIENT
Start: 2021-04-25 | End: 2021-04-25

## 2021-04-25 RX ORDER — DEXMEDETOMIDINE HYDROCHLORIDE 4 UG/ML
.2-1.4 INJECTION, SOLUTION INTRAVENOUS CONTINUOUS
Status: DISCONTINUED | OUTPATIENT
Start: 2021-04-25 | End: 2021-04-26

## 2021-04-25 RX ORDER — METHYLPREDNISOLONE SODIUM SUCCINATE 40 MG/ML
40 INJECTION, POWDER, LYOPHILIZED, FOR SOLUTION INTRAMUSCULAR; INTRAVENOUS EVERY 12 HOURS
Status: DISCONTINUED | OUTPATIENT
Start: 2021-04-25 | End: 2021-05-04

## 2021-04-25 RX ORDER — ALBUTEROL SULFATE 2.5 MG/3ML
2.5 SOLUTION RESPIRATORY (INHALATION)
Status: DISCONTINUED | OUTPATIENT
Start: 2021-04-25 | End: 2021-05-14 | Stop reason: HOSPADM

## 2021-04-25 RX ORDER — IPRATROPIUM BROMIDE AND ALBUTEROL SULFATE 2.5; .5 MG/3ML; MG/3ML
1 SOLUTION RESPIRATORY (INHALATION)
Status: DISCONTINUED | OUTPATIENT
Start: 2021-04-25 | End: 2021-04-25

## 2021-04-25 RX ORDER — METHYLPREDNISOLONE SODIUM SUCCINATE 40 MG/ML
40 INJECTION, POWDER, LYOPHILIZED, FOR SOLUTION INTRAMUSCULAR; INTRAVENOUS EVERY 6 HOURS
Status: DISCONTINUED | OUTPATIENT
Start: 2021-04-25 | End: 2021-04-25

## 2021-04-25 RX ORDER — ASPIRIN 81 MG/1
81 TABLET ORAL DAILY
Status: DISCONTINUED | OUTPATIENT
Start: 2021-04-25 | End: 2021-04-26

## 2021-04-25 RX ORDER — AZITHROMYCIN 250 MG/1
250 TABLET, FILM COATED ORAL DAILY
Status: COMPLETED | OUTPATIENT
Start: 2021-04-26 | End: 2021-04-29

## 2021-04-25 RX ORDER — POLYETHYLENE GLYCOL 3350 17 G/17G
17 POWDER, FOR SOLUTION ORAL DAILY PRN
Status: DISCONTINUED | OUTPATIENT
Start: 2021-04-25 | End: 2021-05-01

## 2021-04-25 RX ORDER — SODIUM CHLORIDE 0.9 % (FLUSH) 0.9 %
5-40 SYRINGE (ML) INJECTION PRN
Status: DISCONTINUED | OUTPATIENT
Start: 2021-04-25 | End: 2021-05-14 | Stop reason: HOSPADM

## 2021-04-25 RX ORDER — IPRATROPIUM BROMIDE AND ALBUTEROL SULFATE 2.5; .5 MG/3ML; MG/3ML
1 SOLUTION RESPIRATORY (INHALATION) EVERY 4 HOURS
Status: DISCONTINUED | OUTPATIENT
Start: 2021-04-25 | End: 2021-05-13

## 2021-04-25 RX ORDER — FUROSEMIDE 10 MG/ML
40 INJECTION INTRAMUSCULAR; INTRAVENOUS DAILY
Status: DISCONTINUED | OUTPATIENT
Start: 2021-04-25 | End: 2021-04-26

## 2021-04-25 RX ORDER — PREDNISONE 20 MG/1
40 TABLET ORAL DAILY
Status: DISCONTINUED | OUTPATIENT
Start: 2021-04-27 | End: 2021-04-25

## 2021-04-25 RX ORDER — SODIUM CHLORIDE 0.9 % (FLUSH) 0.9 %
5-40 SYRINGE (ML) INJECTION EVERY 12 HOURS SCHEDULED
Status: DISCONTINUED | OUTPATIENT
Start: 2021-04-25 | End: 2021-05-14 | Stop reason: HOSPADM

## 2021-04-25 RX ORDER — FAMOTIDINE 20 MG/1
20 TABLET, FILM COATED ORAL 2 TIMES DAILY
Status: DISCONTINUED | OUTPATIENT
Start: 2021-04-25 | End: 2021-05-14 | Stop reason: HOSPADM

## 2021-04-25 RX ORDER — BUDESONIDE AND FORMOTEROL FUMARATE DIHYDRATE 160; 4.5 UG/1; UG/1
2 AEROSOL RESPIRATORY (INHALATION) 2 TIMES DAILY
Status: DISCONTINUED | OUTPATIENT
Start: 2021-04-25 | End: 2021-04-25

## 2021-04-25 RX ORDER — FUROSEMIDE 10 MG/ML
20 INJECTION INTRAMUSCULAR; INTRAVENOUS ONCE
Status: COMPLETED | OUTPATIENT
Start: 2021-04-25 | End: 2021-04-25

## 2021-04-25 RX ORDER — IBUPROFEN 400 MG/1
200 TABLET ORAL EVERY 6 HOURS PRN
Status: DISCONTINUED | OUTPATIENT
Start: 2021-04-25 | End: 2021-05-14 | Stop reason: HOSPADM

## 2021-04-25 RX ORDER — ACETAMINOPHEN 325 MG/1
650 TABLET ORAL EVERY 6 HOURS PRN
Status: DISCONTINUED | OUTPATIENT
Start: 2021-04-25 | End: 2021-05-14 | Stop reason: HOSPADM

## 2021-04-25 RX ORDER — FLUTICASONE PROPIONATE 50 MCG
1 SPRAY, SUSPENSION (ML) NASAL DAILY
Status: DISCONTINUED | OUTPATIENT
Start: 2021-04-25 | End: 2021-04-26

## 2021-04-25 RX ORDER — AZITHROMYCIN 250 MG/1
500 TABLET, FILM COATED ORAL DAILY
Status: COMPLETED | OUTPATIENT
Start: 2021-04-25 | End: 2021-04-25

## 2021-04-25 RX ORDER — ONDANSETRON 4 MG/1
4 TABLET, ORALLY DISINTEGRATING ORAL EVERY 8 HOURS PRN
Status: DISCONTINUED | OUTPATIENT
Start: 2021-04-25 | End: 2021-05-14 | Stop reason: HOSPADM

## 2021-04-25 RX ORDER — SODIUM CHLORIDE 9 MG/ML
25 INJECTION, SOLUTION INTRAVENOUS PRN
Status: DISCONTINUED | OUTPATIENT
Start: 2021-04-25 | End: 2021-05-14 | Stop reason: HOSPADM

## 2021-04-25 RX ORDER — METHYLPREDNISOLONE SODIUM SUCCINATE 125 MG/2ML
125 INJECTION, POWDER, LYOPHILIZED, FOR SOLUTION INTRAMUSCULAR; INTRAVENOUS ONCE
Status: COMPLETED | OUTPATIENT
Start: 2021-04-25 | End: 2021-04-25

## 2021-04-25 RX ORDER — SODIUM CHLORIDE 9 MG/ML
INJECTION, SOLUTION INTRAVENOUS
Status: COMPLETED
Start: 2021-04-25 | End: 2021-04-25

## 2021-04-25 RX ADMIN — IPRATROPIUM BROMIDE AND ALBUTEROL SULFATE 1 AMPULE: .5; 3 SOLUTION RESPIRATORY (INHALATION) at 06:56

## 2021-04-25 RX ADMIN — AZITHROMYCIN 500 MG: 250 TABLET, FILM COATED ORAL at 09:40

## 2021-04-25 RX ADMIN — IPRATROPIUM BROMIDE AND ALBUTEROL SULFATE 1 AMPULE: 2.5; .5 SOLUTION RESPIRATORY (INHALATION) at 14:57

## 2021-04-25 RX ADMIN — Medication 10 ML: at 09:05

## 2021-04-25 RX ADMIN — IBUPROFEN 200 MG: 400 TABLET, FILM COATED ORAL at 14:10

## 2021-04-25 RX ADMIN — ACETAMINOPHEN 650 MG: 325 TABLET ORAL at 13:04

## 2021-04-25 RX ADMIN — KETOROLAC TROMETHAMINE 15 MG: 30 INJECTION, SOLUTION INTRAMUSCULAR at 09:56

## 2021-04-25 RX ADMIN — ENOXAPARIN SODIUM 40 MG: 40 INJECTION SUBCUTANEOUS at 09:42

## 2021-04-25 RX ADMIN — SODIUM CHLORIDE 25 ML: 9 INJECTION, SOLUTION INTRAVENOUS at 06:45

## 2021-04-25 RX ADMIN — CEFTRIAXONE SODIUM 1000 MG: 1 INJECTION, POWDER, FOR SOLUTION INTRAMUSCULAR; INTRAVENOUS at 06:44

## 2021-04-25 RX ADMIN — METHYLPREDNISOLONE SODIUM SUCCINATE 125 MG: 125 INJECTION, POWDER, LYOPHILIZED, FOR SOLUTION INTRAMUSCULAR; INTRAVENOUS at 02:50

## 2021-04-25 RX ADMIN — ASPIRIN 81 MG: 81 TABLET, COATED ORAL at 09:40

## 2021-04-25 RX ADMIN — IPRATROPIUM BROMIDE AND ALBUTEROL SULFATE 1 AMPULE: .5; 3 SOLUTION RESPIRATORY (INHALATION) at 11:17

## 2021-04-25 RX ADMIN — IPRATROPIUM BROMIDE AND ALBUTEROL SULFATE 1 AMPULE: 2.5; .5 SOLUTION RESPIRATORY (INHALATION) at 18:44

## 2021-04-25 RX ADMIN — MUPIROCIN: 20 OINTMENT TOPICAL at 22:06

## 2021-04-25 RX ADMIN — FUROSEMIDE 40 MG: 10 INJECTION, SOLUTION INTRAMUSCULAR; INTRAVENOUS at 09:56

## 2021-04-25 RX ADMIN — Medication 0.2 MCG/KG/HR: at 14:53

## 2021-04-25 RX ADMIN — FLUTICASONE PROPIONATE 1 SPRAY: 50 SPRAY, METERED NASAL at 13:01

## 2021-04-25 RX ADMIN — Medication 0.7 MCG/KG/HR: at 22:28

## 2021-04-25 RX ADMIN — KETOROLAC TROMETHAMINE 15 MG: 30 INJECTION, SOLUTION INTRAMUSCULAR at 14:54

## 2021-04-25 RX ADMIN — FAMOTIDINE 20 MG: 20 TABLET ORAL at 09:40

## 2021-04-25 RX ADMIN — LEVOTHYROXINE SODIUM 125 MCG: 25 TABLET ORAL at 09:39

## 2021-04-25 RX ADMIN — Medication 1500 MG: at 04:04

## 2021-04-25 RX ADMIN — IPRATROPIUM BROMIDE AND ALBUTEROL SULFATE 1 AMPULE: .5; 3 SOLUTION RESPIRATORY (INHALATION) at 02:48

## 2021-04-25 RX ADMIN — METHYLPREDNISOLONE SODIUM SUCCINATE 125 MG: 125 INJECTION, POWDER, FOR SOLUTION INTRAMUSCULAR; INTRAVENOUS at 02:50

## 2021-04-25 RX ADMIN — FUROSEMIDE 20 MG: 10 INJECTION, SOLUTION INTRAMUSCULAR; INTRAVENOUS at 03:40

## 2021-04-25 RX ADMIN — IPRATROPIUM BROMIDE AND ALBUTEROL SULFATE 1 AMPULE: 2.5; .5 SOLUTION RESPIRATORY (INHALATION) at 22:29

## 2021-04-25 RX ADMIN — PIPERACILLIN SODIUM AND TAZOBACTAM SODIUM 4500 MG: 4; .5 INJECTION, POWDER, LYOPHILIZED, FOR SOLUTION INTRAVENOUS at 03:39

## 2021-04-25 RX ADMIN — METHYLPREDNISOLONE SODIUM SUCCINATE 40 MG: 40 INJECTION, POWDER, FOR SOLUTION INTRAMUSCULAR; INTRAVENOUS at 19:57

## 2021-04-25 RX ADMIN — Medication 10 ML: at 19:57

## 2021-04-25 RX ADMIN — MUPIROCIN: 20 OINTMENT TOPICAL at 13:04

## 2021-04-25 RX ADMIN — METHYLPREDNISOLONE SODIUM SUCCINATE 40 MG: 40 INJECTION, POWDER, FOR SOLUTION INTRAMUSCULAR; INTRAVENOUS at 09:05

## 2021-04-25 ASSESSMENT — PAIN SCALES - GENERAL
PAINLEVEL_OUTOF10: 9
PAINLEVEL_OUTOF10: 6
PAINLEVEL_OUTOF10: 4

## 2021-04-25 NOTE — PROGRESS NOTES
Pt still having discomfort and states she has not gotten much relief from back discomfort.  See MAR for Ibuprofen dose Rowena Sample RN

## 2021-04-25 NOTE — PLAN OF CARE
Acute on chronic respiratory failure with hypoxia and hypercapnia   - BIPAP  - ?  PNA vs CHF  - IV abx, IV lasix   - Pulmonary consultation

## 2021-04-25 NOTE — PROGRESS NOTES
RESPIRATORY THERAPY ASSESSMENT    Name:  Anabelle Stark Record Number:  [id number]  Age: 56 y.o. Gender: female  : 1960  Today's Date:  2021  Room:  [address] [place] [address]    Assessment     Is the patient being admitted for a COPD or Asthma exacerbation? Yes   (If yes the patient will be seen every 4 hours for the first 24 hours and then reassessed)    Patient Admission Diagnosis      Allergies  Allergies   Allergen Reactions    Promethazine Other (See Comments)     Extreme confusion (1/3/20)    Codeine Swelling       Minimum Predicted Vital Capacity:               Actual Vital Capacity:                    Pulmonary History:COPD  Home Oxygen Therapy:  3.5 liters/min via nasal cannula  Home Respiratory Therapy:Albuterol Q6PRN  Current Respiratory Therapy:  dUONEB q4w/a and albuterol Q2prn  Treatment Type: HHN  Medications: Albuterol/Ipratropium    Respiratory Severity Index(RSI)   Patients with orders for inhalation medications, oxygen, or any therapeutic treatment modality will be placed on Respiratory Protocol. They will be assessed with the first treatment and at least every 72 hours thereafter. The following severity scale will be used to determine frequency of treatment intervention.     Smoking History: Severe Exacerbation = 4    Social History  Social History     Tobacco Use    Smoking status: Former Smoker     Packs/day: 0.50     Years: 44.00     Pack years: 22.00     Types: Cigarettes     Quit date: 2020     Years since quittin.7    Smokeless tobacco: Never Used   Substance Use Topics    Alcohol use: Yes     Comment: rarely    Drug use: No       Recent Surgical History: None = 0  Past Surgical History  Past Surgical History:   Procedure Laterality Date     SECTION      x2    CHOLECYSTECTOMY         Level of Consciousness: Alert, Oriented, and Cooperative = 0    Level of Activity: Non weight bearing- transfers bed to chair only = 3    Respiratory Pattern: Use of accessory muscles;prolonged expiration; or RR greater than 30 = 3    Breath Sounds: Absent bilaterally and/or with wheezes = 3    Sputum   ,  ,    Cough: Strong, spontaneous, non-productive = 0    Vital Signs   BP (!) 147/94   Pulse 102   Temp 97.8 °F (36.6 °C) (Axillary)   Resp 22   Ht 5' 4\" (1.626 m)   Wt 150 lb 6.4 oz (68.2 kg)   LMP  (LMP Unknown)   SpO2 96%   BMI 25.82 kg/m²   SPO2 (COPD values may differ): 86-87% on room air or greater than 92% on FiO2 35- 50% = 3    Peak Flow (asthma only): not applicable = 0    RSI: 16-82 = Q4 (every four hours)        Plan       Goals: medication delivery     Patient/caregiver was educated on the proper method of use for Respiratory Care Devices:  Yes      Level of patient/caregiver understanding able to:   ? Verbalize understanding   ? Demonstrate understanding       ? Teach back        ? Needs reinforcement       ? No available caregiver               ? Other:     Response to education:  Good     Is patient being placed on Home Treatment Regimen? No     Does the patient have everything they need prior to discharge? Yes     Comments: chart reviewed, pt assessed    Plan of Care: duoneb Q4 x24, albuterol Q2prn    Electronically signed by Billie Richards RCP on 4/25/2021 at 2:10 PM    Respiratory Protocol Guidelines     1. Assessment and treatment by Respiratory Therapy will be initiated for medication and therapeutic interventions upon initiation of aerosolized medication. 2. Physician will be contacted for respiratory rate (RR) greater than 35 breaths per minute. Therapy will be held for heart rate (HR) greater than 140 beats per minute, pending direction from physician. 3. Bronchodilators will be administered via Metered Dose Inhaler (MDI) with spacer when the following criteria are met:  a. Alert and cooperative     b. HR < 140 bpm  c. RR < 30 bpm                d. Can demonstrate a 2-3 second inspiratory hold  4.  Bronchodilators will be administered via Hand Held Nebulizer [facility]) to patients when ANY of the following criteria are met  a. Incognizant or uncooperative          b. Patients treated with HHN at Home        c. Unable to demonstrate proper use of MDI with spacer     d. RR > 30 bpm   5. Bronchodilators will be delivered via Metered Dose Inhaler (MDI), HHN, Aerogen to intubated patients on mechanical ventilation. 6. Inhalation medication orders will be delivered and/or substituted as outlined below. Aerosolized Medications Ordering and Administration Guidelines:    1. All Medications will be ordered by a physician, and their frequency and/or modality will be adjusted as defined by the patients Respiratory Severity Index (RSI) score. 2. If the patient does not have documented COPD, consider discontinuing anticholinergics when RSI is less than 9.  3. If the bronchospasm worsens (increased RSI), then the bronchodilator frequency can be increased to a maximum of every 4 hours. If greater than every 4 hours is required, the physician will be contacted. 4. If the bronchospasm improves, the frequency of the bronchodilator can be decreased, based on the patient's RSI, but not less than home treatment regimen frequency. 5. Bronchodilator(s) will be discontinued if patient has a RSI less than 9 and has received no scheduled or as needed treatment for 72  Hrs. Patients Ordered on a Mucolytic Agent:    1. Must always be administered with a bronchodilator. 2. Discontinue if patient experiences worsened bronchospasm, or secretions have lessened to the point that the patient is able to clear them with a cough. Anti-inflammatory and Combination Medications:    1. If the patient lacks prior history of lung disease, is not using inhaled anti-inflammatory medication at home, and lacks wheezing by examination or by history for at least 24 hours, contact physician for possible discontinuation.

## 2021-04-25 NOTE — PROGRESS NOTES
Pt respirations appear slightly more labored and she is more restless despite precedex gtt titrated to 0.8 mcg. Breath sounds are more diminished with diffuse tight wheezes. ABG's drawn per policy and procedure from right radial  Chung's test shows adequate collateral circulation.  No bleeding or hematoma at site Pressure to site x 5 minutes and bandage applied  Olayinka Prieto RN

## 2021-04-25 NOTE — PROGRESS NOTES
Dr Raymond Barone updated on resp status and ABG's. See orders and RT notified of orders for [facility] tx now and BiPAP settings changed.  ABG's 1 hr after change Shawna Barnhart RN

## 2021-04-25 NOTE — CONSULTS
cefTRIAXone (ROCEPHIN) IV  1,000 mg Intravenous Q24H    azithromycin  500 mg Oral Daily    Followed by   Zulma Mccollum ON 4/26/2021] azithromycin  250 mg Oral Daily    furosemide  40 mg Intravenous Daily    budesonide-formoterol  2 puff Inhalation BID    tiotropium  2 puff Inhalation Daily     Continuous Infusions:   sodium chloride 25 mL (04/25/21 0645)     PRN Meds:  sodium chloride flush, sodium chloride, polyethylene glycol, acetaminophen **OR** acetaminophen, ondansetron **OR** ondansetron, albuterol    ALLERGIES:  Patient is allergic to promethazine and codeine. REVIEW OF SYSTEMS:  Constitutional: Negative for fever  HENT: Negative for sore throat  Eyes: Negative for redness   Respiratory: +  for dyspnea, + cough  Cardiovascular: Negative for chest pain  Gastrointestinal: Negative for vomiting, diarrhea   Genitourinary: Negative for hematuria   Musculoskeletal: Negative for arthralgias   Skin: Negative for rash  Neurological: Negative for syncope  Hematological: Negative for adenopathy  Psychiatric/Behavorial: Negative for anxiety    PHYSICAL EXAM:  Blood pressure (!) 154/80, pulse 104, temperature 97.5 °F (36.4 °C), temperature source Oral, resp. rate 21, height 5' 4\" (1.626 m), weight 150 lb 6.4 oz (68.2 kg), SpO2 96 %, not currently breastfeeding.' on bipap 40%  Gen: No distress. Normocephalic, atraumatic. Eyes: PERRL. No sclera icterus. No conjunctival injection. ENT: No discharge. Pharynx clear. Neck: Trachea midline. No obvious mass. Resp: No accessory muscle use. No crackles. No wheezes. No rhonchi. No dullness on percussion. CV: Regular rate. Regular rhythm. No murmur or rub. No edema. GI: Non-tender. Non-distended. No hernia. Skin: Warm and dry. No nodule on exposed extremities. Lymph: No cervical LAD. No supraclavicular LAD. M/S: No cyanosis. No joint deformity. No clubbing. Neuro: Awake. Alert. Moves all four extremities. Psych: Oriented x 3. No anxiety.      LABS:  CBC: Recent Labs     04/25/21  0252   WBC 23.5*   HGB 11.9*   HCT 35.9*   MCV 97.3        BMP:   Recent Labs     04/25/21  0252      K 4.2   CL 97*   CO2 35*   BUN 17   CREATININE 0.6     LIVER PROFILE:   Recent Labs     04/25/21 0252   AST 39*   ALT 29   BILITOT <0.2   ALKPHOS 77     PT/INR: No results for input(s): PROTIME, INR in the last 72 hours. APTT: No results for input(s): APTT in the last 72 hours. UA:No results for input(s): NITRITE, COLORU, PHUR, LABCAST, WBCUA, RBCUA, MUCUS, TRICHOMONAS, YEAST, BACTERIA, CLARITYU, SPECGRAV, LEUKOCYTESUR, UROBILINOGEN, BILIRUBINUR, BLOODU, GLUCOSEU, AMORPHOUS in the last 72 hours. Invalid input(s): KETONESU  No results for input(s): PHART, OMR7RIS, PO2ART in the last 72 hours. Chest imaging was reviewed by me and showed:    CXR: hyperinflated, small pleural effusions    ASSESSMENT:  ·  Acute on chronic respiratory failure with hypoxemia and hypercapnia  · COPD with AE  · Small pleural effusions  · tobacco abuse    PLAN:  Non-invasive positive pressure ventilation per my orders. The ventilator was adjusted by me at the bedside for unstable, life threatening respiratory failure. Wean oxygen as tolerated. IV steroids today, with plan to switch to oral prednisone 40 mg daily tomorrow, then taper   Inhaled bronchodilators   Antibiotics (azithromycin)  Lasix- 40 mg iv daily  ICU care- lovenox, pepcid, bactroban  D/w  and patient at bedside. Patient is critically ill with acute respiratory failure. We will adjust NIPPV as above. Critical care time spent reviewing labs/films, examining patient, collaborating with other physicians but excluding procedures for life threatening organ failure is 33 minutes.

## 2021-04-25 NOTE — PROGRESS NOTES
04/25/21 1850   NIV Type   $NIV $Daily Charge   Skin Protection for O2 Device Yes   Equipment Type V60   Mode Bilevel   Mask Type Full face mask   Mask Size Small   Settings/Measurements   IPAP 24 cmH20   CPAP/EPAP 5 cmH2O   Rate Ordered 16   Resp 21   FiO2  45 %   Vt Exhaled 532 mL   Mask Leak (lpm) 14 lpm   Comfort Level Good   Using Accessory Muscles No   SpO2 97   Breath Sounds   Right Upper Lobe Expiratory Wheezes   Right Middle Lobe Expiratory Wheezes   Right Lower Lobe Expiratory Wheezes   Left Upper Lobe Expiratory Wheezes   Left Lower Lobe Expiratory Wheezes   Patient Observation   Observations SPO2  97%  on 45%  FIO2  BIPAP.     Alarm Settings   Alarms On Y

## 2021-04-25 NOTE — ED NOTES
Report to Chris Govea RN - transported to ICU / RT on bipap no distress.       Pam Garcia RN  04/25/21 3331

## 2021-04-25 NOTE — PROGRESS NOTES
Pt resting quietly in bed with all lines and monitoring devices in place. Vitals and SpO2 stable. She states she is still very uncomfortable and still having back pain that did improve with dose of toradol earlier but now it is worse and she is rating pain a \"8 or 9\" - See MAR. No other changes noted in exam. Good urinary output after lasix given earlier today.  Continue current plan of care Torri Oro RN

## 2021-04-25 NOTE — PROGRESS NOTES
Care rounds completed with Dr Kailee Holder and RT. Reviewed labs, meds, VS (temp/HR/RR), I/O's, assessment, & plan of care for today. See progress note & new orders for details. Pt taken off BiPAP and placed on 4 lpm N/C.  Dr Kailee Holder updates  at bedside  [facility]

## 2021-04-25 NOTE — ED NOTES
Blood cx #1 collected with IV stick / Efrain Guillen RN upon arrival at 7463. Blood cx #2 collected / MCS RN / venipuncture / right hand.   At 185 M. Yari Terry RN  04/25/21 1839

## 2021-04-25 NOTE — ED PROVIDER NOTES
 Sexual activity: Not Currently     Partners: Male   Lifestyle    Physical activity     Days per week: Not on file     Minutes per session: Not on file    Stress: Not on file   Relationships    Social connections     Talks on phone: Not on file     Gets together: Not on file     Attends Yazdanism service: Not on file     Active member of club or organization: Not on file     Attends meetings of clubs or organizations: Not on file     Relationship status: Not on file    Intimate partner violence     Fear of current or ex partner: Not on file     Emotionally abused: Not on file     Physically abused: Not on file     Forced sexual activity: Not on file   Other Topics Concern    Not on file   Social History Narrative    Not on file     Current Facility-Administered Medications   Medication Dose Route Frequency Provider Last Rate Last Admin    piperacillin-tazobactam (ZOSYN) 4,500 mg in sodium chloride 0.9 % 100 mL IVPB (mini-bag)  4,500 mg Intravenous Once Maru Mcbride MD        vancomycin 1500 mg in dextrose 5% 300 mL IVPB  25 mg/kg Intravenous Once Maru Mcbride MD        furosemide (LASIX) injection 20 mg  20 mg Intravenous Once Maru Mcbride MD         Current Outpatient Medications   Medication Sig Dispense Refill    albuterol (PROVENTIL) (2.5 MG/3ML) 0.083% nebulizer solution INHALE THREE MILLILITERS BY MOUTH EVERY SIX HOURS AS NEEDED FOR WHEEZING OR SHORTNESS OF BREATH 120 each 5    predniSONE (DELTASONE) 10 MG tablet Take 4 tabs for 2 days, 3 tabs a day for 3 days, 2 tab a day for 2 days, 1 tab a day for 2 days. Then back to 5 mg daily as before (Patient taking differently: Take 5 mg by mouth daily Take 4 tabs for 2 days, 3 tabs a day for 3 days, 2 tab a day for 2 days, 1 tab a day for 2 days.  Then back to 5 mg daily as before) 18 tablet 0    guaiFENesin (MUCINEX) 600 MG extended release tablet Take 1 tablet by mouth 2 times daily (Patient not taking: Reported on 11/12/2020) 30 tablet 0    fluticasone (FLONASE) 50 MCG/ACT nasal spray 1 spray by Each Nostril route daily 1 Bottle 3    levothyroxine (SYNTHROID) 125 MCG tablet Take 1 tablet by mouth every morning (before breakfast) 30 tablet 3    furosemide (LASIX) 20 MG tablet Take 1 tablet by mouth daily (Patient not taking: Reported on 11/12/2020) 30 tablet 0    aspirin 81 MG EC tablet Take 1 tablet by mouth daily 30 tablet 3    atorvastatin (LIPITOR) 40 MG tablet Take 1 tablet by mouth nightly 30 tablet 3    montelukast (SINGULAIR) 10 MG tablet Take 1 tablet by mouth nightly 30 tablet 3    Multiple Vitamin (MULTIVITAMIN) tablet Take 1 tablet by mouth daily 30 tablet 3    Fluticasone-Umeclidin-Vilant (TRELEGY ELLIPTA) 100-62.5-25 MCG/INH AEPB Inhale 1 puff into the lungs daily      albuterol sulfate  (90 Base) MCG/ACT inhaler Inhale 2 puffs into the lungs every 6 hours as needed for Wheezing 1 Inhaler 3     Allergies   Allergen Reactions    Promethazine Other (See Comments)     Extreme confusion (1/3/20)    Codeine Swelling       REVIEW OF SYSTEMS  10 systems reviewed, pertinent positives per HPI otherwise noted to be negative. PHYSICAL EXAM  BP (!) 183/103   Pulse 118   Temp 98.3 °F (36.8 °C) (Oral)   Resp 22   Ht 5' 4\" (1.626 m)   Wt 140 lb (63.5 kg)   LMP  (LMP Unknown)   SpO2 97%   BMI 24.03 kg/m²    GENERAL APPEARANCE: Awake and alert. Cooperative. Moderate respiratory distress. HENT: Normocephalic. Atraumatic. NECK: Supple. EYES: PERRL. EOM's grossly intact. HEART/CHEST: RRR. No murmurs. LUNGS: Labored respirations, severely diminished breath sounds bilaterally with mild expiratory wheezes. Able to speak in few word sentences at a time. ABDOMEN: No tenderness. Soft. Non-distended. No masses. No organomegaly. No guarding or rebound. MUSCULOSKELETAL: No extremity edema. Compartments soft. No deformity. No tenderness in the extremities. All extremities neurovascularly intact. SKIN: Warm and dry.  No ECG  The Ekg interpreted by me shows  sinus tachycardia, [id number]  Axis is   Right axis deviation  QTc is  normal  Intervals and Durations are unremarkable. ST Segments: nonspecific changes  No significant change from prior EKG dated 8/23/2020    RADIOLOGY  XR CHEST PORTABLE   Final Result   Pulmonary edema with bibasilar atelectasis versus pneumonia. ED COURSE/MDM  Patient seen and evaluated. Old records reviewed. Labs and imaging reviewed and results discussed with patient. Patient is a 68-year-old female, with history of COPD chronically on 2 to 4 L oxygen at baseline, presenting with concerns for worsening shortness of breath over the past 2 days as well as diffuse chest pain. Full HPI as detailed above. Upon arrival in the ED, vitals remarkable for tachycardia. Blood pressure is elevated at 183/103. Patient is in moderate respiratory distress, with severely diminished breath sounds bilaterally and some mild scattered expiratory wheezes. She had received 2 breathing treatments prior to arrival, no steroids. She was given Solu-Medrol here in the department as well as an additional breathing treatment and started on BiPAP. Labs were performed and revealed leukocytosis of 23.5. VBG with pH of 7.2, PCO2 of 92. Chest x-ray with pulmonary edema and bibasilar atelectasis versus pneumonia. Patient was started on broad-spectrum antibiotics, does meet sepsis criteria. She is not hypotensive and due to the pulmonary edema on chest x-ray a elected to withhold fluids at this time. EKG shows sinus tachycardia without evidence of acute ischemia. Troponin is negative. Patient will be hospitalized for further work-up and treatment of her sepsis, possible pneumonia, COPD exacerbation and acute hypercapnic respiratory failure. The total Critical Care time is 35 minutes which excludes separately billable procedures.     During the patient's ED course, the patient was given:  Medications piperacillin-tazobactam (ZOSYN) 4,500 mg in sodium chloride 0.9 % 100 mL IVPB (mini-bag) (has no administration in time range)   vancomycin 1500 mg in dextrose 5% 300 mL IVPB (has no administration in time range)   furosemide (LASIX) injection 20 mg (has no administration in time range)   ipratropium-albuterol (DUONEB) nebulizer solution 1 ampule (1 ampule Inhalation Given 4/25/21 0248)   methylPREDNISolone sodium (SOLU-MEDROL) injection 125 mg (125 mg Intravenous Given 4/25/21 0250)        CLINICAL IMPRESSION  1. COPD exacerbation ([address]    2. Acute respiratory failure with hypercapnia (HCC)    3. Acute pulmonary edema (HCC)    4. Pneumonia due to organism    5. Sepsis with acute hypercapnic respiratory failure without septic shock, due to unspecified organism (HCC)        Blood pressure (!) 183/103, pulse 118, temperature 98.3 °F (36.8 °C), temperature source Oral, resp. rate 22, height 5' 4\" (1.626 m), weight 140 lb (63.5 kg), SpO2 97 %, not currently breastfeeding. DISPOSITION  Sherlyn Garzon was admitted in fair condition. Patient was given scripts for the following medications. I counseled patient how to take these medications. New Prescriptions    No medications on file       Follow-up with:  No follow-up provider specified. DISCLAIMER: This chart was created using Dragon dictation software. Efforts were made by me to ensure accuracy, however some errors may be present due to limitations of this technology and occasionally words are not transcribed correctly.        Deana Garvin MD  04/25/21 0852

## 2021-04-25 NOTE — PROGRESS NOTES
Pt appears more SOB and  and SpO2 down to 88-89%. Pt placed back on BiPAP and Dr Breonna Cummins and RT updated.  Sandra Motta RN

## 2021-04-25 NOTE — PROGRESS NOTES
04/25/21 0257   NIV Type   Skin Assessment Clean, dry, & intact   Skin Protection for O2 Device Yes   Equipment Type v60   Mode Bilevel   Mask Type Full face mask   Mask Size Small   Settings/Measurements   IPAP 16 cmH20   CPAP/EPAP 8 cmH2O   Rate Ordered 12   Resp 22   FiO2  40 %   Vt Exhaled 688 mL   Minute Volume 16.1 Liters   Mask Leak (lpm) 25 lpm   Comfort Level Good   Using Accessory Muscles Yes   SpO2 95   Patient Observation   Observations spo2 95% on 40% bipap

## 2021-04-25 NOTE — H&P
Hospital Medicine History & Physical      PCP: Kristan Boeck, MD    Date of Admission: 2021    Date of Service: Pt seen/examined on 2021     Chief Complaint:    Chief Complaint   Patient presents with    Shortness of Breath     hx COPD started z yessenia yesterday. History Of Present Illness: The patient is a 56 y.o. female with COPD/asthma, chronic respiratory failure on home oxygen 3 L, continued tobacco abuse and hypothyroidism who presents to [facility] with c/o shortness of breath and chest pain. Ongoing for the last 2 days. Her symptoms were worsening. She called 911. She was started on a Z-pack yesterday. Her BP is elevated. She is tachypneic and tachycardic. She is requiring BiPAP. Labs with leukocytosis. Initial Venous blood gas with pH 7.2 and PCO2 92. CXR with pulmonary edema with bibasilar atelectasis versus pneumonia. Admitted to ICU on BiPAP. Pulmonology consulted. Patient seen in ICU. She is currently on BiPAP, still in some respiratory distress, tolerating BiPAP well. Oxygen saturations are stable. ABG improving pH is up to 7.29, PCO2 is down to 74. She is awake and alert and well-oriented. She describes being ill for 2 to 3 days now, with increasing shortness of breath and wheezing, increasing cough and coughing up some sputum. No fevers. She is normally on home oxygen 3 L. She continues to smoke tobacco down to about 4 to 5 cigarettes/day. Syed Delong Her Covid test on admission was negative   She has not had her Covid vaccine. I spoke to patient's  at bedside.    Spoke to patient's ICU nurse    Past Medical History:        Diagnosis Date    Asthma     COPD (chronic obstructive pulmonary disease) ([facility] [address]     Thyroid disease        Past Surgical History:        Procedure Laterality Date     SECTION      x2    CHOLECYSTECTOMY         Medications Prior to Admission:    Prior to Admission medications    Medication Sig Start Date End Date Taking? Authorizing Provider   albuterol (PROVENTIL) (2.5 MG/3ML) 0.083% nebulizer solution INHALE THREE MILLILITERS BY MOUTH EVERY SIX HOURS AS NEEDED FOR WHEEZING OR SHORTNESS OF BREATH 12/28/20  Yes Alaina Flores MD   predniSONE (DELTASONE) 10 MG tablet Take 4 tabs for 2 days, 3 tabs a day for 3 days, 2 tab a day for 2 days, 1 tab a day for 2 days. Then back to 5 mg daily as before  Patient taking differently: Take 5 mg by mouth daily Take 4 tabs for 2 days, 3 tabs a day for 3 days, 2 tab a day for 2 days, 1 tab a day for 2 days.  Then back to 5 mg daily as before 8/26/20  Yes Skippy Lat, APRN - CNP   guaiFENesin (MUCINEX) 600 MG extended release tablet Take 1 tablet by mouth 2 times daily 8/26/20  Yes Skippy Lat, APRN - CNP   fluticasone Donnie Sport) 50 MCG/ACT nasal spray 1 spray by Each Nostril route daily 8/27/20  Yes Skippy Lat, APRN - CNP   levothyroxine (SYNTHROID) 125 MCG tablet Take 1 tablet by mouth every morning (before breakfast) 8/27/20  Yes Skippy Lat, APRN - CNP   furosemide (LASIX) 20 MG tablet Take 1 tablet by mouth daily 8/26/20  Yes Skippy Lat, APRN - CNP   aspirin 81 MG EC tablet Take 1 tablet by mouth daily 1/7/20  Yes Stephanie Almanza MD   atorvastatin (LIPITOR) 40 MG tablet Take 1 tablet by mouth nightly 1/6/20  Yes Stephanie Almanza MD   montelukast (SINGULAIR) 10 MG tablet Take 1 tablet by mouth nightly 1/6/20  Yes Stephanie Almanza MD   Multiple Vitamin (MULTIVITAMIN) tablet Take 1 tablet by mouth daily 1/6/20  Yes Stephanie Almanza MD   Fluticasone-Umeclidin-Vilant (TRELEGY ELLIPTA) 104-53.5-97 MCG/INH AEPB Inhale 1 puff into the lungs daily 7/5/19  Yes Historical Provider, MD   albuterol sulfate  (90 Base) MCG/ACT inhaler Inhale 2 puffs into the lungs every 6 hours as needed for Wheezing 1/17/19  Yes Alaina Flores MD       Allergies:  Promethazine and Codeine    Social History:  The patient currently lives at home    TOBACCO:   reports that she quit smoking about 8 months ago. Her smoking use included cigarettes. She has a 22.00 pack-year smoking history. She has never used smokeless tobacco.  ETOH:   reports current alcohol use. Family History:       History reviewed. No pertinent family history. REVIEW OF SYSTEMS:     Constitutional: Negative for fever   HENT: Negative for sore throat   Eyes: Negative for redness   Respiratory: Positive for dyspnea, cough   Cardiovascular: Negative for chest pain   Gastrointestinal: Negative for vomiting, diarrhea   Genitourinary: Negative for hematuria   Musculoskeletal: Negative for arthralgias   Skin: Negative for rash   Neurological: Negative for syncope   Hematological: Negative for adenopathy   Psychiatric/Behavorial: Negative for anxiety    PHYSICAL EXAM:    BP (!) 154/80   Pulse 104   Temp 97.5 °F (36.4 °C) (Oral)   Resp 21   Ht 5' 4\" (1.626 m)   Wt 150 lb 6.4 oz (68.2 kg)   LMP  (LMP Unknown)   SpO2 96%   BMI 25.82 kg/m²     Gen: Mild distress. Alert. Awake and well-oriented. On BiPAP  Eyes: PERRL. No sclera icterus. No conjunctival injection. ENT: No discharge. Pharynx clear. Neck: No JVD. No Carotid Bruit. Trachea midline. Resp:  + accessory muscle use. Bilateral diffuse wheezes and rhonchi present . Tachypneic . CV: Regular rate. Regular rhythm. No murmur. No rub. No edema. GI: Non-tender. Non-distended. No masses. No organomegaly. Normal bowel sounds. No hernia. Skin: Warm and dry. No nodule on exposed extremities. No rash on exposed extremities. M/S: No cyanosis. No joint deformity. No clubbing. Neuro: Awake. Grossly nonfocal    Psych: Oriented x 3. No anxiety or agitation.      CBC:   Recent Labs     04/25/21 0252   WBC 23.5*   HGB 11.9*   HCT 35.9*   MCV 97.3        BMP:   Recent Labs     04/25/21 0252      K 4.2   CL 97*   CO2 35*   BUN 17   CREATININE 0.6     LIVER PROFILE:   Recent Labs     04/25/21 0252   AST 39*   ALT 29   BILITOT <0.2   ALKPHOS 77

## 2021-04-26 ENCOUNTER — APPOINTMENT (OUTPATIENT)
Dept: GENERAL RADIOLOGY | Age: 61
DRG: 870 | End: 2021-04-26
Payer: COMMERCIAL

## 2021-04-26 LAB
ALBUMIN SERPL-MCNC: 3.9 G/DL (ref 3.4–5)
ANION GAP SERPL CALCULATED.3IONS-SCNC: 6 MMOL/L (ref 3–16)
ANION GAP SERPL CALCULATED.3IONS-SCNC: 7 MMOL/L (ref 3–16)
BASE EXCESS ARTERIAL: 10.9 MMOL/L (ref -3–3)
BASE EXCESS ARTERIAL: 5.4 MMOL/L (ref -3–3)
BASE EXCESS ARTERIAL: 8 MMOL/L (ref -3–3)
BASOPHILS ABSOLUTE: 0.1 K/UL (ref 0–0.2)
BASOPHILS RELATIVE PERCENT: 0.3 %
BUN BLDV-MCNC: 38 MG/DL (ref 7–20)
BUN BLDV-MCNC: 40 MG/DL (ref 7–20)
CALCIUM SERPL-MCNC: 8.2 MG/DL (ref 8.3–10.6)
CALCIUM SERPL-MCNC: 9 MG/DL (ref 8.3–10.6)
CARBOXYHEMOGLOBIN ARTERIAL: 0.1 % (ref 0–1.5)
CARBOXYHEMOGLOBIN ARTERIAL: 0.3 % (ref 0–1.5)
CARBOXYHEMOGLOBIN ARTERIAL: 0.3 % (ref 0–1.5)
CHLORIDE BLD-SCNC: 91 MMOL/L (ref 99–110)
CHLORIDE BLD-SCNC: 95 MMOL/L (ref 99–110)
CO2: 33 MMOL/L (ref 21–32)
CO2: 36 MMOL/L (ref 21–32)
CREAT SERPL-MCNC: 0.6 MG/DL (ref 0.6–1.2)
CREAT SERPL-MCNC: 0.9 MG/DL (ref 0.6–1.2)
EOSINOPHILS ABSOLUTE: 0 K/UL (ref 0–0.6)
EOSINOPHILS RELATIVE PERCENT: 0 %
GFR AFRICAN AMERICAN: >60
GFR AFRICAN AMERICAN: >60
GFR NON-AFRICAN AMERICAN: >60
GFR NON-AFRICAN AMERICAN: >60
GLUCOSE BLD-MCNC: 119 MG/DL (ref 70–99)
GLUCOSE BLD-MCNC: 120 MG/DL (ref 70–99)
GLUCOSE BLD-MCNC: 121 MG/DL (ref 70–99)
GLUCOSE BLD-MCNC: 126 MG/DL (ref 70–99)
GLUCOSE BLD-MCNC: 141 MG/DL (ref 70–99)
GLUCOSE BLD-MCNC: 96 MG/DL (ref 70–99)
HCO3 ARTERIAL: 35 MMOL/L (ref 21–29)
HCO3 ARTERIAL: 39 MMOL/L (ref 21–29)
HCO3 ARTERIAL: 43.3 MMOL/L (ref 21–29)
HCT VFR BLD CALC: 35.1 % (ref 36–48)
HEMATOLOGY PATH CONSULT: NORMAL
HEMOGLOBIN, ART, EXTENDED: 11.5 G/DL (ref 12–16)
HEMOGLOBIN, ART, EXTENDED: 11.8 G/DL (ref 12–16)
HEMOGLOBIN, ART, EXTENDED: 12.2 G/DL (ref 12–16)
HEMOGLOBIN: 11.5 G/DL (ref 12–16)
LYMPHOCYTES ABSOLUTE: 0.5 K/UL (ref 1–5.1)
LYMPHOCYTES RELATIVE PERCENT: 2.2 %
MCH RBC QN AUTO: 31.9 PG (ref 26–34)
MCHC RBC AUTO-ENTMCNC: 32.8 G/DL (ref 31–36)
MCV RBC AUTO: 97.3 FL (ref 80–100)
METHEMOGLOBIN ARTERIAL: 0 %
METHEMOGLOBIN ARTERIAL: 0.1 %
METHEMOGLOBIN ARTERIAL: 0.1 %
MONOCYTES ABSOLUTE: 1.2 K/UL (ref 0–1.3)
MONOCYTES RELATIVE PERCENT: 5.6 %
NEUTROPHILS ABSOLUTE: 19.6 K/UL (ref 1.7–7.7)
NEUTROPHILS RELATIVE PERCENT: 91.9 %
O2 CONTENT ARTERIAL: 14 ML/DL
O2 CONTENT ARTERIAL: 16 ML/DL
O2 CONTENT ARTERIAL: 17 ML/DL
O2 SAT, ARTERIAL: 83 %
O2 SAT, ARTERIAL: 96.5 %
O2 SAT, ARTERIAL: 97.7 %
O2 THERAPY: ABNORMAL
PCO2 ARTERIAL: 120.6 MMHG (ref 35–45)
PCO2 ARTERIAL: 82.4 MMHG (ref 35–45)
PCO2 ARTERIAL: 98 MMHG (ref 35–45)
PDW BLD-RTO: 13.8 % (ref 12.4–15.4)
PERFORMED ON: ABNORMAL
PERFORMED ON: NORMAL
PH ARTERIAL: 7.17 (ref 7.35–7.45)
PH ARTERIAL: 7.22 (ref 7.35–7.45)
PH ARTERIAL: 7.25 (ref 7.35–7.45)
PHOSPHORUS: 2.5 MG/DL (ref 2.5–4.9)
PLATELET # BLD: 277 K/UL (ref 135–450)
PMV BLD AUTO: 8 FL (ref 5–10.5)
PO2 ARTERIAL: 107.1 MMHG (ref 75–108)
PO2 ARTERIAL: 124.3 MMHG (ref 75–108)
PO2 ARTERIAL: 62.5 MMHG (ref 75–108)
POTASSIUM REFLEX MAGNESIUM: 5.5 MMOL/L (ref 3.5–5.1)
POTASSIUM SERPL-SCNC: 4.6 MMOL/L (ref 3.5–5.1)
RBC # BLD: 3.61 M/UL (ref 4–5.2)
SODIUM BLD-SCNC: 134 MMOL/L (ref 136–145)
SODIUM BLD-SCNC: 134 MMOL/L (ref 136–145)
TCO2 ARTERIAL: 37.5 MMOL/L
TCO2 ARTERIAL: 42 MMOL/L
TCO2 ARTERIAL: 47 MMOL/L
WBC # BLD: 21.3 K/UL (ref 4–11)

## 2021-04-26 PROCEDURE — 6360000002 HC RX W HCPCS: Performed by: INTERNAL MEDICINE

## 2021-04-26 PROCEDURE — 6370000000 HC RX 637 (ALT 250 FOR IP): Performed by: INTERNAL MEDICINE

## 2021-04-26 PROCEDURE — 94640 AIRWAY INHALATION TREATMENT: CPT

## 2021-04-26 PROCEDURE — 2500000003 HC RX 250 WO HCPCS: Performed by: INTERNAL MEDICINE

## 2021-04-26 PROCEDURE — 99291 CRITICAL CARE FIRST HOUR: CPT | Performed by: INTERNAL MEDICINE

## 2021-04-26 PROCEDURE — 85025 COMPLETE CBC W/AUTO DIFF WBC: CPT

## 2021-04-26 PROCEDURE — 36556 INSERT NON-TUNNEL CV CATH: CPT

## 2021-04-26 PROCEDURE — 87070 CULTURE OTHR SPECIMN AEROBIC: CPT

## 2021-04-26 PROCEDURE — 94002 VENT MGMT INPAT INIT DAY: CPT

## 2021-04-26 PROCEDURE — 51702 INSERT TEMP BLADDER CATH: CPT

## 2021-04-26 PROCEDURE — 36415 COLL VENOUS BLD VENIPUNCTURE: CPT

## 2021-04-26 PROCEDURE — 94761 N-INVAS EAR/PLS OXIMETRY MLT: CPT

## 2021-04-26 PROCEDURE — 2000000000 HC ICU R&B

## 2021-04-26 PROCEDURE — 2580000003 HC RX 258: Performed by: INTERNAL MEDICINE

## 2021-04-26 PROCEDURE — 5A1955Z RESPIRATORY VENTILATION, GREATER THAN 96 CONSECUTIVE HOURS: ICD-10-PCS | Performed by: INTERNAL MEDICINE

## 2021-04-26 PROCEDURE — 36556 INSERT NON-TUNNEL CV CATH: CPT | Performed by: INTERNAL MEDICINE

## 2021-04-26 PROCEDURE — 02HV33Z INSERTION OF INFUSION DEVICE INTO SUPERIOR VENA CAVA, PERCUTANEOUS APPROACH: ICD-10-PCS | Performed by: INTERNAL MEDICINE

## 2021-04-26 PROCEDURE — 80069 RENAL FUNCTION PANEL: CPT

## 2021-04-26 PROCEDURE — 87205 SMEAR GRAM STAIN: CPT

## 2021-04-26 PROCEDURE — 82803 BLOOD GASES ANY COMBINATION: CPT

## 2021-04-26 PROCEDURE — 71045 X-RAY EXAM CHEST 1 VIEW: CPT

## 2021-04-26 PROCEDURE — 2700000000 HC OXYGEN THERAPY PER DAY

## 2021-04-26 PROCEDURE — 99233 SBSQ HOSP IP/OBS HIGH 50: CPT | Performed by: INTERNAL MEDICINE

## 2021-04-26 PROCEDURE — 89220 SPUTUM SPECIMEN COLLECTION: CPT

## 2021-04-26 PROCEDURE — 0BH17EZ INSERTION OF ENDOTRACHEAL AIRWAY INTO TRACHEA, VIA NATURAL OR ARTIFICIAL OPENING: ICD-10-PCS | Performed by: INTERNAL MEDICINE

## 2021-04-26 RX ORDER — KETOROLAC TROMETHAMINE 30 MG/ML
15 INJECTION, SOLUTION INTRAMUSCULAR; INTRAVENOUS EVERY 6 HOURS PRN
Status: DISCONTINUED | OUTPATIENT
Start: 2021-04-26 | End: 2021-04-26

## 2021-04-26 RX ORDER — ROCURONIUM BROMIDE 10 MG/ML
INJECTION, SOLUTION INTRAVENOUS
Status: COMPLETED | OUTPATIENT
Start: 2021-04-26 | End: 2021-04-26

## 2021-04-26 RX ORDER — MIDAZOLAM HYDROCHLORIDE 1 MG/ML
2 INJECTION INTRAMUSCULAR; INTRAVENOUS ONCE
Status: COMPLETED | OUTPATIENT
Start: 2021-04-26 | End: 2021-04-30

## 2021-04-26 RX ORDER — ASPIRIN 81 MG/1
81 TABLET, CHEWABLE ORAL DAILY
Status: DISCONTINUED | OUTPATIENT
Start: 2021-04-26 | End: 2021-05-14 | Stop reason: HOSPADM

## 2021-04-26 RX ORDER — ALBUTEROL SULFATE 90 UG/1
4 AEROSOL, METERED RESPIRATORY (INHALATION) EVERY 4 HOURS PRN
Status: DISCONTINUED | OUTPATIENT
Start: 2021-04-26 | End: 2021-05-03

## 2021-04-26 RX ORDER — SODIUM CHLORIDE 9 MG/ML
INJECTION, SOLUTION INTRAVENOUS CONTINUOUS
Status: ACTIVE | OUTPATIENT
Start: 2021-04-26 | End: 2021-04-26

## 2021-04-26 RX ORDER — MIDAZOLAM HYDROCHLORIDE 1 MG/ML
2 INJECTION INTRAMUSCULAR; INTRAVENOUS
Status: DISCONTINUED | OUTPATIENT
Start: 2021-04-26 | End: 2021-04-29

## 2021-04-26 RX ORDER — CHLORHEXIDINE GLUCONATE 0.12 MG/ML
15 RINSE ORAL 2 TIMES DAILY
Status: DISCONTINUED | OUTPATIENT
Start: 2021-04-26 | End: 2021-05-11

## 2021-04-26 RX ORDER — 0.9 % SODIUM CHLORIDE 0.9 %
1000 INTRAVENOUS SOLUTION INTRAVENOUS ONCE
Status: COMPLETED | OUTPATIENT
Start: 2021-04-26 | End: 2021-04-26

## 2021-04-26 RX ORDER — FENTANYL CITRATE 50 UG/ML
25 INJECTION, SOLUTION INTRAMUSCULAR; INTRAVENOUS
Status: DISCONTINUED | OUTPATIENT
Start: 2021-04-26 | End: 2021-05-11

## 2021-04-26 RX ORDER — PROPOFOL 10 MG/ML
5-80 INJECTION, EMULSION INTRAVENOUS CONTINUOUS
Status: DISCONTINUED | OUTPATIENT
Start: 2021-04-26 | End: 2021-05-11

## 2021-04-26 RX ORDER — KETAMINE HYDROCHLORIDE 50 MG/ML
INJECTION, SOLUTION, CONCENTRATE INTRAMUSCULAR; INTRAVENOUS
Status: COMPLETED | OUTPATIENT
Start: 2021-04-26 | End: 2021-04-26

## 2021-04-26 RX ADMIN — LEVOTHYROXINE SODIUM 125 MCG: 25 TABLET ORAL at 11:19

## 2021-04-26 RX ADMIN — ALBUTEROL SULFATE 2.5 MG: 2.5 SOLUTION RESPIRATORY (INHALATION) at 23:02

## 2021-04-26 RX ADMIN — VANCOMYCIN HYDROCHLORIDE 1000 MG: 1 INJECTION, POWDER, LYOPHILIZED, FOR SOLUTION INTRAVENOUS at 14:04

## 2021-04-26 RX ADMIN — KETOROLAC TROMETHAMINE 15 MG: 30 INJECTION, SOLUTION INTRAMUSCULAR at 00:18

## 2021-04-26 RX ADMIN — AZITHROMYCIN 250 MG: 250 TABLET, FILM COATED ORAL at 11:00

## 2021-04-26 RX ADMIN — SODIUM CHLORIDE: 9 INJECTION, SOLUTION INTRAVENOUS at 11:01

## 2021-04-26 RX ADMIN — ROCURONIUM BROMIDE 40 MG: 10 INJECTION INTRAVENOUS at 05:28

## 2021-04-26 RX ADMIN — ASPIRIN 81 MG: 81 TABLET, CHEWABLE ORAL at 11:00

## 2021-04-26 RX ADMIN — IPRATROPIUM BROMIDE AND ALBUTEROL SULFATE 1 AMPULE: 2.5; .5 SOLUTION RESPIRATORY (INHALATION) at 07:29

## 2021-04-26 RX ADMIN — ENOXAPARIN SODIUM 40 MG: 40 INJECTION SUBCUTANEOUS at 11:01

## 2021-04-26 RX ADMIN — KETAMINE HYDROCHLORIDE 100 MG: 50 INJECTION INTRAMUSCULAR; INTRAVENOUS at 05:26

## 2021-04-26 RX ADMIN — MONTELUKAST SODIUM 10 MG: 10 TABLET, COATED ORAL at 20:32

## 2021-04-26 RX ADMIN — PROPOFOL 30 MCG/KG/MIN: 10 INJECTION, EMULSION INTRAVENOUS at 14:03

## 2021-04-26 RX ADMIN — SODIUM CHLORIDE 1000 ML: 9 INJECTION, SOLUTION INTRAVENOUS at 07:34

## 2021-04-26 RX ADMIN — Medication 10 ML: at 11:16

## 2021-04-26 RX ADMIN — ACETAMINOPHEN 650 MG: 325 TABLET ORAL at 22:38

## 2021-04-26 RX ADMIN — Medication 10 ML: at 20:34

## 2021-04-26 RX ADMIN — MIDAZOLAM HYDROCHLORIDE 2 MG: 1 INJECTION, SOLUTION INTRAMUSCULAR; INTRAVENOUS at 06:13

## 2021-04-26 RX ADMIN — IPRATROPIUM BROMIDE AND ALBUTEROL SULFATE 1 AMPULE: 2.5; .5 SOLUTION RESPIRATORY (INHALATION) at 19:16

## 2021-04-26 RX ADMIN — MIDAZOLAM HYDROCHLORIDE 2 MG: 1 INJECTION, SOLUTION INTRAMUSCULAR; INTRAVENOUS at 23:31

## 2021-04-26 RX ADMIN — IPRATROPIUM BROMIDE AND ALBUTEROL SULFATE 1 AMPULE: 2.5; .5 SOLUTION RESPIRATORY (INHALATION) at 15:32

## 2021-04-26 RX ADMIN — Medication 15 ML: at 20:34

## 2021-04-26 RX ADMIN — Medication 15 ML: at 10:59

## 2021-04-26 RX ADMIN — CEFTRIAXONE SODIUM 1000 MG: 1 INJECTION, POWDER, FOR SOLUTION INTRAMUSCULAR; INTRAVENOUS at 06:02

## 2021-04-26 RX ADMIN — FAMOTIDINE 20 MG: 20 TABLET ORAL at 20:33

## 2021-04-26 RX ADMIN — Medication 1.4 MCG/KG/HR: at 03:48

## 2021-04-26 RX ADMIN — IPRATROPIUM BROMIDE AND ALBUTEROL SULFATE 1 AMPULE: 2.5; .5 SOLUTION RESPIRATORY (INHALATION) at 10:50

## 2021-04-26 RX ADMIN — IPRATROPIUM BROMIDE AND ALBUTEROL SULFATE 1 AMPULE: 2.5; .5 SOLUTION RESPIRATORY (INHALATION) at 23:02

## 2021-04-26 RX ADMIN — METHYLPREDNISOLONE SODIUM SUCCINATE 40 MG: 40 INJECTION, POWDER, FOR SOLUTION INTRAMUSCULAR; INTRAVENOUS at 10:59

## 2021-04-26 RX ADMIN — FENTANYL CITRATE 25 MCG: 0.05 INJECTION, SOLUTION INTRAMUSCULAR; INTRAVENOUS at 06:57

## 2021-04-26 RX ADMIN — PROPOFOL 45 MCG/KG/MIN: 10 INJECTION, EMULSION INTRAVENOUS at 20:06

## 2021-04-26 RX ADMIN — METHYLPREDNISOLONE SODIUM SUCCINATE 40 MG: 40 INJECTION, POWDER, FOR SOLUTION INTRAMUSCULAR; INTRAVENOUS at 20:33

## 2021-04-26 RX ADMIN — ATORVASTATIN CALCIUM 40 MG: 40 TABLET, FILM COATED ORAL at 20:33

## 2021-04-26 RX ADMIN — ALBUTEROL SULFATE 2.5 MG: 2.5 SOLUTION RESPIRATORY (INHALATION) at 19:16

## 2021-04-26 RX ADMIN — FAMOTIDINE 20 MG: 20 TABLET ORAL at 11:01

## 2021-04-26 RX ADMIN — FENTANYL CITRATE 25 MCG: 0.05 INJECTION, SOLUTION INTRAMUSCULAR; INTRAVENOUS at 11:34

## 2021-04-26 RX ADMIN — PROPOFOL 10 MCG/KG/MIN: 10 INJECTION, EMULSION INTRAVENOUS at 06:20

## 2021-04-26 RX ADMIN — MUPIROCIN: 20 OINTMENT TOPICAL at 20:32

## 2021-04-26 RX ADMIN — IPRATROPIUM BROMIDE AND ALBUTEROL SULFATE 1 AMPULE: 2.5; .5 SOLUTION RESPIRATORY (INHALATION) at 02:55

## 2021-04-26 RX ADMIN — MUPIROCIN: 20 OINTMENT TOPICAL at 11:06

## 2021-04-26 ASSESSMENT — PULMONARY FUNCTION TESTS
PIF_VALUE: 0
PIF_VALUE: 40
PIF_VALUE: 38
PIF_VALUE: 40
PIF_VALUE: 34
PIF_VALUE: 33
PIF_VALUE: 41
PIF_VALUE: 41
PIF_VALUE: 0
PIF_VALUE: 42
PIF_VALUE: 36
PIF_VALUE: 41

## 2021-04-26 ASSESSMENT — PAIN DESCRIPTION - ORIENTATION: ORIENTATION: MID;UPPER

## 2021-04-26 ASSESSMENT — PAIN - FUNCTIONAL ASSESSMENT: PAIN_FUNCTIONAL_ASSESSMENT: PREVENTS OR INTERFERES SOME ACTIVE ACTIVITIES AND ADLS

## 2021-04-26 ASSESSMENT — PAIN DESCRIPTION - PROGRESSION: CLINICAL_PROGRESSION: GRADUALLY WORSENING

## 2021-04-26 ASSESSMENT — PAIN SCALES - GENERAL
PAINLEVEL_OUTOF10: 0
PAINLEVEL_OUTOF10: 9
PAINLEVEL_OUTOF10: 0

## 2021-04-26 ASSESSMENT — PAIN DESCRIPTION - LOCATION: LOCATION: BACK;NECK

## 2021-04-26 ASSESSMENT — PAIN DESCRIPTION - DESCRIPTORS: DESCRIPTORS: ACHING;DISCOMFORT;CONSTANT

## 2021-04-26 ASSESSMENT — PAIN DESCRIPTION - FREQUENCY: FREQUENCY: CONTINUOUS

## 2021-04-26 NOTE — PROGRESS NOTES
B/P 81/59. Precedex decreased again to 0.5 mcg/kg/hr. ABG results greatly improved. Continue to monitor pt.

## 2021-04-26 NOTE — PROGRESS NOTES
04/26/21 0537   Vent Information   Vent Type 840   Vent Mode AC/VC   Vt Ordered 450 mL   Rate Set 16 bmp   Peak Flow 60 L/min   Pressure Support 0 cmH20   FiO2  35 %   SpO2 100 %   SpO2/FiO2 ratio 285.71   Sensitivity 3   PEEP/CPAP 5   I Time/ I Time % 0 s   Humidification Source Heated wire   Humidification Temp 37   Humidification Temp Measured 36.7   Vent Patient Data   High Peep/I Pressure 0   Peak Inspiratory Pressure 34 cmH2O   Mean Airway Pressure 11 cmH20   Rate Measured 16 br/min   Vt Exhaled 460 mL   Minute Volume 7.37 Liters   I:E Ratio 1:3.60   Cough/Sputum   Sputum How Obtained Endotracheal;Suctioned   Cough Non-productive   Sputum Amount None   Spontaneous Breathing Trial (SBT) RT Doc   Pulse 74   Breath Sounds   Right Upper Lobe Diminished   Right Middle Lobe Diminished   Right Lower Lobe Diminished   Left Upper Lobe Diminished   Left Lower Lobe Diminished   Additional Respiratory  Assessments   Resp 15   Position Semi-Woods's   Alarm Settings   High Pressure Alarm 40 cmH2O   Low Minute Volume Alarm 4 L/min   High Respiratory Rate 40 br/min   Patient Observation   Observations ETT SIZE 8.0, SECURED AT 23 LIP LINE. AMBU BAG AT HEAD OF BED. WATER GOOD. Non-Surgical Airway Endo Tracheal Tube   Placement Date/Time: 04/26/21 0528   Timeout: Patient;Procedure; Appropriate Equipment  Mask Ventilation: Ventilated by mask (1)  Placed By: Licensed provider  Inserted by: Dr Jing Odom  Insertion attempts: 1  Airway Device: Endo Tracheal Tube  Size: 8   Secured at 23 cm   Measured From [address] By Commercial tube [email or web address]   Site Condition Dry

## 2021-04-26 NOTE — PROCEDURES
Pt was emergently intubated secondary worsening hypercapnia, failed NIV. Pt was sedated with 100 mg ketamine and 40 mg rocuronium. Pt was intubated in a single attempt using a Glide scope with a size 3 MAC in place. The tip of a size 8 ETT was inserted between the cords and then advanced off of the stylette into the air way to 23 cm from the lip. Cuff was inflated and secured. Good CO2 color change and bilateral breath sounds auscultated. Vent settings provided to respiratory staff. OG was passed to 60 cm from the lip. CXR ordered and pending to verify tube placement.

## 2021-04-26 NOTE — PROGRESS NOTES
Reassessment complete, changes as per flowsheet. VSS. Patient remains on BiPAP 24/5 and FiO2 45 %. Precedex gtt @ 1.4 mcg/kg/hr. Normal sinus rhythm on cardiac monitor. Will continue to closely monitor.

## 2021-04-26 NOTE — CARE COORDINATION
Case Management Assessment  Initial Evaluation      Patient Name: Kali Macias  YOB: 1960  Diagnosis: Acute on chronic respiratory failure ([facility] [address] [J96.20]  Date / Time: 4/25/2021  2:30 AM    Admission status/Date:inpt  Chart Reviewed: Yes      Patient Interviewed: No   Family Interviewed:  Yes - spouse,Asif      Hospitalization in the last 30 days:  No      Health Care Decision Maker :   Primary Decision Maker: Simon Lin - Spouse - [phone]    (CM - must 1st enter selection under Navigator - emergency contact- [phone] Relationship and pick relationship)   Who do you trust or have selected to make healthcare decisions for you      Met with: pt's spouse via TC  Interview conducted  (bedside/phone):    Current PCP:   Luisana Pham MD      Financial  Commercial  Precert required for SNF : Y, N          3 night stay required - Y, N    ADLS  Support Systems/Care Needs:    Transportation: self    Meal Preparation: self    Housing  Living Arrangements: home with spouse  Steps: 3  Intent for return to present living arrangements: Yes  Identified Issues: no    Home Care Information  Active with [address] : No Agency:(Services)     Passport/Waiver : No  :                      Phone Number:    Passport/Waiver Services: no          Durable Medical Equiptment   DME Provider: [facility]  Equipment:   Walker__x_Cane___RTS___ BSC___Shower Chair___Hospital Bed___W/C____Other________  02 at ____Liter(s)---wears(frequency)_______ HHN ___ CPAP___ BiPap___   N/A____      Home O2 Use :  Yes    If No for home O2---if presently on O2 during hospitalization:  Yes  if yes CM to follow for potential DC O2 need  Informed of need for care provider to bring portable home O2 tank on day of discharge for nursing to connect prior to leaving:   Yes  Verbalized agreement/Understanding:   Yes    Community Service Affiliation  Dialysis:  No · Agency:  · Location:  · Dialysis Schedule:  · Phone:   · Fax: Other Community Services: (ex:PT/OT,Mental Health,Wound Clinic, Cardio/Pul [address])    DISCHARGE PLAN: Explained Case Management role/services. Reviewed chart. Pt in ICU on Ventilator. Writer spoke with pt's spouse for initial interview. Pt from home with spouse. Pt active with [facility] for home O2, uses 2.5 liters O2 at baseline. Following for hhc/dme needs.

## 2021-04-26 NOTE — PROGRESS NOTES
EOS report and transfer of care to [facility], [address] [place] [address]. Patient resting in bed, stable condition at hand off.

## 2021-04-26 NOTE — PROGRESS NOTES
Dr Yeh Chain up to intubate pt and place OG tube. Pt had good color change and good bilateral breath sounds post intubation.

## 2021-04-26 NOTE — PROGRESS NOTES
Dr. Ulrich Mems rounded on patient with RT, pharmacy, and RN. Labs, Imaging, VS, medications and  vent settings reviewed. -Start NS 250mL/hour x 4 hours, then repeat renal panel.   - Start Tube feed today.

## 2021-04-26 NOTE — PROGRESS NOTES
IM Progress Note    Admit Date:  4/25/2021  1    Interval history:  Acute resp failure, was on bipap, intubated this am     Subjective:  Ms. Isabell Valdez seen sedated on vent , hypotension post intubation improved with lightening sedation       Objective:   BP (!) 70/50   Pulse 65   Temp 97.4 °F (36.3 °C) (Axillary)   Resp 22   Ht 5' 4\" (1.626 m)   Wt 150 lb 6.4 oz (68.2 kg)   LMP  (LMP Unknown)   SpO2 93%   BMI 25.82 kg/m²       Intake/Output Summary (Last 24 hours) at 4/26/2021 0732  Last data filed at 4/26/2021 0700  Gross per 24 hour   Intake 560 ml   Output 1560 ml   Net -1000 ml       Physical Exam:        General: middle aged female on vent,   Oral ETT and OG noted  Right IJ TLC   . Appears to be not in any distress  Mucous Membranes:  Pink , anicteric  Neck: No JVD, no carotid bruit, no thyromegaly  Chest: diminished kelvin with scattered wheeze  Cardiovascular:  RRR S1S2 heard, no murmurs or gallops  Abdomen:  Soft, undistended, non tender, no organomegaly, BS present  Extremities: No edema or cyanosis.  Distal pulses well felt  Neurological : sedated        Medications:   Scheduled Medications:    chlorhexidine  15 mL Mouth/Throat BID    midazolam  2 mg Intravenous Once    sodium chloride  1,000 mL Intravenous Once    aspirin  81 mg Oral Daily    atorvastatin  40 mg Oral Nightly    levothyroxine  125 mcg Oral QAM AC    montelukast  10 mg Oral Nightly    sodium chloride flush  5-40 mL Intravenous 2 times per day    enoxaparin  40 mg Subcutaneous Daily    famotidine  20 mg Oral BID    cefTRIAXone (ROCEPHIN) IV  1,000 mg Intravenous Q24H    azithromycin  250 mg Oral Daily    furosemide  40 mg Intravenous Daily    methylPREDNISolone  40 mg Intravenous Q12H    mupirocin   Nasal BID    fluticasone  1 spray Each Nostril Daily    ipratropium-albuterol  1 ampule Inhalation Q4H     I   propofol 50 mcg/kg/min (04/26/21 0706)    norepinephrine      sodium chloride 25 mL (04/25/21 0645)     ketorolac, midazolam, fentanNYL, albuterol sulfate HFA **AND** ipratropium **AND** MDI Treatment, sodium chloride flush, sodium chloride, polyethylene glycol, acetaminophen **OR** acetaminophen, ondansetron **OR** ondansetron, albuterol, ibuprofen    Lab Data:  Recent Labs     04/25/21  0252 04/26/21  0434   WBC 23.5* 21.3*   HGB 11.9* 11.5*   HCT 35.9* 35.1*   MCV 97.3 97.3    277     Recent Labs     04/25/21  0252 04/26/21  0434    134*   K 4.2 5.5*   CL 97* 91*   CO2 35* 36*   BUN 17 38*   CREATININE 0.6 0.9     Recent Labs     04/25/21 0252   TROPONINI <0.01       Coagulation:   Lab Results   Component Value Date    INR 1.03 12/26/2019     Cardiac markers:   Lab Results   Component Value Date    TROPONINI <0.01 04/25/2021         Lab Results   Component Value Date    ALT 29 04/25/2021    AST 39 (H) 04/25/2021    ALKPHOS 77 04/25/2021    BILITOT <0.2 04/25/2021       Lab Results   Component Value Date    INR 1.03 12/26/2019    PROTIME 12.0 12/26/2019         CULTURES  COVID: not detected  Blood: pending     EKG:  I have reviewed the EKG with the following interpretation:   Sinus tachycardia, Nonspecific ST abnormality     RADIOLOGY  XR CHEST PORTABLE   Final Result   Pulmonary edema with bibasilar atelectasis versus pneumonia.                    cxr    Satisfactory position endotracheal tube and NG tube. No acute airspace disease. Pulmonary vessels upper normal.     Echo 8/25/2020   Summary   Limited imaging due to lung interface.  Parasternal images are unobtainable.   Normal left ventricular systolic function with an estimated ejection   fraction of 55-60%.   No regional wall motion abnormalities are seen.   Normal left ventricular diastolic filling pressure.   Normal systolic pulmonary artery pressure (SPAP) estimated at 20 mmHg (RA   pressure 3 mmHg).   No significant valvular heart disease.                 ASSESSMENT/PLAN:     #Acute on chronic hypoxic/hypercapnic respiratory failure-due to COPD exacerbation, Pneumonia  -Normally on home oxygen 3 L   -ABG on admission pH 7.2, PCO2 92  - admitted to ICU on BiPAP  - pulmonology consulted. - IV Solu-medrol, HHN , abx initiated   -Received vancomycin and Zosyn in the ED . Currently on Rocephin, Zithromax D#3  - failed bipap and worsened leading to intubation and now on vent support        #Pneumonia, Gram positive organism  - IV Rocephin, Zithromax D#3  - albuterol prn  -Follow-up on cultures        #Sepsis  -Present on admission  -With tachycardia and tachypnea, leukocytosis.    Secondary to pneumonia  Monitor     #Tobacco abuse  - smoking cessation needed     #Pulmonary Edema  - ECHO last year with normal EF and normal Diastolic function   - IV Lasix 40 mg as tolerated     #Leukocytosis-likely due to chronic steroids  -Trend WBC  -neg procalcitonin        #Hypothyroidism  - home dose of synthroid 125 mcg      #Hyperlipidemia  - on Atorvastatin.     DVT Prophylaxis: Lovenox  Diet: NPO  Code Status: Full Code          Kaz Juárez MD 4/26/2021 7:32 AM

## 2021-04-26 NOTE — PROGRESS NOTES
ABG with worsening results despite being on BiPAP all night. Now with pH 7.218 and CO2 98.0. Dr. Eli Corcoran made aware. Plans to intubate patient. Patient verbalizes agreeing with intubation.

## 2021-04-26 NOTE — PROGRESS NOTES
Telephone consent obtained from patient's spouse, Maribell Muniz, for central venous catheter insertion. Questions answered and discussed POC. Shelbie Meneses RN was second nurse to verify telephone consent. Placed in paper chart.

## 2021-04-26 NOTE — PROGRESS NOTES
Pulmonary & Critical Care Medicine ICU Progress Note    CC: Shortness of breath, respiratory failure    Events of Last 24 hours: Failed BiPAP, required emergent intubation  Hypotension following Precedex and then subsequently following intubation    Invasive Lines:  JOANNE CVC 21 by me     MV: 2021  Vent Mode: AC/PC Rate Set: 30 bmp/Vt Ordered: 0 mL/ /FiO2 : 35 %  Recent Labs     21  0437 21  0644   PHART 7.218* 7.173*   [id number] 98.0* 120.6*   PO2ART 107.1 62.5*       IV:   propofol 50 mcg/kg/min (21 0706)    norepinephrine      sodium chloride 25 mL (21 0645)       Vitals:  Blood pressure (!) 70/50, pulse 73, temperature 97.4 °F (36.3 °C), temperature source Axillary, resp. rate 24, height 5' 4\" (1.626 m), weight 150 lb 6.4 oz (68.2 kg), SpO2 95 %, not currently breastfeeding. on vent  Temp  Av.7 °F (36.5 °C)  Min: 97.4 °F (36.3 °C)  Max: 98.2 °F (36.8 °C)    Intake/Output Summary (Last 24 hours) at 2021 0742  Last data filed at 2021 0700  Gross per 24 hour   Intake 560 ml   Output 1560 ml   Net -1000 ml     EXAM:  General: intubated, ill appearing    ENT: Pharynx with ETT. Resp: No crackles. No wheezing. CV: S1, S2. No edema  GI: NT, ND, +BS  Skin: Warm and dry. Neuro: PERRL. Sedated, not following commands.  Patellar reflexes are symmetric     Scheduled Meds:   chlorhexidine  15 mL Mouth/Throat BID    midazolam  2 mg Intravenous Once    sodium chloride  1,000 mL Intravenous Once    aspirin  81 mg Oral Daily    atorvastatin  40 mg Oral Nightly    levothyroxine  125 mcg Oral QAM AC    montelukast  10 mg Oral Nightly    sodium chloride flush  5-40 mL Intravenous 2 times per day    enoxaparin  40 mg Subcutaneous Daily    famotidine  20 mg Oral BID    cefTRIAXone (ROCEPHIN) IV  1,000 mg Intravenous Q24H    azithromycin  250 mg Oral Daily    methylPREDNISolone  40 mg Intravenous Q12H    mupirocin   Nasal BID    ipratropium-albuterol  1 ampule Inhalation Q4H     PRN Meds:  ketorolac, midazolam, fentanNYL, albuterol sulfate HFA **AND** ipratropium **AND** MDI Treatment, sodium chloride flush, sodium chloride, polyethylene glycol, acetaminophen **OR** acetaminophen, ondansetron **OR** ondansetron, albuterol, ibuprofen    Results:  CBC:   Recent Labs     04/25/21 0252 04/26/21  0434   WBC 23.5* 21.3*   HGB 11.9* 11.5*   HCT 35.9* 35.1*   MCV 97.3 97.3    277     BMP:   Recent Labs     04/25/21 0252 04/26/21  0434    134*   K 4.2 5.5*   CL 97* 91*   CO2 35* 36*   BUN 17 38*   CREATININE 0.6 0.9     LIVER PROFILE:   Recent Labs     04/25/21 0252   AST 39*   ALT 29   BILITOT <0.2   ALKPHOS 77       Cultures:  4/25/2021 SARS-CoV-2 negative   4/25/2021 blood no growth to date  4/26/2021 respiratory culture sent     Films:  CXR: hyperinflated, small pleural effusions, CVC okay     ASSESSMENT:  · Acute on chronic respiratory failure with hypoxemia and hypercapnia - failed BiPAP with worsening hypercapnia, typically on 3 L home oxygen  · Community acquired pneumonia   · Septic shock  · COPD with AE  · Small pleural effusions  · Ongoing tobacco abuse    PLAN:  Mechanical ventilation as per my orders. The ventilator was adjusted by me at the bedside for unstable, life threatening respiratory failure. IV Propofol for sedation, target RASS -2, with daily spontaneous awakening trial.  Fentanyl PRN pain, gtt as needed  Head of bed 30 degrees or higher at all times  Daily spontaneous breathing trial once PEEP less than 8, FiO2 less than 55%.     IV Solu-Medrol  Ceftriaxone, azithromycin D#2, vancomycin D#1  ICU care- lovenox, pepcid, bactroban  GERALDINE is spouse, I d/w him at bedside      Total critical care time caring for this patient with life threatening, unstable organ failure, including direct patient contact, management of life support systems, review of data including imaging and labs, discussions with other team members and physicians is 41 minutes so far today, excluding procedures.

## 2021-04-26 NOTE — PROGRESS NOTES
Patient's emergency contact and daughter, Garrett Sears, updated at this time. Discussed patient condition and updated on plan of care. Questions answered at this time.

## 2021-04-26 NOTE — PROGRESS NOTES
Propofol infusing at 45 mcg/kg/min. Increased from 40 mcg/kg/min. RASS appears to be +1. Biting at ETT and oxygen saturation decreasing to 82%, and patient increasingly restless. Oxygen saturation currently in the 90's.     Electronically signed by Nathalia Weaver RN on 4/26/2021 at 7:43 PM

## 2021-04-26 NOTE — PROGRESS NOTES
04/25/21 2234   NIV Type   Equipment Type V60   Mode Bilevel   Mask Type Full face mask   Mask Size Small   Settings/Measurements   IPAP 24 cmH20   CPAP/EPAP 5 cmH2O   Rate Ordered 16   Resp 27   FiO2  45 %   Vt Exhaled 533 mL   Mask Leak (lpm) 9 lpm   Comfort Level Good   Using Accessory Muscles No   SpO2 92   Breath Sounds   Right Upper Lobe Expiratory Wheezes   Right Middle Lobe Expiratory Wheezes   Right Lower Lobe Expiratory Wheezes   Left Upper Lobe Expiratory Wheezes   Left Lower Lobe Expiratory Wheezes   Patient Observation   Observations SPO2  92%  on 45%  FIO2  BIPAP.     Alarm Settings   Alarms On Y

## 2021-04-26 NOTE — PROGRESS NOTES
4 Eyes Skin Assessment     The patient is being assess for   Shift Handoff    I agree that 2 RN's have performed a thorough Head to Toe Skin Assessment on the patient. ALL assessment sites listed below have been assessed. Areas assessed by both nurses:   [x]   Head, Face, and Ears   [x]   Shoulders, Back, and Chest, Abdomen  [x]   Arms, Elbows, and Hands   [x]   Coccyx, Sacrum, and Ischium  [x]   Legs, Feet, and Heels        See flowsheet*    **SHARE this note so that the co-signing nurse is able to place an eSignature**    Co-signer eSignature: Electronically signed by Sachi Vences RN on 4/26/21 at 7:20 PM EDT    Does the Patient have Skin Breakdown?   Yes LDA WOUND CARE was Initiated documentation include the Alis-wound, Wound Assessment, Measurements, Dressing Treatment, Drainage, and Color\",          Ron Prevention initiated:  Yes   Wound Care Orders initiated:  Yes      [id number]  nurse consulted for Pressure Injury (Stage 3,4, Unstageable, DTI, NWPT, Complex wounds)and New or Established Ostomies:  Yes      Primary Nurse eSignature: Electronically signed by Mike Reese RN on 4/26/21 at 6:38 PM EDT

## 2021-04-26 NOTE — PROGRESS NOTES
Patient's spouse, Jeanne Adjutant and Daughter, Brookie Simmonds updated at this regarding change in patient's condition, intubation and ventilator status. Questions answered at this time.

## 2021-04-26 NOTE — PROGRESS NOTES
Patient removed from BiPAP and placed on 4L O2 via NC. Within 5 minutes, she begins complaining of 7/10 back pain and states \"I need a breathing treatment, my breathing is not good. If you need to intubate me, please do it. \". SpO2 down to 81%. BiPAP replaced and PO meds held at this time. Writer called RT to request breathing treatment, and precedex gtt titrated up to 0.6 mcg/kg/hr to facilitate comfort for patient while on BiPAP. Repositioned in bed for comfort. Will closely monitor.

## 2021-04-26 NOTE — PROGRESS NOTES
04/26/21 0737   Vent Patient Data   Plateau Pressure 35 [id number]   Static Compliance 11.26 mL/cmH2O   Dynamic Compliance 11.26 mL/cmH2O

## 2021-04-26 NOTE — PROGRESS NOTES
Patient still with c/o feeling short of breath, \"I cannot get enough air\", and appears restless. SpO2 96%. Precedex gtt again titrated up, now at 0.9 mcg/kg/hr with adequate BP (135/89 ). Expiratory wheezes in all lobes, with lower lobes also diminished. BiPAP settings 24/5 and FiO2 45%.

## 2021-04-26 NOTE — PROGRESS NOTES
Vent mode switched to PC at this time due to high peak pressures and inability to achieve adequate tidal volumes. FiO2 increased from 35% to 45%. RR increased from 16 to 30bpm at this time as well per Dr. Walter Shepard. New Settings: AC/PC 30/Pi 35/Ti . 75sec/45%/+5. Tidal volumes are now 330-350 with MV >9.5.

## 2021-04-26 NOTE — PROGRESS NOTES
04/26/21 0253   NIV Type   Equipment Type v60   Mode Bilevel   Mask Type Full face mask   Mask Size Small   Settings/Measurements   IPAP 24 cmH20   CPAP/EPAP 5 cmH2O   Rate Ordered 16   Resp 26   FiO2  45 %   Vt Exhaled 620 mL   Minute Volume 14.1 Liters   Mask Leak (lpm) 2 lpm   Comfort Level Good   Using Accessory Muscles No   SpO2 94   Patient Observation   Observations spo2 94% on 45% bipap

## 2021-04-26 NOTE — PROGRESS NOTES
Perfectserve to Dr. Valencia Chase, nocturnist, regarding patient continued anxiety and c/o severe back & neck pain despite nonpharma comfort measures and precedex gtt titrations. Gtt is now at 1 mcg/kg/hr (max of 1.4) and patient RASS +3. Orders received for PRN Toradol x2 doses. See MAR for administrations.

## 2021-04-26 NOTE — PROGRESS NOTES
Assessment complete as per flow sheets. Patient is tolerating BiPAP well at this time, opens eyes to voice. Normal sinus rhythm on cardiac monitor. BP on soft side, otherwise VSS. Patient is on BiPAP with settings of 24/5 and FiO2 45%. Precedex gtt being titrated down due to hypotension, see MAR. Patient voids per purewick catheter, site is patent and secured. UO is clear and yellow in appearance. PIV x 3 appears WNL. Dressings are C/D/I. Meds as per MAR. Safety measures in place and will continue to monitor.

## 2021-04-26 NOTE — PROGRESS NOTES
04/26/21 1916   Vent Information   $Ventilation $Initial Day   Skin Assessment Clean, dry, & intact   Vent Type 840   Vent Mode AC/PC   Vt Ordered 0 mL   Rate Set 30 bmp   Peak Flow 0 L/min   Pressure Support 0 cmH20   FiO2  35 %   SpO2 95 %   SpO2/FiO2 ratio 271.43   Sensitivity 3   PEEP/CPAP 5   I Time/ I Time % 0.66 s   Humidification Source Heated wire   Humidification Temp 37   Humidification Temp Measured 36   Vent Patient Data   High Peep/I Pressure 35   Peak Inspiratory Pressure 41 cmH2O   Mean Airway Pressure 17 cmH20   Rate Measured 30 br/min   Vt Exhaled 415 mL   Minute Volume 12.5 Liters   I:E Ratio 1:2.00   Plateau Pressure 33 [id number]   Static Compliance 15 mL/cmH2O   Dynamic Compliance 12 mL/cmH2O   Cough/Sputum   Sputum How Obtained Suctioned;Endotracheal   Cough Non-productive   Spontaneous Breathing Trial (SBT) RT Doc   Pulse 106   Breath Sounds   Right Upper Lobe Expiratory Wheezes   Right Middle Lobe Expiratory Wheezes   Right Lower Lobe Expiratory Wheezes   Left Upper Lobe Expiratory Wheezes   Left Lower Lobe Expiratory Wheezes   Additional Respiratory  Assessments   Resp 30   Position Semi-Woods's   Oral Care Completed? Yes   Oral Care Mouth suctioned   Subglottic Suction Done? Yes   Alarm Settings   High Pressure Alarm 45 cmH2O   Low Minute Volume Alarm 4 L/min   Apnea (secs) 20 secs   High Respiratory Rate 40 br/min   Low Exhaled Vt  250 mL   Non-Surgical Airway Endo Tracheal Tube   Placement Date/Time: 04/26/21 0528   Timeout: Patient;Procedure; Appropriate Equipment  Mask Ventilation: Ventilated by mask (1)  Placed By: Licensed provider  Inserted by: Dr Carly Fink  Insertion attempts: 1  Airway Device: Endo Tracheal Tube  Size: 8   Secured at 24 cm   Measured From Lips   Secured Location Right   Secured By Commercial tube [email or web address]   Site Condition Dry

## 2021-04-26 NOTE — PROCEDURES
Procedure: central venous access, RIJ    Indication: hypotensive with need for IV vasopressors     Informed Consent: was obtained from family     Time Out: taken    Procedure: Sterile prep with chlorhexidine. Full maximum sterile field/barrier technique was followed (with cap and mask and sterile gown and large sterile sheet and hand hygeine and 2% chlorhexidine). Aqueous lidocaine anesthetic. Direct ultrasound visualization of the right internal jugular vein, with placement of central venous catheter using modified seldinger technique. Good dark venous blood from all 3 lumens. CXR pending. No immediate complication.     Recommendation: remove central line at earliest time feasible to mitigate infectious risks

## 2021-04-26 NOTE — PROGRESS NOTES
Reassessment complete, changes as per flowsheet. VSS. Patient remains on BiPAP and sedated with precedex gtt. BiPAP settings of 24/5 and FiO2 45 %. Normal sinus/sinus tachy rhythm on cardiac monitor, HR 90- low 100s. Will continue to closely monitor.

## 2021-04-26 NOTE — PROGRESS NOTES
Patient now resting with eyes closed and BiPAP in place. Precedex gtt remains at 1.4 mcg/kg/hr. Patient intermittently becomes restless, (moaning and RR increase to 30s briefly). Frequent repositioning and comfort measures provided. Will continue to closely monitor.

## 2021-04-26 NOTE — PROGRESS NOTES
Comprehensive Nutrition Assessment    Type and Reason for Visit:  Initial, Consult(consult for TF ordering and managment)    Nutrition Recommendations/Plan:   1. Start TF - Jevity 1.2 (\"standard with fiber\" formula name in Epic) with a goal rate of 45 ml/hr x 20 hours. Start with 20 ml/hr and increase by 20 ml every 4 hours, as tolerated by patient, until goal rate can be achieved and maintained. Water flushes, 30 ml every 4 hours or per MD guidance. Please administer one proteinex P2Go once daily via feeding tube to better meet patient's protein needs that formula alone cannot. 2. Monitor TF start, rate, intake, and tolerance + water flushes + administration of one proteinex P2Go once daily via feeding tube. 3. Monitor vent status, sedation type/amount (propofol currently at 25 mcg x 24 hours = 269 kcals from lipids), and plan of care. 4. Monitor nutrition-related labs, bowel function, and weight trends. Nutrition Assessment:  patient is nutritionally compromised AEB SOB and chest pain x 2 days PTA and worsening respiratory status upon admission + intubation necessary and she is at risk for further compromise d/t NPO status r/t intubation, altered nutrition-related labs, and need for EN as sole source of nutrition while intubated; will start TF today    Malnutrition Assessment:  Malnutrition Status: At risk for malnutrition     Context:  Acute Illness     Findings of the 6 clinical characteristics of malnutrition:  Energy Intake:  Unable to assess  Weight Loss:  No significant weight loss(+ weight gain since August 2020)     Body Fat Loss:  No significant body fat loss     Muscle Mass Loss:  7 - Moderate muscle mass loss Clavicles (pectoralis & deltoids), Hand (interosseous)  Fluid Accumulation:  No significant fluid accumulation     Strength:  Not Performed    Estimated Daily Nutrient Needs:  Energy (kcal):  1360 - 1564 kcals based on 20-23 kcals/kg/CBW;  Weight Used for Energy Requirements: Current     Protein (g):  66 - 72 g protein based on 1.2-1.3 g/kg/IBW; Weight Used for Protein Requirements:  Ideal        Fluid (ml/day):  1360 - 1564 ml; Method Used for Fluid Requirements:  1 ml/kcal      Nutrition Related Findings:  patient is intubated and sedated on 25 mcg propofol at this time; patient is edentulous; + BM on 4/26/21; abdomen felt firm and round this afternoon during NFPE; patient was admitted with c/o SOB and chest pain x 2 days PTA; she was intubated early this am; patient's skin appeared dry, especially on BUE; bilateral hands were cold to touch; patient did not open her eyes but made some slight movement when this RD touched her face for NFPE; Na, Cl, and h/h low; K and BUN elevated; blood glucose trends WNL; patient has pepcid, synthroid, solumedrol, versed, NS at 250 ml/hr, and levo ordered at this time      Wounds:  None       Current Nutrition Therapies:    Current Tube Feeding (TF) Orders:  · Feeding Route: Orogastric  · Formula: Standard w/Fiber  · Schedule: Continuous  · Additives/Modulars: Protein(one proteinex P2Go once daily via feeding tube)  · Water Flushes: 30 ml every 4 hours or per MD guidance  · Current TF & Flush Orders Provides: TF to be started today  · Goal TF & Flush Orders Provides: Jevity 1.2 with a goal rate of 45 ml/hr x 20 hours = 900 ml TV, 1080 kcals, 50 g protein, and 726 ml free water + 30 ml water flushes every 4 hours or per MD guidance + 26 g protein and 104 kcals from one proteinex P2Go once daily (76 g protein, and 1184 kcals total)      Anthropometric Measures:  · Height: 5' 4\" (162.6 cm)  · Current Body Weight: 150 lb 6.4 oz (68.2 kg)(obtained on 4/25/21)   · Admission Body Weight: 150 lb 6.4 oz (68.2 kg)(obtained on 4/25/21; actual weight)    · Usual Body Weight: 135 lb 1.6 oz (61.3 kg)(obtained on 8/23/20; actual weight)     · Ideal Body Weight: 120 lbs; % Ideal Body Weight 125.3 %   · BMI: 25.8   · BMI Categories: Overweight (BMI 25.0-29. 9) Nutrition Diagnosis:   · Inadequate oral intake related to inadequate protein-energy intake, impaired respiratory function as evidenced by NPO or clear liquid status due to medical condition, intubation      Nutrition Interventions:   Food and/or Nutrient Delivery:  Continue NPO, Start Tube Feeding  Nutrition Education/Counseling:  No recommendation at this time   Coordination of Nutrition Care:  Continue to monitor while inpatient, Interdisciplinary Rounds    Goals:  patient will tolerate Jevity 1.2 at goal rate of 45 ml/hr x 20 hours without GI distress, without s/s of aspiration, and without additional lab/fluid disturbances       Nutrition Monitoring and Evaluation:   Behavioral-Environmental Outcomes:  None Identified   Food/Nutrient Intake Outcomes:  Enteral Nutrition Intake/Tolerance, IVF Intake  Physical Signs/Symptoms Outcomes:  Biochemical Data, Nutrition Focused Physical Findings, Skin, Weight     Discharge Planning:     Too soon to determine     Electronically signed by Ashley Ayala RD, LD on 4/26/21 at 1:35 PM EDT    Contact: [phone]

## 2021-04-27 ENCOUNTER — APPOINTMENT (OUTPATIENT)
Dept: GENERAL RADIOLOGY | Age: 61
DRG: 870 | End: 2021-04-27
Payer: COMMERCIAL

## 2021-04-27 LAB
ANION GAP SERPL CALCULATED.3IONS-SCNC: 6 MMOL/L (ref 3–16)
BASE EXCESS ARTERIAL: 5 MMOL/L (ref -3–3)
BUN BLDV-MCNC: 35 MG/DL (ref 7–20)
CALCIUM SERPL-MCNC: 8.4 MG/DL (ref 8.3–10.6)
CARBOXYHEMOGLOBIN ARTERIAL: 0.3 % (ref 0–1.5)
CHLORIDE BLD-SCNC: 93 MMOL/L (ref 99–110)
CO2: 33 MMOL/L (ref 21–32)
CREAT SERPL-MCNC: 0.6 MG/DL (ref 0.6–1.2)
GFR AFRICAN AMERICAN: >60
GFR NON-AFRICAN AMERICAN: >60
GLUCOSE BLD-MCNC: 124 MG/DL (ref 70–99)
GLUCOSE BLD-MCNC: 132 MG/DL (ref 70–99)
GLUCOSE BLD-MCNC: 133 MG/DL (ref 70–99)
GLUCOSE BLD-MCNC: 142 MG/DL (ref 70–99)
GLUCOSE BLD-MCNC: 159 MG/DL (ref 70–99)
GLUCOSE BLD-MCNC: 162 MG/DL (ref 70–99)
GLUCOSE BLD-MCNC: 167 MG/DL (ref 70–99)
GLUCOSE BLD-MCNC: 187 MG/DL (ref 70–99)
HCO3 ARTERIAL: 31.5 MMOL/L (ref 21–29)
HEMOGLOBIN, ART, EXTENDED: 11.6 G/DL (ref 12–16)
METHEMOGLOBIN ARTERIAL: 0 %
O2 CONTENT ARTERIAL: 16 ML/DL
O2 SAT, ARTERIAL: 97.7 %
O2 THERAPY: ABNORMAL
PCO2 ARTERIAL: 55.8 MMHG (ref 35–45)
PERFORMED ON: ABNORMAL
PH ARTERIAL: 7.37 (ref 7.35–7.45)
PO2 ARTERIAL: 108.2 MMHG (ref 75–108)
POTASSIUM REFLEX MAGNESIUM: 4.3 MMOL/L (ref 3.5–5.1)
SODIUM BLD-SCNC: 132 MMOL/L (ref 136–145)
TCO2 ARTERIAL: 33.2 MMOL/L

## 2021-04-27 PROCEDURE — 6370000000 HC RX 637 (ALT 250 FOR IP): Performed by: INTERNAL MEDICINE

## 2021-04-27 PROCEDURE — 99291 CRITICAL CARE FIRST HOUR: CPT | Performed by: INTERNAL MEDICINE

## 2021-04-27 PROCEDURE — 94640 AIRWAY INHALATION TREATMENT: CPT

## 2021-04-27 PROCEDURE — 2580000003 HC RX 258: Performed by: INTERNAL MEDICINE

## 2021-04-27 PROCEDURE — 99233 SBSQ HOSP IP/OBS HIGH 50: CPT | Performed by: INTERNAL MEDICINE

## 2021-04-27 PROCEDURE — 82803 BLOOD GASES ANY COMBINATION: CPT

## 2021-04-27 PROCEDURE — 80048 BASIC METABOLIC PNL TOTAL CA: CPT

## 2021-04-27 PROCEDURE — 94003 VENT MGMT INPAT SUBQ DAY: CPT

## 2021-04-27 PROCEDURE — 94761 N-INVAS EAR/PLS OXIMETRY MLT: CPT

## 2021-04-27 PROCEDURE — 6360000002 HC RX W HCPCS: Performed by: INTERNAL MEDICINE

## 2021-04-27 PROCEDURE — 71045 X-RAY EXAM CHEST 1 VIEW: CPT

## 2021-04-27 PROCEDURE — 36592 COLLECT BLOOD FROM PICC: CPT

## 2021-04-27 PROCEDURE — 36600 WITHDRAWAL OF ARTERIAL BLOOD: CPT

## 2021-04-27 PROCEDURE — 2000000000 HC ICU R&B

## 2021-04-27 PROCEDURE — 2580000003 HC RX 258

## 2021-04-27 PROCEDURE — 2700000000 HC OXYGEN THERAPY PER DAY

## 2021-04-27 RX ORDER — SODIUM CHLORIDE 9 MG/ML
INJECTION, SOLUTION INTRAVENOUS
Status: COMPLETED
Start: 2021-04-27 | End: 2021-04-27

## 2021-04-27 RX ADMIN — MIDAZOLAM HYDROCHLORIDE 2 MG: 1 INJECTION, SOLUTION INTRAMUSCULAR; INTRAVENOUS at 02:46

## 2021-04-27 RX ADMIN — IPRATROPIUM BROMIDE AND ALBUTEROL SULFATE 1 AMPULE: 2.5; .5 SOLUTION RESPIRATORY (INHALATION) at 16:45

## 2021-04-27 RX ADMIN — MIDAZOLAM HYDROCHLORIDE 2 MG: 1 INJECTION, SOLUTION INTRAMUSCULAR; INTRAVENOUS at 21:16

## 2021-04-27 RX ADMIN — ENOXAPARIN SODIUM 40 MG: 40 INJECTION SUBCUTANEOUS at 09:08

## 2021-04-27 RX ADMIN — PROPOFOL 70 MCG/KG/MIN: 10 INJECTION, EMULSION INTRAVENOUS at 19:23

## 2021-04-27 RX ADMIN — LEVOTHYROXINE SODIUM 125 MCG: 25 TABLET ORAL at 09:12

## 2021-04-27 RX ADMIN — ATORVASTATIN CALCIUM 40 MG: 40 TABLET, FILM COATED ORAL at 21:11

## 2021-04-27 RX ADMIN — MUPIROCIN: 20 OINTMENT TOPICAL at 09:07

## 2021-04-27 RX ADMIN — IPRATROPIUM BROMIDE AND ALBUTEROL SULFATE 1 AMPULE: 2.5; .5 SOLUTION RESPIRATORY (INHALATION) at 07:52

## 2021-04-27 RX ADMIN — MIDAZOLAM HYDROCHLORIDE 1 MG/HR: 5 INJECTION, SOLUTION INTRAMUSCULAR; INTRAVENOUS at 23:42

## 2021-04-27 RX ADMIN — FAMOTIDINE 20 MG: 20 TABLET ORAL at 21:11

## 2021-04-27 RX ADMIN — CEFTRIAXONE SODIUM 1000 MG: 1 INJECTION, POWDER, FOR SOLUTION INTRAMUSCULAR; INTRAVENOUS at 05:57

## 2021-04-27 RX ADMIN — PROPOFOL 75 MCG/KG/MIN: 10 INJECTION, EMULSION INTRAVENOUS at 22:51

## 2021-04-27 RX ADMIN — ASPIRIN 81 MG: 81 TABLET, CHEWABLE ORAL at 09:07

## 2021-04-27 RX ADMIN — AZITHROMYCIN 250 MG: 250 TABLET, FILM COATED ORAL at 09:07

## 2021-04-27 RX ADMIN — Medication 15 ML: at 09:07

## 2021-04-27 RX ADMIN — IPRATROPIUM BROMIDE AND ALBUTEROL SULFATE 1 AMPULE: 2.5; .5 SOLUTION RESPIRATORY (INHALATION) at 02:32

## 2021-04-27 RX ADMIN — IPRATROPIUM BROMIDE AND ALBUTEROL SULFATE 1 AMPULE: 2.5; .5 SOLUTION RESPIRATORY (INHALATION) at 23:23

## 2021-04-27 RX ADMIN — IPRATROPIUM BROMIDE AND ALBUTEROL SULFATE 1 AMPULE: 2.5; .5 SOLUTION RESPIRATORY (INHALATION) at 19:31

## 2021-04-27 RX ADMIN — ALBUTEROL SULFATE 2.5 MG: 2.5 SOLUTION RESPIRATORY (INHALATION) at 16:46

## 2021-04-27 RX ADMIN — MIDAZOLAM HYDROCHLORIDE 2 MG: 1 INJECTION, SOLUTION INTRAMUSCULAR; INTRAVENOUS at 17:29

## 2021-04-27 RX ADMIN — METHYLPREDNISOLONE SODIUM SUCCINATE 40 MG: 40 INJECTION, POWDER, FOR SOLUTION INTRAMUSCULAR; INTRAVENOUS at 09:08

## 2021-04-27 RX ADMIN — Medication 10 ML: at 21:13

## 2021-04-27 RX ADMIN — MONTELUKAST SODIUM 10 MG: 10 TABLET, COATED ORAL at 21:12

## 2021-04-27 RX ADMIN — ALBUTEROL SULFATE 2.5 MG: 2.5 SOLUTION RESPIRATORY (INHALATION) at 19:31

## 2021-04-27 RX ADMIN — MUPIROCIN: 20 OINTMENT TOPICAL at 21:12

## 2021-04-27 RX ADMIN — SODIUM CHLORIDE 250 ML: 9 INJECTION, SOLUTION INTRAVENOUS at 14:24

## 2021-04-27 RX ADMIN — MIDAZOLAM HYDROCHLORIDE 2 MG: 1 INJECTION, SOLUTION INTRAMUSCULAR; INTRAVENOUS at 08:48

## 2021-04-27 RX ADMIN — Medication 10 ML: at 09:10

## 2021-04-27 RX ADMIN — METHYLPREDNISOLONE SODIUM SUCCINATE 40 MG: 40 INJECTION, POWDER, FOR SOLUTION INTRAMUSCULAR; INTRAVENOUS at 21:11

## 2021-04-27 RX ADMIN — PROPOFOL 60 MCG/KG/MIN: 10 INJECTION, EMULSION INTRAVENOUS at 03:41

## 2021-04-27 RX ADMIN — IPRATROPIUM BROMIDE AND ALBUTEROL SULFATE 1 AMPULE: 2.5; .5 SOLUTION RESPIRATORY (INHALATION) at 12:09

## 2021-04-27 RX ADMIN — ALBUTEROL SULFATE 2.5 MG: 2.5 SOLUTION RESPIRATORY (INHALATION) at 02:32

## 2021-04-27 RX ADMIN — MIDAZOLAM HYDROCHLORIDE 2 MG: 1 INJECTION, SOLUTION INTRAMUSCULAR; INTRAVENOUS at 19:57

## 2021-04-27 RX ADMIN — FAMOTIDINE 20 MG: 20 TABLET ORAL at 09:08

## 2021-04-27 RX ADMIN — PROPOFOL 55 MCG/KG/MIN: 10 INJECTION, EMULSION INTRAVENOUS at 00:03

## 2021-04-27 RX ADMIN — MIDAZOLAM HYDROCHLORIDE 2 MG: 1 INJECTION, SOLUTION INTRAMUSCULAR; INTRAVENOUS at 12:47

## 2021-04-27 RX ADMIN — PROPOFOL 55 MCG/KG/MIN: 10 INJECTION, EMULSION INTRAVENOUS at 12:47

## 2021-04-27 RX ADMIN — Medication 15 ML: at 21:13

## 2021-04-27 RX ADMIN — VANCOMYCIN HYDROCHLORIDE 1000 MG: 1 INJECTION, POWDER, LYOPHILIZED, FOR SOLUTION INTRAVENOUS at 13:08

## 2021-04-27 RX ADMIN — ACETAMINOPHEN 650 MG: 325 TABLET ORAL at 21:11

## 2021-04-27 RX ADMIN — VANCOMYCIN HYDROCHLORIDE 1000 MG: 1 INJECTION, POWDER, LYOPHILIZED, FOR SOLUTION INTRAVENOUS at 02:21

## 2021-04-27 ASSESSMENT — PULMONARY FUNCTION TESTS
PIF_VALUE: 32
PIF_VALUE: 31
PIF_VALUE: 32
PIF_VALUE: 39
PIF_VALUE: 31
PIF_VALUE: 33
PIF_VALUE: 39

## 2021-04-27 NOTE — PROGRESS NOTES
Shift reassesment completed. Propofol infusing at 55 mcg/kg/min. PRN versed given for agitation and vent compliance. RASS appears to be -2 at this time. Oral care and suctioning performed. No significant changes since previous assessment.     Electronically signed by Jian Larry RN on 4/27/2021 at 12:14 AM    Vitals:    04/27/21 0000   BP: 101/71   Pulse: 118   Resp: 30   Temp: 101.1 °F (38.4 °C)   SpO2: 97%

## 2021-04-27 NOTE — PROGRESS NOTES
Dr. Kayla Hannon rounded on pt with RT, pharmacy, case management and RN. Labs, imaging, medications discussed.     - Daily CBC added, begin tomorrow  - Continue current antibiotic therapy  - No Spontaneous breathing trial today

## 2021-04-27 NOTE — PROGRESS NOTES
04/27/21 0232   Vent Information   Vent Type 840   Vent Mode AC/PC   Vt Ordered 0 mL   Pressure Ordered 25   Rate Set 30 bmp   Peak Flow 0 L/min   Pressure Support 0 cmH20   FiO2  40 %   SpO2 99 %   SpO2/FiO2 ratio 247.5   Sensitivity 3   PEEP/CPAP 5   I Time/ I Time % 0.66 s   Humidification Source Heated wire   Humidification Temp 37   Humidification Temp Measured 37   Circuit Condensation Drained   Vent Patient Data   High Peep/I Pressure 30   Peak Inspiratory Pressure 39 cmH2O   Mean Airway Pressure 18 cmH20   Rate Measured 30 br/min   Vt Exhaled 372 mL   Minute Volume 13.7 Liters   I:E Ratio 1:2.00   Plateau Pressure 24 AUP18   Cough/Sputum   Sputum How Obtained Suctioned;Endotracheal   Cough Non-productive   Spontaneous Breathing Trial (SBT) RT Doc   Pulse 101   Breath Sounds   Right Upper Lobe Expiratory Wheezes   Right Middle Lobe Expiratory Wheezes   Right Lower Lobe Expiratory Wheezes   Left Upper Lobe Expiratory Wheezes   Left Lower Lobe Expiratory Wheezes   Additional Respiratory  Assessments   Resp 30   Position Semi-Woods's   Oral Care Completed? Yes   Oral Care Mouth suctioned   Subglottic Suction Done? Yes   Alarm Settings   High Pressure Alarm 45 cmH2O   Low Minute Volume Alarm 4 L/min   Apnea (secs) 20 secs   High Respiratory Rate 40 br/min   Low Exhaled Vt  250 mL   Non-Surgical Airway Endo Tracheal Tube   Placement Date/Time: 04/26/21 0556   Timeout: Patient;Procedure; Appropriate Equipment  Mask Ventilation: Ventilated by mask (1)  Placed By: Licensed provider  Inserted by: Dr Tristan Ang  Insertion attempts: 1  Airway Device: Endo Tracheal Tube  Size: 8   Secured at 24 cm   Measured From Lips   Secured Location Left   Secured By Commercial tube [email or web address]   Site Condition Dry

## 2021-04-27 NOTE — PROGRESS NOTES
Shift assessment completed, see flow sheet. RASS -1. Intubated/sedated, shakes head yes or no appropriately when roused.      Intubated and sedated on PC #8.0 ETT, at 24 LL. 30/ 450 / 35 %/ +5. SpO2 98%. Respirations are easy, even, and unlabored. Bilateral lung sounds : wheezes throughout bilateral lung fields.      VSS  ST on the monitor. OG in placed.      PICC/CVC/PIV, WNL      All lines and monitoring devices in place. Baugh is patent and secured. Bilateral soft wrist restraints in place for patient safety. Bed in lowest position with wheels locked. No needs expressed at this time.  Will continue to monitor

## 2021-04-27 NOTE — PROGRESS NOTES
RASS appears to be +1. Patient increasingly restless. , /80. PRN versed given, Propofol infusing at 60 mcg/kg/min.     Electronically signed by Jas Michael RN on 4/27/2021 at 2:53 AM

## 2021-04-27 NOTE — PROGRESS NOTES
Vancomycin Day: 2    Patient's labs, cultures, vitals, and vancomycin regimen reviewed. No changes today. trough in am  Tammie Browne 2/28/116042:54 PM  .

## 2021-04-27 NOTE — PLAN OF CARE
Problem: Falls - Risk of:  Goal: Will remain free from falls  Description: Will remain free from falls  Outcome: Ongoing  Goal: Absence of physical injury  Description: Absence of physical injury  Outcome: Ongoing     Problem: Pain:  Goal: Pain level will decrease  Description: Pain level will decrease  Outcome: Ongoing  Goal: Control of acute pain  Description: Control of acute pain  Outcome: Ongoing  Goal: Control of chronic pain  Description: Control of chronic pain  Outcome: Ongoing  Goal: Patient's pain/discomfort is manageable  Description: Patient's pain/discomfort is manageable  Outcome: Ongoing     Problem: Infection:  Goal: Will remain free from infection  Description: Will remain free from infection  Outcome: Ongoing     Problem: Safety:  Goal: Free from accidental physical injury  Description: Free from accidental physical injury  Outcome: Ongoing  Goal: Free from intentional harm  Description: Free from intentional harm  Outcome: Ongoing     Problem: Daily Care:  Goal: Daily care needs are met  Description: Daily care needs are met  Outcome: Ongoing     Problem: Skin Integrity:  Goal: Skin integrity will stabilize  Description: Skin integrity will stabilize  Outcome: Ongoing     Problem: Discharge Planning:  Goal: Patients continuum of care needs are met  Description: Patients continuum of care needs are met  Outcome: Ongoing     Problem: Non-Violent Restraints  Goal: Removal from restraints as soon as assessed to be safe  Outcome: Ongoing  Goal: No harm/injury to patient while restraints in use  Outcome: Ongoing  Goal: Patient's dignity will be maintained  Outcome: Ongoing     Problem: Nutrition  Goal: Optimal nutrition therapy  Outcome: Ongoing  Goal: Understanding of nutritional guidelines  Outcome: Ongoing

## 2021-04-27 NOTE — PROGRESS NOTES
IM Progress Note    Admit Date:  4/25/2021  2    Interval history:  Acute resp failure, was on bipap, intubated on 4/26   Remains on vent  Started TF  Fevers noted , off levo    Subjective:  Ms. Cy Mcintosh seen sedated on vent , hypotension post intubation improved and is off levophed         Objective:   /74   Pulse 98   Temp 100.1 °F (37.8 °C) (Bladder)   Resp 29   Ht 5' 4\" (1.626 m)   Wt 153 lb (69.4 kg)   LMP  (LMP Unknown)   SpO2 99%   BMI 26.26 kg/m²       Intake/Output Summary (Last 24 hours) at 4/27/2021 0730  Last data filed at 4/27/2021 0507  Gross per 24 hour   Intake 3767.5 ml   Output 1300 ml   Net 2467.5 ml       Physical Exam:        General: middle aged female on vent,   Oral ETT and OG noted  Right IJ TLC   . Appears to be not in any distress  Mucous Membranes:  Pink , anicteric  Neck: No JVD, no carotid bruit, no thyromegaly  Chest: diminished kelvin with scattered wheeze  Cardiovascular:  RRR S1S2 heard, no murmurs or gallops  Abdomen:  Soft, undistended, non tender, no organomegaly, BS present  Extremities: No edema or cyanosis.  Distal pulses well felt  Neurological : sedated        Medications:   Scheduled Medications:    chlorhexidine  15 mL Mouth/Throat BID    midazolam  2 mg Intravenous Once    mupirocin   Nasal BID    aspirin  81 mg Oral Daily    vancomycin  1,000 mg Intravenous Q12H    atorvastatin  40 mg Oral Nightly    levothyroxine  125 mcg Oral QAM AC    montelukast  10 mg Oral Nightly    sodium chloride flush  5-40 mL Intravenous 2 times per day    enoxaparin  40 mg Subcutaneous Daily    famotidine  20 mg Oral BID    cefTRIAXone (ROCEPHIN) IV  1,000 mg Intravenous Q24H    azithromycin  250 mg Oral Daily    methylPREDNISolone  40 mg Intravenous Q12H    ipratropium-albuterol  1 ampule Inhalation Q4H     I   propofol 55 mcg/kg/min (04/27/21 0504)    norepinephrine Stopped (04/26/21 1011)    sodium chloride 25 mL (04/25/21 0645)     midazolam, fentanNYL, albuterol sulfate HFA **AND** ipratropium **AND** MDI Treatment, sodium chloride flush, sodium chloride, polyethylene glycol, acetaminophen **OR** acetaminophen, ondansetron **OR** ondansetron, albuterol, ibuprofen    Lab Data:  Recent Labs     04/25/21  0252 04/26/21  0434   WBC 23.5* 21.3*   HGB 11.9* 11.5*   HCT 35.9* 35.1*   MCV 97.3 97.3    277     Recent Labs     04/26/21  0434 04/26/21  1651 04/27/21  0455   * 134* 132*   K 5.5* 4.6 4.3   CL 91* 95* 93*   CO2 36* 33* 33*   PHOS  --  2.5  --    BUN 38* 40* 35*   CREATININE 0.9 0.6 0.6     Recent Labs     04/25/21  0252   TROPONINI <0.01       Coagulation:   Lab Results   Component Value Date    INR 1.03 12/26/2019     Cardiac markers:   Lab Results   Component Value Date    TROPONINI <0.01 04/25/2021         Lab Results   Component Value Date    ALT 29 04/25/2021    AST 39 (H) 04/25/2021    ALKPHOS 77 04/25/2021    BILITOT <0.2 04/25/2021       Lab Results   Component Value Date    INR 1.03 12/26/2019    PROTIME 12.0 12/26/2019         CULTURES  COVID: not detected  Blood: pending     EKG:  I have reviewed the EKG with the following interpretation:   Sinus tachycardia, Nonspecific ST abnormality     RADIOLOGY  XR CHEST PORTABLE   Final Result   Pulmonary edema with bibasilar atelectasis versus pneumonia.                    cxr    Satisfactory position endotracheal tube and NG tube. No acute airspace disease. Pulmonary vessels upper normal.     Echo 8/25/2020   Summary   Limited imaging due to lung interface.  Parasternal images are unobtainable.   Normal left ventricular systolic function with an estimated ejection   fraction of 55-60%.   No regional wall motion abnormalities are seen.   Normal left ventricular diastolic filling pressure.   Normal systolic pulmonary artery pressure (SPAP) estimated at 20 mmHg (RA   pressure 3 mmHg).   No significant valvular heart disease.                 ASSESSMENT/PLAN:     #Acute on chronic hypoxic/hypercapnic respiratory failure-due to COPD exacerbation, Pneumonia  -Normally on home oxygen 3 L   -ABG on admission pH 7.2, PCO2 92  - admitted to ICU on BiPAP  - pulmonology consulted. - IV Solu-medrol, HHN , abx initiated   -- failed bipap and worsened leading to intubation and now on vent support  -Received vancomycin and Zosyn in the ED . Currently on Rocephin, Zithromax and vanc   - sputum pending           #Pneumonia, Gram positive organism  - IV Rocephin, Zithromax and vanc  - albuterol prn  -Follow-up on cultures        #Sepsis  -Present on admission  -With tachycardia and tachypnea, leukocytosis.    Secondary to pneumonia  Monitor  Improved BP and off levo     #Tobacco abuse  - smoking cessation needed     #Pulmonary Edema  - ECHO last year with normal EF and normal Diastolic function   - IV Lasix 40 mg as tolerated     #Leukocytosis-likely due to chronic steroids  -Trend WBC- remains high   -neg procalcitonin        #Hypothyroidism  - home dose of synthroid 125 mcg      #Hyperlipidemia  - on Atorvastatin.     DVT Prophylaxis: Lovenox  Diet: on TF  Code Status: Full Code          Kalee Ceballos MD 4/27/2021 7:30 AM

## 2021-04-27 NOTE — PROGRESS NOTES
4 Eyes Skin Assessment     The patient is being assess for   Shift Handoff    I agree that 2 RN's have performed a thorough Head to Toe Skin Assessment on the patient. ALL assessment sites listed below have been assessed. Areas assessed by both nurses:   [x]   Head, Face, and Ears   [x]   Shoulders, Back, and Chest, Abdomen  [x]   Arms, Elbows, and Hands   [x]   Coccyx, Sacrum, and Ischium  [x]   Legs, Feet, and Heels        Patient has new stage I to the left shin. Area is slow to teodora. **SHARE this note so that the co-signing nurse is able to place an eSignature**    Co-signer eSignature: Electronically signed by Lizy Kapoor RN on 4/27/21 at 7:01 PM EDT    Does the Patient have Skin Breakdown?   Yes LDA WOUND CARE was Initiated documentation include the Alis-wound, Wound Assessment, Measurements, Dressing Treatment, Drainage, and Color\",          Ron Prevention initiated:  Yes   Wound Care Orders initiated:  Yes      [id number]  nurse consulted for Pressure Injury (Stage 3,4, Unstageable, DTI, NWPT, Complex wounds)and New or Established Ostomies:  Yes      Primary Nurse eSignature: Electronically signed by Kamila Smith RN on 4/27/21 at 6:49 PM EDT

## 2021-04-27 NOTE — ACP (ADVANCE CARE PLANNING)
Advance Care Planning   Healthcare Decision Maker:    Primary Decision Maker: Simon Lin - Spouse - [phone]    Secondary Decision Maker: Prakash jJ - Child - [phone]    Click here to complete Healthcare Decision Makers including selection of the Healthcare Decision Maker Relationship (ie \"Primary\").

## 2021-04-27 NOTE — PROGRESS NOTES
Shift assessment completed. ETT at 24LL vent settings PC 30/i time 0.66/35%/+5. Expiratory wheezes bilaterally. Breathing is easy, even and unlabored. Propofol infusing at 50 mcg/kg/min. Rate increased from previous due to RASS of +1. Patient is calm, with eyes closed at this time. Did not follow commands such as squeeze hand/wiggle toes previously, but did calm down to voice. However, patient was increasingly restless. Environmental stimulus also reduced (curtains drawn, lights dimmed, TV off). RASS appears to be -1 at this time. Abdomen is soft and rounded to palpation. Bowel sounds are active and present x4. ST on monitor. Febrile at 101.5. VSS. Central line dressing is clean, dry, and intact.     Electronically signed by Kelly Romero RN on 4/26/2021 at 8:42 PM    Vitals:    04/26/21 2000   BP: 114/77   Pulse: 120   Resp: 27   Temp: 101.5 °F (38.6 °C)   SpO2: 91%

## 2021-04-27 NOTE — PROGRESS NOTES
Pulmonary & Critical Care Medicine ICU Progress Note    CC: Shortness of breath, respiratory failure    Events of Last 24 hours:   CBC  Levophed off with IV fluids  Fevers    Invasive Lines:  RIDOROTHY CVC 21 by me     MV: 2021  Vent Mode: AC/PC Rate Set: 30 bmp/Vt Ordered: 0 mL/ /FiO2 : 40 %  Recent Labs     21  0840 21  0500   PHART 7.246* 7.370   BIU6LXS 82.4* 55.8*   PO2ART 124.3* 108.2*       IV:   propofol 55 mcg/kg/min (21 0504)    norepinephrine Stopped (21 1011)    sodium chloride 25 mL (21 0645)       Vitals:  Blood pressure 114/74, pulse 98, temperature 100.1 °F (37.8 °C), temperature source Bladder, resp. rate 29, height 5' 4\" (1.626 m), weight 153 lb (69.4 kg), SpO2 99 %, not currently breastfeeding. on vent 40%  Temp  Av.2 °F (37.9 °C)  Min: 96.6 °F (35.9 °C)  Max: 101.5 °F (38.6 °C)    Intake/Output Summary (Last 24 hours) at 2021 0659  Last data filed at 2021 0507  Gross per 24 hour   Intake 3767.5 ml   Output 1360 ml   Net 2407.5 ml     EXAM:  General: intubated, ill appearing    ENT: Pharynx with ETT. Resp: No crackles. No wheezing. CV: S1, S2. No edema  GI: NT, ND, +BS  Skin: Warm and dry. Neuro: PERRL. Sedated, not following commands.  Patellar reflexes are symmetric     Scheduled Meds:   chlorhexidine  15 mL Mouth/Throat BID    midazolam  2 mg Intravenous Once    mupirocin   Nasal BID    aspirin  81 mg Oral Daily    vancomycin  1,000 mg Intravenous Q12H    atorvastatin  40 mg Oral Nightly    levothyroxine  125 mcg Oral QAM AC    montelukast  10 mg Oral Nightly    sodium chloride flush  5-40 mL Intravenous 2 times per day    enoxaparin  40 mg Subcutaneous Daily    famotidine  20 mg Oral BID    cefTRIAXone (ROCEPHIN) IV  1,000 mg Intravenous Q24H    azithromycin  250 mg Oral Daily    methylPREDNISolone  40 mg Intravenous Q12H    ipratropium-albuterol  1 ampule Inhalation Q4H     PRN Meds:  midazolam, fentanNYL, albuterol sulfate HFA **AND** ipratropium **AND** MDI Treatment, sodium chloride flush, sodium chloride, polyethylene glycol, acetaminophen **OR** acetaminophen, ondansetron **OR** ondansetron, albuterol, ibuprofen    Results:  CBC:   Recent Labs     04/25/21  0252 04/26/21  0434   WBC 23.5* 21.3*   HGB 11.9* 11.5*   HCT 35.9* 35.1*   MCV 97.3 97.3    277     BMP:   Recent Labs     04/26/21  0434 04/26/21  1651 04/27/21  0455   * 134* 132*   K 5.5* 4.6 4.3   CL 91* 95* 93*   CO2 36* 33* 33*   PHOS  --  2.5  --    BUN 38* 40* 35*   CREATININE 0.9 0.6 0.6     LIVER PROFILE:   Recent Labs     04/25/21  0252   AST 39*   ALT 29   BILITOT <0.2   ALKPHOS 77       Cultures:  4/25/2021 SARS-CoV-2 negative   4/25/2021 blood no growth to date  4/26/2021 respiratory culture sent     Films:  CXR: hyperinflated, small pleural effusions, CVC okay     ASSESSMENT:  · Acute on chronic respiratory failure with hypoxemia and hypercapnia - failed BiPAP with worsening hypercapnia, typically on 3 L home oxygen  · Community acquired pneumonia   · Septic shock  · COPD with AE  · Small pleural effusions  · Ongoing tobacco abuse    PLAN:  Mechanical ventilation as per my orders. The ventilator was adjusted by me at the bedside for unstable, life threatening respiratory failure. IV Propofol for sedation, target RASS -2, with daily spontaneous awakening trial.  Fentanyl PRN pain, gtt as needed  Head of bed 30 degrees or higher at all times  IV Solu-Medrol  Ceftriaxone, azithromycin D#3, vancomycin D#2  ICU care- lovenox, pepcid, bactroban  NAKIAK is spouse, I d/w him at bedside again today     Total critical care time caring for this patient with life threatening, unstable organ failure, including direct patient contact, management of life support systems, review of data including imaging and labs, discussions with other team members and physicians is 32 minutes so far today, excluding procedures.

## 2021-04-27 NOTE — PROGRESS NOTES
04/27/21 0755   Vent Patient Data   Plateau Pressure 28 [id number]   Static Compliance 16.52 mL/cmH2O   Dynamic Compliance 14.61 mL/cmH2O

## 2021-04-28 ENCOUNTER — APPOINTMENT (OUTPATIENT)
Dept: GENERAL RADIOLOGY | Age: 61
DRG: 870 | End: 2021-04-28
Payer: COMMERCIAL

## 2021-04-28 LAB
ANION GAP SERPL CALCULATED.3IONS-SCNC: 3 MMOL/L (ref 3–16)
BASE EXCESS ARTERIAL: 9.4 MMOL/L (ref -3–3)
BASOPHILS ABSOLUTE: 0.2 K/UL (ref 0–0.2)
BASOPHILS RELATIVE PERCENT: 1.2 %
BUN BLDV-MCNC: 30 MG/DL (ref 7–20)
CALCIUM SERPL-MCNC: 8.9 MG/DL (ref 8.3–10.6)
CARBOXYHEMOGLOBIN ARTERIAL: 0.3 % (ref 0–1.5)
CHLORIDE BLD-SCNC: 96 MMOL/L (ref 99–110)
CO2: 37 MMOL/L (ref 21–32)
CREAT SERPL-MCNC: <0.5 MG/DL (ref 0.6–1.2)
CULTURE, RESPIRATORY: ABNORMAL
EOSINOPHILS ABSOLUTE: 0 K/UL (ref 0–0.6)
EOSINOPHILS RELATIVE PERCENT: 0 %
GFR AFRICAN AMERICAN: >60
GFR NON-AFRICAN AMERICAN: >60
GLUCOSE BLD-MCNC: 135 MG/DL (ref 70–99)
GLUCOSE BLD-MCNC: 141 MG/DL (ref 70–99)
GLUCOSE BLD-MCNC: 156 MG/DL (ref 70–99)
GLUCOSE BLD-MCNC: 161 MG/DL (ref 70–99)
GLUCOSE BLD-MCNC: 175 MG/DL (ref 70–99)
GLUCOSE BLD-MCNC: 188 MG/DL (ref 70–99)
GRAM STAIN RESULT: ABNORMAL
HCO3 ARTERIAL: 37.6 MMOL/L (ref 21–29)
HCT VFR BLD CALC: 32.1 % (ref 36–48)
HEMOGLOBIN, ART, EXTENDED: 11.7 G/DL (ref 12–16)
HEMOGLOBIN: 10.6 G/DL (ref 12–16)
LYMPHOCYTES ABSOLUTE: 0.5 K/UL (ref 1–5.1)
LYMPHOCYTES RELATIVE PERCENT: 3.3 %
MCH RBC QN AUTO: 32.3 PG (ref 26–34)
MCHC RBC AUTO-ENTMCNC: 33.2 G/DL (ref 31–36)
MCV RBC AUTO: 97.1 FL (ref 80–100)
METHEMOGLOBIN ARTERIAL: 0.3 %
MONOCYTES ABSOLUTE: 0.8 K/UL (ref 0–1.3)
MONOCYTES RELATIVE PERCENT: 5.8 %
NEUTROPHILS ABSOLUTE: 12.9 K/UL (ref 1.7–7.7)
NEUTROPHILS RELATIVE PERCENT: 89.7 %
O2 CONTENT ARTERIAL: 16 ML/DL
O2 SAT, ARTERIAL: 94.5 %
O2 THERAPY: ABNORMAL
ORGANISM: ABNORMAL
PCO2 ARTERIAL: 72.5 MMHG (ref 35–45)
PDW BLD-RTO: 14.3 % (ref 12.4–15.4)
PERFORMED ON: ABNORMAL
PH ARTERIAL: 7.33 (ref 7.35–7.45)
PLATELET # BLD: 242 K/UL (ref 135–450)
PMV BLD AUTO: 7.9 FL (ref 5–10.5)
PO2 ARTERIAL: 79.4 MMHG (ref 75–108)
POTASSIUM REFLEX MAGNESIUM: 4.9 MMOL/L (ref 3.5–5.1)
RBC # BLD: 3.3 M/UL (ref 4–5.2)
SODIUM BLD-SCNC: 136 MMOL/L (ref 136–145)
TCO2 ARTERIAL: 39.9 MMOL/L
TRIGL SERPL-MCNC: 172 MG/DL (ref 0–150)
VANCOMYCIN TROUGH: 12.5 UG/ML (ref 10–20)
WBC # BLD: 14.4 K/UL (ref 4–11)

## 2021-04-28 PROCEDURE — 6360000002 HC RX W HCPCS: Performed by: INTERNAL MEDICINE

## 2021-04-28 PROCEDURE — 36415 COLL VENOUS BLD VENIPUNCTURE: CPT

## 2021-04-28 PROCEDURE — 6370000000 HC RX 637 (ALT 250 FOR IP): Performed by: INTERNAL MEDICINE

## 2021-04-28 PROCEDURE — 94761 N-INVAS EAR/PLS OXIMETRY MLT: CPT

## 2021-04-28 PROCEDURE — 85025 COMPLETE CBC W/AUTO DIFF WBC: CPT

## 2021-04-28 PROCEDURE — 2000000000 HC ICU R&B

## 2021-04-28 PROCEDURE — 94640 AIRWAY INHALATION TREATMENT: CPT

## 2021-04-28 PROCEDURE — 84478 ASSAY OF TRIGLYCERIDES: CPT

## 2021-04-28 PROCEDURE — 71045 X-RAY EXAM CHEST 1 VIEW: CPT

## 2021-04-28 PROCEDURE — 2580000003 HC RX 258: Performed by: INTERNAL MEDICINE

## 2021-04-28 PROCEDURE — 80048 BASIC METABOLIC PNL TOTAL CA: CPT

## 2021-04-28 PROCEDURE — 99291 CRITICAL CARE FIRST HOUR: CPT | Performed by: INTERNAL MEDICINE

## 2021-04-28 PROCEDURE — 80202 ASSAY OF VANCOMYCIN: CPT

## 2021-04-28 PROCEDURE — 82803 BLOOD GASES ANY COMBINATION: CPT

## 2021-04-28 PROCEDURE — 94003 VENT MGMT INPAT SUBQ DAY: CPT

## 2021-04-28 PROCEDURE — 2500000003 HC RX 250 WO HCPCS: Performed by: INTERNAL MEDICINE

## 2021-04-28 PROCEDURE — 99232 SBSQ HOSP IP/OBS MODERATE 35: CPT | Performed by: INTERNAL MEDICINE

## 2021-04-28 PROCEDURE — 2700000000 HC OXYGEN THERAPY PER DAY

## 2021-04-28 RX ORDER — DEXTROSE MONOHYDRATE 50 MG/ML
100 INJECTION, SOLUTION INTRAVENOUS PRN
Status: DISCONTINUED | OUTPATIENT
Start: 2021-04-28 | End: 2021-05-14 | Stop reason: HOSPADM

## 2021-04-28 RX ORDER — NICOTINE POLACRILEX 4 MG
15 LOZENGE BUCCAL PRN
Status: DISCONTINUED | OUTPATIENT
Start: 2021-04-28 | End: 2021-05-14 | Stop reason: HOSPADM

## 2021-04-28 RX ORDER — DEXTROSE MONOHYDRATE 25 G/50ML
12.5 INJECTION, SOLUTION INTRAVENOUS PRN
Status: DISCONTINUED | OUTPATIENT
Start: 2021-04-28 | End: 2021-05-14 | Stop reason: HOSPADM

## 2021-04-28 RX ADMIN — ALBUTEROL SULFATE 2.5 MG: 2.5 SOLUTION RESPIRATORY (INHALATION) at 23:44

## 2021-04-28 RX ADMIN — PROPOFOL 50 MCG/KG/MIN: 10 INJECTION, EMULSION INTRAVENOUS at 18:28

## 2021-04-28 RX ADMIN — MONTELUKAST SODIUM 10 MG: 10 TABLET, COATED ORAL at 20:12

## 2021-04-28 RX ADMIN — IPRATROPIUM BROMIDE AND ALBUTEROL SULFATE 1 AMPULE: 2.5; .5 SOLUTION RESPIRATORY (INHALATION) at 16:18

## 2021-04-28 RX ADMIN — IPRATROPIUM BROMIDE AND ALBUTEROL SULFATE 1 AMPULE: 2.5; .5 SOLUTION RESPIRATORY (INHALATION) at 23:39

## 2021-04-28 RX ADMIN — FAMOTIDINE 20 MG: 20 TABLET ORAL at 07:52

## 2021-04-28 RX ADMIN — VANCOMYCIN HYDROCHLORIDE 1000 MG: 1 INJECTION, POWDER, LYOPHILIZED, FOR SOLUTION INTRAVENOUS at 01:13

## 2021-04-28 RX ADMIN — Medication 25 MCG/HR: at 11:12

## 2021-04-28 RX ADMIN — Medication 15 ML: at 07:51

## 2021-04-28 RX ADMIN — PROPOFOL 75 MCG/KG/MIN: 10 INJECTION, EMULSION INTRAVENOUS at 01:51

## 2021-04-28 RX ADMIN — PROPOFOL 70 MCG/KG/MIN: 10 INJECTION, EMULSION INTRAVENOUS at 11:02

## 2021-04-28 RX ADMIN — PROPOFOL 70 MCG/KG/MIN: 10 INJECTION, EMULSION INTRAVENOUS at 04:40

## 2021-04-28 RX ADMIN — IPRATROPIUM BROMIDE AND ALBUTEROL SULFATE 1 AMPULE: 2.5; .5 SOLUTION RESPIRATORY (INHALATION) at 08:26

## 2021-04-28 RX ADMIN — PROPOFOL 55 MCG/KG/MIN: 10 INJECTION, EMULSION INTRAVENOUS at 14:13

## 2021-04-28 RX ADMIN — Medication 15 ML: at 20:13

## 2021-04-28 RX ADMIN — FENTANYL CITRATE 25 MCG: 0.05 INJECTION, SOLUTION INTRAMUSCULAR; INTRAVENOUS at 09:51

## 2021-04-28 RX ADMIN — MUPIROCIN: 20 OINTMENT TOPICAL at 20:12

## 2021-04-28 RX ADMIN — IPRATROPIUM BROMIDE AND ALBUTEROL SULFATE 1 AMPULE: 2.5; .5 SOLUTION RESPIRATORY (INHALATION) at 19:54

## 2021-04-28 RX ADMIN — ASPIRIN 81 MG: 81 TABLET, CHEWABLE ORAL at 07:52

## 2021-04-28 RX ADMIN — MUPIROCIN: 20 OINTMENT TOPICAL at 08:14

## 2021-04-28 RX ADMIN — INSULIN LISPRO 2 UNITS: 100 INJECTION, SOLUTION INTRAVENOUS; SUBCUTANEOUS at 16:33

## 2021-04-28 RX ADMIN — ATORVASTATIN CALCIUM 40 MG: 40 TABLET, FILM COATED ORAL at 20:12

## 2021-04-28 RX ADMIN — CEFTRIAXONE SODIUM 1000 MG: 1 INJECTION, POWDER, FOR SOLUTION INTRAMUSCULAR; INTRAVENOUS at 06:09

## 2021-04-28 RX ADMIN — AZITHROMYCIN 250 MG: 250 TABLET, FILM COATED ORAL at 07:52

## 2021-04-28 RX ADMIN — IPRATROPIUM BROMIDE AND ALBUTEROL SULFATE 1 AMPULE: 2.5; .5 SOLUTION RESPIRATORY (INHALATION) at 11:58

## 2021-04-28 RX ADMIN — METHYLPREDNISOLONE SODIUM SUCCINATE 40 MG: 40 INJECTION, POWDER, FOR SOLUTION INTRAMUSCULAR; INTRAVENOUS at 07:52

## 2021-04-28 RX ADMIN — METHYLPREDNISOLONE SODIUM SUCCINATE 40 MG: 40 INJECTION, POWDER, FOR SOLUTION INTRAMUSCULAR; INTRAVENOUS at 20:12

## 2021-04-28 RX ADMIN — ALBUTEROL SULFATE 2.5 MG: 2.5 SOLUTION RESPIRATORY (INHALATION) at 20:04

## 2021-04-28 RX ADMIN — ALBUTEROL SULFATE 2.5 MG: 2.5 SOLUTION RESPIRATORY (INHALATION) at 08:33

## 2021-04-28 RX ADMIN — MIDAZOLAM HYDROCHLORIDE 2 MG: 1 INJECTION, SOLUTION INTRAMUSCULAR; INTRAVENOUS at 18:22

## 2021-04-28 RX ADMIN — Medication 10 ML: at 20:13

## 2021-04-28 RX ADMIN — Medication 10 ML: at 07:53

## 2021-04-28 RX ADMIN — IPRATROPIUM BROMIDE AND ALBUTEROL SULFATE 1 AMPULE: 2.5; .5 SOLUTION RESPIRATORY (INHALATION) at 02:40

## 2021-04-28 RX ADMIN — FAMOTIDINE 20 MG: 20 TABLET ORAL at 20:12

## 2021-04-28 RX ADMIN — LEVOTHYROXINE SODIUM 125 MCG: 25 TABLET ORAL at 07:52

## 2021-04-28 RX ADMIN — ENOXAPARIN SODIUM 40 MG: 40 INJECTION SUBCUTANEOUS at 07:52

## 2021-04-28 RX ADMIN — PROPOFOL 50 MCG/KG/MIN: 10 INJECTION, EMULSION INTRAVENOUS at 23:14

## 2021-04-28 ASSESSMENT — PULMONARY FUNCTION TESTS
PIF_VALUE: 34
PIF_VALUE: 31
PIF_VALUE: 35
PIF_VALUE: 34
PIF_VALUE: 35
PIF_VALUE: 31
PIF_VALUE: 35
PIF_VALUE: 35
PIF_VALUE: 34
PIF_VALUE: 36
PIF_VALUE: 34
PIF_VALUE: 37
PIF_VALUE: 31
PIF_VALUE: 31
PIF_VALUE: 35
PIF_VALUE: 32
PIF_VALUE: 34
PIF_VALUE: 34
PIF_VALUE: 31
PIF_VALUE: 34
PIF_VALUE: 31

## 2021-04-28 NOTE — PROGRESS NOTES
RESPIRATORY THERAPY ASSESSMENT     Name:  Anabelle Stark Record Number:  [id number]  Age: 56 y.o. Gender: female  : 1960  Today's Date:  2021  Room:  [address] [place] [address]-     Assessment      Is the patient being admitted for a COPD or Asthma exacerbation?  Yes   (If yes the patient will be seen every 4 hours for the first 24 hours and then reassessed)     Patient Admission Diagnosis        Allergies        Allergies   Allergen Reactions    Promethazine Other (See Comments)       Extreme confusion (1/3/20)    Codeine Swelling         Minimum Predicted Vital Capacity:                Actual Vital Capacity:                       Pulmonary History:COPD  Home Oxygen Therapy:  3.5 liters/min via nasal cannula  Home Respiratory Therapy:Albuterol Q6PRN  Current Respiratory Therapy:  dUONEB q4w/a and albuterol Q2prn  Treatment Type: HHN  Medications: Albuterol/Ipratropium     Respiratory Severity Index(RSI)   Patients with orders for inhalation medications, oxygen, or any therapeutic treatment modality will be placed on Respiratory Protocol. They will be assessed with the first treatment and at least every 72 hours thereafter. The following severity scale will be used to determine frequency of treatment intervention.     Smoking History: Severe Exacerbation = 4    Social History  Social History            Tobacco Use    Smoking status: Former Smoker       Packs/day: 0.50       Years: 44.00       Pack years: 22.00       Types: Cigarettes       Quit date: 2020       Years since quittin.7    Smokeless tobacco: Never Used   Substance Use Topics    Alcohol use: Yes       Comment: rarely    Drug use:  No         Recent Surgical History: None = 0  Past Surgical History  Past Surgical History         Past Surgical History:   Procedure Laterality Date     SECTION         x2    CHOLECYSTECTOMY                Level of Consciousness: Alert, Oriented, and Cooperative = 0     Level of Activity: Non weight bearing- transfers bed to chair only = 3     Respiratory Pattern: Use of accessory muscles;prolonged expiration; or RR greater than 30 = 3     Breath Sounds: Absent bilaterally and/or with wheezes = 3     Sputum   ,  ,    Cough: Strong, spontaneous, non-productive = 0     Vital Signs   BP (!) 147/94   Pulse 102   Temp 97.8 °F (36.6 °C) (Axillary)   Resp 22   Ht 5' 4\" (1.626 m)   Wt 150 lb 6.4 oz (68.2 kg)   LMP  (LMP Unknown)   SpO2 96%   BMI 25.82 kg/m²   SPO2 (COPD values may differ): 86-87% on room air or greater than 92% on FiO2 35- 50% = 3     Peak Flow (asthma only): not applicable = 0     RSI: 16-17 = Q4 (every four hours)           Plan         Goals: medication delivery      Patient/caregiver was educated on the proper method of use for Respiratory Care Devices: Yes       Level of patient/caregiver understanding able to:   ? Verbalize understanding   ? Demonstrate understanding       ? Teach back        ? Needs reinforcement       ? No available caregiver               ? Other:     Response to education:  Good      Is patient being placed on Home Treatment Regimen? No      Does the patient have everything they need prior to discharge? N/A      Comments: chart reviewed, pt assessed     Plan of Care: duoneb Q4         Respiratory Protocol Guidelines      1. Assessment and treatment by Respiratory Therapy will be initiated for medication and therapeutic interventions upon initiation of aerosolized medication. 2. Physician will be contacted for respiratory rate (RR) greater than 35 breaths per minute. Therapy will be held for heart rate (HR) greater than 140 beats per minute, pending direction from physician. 3. Bronchodilators will be administered via Metered Dose Inhaler (MDI) with spacer when the following criteria are met:  a. Alert and cooperative     b. HR < 140 bpm  c. RR < 30 bpm                d. Can demonstrate a 2-3 second inspiratory hold  4.  Bronchodilators will be administered via Hand Held Nebulizer [facility]) to patients when ANY of the following criteria are met  a. Incognizant or uncooperative          b. Patients treated with HHN at Home        c. Unable to demonstrate proper use of MDI with spacer     d. RR > 30 bpm   5. Bronchodilators will be delivered via Metered Dose Inhaler (MDI), HHN, Aerogen to intubated patients on mechanical ventilation. 6. Inhalation medication orders will be delivered and/or substituted as outlined below.     Aerosolized Medications Ordering and Administration Guidelines:     1. All Medications will be ordered by a physician, and their frequency and/or modality will be adjusted as defined by the patients Respiratory Severity Index (RSI) score. 2. If the patient does not have documented COPD, consider discontinuing anticholinergics when RSI is less than 9.  3. If the bronchospasm worsens (increased RSI), then the bronchodilator frequency can be increased to a maximum of every 4 hours. If greater than every 4 hours is required, the physician will be contacted. 4. If the bronchospasm improves, the frequency of the bronchodilator can be decreased, based on the patient's RSI, but not less than home treatment regimen frequency. 5. Bronchodilator(s) will be discontinued if patient has a RSI less than 9 and has received no scheduled or as needed treatment for 72  Hrs.     Patients Ordered on a Mucolytic Agent:     1. Must always be administered with a bronchodilator.     2. Discontinue if patient experiences worsened bronchospasm, or secretions have lessened to the point that the patient is able to clear them with a cough.     Anti-inflammatory and Combination Medications:     1.  If the patient lacks prior history of lung disease, is not using inhaled anti-inflammatory medication at home, and lacks wheezing by examination or by history for at least 24 hours, contact physician for possible discontinuation.

## 2021-04-28 NOTE — PROGRESS NOTES
Care rounds completed with Dr Durga Thornton and multidisciplinary team.  Reviewed labs, meds, VS (temp/HR/RR), I/O's, assessment, & plan of care for today. See progress note & new orders for details. Dr hernandes  who is at bedside.   Lillian Pan RN

## 2021-04-28 NOTE — PROGRESS NOTES
Shift reassessment completed. Propofol infusing at 75 mcg/kg/min. Versed infusing at 1 mg/h. Dr. Neno Malik notified of critical ABG PCO2 value. RASS appears to be -1 at this time. No additional changes noted at this time. See flowsheet for documentation.     Electronically signed by Julissa Holder RN on 4/28/2021 at 5:30 AM

## 2021-04-28 NOTE — PROGRESS NOTES
Shift assessment completed.     ETT at 24LL vent settings PC 26/i time 0.66/40%/+8. Expiratory wheezes bilaterally. Breathing is easy, even and unlabored. Oxygen saturation in the 90's.     Propofol infusing at 70 mcg/kg/min. RASS appears to be -1 at this time. Previous RASS score of +1. Patient increasingly restless and not compliant with vent. PRN versed given. Environmental stimulus also reduced (curtains drawn, lights dimmed, TV off).    Abdomen is soft and rounded to palpation. Bowel sounds are active and present x4.     ST on monitor with HR in the 110's to 120's. Febrile at 100. VSS.      Central line dressing is clean, dry, and intact.     Electronically signed by Jas Michael RN on 4/27/2021 at 8:58 PM    Vitals:    04/27/21 2000   BP: (!) 148/78   Pulse: 119   Resp: (!) 32   Temp: 100 °F (37.8 °C)   SpO2: 95%

## 2021-04-28 NOTE — PROGRESS NOTES
Vancomycin trough = 12.5 mcg/mL at 0020 on 4/28. Result slightly below desired level, but will continue current dosing regimen of Vancomycin 1000 mg every 12 hours due to current condition. Next vancomycin trough ordered for 0030 on 4/30/21. Pharmacy will continue to monitor labs daily and adjust as necessary.   Alfred Ignacio [facility]

## 2021-04-28 NOTE — PROGRESS NOTES
Initial exam completed- See doc flowsheet for assessment findings. Pt resting quietly in bed and withdraws some to pain but does not open eyes or follow commands . All lines and monitoring devices are in place . Vitals and SpO2 stable. Call light is within easy reach. Plan of care and goals reviewed.  Will attempt to wean diprivan gtt down and versed gtt up as tolerated   Reynaldo Roberts RN

## 2021-04-28 NOTE — PROGRESS NOTES
IM Progress Note    Admit Date:  4/25/2021  3    Interval history:  Acute resp failure, was on bipap, intubated on 4/26   Remains on vent  Started TF  Fevers noted , off levo  Pco2 remains high    Subjective:  Ms. Manfred Lawrence seen sedated on vent , hypotension post intubation improved and is off levophed         Objective:   BP (!) 175/76   Pulse 98   Temp 98.9 °F (37.2 °C) (Bladder)   Resp 26   Ht 5' 4\" (1.626 m)   Wt 153 lb (69.4 kg)   LMP  (LMP Unknown)   SpO2 98%   BMI 26.26 kg/m²       Intake/Output Summary (Last 24 hours) at 4/28/2021 0740  Last data filed at 4/28/2021 0500  Gross per 24 hour   Intake 2632 ml   Output 2665 ml   Net -33 ml       Physical Exam:        General: middle aged female on vent,   Oral ETT and OG noted  Right IJ TLC   . Appears to be not in any distress  Mucous Membranes:  Pink , anicteric  Neck: No JVD, no carotid bruit, no thyromegaly  Chest: diminished kelvin with resolving  wheeze  Cardiovascular:  RRR S1S2 heard, no murmurs or gallops  Abdomen:  Soft, undistended, non tender, no organomegaly, BS present  Extremities: No edema or cyanosis.  Distal pulses well felt  Neurological : sedated        Medications:   Scheduled Medications:    chlorhexidine  15 mL Mouth/Throat BID    midazolam  2 mg Intravenous Once    mupirocin   Nasal BID    aspirin  81 mg Oral Daily    vancomycin  1,000 mg Intravenous Q12H    atorvastatin  40 mg Oral Nightly    levothyroxine  125 mcg Oral QAM AC    montelukast  10 mg Oral Nightly    sodium chloride flush  5-40 mL Intravenous 2 times per day    enoxaparin  40 mg Subcutaneous Daily    famotidine  20 mg Oral BID    cefTRIAXone (ROCEPHIN) IV  1,000 mg Intravenous Q24H    azithromycin  250 mg Oral Daily    methylPREDNISolone  40 mg Intravenous Q12H    ipratropium-albuterol  1 ampule Inhalation Q4H     I   midazolam 1 mg/hr (04/27/21 4112)    propofol 75 mcg/kg/min (04/28/21 1737)    norepinephrine Stopped (04/26/21 1011)    sodium chloride 25 mL (04/25/21 0645)     midazolam, fentanNYL, albuterol sulfate HFA **AND** ipratropium **AND** MDI Treatment, sodium chloride flush, sodium chloride, polyethylene glycol, acetaminophen **OR** acetaminophen, ondansetron **OR** ondansetron, albuterol, ibuprofen    Lab Data:  Recent Labs     04/26/21  0434 04/28/21  0506   WBC 21.3* 14.4*   HGB 11.5* 10.6*   HCT 35.1* 32.1*   MCV 97.3 97.1    242     Recent Labs     04/26/21  1651 04/27/21  0455 04/28/21  0506   * 132* 136   K 4.6 4.3 4.9   CL 95* 93* 96*   CO2 33* 33* 37*   PHOS 2.5  --   --    BUN 40* 35* 30*   CREATININE 0.6 0.6 <0.5*     No results for input(s): CKTOTAL, CKMB, CKMBINDEX, TROPONINI in the last 72 hours. Coagulation:   Lab Results   Component Value Date    INR 1.03 12/26/2019     Cardiac markers:   Lab Results   Component Value Date    TROPONINI <0.01 04/25/2021         Lab Results   Component Value Date    ALT 29 04/25/2021    AST 39 (H) 04/25/2021    ALKPHOS 77 04/25/2021    BILITOT <0.2 04/25/2021       Lab Results   Component Value Date    INR 1.03 12/26/2019    PROTIME 12.0 12/26/2019         CULTURES  COVID: not detected  Blood: pending     EKG:  I have reviewed the EKG with the following interpretation:   Sinus tachycardia, Nonspecific ST abnormality     RADIOLOGY  XR CHEST PORTABLE   Final Result   Pulmonary edema with bibasilar atelectasis versus pneumonia.                    cxr    Satisfactory position endotracheal tube and NG tube. No acute airspace disease. Pulmonary vessels upper normal.     Echo 8/25/2020   Summary   Limited imaging due to lung interface.  Parasternal images are unobtainable.   Normal left ventricular systolic function with an estimated ejection   fraction of 55-60%.   No regional wall motion abnormalities are seen.   Normal left ventricular diastolic filling pressure.   Normal systolic pulmonary artery pressure (SPAP) estimated at 20 mmHg (RA   pressure 3 mmHg).   No significant valvular heart disease.                 ASSESSMENT/PLAN:     #Acute on chronic hypoxic/hypercapnic respiratory failure-due to COPD exacerbation, Pneumonia  -Normally on home oxygen 3 L   -ABG on admission pH 7.2, PCO2 92  - admitted to ICU on BiPAP  - pulmonology consulted. - IV Solu-medrol, HHN , abx initiated   -- failed bipap and worsened leading to intubation and now on vent support  -Received vancomycin and Zosyn in the ED . Currently on Rocephin, Zithromax and vanc   - sputum pending           #Pneumonia, Gram positive organism  - IV Rocephin, Zithromax and vanc  - albuterol prn  -Follow-up on cultures        #Sepsis  -Present on admission  -With tachycardia and tachypnea, leukocytosis.    Secondary to pneumonia  Monitor  Improved BP and off levo     #Tobacco abuse  - smoking cessation needed     #Pulmonary Edema  - ECHO last year with normal EF and normal Diastolic function   - IV Lasix 40 mg as tolerated     #Leukocytosis-likely due to chronic steroids  -Trend WBC- remains high   -neg procalcitonin        #Hypothyroidism  - home dose of synthroid 125 mcg      #Hyperlipidemia  - on Atorvastatin.     DVT Prophylaxis: Lovenox  Diet: on TF  Code Status: Full Code          Zee Moncada MD 4/28/2021 7:40 AM

## 2021-04-28 NOTE — PROGRESS NOTES
Shift reassessment completed. BP and HR decreased after PRN versed previously given. RASS appears to be 0. BP increasing to 140's/70's, HR increasing to 110's. Patient starting to become restless and biting ETT tube. Versed started per eMar orders. Propofol infusing at 75 mcg/kg/min. Versed infusing at 1 mg/h. PRN tylenol given previously. Patient currently febrile at 100.2 degrees Farenheit. No additional changes noted at this time.     Electronically signed by Aly Douglas RN on 4/27/2021 at 11:58 PM

## 2021-04-28 NOTE — PROGRESS NOTES
RASS appears to be +1. Patient biting on ETT tube and increasingly restless. Simulation reduced in room. . , /83. Propofol infusing at 75 mcg/kg/min. PRN versed given    PRN tylenol given for temperature 100.4 degrees Farenheit.     Electronically signed by Divya Ervin RN on 4/27/2021 at 9:20 PM

## 2021-04-28 NOTE — PROGRESS NOTES
This note also relates to the following rows which could not be included:  Vt Ordered - Cannot attach notes to unvalidated device data  Rate Set - Cannot attach notes to unvalidated device data  Peak Flow - Cannot attach notes to unvalidated device data  Pressure Support - Cannot attach notes to unvalidated device data  Sensitivity - Cannot attach notes to unvalidated device data  PEEP/CPAP - Cannot attach notes to unvalidated device data  I Time/ I Time % - Cannot attach notes to unvalidated device data  High Peep/I Pressure - Cannot attach notes to unvalidated device data  Peak Inspiratory Pressure - Cannot attach notes to unvalidated device data  Mean Airway Pressure - Cannot attach notes to unvalidated device data  Rate Measured - Cannot attach notes to unvalidated device data  Vt Exhaled - Cannot attach notes to unvalidated device data  Minute Volume - Cannot attach notes to unvalidated device data  I:E Ratio - Cannot attach notes to unvalidated device data  Pulse - Cannot attach notes to unvalidated device data  High Pressure Alarm - Cannot attach notes to unvalidated device data  Low Minute Volume Alarm - Cannot attach notes to unvalidated device data  High Respiratory Rate - Cannot attach notes to unvalidated device data       04/27/21 2323   Vent Information   Vent Type 840   Vent Mode AC/VC   FiO2  40 %   SpO2 96 %   SpO2/FiO2 ratio 240   Humidification Source Heated wire   Humidification Temp 37   Humidification Temp Measured 37   Circuit Condensation Drained   Vent Patient Data   Plateau Pressure 21 NFC88   Cough/Sputum   Sputum How Obtained Endotracheal;Suctioned   Cough Productive   Sputum Amount Small   Sputum Color Creamy   Tenacity Thick   Breath Sounds   Right Upper Lobe Expiratory Wheezes   Right Middle Lobe Expiratory Wheezes   Right Lower Lobe Expiratory Wheezes   Left Upper Lobe Expiratory Wheezes   Left Lower Lobe Expiratory Wheezes   Additional Respiratory  Assessments   Resp 30   Position Semi-Woods's   Alarm Settings   Apnea (secs) 20 secs   Low Exhaled Vt  250 mL   Non-Surgical Airway Endo Tracheal Tube   Placement Date/Time: 04/26/21 7396   Timeout: Patient;Procedure; Appropriate Equipment  Mask Ventilation: Ventilated by mask (1)  Placed By: Licensed provider  Inserted by: Dr Lizzy Chávez  Insertion attempts: 1  Airway Device: Endo Tracheal Tube  Size: 8   Secured at 24 cm   Measured From PAUL Lundberg  (moved to center)   Secured By Commercial tube [email or web address]   Site Condition [employer]

## 2021-04-28 NOTE — PROGRESS NOTES
Shift report given to Rainell Heimlich, RN. Patient care handed off in stable condition at this time.

## 2021-04-29 ENCOUNTER — APPOINTMENT (OUTPATIENT)
Dept: GENERAL RADIOLOGY | Age: 61
DRG: 870 | End: 2021-04-29
Payer: COMMERCIAL

## 2021-04-29 LAB
ALBUMIN SERPL-MCNC: 3.8 G/DL (ref 3.4–5)
ANION GAP SERPL CALCULATED.3IONS-SCNC: 2 MMOL/L (ref 3–16)
ANION GAP SERPL CALCULATED.3IONS-SCNC: 5 MMOL/L (ref 3–16)
BASE EXCESS ARTERIAL: 10.9 MMOL/L (ref -3–3)
BASE EXCESS ARTERIAL: 12.4 MMOL/L (ref -3–3)
BASOPHILS ABSOLUTE: 0.1 K/UL (ref 0–0.2)
BASOPHILS RELATIVE PERCENT: 0.4 %
BLOOD CULTURE, ROUTINE: NORMAL
BUN BLDV-MCNC: 36 MG/DL (ref 7–20)
BUN BLDV-MCNC: 42 MG/DL (ref 7–20)
CALCIUM SERPL-MCNC: 8.9 MG/DL (ref 8.3–10.6)
CALCIUM SERPL-MCNC: 9 MG/DL (ref 8.3–10.6)
CARBOXYHEMOGLOBIN ARTERIAL: 0.3 % (ref 0–1.5)
CARBOXYHEMOGLOBIN ARTERIAL: 0.3 % (ref 0–1.5)
CHLORIDE BLD-SCNC: 93 MMOL/L (ref 99–110)
CHLORIDE BLD-SCNC: 97 MMOL/L (ref 99–110)
CO2: 39 MMOL/L (ref 21–32)
CO2: 41 MMOL/L (ref 21–32)
CREAT SERPL-MCNC: 0.6 MG/DL (ref 0.6–1.2)
CREAT SERPL-MCNC: <0.5 MG/DL (ref 0.6–1.2)
CULTURE, BLOOD 2: NORMAL
EOSINOPHILS ABSOLUTE: 0 K/UL (ref 0–0.6)
EOSINOPHILS RELATIVE PERCENT: 0 %
GFR AFRICAN AMERICAN: >60
GFR AFRICAN AMERICAN: >60
GFR NON-AFRICAN AMERICAN: >60
GFR NON-AFRICAN AMERICAN: >60
GLUCOSE BLD-MCNC: 128 MG/DL (ref 70–99)
GLUCOSE BLD-MCNC: 131 MG/DL (ref 70–99)
GLUCOSE BLD-MCNC: 132 MG/DL (ref 70–99)
GLUCOSE BLD-MCNC: 143 MG/DL (ref 70–99)
GLUCOSE BLD-MCNC: 144 MG/DL (ref 70–99)
GLUCOSE BLD-MCNC: 145 MG/DL (ref 70–99)
GLUCOSE BLD-MCNC: 147 MG/DL (ref 70–99)
GLUCOSE BLD-MCNC: 148 MG/DL (ref 70–99)
HCO3 ARTERIAL: 38.3 MMOL/L (ref 21–29)
HCO3 ARTERIAL: 41.9 MMOL/L (ref 21–29)
HCT VFR BLD CALC: 29.6 % (ref 36–48)
HEMOGLOBIN, ART, EXTENDED: 10.7 G/DL (ref 12–16)
HEMOGLOBIN, ART, EXTENDED: 11.2 G/DL (ref 12–16)
HEMOGLOBIN: 9.9 G/DL (ref 12–16)
LYMPHOCYTES ABSOLUTE: 0.7 K/UL (ref 1–5.1)
LYMPHOCYTES RELATIVE PERCENT: 5.5 %
MCH RBC QN AUTO: 32.4 PG (ref 26–34)
MCHC RBC AUTO-ENTMCNC: 33.3 G/DL (ref 31–36)
MCV RBC AUTO: 97.4 FL (ref 80–100)
METHEMOGLOBIN ARTERIAL: 0 %
METHEMOGLOBIN ARTERIAL: 0.1 %
MONOCYTES ABSOLUTE: 1.1 K/UL (ref 0–1.3)
MONOCYTES RELATIVE PERCENT: 8.7 %
NEUTROPHILS ABSOLUTE: 10.7 K/UL (ref 1.7–7.7)
NEUTROPHILS RELATIVE PERCENT: 85.4 %
O2 CONTENT ARTERIAL: 15 ML/DL
O2 CONTENT ARTERIAL: 15 ML/DL
O2 SAT, ARTERIAL: 95.6 %
O2 SAT, ARTERIAL: 96.2 %
O2 THERAPY: ABNORMAL
O2 THERAPY: ABNORMAL
PCO2 ARTERIAL: 68.3 MMHG (ref 35–45)
PCO2 ARTERIAL: 87.5 MMHG (ref 35–45)
PDW BLD-RTO: 14.1 % (ref 12.4–15.4)
PERFORMED ON: ABNORMAL
PH ARTERIAL: 7.3 (ref 7.35–7.45)
PH ARTERIAL: 7.37 (ref 7.35–7.45)
PHOSPHORUS: 3.6 MG/DL (ref 2.5–4.9)
PLATELET # BLD: 229 K/UL (ref 135–450)
PMV BLD AUTO: 7.5 FL (ref 5–10.5)
PO2 ARTERIAL: 84 MMHG (ref 75–108)
PO2 ARTERIAL: 96.2 MMHG (ref 75–108)
POTASSIUM REFLEX MAGNESIUM: 5.7 MMOL/L (ref 3.5–5.1)
POTASSIUM SERPL-SCNC: 5 MMOL/L (ref 3.5–5.1)
RBC # BLD: 3.04 M/UL (ref 4–5.2)
SODIUM BLD-SCNC: 138 MMOL/L (ref 136–145)
SODIUM BLD-SCNC: 139 MMOL/L (ref 136–145)
TCO2 ARTERIAL: 40.4 MMOL/L
TCO2 ARTERIAL: 44.6 MMOL/L
WBC # BLD: 12.5 K/UL (ref 4–11)

## 2021-04-29 PROCEDURE — 71045 X-RAY EXAM CHEST 1 VIEW: CPT

## 2021-04-29 PROCEDURE — 6370000000 HC RX 637 (ALT 250 FOR IP): Performed by: INTERNAL MEDICINE

## 2021-04-29 PROCEDURE — 36592 COLLECT BLOOD FROM PICC: CPT

## 2021-04-29 PROCEDURE — 2000000000 HC ICU R&B

## 2021-04-29 PROCEDURE — 99291 CRITICAL CARE FIRST HOUR: CPT | Performed by: INTERNAL MEDICINE

## 2021-04-29 PROCEDURE — 6360000002 HC RX W HCPCS: Performed by: INTERNAL MEDICINE

## 2021-04-29 PROCEDURE — 94761 N-INVAS EAR/PLS OXIMETRY MLT: CPT

## 2021-04-29 PROCEDURE — 94640 AIRWAY INHALATION TREATMENT: CPT

## 2021-04-29 PROCEDURE — 94003 VENT MGMT INPAT SUBQ DAY: CPT

## 2021-04-29 PROCEDURE — 85025 COMPLETE CBC W/AUTO DIFF WBC: CPT

## 2021-04-29 PROCEDURE — 36600 WITHDRAWAL OF ARTERIAL BLOOD: CPT

## 2021-04-29 PROCEDURE — 2580000003 HC RX 258: Performed by: INTERNAL MEDICINE

## 2021-04-29 PROCEDURE — 99233 SBSQ HOSP IP/OBS HIGH 50: CPT | Performed by: INTERNAL MEDICINE

## 2021-04-29 PROCEDURE — 80069 RENAL FUNCTION PANEL: CPT

## 2021-04-29 PROCEDURE — 2700000000 HC OXYGEN THERAPY PER DAY

## 2021-04-29 PROCEDURE — 82803 BLOOD GASES ANY COMBINATION: CPT

## 2021-04-29 PROCEDURE — 2500000003 HC RX 250 WO HCPCS: Performed by: INTERNAL MEDICINE

## 2021-04-29 RX ORDER — FUROSEMIDE 10 MG/ML
20 INJECTION INTRAMUSCULAR; INTRAVENOUS ONCE
Status: COMPLETED | OUTPATIENT
Start: 2021-04-29 | End: 2021-04-29

## 2021-04-29 RX ORDER — MIDAZOLAM HYDROCHLORIDE 1 MG/ML
4 INJECTION INTRAMUSCULAR; INTRAVENOUS
Status: DISCONTINUED | OUTPATIENT
Start: 2021-04-29 | End: 2021-05-11

## 2021-04-29 RX ADMIN — INSULIN LISPRO 2 UNITS: 100 INJECTION, SOLUTION INTRAVENOUS; SUBCUTANEOUS at 04:01

## 2021-04-29 RX ADMIN — Medication 5 ML: at 22:45

## 2021-04-29 RX ADMIN — ASPIRIN 81 MG: 81 TABLET, CHEWABLE ORAL at 07:51

## 2021-04-29 RX ADMIN — PROPOFOL 50 MCG/KG/MIN: 10 INJECTION, EMULSION INTRAVENOUS at 21:39

## 2021-04-29 RX ADMIN — ALBUTEROL SULFATE 2.5 MG: 2.5 SOLUTION RESPIRATORY (INHALATION) at 07:24

## 2021-04-29 RX ADMIN — FUROSEMIDE 20 MG: 10 INJECTION, SOLUTION INTRAMUSCULAR; INTRAVENOUS at 07:27

## 2021-04-29 RX ADMIN — FAMOTIDINE 20 MG: 20 TABLET ORAL at 21:41

## 2021-04-29 RX ADMIN — METHYLPREDNISOLONE SODIUM SUCCINATE 40 MG: 40 INJECTION, POWDER, FOR SOLUTION INTRAMUSCULAR; INTRAVENOUS at 07:52

## 2021-04-29 RX ADMIN — IPRATROPIUM BROMIDE AND ALBUTEROL SULFATE 1 AMPULE: 2.5; .5 SOLUTION RESPIRATORY (INHALATION) at 23:44

## 2021-04-29 RX ADMIN — IPRATROPIUM BROMIDE AND ALBUTEROL SULFATE 1 AMPULE: 2.5; .5 SOLUTION RESPIRATORY (INHALATION) at 07:22

## 2021-04-29 RX ADMIN — Medication 50 MCG/HR: at 06:48

## 2021-04-29 RX ADMIN — MIDAZOLAM HYDROCHLORIDE 4 MG: 1 INJECTION, SOLUTION INTRAMUSCULAR; INTRAVENOUS at 17:18

## 2021-04-29 RX ADMIN — MONTELUKAST SODIUM 10 MG: 10 TABLET, COATED ORAL at 21:41

## 2021-04-29 RX ADMIN — AZITHROMYCIN 250 MG: 250 TABLET, FILM COATED ORAL at 07:51

## 2021-04-29 RX ADMIN — MIDAZOLAM HYDROCHLORIDE 4 MG: 1 INJECTION, SOLUTION INTRAMUSCULAR; INTRAVENOUS at 14:56

## 2021-04-29 RX ADMIN — ATORVASTATIN CALCIUM 40 MG: 40 TABLET, FILM COATED ORAL at 21:40

## 2021-04-29 RX ADMIN — INSULIN LISPRO 2 UNITS: 100 INJECTION, SOLUTION INTRAVENOUS; SUBCUTANEOUS at 00:38

## 2021-04-29 RX ADMIN — CEFTRIAXONE SODIUM 1000 MG: 1 INJECTION, POWDER, FOR SOLUTION INTRAMUSCULAR; INTRAVENOUS at 06:06

## 2021-04-29 RX ADMIN — MUPIROCIN: 20 OINTMENT TOPICAL at 07:52

## 2021-04-29 RX ADMIN — METHYLPREDNISOLONE SODIUM SUCCINATE 40 MG: 40 INJECTION, POWDER, FOR SOLUTION INTRAMUSCULAR; INTRAVENOUS at 21:40

## 2021-04-29 RX ADMIN — MUPIROCIN: 20 OINTMENT TOPICAL at 22:45

## 2021-04-29 RX ADMIN — Medication 150 MCG/HR: at 18:51

## 2021-04-29 RX ADMIN — IPRATROPIUM BROMIDE AND ALBUTEROL SULFATE 1 AMPULE: 2.5; .5 SOLUTION RESPIRATORY (INHALATION) at 11:34

## 2021-04-29 RX ADMIN — PROPOFOL 45 MCG/KG/MIN: 10 INJECTION, EMULSION INTRAVENOUS at 12:04

## 2021-04-29 RX ADMIN — ALBUTEROL SULFATE 2.5 MG: 2.5 SOLUTION RESPIRATORY (INHALATION) at 20:19

## 2021-04-29 RX ADMIN — ALBUTEROL SULFATE 2.5 MG: 2.5 SOLUTION RESPIRATORY (INHALATION) at 23:48

## 2021-04-29 RX ADMIN — IPRATROPIUM BROMIDE AND ALBUTEROL SULFATE 1 AMPULE: 2.5; .5 SOLUTION RESPIRATORY (INHALATION) at 15:38

## 2021-04-29 RX ADMIN — MIDAZOLAM HYDROCHLORIDE 4 MG: 1 INJECTION, SOLUTION INTRAMUSCULAR; INTRAVENOUS at 15:59

## 2021-04-29 RX ADMIN — IPRATROPIUM BROMIDE AND ALBUTEROL SULFATE 1 AMPULE: 2.5; .5 SOLUTION RESPIRATORY (INHALATION) at 03:20

## 2021-04-29 RX ADMIN — Medication 15 ML: at 07:52

## 2021-04-29 RX ADMIN — Medication 15 ML: at 21:40

## 2021-04-29 RX ADMIN — LEVOTHYROXINE SODIUM 125 MCG: 25 TABLET ORAL at 07:51

## 2021-04-29 RX ADMIN — MIDAZOLAM HYDROCHLORIDE 2 MG: 1 INJECTION, SOLUTION INTRAMUSCULAR; INTRAVENOUS at 13:35

## 2021-04-29 RX ADMIN — ALBUTEROL SULFATE 2.5 MG: 2.5 SOLUTION RESPIRATORY (INHALATION) at 13:51

## 2021-04-29 RX ADMIN — Medication 1 MG/HR: at 14:51

## 2021-04-29 RX ADMIN — ALBUTEROL SULFATE 2.5 MG: 2.5 SOLUTION RESPIRATORY (INHALATION) at 13:20

## 2021-04-29 RX ADMIN — PROPOFOL 50 MCG/KG/MIN: 10 INJECTION, EMULSION INTRAVENOUS at 07:21

## 2021-04-29 RX ADMIN — PROPOFOL 50 MCG/KG/MIN: 10 INJECTION, EMULSION INTRAVENOUS at 16:47

## 2021-04-29 RX ADMIN — PROPOFOL 50 MCG/KG/MIN: 10 INJECTION, EMULSION INTRAVENOUS at 04:01

## 2021-04-29 RX ADMIN — ENOXAPARIN SODIUM 40 MG: 40 INJECTION SUBCUTANEOUS at 07:52

## 2021-04-29 RX ADMIN — Medication 10 ML: at 07:52

## 2021-04-29 RX ADMIN — ALBUTEROL SULFATE 2.5 MG: 2.5 SOLUTION RESPIRATORY (INHALATION) at 03:25

## 2021-04-29 RX ADMIN — ALBUTEROL SULFATE 2.5 MG: 2.5 SOLUTION RESPIRATORY (INHALATION) at 15:38

## 2021-04-29 RX ADMIN — FAMOTIDINE 20 MG: 20 TABLET ORAL at 07:51

## 2021-04-29 RX ADMIN — IPRATROPIUM BROMIDE AND ALBUTEROL SULFATE 1 AMPULE: 2.5; .5 SOLUTION RESPIRATORY (INHALATION) at 20:11

## 2021-04-29 ASSESSMENT — PULMONARY FUNCTION TESTS
PIF_VALUE: 34
PIF_VALUE: 35
PIF_VALUE: 31
PIF_VALUE: 34
PIF_VALUE: 35
PIF_VALUE: 35
PIF_VALUE: 34
PIF_VALUE: 34
PIF_VALUE: 32
PIF_VALUE: 31
PIF_VALUE: 34
PIF_VALUE: 36
PIF_VALUE: 31
PIF_VALUE: 35

## 2021-04-29 NOTE — PROGRESS NOTES
IM Progress Note    Admit Date:  4/25/2021  4    Interval history:  Acute resp failure, was on bipap, intubated on 4/26   Remains on vent  Started TF      Subjective:  Ms. Daniel Pollard seen sedated on vent , BP stable   Ongoing weaning trials       Objective:   /62   Pulse 82   Temp 99.3 °F (37.4 °C) (Bladder)   Resp 25   Ht 5' 4\" (1.626 m)   Wt 153 lb (69.4 kg)   LMP  (LMP Unknown)   SpO2 97%   BMI 26.26 kg/m²       Intake/Output Summary (Last 24 hours) at 4/29/2021 0726  Last data filed at 4/29/2021 0358  Gross per 24 hour   Intake 1847 ml   Output 1345 ml   Net 502 ml       Physical Exam:        General: middle aged female on vent,   Oral ETT and OG noted  Right IJ TLC   . Appears to be not in any distress  Mucous Membranes:  Pink , anicteric  Neck: No JVD, no carotid bruit, no thyromegaly  Chest: improving  kelvin air entry with resolving  wheeze  Cardiovascular:  RRR S1S2 heard, no murmurs or gallops  Abdomen:  Soft, undistended, non tender, no organomegaly, BS present  Extremities: No edema or cyanosis.  Distal pulses well felt  Neurological : sedated        Medications:   Scheduled Medications:    furosemide  20 mg Intravenous Once    insulin lispro  0-12 Units Subcutaneous Q4H    chlorhexidine  15 mL Mouth/Throat BID    midazolam  2 mg Intravenous Once    mupirocin   Nasal BID    aspirin  81 mg Oral Daily    atorvastatin  40 mg Oral Nightly    levothyroxine  125 mcg Oral QAM AC    montelukast  10 mg Oral Nightly    sodium chloride flush  5-40 mL Intravenous 2 times per day    enoxaparin  40 mg Subcutaneous Daily    famotidine  20 mg Oral BID    cefTRIAXone (ROCEPHIN) IV  1,000 mg Intravenous Q24H    azithromycin  250 mg Oral Daily    methylPREDNISolone  40 mg Intravenous Q12H    ipratropium-albuterol  1 ampule Inhalation Q4H     I   fentaNYL 50 mcg/hr (04/29/21 4003)    dextrose      midazolam 1 mg/hr (04/28/21 8000)    propofol 50 mcg/kg/min (04/29/21 4783)    sodium chloride 25 mL pulmonary artery pressure (SPAP) estimated at 20 mmHg (RA   pressure 3 mmHg).   No significant valvular heart disease.                 ASSESSMENT/PLAN:     #Acute on chronic hypoxic/hypercapnic respiratory failure-due to COPD exacerbation, Pneumonia  -Normally on home oxygen 3 L   -ABG on admission pH 7.2, PCO2 92  - admitted to ICU on BiPAP  - pulmonology consulted. - IV Solu-medrol, HHN , abx initiated   -- failed bipap and worsened leading to intubation and now on vent support  -Received vancomycin and Zosyn in the ED .  was treated with Rocephin, Zithromax and vanc   - sputum neg so far           #Pneumonia, Gram positive organism  - IV Rocephin, Zithromax and off  vanc  - albuterol prn  -Follow-up on cultures        #Sepsis  -Present on admission  -With tachycardia and tachypnea, leukocytosis.    Secondary to pneumonia  Monitor  Improved BP and off levo     #Tobacco abuse  - smoking cessation needed     #Pulmonary Edema  - ECHO last year with normal EF and normal Diastolic function   - IV Lasix 40 mg as tolerated     #Leukocytosis-likely due to chronic steroids  -Trend WBC- remains high   -neg procalcitonin        #Hypothyroidism  - home dose of synthroid 125 mcg      #Hyperlipidemia  - on Atorvastatin.     DVT Prophylaxis: Lovenox  Diet: on TF  Code Status: Full Code      Updated      Mal Villar MD 4/29/2021 7:26 AM

## 2021-04-29 NOTE — PROGRESS NOTES
04/28/21 2339   Vent Information   Vent Type 840   Vent Mode AC/PC   Vt Ordered 0 mL   Rate Set 26 bmp   Peak Flow 0 L/min   Pressure Support 0 cmH20   FiO2  40 %   SpO2 93 %   SpO2/FiO2 ratio 232.5   Sensitivity 3   PEEP/CPAP 8   I Time/ I Time % 0 s   Humidification Source Heated wire   Humidification Temp 37   Humidification Temp Measured 37   Circuit Condensation Drained   Vent Patient Data   High Peep/I Pressure 25   Peak Inspiratory Pressure 36 cmH2O   Mean Airway Pressure 16 cmH20   Rate Measured 27 br/min   Vt Exhaled 147 mL   Minute Volume 7.95 Liters   I:E Ratio 1:1.80   Cough/Sputum   Sputum How Obtained Endotracheal;Suctioned   Cough Productive   Sputum Amount Small   Sputum Color Creamy   Tenacity Thick   Spontaneous Breathing Trial (SBT) RT Doc   Pulse 88   Breath Sounds   Right Upper Lobe Expiratory Wheezes   Right Middle Lobe Expiratory Wheezes   Right Lower Lobe Expiratory Wheezes   Left Upper Lobe Expiratory Wheezes   Left Lower Lobe Expiratory Wheezes   Additional Respiratory  Assessments   Resp 12   Position Semi-Woods's   Alarm Settings   High Pressure Alarm 45 cmH2O   Low Minute Volume Alarm 4 L/min   High Respiratory Rate 40 br/min   Patient Observation   Observations ETT SIZE 8.0, SECURED AT 24 LIP LINE. AMBU BAG AT HEAD OF BED. WATER GOOD. Non-Surgical Airway Endo Tracheal Tube   Placement Date/Time: 04/26/21 4047   Timeout: Patient;Procedure; Appropriate Equipment  Mask Ventilation: Ventilated by mask (1)  Placed By: Licensed provider  Inserted by: Dr Nayely Florence  Insertion attempts: 1  Airway Device: Endo Tracheal Tube  Size: 8   Secured at 24 cm   Measured From Lips   Secured Location Left   Secured By Commercial tube [email or web address]   Site Condition Dry

## 2021-04-29 NOTE — PROGRESS NOTES
Pulmonary & Critical Care Medicine ICU Progress Note    CC: Shortness of breath, respiratory failure    Events of Last 24 hours:   No new problems, versed is down to 1    Invasive Lines:  JOANNE CVC 21 by me     MV: 2021  Vent Mode: AC/PC Rate Set: 26 bmp/Vt Ordered: 0 mL/ /FiO2 : 40 %  Recent Labs     21  0506 21  0535   PHART 7.333* 7.367   TWV9ZDH 72.5* 68.3*   PO2ART 79.4 84.0       IV:   fentaNYL 50 mcg/hr (21 0648)    dextrose      midazolam 1 mg/hr (21 1653)    propofol 50 mcg/kg/min (21 0401)    sodium chloride 25 mL (21 0645)       Vitals:  Blood pressure 117/62, pulse 85, temperature 99.4 °F (37.4 °C), temperature source Bladder, resp. rate 17, height 5' 4\" (1.626 m), weight 153 lb (69.4 kg), SpO2 96 %, not currently breastfeeding. on vent 40%  Temp  Av.1 °F (37.3 °C)  Min: 98.5 °F (36.9 °C)  Max: 99.4 °F (37.4 °C)    Intake/Output Summary (Last 24 hours) at 2021 0650  Last data filed at 2021 0358  Gross per 24 hour   Intake 1847 ml   Output 1345 ml   Net 502 ml     EXAM:  General: intubated, ill appearing    ENT: Pharynx with ETT. Resp: No crackles. + wheezing. CV: S1, S2. No edema  GI: NT, ND, +BS  Skin: Warm and dry. Neuro: PERRL. Sedated, not following commands.  Patellar reflexes are symmetric     Scheduled Meds:   insulin lispro  0-12 Units Subcutaneous Q4H    chlorhexidine  15 mL Mouth/Throat BID    midazolam  2 mg Intravenous Once    mupirocin   Nasal BID    aspirin  81 mg Oral Daily    atorvastatin  40 mg Oral Nightly    levothyroxine  125 mcg Oral QAM AC    montelukast  10 mg Oral Nightly    sodium chloride flush  5-40 mL Intravenous 2 times per day    enoxaparin  40 mg Subcutaneous Daily    famotidine  20 mg Oral BID    cefTRIAXone (ROCEPHIN) IV  1,000 mg Intravenous Q24H    azithromycin  250 mg Oral Daily    methylPREDNISolone  40 mg Intravenous Q12H    ipratropium-albuterol  1 ampule Inhalation Q4H     PRN Meds:  glucose, dextrose, glucagon (rDNA), dextrose, midazolam, fentanNYL, albuterol sulfate HFA **AND** ipratropium **AND** MDI Treatment, sodium chloride flush, sodium chloride, polyethylene glycol, acetaminophen **OR** acetaminophen, ondansetron **OR** ondansetron, albuterol, ibuprofen    Results:  CBC:   Recent Labs     04/28/21  0506 04/29/21  0530   WBC 14.4* 12.5*   HGB 10.6* 9.9*   HCT 32.1* 29.6*   MCV 97.1 97.4    229     BMP:   Recent Labs     04/26/21  1651 04/27/21  0455 04/28/21  0506 04/29/21  0530   * 132* 136 138   K 4.6 4.3 4.9 5.7*   CL 95* 93* 96* 97*   CO2 33* 33* 37* 39*   PHOS 2.5  --   --   --    BUN 40* 35* 30* 36*   CREATININE 0.6 0.6 <0.5* <0.5*     LIVER PROFILE:   No results for input(s): AST, ALT, LIPASE, BILIDIR, BILITOT, ALKPHOS in the last 72 hours. Invalid input(s): AMYLASE,  ALB    Cultures:  4/25/2021 SARS-CoV-2 negative   4/25/2021 blood no growth to date  4/26/2021 respiratory culture candida     Films:  CXR 4/28/2021 hyperinflated, small pleural effusions, CVC okay     ASSESSMENT:  · Acute on chronic respiratory failure with hypoxemia and hypercapnia - failed BiPAP with worsening hypercapnia, typically on 3 L home oxygen  · Community acquired pneumonia   · Septic shock  · COPD with AE; severe airtrapping but autoPEEP is improving  · Hyperkalemia  · Small pleural effusions  · Ongoing tobacco abuse    PLAN:  Mechanical ventilation as per my orders. The ventilator was adjusted by me at the bedside for unstable, life threatening respiratory failure. IV Propofol for sedation, target RASS -2, with daily spontaneous awakening trial.  Titrate vesed gtt to off as able.  Fentanyl gtt to enable lightening of other sedatives  Head of bed 30 degrees or higher at all times  IV Solu-Medrol  Ceftriaxone D#5/7, azithromycin D#5/5  Lasix X 1, repeat potassium   ICU care- lovenox, pepcid, bactroban  NOK is spouse    Total critical care time caring for this patient with life threatening, unstable organ failure, including direct patient contact, management of life support systems, review of data including imaging and labs, discussions with other team members and physicians is 35 minutes so far today, excluding procedures.

## 2021-04-29 NOTE — PROGRESS NOTES
Shift assessment completed.     ETT at 24LL vent settings PC 26/40%/+8. Expiratory wheezes and diminished breath sounds bilaterally. Breathing is easy, even and unlabored. Oxygen saturation in the 90's.     Propofol infusing at 50 mcg/kg/min. Fentanyl infusing at 50 mcg//h. Versed infusing at 1 mg/h.      RASS appears to be -1 at this time.     Abdomen is soft and rounded to palpation. Bowel sounds are active and present x4. TF running at 45 mL/h, 0mL residual.     NSR on monitor, VSS.     Central line dressing is clean, dry, and intact.     Electronically signed by Lindsey Contreras RN on 4/28/2021 at 8:34 PM

## 2021-04-29 NOTE — PROGRESS NOTES
Dr Ewelina Castillo updated on current status and resp more synchronous with vent since sedation increased via PRN's and continuous gtts  Stephanie Beltrán RN

## 2021-04-29 NOTE — PROGRESS NOTES
04/29/21 0321   Vent Information   Vent Type 840   Vent Mode AC/PC   Vt Ordered 0 mL   Rate Set 26 bmp   Peak Flow 0 L/min   Pressure Support 0 cmH20   FiO2  40 %   SpO2 92 %   SpO2/FiO2 ratio 230   Sensitivity 3   PEEP/CPAP 8   I Time/ I Time % 0 s   Humidification Source Heated wire   Humidification Temp 37   Humidification Temp Measured 36.9   Circuit Condensation Drained   Vent Patient Data   High Peep/I Pressure 25   Peak Inspiratory Pressure 34 cmH2O   Mean Airway Pressure 15 cmH20   Rate Measured 26 br/min   Vt Exhaled 341 mL   Minute Volume 9.35 Liters   I:E Ratio 1:2.50   Cough/Sputum   Sputum How Obtained Endotracheal;Suctioned   Cough Productive   Sputum Amount Small   Sputum Color Creamy   Tenacity Thick   Spontaneous Breathing Trial (SBT) RT Doc   Pulse 83   Breath Sounds   Right Upper Lobe Diminished; Expiratory Wheezes   Right Middle Lobe Diminished; Expiratory Wheezes   Right Lower Lobe Diminished; Expiratory Wheezes   Left Upper Lobe Diminished; Expiratory Wheezes   Left Lower Lobe Diminished; Expiratory Wheezes   Additional Respiratory  Assessments   Resp 14   Position Semi-Owods's   Alarm Settings   High Pressure Alarm 45 cmH2O   Low Minute Volume Alarm 4 L/min   High Respiratory Rate 40 br/min   Patient Observation   Observations ETT SIZE 8.0, SECURED AT 24 LIP LINE. AMBU BAG AT HEAD OF BED. WATER GOOD. Non-Surgical Airway Endo Tracheal Tube   Placement Date/Time: 04/26/21 0528   Timeout: Patient;Procedure; Appropriate Equipment  Mask Ventilation: Ventilated by mask (1)  Placed By: Licensed provider  Inserted by: Dr Damian Tavarez  Insertion attempts: 1  Airway Device: Endo Tracheal Tube  Size: 8   Secured at 24 cm   Measured From [address] [facility] By Commercial tube [email or web address]   Site Condition Dry

## 2021-04-29 NOTE — PROGRESS NOTES
Shift reassessment completed. Patient resting comfortably at this time. Repositioning completed. No significant changes at this time.     Electronically signed by Sofy Castro RN on 4/29/2021 at 5:07 AM    Vitals:    04/29/21 0400   BP: 127/63   Pulse: 94   Resp: 15   Temp: 99.4 °F (37.4 °C)   SpO2: 94%

## 2021-04-29 NOTE — PROGRESS NOTES
Care rounds completed with Dr Mayte Benavides and multidisciplinary team.  Reviewed labs, meds, VS (temp/HR/RR), I/O's, assessment, & plan of care for today. See progress note & new orders for details.  Dr Mayte Benavides updates  at bedside  [facility]

## 2021-04-29 NOTE — PROGRESS NOTES
Shift reassessment completed. Patient resting comfortably at this time. Oral care, suctioning and repositioning completed. No significant changes at this time.     Electronically signed by Sofy Castro RN on 4/29/2021 at 12:51 AM     Vitals:    04/29/21 0000   BP: 138/76   Pulse: 92   Resp: 12   Temp: 99.3 °F (37.4 °C)   SpO2: 94%

## 2021-04-29 NOTE — PROGRESS NOTES
Comprehensive Nutrition Assessment    Type and Reason for Visit:  Reassess    Nutrition Recommendations/Plan:   1. Modified TF order to Nepro with a goal rate of 25 ml/hr x 20 hours. Start Nepro at goal rate since patient was tolerating previous TF formula at higher goal rate prior to switching TF formulas. Water flushes, 30 ml every 4 hours or per MD guidance. Please administer one proteinex P2Go once daily via feeding tube. 2. Monitor TF formula switch, rate, intake, and tolerance + water flushes + administration of one proteinex P2Go once daily. 3. Monitor vent status, sedation type/amount (propofol is currently at 50 mcg x 24 hours which = 554 kcals from lipids), and plan of care + monitor TG lab results (drawn on 4/28/21). 4. Monitor nutrition-related labs, bowel function, and weight trends. Nutrition Assessment:  patient has improved from a nutritional standpoint AEB she was tolerating Jevity 1.2 at goal rate and it was meeting 100% of her current, estimated nutrition needs prior to formula switch this am (per RN request d/t hyperkalemia), however, she remains at risk for further compromise d/t altered nutrition-related labs, respiratory dysfunction, and need for EN as sole source of nutrition while intubated; will modify TF order to Nepro with a goal rate of 25 ml/hr x 20 hours + water flushes of 30 ml every 4 hours + one proteinex P2Go once daily    Malnutrition Assessment:  Malnutrition Status:  Mild malnutrition (mild malnutrition in the context of acute illness or injury < 3 months based on mild decrease in energy intake and moderate muscle wasting present during NFPE), per guidelines from Academy of Nutrition and Dietetics (Academy)/American Society for Parenteral and Enteral Nutrition (A.S.P.E.N.) - clinical characteristics that the clinician can  obtain and document to support a diagnosis of malnutrition.     Context:  Acute Illness     Findings of the 6 clinical characteristics of malnutrition:  Energy Intake:  Mild decrease in energy intake (Comment)(TF goal rate is lower since patient's propofol intake is higher)  Weight Loss:  No significant weight loss     Body Fat Loss:  No significant body fat loss     Muscle Mass Loss:  7 - Moderate muscle mass loss Clavicles (pectoralis & deltoids), Hand (interosseous)  Fluid Accumulation:  No significant fluid accumulation     Strength:  Not Performed    Estimated Daily Nutrient Needs:  Energy (kcal):  1360 - 1564 kcals based on 20-23 kcals/kg/CBW; Weight Used for Energy Requirements:  Current     Protein (g):  66 - 72 g protein based on 1.2-1.3 g/kg/IBW;  Weight Used for Protein Requirements:  Ideal        Fluid (ml/day):  1360 - 1564 ml; Method Used for Fluid Requirements:  1 ml/kcal      Nutrition Related Findings:  patient is intubated and sedated with 50 mcg propofol at this time; she responds to pain; last BM was on 4/27/21; K and BUN elevated; Cl and h/h are low; patient has pepcid, med-dose SSI, synthroid, solumedrol, versed, and fentanyl ordered at this time      Wounds:  None       Current Nutrition Therapies:    Current Tube Feeding (TF) Orders:  · Feeding Route: Orogastric  · Formula: Renal  · Schedule: Continuous  · Additives/Modulars: Protein(one proteinex P2Go once daily)  · Water Flushes: 30 ml every 4 hours or per MD guidance  · Current TF & Flush Orders Provides: Jevity 1.2 with a goal rate of 45 ml/hr x 20 hours = 900 ml TV, 1080 kcals, 50 g protein, and 726 ml free water + 30 ml water flushes every 4 hours or per MD guidance + 26 g protein and 104 kcals from one proteinex P2Go once daily (76 g protein and 1184 kcals total)  · Goal TF & Flush Orders Provides: Nepro with a goal rate of 25 ml/hr x 20 hours = 500 ml TV, 900 kcals, 41 g protein, and 364 ml free water + 26 g protein and 104 kcals from one proteinex P2Go once daily via feeding tube (67 g protein and 1004 kcals total) + 30 ml water flushes every 4 hours or per MD

## 2021-04-29 NOTE — PROGRESS NOTES
04/28/21 1955   Vent Information   $Ventilation $Subsequent Day   Skin Assessment Clean, dry, & intact   Vent Type 840   Vent Mode AC/PC   Vt Ordered 0 mL   Rate Set 26 bmp   Peak Flow 0 L/min   Pressure Support 0 cmH20   FiO2  40 %   SpO2 97 %   SpO2/FiO2 ratio 242.5   Sensitivity 3   PEEP/CPAP 8   I Time/ I Time % 0 s   Humidification Source Heated wire   Humidification Temp 37   Circuit Condensation Drained   Vent Patient Data   High Peep/I Pressure 25   Peak Inspiratory Pressure 35 cmH2O   Mean Airway Pressure 15 cmH20   Rate Measured 26 br/min   Vt Exhaled 310 mL   Minute Volume 8.34 Liters   I:E Ratio 1:2.50   Plateau Pressure 29 [id number]   Static Compliance 15 mL/cmH2O   Dynamic Compliance 11 mL/cmH2O   Cough/Sputum   Sputum How Obtained Endotracheal;Suctioned   Cough Productive   Sputum Amount Small   Sputum Color Creamy   Tenacity Thick   Spontaneous Breathing Trial (SBT) RT Doc   Pulse 87   Breath Sounds   Right Upper Lobe Diminished   Right Middle Lobe Diminished   Right Lower Lobe Diminished   Left Upper Lobe Diminished   Left Lower Lobe Diminished   Additional Respiratory  Assessments   Resp 14   Position Semi-Woods's   Alarm Settings   High Pressure Alarm 45 cmH2O   Low Minute Volume Alarm 4 L/min   High Respiratory Rate 40 br/min   Patient Observation   Observations ETT SIZE 8.0, SECURED AT 24 LIP LINE. AMBU BAG AT HEAD OF BED. WATER GOOD. Non-Surgical Airway Endo Tracheal Tube   Placement Date/Time: 04/26/21 0528   Timeout: Patient;Procedure; Appropriate Equipment  Mask Ventilation: Ventilated by mask (1)  Placed By: Licensed provider  Inserted by: Dr Angelica Hayward  Insertion attempts: 1  Airway Device: Endo Tracheal Tube  Size: 8   Secured at 24 cm   Measured From [address] [facility] By Commercial tube [email or web address]   Site Condition Dry        04/28/21 1955   Vent Information   $Ventilation $Subsequent Day   Skin Assessment Clean, dry, & intact   Vent Type 840   Vent Mode AC/PC   Vt Ordered 0 mL   Rate Set 26 bmp   Peak Flow 0 L/min   Pressure Support 0 cmH20   FiO2  40 %   SpO2 97 %   SpO2/FiO2 ratio 242.5   Sensitivity 3   PEEP/CPAP 8   I Time/ I Time % 0 s   Humidification Source Heated wire   Humidification Temp 37   Circuit Condensation Drained   Vent Patient Data   High Peep/I Pressure 25   Peak Inspiratory Pressure 35 cmH2O   Mean Airway Pressure 15 cmH20   Rate Measured 26 br/min   Vt Exhaled 310 mL   Minute Volume 8.34 Liters   I:E Ratio 1:2.50   Plateau Pressure 29 QHV04   Static Compliance 15 mL/cmH2O   Dynamic Compliance 11 mL/cmH2O   Cough/Sputum   Sputum How Obtained Endotracheal;Suctioned   Cough Productive   Sputum Amount Small   Sputum Color Creamy   Tenacity Thick   Spontaneous Breathing Trial (SBT) RT Doc   Pulse 87   Breath Sounds   Right Upper Lobe Diminished   Right Middle Lobe Diminished   Right Lower Lobe Diminished   Left Upper Lobe Diminished   Left Lower Lobe Diminished   Additional Respiratory  Assessments   Resp 14   Position Semi-Woods's   Alarm Settings   High Pressure Alarm 45 cmH2O   Low Minute Volume Alarm 4 L/min   High Respiratory Rate 40 br/min   Patient Observation   Observations ETT SIZE 8.0, SECURED AT 24 LIP LINE. AMBU BAG AT HEAD OF BED. WATER GOOD. Non-Surgical Airway Endo Tracheal Tube   Placement Date/Time: 04/26/21 0528   Timeout: Patient;Procedure; Appropriate Equipment  Mask Ventilation: Ventilated by mask (1)  Placed By: Licensed provider  Inserted by: Dr Effie Mcardle  Insertion attempts: 1  Airway Device: Endo Tracheal Tube  Size: 8   Secured at 24 cm   Measured From [address] [facility] By Commercial tube [email or web address]   Site Condition Dry        04/28/21 1955   Vent Information   $Ventilation $Subsequent Day   Skin Assessment Clean, dry, & intact   Vent Type 840   Vent Mode AC/PC   Vt Ordered 0 mL   Rate Set 26 bmp   Peak Flow 0 L/min   Pressure Support 0 cmH20   FiO2  40 %   SpO2 97 %   SpO2/FiO2 ratio 242.5   Sensitivity 3 PEEP/CPAP 8   I Time/ I Time % 0 s   Humidification Source Heated wire   Humidification Temp 37   Circuit Condensation Drained   Vent Patient Data   High Peep/I Pressure 25   Peak Inspiratory Pressure 35 cmH2O   Mean Airway Pressure 15 cmH20   Rate Measured 26 br/min   Vt Exhaled 310 mL   Minute Volume 8.34 Liters   I:E Ratio 1:2.50   Plateau Pressure 29 DOZ38   Static Compliance 15 mL/cmH2O   Dynamic Compliance 11 mL/cmH2O   Cough/Sputum   Sputum How Obtained Endotracheal;Suctioned   Cough Productive   Sputum Amount Small   Sputum Color Creamy   Tenacity Thick   Spontaneous Breathing Trial (SBT) RT Doc   Pulse 87   Breath Sounds   Right Upper Lobe Diminished   Right Middle Lobe Diminished   Right Lower Lobe Diminished   Left Upper Lobe Diminished   Left Lower Lobe Diminished   Additional Respiratory  Assessments   Resp 14   Position Semi-Woods's   Alarm Settings   High Pressure Alarm 45 cmH2O   Low Minute Volume Alarm 4 L/min   High Respiratory Rate 40 br/min   Patient Observation   Observations ETT SIZE 8.0, SECURED AT 24 LIP LINE. AMBU BAG AT HEAD OF BED. WATER GOOD. Non-Surgical Airway Endo Tracheal Tube   Placement Date/Time: 04/26/21 0528   Timeout: Patient;Procedure; Appropriate Equipment  Mask Ventilation: Ventilated by mask (1)  Placed By: Licensed provider  Inserted by: Dr Zan Florez  Insertion attempts: 1  Airway Device: Endo Tracheal Tube  Size: 8   Secured at 24 cm   Measured From [address] [facility] [address] By Commercial tube [email or web address]   Site Condition Dry        04/28/21 1955   Vent Information   $Ventilation $Subsequent Day   Skin Assessment Clean, dry, & intact   Vent Type 840   Vent Mode AC/PC   Vt Ordered 0 mL   Rate Set 26 bmp   Peak Flow 0 L/min   Pressure Support 0 cmH20   FiO2  40 %   SpO2 97 %   SpO2/FiO2 ratio 242.5   Sensitivity 3   PEEP/CPAP 8   I Time/ I Time % 0 s   Humidification Source Heated wire   Humidification Temp 37   Circuit Condensation Drained   Vent Patient Data High Peep/I Pressure 25   Peak Inspiratory Pressure 35 cmH2O   Mean Airway Pressure 15 cmH20   Rate Measured 26 br/min   Vt Exhaled 310 mL   Minute Volume 8.34 Liters   I:E Ratio 1:2.50   Plateau Pressure 29 [id number]   Static Compliance 15 mL/cmH2O   Dynamic Compliance 11 mL/cmH2O   Cough/Sputum   Sputum How Obtained Endotracheal;Suctioned   Cough Productive   Sputum Amount Small   Sputum Color Creamy   Tenacity Thick   Spontaneous Breathing Trial (SBT) RT Doc   Pulse 87   Breath Sounds   Right Upper Lobe Diminished   Right Middle Lobe Diminished   Right Lower Lobe Diminished   Left Upper Lobe Diminished   Left Lower Lobe Diminished   Additional Respiratory  Assessments   Resp 14   Position Semi-Woods's   Alarm Settings   High Pressure Alarm 45 cmH2O   Low Minute Volume Alarm 4 L/min   High Respiratory Rate 40 br/min   Patient Observation   Observations ETT SIZE 8.0, SECURED AT 24 LIP LINE. AMBU BAG AT HEAD OF BED. WATER GOOD. Non-Surgical Airway Endo Tracheal Tube   Placement Date/Time: 04/26/21 0528   Timeout: Patient;Procedure; Appropriate Equipment  Mask Ventilation: Ventilated by mask (1)  Placed By: Licensed provider  Inserted by: Dr Ernestine Ramos  Insertion attempts: 1  Airway Device: Endo Tracheal Tube  Size: 8   Secured at 24 cm   Measured From [facility] By Commercial tube [email or web address]   Site Condition Dry        04/28/21 1955   Vent Information   $Ventilation $Subsequent Day   Skin Assessment Clean, dry, & intact   Vent Type 840   Vent Mode AC/PC   Vt Ordered 0 mL   Rate Set 26 bmp   Peak Flow 0 L/min   Pressure Support 0 cmH20   FiO2  40 %   SpO2 97 %   SpO2/FiO2 ratio 242.5   Sensitivity 3   PEEP/CPAP 8   I Time/ I Time % 0 s   Humidification Source Heated wire   Humidification Temp 37   Circuit Condensation Drained   Vent Patient Data   High Peep/I Pressure 25   Peak Inspiratory Pressure 35 cmH2O   Mean Airway Pressure 15 cmH20   Rate Measured 26 br/min   Vt Exhaled 310 mL   Minute Volume 8.34 Liters   I:E Ratio 1:2.50   Plateau Pressure 29 ATD11   Static Compliance 15 mL/cmH2O   Dynamic Compliance 11 mL/cmH2O   Cough/Sputum   Sputum How Obtained Endotracheal;Suctioned   Cough Productive   Sputum Amount Small   Sputum Color Creamy   Tenacity Thick   Spontaneous Breathing Trial (SBT) RT Doc   Pulse 87   Breath Sounds   Right Upper Lobe Diminished   Right Middle Lobe Diminished   Right Lower Lobe Diminished   Left Upper Lobe Diminished   Left Lower Lobe Diminished   Additional Respiratory  Assessments   Resp 14   Position Semi-Woods's   Alarm Settings   High Pressure Alarm 45 cmH2O   Low Minute Volume Alarm 4 L/min   High Respiratory Rate 40 br/min   Patient Observation   Observations ETT SIZE 8.0, SECURED AT 24 LIP LINE. AMBU BAG AT HEAD OF BED. WATER GOOD. Non-Surgical Airway Endo Tracheal Tube   Placement Date/Time: 04/26/21 0528   Timeout: Patient;Procedure; Appropriate Equipment  Mask Ventilation: Ventilated by mask (1)  Placed By: Licensed provider  Inserted by: Dr Hayden Kirkpatrick  Insertion attempts: 1  Airway Device: Endo Tracheal Tube  Size: 8   Secured at 24 cm   Measured From [address] [facility] By Commercial tube [email or web address]   Site Condition Dry        04/28/21 1955   Vent Information   $Ventilation $Subsequent Day   Skin Assessment Clean, dry, & intact   Vent Type 840   Vent Mode AC/PC   Vt Ordered 0 mL   Rate Set 26 bmp   Peak Flow 0 L/min   Pressure Support 0 cmH20   FiO2  40 %   SpO2 97 %   SpO2/FiO2 ratio 242.5   Sensitivity 3   PEEP/CPAP 8   I Time/ I Time % 0 s   Humidification Source Heated wire   Humidification Temp 37   Circuit Condensation Drained   Vent Patient Data   High Peep/I Pressure 25   Peak Inspiratory Pressure 35 cmH2O   Mean Airway Pressure 15 cmH20   Rate Measured 26 br/min   Vt Exhaled 310 mL   Minute Volume 8.34 Liters   I:E Ratio 1:2.50   Plateau Pressure 29 URT70   Static Compliance 15 mL/cmH2O   Dynamic Compliance 11 mL/cmH2O   Cough/Sputum

## 2021-04-29 NOTE — PROGRESS NOTES
04/28/21 2339   Vent Information   Vent Type 840   Vent Mode AC/PC   Vt Ordered 0 mL   Rate Set 26 bmp   Peak Flow 0 L/min   Pressure Support 0 cmH20   FiO2  40 %   SpO2 93 %   SpO2/FiO2 ratio 232.5   Sensitivity 3   PEEP/CPAP 8   I Time/ I Time % 0 s   Humidification Source Heated wire   Humidification Temp 37   Humidification Temp Measured 37   Circuit Condensation Drained   Vent Patient Data   High Peep/I Pressure 25   Peak Inspiratory Pressure 36 cmH2O   Mean Airway Pressure 16 cmH20   Rate Measured 27 br/min   Vt Exhaled 147 mL   Minute Volume 7.95 Liters   I:E Ratio 1:1.80   Cough/Sputum   Sputum How Obtained Endotracheal;Suctioned   Cough Productive   Sputum Amount Small   Sputum Color Creamy   Tenacity Thick   Spontaneous Breathing Trial (SBT) RT Doc   Pulse 88   Breath Sounds   Right Upper Lobe Expiratory Wheezes   Right Middle Lobe Expiratory Wheezes   Right Lower Lobe Expiratory Wheezes   Left Upper Lobe Expiratory Wheezes   Left Lower Lobe Expiratory Wheezes   Additional Respiratory  Assessments   Resp 12   Position Semi-Woods's   Alarm Settings   High Pressure Alarm 45 cmH2O   Low Minute Volume Alarm 4 L/min   High Respiratory Rate 40 br/min   Patient Observation   Observations ETT SIZE 8.0, SECURED AT 24 LIP LINE. AMBU BAG AT HEAD OF BED. WATER GOOD. Non-Surgical Airway Endo Tracheal Tube   Placement Date/Time: 04/26/21 0535   Timeout: Patient;Procedure; Appropriate Equipment  Mask Ventilation: Ventilated by mask (1)  Placed By: Licensed provider  Inserted by: Dr Tristan Ang  Insertion attempts: 1  Airway Device: Endo Tracheal Tube  Size: 8   Secured at 24 cm   Measured From Lips   Secured Location Left   Secured By Commercial tube [email or web address]   Site Condition Dry

## 2021-04-30 ENCOUNTER — APPOINTMENT (OUTPATIENT)
Dept: GENERAL RADIOLOGY | Age: 61
DRG: 870 | End: 2021-04-30
Payer: COMMERCIAL

## 2021-04-30 LAB
ANION GAP SERPL CALCULATED.3IONS-SCNC: 5 MMOL/L (ref 3–16)
BANDED NEUTROPHILS RELATIVE PERCENT: 1 % (ref 0–7)
BASE EXCESS ARTERIAL: 9 MMOL/L (ref -3–3)
BASOPHILS ABSOLUTE: 0 K/UL (ref 0–0.2)
BASOPHILS RELATIVE PERCENT: 0 %
BUN BLDV-MCNC: 45 MG/DL (ref 7–20)
CALCIUM SERPL-MCNC: 9.5 MG/DL (ref 8.3–10.6)
CARBOXYHEMOGLOBIN ARTERIAL: 0.3 % (ref 0–1.5)
CHLORIDE BLD-SCNC: 93 MMOL/L (ref 99–110)
CO2: 40 MMOL/L (ref 21–32)
CREAT SERPL-MCNC: <0.5 MG/DL (ref 0.6–1.2)
EOSINOPHILS ABSOLUTE: 0 K/UL (ref 0–0.6)
EOSINOPHILS RELATIVE PERCENT: 0 %
GFR AFRICAN AMERICAN: >60
GFR NON-AFRICAN AMERICAN: >60
GLUCOSE BLD-MCNC: 113 MG/DL (ref 70–99)
GLUCOSE BLD-MCNC: 120 MG/DL (ref 70–99)
GLUCOSE BLD-MCNC: 131 MG/DL (ref 70–99)
GLUCOSE BLD-MCNC: 133 MG/DL (ref 70–99)
GLUCOSE BLD-MCNC: 147 MG/DL (ref 70–99)
GLUCOSE BLD-MCNC: 150 MG/DL (ref 70–99)
GLUCOSE BLD-MCNC: 152 MG/DL (ref 70–99)
HCO3 ARTERIAL: 36.4 MMOL/L (ref 21–29)
HCT VFR BLD CALC: 29.1 % (ref 36–48)
HEMATOLOGY PATH CONSULT: NO
HEMOGLOBIN, ART, EXTENDED: 11.2 G/DL (ref 12–16)
HEMOGLOBIN: 9.7 G/DL (ref 12–16)
LYMPHOCYTES ABSOLUTE: 0.9 K/UL (ref 1–5.1)
LYMPHOCYTES RELATIVE PERCENT: 8 %
MCH RBC QN AUTO: 32.6 PG (ref 26–34)
MCHC RBC AUTO-ENTMCNC: 33.5 G/DL (ref 31–36)
MCV RBC AUTO: 97.5 FL (ref 80–100)
METHEMOGLOBIN ARTERIAL: 0.1 %
MONOCYTES ABSOLUTE: 0.1 K/UL (ref 0–1.3)
MONOCYTES RELATIVE PERCENT: 1 %
NEUTROPHILS ABSOLUTE: 9.8 K/UL (ref 1.7–7.7)
NEUTROPHILS RELATIVE PERCENT: 90 %
O2 CONTENT ARTERIAL: 16 ML/DL
O2 SAT, ARTERIAL: 98.2 %
O2 THERAPY: ABNORMAL
PCO2 ARTERIAL: 65.7 MMHG (ref 35–45)
PDW BLD-RTO: 13.9 % (ref 12.4–15.4)
PERFORMED ON: ABNORMAL
PH ARTERIAL: 7.36 (ref 7.35–7.45)
PLATELET # BLD: 247 K/UL (ref 135–450)
PLATELET SLIDE REVIEW: ADEQUATE
PMV BLD AUTO: 8.2 FL (ref 5–10.5)
PO2 ARTERIAL: 123.7 MMHG (ref 75–108)
POTASSIUM REFLEX MAGNESIUM: 4.9 MMOL/L (ref 3.5–5.1)
RBC # BLD: 2.99 M/UL (ref 4–5.2)
SLIDE REVIEW: ABNORMAL
SODIUM BLD-SCNC: 138 MMOL/L (ref 136–145)
TCO2 ARTERIAL: 38.4 MMOL/L
TRIGL SERPL-MCNC: 176 MG/DL (ref 0–150)
WBC # BLD: 10.8 K/UL (ref 4–11)

## 2021-04-30 PROCEDURE — 2580000003 HC RX 258: Performed by: INTERNAL MEDICINE

## 2021-04-30 PROCEDURE — 6360000002 HC RX W HCPCS: Performed by: INTERNAL MEDICINE

## 2021-04-30 PROCEDURE — 84478 ASSAY OF TRIGLYCERIDES: CPT

## 2021-04-30 PROCEDURE — 2700000000 HC OXYGEN THERAPY PER DAY

## 2021-04-30 PROCEDURE — 94640 AIRWAY INHALATION TREATMENT: CPT

## 2021-04-30 PROCEDURE — 2500000003 HC RX 250 WO HCPCS: Performed by: INTERNAL MEDICINE

## 2021-04-30 PROCEDURE — 71045 X-RAY EXAM CHEST 1 VIEW: CPT

## 2021-04-30 PROCEDURE — 6370000000 HC RX 637 (ALT 250 FOR IP): Performed by: INTERNAL MEDICINE

## 2021-04-30 PROCEDURE — 80048 BASIC METABOLIC PNL TOTAL CA: CPT

## 2021-04-30 PROCEDURE — 94761 N-INVAS EAR/PLS OXIMETRY MLT: CPT

## 2021-04-30 PROCEDURE — 94003 VENT MGMT INPAT SUBQ DAY: CPT

## 2021-04-30 PROCEDURE — 99291 CRITICAL CARE FIRST HOUR: CPT | Performed by: INTERNAL MEDICINE

## 2021-04-30 PROCEDURE — 2000000000 HC ICU R&B

## 2021-04-30 PROCEDURE — 99232 SBSQ HOSP IP/OBS MODERATE 35: CPT | Performed by: INTERNAL MEDICINE

## 2021-04-30 PROCEDURE — 82803 BLOOD GASES ANY COMBINATION: CPT

## 2021-04-30 PROCEDURE — 85025 COMPLETE CBC W/AUTO DIFF WBC: CPT

## 2021-04-30 RX ORDER — MIDAZOLAM HYDROCHLORIDE 1 MG/ML
INJECTION INTRAMUSCULAR; INTRAVENOUS
Status: DISPENSED
Start: 2021-04-30 | End: 2021-05-01

## 2021-04-30 RX ORDER — MIDAZOLAM HYDROCHLORIDE 5 MG/ML
5 INJECTION, SOLUTION INTRAMUSCULAR; INTRAVENOUS ONCE
Status: COMPLETED | OUTPATIENT
Start: 2021-04-30 | End: 2021-04-30

## 2021-04-30 RX ORDER — MAGNESIUM SULFATE IN WATER 40 MG/ML
2000 INJECTION, SOLUTION INTRAVENOUS ONCE
Status: COMPLETED | OUTPATIENT
Start: 2021-04-30 | End: 2021-04-30

## 2021-04-30 RX ORDER — KETAMINE HYDROCHLORIDE 50 MG/ML
25 INJECTION, SOLUTION, CONCENTRATE INTRAMUSCULAR; INTRAVENOUS ONCE
Status: COMPLETED | OUTPATIENT
Start: 2021-04-30 | End: 2021-04-30

## 2021-04-30 RX ADMIN — Medication 17.5 MCG/HR: at 20:09

## 2021-04-30 RX ADMIN — MIDAZOLAM HYDROCHLORIDE 5 MG: 5 INJECTION, SOLUTION INTRAMUSCULAR; INTRAVENOUS at 12:54

## 2021-04-30 RX ADMIN — Medication 10 ML: at 07:56

## 2021-04-30 RX ADMIN — Medication 15 ML: at 07:55

## 2021-04-30 RX ADMIN — KETAMINE HYDROCHLORIDE 0.1 MG/KG/HR: 50 INJECTION, SOLUTION INTRAMUSCULAR; INTRAVENOUS at 13:54

## 2021-04-30 RX ADMIN — KETAMINE HYDROCHLORIDE 25 MG: 50 INJECTION, SOLUTION INTRAMUSCULAR; INTRAVENOUS at 13:52

## 2021-04-30 RX ADMIN — MAGNESIUM SULFATE HEPTAHYDRATE 2000 MG: 40 INJECTION, SOLUTION INTRAVENOUS at 13:30

## 2021-04-30 RX ADMIN — Medication 175 MCG/HR: at 07:59

## 2021-04-30 RX ADMIN — INSULIN LISPRO 2 UNITS: 100 INJECTION, SOLUTION INTRAVENOUS; SUBCUTANEOUS at 07:58

## 2021-04-30 RX ADMIN — PROPOFOL 50 MCG/KG/MIN: 10 INJECTION, EMULSION INTRAVENOUS at 17:48

## 2021-04-30 RX ADMIN — ALBUTEROL SULFATE 2.5 MG: 2.5 SOLUTION RESPIRATORY (INHALATION) at 03:44

## 2021-04-30 RX ADMIN — METHYLPREDNISOLONE SODIUM SUCCINATE 40 MG: 40 INJECTION, POWDER, FOR SOLUTION INTRAMUSCULAR; INTRAVENOUS at 07:55

## 2021-04-30 RX ADMIN — Medication 15 ML: at 19:52

## 2021-04-30 RX ADMIN — FAMOTIDINE 20 MG: 20 TABLET ORAL at 19:52

## 2021-04-30 RX ADMIN — MONTELUKAST SODIUM 10 MG: 10 TABLET, COATED ORAL at 19:52

## 2021-04-30 RX ADMIN — CEFTRIAXONE SODIUM 1000 MG: 1 INJECTION, POWDER, FOR SOLUTION INTRAMUSCULAR; INTRAVENOUS at 05:27

## 2021-04-30 RX ADMIN — ASPIRIN 81 MG: 81 TABLET, CHEWABLE ORAL at 07:55

## 2021-04-30 RX ADMIN — PROPOFOL 50 MCG/KG/MIN: 10 INJECTION, EMULSION INTRAVENOUS at 11:27

## 2021-04-30 RX ADMIN — MUPIROCIN: 20 OINTMENT TOPICAL at 19:51

## 2021-04-30 RX ADMIN — LEVOTHYROXINE SODIUM 125 MCG: 25 TABLET ORAL at 05:27

## 2021-04-30 RX ADMIN — Medication 10 ML: at 19:52

## 2021-04-30 RX ADMIN — Medication 175 MCG/HR: at 13:56

## 2021-04-30 RX ADMIN — METHYLPREDNISOLONE SODIUM SUCCINATE 40 MG: 40 INJECTION, POWDER, FOR SOLUTION INTRAMUSCULAR; INTRAVENOUS at 19:51

## 2021-04-30 RX ADMIN — IPRATROPIUM BROMIDE AND ALBUTEROL SULFATE 1 AMPULE: 2.5; .5 SOLUTION RESPIRATORY (INHALATION) at 11:41

## 2021-04-30 RX ADMIN — ENOXAPARIN SODIUM 40 MG: 40 INJECTION SUBCUTANEOUS at 07:55

## 2021-04-30 RX ADMIN — FAMOTIDINE 20 MG: 20 TABLET ORAL at 07:56

## 2021-04-30 RX ADMIN — ATORVASTATIN CALCIUM 40 MG: 40 TABLET, FILM COATED ORAL at 19:52

## 2021-04-30 RX ADMIN — IPRATROPIUM BROMIDE AND ALBUTEROL SULFATE 1 AMPULE: 2.5; .5 SOLUTION RESPIRATORY (INHALATION) at 19:55

## 2021-04-30 RX ADMIN — Medication 150 MCG/HR: at 01:36

## 2021-04-30 RX ADMIN — MIDAZOLAM HYDROCHLORIDE 2 MG: 1 INJECTION, SOLUTION INTRAMUSCULAR; INTRAVENOUS at 12:37

## 2021-04-30 RX ADMIN — ALBUTEROL SULFATE 2.5 MG: 2.5 SOLUTION RESPIRATORY (INHALATION) at 15:50

## 2021-04-30 RX ADMIN — IPRATROPIUM BROMIDE AND ALBUTEROL SULFATE 1 AMPULE: 2.5; .5 SOLUTION RESPIRATORY (INHALATION) at 15:44

## 2021-04-30 RX ADMIN — MUPIROCIN: 20 OINTMENT TOPICAL at 07:56

## 2021-04-30 RX ADMIN — IPRATROPIUM BROMIDE AND ALBUTEROL SULFATE 1 AMPULE: 2.5; .5 SOLUTION RESPIRATORY (INHALATION) at 08:12

## 2021-04-30 RX ADMIN — IPRATROPIUM BROMIDE AND ALBUTEROL SULFATE 1 AMPULE: 2.5; .5 SOLUTION RESPIRATORY (INHALATION) at 23:18

## 2021-04-30 RX ADMIN — ALBUTEROL SULFATE 2.5 MG: 2.5 SOLUTION RESPIRATORY (INHALATION) at 12:36

## 2021-04-30 RX ADMIN — IPRATROPIUM BROMIDE AND ALBUTEROL SULFATE 1 AMPULE: 2.5; .5 SOLUTION RESPIRATORY (INHALATION) at 03:39

## 2021-04-30 ASSESSMENT — PULMONARY FUNCTION TESTS
PIF_VALUE: 29
PIF_VALUE: 35
PIF_VALUE: 35
PIF_VALUE: 30
PIF_VALUE: 34
PIF_VALUE: 36
PIF_VALUE: 35
PIF_VALUE: 26
PIF_VALUE: 35
PIF_VALUE: 35
PIF_VALUE: 33

## 2021-04-30 ASSESSMENT — PAIN SCALES - GENERAL
PAINLEVEL_OUTOF10: 0
PAINLEVEL_OUTOF10: 0

## 2021-04-30 NOTE — PROGRESS NOTES
Pending Ketamine 25mg IV bolus and IV drip intiation. Sp02 94% on Fi02 60% RR 19, Insp volumes 170-370, inconsistent.

## 2021-04-30 NOTE — PROGRESS NOTES
04/30/21 0339   Vent Information   Vent Type 840   Vent Mode AC/PC   Vt Ordered 0 mL   Rate Set 24 bmp   Peak Flow 0 L/min   Pressure Support 0 cmH20   FiO2  45 %   SpO2 97 %   SpO2/FiO2 ratio 215.56   Sensitivity 3   PEEP/CPAP 8   I Time/ I Time % 0 s   Humidification Source Heated wire   Humidification Temp 37   Humidification Temp Measured 37   Circuit Condensation Drained   Vent Patient Data   High Peep/I Pressure 25   Peak Inspiratory Pressure 35 cmH2O   Mean Airway Pressure 15 cmH20   Rate Measured 24 br/min   Vt Exhaled 413 mL   Minute Volume 9.93 Liters   I:E Ratio 1:2.80   Cough/Sputum   Sputum How Obtained Endotracheal;Suctioned   Cough Productive   Sputum Amount Small   Sputum Color Creamy   Tenacity Thick   Spontaneous Breathing Trial (SBT) RT Doc   Pulse 76   Breath Sounds   Right Upper Lobe Diminished   Right Middle Lobe Diminished   Right Lower Lobe Diminished   Left Upper Lobe Diminished   Left Lower Lobe Diminished   Additional Respiratory  Assessments   Resp 24   Position Semi-Woods's   Alarm Settings   High Pressure Alarm 50 cmH2O   Low Minute Volume Alarm 4 L/min   High Respiratory Rate 40 br/min   Patient Observation   Observations ETT SIZE 8.0, SECURED AT 24 LIP LINE. AMBU BAG AT HEAD OF BED. WATER GOOD. Non-Surgical Airway Endo Tracheal Tube   Placement Date/Time: 04/26/21 0528   Timeout: Patient;Procedure; Appropriate Equipment  Mask Ventilation: Ventilated by mask (1)  Placed By: Licensed provider  Inserted by: Dr Jose Luis Perry  Insertion attempts: 1  Airway Device: Endo Tracheal Tube  Size: 8   Secured at 24 cm   Measured From Lips   Secured Location Right   Secured By Commercial tube [email or web address]   Site Condition Dry

## 2021-04-30 NOTE — FLOWSHEET NOTE
04/30/21 1120   Assessment   Charting Type Reassessment   Neurological   Neuro (WDL) X   Level of Consciousness Responds to Pain (2)   Orientation Level KARLEE  (intubated and sedated)   Cognition KARLEE  (intubated and sedated)   Language KARLEE  (intubated and sedated)   Size R Pupil (mm) 2   R Pupil Shape Round   R Pupil Reaction Sluggish   Size L Pupil (mm) 2   L Pupil Shape Round   L Pupil Reaction Sluggish   R Hand  KARLEE  (moves fingers)   L Hand  KARLEE  (moves fingers)   R Foot Dorsiflexion KARLEE  (moves feet, not following commands)   L Foot Dorsiflexion KARLEE  (moves feet, not following commands)   R Foot Plantar Flexion KARLEE  (moves feet, not following commands)   L Foot Plantar Flexion KARLEE  (moves feet, not following commands)   RUE Motor Response Movement to painful stimulus   Sensation RUE KARLEE   LUE Motor Response Movement to painful stimulus   Sensation LUE KARLEE   RLE Motor Response Movement to painful stimulus   Sensation RLE KARLEE   LLE Motor Response Movement to painful stimulus   Sensation LLE KARLEE   HEENT   HEENT (WDL) X   Right Eye Impaired vision  (glasses)   Left Eye Impaired vision  (glasses)   Teeth Edentulous   Respiratory   Respiratory (WDL) X  (intubated and sedated)   Respiratory Pattern Regular   Respiratory Depth Normal   Respiratory Quality/Effort Unlabored   Chest Assessment Chest expansion symmetrical;Trachea midline   Breath Sounds   Right Upper Lobe Diminished;End Expiratory Wheezes   Right Middle Lobe Diminished;End Expiratory Wheezes   Right Lower Lobe Diminished   Left Upper Lobe Diminished;End Expiratory Wheezes   Left Lower Lobe Diminished   Cough/Sputum   Cough None   Non-Surgical Airway Endo Tracheal Tube   Placement Date/Time: 04/26/21 4882   Timeout: Patient;Procedure; Appropriate Equipment  Mask Ventilation: Ventilated by mask (1)  Placed By: Licensed provider  Inserted by: Dr Krystal Billy  Insertion attempts: 1  Airway Device: Endo Tracheal Tube  Size: 8   Secured at 24 cm   Me[facility] From [facility] By Commercial tube [email or web address]   Site Condition Dry   Cardiac   Cardiac (WDL) X   Cardiac Rhythm NSR   Cardiac Monitor   Telemetry Monitor On Yes   Telemetry Audible Yes   Telemetry Alarms Set Yes   Telemetry Box Number ICU 10   Telemetry Monitor Alarm Parameters Bedside   Gastrointestinal   Abdominal (WDL) WDL   Peripheral Vascular   Peripheral Vascular (WDL) WDL   Edema None   Skin Color/Condition   Skin Color/Condition (WDL) WDL   Skin Integrity   Skin Integrity (WDL) WDL   Musculoskeletal   Musculoskeletal (WDL) X  (generalized weakness, bilateral wrist restraints)   Genitourinary   Genitourinary (WDL) X  (temp sensing mitchell)   Flank Tenderness KARLEE   Suprapubic Tenderness KARLEE   Dysuria KARLEE   Anus/Rectum   Anus/Rectum (WDL) WDL   Urethral Catheter Temperature probe 16 fr   Placement Date/Time: 04/26/21 0600   Urethral Catheter Timeout: Patient;Procedure; Appropriate Equipment;Sterile technique  Inserted by: Ruba Stoll RN  Catheter Type: Temperature probe  Tube Size (fr): 16 fr  Catheter Balloon Size: 10 mL  Collection C. .. Catheter Indications Need for fluid volume management of the critically ill patient in a critical care setting   Urine Color Yellow   Urine Appearance Clear   Output (mL) 200 mL   Psychosocial   Psychosocial (WDL) X  (intubated and sedated)   Reassessed, changes as noted. Remains intubated and sedated, all ICU monitoring leads in place, medical tubing in place, secure.

## 2021-04-30 NOTE — PROGRESS NOTES
Noncompliant with vent, doublestacking respirations, TV < 200. Peak airway pressure alarm. 100 % 02 breaths/vent ineffective,   Bagged at 400 ml volume per Bel Garcia RN. Versed 2 mg IVP, TF stopped. AmberRT to room, vent adjusted. Additional  Respiratory treatment administered. Call placed to Dr. Jose Almeida for update. Fentanyl IV drip increased back to 175 mcg/hr. HOB>30 degrees. Monitoring closely. Continuing with torsten Peak airway pressures, alarming vent. 12-  Dr. Jose Almeida reached per phone, Order for 5 mg Versed IVP, RT to return patient to previous Pressure control vent settings.

## 2021-04-30 NOTE — PROGRESS NOTES
IM Progress Note    Admit Date:  4/25/2021  5    Interval history:  Acute resp failure, was on bipap, intubated on 4/26   Remains on vent  Failed SBT       Subjective:  Ms. Hunter Camarena seen sedated on vent , BP stable       Objective:   /69   Pulse 85   Temp 98.8 °F (37.1 °C) (Bladder)   Resp 24   Ht 5' 4\" (1.626 m)   Wt 153 lb (69.4 kg)   LMP  (LMP Unknown)   SpO2 97%   BMI 26.26 kg/m²       Intake/Output Summary (Last 24 hours) at 4/30/2021 0740  Last data filed at 4/30/2021 6008  Gross per 24 hour   Intake 557 ml   Output 2655 ml   Net -2098 ml       Physical Exam:        General: middle aged female on vent,   Oral ETT and OG noted  Right IJ TLC   . Appears to be not in any distress  Mucous Membranes:  Pink , anicteric  Neck: No JVD, no carotid bruit, no thyromegaly  Chest: diminished  kelvin air entry with persistent   wheeze  Cardiovascular:  RRR S1S2 heard, no murmurs or gallops  Abdomen:  Soft, undistended, non tender, no organomegaly, BS present  Extremities: No edema or cyanosis.  Distal pulses well felt  Neurological : sedated        Medications:   Scheduled Medications:    insulin lispro  0-12 Units Subcutaneous Q4H    chlorhexidine  15 mL Mouth/Throat BID    midazolam  2 mg Intravenous Once    mupirocin   Nasal BID    aspirin  81 mg Oral Daily    atorvastatin  40 mg Oral Nightly    levothyroxine  125 mcg Oral QAM AC    montelukast  10 mg Oral Nightly    sodium chloride flush  5-40 mL Intravenous 2 times per day    enoxaparin  40 mg Subcutaneous Daily    famotidine  20 mg Oral BID    cefTRIAXone (ROCEPHIN) IV  1,000 mg Intravenous Q24H    methylPREDNISolone  40 mg Intravenous Q12H    ipratropium-albuterol  1 ampule Inhalation Q4H     I   midazolam 3 mg/hr (04/29/21 1643)    fentaNYL 150 mcg/hr (04/30/21 0136)    dextrose      propofol 50 mcg/kg/min (04/29/21 2139)    sodium chloride 25 mL (04/25/21 0645)     midazolam, glucose, dextrose, glucagon (rDNA), dextrose, fentanNYL, significant valvular heart disease.                 ASSESSMENT/PLAN:     #Acute on chronic hypoxic/hypercapnic respiratory failure-due to COPD exacerbation, Pneumonia  -Normally on home oxygen 3 L   -ABG on admission pH 7.2, PCO2 92  - admitted to ICU on BiPAP  - pulmonology consulted. - IV Solu-medrol, HHN , abx initiated   -- failed bipap and worsened leading to intubation and now on vent support  -Received vancomycin and Zosyn in the ED .  was treated with Rocephin, Zithromax and vanc   - sputum neg so far             #Pneumonia, Gram positive organism  - IV Rocephin, Zithromax and off  vanc  - albuterol prn  -Follow-up on cultures  -consider broadening abx         #Sepsis  -Present on admission  -With tachycardia and tachypnea, leukocytosis.    Secondary to pneumonia  Monitor  Improved BP and off levo     #Tobacco abuse  - smoking cessation needed     #Pulmonary Edema  - ECHO last year with normal EF and normal Diastolic function   - IV Lasix 40 mg as tolerated     #Leukocytosis-likely due to chronic steroids  -Trend WBC- remains high   -neg procalcitonin        #Hypothyroidism  - home dose of synthroid 125 mcg      #Hyperlipidemia  - on Atorvastatin.     DVT Prophylaxis: Lovenox  Diet: on TF  Code Status: Full Code       Mal Villar MD 4/30/2021 7:40 AM

## 2021-04-30 NOTE — PROGRESS NOTES
04/30/21 1142   Vent Information   Vent Type 840   Vent Mode AC/VC   Vt Ordered 350 mL   Rate Set 24 bmp   Peak Flow 60 L/min   Pressure Support 0 cmH20   FiO2  40 %   SpO2 96 %   SpO2/FiO2 ratio 240   Sensitivity 3   PEEP/CPAP 5   I Time/ I Time % 0 s   Humidification Source Heated wire   Humidification Temp 37   Circuit Condensation Drained   Vent Patient Data   High Peep/I Pressure 0   Peak Inspiratory Pressure 30 cmH2O   Mean Airway Pressure 11 cmH20   Rate Measured 24 br/min   Vt Exhaled 379 mL   Minute Volume 9.11 Liters   I:E Ratio 1:2.90   Cough/Sputum   Cough None   Spontaneous Breathing Trial (SBT) RT Doc   Pulse 81   Breath Sounds   Right Upper Lobe End Expiratory Wheezes; Diminished   Right Middle Lobe End Expiratory Wheezes; Diminished   Right Lower Lobe Diminished;End Expiratory Wheezes   Left Upper Lobe Diminished;End Expiratory Wheezes   Left Lower Lobe Diminished;End Expiratory Wheezes   Additional Respiratory  Assessments   Resp 24   Position Semi-Woods's   Alarm Settings   High Pressure Alarm 40 cmH2O   Low Minute Volume Alarm 4 L/min   Apnea (secs) 20 secs   High Respiratory Rate 40 br/min   Low Exhaled Vt  250 mL   Patient Observation   Observations ETT SIZE 8.0, SECURED AT 24 LIP LINE. AMBU BAG AT HEAD OF BED. WATER GOOD. Non-Surgical Airway Endo Tracheal Tube   Placement Date/Time: 04/26/21 6835   Timeout: Patient;Procedure; Appropriate Equipment  Mask Ventilation: Ventilated by mask (1)  Placed By: Licensed provider  Inserted by: Dr Ernestine Ramos  Insertion attempts: 1  Airway Device: Endo Tracheal Tube  Size: 8   Secured at 24 cm   Measured From [address] [facility] By [employer] tube [email or web address]   Site Condition Dry

## 2021-04-30 NOTE — PLAN OF CARE
Problem: Falls - Risk of:  Goal: Will remain free from falls  Description: Will remain free from falls  Outcome: Met This Shift  Goal: Absence of physical injury  Description: Absence of physical injury  Outcome: Met This Shift     Problem: Pain:  Goal: Pain level will decrease  Description: Pain level will decrease  Outcome: Met This Shift  Goal: Control of acute pain  Description: Control of acute pain  Outcome: Met This Shift  Goal: Control of chronic pain  Description: Control of chronic pain  Outcome: Met This Shift  Goal: Patient's pain/discomfort is manageable  Description: Patient's pain/discomfort is manageable  Outcome: Met This Shift     Problem: Infection:  Goal: Will remain free from infection  Description: Will remain free from infection  Outcome: Met This Shift

## 2021-04-30 NOTE — PROGRESS NOTES
Dr. Bernice Henriquez reached per phone, status reviewed. Telephone order with readback for 2 Gram Magnesium Sulfate IV now, Ketamine 100 mg IVP x1 and to start Ketamine IV drmynor. Reema Valverde 89 aware and placing order. [address] [facility] Pharmacist to room , concern over BP and Ketamine administration. Calling Dr Bernice Henriquez to discuss. Fi02 to 60%.

## 2021-04-30 NOTE — PROGRESS NOTES
Pt resting in bed, vitals per doc flow. Assessment complete see doc flow. Bilateral lung sounds are diminished. NSR on ICU bedside monitor. #8 ETT @ 24 LL. Vent: PC RR:24  FIO2:45 % PEEP 8 SPO2 98% with CSPO2 monitoring. RASS -3. PERRL. CVC  PIV x 1 WDL. OG placement checked via gastric contents. Minimal residual noted. Medications administered via OG, flushed easily. clamped  TF resumed @ 25 ml/hr per MD order Baugh secured draining clear yellow urine. Pt repositioned for comfort. Will continue to closely monitor.

## 2021-04-30 NOTE — PROGRESS NOTES
Resting well, Dr Gregory Pierre updated on unit,  PC vent settings active. Monitoring closely. Insp volumes > 250.

## 2021-04-30 NOTE — PROGRESS NOTES
04/29/21 2011   Vent Information   $Ventilation $Subsequent Day   Skin Assessment Clean, dry, & intact   Vent Type 840   Vent Mode AC/PC   Vt Ordered 0 mL   Rate Set 24 bmp   Peak Flow 0 L/min   Pressure Support 0 cmH20   FiO2  45 %   SpO2 97 %   SpO2/FiO2 ratio 215.56   Sensitivity 3   PEEP/CPAP 8   I Time/ I Time % 0 s   Humidification Source Heated wire   Humidification Temp 37   Humidification Temp Measured 37   Circuit Condensation Drained   Vent Patient Data   High Peep/I Pressure 25   Peak Inspiratory Pressure 35 cmH2O   Mean Airway Pressure 15 cmH20   Rate Measured 24 br/min   Vt Exhaled 397 mL   Minute Volume 9.55 Liters   I:E Ratio 1:2.80   Plateau Pressure 24 [id number]   Static Compliance 25 mL/cmH2O   Dynamic Compliance 15 mL/cmH2O   Cough/Sputum   Sputum How Obtained Endotracheal;Suctioned   Cough Productive   Sputum Amount Small   Sputum Color Creamy; Yellow   Tenacity Thick   Spontaneous Breathing Trial (SBT) RT Doc   Pulse 80   Breath Sounds   Right Upper Lobe Inspiratory Wheezes   Right Middle Lobe Expiratory Wheezes   Right Lower Lobe Diminished   Left Upper Lobe Inspiratory Wheezes   Left Lower Lobe Diminished   Additional Respiratory  Assessments   Resp 24   Position Semi-Woods's   Alarm Settings   High Pressure Alarm 50 cmH2O   Low Minute Volume Alarm 4 L/min   High Respiratory Rate 40 br/min   Patient Observation   Observations ETT SIZE 8.0, SECURED AT 24 LIP LINE. AMBU BAG AT HEAD OF BED. WATER GOOD. Non-Surgical Airway Endo Tracheal Tube   Placement Date/Time: 04/26/21 0528   Timeout: Patient;Procedure; Appropriate Equipment  Mask Ventilation: Ventilated by mask (1)  Placed By: Licensed provider  Inserted by: Dr Carly Fink  Insertion attempts: 1  Airway Device: Endo Tracheal Tube  Size: 8   Secured at 24 cm   Measured From Lips   Secured Location Left   Secured By Commercial tube [email or web address]   Site Condition Dry        04/29/21 2011   Vent Information   $Ventilation $Subsequent Day   Skin Assessment Clean, dry, & intact   Vent Type 840   Vent Mode AC/PC   Vt Ordered 0 mL   Rate Set 24 bmp   Peak Flow 0 L/min   Pressure Support 0 cmH20   FiO2  45 %   SpO2 97 %   SpO2/FiO2 ratio 215.56   Sensitivity 3   PEEP/CPAP 8   I Time/ I Time % 0 s   Humidification Source Heated wire   Humidification Temp 37   Humidification Temp Measured 37   Circuit Condensation Drained   Vent Patient Data   High Peep/I Pressure 25   Peak Inspiratory Pressure 35 cmH2O   Mean Airway Pressure 15 cmH20   Rate Measured 24 br/min   Vt Exhaled 397 mL   Minute Volume 9.55 Liters   I:E Ratio 1:2.80   Plateau Pressure 24 [id number]   Static Compliance 25 mL/cmH2O   Dynamic Compliance 15 mL/cmH2O   Cough/Sputum   Sputum How Obtained Endotracheal;Suctioned   Cough Productive   Sputum Amount Small   Sputum Color Creamy; Yellow   Tenacity Thick   Spontaneous Breathing Trial (SBT) RT Doc   Pulse 80   Breath Sounds   Right Upper Lobe Inspiratory Wheezes   Right Middle Lobe Expiratory Wheezes   Right Lower Lobe Diminished   Left Upper Lobe Inspiratory Wheezes   Left Lower Lobe Diminished   Additional Respiratory  Assessments   Resp 24   Position Semi-Woods's   Alarm Settings   High Pressure Alarm 50 cmH2O   Low Minute Volume Alarm 4 L/min   High Respiratory Rate 40 br/min   Patient Observation   Observations ETT SIZE 8.0, SECURED AT 24 LIP LINE. AMBU BAG AT HEAD OF BED. WATER GOOD. Non-Surgical Airway Endo Tracheal Tube   Placement Date/Time: 04/26/21 0528   Timeout: Patient;Procedure; Appropriate Equipment  Mask Ventilation: Ventilated by mask (1)  Placed By: Licensed provider  Inserted by: Dr Fernando Dos Santos  Insertion attempts: 1  Airway Device: Endo Tracheal Tube  Size: 8   Secured at 24 cm   Measured From Lips   Secured Location Left   Secured By Commercial tube [email or web address]   Site Condition Dry        04/29/21 2011   Vent Information   $Ventilation $Subsequent Day   Skin Assessment Clean, dry, & intact   Vent Type 840   Vent Mode AC/PC   Vt Ordered 0 mL Rate Set 24 bmp   Peak Flow 0 L/min   Pressure Support 0 cmH20   FiO2  45 %   SpO2 97 %   SpO2/FiO2 ratio 215.56   Sensitivity 3   PEEP/CPAP 8   I Time/ I Time % 0 s   Humidification Source Heated wire   Humidification Temp 37   Humidification Temp Measured 37   Circuit Condensation Drained   Vent Patient Data   High Peep/I Pressure 25   Peak Inspiratory Pressure 35 cmH2O   Mean Airway Pressure 15 cmH20   Rate Measured 24 br/min   Vt Exhaled 397 mL   Minute Volume 9.55 Liters   I:E Ratio 1:2.80   Plateau Pressure 24 [id number]   Static Compliance 25 mL/cmH2O   Dynamic Compliance 15 mL/cmH2O   Cough/Sputum   Sputum How Obtained Endotracheal;Suctioned   Cough Productive   Sputum Amount Small   Sputum Color Creamy; Yellow   Tenacity Thick   Spontaneous Breathing Trial (SBT) RT Doc   Pulse 80   Breath Sounds   Right Upper Lobe Inspiratory Wheezes   Right Middle Lobe Expiratory Wheezes   Right Lower Lobe Diminished   Left Upper Lobe Inspiratory Wheezes   Left Lower Lobe Diminished   Additional Respiratory  Assessments   Resp 24   Position Semi-Woods's   Alarm Settings   High Pressure Alarm 50 cmH2O   Low Minute Volume Alarm 4 L/min   High Respiratory Rate 40 br/min   Patient Observation   Observations ETT SIZE 8.0, SECURED AT 24 LIP LINE. AMBU BAG AT HEAD OF BED. WATER GOOD. Non-Surgical Airway Endo Tracheal Tube   Placement Date/Time: 04/26/21 0528   Timeout: Patient;Procedure; Appropriate Equipment  Mask Ventilation: Ventilated by mask (1)  Placed By: Licensed provider  Inserted by: Dr Tristan Ang  Insertion attempts: 1  Airway Device: Endo Tracheal Tube  Size: 8   Secured at 24 cm   Measured From Lips   Secured Location Left   Secured By Commercial tube [email or web address]   Site Condition Dry        04/29/21 2011   Vent Information   $Ventilation $Subsequent Day   Skin Assessment Clean, dry, & intact   Vent Type 840   Vent Mode AC/PC   Vt Ordered 0 mL   Rate Set 24 bmp   Peak Flow 0 L/min   Pressure Support 0 cmH20   FiO2  45 % SpO2 97 %   SpO2/FiO2 ratio 215.56   Sensitivity 3   PEEP/CPAP 8   I Time/ I Time % 0 s   Humidification Source Heated wire   Humidification Temp 37   Humidification Temp Measured 37   Circuit Condensation Drained   Vent Patient Data   High Peep/I Pressure 25   Peak Inspiratory Pressure 35 cmH2O   Mean Airway Pressure 15 cmH20   Rate Measured 24 br/min   Vt Exhaled 397 mL   Minute Volume 9.55 Liters   I:E Ratio 1:2.80   Plateau Pressure 24 [id number]   Static Compliance 25 mL/cmH2O   Dynamic Compliance 15 mL/cmH2O   Cough/Sputum   Sputum How Obtained Endotracheal;Suctioned   Cough Productive   Sputum Amount Small   Sputum Color Creamy; Yellow   Tenacity Thick   Spontaneous Breathing Trial (SBT) RT Doc   Pulse 80   Breath Sounds   Right Upper Lobe Inspiratory Wheezes   Right Middle Lobe Expiratory Wheezes   Right Lower Lobe Diminished   Left Upper Lobe Inspiratory Wheezes   Left Lower Lobe Diminished   Additional Respiratory  Assessments   Resp 24   Position Semi-Woods's   Alarm Settings   High Pressure Alarm 50 cmH2O   Low Minute Volume Alarm 4 L/min   High Respiratory Rate 40 br/min   Patient Observation   Observations ETT SIZE 8.0, SECURED AT 24 LIP LINE. AMBU BAG AT HEAD OF BED. WATER GOOD. Non-Surgical Airway Endo Tracheal Tube   Placement Date/Time: 04/26/21 0528   Timeout: Patient;Procedure; Appropriate Equipment  Mask Ventilation: Ventilated by mask (1)  Placed By: Licensed provider  Inserted by: Dr Rebekah Hurtado  Insertion attempts: 1  Airway Device: Endo Tracheal Tube  Size: 8   Secured at 24 cm   Measured From Lips   Secured Location Left   Secured By [employer] tube [email or web address]   Site Condition Dry        04/29/21 2011   Vent Information   $Ventilation $Subsequent Day   Skin Assessment Clean, dry, & intact   Vent Type 840   Vent Mode AC/PC   Vt Ordered 0 mL   Rate Set 24 bmp   Peak Flow 0 L/min   Pressure Support 0 cmH20   FiO2  45 %   SpO2 97 %   SpO2/FiO2 ratio 215.56   Sensitivity 3   PEEP/CPAP 8   I Time/ I Time % 0 s   Humidification Source Heated wire   Humidification Temp 37   Humidification Temp Measured 37   Circuit Condensation Drained   Vent Patient Data   High Peep/I Pressure 25   Peak Inspiratory Pressure 35 cmH2O   Mean Airway Pressure 15 cmH20   Rate Measured 24 br/min   Vt Exhaled 397 mL   Minute Volume 9.55 Liters   I:E Ratio 1:2.80   Plateau Pressure 24 KSL58   Static Compliance 25 mL/cmH2O   Dynamic Compliance 15 mL/cmH2O   Cough/Sputum   Sputum How Obtained Endotracheal;Suctioned   Cough Productive   Sputum Amount Small   Sputum Color Creamy; Yellow   Tenacity Thick   Spontaneous Breathing Trial (SBT) RT Doc   Pulse 80   Breath Sounds   Right Upper Lobe Inspiratory Wheezes   Right Middle Lobe Expiratory Wheezes   Right Lower Lobe Diminished   Left Upper Lobe Inspiratory Wheezes   Left Lower Lobe Diminished   Additional Respiratory  Assessments   Resp 24   Position Semi-Woods's   Alarm Settings   High Pressure Alarm 50 cmH2O   Low Minute Volume Alarm 4 L/min   High Respiratory Rate 40 br/min   Patient Observation   Observations ETT SIZE 8.0, SECURED AT 24 LIP LINE. AMBU BAG AT HEAD OF BED. WATER GOOD. Non-Surgical Airway Endo Tracheal Tube   Placement Date/Time: 04/26/21 0528   Timeout: Patient;Procedure; Appropriate Equipment  Mask Ventilation: Ventilated by mask (1)  Placed By: Licensed provider  Inserted by: Dr Andrew Jones  Insertion attempts: 1  Airway Device: Endo Tracheal Tube  Size: 8   Secured at 24 cm   Measured From Lips   Secured Location Left   Secured By [employer] [facility] tube [email or web address]   Site Condition Dry        04/29/21 2011   Vent Information   $Ventilation $Subsequent Day   Skin Assessment Clean, dry, & intact   Vent Type 840   Vent Mode AC/PC   Vt Ordered 0 mL   Rate Set 24 bmp   Peak Flow 0 L/min   Pressure Support 0 cmH20   FiO2  45 %   SpO2 97 %   SpO2/FiO2 ratio 215.56   Sensitivity 3   PEEP/CPAP 8   I Time/ I Time % 0 s   Humidification Source Heated wire   Humidification Temp 37   Humidification Temp Measured 37   Circuit Condensation Drained   Vent Patient Data   High Peep/I Pressure 25   Peak Inspiratory Pressure 35 cmH2O   Mean Airway Pressure 15 cmH20   Rate Measured 24 br/min   Vt Exhaled 397 mL   Minute Volume 9.55 Liters   I:E Ratio 1:2.80   Plateau Pressure 24 HRL86   Static Compliance 25 mL/cmH2O   Dynamic Compliance 15 mL/cmH2O   Cough/Sputum   Sputum How Obtained Endotracheal;Suctioned   Cough Productive   Sputum Amount Small   Sputum Color Creamy; Yellow   Tenacity Thick   Spontaneous Breathing Trial (SBT) RT Doc   Pulse 80   Breath Sounds   Right Upper Lobe Inspiratory Wheezes   Right Middle Lobe Expiratory Wheezes   Right Lower Lobe Diminished   Left Upper Lobe Inspiratory Wheezes   Left Lower Lobe Diminished   Additional Respiratory  Assessments   Resp 24   Position Semi-Woods's   Alarm Settings   High Pressure Alarm 50 cmH2O   Low Minute Volume Alarm 4 L/min   High Respiratory Rate 40 br/min   Patient Observation   Observations ETT SIZE 8.0, SECURED AT 24 LIP LINE. AMBU BAG AT HEAD OF BED. WATER GOOD. Non-Surgical Airway Endo Tracheal Tube   Placement Date/Time: 04/26/21 0585   Timeout: Patient;Procedure; Appropriate Equipment  Mask Ventilation: Ventilated by mask (1)  Placed By: Licensed provider  Inserted by: Dr Damian Tavarez  Insertion attempts: 1  Airway Device: Endo Tracheal Tube  Size: 8   Secured at 24 cm   Measured From Lips   Secured Location Left   Secured By Commercial tube [email or web address]   Site Condition Dry

## 2021-04-30 NOTE — PROGRESS NOTES
04/29/21 2344   Vent Information   Vent Type 840   Vent Mode AC/PC   Vt Ordered 0 mL   Rate Set 24 bmp   Peak Flow 0 L/min   Pressure Support 0 cmH20   FiO2  45 %   SpO2 99 %   SpO2/FiO2 ratio 220   Sensitivity 3   PEEP/CPAP 8   I Time/ I Time % 0 s   Humidification Source Heated wire   Humidification Temp 37   Humidification Temp Measured 36.9   Circuit Condensation Drained   Vent Patient Data   High Peep/I Pressure 25   Peak Inspiratory Pressure 35 cmH2O   Mean Airway Pressure 15 cmH20   Rate Measured 24 br/min   Vt Exhaled 399 mL   Minute Volume 9.62 Liters   I:E Ratio 1:2.80   Cough/Sputum   Sputum How Obtained Endotracheal;Suctioned   Cough Productive   Sputum Amount Small   Sputum Color Creamy   Tenacity Thick   Spontaneous Breathing Trial (SBT) RT Doc   Pulse 72   Breath Sounds   Right Upper Lobe Expiratory Wheezes   Right Middle Lobe Expiratory Wheezes   Right Lower Lobe Expiratory Wheezes   Left Upper Lobe Expiratory Wheezes   Left Lower Lobe Expiratory Wheezes   Additional Respiratory  Assessments   Resp 24   Position Semi-Woods's   Alarm Settings   High Pressure Alarm 50 cmH2O   Low Minute Volume Alarm 4 L/min   High Respiratory Rate 40 br/min   Patient Observation   Observations ETT SIZE 8.0, SECURED AT 24 LIP LINE. AMBU BAG AT HEAD OF BED. WATER GOOD. Non-Surgical Airway Endo Tracheal Tube   Placement Date/Time: 04/26/21 0588   Timeout: Patient;Procedure; Appropriate Equipment  Mask Ventilation: Ventilated by mask (1)  Placed By: Licensed provider  Inserted by: Dr Lali Odell  Insertion attempts: 1  Airway Device: Endo Tracheal Tube  Size: 8   Secured at 24 cm   Measured From [facility] By Commercial tube [email or web address]   Site Condition Dry

## 2021-04-30 NOTE — PROGRESS NOTES
Weaned from [place] of 8 to 5. Tolerating well.       04/30/21 0814   Vent Information   Vent Type 840   Vent Mode AC/PC   Vt Ordered 0 mL   Pressure Ordered 25   Rate Set 24 bmp   Peak Flow 65 L/min   Pressure Support 0 cmH20   FiO2  40 %   SpO2 97 %   SpO2/FiO2 ratio 242.5   Sensitivity 3   PEEP/CPAP 5   I Time/ I Time % 0 s   Humidification Source Heated wire   Humidification Temp 37   Circuit Condensation Drained   Vent Patient Data   High Peep/I Pressure 25   Peak Inspiratory Pressure 35 cmH2O   Mean Airway Pressure 15 cmH20   Rate Measured 24 br/min   Vt Exhaled 412 mL   Minute Volume 9.96 Liters   I:E Ratio 1:2.80   Plateau Pressure 24 [id number]   Static Compliance 19 mL/cmH2O   Dynamic Compliance 14 mL/cmH2O   Cough/Sputum   Sputum How Obtained Endotracheal;Suctioned   Cough None   Spontaneous Breathing Trial (SBT) RT Doc   Pulse 78   Breath Sounds   Right Upper Lobe Diminished   Right Middle Lobe Diminished   Right Lower Lobe Diminished   Left Upper Lobe Diminished   Left Lower Lobe Diminished   Additional Respiratory  Assessments   Resp 24   Position Semi-Woods's   Alarm Settings   High Pressure Alarm 50 cmH2O   Low Minute Volume Alarm 4 L/min   Apnea (secs) 20 secs   High Respiratory Rate 40 br/min   Low Exhaled Vt  250 mL   Patient Observation   Observations ETT SIZE 8.0, SECURED AT 24 LIP LINE. AMBU BAG AT HEAD OF BED. WATER GOOD. Non-Surgical Airway Endo Tracheal Tube   Placement Date/Time: 04/26/21 0528   Timeout: Patient;Procedure; Appropriate Equipment  Mask Ventilation: Ventilated by mask (1)  Placed By: Licensed provider  Inserted by: Dr Sis Pardo  Insertion attempts: 1  Airway Device: Endo Tracheal Tube  Size: 8   Secured at 24 cm   Measured From [address] [facility] By Commercial tube [email or web address]   Site Condition Dry

## 2021-04-30 NOTE — PROGRESS NOTES
Pulmonary & Critical Care Medicine ICU Progress Note    CC: Shortness of breath, respiratory failure    Events of Last 24 hours:   Marked worsening yesterday afternoon with agitation and more difficulty ventilating 2/2 airflow obstruction  Severe air trapping    Invasive Lines:  JOANNE CVC 21 by me     MV: 2021  Vent Mode: AC/PC Rate Set: 24 bmp/Vt Ordered: 0 mL/ /FiO2 : 45 %  Recent Labs     21  1412 21  0350   PHART 7.298* 7.361   [id number] 87.5* 65.7*   PO2ART 96.2 123.7*       IV:   midazolam 3 mg/hr (21 1643)    fentaNYL 150 mcg/hr (21 0136)    dextrose      propofol 50 mcg/kg/min (21 2139)    sodium chloride 25 mL (21 0645)       Vitals:  Blood pressure 115/69, pulse 85, temperature 98.8 °F (37.1 °C), temperature source Bladder, resp. rate 24, height 5' 4\" (1.626 m), weight 153 lb (69.4 kg), SpO2 97 %, not currently breastfeeding. on vent 45%  Temp  Av.1 °F (37.3 °C)  Min: 98.6 °F (37 °C)  Max: 99.6 °F (37.6 °C)    Intake/Output Summary (Last 24 hours) at 2021 0716  Last data filed at 2021 0526  Gross per 24 hour   Intake 557 ml   Output 2795 ml   Net -2238 ml     EXAM:  General: intubated, ill appearing    ENT: Pharynx with ETT. Resp: No crackles. + wheezing but better air movement. CV: S1, S2. No edema  GI: NT, ND, +BS  Skin: Warm and dry. Neuro: PERRL. Sedated, not following commands.  Patellar reflexes are symmetric     Scheduled Meds:   insulin lispro  0-12 Units Subcutaneous Q4H    chlorhexidine  15 mL Mouth/Throat BID    midazolam  2 mg Intravenous Once    mupirocin   Nasal BID    aspirin  81 mg Oral Daily    atorvastatin  40 mg Oral Nightly    levothyroxine  125 mcg Oral QAM AC    montelukast  10 mg Oral Nightly    sodium chloride flush  5-40 mL Intravenous 2 times per day    enoxaparin  40 mg Subcutaneous Daily    famotidine  20 mg Oral BID    cefTRIAXone (ROCEPHIN) IV  1,000 mg Intravenous Q24H    methylPREDNISolone  40 mg Intravenous Q12H    ipratropium-albuterol  1 ampule Inhalation Q4H     PRN Meds:  midazolam, glucose, dextrose, glucagon (rDNA), dextrose, fentanNYL, albuterol sulfate HFA **AND** ipratropium **AND** MDI Treatment, sodium chloride flush, sodium chloride, polyethylene glycol, acetaminophen **OR** acetaminophen, ondansetron **OR** ondansetron, albuterol, ibuprofen    Results:  CBC:   Recent Labs     04/28/21  0506 04/29/21  0530 04/30/21  0345   WBC 14.4* 12.5* 10.8   HGB 10.6* 9.9* 9.7*   HCT 32.1* 29.6* 29.1*   MCV 97.1 97.4 97.5    229 247     BMP:   Recent Labs     04/29/21  0530 04/29/21  1210 04/30/21  0345    139 138   K 5.7* 5.0 4.9   CL 97* 93* 93*   CO2 39* 41* 40*   PHOS  --  3.6  --    BUN 36* 42* 45*   CREATININE <0.5* 0.6 <0.5*     LIVER PROFILE:   No results for input(s): AST, ALT, LIPASE, BILIDIR, BILITOT, ALKPHOS in the last 72 hours. Invalid input(s): AMYLASE,  ALB    Cultures:  4/25/2021 SARS-CoV-2 negative   4/25/2021 blood no growth to date  4/26/2021 respiratory culture candida     Films:  CXR 4/30/2021 hyperinflation    ASSESSMENT:  · Acute on chronic respiratory failure with hypoxemia and hypercapnia - failed BiPAP with worsening hypercapnia, typically on 3 L home oxygen  · Community acquired pneumonia   · Septic shock  · COPD with AE; severe airtrapping - marked worsening when PEEP was dropped to 5  · Small pleural effusions  · Ongoing tobacco abuse    PLAN:  Mechanical ventilation as per my orders. The ventilator was adjusted by me at the bedside for unstable, life threatening respiratory failure. changed back to [facility] volume target today   IV Propofol for sedation, target RASS -2, with daily spontaneous awakening trial.  Titrate vesed down as able.  Fentanyl gtt    Head of bed 30 degrees or higher at all times  IV Solu-Medrol  Ceftriaxone D#6/7, received 5 days azithromycin    ICU care- ap montez is spouse    Total critical care time caring for this patient with life threatening, unstable organ failure, including direct patient contact, management of life support systems, review of data including imaging and labs, discussions with other team members and physicians is 38 minutes so far today, excluding procedures.

## 2021-04-30 NOTE — CARE COORDINATION
INTERDISCIPLINARY PLAN OF CARE CONFERENCE    Date/Time: 4/30/2021 3:45 PM  Completed by: Velvet Veliz, Case Management      Patient Name:  Billy Esparza  YOB: 1960  Admitting Diagnosis: Acute on chronic respiratory failure ([facility] [address] [J96.20]     Admit Date/Time:  4/25/2021  2:30 AM    Chart reviewed. Interdisciplinary team contacted or reviewed plan related to patient progress and discharge plans. Disciplines included Case Management, Nursing, and Dietitian. Current Status:ICU LOC,inpt  PT/OT recommendation for discharge plan of care: tbd    Expected D/C Disposition:  tbd    Discharge Plan Comments: Reviewed chart. Pt in ICU and remains on Ventilator. Pt from home with spouse and plan to be decided pending medical progress. Pt active with [employer] for home O2. Follow.     Home O2 in place on admit: Yes

## 2021-04-30 NOTE — PROGRESS NOTES
Peak pressure alarming, low Insp volume vent alarms. Cheryl RT called to bedside, 100 % 02 breaths given. Vent adjusted per RT. Patient in supine position HOB 45 degrees.

## 2021-05-01 ENCOUNTER — APPOINTMENT (OUTPATIENT)
Dept: GENERAL RADIOLOGY | Age: 61
DRG: 870 | End: 2021-05-01
Payer: COMMERCIAL

## 2021-05-01 LAB
ANION GAP SERPL CALCULATED.3IONS-SCNC: 1 MMOL/L (ref 3–16)
ATYPICAL LYMPHOCYTE RELATIVE PERCENT: 1 % (ref 0–6)
BANDED NEUTROPHILS RELATIVE PERCENT: 2 % (ref 0–7)
BASE EXCESS ARTERIAL: 16.1 MMOL/L (ref -3–3)
BASOPHILS ABSOLUTE: 0 K/UL (ref 0–0.2)
BASOPHILS RELATIVE PERCENT: 0 %
BUN BLDV-MCNC: 45 MG/DL (ref 7–20)
CALCIUM SERPL-MCNC: 9.7 MG/DL (ref 8.3–10.6)
CARBOXYHEMOGLOBIN ARTERIAL: 0.3 % (ref 0–1.5)
CHLORIDE BLD-SCNC: 95 MMOL/L (ref 99–110)
CO2: 44 MMOL/L (ref 21–32)
CREAT SERPL-MCNC: <0.5 MG/DL (ref 0.6–1.2)
EOSINOPHILS ABSOLUTE: 0 K/UL (ref 0–0.6)
EOSINOPHILS RELATIVE PERCENT: 0 %
GFR AFRICAN AMERICAN: >60
GFR NON-AFRICAN AMERICAN: >60
GLUCOSE BLD-MCNC: 107 MG/DL (ref 70–99)
GLUCOSE BLD-MCNC: 116 MG/DL (ref 70–99)
GLUCOSE BLD-MCNC: 122 MG/DL (ref 70–99)
GLUCOSE BLD-MCNC: 124 MG/DL (ref 70–99)
GLUCOSE BLD-MCNC: 133 MG/DL (ref 70–99)
GLUCOSE BLD-MCNC: 136 MG/DL (ref 70–99)
HCO3 ARTERIAL: 44.5 MMOL/L (ref 21–29)
HCT VFR BLD CALC: 28.6 % (ref 36–48)
HEMATOLOGY PATH CONSULT: NO
HEMOGLOBIN, ART, EXTENDED: 11.8 G/DL (ref 12–16)
HEMOGLOBIN: 9.5 G/DL (ref 12–16)
LYMPHOCYTES ABSOLUTE: 1.3 K/UL (ref 1–5.1)
LYMPHOCYTES RELATIVE PERCENT: 8 %
MCH RBC QN AUTO: 32.3 PG (ref 26–34)
MCHC RBC AUTO-ENTMCNC: 33.1 G/DL (ref 31–36)
MCV RBC AUTO: 97.6 FL (ref 80–100)
METHEMOGLOBIN ARTERIAL: 0.2 %
MONOCYTES ABSOLUTE: 1.3 K/UL (ref 0–1.3)
MONOCYTES RELATIVE PERCENT: 9 %
MYELOCYTE PERCENT: 1 %
NEUTROPHILS ABSOLUTE: 11.7 K/UL (ref 1.7–7.7)
NEUTROPHILS RELATIVE PERCENT: 79 %
NUCLEATED RED BLOOD CELLS: 1 /100 WBC
O2 CONTENT ARTERIAL: 17 ML/DL
O2 SAT, ARTERIAL: 98.8 %
O2 THERAPY: ABNORMAL
PCO2 ARTERIAL: 76.8 MMHG (ref 35–45)
PDW BLD-RTO: 13.8 % (ref 12.4–15.4)
PERFORMED ON: ABNORMAL
PH ARTERIAL: 7.38 (ref 7.35–7.45)
PLATELET # BLD: 235 K/UL (ref 135–450)
PLATELET SLIDE REVIEW: ADEQUATE
PMV BLD AUTO: 7.9 FL (ref 5–10.5)
PO2 ARTERIAL: 148 MMHG (ref 75–108)
POTASSIUM REFLEX MAGNESIUM: 5.2 MMOL/L (ref 3.5–5.1)
RBC # BLD: 2.93 M/UL (ref 4–5.2)
RBC # BLD: NORMAL 10*6/UL
SLIDE REVIEW: ABNORMAL
SODIUM BLD-SCNC: 140 MMOL/L (ref 136–145)
TCO2 ARTERIAL: 46.9 MMOL/L
WBC # BLD: 14.3 K/UL (ref 4–11)

## 2021-05-01 PROCEDURE — 94003 VENT MGMT INPAT SUBQ DAY: CPT

## 2021-05-01 PROCEDURE — 94640 AIRWAY INHALATION TREATMENT: CPT

## 2021-05-01 PROCEDURE — 2580000003 HC RX 258: Performed by: INTERNAL MEDICINE

## 2021-05-01 PROCEDURE — 6370000000 HC RX 637 (ALT 250 FOR IP)

## 2021-05-01 PROCEDURE — 31624 DX BRONCHOSCOPE/LAVAGE: CPT | Performed by: INTERNAL MEDICINE

## 2021-05-01 PROCEDURE — 82803 BLOOD GASES ANY COMBINATION: CPT

## 2021-05-01 PROCEDURE — 6360000002 HC RX W HCPCS: Performed by: INTERNAL MEDICINE

## 2021-05-01 PROCEDURE — 94761 N-INVAS EAR/PLS OXIMETRY MLT: CPT

## 2021-05-01 PROCEDURE — 99291 CRITICAL CARE FIRST HOUR: CPT | Performed by: INTERNAL MEDICINE

## 2021-05-01 PROCEDURE — 71045 X-RAY EXAM CHEST 1 VIEW: CPT

## 2021-05-01 PROCEDURE — 6370000000 HC RX 637 (ALT 250 FOR IP): Performed by: INTERNAL MEDICINE

## 2021-05-01 PROCEDURE — 87070 CULTURE OTHR SPECIMN AEROBIC: CPT

## 2021-05-01 PROCEDURE — 31622 DX BRONCHOSCOPE/WASH: CPT

## 2021-05-01 PROCEDURE — 2700000000 HC OXYGEN THERAPY PER DAY

## 2021-05-01 PROCEDURE — 2500000003 HC RX 250 WO HCPCS: Performed by: INTERNAL MEDICINE

## 2021-05-01 PROCEDURE — 87205 SMEAR GRAM STAIN: CPT

## 2021-05-01 PROCEDURE — 2000000000 HC ICU R&B

## 2021-05-01 PROCEDURE — 0B9J8ZX DRAINAGE OF LEFT LOWER LUNG LOBE, VIA NATURAL OR ARTIFICIAL OPENING ENDOSCOPIC, DIAGNOSTIC: ICD-10-PCS | Performed by: INTERNAL MEDICINE

## 2021-05-01 PROCEDURE — 85025 COMPLETE CBC W/AUTO DIFF WBC: CPT

## 2021-05-01 PROCEDURE — 99233 SBSQ HOSP IP/OBS HIGH 50: CPT | Performed by: INTERNAL MEDICINE

## 2021-05-01 PROCEDURE — 31645 BRNCHSC W/THER ASPIR 1ST: CPT | Performed by: INTERNAL MEDICINE

## 2021-05-01 PROCEDURE — 80048 BASIC METABOLIC PNL TOTAL CA: CPT

## 2021-05-01 RX ORDER — LIDOCAINE HYDROCHLORIDE 40 MG/ML
SOLUTION TOPICAL
Status: COMPLETED
Start: 2021-05-01 | End: 2021-05-01

## 2021-05-01 RX ORDER — POLYETHYLENE GLYCOL 3350 17 G/17G
17 POWDER, FOR SOLUTION ORAL DAILY
Status: DISCONTINUED | OUTPATIENT
Start: 2021-05-01 | End: 2021-05-14 | Stop reason: HOSPADM

## 2021-05-01 RX ADMIN — VANCOMYCIN HYDROCHLORIDE 1000 MG: 1 INJECTION, POWDER, LYOPHILIZED, FOR SOLUTION INTRAVENOUS at 11:38

## 2021-05-01 RX ADMIN — PROPOFOL 50 MCG/KG/MIN: 10 INJECTION, EMULSION INTRAVENOUS at 13:03

## 2021-05-01 RX ADMIN — IPRATROPIUM BROMIDE AND ALBUTEROL SULFATE 1 AMPULE: 2.5; .5 SOLUTION RESPIRATORY (INHALATION) at 20:04

## 2021-05-01 RX ADMIN — ENOXAPARIN SODIUM 40 MG: 40 INJECTION SUBCUTANEOUS at 10:26

## 2021-05-01 RX ADMIN — PROPOFOL 50 MCG/KG/MIN: 10 INJECTION, EMULSION INTRAVENOUS at 17:46

## 2021-05-01 RX ADMIN — METHYLPREDNISOLONE SODIUM SUCCINATE 40 MG: 40 INJECTION, POWDER, FOR SOLUTION INTRAMUSCULAR; INTRAVENOUS at 10:33

## 2021-05-01 RX ADMIN — Medication 175 MCG/HR: at 08:24

## 2021-05-01 RX ADMIN — IPRATROPIUM BROMIDE AND ALBUTEROL SULFATE 1 AMPULE: 2.5; .5 SOLUTION RESPIRATORY (INHALATION) at 11:19

## 2021-05-01 RX ADMIN — CEFEPIME 2000 MG: 2 INJECTION, POWDER, FOR SOLUTION INTRAVENOUS at 10:29

## 2021-05-01 RX ADMIN — Medication 15 ML: at 10:26

## 2021-05-01 RX ADMIN — CEFTRIAXONE SODIUM 1000 MG: 1 INJECTION, POWDER, FOR SOLUTION INTRAMUSCULAR; INTRAVENOUS at 05:38

## 2021-05-01 RX ADMIN — ATORVASTATIN CALCIUM 40 MG: 40 TABLET, FILM COATED ORAL at 19:31

## 2021-05-01 RX ADMIN — VANCOMYCIN HYDROCHLORIDE 1000 MG: 1 INJECTION, POWDER, LYOPHILIZED, FOR SOLUTION INTRAVENOUS at 23:12

## 2021-05-01 RX ADMIN — MONTELUKAST SODIUM 10 MG: 10 TABLET, COATED ORAL at 19:31

## 2021-05-01 RX ADMIN — PROPOFOL 50 MCG/KG/MIN: 10 INJECTION, EMULSION INTRAVENOUS at 02:28

## 2021-05-01 RX ADMIN — IPRATROPIUM BROMIDE AND ALBUTEROL SULFATE 1 AMPULE: 2.5; .5 SOLUTION RESPIRATORY (INHALATION) at 23:50

## 2021-05-01 RX ADMIN — FAMOTIDINE 20 MG: 20 TABLET ORAL at 10:29

## 2021-05-01 RX ADMIN — Medication 10 ML: at 19:31

## 2021-05-01 RX ADMIN — LIDOCAINE HYDROCHLORIDE: 40 SOLUTION TOPICAL at 09:26

## 2021-05-01 RX ADMIN — CEFEPIME 2000 MG: 2 INJECTION, POWDER, FOR SOLUTION INTRAVENOUS at 18:36

## 2021-05-01 RX ADMIN — Medication 175 MCG/HR: at 02:29

## 2021-05-01 RX ADMIN — LEVOTHYROXINE SODIUM 125 MCG: 25 TABLET ORAL at 05:38

## 2021-05-01 RX ADMIN — FAMOTIDINE 20 MG: 20 TABLET ORAL at 19:31

## 2021-05-01 RX ADMIN — Medication 17.5 MCG/HR: at 20:45

## 2021-05-01 RX ADMIN — MIDAZOLAM HYDROCHLORIDE 2 MG/HR: 5 INJECTION, SOLUTION INTRAMUSCULAR; INTRAVENOUS at 10:53

## 2021-05-01 RX ADMIN — IPRATROPIUM BROMIDE AND ALBUTEROL SULFATE 1 AMPULE: 2.5; .5 SOLUTION RESPIRATORY (INHALATION) at 15:58

## 2021-05-01 RX ADMIN — POLYETHYLENE GLYCOL (3350) 17 G: 17 POWDER, FOR SOLUTION ORAL at 10:26

## 2021-05-01 RX ADMIN — METHYLPREDNISOLONE SODIUM SUCCINATE 40 MG: 40 INJECTION, POWDER, FOR SOLUTION INTRAMUSCULAR; INTRAVENOUS at 19:31

## 2021-05-01 RX ADMIN — Medication 10 ML: at 10:55

## 2021-05-01 RX ADMIN — Medication 15 ML: at 19:31

## 2021-05-01 RX ADMIN — Medication 175 MCG/HR: at 13:51

## 2021-05-01 RX ADMIN — ASPIRIN 81 MG: 81 TABLET, CHEWABLE ORAL at 10:26

## 2021-05-01 RX ADMIN — ALBUTEROL SULFATE 2.5 MG: 2.5 SOLUTION RESPIRATORY (INHALATION) at 08:25

## 2021-05-01 RX ADMIN — IPRATROPIUM BROMIDE AND ALBUTEROL SULFATE 1 AMPULE: 2.5; .5 SOLUTION RESPIRATORY (INHALATION) at 04:06

## 2021-05-01 RX ADMIN — IPRATROPIUM BROMIDE AND ALBUTEROL SULFATE 1 AMPULE: 2.5; .5 SOLUTION RESPIRATORY (INHALATION) at 08:28

## 2021-05-01 RX ADMIN — Medication 10 ML: at 10:56

## 2021-05-01 RX ADMIN — PROPOFOL 50 MCG/KG/MIN: 10 INJECTION, EMULSION INTRAVENOUS at 07:52

## 2021-05-01 ASSESSMENT — PULMONARY FUNCTION TESTS
PIF_VALUE: 35

## 2021-05-01 NOTE — PROGRESS NOTES
4 Eyes Skin Assessment     The patient is being assess for   Shift Handoff    I agree that 2 RN's have performed a thorough Head to Toe Skin Assessment on the patient. ALL assessment sites listed below have been assessed. Areas assessed by both nurses:   [x]   Head, Face, and Ears   [x]   Shoulders, Back, and Chest, Abdomen  [x]   Arms, Elbows, and Hands   [x]   Coccyx, Sacrum, and Ischium  [x]   Legs, Feet, and Heels        See  Flow sheets         Co-signer eSignature: Electronically signed by Katelin Jane RN on 5/1/21 at 6:36 PM EDT    Does the Patient have Skin Breakdown?   No         Ron Prevention initiated:  Yes   Wound Care Orders initiated:  N/A    WOC nurse consulted for Pressure Injury (Stage 3,4, Unstageable, DTI, NWPT, Complex wounds)and New or Established Ostomies:  N/A    Primary Nurse eSignature: Electronically signed by Ruthy Britton RN on 5/1/21 at 6:29 PM EDT

## 2021-05-01 NOTE — PROGRESS NOTES
Dr. Mart Wagner rounded on patient with RT and RN. Labs, imaging and medications reviewed. - start bowel regimen  - decrease to 2 today and decrease to 1 tomorrow, prior to 0700. - If increased sedation is needed, increase ketamine.  - Bronchoscopy today.

## 2021-05-01 NOTE — PROCEDURES
PROCEDURE:  BRONCHOSCOPY WITH BRONCHOALVEOLAR LAVAGE    Pre-procedure evaluation/H&P: Critically ill patient with respiratory failure, requires fiberoptic bronchoscopy for direct airway evaluation and to enable acquisition of quality pulmonary fluid sample for analysis.   Please see my ICU note from today for additional details    Current Facility-Administered Medications   Medication Dose Route Frequency Provider Last Rate Last Admin    polyethylene glycol (GLYCOLAX) packet 17 g  17 g Oral Daily Raquel Olvera MD        midazolam (VERSED) 1 mg/mL in D5W infusion  1-10 mg/hr Intravenous Continuous Raquel Olvera MD 3 mL/hr at 05/01/21 0720 3 mg/hr at 05/01/21 0720    ketamine (KETALAR) 500 mg in sodium chloride 0.9 % 250 mL infusion  0.1 mg/kg/hr Intravenous Continuous Raquel Olvera MD 5.2 mL/hr at 05/01/21 0720 0.15 mg/kg/hr at 05/01/21 0720    midazolam (VERSED) injection 4 mg  4 mg Intravenous Q1H PRN Raquel Olvera MD   4 mg at 04/29/21 1718    fentaNYL (SUBLIMAZE) 1,000 mcg in sodium chloride 0.9% 100 mL infusion  12.5-200 mcg/hr Intravenous Continuous Raquel Olvera MD 17.5 mL/hr at 05/01/21 0824 175 mcg/hr at 05/01/21 0824    insulin lispro (HUMALOG) injection vial 0-12 Units  0-12 Units Subcutaneous Q4H Raquel Olvera MD   2 Units at 04/30/21 0758    glucose (GLUTOSE) 40 % oral gel 15 g  15 g Oral PRN Raquel Olvera MD        dextrose 50 % IV solution  12.5 g Intravenous PRN Raquel Olvera MD        glucagon (rDNA) injection 1 mg  1 mg Intramuscular PRN Raquel Olvera MD        dextrose 5 % solution  100 mL/hr Intravenous PRN Raquel Olvera MD        chlorhexidine (PERIDEX) 0.12 % solution 15 mL  15 mL Mouth/Throat BID Leighann Rizvi MD   15 mL at 04/30/21 1952    fentaNYL (SUBLIMAZE) injection 25 mcg  25 mcg Intravenous Q1H PRN Leighann Rizvi MD   25 mcg at 04/28/21 0951    propofol injection  5-80 mcg/kg/min Intravenous Continuous Leighann Rizvi MD 20.5 mL/hr at 05/01/21 0752 50 mcg/kg/min at 05/01/21 0752    albuterol sulfate  (90 Base) MCG/ACT inhaler 4 puff  4 puff Inhalation Q4H PRN Pam Schwartz MD        And    ipratropium (ATROVENT HFA) 17 MCG/ACT inhaler 4 puff  4 puff Inhalation Q4H PRN Pam Schwartz MD        aspirin chewable tablet 81 mg  81 mg Oral Daily Cassi Avery MD   81 mg at 04/30/21 0755    atorvastatin (LIPITOR) tablet 40 mg  40 mg Oral Nightly Krishan Vines MD   40 mg at 04/30/21 1952    levothyroxine (SYNTHROID) tablet 125 mcg  125 mcg Oral QAM AC Krishan Vines MD   125 mcg at 05/01/21 0538    montelukast (SINGULAIR) tablet 10 mg  10 mg Oral Nightly Krishan Vines MD   10 mg at 04/30/21 1952    sodium chloride flush 0.9 % injection 5-40 mL  5-40 mL Intravenous 2 times per day Krishan Vines MD   10 mL at 04/30/21 1952    sodium chloride flush 0.9 % injection 5-40 mL  5-40 mL Intravenous PRN Krishan Vines MD        0.9 % sodium chloride infusion  25 mL Intravenous PRN Krishan Vines  mL/hr at 04/25/21 0645 25 mL at 04/25/21 0645    enoxaparin (LOVENOX) injection 40 mg  40 mg Subcutaneous Daily Krishan Vines MD   40 mg at 04/30/21 0755    acetaminophen (TYLENOL) tablet 650 mg  650 mg Oral Q6H PRN Krishan Vines MD   650 mg at 04/27/21 2111    Or    acetaminophen (TYLENOL) suppository 650 mg  650 mg Rectal Q6H PRN Krishan Vines MD        ondansetron (ZOFRAN-ODT) disintegrating tablet 4 mg  4 mg Oral Q8H PRN Krishan Vines MD        Or    ondansetron (ZOFRAN) injection 4 mg  4 mg Intravenous Q6H PRN Krishan Vines MD        famotidine (PEPCID) tablet 20 mg  20 mg Oral BID Krishan Vines MD   20 mg at 04/30/21 1952    albuterol (PROVENTIL) nebulizer solution 2.5 mg  2.5 mg Nebulization Q2H PRN Trent Zapata MD   2.5 mg at 05/01/21 0825    methylPREDNISolone sodium (SOLU-MEDROL) injection 40 mg  40 mg Intravenous Q12H Trent Zapata MD   40 mg at 04/30/21 1951    ibuprofen (ADVIL;MOTRIN) tablet 200 mg  200 mg Oral Q6H

## 2021-05-01 NOTE — PROGRESS NOTES
Shift assessment completed, see flow sheet. RASS -2. .      Intubated and sedated on PC#  ETT,8 at 24 LL. PI 25 /TI 0.60 / 45%/ +8. SpO2 95%. Respirations are easy, even, and unlabored. Bilateral lung sounds diminished. VSS  NS on the monitor. OG in place, tube feed stopped at this time in preparation for bronchoscopy, per Dr. Jsoe Almeida       PICC/CVC/PIV, WNL with Fentanyl 175, propofol 50, versed 3, and ketamine 0.15     All lines and monitoring devices in place. Baugh is patent and secured. Bilateral soft wrist restraints in place for patient safety. Bed in lowest position with wheels locked. No needs expressed at this time.  Will continue to monitor

## 2021-05-01 NOTE — PROGRESS NOTES
04/30/21 2318   Vent Information   Vent Type 840   Vent Mode AC/PC   Vt Ordered 0 mL   Pressure Ordered 25   Rate Set 24 bmp   Peak Flow 0 L/min   Pressure Support 0 cmH20   FiO2  60 %   SpO2 96 %   SpO2/FiO2 ratio 160   Sensitivity 3   PEEP/CPAP 8   I Time/ I Time % 0 s   Humidification Source Heated wire   Humidification Temp Measured 36.9   Circuit Condensation Drained   Vent Patient Data   High Peep/I Pressure 25   Peak Inspiratory Pressure 35 cmH2O   Mean Airway Pressure 14 cmH20   Rate Measured 24 br/min   Vt Exhaled 411 mL   Minute Volume 9.83 Liters   I:E Ratio 1:3.20   Cough/Sputum   Sputum How Obtained Endotracheal   Cough Non-productive   Sputum Amount Small   Sputum Color Creamy   Tenacity Thick   Spontaneous Breathing Trial (SBT) RT Doc   Pulse 86   Breath Sounds   Right Upper Lobe Diminished   Right Middle Lobe Diminished   Right Lower Lobe Diminished   Left Upper Lobe Diminished   Left Lower Lobe Diminished   Additional Respiratory  Assessments   Resp 24   Alarm Settings   High Pressure Alarm 50 cmH2O   Low Minute Volume Alarm 4 L/min   Apnea (secs) 20 secs   High Respiratory Rate 40 br/min   Low Exhaled Vt  230 mL   Patient Observation   Observations 8ett 24 at lip

## 2021-05-01 NOTE — PROGRESS NOTES
bilaterally and/or crackles = 2    Sputum  Sputum Color: Creamy, Tenacity: Thick, Sputum How Obtained: Endotracheal  Cough: Strong, productive = 1    Vital Signs   BP (!) 142/72   Pulse 86   Temp 99 °F (37.2 °C) (Bladder)   Resp 24   Ht 5' 4\" (1.626 m)   Wt 153 lb (69.4 kg)   LMP  (LMP Unknown)   SpO2 96%   BMI 26.26 kg/m²   SPO2 (COPD values may differ): Less than 86% on room air or greater than 92% on FiO2 greater than 50% = 4    Peak Flow (asthma only): not applicable = 0    RSI: 93-18 = Q4 (every four hours)        Plan       Goals: medication delivery and improve oxygenation    Patient/caregiver was educated on the proper method of use for Respiratory Care Devices: On vent      Level of patient/caregiver understanding able to:   ? Verbalize understanding   ? Demonstrate understanding       ? Teach back        ? Needs reinforcement       ? No available caregiver               ? Other:     Response to education:  On vent     Is patient being placed on Home Treatment Regimen? No     Does the patient have everything they need prior to discharge? NA     Comments: chart reviewed and patient assessed    Plan of Care: keep duoneb q4 per assessment    Electronically signed by Irven Alpers, RCP on 5/1/2021 at 1:17 AM    Respiratory Protocol Guidelines     1. Assessment and treatment by Respiratory Therapy will be initiated for medication and therapeutic interventions upon initiation of aerosolized medication. 2. Physician will be contacted for respiratory rate (RR) greater than 35 breaths per minute. Therapy will be held for heart rate (HR) greater than 140 beats per minute, pending direction from physician. 3. Bronchodilators will be administered via Metered Dose Inhaler (MDI) with spacer when the following criteria are met:  a. Alert and cooperative     b. HR < 140 bpm  c. RR < 30 bpm                d. Can demonstrate a 2-3 second inspiratory hold  4.  Bronchodilators will be administered via Hand Held Nebulizer CUR[facility]) to patients when ANY of the following criteria are met  a. Incognizant or uncooperative          b. Patients treated with HHN at Home        c. Unable to demonstrate proper use of MDI with spacer     d. RR > 30 bpm   5. Bronchodilators will be delivered via Metered Dose Inhaler (MDI), HHN, [employer] to intubated patients on mechanical ventilation. 6. Inhalation medication orders will be delivered and/or substituted as outlined below. Aerosolized Medications Ordering and Administration Guidelines:    1. All Medications will be ordered by a physician, and their frequency and/or modality will be adjusted as defined by the patients Respiratory Severity Index (RSI) score. 2. If the patient does not have documented COPD, consider discontinuing anticholinergics when RSI is less than 9.  3. If the bronchospasm worsens (increased RSI), then the bronchodilator frequency can be increased to a maximum of every 4 hours. If greater than every 4 hours is required, the physician will be contacted. 4. If the bronchospasm improves, the frequency of the bronchodilator can be decreased, based on the patient's RSI, but not less than home treatment regimen frequency. 5. Bronchodilator(s) will be discontinued if patient has a RSI less than 9 and has received no scheduled or as needed treatment for 72  Hrs. Patients Ordered on a Mucolytic Agent:    1. Must always be administered with a bronchodilator. 2. Discontinue if patient experiences worsened bronchospasm, or secretions have lessened to the point that the patient is able to clear them with a cough. Anti-inflammatory and Combination Medications:    1. If the patient lacks prior history of lung disease, is not using inhaled anti-inflammatory medication at home, and lacks wheezing by examination or by history for at least 24 hours, contact physician for possible discontinuation.

## 2021-05-01 NOTE — CONSULTS
Pharmacy to dose IV Vanco:  Please start with 1g IV Q12hrs to provide Gram+ organism coverage to include MRSA. Trough 5/3 at 1030. Aspiration PNA.    Julio Parrish PharmD 5/1/2021 10:27 AM

## 2021-05-01 NOTE — PROGRESS NOTES
Pulmonary & Critical Care Medicine ICU Progress Note    CC: Shortness of breath, respiratory failure    Events of Last 24 hours:   Marked worsening again yesterday with ventilator dyssynchrony, increased work of breathing, high peak pressures, air trapping, auto PEEP --> started on ketamine drip   Now on Fentanyl 175, Propofol 50, Versed 3, Ketamine 0.15  Low-grade fevers    Invasive Lines:  RIJ CVC 21 by me     MV: 2021  Vent Mode: AC/VC Rate Set: 24 bmp/Vt Ordered: 0 mL/ /FiO2 : (S) 50 %  Recent Labs     21  0350 21  0400   PHART 7.361 7.381   [id number] 65.7* 76.8*   PO2ART 123.7* 148.0*       IV:   midazolam 2 mg/hr (21 1005)    ketamine (KETALAR) infusion 0.1 mg/kg/hr (21 1354)    fentaNYL 175 mcg/hr (21)    dextrose      propofol 50 mcg/kg/min (21 022)    sodium chloride 25 mL (21 0645)       Vitals:  Blood pressure 105/60, pulse 81, temperature 99 °F (37.2 °C), temperature source Bladder, resp. rate 24, height 5' 4\" (1.626 m), weight 153 lb (69.4 kg), SpO2 98 %, not currently breastfeeding. on vent 2%  Temp  Av.9 °F (37.2 °C)  Min: 98.7 °F (37.1 °C)  Max: 99.1 °F (37.3 °C)    Intake/Output Summary (Last 24 hours) at 2021 0600  Last data filed at 2021 0513  Gross per 24 hour   Intake 2541 ml   Output 2350 ml   Net 191 ml     EXAM:  General: intubated, ill appearing    ENT: Pharynx with ETT. Resp: No crackles. Wheezing with poor air movement  CV: S1, S2. No edema  GI: NT, ND, +BS  Skin: Warm and dry. Neuro: PERRL. Sedated, not following commands.  Patellar reflexes are symmetric     Scheduled Meds:   insulin lispro  0-12 Units Subcutaneous Q4H    chlorhexidine  15 mL Mouth/Throat BID    aspirin  81 mg Oral Daily    atorvastatin  40 mg Oral Nightly    levothyroxine  125 mcg Oral QAM AC    montelukast  10 mg Oral Nightly    sodium chloride flush  5-40 mL Intravenous 2 times per day    enoxaparin  40 mg Subcutaneous Daily    famotidine  20 mg Oral BID    cefTRIAXone (ROCEPHIN) IV  1,000 mg Intravenous Q24H    methylPREDNISolone  40 mg Intravenous Q12H    ipratropium-albuterol  1 ampule Inhalation Q4H     PRN Meds:  midazolam, glucose, dextrose, glucagon (rDNA), dextrose, fentanNYL, albuterol sulfate HFA **AND** ipratropium **AND** MDI Treatment, sodium chloride flush, sodium chloride, polyethylene glycol, acetaminophen **OR** acetaminophen, ondansetron **OR** ondansetron, albuterol, ibuprofen    Results:  CBC:   Recent Labs     04/29/21  0530 04/30/21  0345 05/01/21  0400   WBC 12.5* 10.8 14.3*   HGB 9.9* 9.7* 9.5*   HCT 29.6* 29.1* 28.6*   MCV 97.4 97.5 97.6    247 235     BMP:   Recent Labs     04/29/21  1210 04/30/21  0345 05/01/21  0400    138 140   K 5.0 4.9 5.2*   CL 93* 93* 95*   CO2 41* 40* 44*   PHOS 3.6  --   --    BUN 42* 45* 45*   CREATININE 0.6 <0.5* <0.5*     LIVER PROFILE:   No results for input(s): AST, ALT, LIPASE, BILIDIR, BILITOT, ALKPHOS in the last 72 hours. Invalid input(s): AMYLASE,  ALB    Cultures:  4/25/2021 SARS-CoV-2 negative   4/25/2021 blood no growth to date  4/26/2021 respiratory culture candida     Films:  CXR 5/1/2021 possible left base infiltrate developing    ASSESSMENT:  · Acute on chronic respiratory failure with hypoxemia and hypercapnia - failed BiPAP with worsening hypercapnia, typically on 3 L home oxygen  · Clinically she has worsened  · Community acquired pneumonia   · Septic shock  · COPD with AE; severe airtrapping - marked worsening when PEEP was dropped to 5  · Small pleural effusions  · Ongoing tobacco abuse    PLAN:  Mechanical ventilation as per my orders. The ventilator was adjusted by me at the bedside for unstable, life threatening respiratory failure.    IV Propofol for sedation, target RASS -2, with daily spontaneous awakening trial.  Versed gtt, fentanyl gtt, Ketamine gtt to help with reactive airways   Head of bed 30 degrees or higher at all times  IV Solu-Medrol  Ceftriaxone D#7/7, received 5 days azithromycin and 4 days vancomycin. Plan to start cefepime and vancomycin after bronchoscopy today [facility]    ICU care- charlierajendra nationcelso CHANDLER is spouse, I d/w him at bedside today. Total critical care time caring for this patient with life threatening, unstable organ failure, including direct patient contact, management of life support systems, review of data including imaging and labs, discussions with other team members and physicians is 35 minutes so far today, excluding procedures.

## 2021-05-01 NOTE — PROGRESS NOTES
 sodium chloride 25 mL (04/25/21 0645)     midazolam, glucose, dextrose, glucagon (rDNA), dextrose, fentanNYL, albuterol sulfate HFA **AND** ipratropium **AND** MDI Treatment, sodium chloride flush, sodium chloride, acetaminophen **OR** acetaminophen, ondansetron **OR** ondansetron, albuterol, ibuprofen    Lab Data:  Recent Labs     04/29/21  0530 04/30/21  0345 05/01/21  0400   WBC 12.5* 10.8 14.3*   HGB 9.9* 9.7* 9.5*   HCT 29.6* 29.1* 28.6*   MCV 97.4 97.5 97.6    247 235     Recent Labs     04/29/21  1210 04/30/21  0345 05/01/21  0400    138 140   K 5.0 4.9 5.2*   CL 93* 93* 95*   CO2 41* 40* 44*   PHOS 3.6  --   --    BUN 42* 45* 45*   CREATININE 0.6 <0.5* <0.5*     No results for input(s): CKTOTAL, CKMB, CKMBINDEX, TROPONINI in the last 72 hours. Coagulation:   Lab Results   Component Value Date    INR 1.03 12/26/2019     Cardiac markers:   Lab Results   Component Value Date    TROPONINI <0.01 04/25/2021         Lab Results   Component Value Date    ALT 29 04/25/2021    AST 39 (H) 04/25/2021    ALKPHOS 77 04/25/2021    BILITOT <0.2 04/25/2021       Lab Results   Component Value Date    INR 1.03 12/26/2019    PROTIME 12.0 12/26/2019         CULTURES  COVID: not detected  Blood: pending  Sputum - rare candida     EKG:  I have reviewed the EKG with the following interpretation:   Sinus tachycardia, Nonspecific ST abnormality     RADIOLOGY  XR CHEST PORTABLE   Final Result   Pulmonary edema with bibasilar atelectasis versus pneumonia.                    cxr    Satisfactory position endotracheal tube and NG tube. No acute airspace disease. Pulmonary vessels upper normal.     Echo 8/25/2020   Summary   Limited imaging due to lung interface.  Parasternal images are unobtainable.   Normal left ventricular systolic function with an estimated ejection   fraction of 55-60%.   No regional wall motion abnormalities are seen.   Normal left ventricular diastolic filling pressure.   Normal systolic

## 2021-05-01 NOTE — PROGRESS NOTES
04/1960   Vent Information   $Ventilation $Subsequent Day   Skin Assessment Clean, dry, & intact   Vent Type 840   Vent Mode AC/PC   Vt Ordered 0 mL   Rate Set 24 bmp   Peak Flow 0 L/min   Pressure Support 0 cmH20   FiO2  60 %   SpO2 98 %   SpO2/FiO2 ratio 163.33   Sensitivity 3   PEEP/CPAP 8   I Time/ I Time % 0 s   Humidification Source Heated wire   Humidification Temp Measured 37   Circuit Condensation Drained   Vent Patient Data   High Peep/I Pressure 25   Peak Inspiratory Pressure 34 cmH2O   Mean Airway Pressure 14 cmH20   Rate Measured 24 br/min   Vt Exhaled 265 mL   Minute Volume 6.82 Liters   I:E Ratio 1:3.20   Plateau Pressure 27 [id number]   Static Compliance 12 mL/cmH2O   Dynamic Compliance 9 mL/cmH2O   Cough/Sputum   Sputum How Obtained Endotracheal   Cough Non-productive   Sputum Amount None   Spontaneous Breathing Trial (SBT) RT Doc   Pulse 100   Breath Sounds   Right Upper Lobe Diminished   Right Middle Lobe Diminished   Right Lower Lobe Diminished   Left Upper Lobe Diminished   Left Lower Lobe Diminished   Additional Respiratory  Assessments   Resp 19   Alarm Settings   High Pressure Alarm 50 cmH2O   Low Minute Volume Alarm 4 L/min   Apnea (secs) 20 secs   High Respiratory Rate 40 br/min   Low Exhaled Vt  230 mL   Patient Observation   Observations 8ett 24 at lip

## 2021-05-01 NOTE — PROGRESS NOTES
Assisted M.D. with bronchoscopy. Pt placed on PCV 15 and 100% Fio2 during procedure. Pt. lolita well and without complication. Returned to previous settings after procedure.

## 2021-05-02 ENCOUNTER — APPOINTMENT (OUTPATIENT)
Dept: GENERAL RADIOLOGY | Age: 61
DRG: 870 | End: 2021-05-02
Payer: COMMERCIAL

## 2021-05-02 LAB
ALBUMIN SERPL-MCNC: 3.7 G/DL (ref 3.4–5)
ANION GAP SERPL CALCULATED.3IONS-SCNC: 1 MMOL/L (ref 3–16)
ANION GAP SERPL CALCULATED.3IONS-SCNC: 1 MMOL/L (ref 3–16)
BANDED NEUTROPHILS RELATIVE PERCENT: 3 % (ref 0–7)
BASE EXCESS ARTERIAL: 12 MMOL/L (ref -3–3)
BASOPHILIC STIPPLING: ABNORMAL
BASOPHILS ABSOLUTE: 0 K/UL (ref 0–0.2)
BASOPHILS RELATIVE PERCENT: 0 %
BUN BLDV-MCNC: 41 MG/DL (ref 7–20)
BUN BLDV-MCNC: 44 MG/DL (ref 7–20)
CALCIUM SERPL-MCNC: 9.4 MG/DL (ref 8.3–10.6)
CALCIUM SERPL-MCNC: 9.5 MG/DL (ref 8.3–10.6)
CARBOXYHEMOGLOBIN ARTERIAL: 0.3 % (ref 0–1.5)
CHLORIDE BLD-SCNC: 90 MMOL/L (ref 99–110)
CHLORIDE BLD-SCNC: 94 MMOL/L (ref 99–110)
CO2: 42 MMOL/L (ref 21–32)
CO2: 43 MMOL/L (ref 21–32)
CREAT SERPL-MCNC: <0.5 MG/DL (ref 0.6–1.2)
CREAT SERPL-MCNC: <0.5 MG/DL (ref 0.6–1.2)
EOSINOPHILS ABSOLUTE: 0 K/UL (ref 0–0.6)
EOSINOPHILS RELATIVE PERCENT: 0 %
GFR AFRICAN AMERICAN: >60
GFR AFRICAN AMERICAN: >60
GFR NON-AFRICAN AMERICAN: >60
GFR NON-AFRICAN AMERICAN: >60
GLUCOSE BLD-MCNC: 117 MG/DL (ref 70–99)
GLUCOSE BLD-MCNC: 125 MG/DL (ref 70–99)
GLUCOSE BLD-MCNC: 126 MG/DL (ref 70–99)
GLUCOSE BLD-MCNC: 135 MG/DL (ref 70–99)
GLUCOSE BLD-MCNC: 150 MG/DL (ref 70–99)
GLUCOSE BLD-MCNC: 157 MG/DL (ref 70–99)
GLUCOSE BLD-MCNC: 183 MG/DL (ref 70–99)
HCO3 ARTERIAL: 39.1 MMOL/L (ref 21–29)
HCT VFR BLD CALC: 28.3 % (ref 36–48)
HEMOGLOBIN, ART, EXTENDED: 10.5 G/DL (ref 12–16)
HEMOGLOBIN: 9.5 G/DL (ref 12–16)
LYMPHOCYTES ABSOLUTE: 1.1 K/UL (ref 1–5.1)
LYMPHOCYTES RELATIVE PERCENT: 7 %
MCH RBC QN AUTO: 32.5 PG (ref 26–34)
MCHC RBC AUTO-ENTMCNC: 33.7 G/DL (ref 31–36)
MCV RBC AUTO: 96.5 FL (ref 80–100)
METAMYELOCYTES RELATIVE PERCENT: 1 %
METHEMOGLOBIN ARTERIAL: 0.3 %
MONOCYTES ABSOLUTE: 0.9 K/UL (ref 0–1.3)
MONOCYTES RELATIVE PERCENT: 6 %
MYELOCYTE PERCENT: 1 %
NEUTROPHILS ABSOLUTE: 13.2 K/UL (ref 1.7–7.7)
NEUTROPHILS RELATIVE PERCENT: 82 %
NUCLEATED RED BLOOD CELLS: 1 /100 WBC
O2 CONTENT ARTERIAL: 14 ML/DL
O2 SAT, ARTERIAL: 94.3 %
O2 THERAPY: ABNORMAL
PCO2 ARTERIAL: 66.6 MMHG (ref 35–45)
PDW BLD-RTO: 13.9 % (ref 12.4–15.4)
PERFORMED ON: ABNORMAL
PH ARTERIAL: 7.39 (ref 7.35–7.45)
PHOSPHORUS: 3.9 MG/DL (ref 2.5–4.9)
PLATELET # BLD: 240 K/UL (ref 135–450)
PLATELET SLIDE REVIEW: ADEQUATE
PMV BLD AUTO: 7.8 FL (ref 5–10.5)
PO2 ARTERIAL: 74.5 MMHG (ref 75–108)
POLYCHROMASIA: ABNORMAL
POTASSIUM REFLEX MAGNESIUM: 5.4 MMOL/L (ref 3.5–5.1)
POTASSIUM SERPL-SCNC: 4 MMOL/L (ref 3.5–5.1)
RBC # BLD: 2.94 M/UL (ref 4–5.2)
SLIDE REVIEW: ABNORMAL
SODIUM BLD-SCNC: 134 MMOL/L (ref 136–145)
SODIUM BLD-SCNC: 137 MMOL/L (ref 136–145)
STOMATOCYTES: ABNORMAL
TCO2 ARTERIAL: 41.2 MMOL/L
WBC # BLD: 15.2 K/UL (ref 4–11)

## 2021-05-02 PROCEDURE — 2580000003 HC RX 258: Performed by: INTERNAL MEDICINE

## 2021-05-02 PROCEDURE — 85025 COMPLETE CBC W/AUTO DIFF WBC: CPT

## 2021-05-02 PROCEDURE — 94761 N-INVAS EAR/PLS OXIMETRY MLT: CPT

## 2021-05-02 PROCEDURE — 2000000000 HC ICU R&B

## 2021-05-02 PROCEDURE — 6370000000 HC RX 637 (ALT 250 FOR IP): Performed by: INTERNAL MEDICINE

## 2021-05-02 PROCEDURE — 2500000003 HC RX 250 WO HCPCS: Performed by: INTERNAL MEDICINE

## 2021-05-02 PROCEDURE — 36415 COLL VENOUS BLD VENIPUNCTURE: CPT

## 2021-05-02 PROCEDURE — 36592 COLLECT BLOOD FROM PICC: CPT

## 2021-05-02 PROCEDURE — 84145 PROCALCITONIN (PCT): CPT

## 2021-05-02 PROCEDURE — 94640 AIRWAY INHALATION TREATMENT: CPT

## 2021-05-02 PROCEDURE — 6360000002 HC RX W HCPCS: Performed by: INTERNAL MEDICINE

## 2021-05-02 PROCEDURE — 80069 RENAL FUNCTION PANEL: CPT

## 2021-05-02 PROCEDURE — 2700000000 HC OXYGEN THERAPY PER DAY

## 2021-05-02 PROCEDURE — 99291 CRITICAL CARE FIRST HOUR: CPT | Performed by: INTERNAL MEDICINE

## 2021-05-02 PROCEDURE — 99233 SBSQ HOSP IP/OBS HIGH 50: CPT | Performed by: INTERNAL MEDICINE

## 2021-05-02 PROCEDURE — 82803 BLOOD GASES ANY COMBINATION: CPT

## 2021-05-02 PROCEDURE — 71045 X-RAY EXAM CHEST 1 VIEW: CPT

## 2021-05-02 PROCEDURE — 94003 VENT MGMT INPAT SUBQ DAY: CPT

## 2021-05-02 RX ORDER — FUROSEMIDE 10 MG/ML
40 INJECTION INTRAMUSCULAR; INTRAVENOUS ONCE
Status: COMPLETED | OUTPATIENT
Start: 2021-05-02 | End: 2021-05-02

## 2021-05-02 RX ORDER — CARBOXYMETHYLCELLULOSE SODIUM 10 MG/ML
1 GEL OPHTHALMIC EVERY 4 HOURS
Status: DISCONTINUED | OUTPATIENT
Start: 2021-05-02 | End: 2021-05-10

## 2021-05-02 RX ORDER — MINERAL OIL AND WHITE PETROLATUM 150; 830 MG/G; MG/G
OINTMENT OPHTHALMIC EVERY 4 HOURS
Status: DISCONTINUED | OUTPATIENT
Start: 2021-05-02 | End: 2021-05-10 | Stop reason: ALTCHOICE

## 2021-05-02 RX ADMIN — ENOXAPARIN SODIUM 40 MG: 40 INJECTION SUBCUTANEOUS at 09:44

## 2021-05-02 RX ADMIN — Medication 150 MCG/HR: at 22:12

## 2021-05-02 RX ADMIN — FAMOTIDINE 20 MG: 20 TABLET ORAL at 09:44

## 2021-05-02 RX ADMIN — Medication 150 MCG/HR: at 16:14

## 2021-05-02 RX ADMIN — METHYLPREDNISOLONE SODIUM SUCCINATE 40 MG: 40 INJECTION, POWDER, FOR SOLUTION INTRAMUSCULAR; INTRAVENOUS at 09:46

## 2021-05-02 RX ADMIN — FUROSEMIDE 40 MG: 10 INJECTION, SOLUTION INTRAMUSCULAR; INTRAVENOUS at 08:01

## 2021-05-02 RX ADMIN — CARBOXYMETHYLCELLULOSE SODIUM 1 DROP: 10 GEL OPHTHALMIC at 18:12

## 2021-05-02 RX ADMIN — METHYLPREDNISOLONE SODIUM SUCCINATE 40 MG: 40 INJECTION, POWDER, FOR SOLUTION INTRAMUSCULAR; INTRAVENOUS at 20:06

## 2021-05-02 RX ADMIN — IPRATROPIUM BROMIDE AND ALBUTEROL SULFATE 1 AMPULE: 2.5; .5 SOLUTION RESPIRATORY (INHALATION) at 08:32

## 2021-05-02 RX ADMIN — FAMOTIDINE 20 MG: 20 TABLET ORAL at 20:06

## 2021-05-02 RX ADMIN — PROPOFOL 50 MCG/KG/MIN: 10 INJECTION, EMULSION INTRAVENOUS at 08:00

## 2021-05-02 RX ADMIN — Medication 10 ML: at 20:07

## 2021-05-02 RX ADMIN — VANCOMYCIN HYDROCHLORIDE 1000 MG: 1 INJECTION, POWDER, LYOPHILIZED, FOR SOLUTION INTRAVENOUS at 11:58

## 2021-05-02 RX ADMIN — VANCOMYCIN HYDROCHLORIDE 1000 MG: 1 INJECTION, POWDER, LYOPHILIZED, FOR SOLUTION INTRAVENOUS at 22:38

## 2021-05-02 RX ADMIN — ATORVASTATIN CALCIUM 40 MG: 40 TABLET, FILM COATED ORAL at 20:06

## 2021-05-02 RX ADMIN — CEFEPIME 2000 MG: 2 INJECTION, POWDER, FOR SOLUTION INTRAVENOUS at 18:12

## 2021-05-02 RX ADMIN — Medication 17.5 MCG/HR: at 01:58

## 2021-05-02 RX ADMIN — Medication 15 ML: at 20:03

## 2021-05-02 RX ADMIN — KETAMINE HYDROCHLORIDE 0.1 MG/KG/HR: 50 INJECTION, SOLUTION INTRAMUSCULAR; INTRAVENOUS at 16:50

## 2021-05-02 RX ADMIN — CARBOXYMETHYLCELLULOSE SODIUM 1 DROP: 10 GEL OPHTHALMIC at 22:47

## 2021-05-02 RX ADMIN — IPRATROPIUM BROMIDE AND ALBUTEROL SULFATE 1 AMPULE: 2.5; .5 SOLUTION RESPIRATORY (INHALATION) at 11:25

## 2021-05-02 RX ADMIN — WHITE PETROLATUM 57.7 %-MINERAL OIL 31.9 % EYE OINTMENT: at 11:11

## 2021-05-02 RX ADMIN — IPRATROPIUM BROMIDE AND ALBUTEROL SULFATE 1 AMPULE: 2.5; .5 SOLUTION RESPIRATORY (INHALATION) at 19:47

## 2021-05-02 RX ADMIN — Medication 175 MCG/HR: at 08:00

## 2021-05-02 RX ADMIN — Medication 15 ML: at 09:44

## 2021-05-02 RX ADMIN — MIDAZOLAM HYDROCHLORIDE 4 MG: 1 INJECTION, SOLUTION INTRAMUSCULAR; INTRAVENOUS at 16:51

## 2021-05-02 RX ADMIN — IPRATROPIUM BROMIDE AND ALBUTEROL SULFATE 1 AMPULE: 2.5; .5 SOLUTION RESPIRATORY (INHALATION) at 02:57

## 2021-05-02 RX ADMIN — POLYETHYLENE GLYCOL (3350) 17 G: 17 POWDER, FOR SOLUTION ORAL at 09:46

## 2021-05-02 RX ADMIN — CEFEPIME 2000 MG: 2 INJECTION, POWDER, FOR SOLUTION INTRAVENOUS at 01:58

## 2021-05-02 RX ADMIN — WHITE PETROLATUM 57.7 %-MINERAL OIL 31.9 % EYE OINTMENT: at 20:03

## 2021-05-02 RX ADMIN — FENTANYL CITRATE 25 MCG: 0.05 INJECTION, SOLUTION INTRAMUSCULAR; INTRAVENOUS at 16:40

## 2021-05-02 RX ADMIN — CEFEPIME 2000 MG: 2 INJECTION, POWDER, FOR SOLUTION INTRAVENOUS at 11:12

## 2021-05-02 RX ADMIN — WHITE PETROLATUM 57.7 %-MINERAL OIL 31.9 % EYE OINTMENT: at 14:03

## 2021-05-02 RX ADMIN — CARBOXYMETHYLCELLULOSE SODIUM 1 DROP: 10 GEL OPHTHALMIC at 11:11

## 2021-05-02 RX ADMIN — CARBOXYMETHYLCELLULOSE SODIUM 1 DROP: 10 GEL OPHTHALMIC at 14:01

## 2021-05-02 RX ADMIN — INSULIN LISPRO 2 UNITS: 100 INJECTION, SOLUTION INTRAVENOUS; SUBCUTANEOUS at 12:03

## 2021-05-02 RX ADMIN — PROPOFOL 50 MCG/KG/MIN: 10 INJECTION, EMULSION INTRAVENOUS at 18:11

## 2021-05-02 RX ADMIN — LEVOTHYROXINE SODIUM 125 MCG: 25 TABLET ORAL at 06:08

## 2021-05-02 RX ADMIN — ASPIRIN 81 MG: 81 TABLET, CHEWABLE ORAL at 09:44

## 2021-05-02 RX ADMIN — IPRATROPIUM BROMIDE AND ALBUTEROL SULFATE 1 AMPULE: 2.5; .5 SOLUTION RESPIRATORY (INHALATION) at 16:15

## 2021-05-02 RX ADMIN — INSULIN LISPRO 2 UNITS: 100 INJECTION, SOLUTION INTRAVENOUS; SUBCUTANEOUS at 23:36

## 2021-05-02 RX ADMIN — Medication 10 ML: at 10:18

## 2021-05-02 RX ADMIN — WHITE PETROLATUM 57.7 %-MINERAL OIL 31.9 % EYE OINTMENT: at 18:12

## 2021-05-02 RX ADMIN — PROPOFOL 50 MCG/KG/MIN: 10 INJECTION, EMULSION INTRAVENOUS at 12:55

## 2021-05-02 RX ADMIN — MONTELUKAST SODIUM 10 MG: 10 TABLET, COATED ORAL at 20:06

## 2021-05-02 ASSESSMENT — PULMONARY FUNCTION TESTS
PIF_VALUE: 35

## 2021-05-02 ASSESSMENT — PAIN SCALES - GENERAL: PAINLEVEL_OUTOF10: 5

## 2021-05-02 NOTE — PROGRESS NOTES
Care rounds completed with Dr. Delisa Farias and multidisciplinary team. Reviewed labs, meds, VS, assessment, & plan of care for today. See dictated note and new orders for details. Give lasix, recheck BMP 1100, leave on PEEP 8, do not trial until after Dr. Collette Park sees tomorrow, wean of versed, then slowly wean off fentanyl. Can increase ketamine to max if needed for sedation.

## 2021-05-02 NOTE — PROGRESS NOTES
Vancomycin Day # 2  Current dose = 1000 mg IVPB q12h  BUN/SRCR 41/ < 0.5        WBC   15.2            Tmax 99.1  Vancomycin trough ordered for tomorrow @ 1030. Continue same until then.

## 2021-05-02 NOTE — PROGRESS NOTES
05/02/21 0258   Vent Information   Vent Type 840   Vent Mode AC/PC   Vt Ordered 0 mL   Pressure Ordered 25   Rate Set 24 bmp   Peak Flow 0 L/min   Pressure Support 0 cmH20   FiO2  40 %   SpO2 93 %   SpO2/FiO2 ratio 232.5   Sensitivity 3   PEEP/CPAP 8   I Time/ I Time % 0 s   Humidification Source Heated wire   Humidification Temp Measured 37   Circuit Condensation Drained   Vent Patient Data   High Peep/I Pressure 25   Peak Inspiratory Pressure 35 cmH2O   Mean Airway Pressure 14 cmH20   Rate Measured 24 br/min   Vt Exhaled 420 mL   Minute Volume 10.1 Liters   I:E Ratio 1:3.20   Cough/Sputum   Sputum How Obtained Endotracheal   Cough Non-productive   Sputum Amount None   Spontaneous Breathing Trial (SBT) RT Doc   Pulse 64   Breath Sounds   Right Upper Lobe Diminished   Right Middle Lobe Diminished   Right Lower Lobe Diminished   Left Upper Lobe Diminished   Left Lower Lobe Diminished   Additional Respiratory  Assessments   Resp 24   Alarm Settings   High Pressure Alarm 50 cmH2O   Low Minute Volume Alarm 4 L/min   Apnea (secs) 20 secs   High Respiratory Rate 40 br/min   Low Exhaled Vt  230 mL   Patient Observation   Observations 8ett 24 at lip

## 2021-05-02 NOTE — PROGRESS NOTES
Dr. Natasha Schmidt rounded on patient with RT and RN. Labs, Imaging, and medications were discussed. - D/C Versed, if patient tolerates, wean fentanyl slowly. May increase ketamine to max dose if needed for sedation.    - Possible SBT after Dr. Dominique Locke sees patient tomorrow. Possibly change vent to [facility] prior to SBT. - Lasix this morning and recheck BMP at 1100.

## 2021-05-02 NOTE — PROGRESS NOTES
propofol 50 mcg/kg/min (05/02/21 0800)    sodium chloride 25 mL (04/25/21 0645)     midazolam, glucose, dextrose, glucagon (rDNA), dextrose, fentanNYL, albuterol sulfate HFA **AND** ipratropium **AND** MDI Treatment, sodium chloride flush, sodium chloride, acetaminophen **OR** acetaminophen, ondansetron **OR** ondansetron, albuterol, ibuprofen    Lab Data:  Recent Labs     04/30/21  0345 05/01/21  0400 05/02/21  0330   WBC 10.8 14.3* 15.2*   HGB 9.7* 9.5* 9.5*   HCT 29.1* 28.6* 28.3*   MCV 97.5 97.6 96.5    235 240     Recent Labs     04/29/21  1210 04/30/21  0345 05/01/21  0400 05/02/21  0330    138 140 137   K 5.0 4.9 5.2* 5.4*   CL 93* 93* 95* 94*   CO2 41* 40* 44* 42*   PHOS 3.6  --   --   --    BUN 42* 45* 45* 41*   CREATININE 0.6 <0.5* <0.5* <0.5*     No results for input(s): CKTOTAL, CKMB, CKMBINDEX, TROPONINI in the last 72 hours. Coagulation:   Lab Results   Component Value Date    INR 1.03 12/26/2019     Cardiac markers:   Lab Results   Component Value Date    TROPONINI <0.01 04/25/2021         Lab Results   Component Value Date    ALT 29 04/25/2021    AST 39 (H) 04/25/2021    ALKPHOS 77 04/25/2021    BILITOT <0.2 04/25/2021       Lab Results   Component Value Date    INR 1.03 12/26/2019    PROTIME 12.0 12/26/2019         CULTURES  COVID: not detected  Blood: pending  Sputum - rare candida     EKG:  I have reviewed the EKG with the following interpretation:   Sinus tachycardia, Nonspecific ST abnormality     XR CHEST PORTABLE   Final Result   Stable chest.         XR CHEST PORTABLE   Final Result   Hazy bibasilar airspace disease, slightly increased on the right, either due   to atelectasis or pneumonia. XR CHEST PORTABLE   Final Result   Stable chest with moderate hyperinflation. Trace atelectasis left lung base. XR CHEST PORTABLE   Final Result   In this patient with underlying COPD, the lungs are clear.   Interstitial   opacities described on recent exam are less evident on current study. XR CHEST PORTABLE   Final Result   Increased lung markings bilaterally, may be related to minimal pulmonary   vascular congestion versus bronchitis. XR CHEST PORTABLE   Final Result   Stable chest.      Lungs are hyperinflated with no acute airspace disease. XR CHEST PORTABLE   Final Result   Minimal right basilar airspace disease likely atelectasis. Lungs are   moderately hyperinflated. XR CHEST PORTABLE   Final Result   Appropriate positioning of right central venous catheter. XR CHEST PORTABLE   Final Result   Satisfactory position endotracheal tube and NG tube. No acute airspace disease. Pulmonary vessels upper normal.         XR CHEST PORTABLE   Final Result   Pulmonary edema with bibasilar atelectasis versus pneumonia. XR CHEST PORTABLE    (Results Pending)      Echo 8/25/2020   Summary   Limited imaging due to lung interface. Parasternal images are unobtainable.   Normal left ventricular systolic function with an estimated ejection   fraction of 55-60%.   No regional wall motion abnormalities are seen.   Normal left ventricular diastolic filling pressure.   Normal systolic pulmonary artery pressure (SPAP) estimated at 20 mmHg (RA   pressure 3 mmHg).   No significant valvular heart disease.                 ASSESSMENT/PLAN:     #Acute on chronic hypoxic/hypercapnic respiratory failure-due to COPD exacerbation, Pneumonia  -Normally on home oxygen 3 L   -ABG on admission pH 7.2, PCO2 92  - admitted to ICU on BiPAP  - pulmonology consulted. - IV Solu-medrol, HHN , abx initiated   -- failed bipap and worsened leading to intubation and now on vent support  -started on Vancomycin and Cefepime after bronchoscopy.        #Pneumonia, Gram positive organism  - Bronchoscopy done. Vancomycin and Cefepime re ordered. Await cultures.        #Sepsis  -Present on admission  -With tachycardia and tachypnea, leukocytosis.    Secondary to

## 2021-05-02 NOTE — PROGRESS NOTES
05/01/21 2004   Vent Information   $Ventilation $Subsequent Day   Skin Assessment Clean, dry, & intact   Vent Type 840   Vent Mode AC/PC   Vt Ordered 0 mL   Pressure Ordered 25   Rate Set 24 bmp   Peak Flow 0 L/min   Pressure Support 0 cmH20   FiO2  40 %   SpO2 95 %   SpO2/FiO2 ratio 237.5   Sensitivity 3   PEEP/CPAP 8   I Time/ I Time % 0.6 s   Humidification Source Heated wire   Humidification Temp Measured 37   Circuit Condensation Drained   Vent Patient Data   High Peep/I Pressure 25   Peak Inspiratory Pressure 35 cmH2O   Mean Airway Pressure 14 cmH20   Rate Measured 24 br/min   Vt Exhaled 409 mL   Minute Volume 9.82 Liters   I:E Ratio 1:3.20   Plateau Pressure 24 [id number]   Static Compliance 25 mL/cmH2O   Dynamic Compliance 15 mL/cmH2O   Cough/Sputum   Sputum How Obtained Endotracheal   Cough None   Sputum Amount None   Spontaneous Breathing Trial (SBT) RT Doc   Pulse 65   Breath Sounds   Right Upper Lobe Diminished   Right Middle Lobe Diminished   Right Lower Lobe Diminished   Left Upper Lobe Diminished   Left Lower Lobe Diminished   Additional Respiratory  Assessments   Resp 24   Alarm Settings   High Pressure Alarm 50 cmH2O   Low Minute Volume Alarm 4 L/min   Apnea (secs) 20 secs   High Respiratory Rate 40 br/min   Low Exhaled Vt  230 mL   Patient Observation   Observations 8ett 24 at lip

## 2021-05-02 NOTE — PROGRESS NOTES
Pulmonary & Critical Care Medicine ICU Progress Note    CC: Shortness of breath, respiratory failure    Events of Last 24 hours:   Bronchoscopy yesterday with very thick viscous secretions    Invasive Lines:  RIDOROTHY CVC 21 by me     MV: 2021  Vent Mode: AC/PC Rate Set: 24 bmp/Vt Ordered: 0 mL/ /FiO2 : 40 %  Recent Labs     21  0400 21  0330   PHART 7.381 7.387   [id number] 76.8* 66.6*   PO2ART 148.0* 74.5*       IV:   midazolam 2 mg/hr (21 1053)    ketamine (KETALAR) infusion 0.15 mg/kg/hr (21 0720)    fentaNYL 17.5 mcg/hr (21 0158)    dextrose      propofol 50 mcg/kg/min (21 1746)    sodium chloride 25 mL (21 0645)       Vitals:  Blood pressure (!) 110/58, pulse 67, temperature 99.1 °F (37.3 °C), temperature source Bladder, resp. rate 24, height 5' 4\" (1.626 m), weight 153 lb (69.4 kg), SpO2 93 %, not currently breastfeeding. on vent 2%  Temp  Av.1 °F (37.3 °C)  Min: 99 °F (37.2 °C)  Max: 99.2 °F (37.3 °C)    Intake/Output Summary (Last 24 hours) at 2021 0645  Last data filed at 2021 0600  Gross per 24 hour   Intake 2728 ml   Output 2105 ml   Net 623 ml     EXAM:  General: intubated, ill appearing    ENT: Pharynx with ETT. Resp: No crackles. Wheezing with poor air movement  CV: S1, S2. No edema  GI: NT, ND, +BS  Skin: Warm and dry. Neuro: PERRL. Sedated, not following commands.  Patellar reflexes are symmetric     Scheduled Meds:   polyethylene glycol  17 g Oral Daily    cefepime  2,000 mg Intravenous Q8H    vancomycin  1,000 mg Intravenous Q12H    insulin lispro  0-12 Units Subcutaneous Q4H    chlorhexidine  15 mL Mouth/Throat BID    aspirin  81 mg Oral Daily    atorvastatin  40 mg Oral Nightly    levothyroxine  125 mcg Oral QAM AC    montelukast  10 mg Oral Nightly    sodium chloride flush  5-40 mL Intravenous 2 times per day    enoxaparin  40 mg Subcutaneous Daily    famotidine  20 mg Oral BID    methylPREDNISolone  40 mg Intravenous Q12H    ipratropium-albuterol  1 ampule Inhalation Q4H     PRN Meds:  midazolam, glucose, dextrose, glucagon (rDNA), dextrose, fentanNYL, albuterol sulfate HFA **AND** ipratropium **AND** MDI Treatment, sodium chloride flush, sodium chloride, acetaminophen **OR** acetaminophen, ondansetron **OR** ondansetron, albuterol, ibuprofen    Results:  CBC:   Recent Labs     04/30/21  0345 05/01/21  0400 05/02/21  0330   WBC 10.8 14.3* 15.2*   HGB 9.7* 9.5* 9.5*   HCT 29.1* 28.6* 28.3*   MCV 97.5 97.6 96.5    235 240     BMP:   Recent Labs     04/29/21  1210 04/30/21  0345 05/01/21  0400 05/02/21  0330    138 140 137   K 5.0 4.9 5.2* 5.4*   CL 93* 93* 95* 94*   CO2 41* 40* 44* 42*   PHOS 3.6  --   --   --    BUN 42* 45* 45* 41*   CREATININE 0.6 <0.5* <0.5* <0.5*     LIVER PROFILE:   No results for input(s): AST, ALT, LIPASE, BILIDIR, BILITOT, ALKPHOS in the last 72 hours. Invalid input(s): AMYLASE,  ALB    Cultures:  4/25/2021 SARS-CoV-2 negative   4/25/2021 blood no growth   4/26/2021 respiratory culture candida  5/1/2021 bronc washings no organisms  5/1/2021 BAL no organisms    Films:  CXR 5/1/2021 possible left base infiltrate worsening, new right lower lobe infiltrate    ASSESSMENT:  · Acute on chronic respiratory failure with hypoxemia and hypercapnia - failed BiPAP with worsening hypercapnia, typically on 3 L home oxygen  · Clinically she has worsened  · COPD with AE; severe airtrapping with auto PEEP  · Community acquired pneumonia, worsening 5/1/21 with suspected HCAP/VAP  · Septic shock  · Hyperkalemia, K is rising again   · Small pleural effusions  · Ongoing tobacco abuse    PLAN:  Mechanical ventilation as per my orders. The ventilator was adjusted by me at the bedside for unstable, life threatening respiratory failure.   She continues on pressure control ventilation, she has not tolerated the switch to assist control 2/2 air trapping and peak pressure alarms  IV Propofol for sedation, target RASS -2, with daily spontaneous awakening trial.  Versed gtt to off, fentanyl gtt, Ketamine gtt to help with reactive airways   Head of bed 30 degrees or higher at all times  IV Solu-Medrol  Cefepime and vancomycin day #2, started after bronchoscopy. Completed 7 days ceftriaxone and 5 days azithromycin and 4 days vancomycin. Lasix 40 X 1 and repeat K   ICU care- ap montez is spouse    Total critical care time caring for this patient with life threatening, unstable organ failure, including direct patient contact, management of life support systems, review of data including imaging and labs, discussions with other team members and physicians is 34 minutes so far today, excluding procedures.

## 2021-05-02 NOTE — PROGRESS NOTES
Shift assessment completed, see flow sheet. RASS -2. .      Intubated and sedated on PC#  ETT,8 at 24 LL. PI 25 /TI 0.60 / 40%/ +8. SpO2 95%. Respirations are easy, even, and unlabored. Bilateral lung sounds diminished.      VSS  NS on the monitor. OG in place, tube feed stopped at this time in preparation for bronchoscopy, per Dr. Mayte Benavides       PICC/CVC/PIV, WNL with Fentanyl 175, propofol 50, versed 1, and ketamine 0.15     All lines and monitoring devices in place. Baugh is patent and secured. Bilateral soft wrist restraints in place for patient safety. Bed in lowest position with wheels locked. No needs expressed at this time.  Will continue to monitor

## 2021-05-02 NOTE — PROGRESS NOTES
4 Eyes Skin Assessment     The patient is being assess for   Shift Handoff    I agree that 2 RN's have performed a thorough Head to Toe Skin Assessment on the patient. ALL assessment sites listed below have been assessed. Areas assessed by both nurses:   [x]   Head, Face, and Ears   [x]   Shoulders, Back, and Chest, Abdomen  [x]   Arms, Elbows, and Hands   [x]   Coccyx, Sacrum, and Ischium  [x]   Legs, Feet, and Heels        See flowsheets    **SHARE this note so that the co-signing nurse is able to place an eSignature**    Co-signer eSignature: Electronically signed by Romel Menon RN on 5/2/21 at 7:01 PM EDT    Does the Patient have Skin Breakdown?   Yes [facility] WOUND CARE was Initiated documentation include the Alis-wound, Wound Assessment, Measurements, Dressing Treatment, Drainage, and Color\",          Ron Prevention initiated:  Yes   Wound Care Orders initiated:  NA      WOC nurse consulted for Pressure Injury (Stage 3,4, Unstageable, DTI, NWPT, Complex wounds)and New or Established Ostomies:  NA      Primary Nurse eSignature: Electronically signed by Christine Asher RN on 5/2/21 at 6:57 PM EDT

## 2021-05-02 NOTE — PROGRESS NOTES
05/02/21 1948   Vent Information   $Ventilation $Subsequent Day   Skin Assessment Clean, dry, & intact   Vent Type 840   Vent Mode AC/PC   Vt Ordered 0 mL   Pressure Ordered 25   Rate Set 24 bmp   Peak Flow 0 L/min   Pressure Support 0 cmH20   FiO2  50 %   SpO2 94 %   SpO2/FiO2 ratio 188   Sensitivity 3   PEEP/CPAP 8   I Time/ I Time % 0 s   Humidification Source Heated wire   Humidification Temp Measured 36.1   Circuit Condensation Drained   Vent Patient Data   High Peep/I Pressure 25   Peak Inspiratory Pressure 35 cmH2O   Mean Airway Pressure 14 cmH20   Rate Measured 24 br/min   Vt Exhaled 340 mL   Minute Volume 8.27 Liters   I:E Ratio 1:3.20   Plateau Pressure 28 [id number]   Static Compliance 17 mL/cmH2O   Dynamic Compliance 13 mL/cmH2O   Cough/Sputum   Sputum How Obtained Endotracheal   Cough Non-productive   Sputum Amount None   Spontaneous Breathing Trial (SBT) RT Doc   Pulse 87   Breath Sounds   Right Upper Lobe Diminished   Right Middle Lobe Diminished   Right Lower Lobe Diminished   Left Upper Lobe Diminished   Left Lower Lobe Diminished   Additional Respiratory  Assessments   Resp 24   Alarm Settings   High Pressure Alarm 50 cmH2O   Low Minute Volume Alarm 2 L/min   Apnea (secs) 20 secs   High Respiratory Rate 40 br/min   Low Exhaled Vt  200 mL   Patient Observation   Observations 8ett 24 at lip

## 2021-05-02 NOTE — PROGRESS NOTES
05/01/21 2351   Vent Information   Vent Type 840   Vent Mode AC/PC   Vt Ordered 0 mL   Pressure Ordered 25   Rate Set 24 bmp   Peak Flow 0 L/min   Pressure Support 0 cmH20   FiO2  40 %   SpO2 94 %   SpO2/FiO2 ratio 235   Sensitivity 3   PEEP/CPAP 8   I Time/ I Time % 0 s   Humidification Source Heated wire   Humidification Temp Measured 37   Circuit Condensation Drained   Vent Patient Data   High Peep/I Pressure 25   Peak Inspiratory Pressure 35 cmH2O   Mean Airway Pressure 14 cmH20   Rate Measured 24 br/min   Vt Exhaled 418 mL   Minute Volume 10 Liters   I:E Ratio 1:3.20   Cough/Sputum   Sputum How Obtained Endotracheal   Cough None   Sputum Amount None   Spontaneous Breathing Trial (SBT) RT Doc   Pulse 60   Breath Sounds   Right Upper Lobe Diminished   Right Middle Lobe Diminished   Right Lower Lobe Diminished   Left Upper Lobe Diminished   Left Lower Lobe Diminished   Additional Respiratory  Assessments   Resp 24   Alarm Settings   High Pressure Alarm 50 cmH2O   Low Minute Volume Alarm 4 L/min   Apnea (secs) 20 secs   High Respiratory Rate 40 br/min   Low Exhaled Vt  230 mL   Patient Observation   Observations 8ett 24 at lip

## 2021-05-03 ENCOUNTER — TELEPHONE (OUTPATIENT)
Dept: PULMONOLOGY | Age: 61
End: 2021-05-03

## 2021-05-03 ENCOUNTER — APPOINTMENT (OUTPATIENT)
Dept: GENERAL RADIOLOGY | Age: 61
DRG: 870 | End: 2021-05-03
Payer: COMMERCIAL

## 2021-05-03 LAB
ANION GAP SERPL CALCULATED.3IONS-SCNC: 3 MMOL/L (ref 3–16)
ANISOCYTOSIS: ABNORMAL
BASE EXCESS ARTERIAL: 14 MMOL/L (ref -3–3)
BASE EXCESS ARTERIAL: 15.1 MMOL/L (ref -3–3)
BASE EXCESS ARTERIAL: 7.8 MMOL/L (ref -3–3)
BASOPHILS ABSOLUTE: 0 K/UL (ref 0–0.2)
BASOPHILS RELATIVE PERCENT: 0 %
BUN BLDV-MCNC: 44 MG/DL (ref 7–20)
CALCIUM SERPL-MCNC: 9.6 MG/DL (ref 8.3–10.6)
CARBOXYHEMOGLOBIN ARTERIAL: 0.3 % (ref 0–1.5)
CHLORIDE BLD-SCNC: 91 MMOL/L (ref 99–110)
CO2: 42 MMOL/L (ref 21–32)
CREAT SERPL-MCNC: <0.5 MG/DL (ref 0.6–1.2)
CULTURE, RESPIRATORY: ABNORMAL
EOSINOPHILS ABSOLUTE: 0 K/UL (ref 0–0.6)
EOSINOPHILS RELATIVE PERCENT: 0 %
GFR AFRICAN AMERICAN: >60
GFR NON-AFRICAN AMERICAN: >60
GLUCOSE BLD-MCNC: 119 MG/DL (ref 70–99)
GLUCOSE BLD-MCNC: 119 MG/DL (ref 70–99)
GLUCOSE BLD-MCNC: 121 MG/DL (ref 70–99)
GLUCOSE BLD-MCNC: 124 MG/DL (ref 70–99)
GLUCOSE BLD-MCNC: 134 MG/DL (ref 70–99)
GLUCOSE BLD-MCNC: 143 MG/DL (ref 70–99)
GRAM STAIN RESULT: ABNORMAL
GRAM STAIN RESULT: ABNORMAL
HCO3 ARTERIAL: 35.2 MMOL/L (ref 21–29)
HCO3 ARTERIAL: 40.5 MMOL/L (ref 21–29)
HCO3 ARTERIAL: 45.1 MMOL/L (ref 21–29)
HCT VFR BLD CALC: 28.1 % (ref 36–48)
HEMATOLOGY PATH CONSULT: NO
HEMOGLOBIN, ART, EXTENDED: 10.8 G/DL (ref 12–16)
HEMOGLOBIN, ART, EXTENDED: 11.4 G/DL (ref 12–16)
HEMOGLOBIN, ART, EXTENDED: 5.9 G/DL (ref 12–16)
HEMOGLOBIN: 9.4 G/DL (ref 12–16)
HYPOCHROMIA: ABNORMAL
LYMPHOCYTES ABSOLUTE: 1.9 K/UL (ref 1–5.1)
LYMPHOCYTES RELATIVE PERCENT: 11 %
MCH RBC QN AUTO: 32.3 PG (ref 26–34)
MCHC RBC AUTO-ENTMCNC: 33.3 G/DL (ref 31–36)
MCV RBC AUTO: 96.9 FL (ref 80–100)
METHEMOGLOBIN ARTERIAL: 0.1 %
METHEMOGLOBIN ARTERIAL: 0.5 %
METHEMOGLOBIN ARTERIAL: 0.5 %
MONOCYTES ABSOLUTE: 0.7 K/UL (ref 0–1.3)
MONOCYTES RELATIVE PERCENT: 4 %
NEUTROPHILS ABSOLUTE: 14.8 K/UL (ref 1.7–7.7)
NEUTROPHILS RELATIVE PERCENT: 85 %
O2 CONTENT ARTERIAL: 14 ML/DL
O2 CONTENT ARTERIAL: 15 ML/DL
O2 CONTENT ARTERIAL: 8 ML/DL
O2 SAT, ARTERIAL: 82.3 %
O2 SAT, ARTERIAL: 96.7 %
O2 SAT, ARTERIAL: 97.3 %
O2 THERAPY: ABNORMAL
ORGANISM: ABNORMAL
ORGANISM: ABNORMAL
PCO2 ARTERIAL: 65.5 MMHG (ref 35–45)
PCO2 ARTERIAL: 71.7 MMHG (ref 35–45)
PCO2 ARTERIAL: 92.4 MMHG (ref 35–45)
PDW BLD-RTO: 13.6 % (ref 12.4–15.4)
PERFORMED ON: ABNORMAL
PH ARTERIAL: 7.31 (ref 7.35–7.45)
PH ARTERIAL: 7.35 (ref 7.35–7.45)
PH ARTERIAL: 7.37 (ref 7.35–7.45)
PLATELET # BLD: 250 K/UL (ref 135–450)
PLATELET SLIDE REVIEW: ADEQUATE
PMV BLD AUTO: 7.6 FL (ref 5–10.5)
PO2 ARTERIAL: 103.9 MMHG (ref 75–108)
PO2 ARTERIAL: 53.7 MMHG (ref 75–108)
PO2 ARTERIAL: 95.7 MMHG (ref 75–108)
POTASSIUM REFLEX MAGNESIUM: 5 MMOL/L (ref 3.5–5.1)
PROCALCITONIN: 0.36 NG/ML (ref 0–0.15)
RBC # BLD: 2.9 M/UL (ref 4–5.2)
SLIDE REVIEW: ABNORMAL
SODIUM BLD-SCNC: 136 MMOL/L (ref 136–145)
TCO2 ARTERIAL: 37.2 MMOL/L
TCO2 ARTERIAL: 42.7 MMOL/L
TCO2 ARTERIAL: 47.9 MMOL/L
TRIGL SERPL-MCNC: 135 MG/DL (ref 0–150)
VANCOMYCIN TROUGH: 11.7 UG/ML (ref 10–20)
WBC # BLD: 17.4 K/UL (ref 4–11)

## 2021-05-03 PROCEDURE — 71045 X-RAY EXAM CHEST 1 VIEW: CPT

## 2021-05-03 PROCEDURE — 94640 AIRWAY INHALATION TREATMENT: CPT

## 2021-05-03 PROCEDURE — 6370000000 HC RX 637 (ALT 250 FOR IP): Performed by: INTERNAL MEDICINE

## 2021-05-03 PROCEDURE — 6360000002 HC RX W HCPCS: Performed by: INTERNAL MEDICINE

## 2021-05-03 PROCEDURE — 36600 WITHDRAWAL OF ARTERIAL BLOOD: CPT

## 2021-05-03 PROCEDURE — 82803 BLOOD GASES ANY COMBINATION: CPT

## 2021-05-03 PROCEDURE — 99233 SBSQ HOSP IP/OBS HIGH 50: CPT | Performed by: INTERNAL MEDICINE

## 2021-05-03 PROCEDURE — 80048 BASIC METABOLIC PNL TOTAL CA: CPT

## 2021-05-03 PROCEDURE — 85025 COMPLETE CBC W/AUTO DIFF WBC: CPT

## 2021-05-03 PROCEDURE — 80202 ASSAY OF VANCOMYCIN: CPT

## 2021-05-03 PROCEDURE — 2000000000 HC ICU R&B

## 2021-05-03 PROCEDURE — 2700000000 HC OXYGEN THERAPY PER DAY

## 2021-05-03 PROCEDURE — 2580000003 HC RX 258: Performed by: INTERNAL MEDICINE

## 2021-05-03 PROCEDURE — 94761 N-INVAS EAR/PLS OXIMETRY MLT: CPT

## 2021-05-03 PROCEDURE — 36415 COLL VENOUS BLD VENIPUNCTURE: CPT

## 2021-05-03 PROCEDURE — 2500000003 HC RX 250 WO HCPCS: Performed by: INTERNAL MEDICINE

## 2021-05-03 PROCEDURE — 94003 VENT MGMT INPAT SUBQ DAY: CPT

## 2021-05-03 PROCEDURE — 99291 CRITICAL CARE FIRST HOUR: CPT | Performed by: INTERNAL MEDICINE

## 2021-05-03 PROCEDURE — 84478 ASSAY OF TRIGLYCERIDES: CPT

## 2021-05-03 RX ADMIN — CARBOXYMETHYLCELLULOSE SODIUM 1 DROP: 10 GEL OPHTHALMIC at 13:18

## 2021-05-03 RX ADMIN — CEFEPIME 2000 MG: 2 INJECTION, POWDER, FOR SOLUTION INTRAVENOUS at 10:08

## 2021-05-03 RX ADMIN — ALBUTEROL SULFATE 2.5 MG: 2.5 SOLUTION RESPIRATORY (INHALATION) at 19:46

## 2021-05-03 RX ADMIN — VANCOMYCIN HYDROCHLORIDE 1000 MG: 1 INJECTION, POWDER, LYOPHILIZED, FOR SOLUTION INTRAVENOUS at 12:07

## 2021-05-03 RX ADMIN — Medication 15 ML: at 20:06

## 2021-05-03 RX ADMIN — CEFEPIME 2000 MG: 2 INJECTION, POWDER, FOR SOLUTION INTRAVENOUS at 02:47

## 2021-05-03 RX ADMIN — ATORVASTATIN CALCIUM 40 MG: 40 TABLET, FILM COATED ORAL at 20:06

## 2021-05-03 RX ADMIN — FAMOTIDINE 20 MG: 20 TABLET ORAL at 08:13

## 2021-05-03 RX ADMIN — IPRATROPIUM BROMIDE AND ALBUTEROL SULFATE 1 AMPULE: 2.5; .5 SOLUTION RESPIRATORY (INHALATION) at 23:27

## 2021-05-03 RX ADMIN — WHITE PETROLATUM 57.7 %-MINERAL OIL 31.9 % EYE OINTMENT: at 02:00

## 2021-05-03 RX ADMIN — ASPIRIN 81 MG: 81 TABLET, CHEWABLE ORAL at 08:13

## 2021-05-03 RX ADMIN — PROPOFOL 50 MCG/KG/MIN: 10 INJECTION, EMULSION INTRAVENOUS at 03:59

## 2021-05-03 RX ADMIN — ENOXAPARIN SODIUM 40 MG: 40 INJECTION SUBCUTANEOUS at 08:13

## 2021-05-03 RX ADMIN — IPRATROPIUM BROMIDE AND ALBUTEROL SULFATE 1 AMPULE: 2.5; .5 SOLUTION RESPIRATORY (INHALATION) at 03:27

## 2021-05-03 RX ADMIN — PROPOFOL 50 MCG/KG/MIN: 10 INJECTION, EMULSION INTRAVENOUS at 13:19

## 2021-05-03 RX ADMIN — PROPOFOL 50 MCG/KG/MIN: 10 INJECTION, EMULSION INTRAVENOUS at 17:34

## 2021-05-03 RX ADMIN — PROPOFOL 50 MCG/KG/MIN: 10 INJECTION, EMULSION INTRAVENOUS at 22:52

## 2021-05-03 RX ADMIN — Medication 100 MCG/HR: at 08:35

## 2021-05-03 RX ADMIN — CARBOXYMETHYLCELLULOSE SODIUM 1 DROP: 10 GEL OPHTHALMIC at 20:06

## 2021-05-03 RX ADMIN — IPRATROPIUM BROMIDE AND ALBUTEROL SULFATE 1 AMPULE: 2.5; .5 SOLUTION RESPIRATORY (INHALATION) at 00:22

## 2021-05-03 RX ADMIN — INSULIN LISPRO 2 UNITS: 100 INJECTION, SOLUTION INTRAVENOUS; SUBCUTANEOUS at 11:55

## 2021-05-03 RX ADMIN — METHYLPREDNISOLONE SODIUM SUCCINATE 40 MG: 40 INJECTION, POWDER, FOR SOLUTION INTRAMUSCULAR; INTRAVENOUS at 20:08

## 2021-05-03 RX ADMIN — Medication 10 ML: at 20:07

## 2021-05-03 RX ADMIN — WHITE PETROLATUM 57.7 %-MINERAL OIL 31.9 % EYE OINTMENT: at 13:18

## 2021-05-03 RX ADMIN — POLYETHYLENE GLYCOL (3350) 17 G: 17 POWDER, FOR SOLUTION ORAL at 08:13

## 2021-05-03 RX ADMIN — CARBOXYMETHYLCELLULOSE SODIUM 1 DROP: 10 GEL OPHTHALMIC at 17:34

## 2021-05-03 RX ADMIN — IPRATROPIUM BROMIDE AND ALBUTEROL SULFATE 1 AMPULE: 2.5; .5 SOLUTION RESPIRATORY (INHALATION) at 07:32

## 2021-05-03 RX ADMIN — WHITE PETROLATUM 57.7 %-MINERAL OIL 31.9 % EYE OINTMENT: at 17:34

## 2021-05-03 RX ADMIN — MONTELUKAST SODIUM 10 MG: 10 TABLET, COATED ORAL at 20:06

## 2021-05-03 RX ADMIN — CARBOXYMETHYLCELLULOSE SODIUM 1 DROP: 10 GEL OPHTHALMIC at 08:14

## 2021-05-03 RX ADMIN — IPRATROPIUM BROMIDE AND ALBUTEROL SULFATE 1 AMPULE: 2.5; .5 SOLUTION RESPIRATORY (INHALATION) at 19:39

## 2021-05-03 RX ADMIN — WHITE PETROLATUM 57.7 %-MINERAL OIL 31.9 % EYE OINTMENT: at 06:41

## 2021-05-03 RX ADMIN — IPRATROPIUM BROMIDE AND ALBUTEROL SULFATE 1 AMPULE: 2.5; .5 SOLUTION RESPIRATORY (INHALATION) at 11:47

## 2021-05-03 RX ADMIN — VANCOMYCIN HYDROCHLORIDE 1250 MG: 10 INJECTION, POWDER, LYOPHILIZED, FOR SOLUTION INTRAVENOUS at 23:20

## 2021-05-03 RX ADMIN — Medication 100 MCG/HR: at 20:20

## 2021-05-03 RX ADMIN — METHYLPREDNISOLONE SODIUM SUCCINATE 40 MG: 40 INJECTION, POWDER, FOR SOLUTION INTRAMUSCULAR; INTRAVENOUS at 08:13

## 2021-05-03 RX ADMIN — CARBOXYMETHYLCELLULOSE SODIUM 1 DROP: 10 GEL OPHTHALMIC at 06:41

## 2021-05-03 RX ADMIN — PROPOFOL 50 MCG/KG/MIN: 10 INJECTION, EMULSION INTRAVENOUS at 10:08

## 2021-05-03 RX ADMIN — LEVOTHYROXINE SODIUM 125 MCG: 25 TABLET ORAL at 06:42

## 2021-05-03 RX ADMIN — ALBUTEROL SULFATE 2.5 MG: 2.5 SOLUTION RESPIRATORY (INHALATION) at 23:32

## 2021-05-03 RX ADMIN — IPRATROPIUM BROMIDE AND ALBUTEROL SULFATE 1 AMPULE: 2.5; .5 SOLUTION RESPIRATORY (INHALATION) at 15:02

## 2021-05-03 RX ADMIN — Medication 10 ML: at 08:14

## 2021-05-03 RX ADMIN — FAMOTIDINE 20 MG: 20 TABLET ORAL at 20:06

## 2021-05-03 RX ADMIN — Medication 15 ML: at 08:14

## 2021-05-03 RX ADMIN — CEFEPIME 2000 MG: 2 INJECTION, POWDER, FOR SOLUTION INTRAVENOUS at 17:33

## 2021-05-03 RX ADMIN — WHITE PETROLATUM 57.7 %-MINERAL OIL 31.9 % EYE OINTMENT: at 10:12

## 2021-05-03 RX ADMIN — CARBOXYMETHYLCELLULOSE SODIUM 1 DROP: 10 GEL OPHTHALMIC at 02:00

## 2021-05-03 RX ADMIN — PROPOFOL 50 MCG/KG/MIN: 10 INJECTION, EMULSION INTRAVENOUS at 00:05

## 2021-05-03 RX ADMIN — WHITE PETROLATUM 57.7 %-MINERAL OIL 31.9 % EYE OINTMENT: at 20:06

## 2021-05-03 ASSESSMENT — PULMONARY FUNCTION TESTS
PIF_VALUE: 36
PIF_VALUE: 36
PIF_VALUE: 35
PIF_VALUE: 37
PIF_VALUE: 36
PIF_VALUE: 39
PIF_VALUE: 34
PIF_VALUE: 36
PIF_VALUE: 35
PIF_VALUE: 34

## 2021-05-03 NOTE — PROGRESS NOTES
05/03/21 1939   Vent Information   $Ventilation $Subsequent Day   Skin Assessment Clean, dry, & intact   Vent Type 840   Vent Mode AC/PC   Vt Ordered 0 mL   Rate Set 22 bmp   Peak Flow 0 L/min   Pressure Support 0 cmH20   FiO2  45 %   SpO2 97 %   SpO2/FiO2 ratio 215.56   Sensitivity 3   PEEP/CPAP 12   I Time/ I Time % 0 s   Humidification Source Heated wire   Humidification Temp 37   Humidification Temp Measured 37   Circuit Condensation Drained   Vent Patient Data   High Peep/I Pressure 22   Peak Inspiratory Pressure 37 cmH2O   Mean Airway Pressure 16 cmH20   Rate Measured 22 br/min   Vt Exhaled 408 mL   Minute Volume 8.96 Liters   I:E Ratio 1:4.50   Plateau Pressure 22 [id number]   Static Compliance 41 mL/cmH2O   Dynamic Compliance 16 mL/cmH2O   Cough/Sputum   Sputum How Obtained Endotracheal;Suctioned   Cough Productive   Sputum Amount Small   Sputum Color Creamy   Tenacity Thick   Spontaneous Breathing Trial (SBT) RT Doc   Pulse 78   Breath Sounds   Right Upper Lobe Diminished; Expiratory Wheezes   Right Middle Lobe Diminished; Expiratory Wheezes   Right Lower Lobe Diminished; Expiratory Wheezes   Left Upper Lobe Diminished; Expiratory Wheezes   Left Lower Lobe Diminished; Expiratory Wheezes   Additional Respiratory  Assessments   Resp 22   Position Semi-Woods's   Alarm Settings   High Pressure Alarm 50 cmH2O   Low Minute Volume Alarm 2 L/min   High Respiratory Rate 40 br/min   Patient Observation   Observations ETT SIZE 8.0, SECURED AT 24 LIP LINE. AMBU BAG AT HEAD OF BED. WATER GOOD. Non-Surgical Airway Endo Tracheal Tube   Placement Date/Time: 04/26/21 0528   Timeout: Patient;Procedure; Appropriate Equipment  Mask Ventilation: Ventilated by mask (1)  Placed By: Licensed provider  Inserted by: Dr Valencia Chase  Insertion attempts: 1  Airway Device: Endo Tracheal Tube  Size: 8   Secured at 24 cm   Measured From Lips   Secured Location Right   Secured By Commercial tube [email or web address]   Site Condition Dry        05/03/21 1939 Vent Information   $Ventilation $Subsequent Day   Skin Assessment Clean, dry, & intact   Vent Type 840   Vent Mode AC/PC   Vt Ordered 0 mL   Rate Set 22 bmp   Peak Flow 0 L/min   Pressure Support 0 cmH20   FiO2  45 %   SpO2 97 %   SpO2/FiO2 ratio 215.56   Sensitivity 3   PEEP/CPAP 12   I Time/ I Time % 0 s   Humidification Source Heated wire   Humidification Temp 37   Humidification Temp Measured 37   Circuit Condensation Drained   Vent Patient Data   High Peep/I Pressure 22   Peak Inspiratory Pressure 37 cmH2O   Mean Airway Pressure 16 cmH20   Rate Measured 22 br/min   Vt Exhaled 408 mL   Minute Volume 8.96 Liters   I:E Ratio 1:4.50   Plateau Pressure 22 [id number]   Static Compliance 41 mL/cmH2O   Dynamic Compliance 16 mL/cmH2O   Cough/Sputum   Sputum How Obtained Endotracheal;Suctioned   Cough Productive   Sputum Amount Small   Sputum Color Creamy   Tenacity Thick   Spontaneous Breathing Trial (SBT) RT Doc   Pulse 78   Breath Sounds   Right Upper Lobe Diminished; Expiratory Wheezes   Right Middle Lobe Diminished; Expiratory Wheezes   Right Lower Lobe Diminished; Expiratory Wheezes   Left Upper Lobe Diminished; Expiratory Wheezes   Left Lower Lobe Diminished; Expiratory Wheezes   Additional Respiratory  Assessments   Resp 22   Position Semi-Woods's   Alarm Settings   High Pressure Alarm 50 cmH2O   Low Minute Volume Alarm 2 L/min   High Respiratory Rate 40 br/min   Patient Observation   Observations ETT SIZE 8.0, SECURED AT 24 LIP LINE. AMBU BAG AT HEAD OF BED. WATER GOOD. Non-Surgical Airway Endo Tracheal Tube   Placement Date/Time: 04/26/21 0528   Timeout: Patient;Procedure; Appropriate Equipment  Mask Ventilation: Ventilated by mask (1)  Placed By: Licensed provider  Inserted by: Dr Josafat Denise  Insertion attempts: 1  Airway Device: Endo Tracheal Tube  Size: 8   Secured at 24 cm   Measured From Lips   Secured Location Right   Secured By Commercial tube [email or web address]   Site Condition Dry        05/03/21 1939   Vent Information $Ventilation $Subsequent Day   Skin Assessment Clean, dry, & intact   Vent Type 840   Vent Mode AC/PC   Vt Ordered 0 mL   Rate Set 22 bmp   Peak Flow 0 L/min   Pressure Support 0 cmH20   FiO2  45 %   SpO2 97 %   SpO2/FiO2 ratio 215.56   Sensitivity 3   PEEP/CPAP 12   I Time/ I Time % 0 s   Humidification Source Heated wire   Humidification Temp 37   Humidification Temp Measured 37   Circuit Condensation Drained   Vent Patient Data   High Peep/I Pressure 22   Peak Inspiratory Pressure 37 cmH2O   Mean Airway Pressure 16 cmH20   Rate Measured 22 br/min   Vt Exhaled 408 mL   Minute Volume 8.96 Liters   I:E Ratio 1:4.50   Plateau Pressure 22 [id number]   Static Compliance 41 mL/cmH2O   Dynamic Compliance 16 mL/cmH2O   Cough/Sputum   Sputum How Obtained Endotracheal;Suctioned   Cough Productive   Sputum Amount Small   Sputum Color Creamy   Tenacity Thick   Spontaneous Breathing Trial (SBT) RT Doc   Pulse 78   Breath Sounds   Right Upper Lobe Diminished; Expiratory Wheezes   Right Middle Lobe Diminished; Expiratory Wheezes   Right Lower Lobe Diminished; Expiratory Wheezes   Left Upper Lobe Diminished; Expiratory Wheezes   Left Lower Lobe Diminished; Expiratory Wheezes   Additional Respiratory  Assessments   Resp 22   Position Semi-Woods's   Alarm Settings   High Pressure Alarm 50 cmH2O   Low Minute Volume Alarm 2 L/min   High Respiratory Rate 40 br/min   Patient Observation   Observations ETT SIZE 8.0, SECURED AT 24 LIP LINE. AMBU BAG AT HEAD OF BED. WATER GOOD. Non-Surgical Airway Endo Tracheal Tube   Placement Date/Time: 04/26/21 0528   Timeout: Patient;Procedure; Appropriate Equipment  Mask Ventilation: Ventilated by mask (1)  Placed By: Licensed provider  Inserted by: Dr Yeh Chain  Insertion attempts: 1  Airway Device: Endo Tracheal Tube  Size: 8   Secured at 24 cm   Measured From Lips   Secured Location Right   Secured By Commercial tube [email or web address]   Site Condition Dry        05/03/21 1939   Vent Information   $Ventilation $Subsequent Day   Skin Assessment Clean, dry, & intact   Vent Type 840   Vent Mode AC/PC   Vt Ordered 0 mL   Rate Set 22 bmp   Peak Flow 0 L/min   Pressure Support 0 cmH20   FiO2  45 %   SpO2 97 %   SpO2/FiO2 ratio 215.56   Sensitivity 3   PEEP/CPAP 12   I Time/ I Time % 0 s   Humidification Source Heated wire   Humidification Temp 37   Humidification Temp Measured 37   Circuit Condensation Drained   Vent Patient Data   High Peep/I Pressure 22   Peak Inspiratory Pressure 37 cmH2O   Mean Airway Pressure 16 cmH20   Rate Measured 22 br/min   Vt Exhaled 408 mL   Minute Volume 8.96 Liters   I:E Ratio 1:4.50   Plateau Pressure 22 [id number]   Static Compliance 41 mL/cmH2O   Dynamic Compliance 16 mL/cmH2O   Cough/Sputum   Sputum How Obtained Endotracheal;Suctioned   Cough Productive   Sputum Amount Small   Sputum Color Creamy   Tenacity Thick   Spontaneous Breathing Trial (SBT) RT Doc   Pulse 78   Breath Sounds   Right Upper Lobe Diminished; Expiratory Wheezes   Right Middle Lobe Diminished; Expiratory Wheezes   Right Lower Lobe Diminished; Expiratory Wheezes   Left Upper Lobe Diminished; Expiratory Wheezes   Left Lower Lobe Diminished; Expiratory Wheezes   Additional Respiratory  Assessments   Resp 22   Position Semi-Woods's   Alarm Settings   High Pressure Alarm 50 cmH2O   Low Minute Volume Alarm 2 L/min   High Respiratory Rate 40 br/min   Patient Observation   Observations ETT SIZE 8.0, SECURED AT 24 LIP LINE. AMBU BAG AT HEAD OF BED. WATER GOOD. Non-Surgical Airway Endo Tracheal Tube   Placement Date/Time: 04/26/21 0528   Timeout: Patient;Procedure; Appropriate Equipment  Mask Ventilation: Ventilated by mask (1)  Placed By: Licensed provider  Inserted by: Dr Carly Fink  Insertion attempts: 1  Airway Device: Endo Tracheal Tube  Size: 8   Secured at 24 cm   Measured From Lips   Secured Location Right   Secured By Commercial tube [email or web address]   Site Condition Dry        05/03/21 1939   Vent Information   $Ventilation $Subsequent Day Skin Assessment Clean, dry, & intact   Vent Type 840   Vent Mode AC/PC   Vt Ordered 0 mL   Rate Set 22 bmp   Peak Flow 0 L/min   Pressure Support 0 cmH20   FiO2  45 %   SpO2 97 %   SpO2/FiO2 ratio 215.56   Sensitivity 3   PEEP/CPAP 12   I Time/ I Time % 0 s   Humidification Source Heated wire   Humidification Temp 37   Humidification Temp Measured 37   Circuit Condensation Drained   Vent Patient Data   High Peep/I Pressure 22   Peak Inspiratory Pressure 37 cmH2O   Mean Airway Pressure 16 cmH20   Rate Measured 22 br/min   Vt Exhaled 408 mL   Minute Volume 8.96 Liters   I:E Ratio 1:4.50   Plateau Pressure 22 [id number]   Static Compliance 41 mL/cmH2O   Dynamic Compliance 16 mL/cmH2O   Cough/Sputum   Sputum How Obtained Endotracheal;Suctioned   Cough Productive   Sputum Amount Small   Sputum Color Creamy   Tenacity Thick   Spontaneous Breathing Trial (SBT) RT Doc   Pulse 78   Breath Sounds   Right Upper Lobe Diminished; Expiratory Wheezes   Right Middle Lobe Diminished; Expiratory Wheezes   Right Lower Lobe Diminished; Expiratory Wheezes   Left Upper Lobe Diminished; Expiratory Wheezes   Left Lower Lobe Diminished; Expiratory Wheezes   Additional Respiratory  Assessments   Resp 22   Position Semi-Woods's   Alarm Settings   High Pressure Alarm 50 cmH2O   Low Minute Volume Alarm 2 L/min   High Respiratory Rate 40 br/min   Patient Observation   Observations ETT SIZE 8.0, SECURED AT 24 LIP LINE. AMBU BAG AT HEAD OF BED. WATER GOOD. Non-Surgical Airway Endo Tracheal Tube   Placement Date/Time: 04/26/21 0528   Timeout: Patient;Procedure; Appropriate Equipment  Mask Ventilation: Ventilated by mask (1)  Placed By: Licensed provider  Inserted by: Dr Lali Odell  Insertion attempts: 1  Airway Device: Endo Tracheal Tube  Size: 8   Secured at 24 cm   Measured From Lips   Secured Location Right   Secured By Commercial tube [email or web address]   Site Condition Dry        05/03/21 1939   Vent Information   $Ventilation $Subsequent Day   Skin Assessment Clean, dry, & intact   Vent Type 840   Vent Mode AC/PC   Vt Ordered 0 mL   Rate Set 22 bmp   Peak Flow 0 L/min   Pressure Support 0 cmH20   FiO2  45 %   SpO2 97 %   SpO2/FiO2 ratio 215.56   Sensitivity 3   PEEP/CPAP 12   I Time/ I Time % 0 s   Humidification Source Heated wire   Humidification Temp 37   Humidification Temp Measured 37   Circuit Condensation Drained   Vent Patient Data   High Peep/I Pressure 22   Peak Inspiratory Pressure 37 cmH2O   Mean Airway Pressure 16 cmH20   Rate Measured 22 br/min   Vt Exhaled 408 mL   Minute Volume 8.96 Liters   I:E Ratio 1:4.50   Plateau Pressure 22 [id number]   Static Compliance 41 mL/cmH2O   Dynamic Compliance 16 mL/cmH2O   Cough/Sputum   Sputum How Obtained Endotracheal;Suctioned   Cough Productive   Sputum Amount Small   Sputum Color Creamy   Tenacity Thick   Spontaneous Breathing Trial (SBT) RT Doc   Pulse 78   Breath Sounds   Right Upper Lobe Diminished; Expiratory Wheezes   Right Middle Lobe Diminished; Expiratory Wheezes   Right Lower Lobe Diminished; Expiratory Wheezes   Left Upper Lobe Diminished; Expiratory Wheezes   Left Lower Lobe Diminished; Expiratory Wheezes   Additional Respiratory  Assessments   Resp 22   Position Semi-Woods's   Alarm Settings   High Pressure Alarm 50 cmH2O   Low Minute Volume Alarm 2 L/min   High Respiratory Rate 40 br/min   Patient Observation   Observations ETT SIZE 8.0, SECURED AT 24 LIP LINE. AMBU BAG AT HEAD OF BED. WATER GOOD. Non-Surgical Airway Endo Tracheal Tube   Placement Date/Time: 04/26/21 0528   Timeout: Patient;Procedure; Appropriate Equipment  Mask Ventilation: Ventilated by mask (1)  Placed By: Licensed provider  Inserted by: Dr Stanford Amaya  Insertion attempts: 1  Airway Device: Endo Tracheal Tube  Size: 8   Secured at 24 cm   Measured From Lips   Secured Location Right   Secured By Commercial tube [email or web address]   Site Condition Dry

## 2021-05-03 NOTE — PROGRESS NOTES
Pt has an intrinsic peep of 7cwp  Total peep 15cwp           05/03/21 0734   Vent Information   Vt Ordered 0 mL   Rate Set 24 bmp   Peak Flow 0 L/min   Pressure Support 0 cmH20   FiO2  50 %   SpO2 96 %   SpO2/FiO2 ratio 192   Sensitivity 3   PEEP/CPAP 8   I Time/ I Time % 0 s   Humidification Source Heated wire   Humidification Temp Measured 37   Vent Patient Data   High Peep/I Pressure 25   Peak Inspiratory Pressure 35 cmH2O   Mean Airway Pressure 14 cmH20   Rate Measured 24 br/min   Vt Exhaled 390 mL   Minute Volume 9.38 Liters   I:E Ratio 1:3.20   Plateau Pressure 24 [id number]   Static Compliance 22 mL/cmH2O   Dynamic Compliance 14 mL/cmH2O   Total PEEP 15 cmH20   Auto PEEP 7 cmH20   Cough/Sputum   Sputum How Obtained None   Spontaneous Breathing Trial (SBT) RT Doc   Pulse 70   Breath Sounds   Right Upper Lobe Diminished   Right Middle Lobe Diminished   Right Lower Lobe Diminished   Left Upper Lobe Diminished   Left Lower Lobe Diminished   Additional Respiratory  Assessments   Resp 24   Position Semi-Woods's   Oral Care Completed? Yes   Oral Care Mouth suctioned   Subglottic Suction Done? Yes   Alarm Settings   High Pressure Alarm 50 cmH2O   Low Minute Volume Alarm 2 L/min   High Respiratory Rate 40 br/min   Non-Surgical Airway Endo Tracheal Tube   Placement Date/Time: 04/26/21 0528   Timeout: Patient;Procedure; Appropriate Equipment  Mask Ventilation: Ventilated by mask (1)  Placed By: Licensed provider  Inserted by: Dr Lizzy Chávez  Insertion attempts: 1  Airway Device: Endo Tracheal Tube  Size: 8   Secured at 24 cm   Measured From Lips   Secured Location Right   Secured By Commercial tube [email or web address]   Site Condition Dry

## 2021-05-03 NOTE — PROGRESS NOTES
Reassessment completed, see flowsheets, unchanged from prior. VSS. RIJ CVC drsg changed using sterile technique. Pt tolerated well. All ICU Lines and monitoring remain in place. Bed locked in lowest position. Call light within reach.

## 2021-05-03 NOTE — PROGRESS NOTES
Care rounds completed with Dr. Francis Mackenzie and multidisciplinary team. Reviewed labs, meds, VS, assessment, & plan of care for today. See dictated note and new orders for details.    Change vent while on PC to rate 20, inspiratory pressure 22, Itime 0.55, peep 12  Repeat ABG 1 hour

## 2021-05-03 NOTE — PROGRESS NOTES
Shift assessment, completed, see flow sheet. RASS -3. Intubated and sedated with # 8.0 ETT, at 23 LL. PC  24 /  25 /0.60/ 50 %/ 8. SR 84, /54 (73), SpO2 94%. Respirations are easy, even, and unlabored. Bilateral lung sounds diminished, with slight expiratory wheeze in R LL. OG in place, with TF at 25 mL/hr, 0ml residual.  RIJ CVC, WNL with propofol 50 mcg/kg/min, fentanyl 100 mcg/hr, ketamine 0.2 mcg/kg/h infusing. Baugh in place and patent with STAT lock. Bilateral soft wrist restraints in place for pt safety. Call light within reach. Bed in lowest position. Bed alarm on.  Will continue to monitor

## 2021-05-03 NOTE — PROGRESS NOTES
RR increased to 22, IT changed to .50         05/03/21 1139   Vent Information   Vt Ordered 0 mL   Rate Set 22 bmp   Peak Flow 0 L/min   Pressure Support 0 cmH20   FiO2  50 %   SpO2 93 %   SpO2/FiO2 ratio 186   Sensitivity 2.5   PEEP/CPAP 12   I Time/ I Time % 0 s   Humidification Source Heated wire   Humidification Temp Measured 37   Vent Patient Data   High Peep/I Pressure 22   Peak Inspiratory Pressure 36 cmH2O   Mean Airway Pressure 16 cmH20   Rate Measured 22 br/min   Vt Exhaled 394 mL   Minute Volume 9.24 Liters   I:E Ratio 1:4.50   Cough/Sputum   Sputum How Obtained None   Spontaneous Breathing Trial (SBT) RT Doc   Pulse 94   Additional Respiratory  Assessments   Resp 22   Position Semi-Woods's   Oral Care Completed? Yes   Oral Care Mouth suctioned   Subglottic Suction Done? Yes   Alarm Settings   High Pressure Alarm 50 cmH2O   Low Minute Volume Alarm 2 L/min   High Respiratory Rate 40 br/min   Non-Surgical Airway Endo Tracheal Tube   Placement Date/Time: 04/26/21 0513   Timeout: Patient;Procedure; Appropriate Equipment  Mask Ventilation: Ventilated by mask (1)  Placed By: Licensed provider  Inserted by: Dr Jose Luis Perry  Insertion attempts: 1  Airway Device: Endo Tracheal Tube  Size: 8   Secured at 24 cm   Measured From Lips   Secured By Commercial tube [email or web address]

## 2021-05-03 NOTE — PROGRESS NOTES
Bedside report given to JULIÁN JI [facility]. POC transferred.  Freedom Kalina

## 2021-05-03 NOTE — PROGRESS NOTES
IM Progress Note    Admit Date:  4/25/2021  8       Interval history:  Acute resp failure, was on bipap, intubated on 4/26   Had bronchoscopy on 5/1/21. Had thick secretions. 5/3   sedated on the vent  Low grade fevers  On ketamine,propofol and fentanyl      Objective:   BP (!) 154/74   Pulse 98   Temp 99.9 °F (37.7 °C) (Bladder)   Resp 19   Ht 5' 4\" (1.626 m)   Wt 153 lb (69.4 kg)   LMP  (LMP Unknown)   SpO2 93%   BMI 26.26 kg/m²       Intake/Output Summary (Last 24 hours) at 5/3/2021 0957  Last data filed at 5/3/2021 6728  Gross per 24 hour   Intake 2441.2 ml   Output 1475 ml   Net 966.2 ml       Physical Exam:  General: middle aged female on vent,   Oral ETT and OG noted  Right IJ TLC . Appears to be not in any distress  Neck: No JVD, no carotid bruit, no thyromegaly  Chest: diminished  kelvin air entry with persistent wheeze/diminished BS  Cardiovascular:  RRR S1S2 heard, no murmurs or gallops  Abdomen:  Soft, undistended, non tender, no organomegaly, BS present  kelvin UE edema +  Extremities: No edema or cyanosis.  Distal pulses well felt  Neurological : sedated        Medications:   Scheduled Medications:    carboxymethylcellulose PF  1 drop Both Eyes Q4H    artificial tears   Both Eyes Q4H    polyethylene glycol  17 g Oral Daily    cefepime  2,000 mg Intravenous Q8H    vancomycin  1,000 mg Intravenous Q12H    insulin lispro  0-12 Units Subcutaneous Q4H    chlorhexidine  15 mL Mouth/Throat BID    aspirin  81 mg Oral Daily    atorvastatin  40 mg Oral Nightly    levothyroxine  125 mcg Oral QAM AC    montelukast  10 mg Oral Nightly    sodium chloride flush  5-40 mL Intravenous 2 times per day    enoxaparin  40 mg Subcutaneous Daily    famotidine  20 mg Oral BID    methylPREDNISolone  40 mg Intravenous Q12H    ipratropium-albuterol  1 ampule Inhalation Q4H     I   midazolam Stopped (05/02/21 1110)    ketamine (KETALAR) infusion 0.2 mg/kg/hr (05/02/21 2033)    fentaNYL 100 mcg/hr (05/03/21 7906)    dextrose      propofol 50 mcg/kg/min (05/03/21 0359)    sodium chloride 25 mL (04/25/21 0645)     midazolam, glucose, dextrose, glucagon (rDNA), dextrose, fentanNYL, albuterol sulfate HFA **AND** ipratropium **AND** MDI Treatment, sodium chloride flush, sodium chloride, acetaminophen **OR** acetaminophen, ondansetron **OR** ondansetron, albuterol, ibuprofen    Lab Data:  Recent Labs     05/01/21  0400 05/02/21  0330 05/03/21  0510   WBC 14.3* 15.2* 17.4*   HGB 9.5* 9.5* 9.4*   HCT 28.6* 28.3* 28.1*   MCV 97.6 96.5 96.9    240 250     Recent Labs     05/02/21  0330 05/02/21  1145 05/03/21  0510    134* 136   K 5.4* 4.0 5.0   CL 94* 90* 91*   CO2 42* 43* 42*   PHOS  --  3.9  --    BUN 41* 44* 44*   CREATININE <0.5* <0.5* <0.5*     No results for input(s): CKTOTAL, CKMB, CKMBINDEX, TROPONINI in the last 72 hours. Coagulation:   Lab Results   Component Value Date    INR 1.03 12/26/2019     Cardiac markers:   Lab Results   Component Value Date    TROPONINI <0.01 04/25/2021         Lab Results   Component Value Date    ALT 29 04/25/2021    AST 39 (H) 04/25/2021    ALKPHOS 77 04/25/2021    BILITOT <0.2 04/25/2021       Lab Results   Component Value Date    INR 1.03 12/26/2019    PROTIME 12.0 12/26/2019         CULTURES  COVID: not detected  Blood: pending  Sputum - rare candida     EKG:  I have reviewed the EKG with the following interpretation:   Sinus tachycardia, Nonspecific ST abnormality     XR CHEST PORTABLE   Final Result   Stable chest.         XR CHEST PORTABLE   Final Result   Stable chest.         XR CHEST PORTABLE   Final Result   Hazy bibasilar airspace disease, slightly increased on the right, either due   to atelectasis or pneumonia. XR CHEST PORTABLE   Final Result   Stable chest with moderate hyperinflation. Trace atelectasis left lung base. XR CHEST PORTABLE   Final Result   In this patient with underlying COPD, the lungs are clear.   Interstitial opacities described on recent exam are less evident on current study. XR CHEST PORTABLE   Final Result   Increased lung markings bilaterally, may be related to minimal pulmonary   vascular congestion versus bronchitis. XR CHEST PORTABLE   Final Result   Stable chest.      Lungs are hyperinflated with no acute airspace disease. XR CHEST PORTABLE   Final Result   Minimal right basilar airspace disease likely atelectasis. Lungs are   moderately hyperinflated. XR CHEST PORTABLE   Final Result   Appropriate positioning of right central venous catheter. XR CHEST PORTABLE   Final Result   Satisfactory position endotracheal tube and NG tube. No acute airspace disease. Pulmonary vessels upper normal.         XR CHEST PORTABLE   Final Result   Pulmonary edema with bibasilar atelectasis versus pneumonia. Echo 8/25/2020   Summary   Limited imaging due to lung interface. Parasternal images are unobtainable.   Normal left ventricular systolic function with an estimated ejection   fraction of 55-60%.   No regional wall motion abnormalities are seen.   Normal left ventricular diastolic filling pressure.   Normal systolic pulmonary artery pressure (SPAP) estimated at 20 mmHg (RA   pressure 3 mmHg).   No significant valvular heart disease.                 ASSESSMENT/PLAN:     #Acute on chronic hypoxic/hypercapnic respiratory failure-due to COPD exacerbation, Pneumonia  -Normally on home oxygen 3 L   -ABG on admission pH 7.2, PCO2 92  - admitted to ICU on BiPAP  - pulmonology consulted. - IV Solu-medrol, HHN , abx initiated   -- failed bipap and worsened leading to intubation and now on vent support  -started on Vancomycin and Cefepime day 3  after bronchoscopy. Ketamine to help reactive airways  On pressure control setting 50% PEEP of 12.      #Pneumonia, Gram positive organism  - Bronchoscopy done. Vancomycin and Cefepime re ordered.  Cultures negative except for candida        #Sepsis  -Present on admission  -With tachycardia and tachypnea, leukocytosis.    Secondary to pneumonia  Monitor  Improved BP and off levo     #Tobacco abuse  - smoking cessation needed     #Pulmonary Edema  - ECHO last year with normal EF and normal Diastolic function   - IV Lasix 40 mg as tolerated     #Leukocytosis-likely due to chronic steroids  -Trend WBC- remains high   -neg procalcitonin        #Hypothyroidism  - home dose of synthroid 125 mcg      #Hyperlipidemia  - on Atorvastatin.     DVT Prophylaxis: Lovenox  Diet: on TF  Code Status: Full Code       Joselin Parikh MD 5/3/2021 9:57 AM

## 2021-05-03 NOTE — CARE COORDINATION
INTERDISCIPLINARY PLAN OF CARE CONFERENCE    Date/Time: 5/3/2021 1:37 PM  Completed by: Micha Gill, Case Management      Patient Name:  Noemi Hanley  YOB: 1960  Admitting Diagnosis: Acute on chronic respiratory failure ([facility] [address] [J96.20]     Admit Date/Time:  4/25/2021  2:30 AM    Chart reviewed. Interdisciplinary team contacted or reviewed plan related to patient progress and discharge plans. Disciplines included Case Management, Nursing, and Dietitian. Current Status: Stable  PT/OT recommendation for discharge plan of care: N/A    Expected D/C Disposition:  Home  Confirmed plan with family Yes confirmed with: (name) spouse  Met with: spoke with spouse via phone as pt on vent  Discharge Plan Comments:  Reviewed chart and spoke with spouse via phone as pt on vent. Per spouse plan is for pt to return home at dc. Will need PT eval prior to dc. Will cont to follow.     Home O2 in place on admit: Yes

## 2021-05-03 NOTE — PROGRESS NOTES
Results for Lazaro Rosario (MRN [id number]) as of 5/3/2021 11:48   Ref. Range 5/3/2021 11:16   pH, Arterial Latest Ref Range: 7.350 - 7.450  7.306 (L)   pCO2, Arterial Latest Ref Range: 35.0 - 45.0 mmHg 92.4 (HH)   pO2, Arterial Latest Ref Range: 75.0 - 108.0 mmHg 53.7 (L)   HCO3, Arterial Latest Ref Range: 21.0 - 29.0 mmol/L 45.1 (H)   TCO2 (calc), Art Latest Ref Range: Not Established mmol/L 47.9   Base Excess, Arterial Latest Ref Range: -3.0 - 3.0 mmol/L 15.1 (H)   O2 Sat, Arterial Latest Ref Range: >92 % 82.3 (L)     Dr. Browning aware, new vent orders rate 22, insp pres 22, Itime 0.50. Repeat ABG in 1 hour.

## 2021-05-03 NOTE — PROGRESS NOTES
Results for Catherine Arreaga (MRN [id number])    Ref. Range 5/3/2021 12:53   pH, Arterial Latest Ref Range: 7.350 - 7.450  7.348 (L)   pCO2, Arterial Latest Ref Range: 35.0 - 45.0 mmHg 65.5 (H)   pO2, Arterial Latest Ref Range: 75.0 - 108.0 mmHg 95.7   HCO3, Arterial Latest Ref Range: 21.0 - 29.0 mmol/L 35.2 (H)     Dr. Tam Brantley made aware. No new orders.

## 2021-05-03 NOTE — PROGRESS NOTES
Vent settings changed to IP 22, rate 20.  IT .55, Peep 12         05/03/21 1002   Vent Information   Vt Ordered 0 mL   Rate Set 20 bmp   Peak Flow 0 L/min   Pressure Support 0 cmH20   FiO2  50 %   SpO2 93 %   SpO2/FiO2 ratio 186   Sensitivity 2.5   PEEP/CPAP 12   I Time/ I Time % 0 s   Vent Patient Data   High Peep/I Pressure 22   Peak Inspiratory Pressure 38 cmH2O   Mean Airway Pressure 17 cmH20   Rate Measured 21 br/min   Vt Exhaled 394 mL   Minute Volume 9.35 Liters   I:E Ratio 1:4.50   Spontaneous Breathing Trial (SBT) RT Doc   Pulse 102   Additional Respiratory  Assessments   Resp 20   Alarm Settings   High Pressure Alarm 50 cmH2O   Low Minute Volume Alarm 2 L/min   High Respiratory Rate 40 br/min

## 2021-05-03 NOTE — PROGRESS NOTES
Wasted 75 ml versed from versed gtt with DANIEL Elizabeth RN  Electronically signed by Farrah Azevedo RN on 5/3/2021 at 7:28 AM

## 2021-05-03 NOTE — TELEPHONE ENCOUNTER
Patient appt for 5/12/21 with Dr. Pool Patel has been cancelled by office due to schedule change (Dr. Pool Patel will not be available, will be rounding in ICU). Changed appt for pt to 5/26/21. Pt is currently admitted. Will watch for hospital d/c to notify pt of changed appt.

## 2021-05-03 NOTE — PROGRESS NOTES
4 Eyes Skin Assessment     The patient is being assess for   Shift    I agree that 2 RN's have performed a thorough Head to Toe Skin Assessment on the patient. ALL assessment sites listed below have been assessed. Areas assessed by both nurses:   [x]   Head, Face, and Ears   [x]   Shoulders, Back, and Chest, Abdomen  [x]   Arms, Elbows, and Hands   [x]   Coccyx, Sacrum, and Ischium  [x]   Legs, Feet, and Heels          Scattered bruising    **SHARE this note so that the co-signing nurse is able to place an eSignature**    Co-signer eSignature: Electronically signed by Rae Crawford RN on 5/4/21 at 8:32 AM EDT     Does the Patient have Skin Breakdown?   No          Ron Prevention initiated:  Yes   Wound Care Orders initiated:  NA      [facility] nurse consulted for Pressure Injury (Stage 3,4, Unstageable, DTI, NWPT, Complex wounds)and New or Established Ostomies:  NA      Primary Nurse eSignature: Electronically signed by Charles Townsend RN on 5/3/21 at 2:15 PM EDT

## 2021-05-03 NOTE — PROGRESS NOTES
ipratropium-albuterol  1 ampule Inhalation Q4H     PRN Meds:  midazolam, glucose, dextrose, glucagon (rDNA), dextrose, fentanNYL, albuterol sulfate HFA **AND** ipratropium **AND** MDI Treatment, sodium chloride flush, sodium chloride, acetaminophen **OR** acetaminophen, ondansetron **OR** ondansetron, albuterol, ibuprofen    Results:  CBC:   Recent Labs     05/01/21  0400 05/02/21  0330 05/03/21  0510   WBC 14.3* 15.2* 17.4*   HGB 9.5* 9.5* 9.4*   HCT 28.6* 28.3* 28.1*   MCV 97.6 96.5 96.9    240 250     BMP:   Recent Labs     05/02/21  0330 05/02/21  1145 05/03/21  0510    134* 136   K 5.4* 4.0 5.0   CL 94* 90* 91*   CO2 42* 43* 42*   PHOS  --  3.9  --    BUN 41* 44* 44*   CREATININE <0.5* <0.5* <0.5*     LIVER PROFILE:   No results for input(s): AST, ALT, LIPASE, BILIDIR, BILITOT, ALKPHOS in the last 72 hours. Invalid input(s): AMYLASE,  ALB    Cultures:  4/25/2021 SARS-CoV-2 negative   4/25/2021 blood no growth   4/26/2021 respiratory culture candida  5/1/2021 bronc washings no organisms  5/1/2021 BAL pending    Films:  CXR 5/3/2021 clear lungs    ASSESSMENT:  · Acute on chronic respiratory failure with hypoxemia and hypercapnia   · COPD with AE; severe airtrapping with auto PEEP  · Hyperkalemia  · Tobacco abuse    PLAN:  Mechanical ventilation as per my orders. The ventilator was adjusted by me at the bedside for unstable, life threatening respiratory failure. She continues on pressure control ventilation, she has not tolerated the switch to assist control 2/2 air trapping and peak pressure alarms  IV Propofol for sedation, target RASS -2, with daily spontaneous awakening trial.  Versed gtt to off, fentanyl gtt, Ketamine gtt to help with reactive airways   Head of bed 30 degrees or higher at all times  Duonebs Q4  IV Solu-Medrol Q12  Cefepime and vancomycin day #3, started after bronchoscopy.  Deescelate if bal has neg cultures  Completed 7 days ceftriaxone and 5 days azithromycin and 4 days vancomycin. PCT  ICU care- ap montez   NOK is spouse  Total critical care time caring for this patient with life threatening, unstable organ failure, including direct patient contact, management of life support systems, review of data including imaging and labs, discussions with other team members and physicians is 33 minutes so far today, excluding procedures.

## 2021-05-04 ENCOUNTER — APPOINTMENT (OUTPATIENT)
Dept: GENERAL RADIOLOGY | Age: 61
DRG: 870 | End: 2021-05-04
Payer: COMMERCIAL

## 2021-05-04 LAB
ANION GAP SERPL CALCULATED.3IONS-SCNC: 4 MMOL/L (ref 3–16)
ANISOCYTOSIS: ABNORMAL
ATYPICAL LYMPHOCYTE RELATIVE PERCENT: 1 % (ref 0–6)
BANDED NEUTROPHILS RELATIVE PERCENT: 1 % (ref 0–7)
BASE EXCESS ARTERIAL: 18.5 MMOL/L (ref -3–3)
BASOPHILIC STIPPLING: ABNORMAL
BASOPHILS ABSOLUTE: 0 K/UL (ref 0–0.2)
BASOPHILS RELATIVE PERCENT: 0 %
BUN BLDV-MCNC: 42 MG/DL (ref 7–20)
CALCIUM SERPL-MCNC: 9.2 MG/DL (ref 8.3–10.6)
CARBOXYHEMOGLOBIN ARTERIAL: 0.3 % (ref 0–1.5)
CHLORIDE BLD-SCNC: 93 MMOL/L (ref 99–110)
CO2: 41 MMOL/L (ref 21–32)
CREAT SERPL-MCNC: <0.5 MG/DL (ref 0.6–1.2)
EOSINOPHILS ABSOLUTE: 0 K/UL (ref 0–0.6)
EOSINOPHILS RELATIVE PERCENT: 0 %
GFR AFRICAN AMERICAN: >60
GFR NON-AFRICAN AMERICAN: >60
GLUCOSE BLD-MCNC: 110 MG/DL (ref 70–99)
GLUCOSE BLD-MCNC: 110 MG/DL (ref 70–99)
GLUCOSE BLD-MCNC: 112 MG/DL (ref 70–99)
GLUCOSE BLD-MCNC: 113 MG/DL (ref 70–99)
GLUCOSE BLD-MCNC: 129 MG/DL (ref 70–99)
GLUCOSE BLD-MCNC: 136 MG/DL (ref 70–99)
GLUCOSE BLD-MCNC: 87 MG/DL (ref 70–99)
GLUCOSE BLD-MCNC: 98 MG/DL (ref 70–99)
HCO3 ARTERIAL: 46.6 MMOL/L (ref 21–29)
HCT VFR BLD CALC: 28.1 % (ref 36–48)
HEMOGLOBIN, ART, EXTENDED: 10.3 G/DL (ref 12–16)
HEMOGLOBIN: 9.1 G/DL (ref 12–16)
LYMPHOCYTES ABSOLUTE: 1.5 K/UL (ref 1–5.1)
LYMPHOCYTES RELATIVE PERCENT: 7 %
MCH RBC QN AUTO: 31.7 PG (ref 26–34)
MCHC RBC AUTO-ENTMCNC: 32.5 G/DL (ref 31–36)
MCV RBC AUTO: 97.4 FL (ref 80–100)
METAMYELOCYTES RELATIVE PERCENT: 1 %
METHEMOGLOBIN ARTERIAL: 0.1 %
MONOCYTES ABSOLUTE: 1.5 K/UL (ref 0–1.3)
MONOCYTES RELATIVE PERCENT: 8 %
NEUTROPHILS ABSOLUTE: 15.8 K/UL (ref 1.7–7.7)
NEUTROPHILS RELATIVE PERCENT: 82 %
NUCLEATED RED BLOOD CELLS: 1 /100 WBC
O2 CONTENT ARTERIAL: 14 ML/DL
O2 SAT, ARTERIAL: 97.2 %
O2 THERAPY: ABNORMAL
PCO2 ARTERIAL: 77.8 MMHG (ref 35–45)
PDW BLD-RTO: 13.5 % (ref 12.4–15.4)
PERFORMED ON: ABNORMAL
PERFORMED ON: NORMAL
PERFORMED ON: NORMAL
PH ARTERIAL: 7.39 (ref 7.35–7.45)
PLATELET # BLD: 252 K/UL (ref 135–450)
PLATELET SLIDE REVIEW: ADEQUATE
PMV BLD AUTO: 8.2 FL (ref 5–10.5)
PO2 ARTERIAL: 99.9 MMHG (ref 75–108)
POIKILOCYTES: ABNORMAL
POLYCHROMASIA: ABNORMAL
POTASSIUM REFLEX MAGNESIUM: 4.5 MMOL/L (ref 3.5–5.1)
PROCALCITONIN: 0.3 NG/ML (ref 0–0.15)
RBC # BLD: 2.89 M/UL (ref 4–5.2)
SLIDE REVIEW: ABNORMAL
SODIUM BLD-SCNC: 138 MMOL/L (ref 136–145)
TCO2 ARTERIAL: 49 MMOL/L
WBC # BLD: 18.8 K/UL (ref 4–11)

## 2021-05-04 PROCEDURE — 2500000003 HC RX 250 WO HCPCS: Performed by: INTERNAL MEDICINE

## 2021-05-04 PROCEDURE — 82803 BLOOD GASES ANY COMBINATION: CPT

## 2021-05-04 PROCEDURE — 71045 X-RAY EXAM CHEST 1 VIEW: CPT

## 2021-05-04 PROCEDURE — 99233 SBSQ HOSP IP/OBS HIGH 50: CPT | Performed by: INTERNAL MEDICINE

## 2021-05-04 PROCEDURE — 84145 PROCALCITONIN (PCT): CPT

## 2021-05-04 PROCEDURE — 94640 AIRWAY INHALATION TREATMENT: CPT

## 2021-05-04 PROCEDURE — 6370000000 HC RX 637 (ALT 250 FOR IP): Performed by: INTERNAL MEDICINE

## 2021-05-04 PROCEDURE — 85025 COMPLETE CBC W/AUTO DIFF WBC: CPT

## 2021-05-04 PROCEDURE — 94003 VENT MGMT INPAT SUBQ DAY: CPT

## 2021-05-04 PROCEDURE — 6360000002 HC RX W HCPCS: Performed by: INTERNAL MEDICINE

## 2021-05-04 PROCEDURE — 94761 N-INVAS EAR/PLS OXIMETRY MLT: CPT

## 2021-05-04 PROCEDURE — 80048 BASIC METABOLIC PNL TOTAL CA: CPT

## 2021-05-04 PROCEDURE — 2700000000 HC OXYGEN THERAPY PER DAY

## 2021-05-04 PROCEDURE — 2000000000 HC ICU R&B

## 2021-05-04 PROCEDURE — 2580000003 HC RX 258: Performed by: INTERNAL MEDICINE

## 2021-05-04 PROCEDURE — 99291 CRITICAL CARE FIRST HOUR: CPT | Performed by: INTERNAL MEDICINE

## 2021-05-04 RX ORDER — METHYLPREDNISOLONE SODIUM SUCCINATE 40 MG/ML
40 INJECTION, POWDER, LYOPHILIZED, FOR SOLUTION INTRAMUSCULAR; INTRAVENOUS DAILY
Status: DISCONTINUED | OUTPATIENT
Start: 2021-05-05 | End: 2021-05-14

## 2021-05-04 RX ADMIN — ALBUTEROL SULFATE 2.5 MG: 2.5 SOLUTION RESPIRATORY (INHALATION) at 19:56

## 2021-05-04 RX ADMIN — PROPOFOL 50 MCG/KG/MIN: 10 INJECTION, EMULSION INTRAVENOUS at 17:26

## 2021-05-04 RX ADMIN — METHYLPREDNISOLONE SODIUM SUCCINATE 40 MG: 40 INJECTION, POWDER, FOR SOLUTION INTRAMUSCULAR; INTRAVENOUS at 09:01

## 2021-05-04 RX ADMIN — ASPIRIN 81 MG: 81 TABLET, CHEWABLE ORAL at 09:00

## 2021-05-04 RX ADMIN — FAMOTIDINE 20 MG: 20 TABLET ORAL at 20:00

## 2021-05-04 RX ADMIN — MONTELUKAST SODIUM 10 MG: 10 TABLET, COATED ORAL at 20:00

## 2021-05-04 RX ADMIN — MIDAZOLAM HYDROCHLORIDE 2 MG: 1 INJECTION, SOLUTION INTRAMUSCULAR; INTRAVENOUS at 00:55

## 2021-05-04 RX ADMIN — PROPOFOL 50 MCG/KG/MIN: 10 INJECTION, EMULSION INTRAVENOUS at 09:01

## 2021-05-04 RX ADMIN — ATORVASTATIN CALCIUM 40 MG: 40 TABLET, FILM COATED ORAL at 20:00

## 2021-05-04 RX ADMIN — WHITE PETROLATUM 57.7 %-MINERAL OIL 31.9 % EYE OINTMENT: at 17:26

## 2021-05-04 RX ADMIN — KETAMINE HYDROCHLORIDE 0.2 MG/KG/HR: 50 INJECTION, SOLUTION INTRAMUSCULAR; INTRAVENOUS at 06:41

## 2021-05-04 RX ADMIN — Medication 15 ML: at 20:01

## 2021-05-04 RX ADMIN — CEFEPIME 2000 MG: 2 INJECTION, POWDER, FOR SOLUTION INTRAVENOUS at 18:00

## 2021-05-04 RX ADMIN — PROPOFOL 50 MCG/KG/MIN: 10 INJECTION, EMULSION INTRAVENOUS at 02:50

## 2021-05-04 RX ADMIN — FAMOTIDINE 20 MG: 20 TABLET ORAL at 09:00

## 2021-05-04 RX ADMIN — LEVOTHYROXINE SODIUM 125 MCG: 25 TABLET ORAL at 06:38

## 2021-05-04 RX ADMIN — IPRATROPIUM BROMIDE AND ALBUTEROL SULFATE 1 AMPULE: 2.5; .5 SOLUTION RESPIRATORY (INHALATION) at 23:58

## 2021-05-04 RX ADMIN — CARBOXYMETHYLCELLULOSE SODIUM 1 DROP: 10 GEL OPHTHALMIC at 15:07

## 2021-05-04 RX ADMIN — Medication 75 MCG/HR: at 09:13

## 2021-05-04 RX ADMIN — IPRATROPIUM BROMIDE AND ALBUTEROL SULFATE 1 AMPULE: 2.5; .5 SOLUTION RESPIRATORY (INHALATION) at 19:47

## 2021-05-04 RX ADMIN — WHITE PETROLATUM 57.7 %-MINERAL OIL 31.9 % EYE OINTMENT: at 09:01

## 2021-05-04 RX ADMIN — Medication 10 ML: at 20:00

## 2021-05-04 RX ADMIN — ENOXAPARIN SODIUM 40 MG: 40 INJECTION SUBCUTANEOUS at 09:00

## 2021-05-04 RX ADMIN — PROPOFOL 50 MCG/KG/MIN: 10 INJECTION, EMULSION INTRAVENOUS at 21:44

## 2021-05-04 RX ADMIN — CARBOXYMETHYLCELLULOSE SODIUM 1 DROP: 10 GEL OPHTHALMIC at 05:09

## 2021-05-04 RX ADMIN — SODIUM CHLORIDE 25 ML: 9 INJECTION, SOLUTION INTRAVENOUS at 18:00

## 2021-05-04 RX ADMIN — CEFEPIME 2000 MG: 2 INJECTION, POWDER, FOR SOLUTION INTRAVENOUS at 02:45

## 2021-05-04 RX ADMIN — WHITE PETROLATUM 57.7 %-MINERAL OIL 31.9 % EYE OINTMENT: at 21:44

## 2021-05-04 RX ADMIN — WHITE PETROLATUM 57.7 %-MINERAL OIL 31.9 % EYE OINTMENT: at 15:07

## 2021-05-04 RX ADMIN — PROPOFOL 50 MCG/KG/MIN: 10 INJECTION, EMULSION INTRAVENOUS at 08:03

## 2021-05-04 RX ADMIN — Medication 15 ML: at 09:00

## 2021-05-04 RX ADMIN — IPRATROPIUM BROMIDE AND ALBUTEROL SULFATE 1 AMPULE: 2.5; .5 SOLUTION RESPIRATORY (INHALATION) at 03:07

## 2021-05-04 RX ADMIN — CARBOXYMETHYLCELLULOSE SODIUM 1 DROP: 10 GEL OPHTHALMIC at 17:26

## 2021-05-04 RX ADMIN — ALBUTEROL SULFATE 2.5 MG: 2.5 SOLUTION RESPIRATORY (INHALATION) at 03:13

## 2021-05-04 RX ADMIN — CARBOXYMETHYLCELLULOSE SODIUM 1 DROP: 10 GEL OPHTHALMIC at 02:13

## 2021-05-04 RX ADMIN — WHITE PETROLATUM 57.7 %-MINERAL OIL 31.9 % EYE OINTMENT: at 00:15

## 2021-05-04 RX ADMIN — CEFEPIME 2000 MG: 2 INJECTION, POWDER, FOR SOLUTION INTRAVENOUS at 10:58

## 2021-05-04 RX ADMIN — WHITE PETROLATUM 57.7 %-MINERAL OIL 31.9 % EYE OINTMENT: at 04:00

## 2021-05-04 RX ADMIN — IPRATROPIUM BROMIDE AND ALBUTEROL SULFATE 1 AMPULE: 2.5; .5 SOLUTION RESPIRATORY (INHALATION) at 11:40

## 2021-05-04 RX ADMIN — IPRATROPIUM BROMIDE AND ALBUTEROL SULFATE 1 AMPULE: 2.5; .5 SOLUTION RESPIRATORY (INHALATION) at 15:27

## 2021-05-04 RX ADMIN — POLYETHYLENE GLYCOL (3350) 17 G: 17 POWDER, FOR SOLUTION ORAL at 09:00

## 2021-05-04 RX ADMIN — IPRATROPIUM BROMIDE AND ALBUTEROL SULFATE 1 AMPULE: 2.5; .5 SOLUTION RESPIRATORY (INHALATION) at 07:58

## 2021-05-04 RX ADMIN — Medication 10 ML: at 09:01

## 2021-05-04 RX ADMIN — CARBOXYMETHYLCELLULOSE SODIUM 1 DROP: 10 GEL OPHTHALMIC at 09:01

## 2021-05-04 RX ADMIN — CARBOXYMETHYLCELLULOSE SODIUM 1 DROP: 10 GEL OPHTHALMIC at 21:44

## 2021-05-04 ASSESSMENT — PULMONARY FUNCTION TESTS
PIF_VALUE: 37
PIF_VALUE: 30
PIF_VALUE: 32
PIF_VALUE: 36
PIF_VALUE: 32
PIF_VALUE: 30
PIF_VALUE: 32

## 2021-05-04 ASSESSMENT — PAIN SCALES - GENERAL: PAINLEVEL_OUTOF10: 0

## 2021-05-04 NOTE — PROGRESS NOTES
IM Progress Note    Admit Date:  4/25/2021  9       Interval history:  Acute resp failure, was on bipap, intubated on 4/26   Had bronchoscopy on 5/1/21. Had thick secretions. 5/3   sedated on the vent  Low grade fevers  On ketamine,propofol and fentanyl    5/4  On the vent. Continues to be on ketamine, propofol and fentanyl. Objective:   BP (!) 152/64   Pulse 90   Temp 99.2 °F (37.3 °C) (Bladder)   Resp 21   Ht 5' 4\" (1.626 m)   Wt 153 lb (69.4 kg)   LMP  (LMP Unknown)   SpO2 96%   BMI 26.26 kg/m²       Intake/Output Summary (Last 24 hours) at 5/4/2021 1047  Last data filed at 5/4/2021 0859  Gross per 24 hour   Intake 2664.4 ml   Output 1820 ml   Net 844.4 ml       Physical Exam:  General: middle aged female on vent,   Oral ETT and OG noted  Right IJ TLC . Appears to be not in any distress  Neck: No JVD, no carotid bruit, no thyromegaly  Chest: diminished  kelvin air entry with persistent wheeze/diminished BS  Cardiovascular:  RRR S1S2 heard, no murmurs or gallops  Abdomen:  Soft, undistended, non tender, no organomegaly, BS present  kelvin UE edema +  Extremities: No edema or cyanosis.  Distal pulses well felt  Neurological : sedated        Medications:   Scheduled Medications:    [START ON 5/5/2021] methylPREDNISolone  40 mg Intravenous Daily    carboxymethylcellulose PF  1 drop Both Eyes Q4H    artificial tears   Both Eyes Q4H    polyethylene glycol  17 g Oral Daily    cefepime  2,000 mg Intravenous Q8H    insulin lispro  0-12 Units Subcutaneous Q4H    chlorhexidine  15 mL Mouth/Throat BID    aspirin  81 mg Oral Daily    atorvastatin  40 mg Oral Nightly    levothyroxine  125 mcg Oral QAM AC    montelukast  10 mg Oral Nightly    sodium chloride flush  5-40 mL Intravenous 2 times per day    enoxaparin  40 mg Subcutaneous Daily    famotidine  20 mg Oral BID    ipratropium-albuterol  1 ampule Inhalation Q4H     I   ketamine (KETALAR) infusion 0.2 mg/kg/hr (05/04/21 0641)    fentaNYL 75 mcg/hr (05/04/21 0913)    dextrose      propofol 50 mcg/kg/min (05/04/21 0901)    sodium chloride 25 mL (04/25/21 0645)     midazolam, glucose, dextrose, glucagon (rDNA), dextrose, fentanNYL, sodium chloride flush, sodium chloride, acetaminophen **OR** acetaminophen, ondansetron **OR** ondansetron, albuterol, ibuprofen    Lab Data:  Recent Labs     05/02/21  0330 05/03/21  0510 05/04/21  0430   WBC 15.2* 17.4* 18.8*   HGB 9.5* 9.4* 9.1*   HCT 28.3* 28.1* 28.1*   MCV 96.5 96.9 97.4    250 252     Recent Labs     05/02/21  1145 05/03/21  0510 05/04/21  0430   * 136 138   K 4.0 5.0 4.5   CL 90* 91* 93*   CO2 43* 42* 41*   PHOS 3.9  --   --    BUN 44* 44* 42*   CREATININE <0.5* <0.5* <0.5*     No results for input(s): CKTOTAL, CKMB, CKMBINDEX, TROPONINI in the last 72 hours. Coagulation:   Lab Results   Component Value Date    INR 1.03 12/26/2019     Cardiac markers:   Lab Results   Component Value Date    TROPONINI <0.01 04/25/2021         Lab Results   Component Value Date    ALT 29 04/25/2021    AST 39 (H) 04/25/2021    ALKPHOS 77 04/25/2021    BILITOT <0.2 04/25/2021       Lab Results   Component Value Date    INR 1.03 12/26/2019    PROTIME 12.0 12/26/2019         CULTURES  COVID: not detected  Blood: pending  Sputum - rare candida     EKG:  I have reviewed the EKG with the following interpretation:   Sinus tachycardia, Nonspecific ST abnormality     XR CHEST PORTABLE   Final Result   Tubes and lines as above. No significant interval change from prior study. No overt edema, fusion, extrapleural air or focal consolidation. XR CHEST PORTABLE   Final Result   Stable chest.         XR CHEST PORTABLE   Final Result   Stable chest.         XR CHEST PORTABLE   Final Result   Hazy bibasilar airspace disease, slightly increased on the right, either due   to atelectasis or pneumonia. XR CHEST PORTABLE   Final Result   Stable chest with moderate hyperinflation.   Trace atelectasis left lung 10.      #Pneumonia, Gram positive organism  - Bronchoscopy done. Vancomycin and Cefepime re ordered. Cultures negative except for candida. stop vancomycin.      #Sepsis  -Present on admission  -With tachycardia and tachypnea, leukocytosis.    Secondary to pneumonia  Monitor  Improved BP and off levo     #Tobacco abuse  - smoking cessation needed     #Pulmonary Edema  - ECHO last year with normal EF and normal Diastolic function   - IV Lasix 40 mg as tolerated     #Leukocytosis-likely due to chronic steroids  -Trend WBC- remains high   -neg procalcitonin        #Hypothyroidism  - home dose of synthroid 125 mcg      #Hyperlipidemia  - on Atorvastatin.     DVT Prophylaxis: Lovenox  Diet: on TF  Code Status: Full Code       Aliyah Connelly MD 5/4/2021 10:47 AM

## 2021-05-04 NOTE — PROGRESS NOTES
05/03/21 2328   Vent Information   Vent Type 840   Vent Mode AC/PC   Vt Ordered 0 mL   Rate Set 22 bmp   Peak Flow 0 L/min   Pressure Support 0 cmH20   FiO2  45 %   SpO2 96 %   SpO2/FiO2 ratio 213.33   Sensitivity 3   PEEP/CPAP 12   I Time/ I Time % 0 s   Humidification Source Heated wire   Humidification Temp 37   Humidification Temp Measured 37   Circuit Condensation Drained   Vent Patient Data   High Peep/I Pressure 22   Peak Inspiratory Pressure 35 cmH2O   Mean Airway Pressure 19 cmH20   Rate Measured 27 br/min   Vt Exhaled 86 mL   Minute Volume 9.31 Liters   I:E Ratio 1.20:1   Cough/Sputum   Sputum How Obtained Endotracheal;Suctioned   Cough Productive   Sputum Amount Small   Sputum Color Creamy   Tenacity Thick   Spontaneous Breathing Trial (SBT) RT Doc   Pulse 91   Breath Sounds   Right Upper Lobe Diminished; Expiratory Wheezes   Right Middle Lobe Diminished; Expiratory Wheezes   Right Lower Lobe Diminished; Expiratory Wheezes   Left Upper Lobe Diminished; Expiratory Wheezes   Left Lower Lobe Diminished; Expiratory Wheezes   Additional Respiratory  Assessments   Resp 22   Position Semi-Woods's   Alarm Settings   High Pressure Alarm 50 cmH2O   Low Minute Volume Alarm 2 L/min   High Respiratory Rate 40 br/min   Patient Observation   Observations ETT SIZE 8.0, SECURED AT 24 LIP LINE. AMBU BAG AT HEAD OF BED. WATER GOOD. Non-Surgical Airway Endo Tracheal Tube   Placement Date/Time: 04/26/21 0593   Timeout: Patient;Procedure; Appropriate Equipment  Mask Ventilation: Ventilated by mask (1)  Placed By: Licensed provider  Inserted by: Dr Antwan Taylor  Insertion attempts: 1  Airway Device: Endo Tracheal Tube  Size: 8   Secured at 24 cm   Measured From [address] [facility] By [employer] tube [email or web address]   Site Condition Dry

## 2021-05-04 NOTE — PROGRESS NOTES
05/04/21 1947   Vent Information   $Ventilation $Subsequent Day   Skin Assessment Clean, dry, & intact   Vent Type 840   Vent Mode AC/PC   Vt Ordered 0 mL   Rate Set 22 bmp   Peak Flow 0 L/min   Pressure Support 0 cmH20   FiO2  40 %   SpO2 97 %   SpO2/FiO2 ratio 242.5   Sensitivity 3   PEEP/CPAP 10   I Time/ I Time % 0 s   Humidification Source Heated wire   Humidification Temp 37   Humidification Temp Measured 37   Circuit Condensation Drained   Vent Patient Data   High Peep/I Pressure 20   Peak Inspiratory Pressure 32 cmH2O   Mean Airway Pressure 15 cmH20   Rate Measured 24 br/min   Vt Exhaled 344 mL   Minute Volume 8.15 Liters   I:E Ratio 1:4.50   Plateau Pressure 24 [id number]   Static Compliance 25 mL/cmH2O   Dynamic Compliance 16 mL/cmH2O   Cough/Sputum   Sputum How Obtained Endotracheal;Suctioned   Cough Productive   Sputum Amount Small   Sputum Color Creamy   Tenacity Thick   Spontaneous Breathing Trial (SBT) RT Doc   Pulse 77   Breath Sounds   Right Upper Lobe Diminished   Right Middle Lobe Diminished   Right Lower Lobe Diminished   Left Upper Lobe Diminished   Left Lower Lobe Diminished   Additional Respiratory  Assessments   Resp 22   Position Semi-Woods's   Alarm Settings   High Pressure Alarm 50 cmH2O   Low Minute Volume Alarm 2 L/min   High Respiratory Rate 40 br/min   Patient Observation   Observations ETT SIZE 8.0, SECURED AT 24 LIPLINE. AMBU BAG AT HEAD OF BED. WATER GOOD. Non-Surgical Airway Endo Tracheal Tube   Placement Date/Time: 04/26/21 0528   Timeout: Patient;Procedure; Appropriate Equipment  Mask Ventilation: Ventilated by mask (1)  Placed By: Licensed provider  Inserted by: Dr Rebekah Hurtado  Insertion attempts: 1  Airway Device: Endo Tracheal Tube  Size: 8   Secured at 24 cm   Measured From Lips   Secured Location Left   Secured By Commercial tube [email or web address]   Site Condition Dry        05/04/21 1947   Vent Information   $Ventilation $Subsequent Day   Skin Assessment Clean, dry, & intact   Vent Type 840   Vent Mode AC/PC   Vt Ordered 0 mL   Rate Set 22 bmp   Peak Flow 0 L/min   Pressure Support 0 cmH20   FiO2  40 %   SpO2 97 %   SpO2/FiO2 ratio 242.5   Sensitivity 3   PEEP/CPAP 10   I Time/ I Time % 0 s   Humidification Source Heated wire   Humidification Temp 37   Humidification Temp Measured 37   Circuit Condensation Drained   Vent Patient Data   High Peep/I Pressure 20   Peak Inspiratory Pressure 32 cmH2O   Mean Airway Pressure 15 cmH20   Rate Measured 24 br/min   Vt Exhaled 344 mL   Minute Volume 8.15 Liters   I:E Ratio 1:4.50   Plateau Pressure 24 [id number]   Static Compliance 25 mL/cmH2O   Dynamic Compliance 16 mL/cmH2O   Cough/Sputum   Sputum How Obtained Endotracheal;Suctioned   Cough Productive   Sputum Amount Small   Sputum Color Creamy   Tenacity Thick   Spontaneous Breathing Trial (SBT) RT Doc   Pulse 77   Breath Sounds   Right Upper Lobe Diminished   Right Middle Lobe Diminished   Right Lower Lobe Diminished   Left Upper Lobe Diminished   Left Lower Lobe Diminished   Additional Respiratory  Assessments   Resp 22   Position Semi-Woods's   Alarm Settings   High Pressure Alarm 50 cmH2O   Low Minute Volume Alarm 2 L/min   High Respiratory Rate 40 br/min   Patient Observation   Observations ETT SIZE 8.0, SECURED AT 24 LIPLINE. AMBU BAG AT HEAD OF BED. WATER GOOD. Non-Surgical Airway Endo Tracheal Tube   Placement Date/Time: 04/26/21 0528   Timeout: Patient;Procedure; Appropriate Equipment  Mask Ventilation: Ventilated by mask (1)  Placed By: Licensed provider  Inserted by: Dr Jose Luis Perry  Insertion attempts: 1  Airway Device: Endo Tracheal Tube  Size: 8   Secured at 24 cm   Measured From Lips   Secured Location Left   Secured By [facility] tube [email or web address]   Site Condition Dry        05/04/21 1947   Vent Information   $Ventilation $Subsequent Day   Skin Assessment Clean, dry, & intact   Vent Type 840   Vent Mode AC/PC   Vt Ordered 0 mL   Rate Set 22 bmp   Peak Flow 0 L/min   Pressure Support 0 cmH20   FiO2  40 % Heated wire   Humidification Temp 37   Humidification Temp Measured 37   Circuit Condensation Drained   Vent Patient Data   High Peep/I Pressure 20   Peak Inspiratory Pressure 32 cmH2O   Mean Airway Pressure 15 cmH20   Rate Measured 24 br/min   Vt Exhaled 344 mL   Minute Volume 8.15 Liters   I:E Ratio 1:4.50   Plateau Pressure 24 [id number]   Static Compliance 25 mL/cmH2O   Dynamic Compliance 16 mL/cmH2O   Cough/Sputum   Sputum How Obtained Endotracheal;Suctioned   Cough Productive   Sputum Amount Small   Sputum Color Creamy   Tenacity Thick   Spontaneous Breathing Trial (SBT) RT Doc   Pulse 77   Breath Sounds   Right Upper Lobe Diminished   Right Middle Lobe Diminished   Right Lower Lobe Diminished   Left Upper Lobe Diminished   Left Lower Lobe Diminished   Additional Respiratory  Assessments   Resp 22   Position Semi-Woods's   Alarm Settings   High Pressure Alarm 50 cmH2O   Low Minute Volume Alarm 2 L/min   High Respiratory Rate 40 br/min   Patient Observation   Observations ETT SIZE 8.0, SECURED AT 24 LIPLINE. AMBU BAG AT HEAD OF BED. WATER GOOD. Non-Surgical Airway Endo Tracheal Tube   Placement Date/Time: 04/26/21 0528   Timeout: Patient;Procedure; Appropriate Equipment  Mask Ventilation: Ventilated by mask (1)  Placed By: Licensed provider  Inserted by: Dr Stewart Joy  Insertion attempts: 1  Airway Device: Endo Tracheal Tube  Size: 8   Secured at 24 cm   Measured From Lips   Secured Location Left   Secured By [facility] tube [email or web address]   Site Condition Dry        05/04/21 1947   Vent Information   $Ventilation $Subsequent Day   Skin Assessment Clean, dry, & intact   Vent Type 840   Vent Mode AC/PC   Vt Ordered 0 mL   Rate Set 22 bmp   Peak Flow 0 L/min   Pressure Support 0 cmH20   FiO2  40 %   SpO2 97 %   SpO2/FiO2 ratio 242.5   Sensitivity 3   PEEP/CPAP 10   I Time/ I Time % 0 s   Humidification Source Heated wire   Humidification Temp 37   Humidification Temp Measured 37   Circuit Condensation Drained   Vent Patient 24 br/min   Vt Exhaled 344 mL   Minute Volume 8.15 Liters   I:E Ratio 1:4.50   Plateau Pressure 24 QPH76   Static Compliance 25 mL/cmH2O   Dynamic Compliance 16 mL/cmH2O   Cough/Sputum   Sputum How Obtained Endotracheal;Suctioned   Cough Productive   Sputum Amount Small   Sputum Color Creamy   Tenacity Thick   Spontaneous Breathing Trial (SBT) RT Doc   Pulse 77   Breath Sounds   Right Upper Lobe Diminished   Right Middle Lobe Diminished   Right Lower Lobe Diminished   Left Upper Lobe Diminished   Left Lower Lobe Diminished   Additional Respiratory  Assessments   Resp 22   Position Semi-Woods's   Alarm Settings   High Pressure Alarm 50 cmH2O   Low Minute Volume Alarm 2 L/min   High Respiratory Rate 40 br/min   Patient Observation   Observations ETT SIZE 8.0, SECURED AT 24 LIPLINE. AMBU BAG AT HEAD OF BED. WATER GOOD. Non-Surgical Airway Endo Tracheal Tube   Placement Date/Time: 04/26/21 0528   Timeout: Patient;Procedure; Appropriate Equipment  Mask Ventilation: Ventilated by mask (1)  Placed By: Licensed provider  Inserted by: Dr Krystal Billy  Insertion attempts: 1  Airway Device: Endo Tracheal Tube  Size: 8   Secured at 24 cm   Measured From Lips   Secured Location Left   Secured By Commercial tube [email or web address]   Site Condition Dry

## 2021-05-04 NOTE — PROGRESS NOTES
Reassessment completed, see flowsheets, unchanged from prior. VSS. RASS -2. Propofol 50 mcg/kg/min, fentanyl 75 mcg/hr, ketamine 0.15 mcg/kg/hr. All ICU Lines and monitoring remain in place. Bed locked in lowest position. Call light within reach.

## 2021-05-04 NOTE — PROGRESS NOTES
Care rounds completed with Dr. Karl Marin and multidisciplinary team. Reviewed labs, meds, VS, assessment, & plan of care for today. See dictated note and new orders for details. DC vancomycin, No need for CXR Wednesday morning, Changed PC settings 22 /20 / 0.50 /40% /10. No blood gas required, as long as pt maintaining . Try to wean peep 8 later today. Try to wean ketamine 0.1 mcg/kg/hr today. Daily solumedrol.

## 2021-05-04 NOTE — PROGRESS NOTES
RESPIRATORY THERAPY ASSESSMENT    Name:  Anabelle Stark Record Number:  [id number]  Age: 56 y.o. Gender: female  : 1960  Today's Date:  2021  Room:  [address] [place] [address]    Assessment     Is the patient being admitted for a COPD or Asthma exacerbation? Yes   (If yes the patient will be seen every 4 hours for the first 24 hours and then reassessed)    Patient Admission Diagnosis      Allergies  Allergies   Allergen Reactions    Promethazine Other (See Comments)     Extreme confusion (1/3/20)    Codeine Swelling       Minimum Predicted Vital Capacity:               Actual Vital Capacity:                    Pulmonary History:COPD and Asthma, PNA. Home Oxygen Therapy:  3.5 liters/min via nasal cannula  Home Respiratory Therapy:Albuterol and Umeclidinium Bromide/Vilanterol   Current Respiratory Therapy:  Albuterol Q2PRN,  Duoneb Q4H. Treatment Type: Aerosol generator  Medications: Albuterol    Respiratory Severity Index(RSI)   Patients with orders for inhalation medications, oxygen, or any therapeutic treatment modality will be placed on Respiratory Protocol. They will be assessed with the first treatment and at least every 72 hours thereafter. The following severity scale will be used to determine frequency of treatment intervention.     Smoking History: Severe Exacerbation = 4    Social History  Social History     Tobacco Use    Smoking status: Former Smoker     Packs/day: 0.50     Years: 44.00     Pack years: 22.00     Types: Cigarettes     Quit date: 2020     Years since quittin.7    Smokeless tobacco: Never Used   Substance Use Topics    Alcohol use: Yes     Comment: rarely    Drug use: No       Recent Surgical History: None = 0  Past Surgical History  Past Surgical History:   Procedure Laterality Date     SECTION      x2    CHOLECYSTECTOMY         Level of Consciousness: Comatose = 4    Level of Activity: Bedridden, unresponsive or quadriplegic = 4    Respiratory Pattern: Increased; RR 21-30 = 1    Breath Sounds: Absent bilaterally and/or with wheezes = 3    Sputum  Sputum Color: Creamy, Tenacity: Thick, Sputum How Obtained: Endotracheal, Suctioned  Cough: Strong, productive = 1    Vital Signs   BP (!) 144/61   Pulse 91   Temp 99.7 °F (37.6 °C) (Bladder)   Resp 23   Ht 5' 4\" (1.626 m)   Wt 153 lb (69.4 kg)   LMP  (LMP Unknown)   SpO2 99%   BMI 26.26 kg/m²   SPO2 (COPD values may differ): 86-87% on room air or greater than 92% on FiO2 35- 50% = 3    Peak Flow (asthma only): not applicable = 0    RSI: Greater than 17 = Contact physician        Plan       Goals: medication delivery    Patient/caregiver was educated on the proper method of use for Respiratory Care Devices:        Level of patient/caregiver understanding able to:   ? Verbalize understanding   ? Demonstrate understanding       ? Teach back        ? Needs reinforcement       ? No available caregiver               ? Other:     Response to education:       Is patient being placed on Home Treatment Regimen? No     Does the patient have everything they need prior to discharge? NA     Comments: Chart reviewed and patient assessed. Plan of Care: Duoneb Q4H,  Albuterol Q2PRN. Electronically signed by Cayden Cole RCP on 5/4/2021 at 12:34 AM    Respiratory Protocol Guidelines     1. Assessment and treatment by Respiratory Therapy will be initiated for medication and therapeutic interventions upon initiation of aerosolized medication. 2. Physician will be contacted for respiratory rate (RR) greater than 35 breaths per minute. Therapy will be held for heart rate (HR) greater than 140 beats per minute, pending direction from physician. 3. Bronchodilators will be administered via Metered Dose Inhaler (MDI) with spacer when the following criteria are met:  a. Alert and cooperative     b. HR < 140 bpm  c. RR < 30 bpm                d. Can demonstrate a 2-3 second inspiratory hold  4.  Bronchodilators will be administered via Hand Held Nebulizer [facility]) to patients when ANY of the following criteria are met  a. Incognizant or uncooperative          b. Patients treated with HHN at Home        c. Unable to demonstrate proper use of MDI with spacer     d. RR > 30 bpm   5. Bronchodilators will be delivered via Metered Dose Inhaler (MDI), HHN, Aerogen to intubated patients on mechanical ventilation. 6. Inhalation medication orders will be delivered and/or substituted as outlined below. Aerosolized Medications Ordering and Administration Guidelines:    1. All Medications will be ordered by a physician, and their frequency and/or modality will be adjusted as defined by the patients Respiratory Severity Index (RSI) score. 2. If the patient does not have documented COPD, consider discontinuing anticholinergics when RSI is less than 9.  3. If the bronchospasm worsens (increased RSI), then the bronchodilator frequency can be increased to a maximum of every 4 hours. If greater than every 4 hours is required, the physician will be contacted. 4. If the bronchospasm improves, the frequency of the bronchodilator can be decreased, based on the patient's RSI, but not less than home treatment regimen frequency. 5. Bronchodilator(s) will be discontinued if patient has a RSI less than 9 and has received no scheduled or as needed treatment for 72  Hrs. Patients Ordered on a Mucolytic Agent:    1. Must always be administered with a bronchodilator. 2. Discontinue if patient experiences worsened bronchospasm, or secretions have lessened to the point that the patient is able to clear them with a cough. Anti-inflammatory and Combination Medications:    1. If the patient lacks prior history of lung disease, is not using inhaled anti-inflammatory medication at home, and lacks wheezing by examination or by history for at least 24 hours, contact physician for possible discontinuation.

## 2021-05-04 NOTE — PROGRESS NOTES
Pulmonary & Critical Care Medicine ICU Progress Note    CC: Shortness of breath, respiratory failure    Events of Last 24 hours:     Fentanyl @ 75  Propofol @ 50  Ketamine @ 0.2  Invasive Lines:  RIJ CVC 4/26/21  MV: 4/26/21-  Vent Mode: AC/PC Rate Set: 22 bmp/Vt Ordered: 0 mL/ /FiO2 : 40 %  Recent Labs     05/03/21  1253 05/04/21  0430   PHART 7.348* 7.395   OHT0WLG 65.5* 77.8*   PO2ART 95.7 99.9     IV:   ketamine (KETALAR) infusion 0.2 mg/kg/hr (05/04/21 0641)    fentaNYL 75 mcg/hr (05/04/21 0913)    dextrose      propofol 50 mcg/kg/min (05/04/21 0901)    sodium chloride 25 mL (04/25/21 0645)       Vitals:  Blood pressure (!) 159/67, pulse 90, temperature 99.2 °F (37.3 °C), temperature source Bladder, resp. rate 22, height 5' 4\" (1.626 m), weight 153 lb (69.4 kg), SpO2 96 %, not currently breastfeeding. on vent  EXAM:  General: intubated, ill appearing    ENT: Pharynx with ETT. Resp: No crackles. Wheezing with poor air movement  CV: S1, S2. No edema  GI: NT, ND, +BS  Skin: Warm and dry. Neuro: PERRL. Sedated, not following commands.      Scheduled Meds:   [START ON 5/5/2021] methylPREDNISolone  40 mg Intravenous Daily    vancomycin  1,250 mg Intravenous Q12H    carboxymethylcellulose PF  1 drop Both Eyes Q4H    artificial tears   Both Eyes Q4H    polyethylene glycol  17 g Oral Daily    cefepime  2,000 mg Intravenous Q8H    insulin lispro  0-12 Units Subcutaneous Q4H    chlorhexidine  15 mL Mouth/Throat BID    aspirin  81 mg Oral Daily    atorvastatin  40 mg Oral Nightly    levothyroxine  125 mcg Oral QAM AC    montelukast  10 mg Oral Nightly    sodium chloride flush  5-40 mL Intravenous 2 times per day    enoxaparin  40 mg Subcutaneous Daily    famotidine  20 mg Oral BID    ipratropium-albuterol  1 ampule Inhalation Q4H     PRN Meds:  midazolam, glucose, dextrose, glucagon (rDNA), dextrose, fentanNYL, sodium chloride flush, sodium chloride, acetaminophen **OR** acetaminophen, ondansetron **OR** ondansetron, albuterol, ibuprofen    Results:  CBC:   Recent Labs     05/02/21  0330 05/03/21  0510 05/04/21  0430   WBC 15.2* 17.4* 18.8*   HGB 9.5* 9.4* 9.1*   HCT 28.3* 28.1* 28.1*   MCV 96.5 96.9 97.4    250 252     BMP:   Recent Labs     05/02/21  1145 05/03/21  0510 05/04/21  0430   * 136 138   K 4.0 5.0 4.5   CL 90* 91* 93*   CO2 43* 42* 41*   PHOS 3.9  --   --    BUN 44* 44* 42*   CREATININE <0.5* <0.5* <0.5*     LIVER PROFILE:   No results for input(s): AST, ALT, LIPASE, BILIDIR, BILITOT, ALKPHOS in the last 72 hours. Invalid input(s): AMYLASE,  ALB    Cultures:  4/25/2021 SARS-CoV-2 negative   4/25/2021 blood no growth   4/26/2021 respiratory culture candida  5/1/2021 bronc washings no organisms  5/1/2021 BAL NRF    Films:  CXR 5/4/2021 clear lungs; hyperexpanded    ASSESSMENT:  · Acute on chronic respiratory failure with hypoxemia and hypercapnia   · COPD with AE; severe airtrapping with auto PEEP  · Hyperkalemia -resolved  · Tobacco abuse    PLAN:  Mechanical ventilation as per my orders. The ventilator was adjusted by me at the bedside for unstable, life threatening respiratory failure. She continues on pressure control ventilation, she has not tolerated the switch to assist control 2/2 air trapping and peak pressure alarms  IV Propofol for sedation, target RASS -2, with daily spontaneous awakening trial.  Versed gtt to off, fentanyl gtt, Ketamine gtt to help with reactive airways   Head of bed 30 degrees or higher at all times  Duonebs Q4  IV Solu-Medrol Q12  Cefepime and vancomycin day #4, started after bronchoscopy. Stop Vancomycin. Completed 7 days ceftriaxone and 5 days azithromycin and 4 days vancomycin.     PCT  ICU care- ap montezK is spouse  Total critical care time caring for this patient with life threatening, unstable organ failure, including direct patient contact, management of life support systems, review of data including imaging and labs, discussions with other team members and physicians is 33 minutes so far today, excluding procedures.

## 2021-05-05 ENCOUNTER — APPOINTMENT (OUTPATIENT)
Dept: VASCULAR LAB | Age: 61
DRG: 870 | End: 2021-05-05
Payer: COMMERCIAL

## 2021-05-05 ENCOUNTER — APPOINTMENT (OUTPATIENT)
Dept: GENERAL RADIOLOGY | Age: 61
DRG: 870 | End: 2021-05-05
Payer: COMMERCIAL

## 2021-05-05 LAB
ANION GAP SERPL CALCULATED.3IONS-SCNC: 3 MMOL/L (ref 3–16)
ANISOCYTOSIS: ABNORMAL
ATYPICAL LYMPHOCYTE RELATIVE PERCENT: 4 % (ref 0–6)
BANDED NEUTROPHILS RELATIVE PERCENT: 3 % (ref 0–7)
BASE EXCESS ARTERIAL: 16.4 MMOL/L (ref -3–3)
BASOPHILS ABSOLUTE: 0 K/UL (ref 0–0.2)
BASOPHILS RELATIVE PERCENT: 0 %
BUN BLDV-MCNC: 31 MG/DL (ref 7–20)
CALCIUM SERPL-MCNC: 9.6 MG/DL (ref 8.3–10.6)
CARBOXYHEMOGLOBIN ARTERIAL: 0.3 % (ref 0–1.5)
CHLORIDE BLD-SCNC: 94 MMOL/L (ref 99–110)
CO2: 41 MMOL/L (ref 21–32)
CREAT SERPL-MCNC: <0.5 MG/DL (ref 0.6–1.2)
EOSINOPHILS ABSOLUTE: 0.3 K/UL (ref 0–0.6)
EOSINOPHILS RELATIVE PERCENT: 1 %
GFR AFRICAN AMERICAN: >60
GFR NON-AFRICAN AMERICAN: >60
GLUCOSE BLD-MCNC: 102 MG/DL (ref 70–99)
GLUCOSE BLD-MCNC: 109 MG/DL (ref 70–99)
GLUCOSE BLD-MCNC: 111 MG/DL (ref 70–99)
GLUCOSE BLD-MCNC: 112 MG/DL (ref 70–99)
GLUCOSE BLD-MCNC: 197 MG/DL (ref 70–99)
GLUCOSE BLD-MCNC: 97 MG/DL (ref 70–99)
HCO3 ARTERIAL: 44.1 MMOL/L (ref 21–29)
HCT VFR BLD CALC: 31.5 % (ref 36–48)
HEMATOLOGY PATH CONSULT: NO
HEMOGLOBIN, ART, EXTENDED: 11.1 G/DL (ref 12–16)
HEMOGLOBIN: 10.3 G/DL (ref 12–16)
LYMPHOCYTES ABSOLUTE: 5.2 K/UL (ref 1–5.1)
LYMPHOCYTES RELATIVE PERCENT: 15 %
MAGNESIUM: 2.1 MG/DL (ref 1.8–2.4)
MCH RBC QN AUTO: 32.2 PG (ref 26–34)
MCHC RBC AUTO-ENTMCNC: 32.8 G/DL (ref 31–36)
MCV RBC AUTO: 98 FL (ref 80–100)
METAMYELOCYTES RELATIVE PERCENT: 2 %
METHEMOGLOBIN ARTERIAL: 0.3 %
MONOCYTES ABSOLUTE: 3 K/UL (ref 0–1.3)
MONOCYTES RELATIVE PERCENT: 11 %
MYELOCYTE PERCENT: 1 %
NEUTROPHILS ABSOLUTE: 18.9 K/UL (ref 1.7–7.7)
NEUTROPHILS RELATIVE PERCENT: 63 %
O2 CONTENT ARTERIAL: 15 ML/DL
O2 SAT, ARTERIAL: 94.2 %
O2 THERAPY: ABNORMAL
PCO2 ARTERIAL: 72 MMHG (ref 35–45)
PDW BLD-RTO: 13.9 % (ref 12.4–15.4)
PERFORMED ON: ABNORMAL
PERFORMED ON: NORMAL
PH ARTERIAL: 7.41 (ref 7.35–7.45)
PLATELET # BLD: 281 K/UL (ref 135–450)
PLATELET SLIDE REVIEW: ADEQUATE
PMV BLD AUTO: 7.8 FL (ref 5–10.5)
PO2 ARTERIAL: 73.6 MMHG (ref 75–108)
POLYCHROMASIA: ABNORMAL
POTASSIUM REFLEX MAGNESIUM: 3.4 MMOL/L (ref 3.5–5.1)
PROCALCITONIN: 0.35 NG/ML (ref 0–0.15)
RBC # BLD: 3.22 M/UL (ref 4–5.2)
SLIDE REVIEW: ABNORMAL
SODIUM BLD-SCNC: 138 MMOL/L (ref 136–145)
TCO2 ARTERIAL: 46.3 MMOL/L
TRIGL SERPL-MCNC: 144 MG/DL (ref 0–150)
WBC # BLD: 27.4 K/UL (ref 4–11)

## 2021-05-05 PROCEDURE — 6370000000 HC RX 637 (ALT 250 FOR IP): Performed by: INTERNAL MEDICINE

## 2021-05-05 PROCEDURE — 2580000003 HC RX 258: Performed by: INTERNAL MEDICINE

## 2021-05-05 PROCEDURE — 6360000002 HC RX W HCPCS: Performed by: INTERNAL MEDICINE

## 2021-05-05 PROCEDURE — 99233 SBSQ HOSP IP/OBS HIGH 50: CPT | Performed by: INTERNAL MEDICINE

## 2021-05-05 PROCEDURE — 84478 ASSAY OF TRIGLYCERIDES: CPT

## 2021-05-05 PROCEDURE — 84145 PROCALCITONIN (PCT): CPT

## 2021-05-05 PROCEDURE — 94640 AIRWAY INHALATION TREATMENT: CPT

## 2021-05-05 PROCEDURE — 2000000000 HC ICU R&B

## 2021-05-05 PROCEDURE — 71045 X-RAY EXAM CHEST 1 VIEW: CPT

## 2021-05-05 PROCEDURE — 83735 ASSAY OF MAGNESIUM: CPT

## 2021-05-05 PROCEDURE — 87070 CULTURE OTHR SPECIMN AEROBIC: CPT

## 2021-05-05 PROCEDURE — 82803 BLOOD GASES ANY COMBINATION: CPT

## 2021-05-05 PROCEDURE — 94003 VENT MGMT INPAT SUBQ DAY: CPT

## 2021-05-05 PROCEDURE — 85025 COMPLETE CBC W/AUTO DIFF WBC: CPT

## 2021-05-05 PROCEDURE — 87040 BLOOD CULTURE FOR BACTERIA: CPT

## 2021-05-05 PROCEDURE — 87086 URINE CULTURE/COLONY COUNT: CPT

## 2021-05-05 PROCEDURE — 87103 BLOOD FUNGUS CULTURE: CPT

## 2021-05-05 PROCEDURE — 94761 N-INVAS EAR/PLS OXIMETRY MLT: CPT

## 2021-05-05 PROCEDURE — 99291 CRITICAL CARE FIRST HOUR: CPT | Performed by: INTERNAL MEDICINE

## 2021-05-05 PROCEDURE — 2500000003 HC RX 250 WO HCPCS: Performed by: INTERNAL MEDICINE

## 2021-05-05 PROCEDURE — 87205 SMEAR GRAM STAIN: CPT

## 2021-05-05 PROCEDURE — 36415 COLL VENOUS BLD VENIPUNCTURE: CPT

## 2021-05-05 PROCEDURE — 2700000000 HC OXYGEN THERAPY PER DAY

## 2021-05-05 PROCEDURE — 80048 BASIC METABOLIC PNL TOTAL CA: CPT

## 2021-05-05 RX ORDER — LINEZOLID 2 MG/ML
600 INJECTION, SOLUTION INTRAVENOUS EVERY 12 HOURS
Status: DISCONTINUED | OUTPATIENT
Start: 2021-05-05 | End: 2021-05-07

## 2021-05-05 RX ADMIN — LINEZOLID 600 MG: 600 INJECTION, SOLUTION INTRAVENOUS at 22:15

## 2021-05-05 RX ADMIN — CEFEPIME 2000 MG: 2 INJECTION, POWDER, FOR SOLUTION INTRAVENOUS at 18:12

## 2021-05-05 RX ADMIN — PROPOFOL 50 MCG/KG/MIN: 10 INJECTION, EMULSION INTRAVENOUS at 05:55

## 2021-05-05 RX ADMIN — WHITE PETROLATUM 57.7 %-MINERAL OIL 31.9 % EYE OINTMENT: at 02:03

## 2021-05-05 RX ADMIN — FAMOTIDINE 20 MG: 20 TABLET ORAL at 08:07

## 2021-05-05 RX ADMIN — WHITE PETROLATUM 57.7 %-MINERAL OIL 31.9 % EYE OINTMENT: at 22:15

## 2021-05-05 RX ADMIN — CARBOXYMETHYLCELLULOSE SODIUM 1 DROP: 10 GEL OPHTHALMIC at 08:07

## 2021-05-05 RX ADMIN — ATORVASTATIN CALCIUM 40 MG: 40 TABLET, FILM COATED ORAL at 20:49

## 2021-05-05 RX ADMIN — CARBOXYMETHYLCELLULOSE SODIUM 1 DROP: 10 GEL OPHTHALMIC at 20:51

## 2021-05-05 RX ADMIN — POTASSIUM BICARBONATE 20 MEQ: 782 TABLET, EFFERVESCENT ORAL at 10:27

## 2021-05-05 RX ADMIN — CEFEPIME 2000 MG: 2 INJECTION, POWDER, FOR SOLUTION INTRAVENOUS at 02:08

## 2021-05-05 RX ADMIN — IPRATROPIUM BROMIDE AND ALBUTEROL SULFATE 1 AMPULE: 2.5; .5 SOLUTION RESPIRATORY (INHALATION) at 03:33

## 2021-05-05 RX ADMIN — MIDAZOLAM HYDROCHLORIDE 4 MG: 1 INJECTION, SOLUTION INTRAMUSCULAR; INTRAVENOUS at 11:03

## 2021-05-05 RX ADMIN — CARBOXYMETHYLCELLULOSE SODIUM 1 DROP: 10 GEL OPHTHALMIC at 13:01

## 2021-05-05 RX ADMIN — Medication 15 ML: at 20:50

## 2021-05-05 RX ADMIN — Medication 10 ML: at 08:04

## 2021-05-05 RX ADMIN — WHITE PETROLATUM 57.7 %-MINERAL OIL 31.9 % EYE OINTMENT: at 17:19

## 2021-05-05 RX ADMIN — MONTELUKAST SODIUM 10 MG: 10 TABLET, COATED ORAL at 20:49

## 2021-05-05 RX ADMIN — LINEZOLID 600 MG: 600 INJECTION, SOLUTION INTRAVENOUS at 10:27

## 2021-05-05 RX ADMIN — INSULIN LISPRO 2 UNITS: 100 INJECTION, SOLUTION INTRAVENOUS; SUBCUTANEOUS at 11:50

## 2021-05-05 RX ADMIN — CARBOXYMETHYLCELLULOSE SODIUM 1 DROP: 10 GEL OPHTHALMIC at 05:25

## 2021-05-05 RX ADMIN — IPRATROPIUM BROMIDE AND ALBUTEROL SULFATE 1 AMPULE: 2.5; .5 SOLUTION RESPIRATORY (INHALATION) at 16:08

## 2021-05-05 RX ADMIN — FAMOTIDINE 20 MG: 20 TABLET ORAL at 20:49

## 2021-05-05 RX ADMIN — KETAMINE HYDROCHLORIDE 0.15 MG/KG/HR: 50 INJECTION, SOLUTION INTRAMUSCULAR; INTRAVENOUS at 17:40

## 2021-05-05 RX ADMIN — PROPOFOL 50 MCG/KG/MIN: 10 INJECTION, EMULSION INTRAVENOUS at 10:30

## 2021-05-05 RX ADMIN — Medication 75 MCG/HR: at 15:33

## 2021-05-05 RX ADMIN — Medication 75 MCG/HR: at 02:03

## 2021-05-05 RX ADMIN — Medication 10 ML: at 20:48

## 2021-05-05 RX ADMIN — PROPOFOL 50 MCG/KG/MIN: 10 INJECTION, EMULSION INTRAVENOUS at 19:13

## 2021-05-05 RX ADMIN — WHITE PETROLATUM 57.7 %-MINERAL OIL 31.9 % EYE OINTMENT: at 13:01

## 2021-05-05 RX ADMIN — ALBUTEROL SULFATE 2.5 MG: 2.5 SOLUTION RESPIRATORY (INHALATION) at 00:04

## 2021-05-05 RX ADMIN — IPRATROPIUM BROMIDE AND ALBUTEROL SULFATE 1 AMPULE: 2.5; .5 SOLUTION RESPIRATORY (INHALATION) at 19:30

## 2021-05-05 RX ADMIN — METHYLPREDNISOLONE SODIUM SUCCINATE 40 MG: 40 INJECTION, POWDER, FOR SOLUTION INTRAMUSCULAR; INTRAVENOUS at 08:07

## 2021-05-05 RX ADMIN — ALBUTEROL SULFATE 2.5 MG: 2.5 SOLUTION RESPIRATORY (INHALATION) at 03:38

## 2021-05-05 RX ADMIN — ALBUTEROL SULFATE 2.5 MG: 2.5 SOLUTION RESPIRATORY (INHALATION) at 07:39

## 2021-05-05 RX ADMIN — WHITE PETROLATUM 57.7 %-MINERAL OIL 31.9 % EYE OINTMENT: at 08:07

## 2021-05-05 RX ADMIN — Medication 15 ML: at 08:04

## 2021-05-05 RX ADMIN — WHITE PETROLATUM 57.7 %-MINERAL OIL 31.9 % EYE OINTMENT: at 05:25

## 2021-05-05 RX ADMIN — PROPOFOL 50 MCG/KG/MIN: 10 INJECTION, EMULSION INTRAVENOUS at 15:25

## 2021-05-05 RX ADMIN — ASPIRIN 81 MG: 81 TABLET, CHEWABLE ORAL at 08:07

## 2021-05-05 RX ADMIN — ALBUTEROL SULFATE 2.5 MG: 2.5 SOLUTION RESPIRATORY (INHALATION) at 23:38

## 2021-05-05 RX ADMIN — IPRATROPIUM BROMIDE AND ALBUTEROL SULFATE 1 AMPULE: 2.5; .5 SOLUTION RESPIRATORY (INHALATION) at 11:25

## 2021-05-05 RX ADMIN — ALBUTEROL SULFATE 2.5 MG: 2.5 SOLUTION RESPIRATORY (INHALATION) at 16:08

## 2021-05-05 RX ADMIN — CARBOXYMETHYLCELLULOSE SODIUM 1 DROP: 10 GEL OPHTHALMIC at 17:19

## 2021-05-05 RX ADMIN — ACETAMINOPHEN 650 MG: 325 TABLET ORAL at 05:55

## 2021-05-05 RX ADMIN — PROPOFOL 50 MCG/KG/MIN: 10 INJECTION, EMULSION INTRAVENOUS at 02:08

## 2021-05-05 RX ADMIN — CEFEPIME 2000 MG: 2 INJECTION, POWDER, FOR SOLUTION INTRAVENOUS at 11:43

## 2021-05-05 RX ADMIN — IPRATROPIUM BROMIDE AND ALBUTEROL SULFATE 1 AMPULE: 2.5; .5 SOLUTION RESPIRATORY (INHALATION) at 07:44

## 2021-05-05 RX ADMIN — ALBUTEROL SULFATE 2.5 MG: 2.5 SOLUTION RESPIRATORY (INHALATION) at 12:11

## 2021-05-05 RX ADMIN — MIDAZOLAM HYDROCHLORIDE 4 MG: 1 INJECTION, SOLUTION INTRAMUSCULAR; INTRAVENOUS at 22:35

## 2021-05-05 RX ADMIN — CARBOXYMETHYLCELLULOSE SODIUM 1 DROP: 10 GEL OPHTHALMIC at 02:02

## 2021-05-05 RX ADMIN — LEVOTHYROXINE SODIUM 125 MCG: 25 TABLET ORAL at 05:55

## 2021-05-05 RX ADMIN — IPRATROPIUM BROMIDE AND ALBUTEROL SULFATE 1 AMPULE: 2.5; .5 SOLUTION RESPIRATORY (INHALATION) at 23:32

## 2021-05-05 RX ADMIN — ENOXAPARIN SODIUM 40 MG: 40 INJECTION SUBCUTANEOUS at 08:07

## 2021-05-05 RX ADMIN — ALBUTEROL SULFATE 2.5 MG: 2.5 SOLUTION RESPIRATORY (INHALATION) at 19:36

## 2021-05-05 ASSESSMENT — PULMONARY FUNCTION TESTS
PIF_VALUE: 26
PIF_VALUE: 26
PIF_VALUE: 22
PIF_VALUE: 27
PIF_VALUE: 27
PIF_VALUE: 28
PIF_VALUE: 30
PIF_VALUE: 28
PIF_VALUE: 27
PIF_VALUE: 26
PIF_VALUE: 28
PIF_VALUE: 29
PIF_VALUE: 33
PIF_VALUE: 27
PIF_VALUE: 27

## 2021-05-05 ASSESSMENT — PAIN SCALES - GENERAL: PAINLEVEL_OUTOF10: 0

## 2021-05-05 NOTE — PROGRESS NOTES
05/05/21 1930   Vent Information   $Ventilation $Subsequent Day   Skin Assessment Clean, dry, & intact   Vent Type 840   Vent Mode AC/VC   Vt Ordered 380 mL   Rate Set 22 bmp   Peak Flow 55 L/min   Pressure Support 0 cmH20   FiO2  40 %   SpO2 97 %   SpO2/FiO2 ratio 242.5   Sensitivity 3   PEEP/CPAP 5   I Time/ I Time % 0 s   Humidification Source Heated wire   Humidification Temp 37   Humidification Temp Measured 37   Circuit Condensation Drained   Vent Patient Data   High Peep/I Pressure 0   Peak Inspiratory Pressure 29 cmH2O   Mean Airway Pressure 11 cmH20   Rate Measured 22 br/min   Vt Exhaled 407 mL   Minute Volume 8.93 Liters   I:E Ratio 1:2.60   Plateau Pressure 21 [id number]   Static Compliance 25 mL/cmH2O   Dynamic Compliance 17 mL/cmH2O   Cough/Sputum   Sputum How Obtained Endotracheal;Suctioned   Cough Productive   Sputum Amount Small   Sputum Color Creamy   Tenacity Thick   Spontaneous Breathing Trial (SBT) RT Doc   Pulse 86   Breath Sounds   Right Upper Lobe Diminished   Right Middle Lobe Diminished   Right Lower Lobe Diminished   Left Upper Lobe Diminished   Left Lower Lobe Diminished   Additional Respiratory  Assessments   Resp 22   Position Semi-Woods's   Alarm Settings   High Pressure Alarm 50 cmH2O   Low Minute Volume Alarm 2 L/min   High Respiratory Rate 40 br/min   Patient Observation   Observations ETT SIZE 8.0, SECURED AT 24 LIP LINE. AMBU BAG AT HEAD OF BED. WATER GOOD. Non-Surgical Airway Endo Tracheal Tube   Placement Date/Time: 04/26/21 0528   Timeout: Patient;Procedure; Appropriate Equipment  Mask Ventilation: Ventilated by mask (1)  Placed By: Licensed provider  Inserted by: Dr Lizzy Chávez  Insertion attempts: 1  Airway Device: Endo Tracheal Tube  Size: 8   Secured at 24 cm   Measured From Lips   Secured Location Left   Secured By Commercial tube [email or web address]   Site Condition Dry        05/05/21 1930   Vent Information   $Ventilation $Subsequent Day   Skin Assessment Clean, dry, & intact   Vent Type 840   Vent Mode AC/VC   Vt Ordered 380 mL   Rate Set 22 bmp   Peak Flow 55 L/min   Pressure Support 0 cmH20   FiO2  40 %   SpO2 97 %   SpO2/FiO2 ratio 242.5   Sensitivity 3   PEEP/CPAP 5   I Time/ I Time % 0 s   Humidification Source Heated wire   Humidification Temp 37   Humidification Temp Measured 37   Circuit Condensation Drained   Vent Patient Data   High Peep/I Pressure 0   Peak Inspiratory Pressure 29 cmH2O   Mean Airway Pressure 11 cmH20   Rate Measured 22 br/min   Vt Exhaled 407 mL   Minute Volume 8.93 Liters   I:E Ratio 1:2.60   Plateau Pressure 21 VOF84   Static Compliance 25 mL/cmH2O   Dynamic Compliance 17 mL/cmH2O   Cough/Sputum   Sputum How Obtained Endotracheal;Suctioned   Cough Productive   Sputum Amount Small   Sputum Color Creamy   Tenacity Thick   Spontaneous Breathing Trial (SBT) RT Doc   Pulse 86   Breath Sounds   Right Upper Lobe Diminished   Right Middle Lobe Diminished   Right Lower Lobe Diminished   Left Upper Lobe Diminished   Left Lower Lobe Diminished   Additional Respiratory  Assessments   Resp 22   Position Semi-Woods's   Alarm Settings   High Pressure Alarm 50 cmH2O   Low Minute Volume Alarm 2 L/min   High Respiratory Rate 40 br/min   Patient Observation   Observations ETT SIZE 8.0, SECURED AT 24 LIP LINE. AMBU BAG AT HEAD OF BED. WATER GOOD. Non-Surgical Airway Endo Tracheal Tube   Placement Date/Time: 04/26/21 0528   Timeout: Patient;Procedure; Appropriate Equipment  Mask Ventilation: Ventilated by mask (1)  Placed By: Licensed provider  Inserted by: Dr Samuel Calvo  Insertion attempts: 1  Airway Device: Endo Tracheal Tube  Size: 8   Secured at 24 cm   Measured From Lips   Secured Location Left   Secured By Commercial tube [email or web address]   Site Condition Dry        05/05/21 1930   Vent Information   $Ventilation $Subsequent Day   Skin Assessment Clean, dry, & intact   Vent Type 840   Vent Mode AC/VC   Vt Ordered 380 mL   Rate Set 22 bmp   Peak Flow 55 L/min   Pressure Support 0 cmH20 FiO2  40 %   SpO2 97 %   SpO2/FiO2 ratio 242.5   Sensitivity 3   PEEP/CPAP 5   I Time/ I Time % 0 s   Humidification Source Heated wire   Humidification Temp 37   Humidification Temp Measured 37   Circuit Condensation Drained   Vent Patient Data   High Peep/I Pressure 0   Peak Inspiratory Pressure 29 cmH2O   Mean Airway Pressure 11 cmH20   Rate Measured 22 br/min   Vt Exhaled 407 mL   Minute Volume 8.93 Liters   I:E Ratio 1:2.60   Plateau Pressure 21 EHV40   Static Compliance 25 mL/cmH2O   Dynamic Compliance 17 mL/cmH2O   Cough/Sputum   Sputum How Obtained Endotracheal;Suctioned   Cough Productive   Sputum Amount Small   Sputum Color Creamy   Tenacity Thick   Spontaneous Breathing Trial (SBT) RT Doc   Pulse 86   Breath Sounds   Right Upper Lobe Diminished   Right Middle Lobe Diminished   Right Lower Lobe Diminished   Left Upper Lobe Diminished   Left Lower Lobe Diminished   Additional Respiratory  Assessments   Resp 22   Position Semi-Woods's   Alarm Settings   High Pressure Alarm 50 cmH2O   Low Minute Volume Alarm 2 L/min   High Respiratory Rate 40 br/min   Patient Observation   Observations ETT SIZE 8.0, SECURED AT 24 LIP LINE. AMBU BAG AT HEAD OF BED. WATER GOOD. Non-Surgical Airway Endo Tracheal Tube   Placement Date/Time: 04/26/21 0528   Timeout: Patient;Procedure; Appropriate Equipment  Mask Ventilation: Ventilated by mask (1)  Placed By: Licensed provider  Inserted by: Dr Todd Varner  Insertion attempts: 1  Airway Device: Endo Tracheal Tube  Size: 8   Secured at 24 cm   Measured From Lips   Secured Location Left   Secured By [facility] tube [email or web address]   Site Condition Dry        05/05/21 1930   Vent Information   $Ventilation $Subsequent Day   Skin Assessment Clean, dry, & intact   Vent Type 840   Vent Mode AC/VC   Vt Ordered 380 mL   Rate Set 22 bmp   Peak Flow 55 L/min   Pressure Support 0 cmH20   FiO2  40 %   SpO2 97 %   SpO2/FiO2 ratio 242.5   Sensitivity 3   PEEP/CPAP 5   I Time/ I Time % 0 s Humidification Source Heated wire   Humidification Temp 37   Humidification Temp Measured 37   Circuit Condensation Drained   Vent Patient Data   High Peep/I Pressure 0   Peak Inspiratory Pressure 29 cmH2O   Mean Airway Pressure 11 cmH20   Rate Measured 22 br/min   Vt Exhaled 407 mL   Minute Volume 8.93 Liters   I:E Ratio 1:2.60   Plateau Pressure 21 KGN19   Static Compliance 25 mL/cmH2O   Dynamic Compliance 17 mL/cmH2O   Cough/Sputum   Sputum How Obtained Endotracheal;Suctioned   Cough Productive   Sputum Amount Small   Sputum Color Creamy   Tenacity Thick   Spontaneous Breathing Trial (SBT) RT Doc   Pulse 86   Breath Sounds   Right Upper Lobe Diminished   Right Middle Lobe Diminished   Right Lower Lobe Diminished   Left Upper Lobe Diminished   Left Lower Lobe Diminished   Additional Respiratory  Assessments   Resp 22   Position Semi-Woods's   Alarm Settings   High Pressure Alarm 50 cmH2O   Low Minute Volume Alarm 2 L/min   High Respiratory Rate 40 br/min   Patient Observation   Observations ETT SIZE 8.0, SECURED AT 24 LIP LINE. AMBU BAG AT HEAD OF BED. WATER GOOD. Non-Surgical Airway Endo Tracheal Tube   Placement Date/Time: 04/26/21 0528   Timeout: Patient;Procedure; Appropriate Equipment  Mask Ventilation: Ventilated by mask (1)  Placed By: Licensed provider  Inserted by: Dr Milburn Kussmaul  Insertion attempts: 1  Airway Device: Endo Tracheal Tube  Size: 8   Secured at 24 cm   Measured From Lips   Secured Location Left   Secured By [employer] tube [email or web address]   Site Condition Dry        05/05/21 1930   Vent Information   $Ventilation $Subsequent Day   Skin Assessment Clean, dry, & intact   Vent Type 840   Vent Mode AC/VC   Vt Ordered 380 mL   Rate Set 22 bmp   Peak Flow 55 L/min   Pressure Support 0 cmH20   FiO2  40 %   SpO2 97 %   SpO2/FiO2 ratio 242.5   Sensitivity 3   PEEP/CPAP 5   I Time/ I Time % 0 s   Humidification Source Heated wire   Humidification Temp 37   Humidification Temp Measured [address] Condensation Drained   Vent Patient Data   High Peep/I Pressure 0   Peak Inspiratory Pressure 29 cmH2O   Mean Airway Pressure 11 cmH20   Rate Measured 22 br/min   Vt Exhaled 407 mL   Minute Volume 8.93 Liters   I:E Ratio 1:2.60   Plateau Pressure 21 RZT25   Static Compliance 25 mL/cmH2O   Dynamic Compliance 17 mL/cmH2O   Cough/Sputum   Sputum How Obtained Endotracheal;Suctioned   Cough Productive   Sputum Amount Small   Sputum Color Creamy   Tenacity Thick   Spontaneous Breathing Trial (SBT) RT Doc   Pulse 86   Breath Sounds   Right Upper Lobe Diminished   Right Middle Lobe Diminished   Right Lower Lobe Diminished   Left Upper Lobe Diminished   Left Lower Lobe Diminished   Additional Respiratory  Assessments   Resp 22   Position Semi-Woods's   Alarm Settings   High Pressure Alarm 50 cmH2O   Low Minute Volume Alarm 2 L/min   High Respiratory Rate 40 br/min   Patient Observation   Observations ETT SIZE 8.0, SECURED AT 24 LIP LINE. AMBU BAG AT HEAD OF BED. WATER GOOD. Non-Surgical Airway Endo Tracheal Tube   Placement Date/Time: 04/26/21 0528   Timeout: Patient;Procedure; Appropriate Equipment  Mask Ventilation: Ventilated by mask (1)  Placed By: Licensed provider  Inserted by: Dr Zan Florez  Insertion attempts: 1  Airway Device: Endo Tracheal Tube  Size: 8   Secured at 24 cm   Measured From Lips   Secured Location Left   Secured By Commercial tube [email or web address]   Site Condition Dry

## 2021-05-05 NOTE — PROGRESS NOTES
High risk vesicant drug infusing:  _______n___    Multiple incompatible medications infusing:  ___y______    CVP Monitoring:  ______n___    Extremely difficult IV access challenge:  _______y_    Continued need for central line access:  ____y______    Addressed with physician:  ___y_____    RIGHT PATIENT, RIGHT TIME, RIGHT LINE    Jeison Carrasco RN

## 2021-05-05 NOTE — PROGRESS NOTES
increased on the right, either due   to atelectasis or pneumonia. XR CHEST PORTABLE   Final Result   Stable chest with moderate hyperinflation. Trace atelectasis left lung base. XR CHEST PORTABLE   Final Result   In this patient with underlying COPD, the lungs are clear. Interstitial   opacities described on recent exam are less evident on current study. XR CHEST PORTABLE   Final Result   Increased lung markings bilaterally, may be related to minimal pulmonary   vascular congestion versus bronchitis. XR CHEST PORTABLE   Final Result   Stable chest.      Lungs are hyperinflated with no acute airspace disease. XR CHEST PORTABLE   Final Result   Minimal right basilar airspace disease likely atelectasis. Lungs are   moderately hyperinflated. XR CHEST PORTABLE   Final Result   Appropriate positioning of right central venous catheter. XR CHEST PORTABLE   Final Result   Satisfactory position endotracheal tube and NG tube. No acute airspace disease. Pulmonary vessels upper normal.         XR CHEST PORTABLE   Final Result   Pulmonary edema with bibasilar atelectasis versus pneumonia. XR CHEST 1 VIEW    (Results Pending)      Echo 8/25/2020   Summary   Limited imaging due to lung interface. Parasternal images are unobtainable.   Normal left ventricular systolic function with an estimated ejection   fraction of 55-60%.   No regional wall motion abnormalities are seen.   Normal left ventricular diastolic filling pressure.   Normal systolic pulmonary artery pressure (SPAP) estimated at 20 mmHg (RA   pressure 3 mmHg).   No significant valvular heart disease.                 ASSESSMENT/PLAN:     #Acute on chronic hypoxic/hypercapnic respiratory failure-due to COPD exacerbation, Pneumonia  -Normally on home oxygen 3 L   -ABG on admission pH 7.2, PCO2 92  - admitted to ICU on BiPAP  - pulmonology consulted.     - IV Solu-medrol, HHN , abx initiated   --

## 2021-05-05 NOTE — PROGRESS NOTES
Initial exam completed- See doc flowsheet for assessment findings. Pt resting quietly in bed and opens eyes slightly to stimulation but does not follow commands. Respirations are unlabored. All lines and monitoring devices are in place . Vitals and SpO2 stable. Call light is within easy reach. Plan of care and goals reviewed.  Abe Mattson RN

## 2021-05-05 NOTE — PROGRESS NOTES
Patient is not able to demonstrate the ability to move from a reclining position to an upright position within the recliner due to Pt on bedrest and vent .  Siddhartha Ring RN

## 2021-05-05 NOTE — PROGRESS NOTES
05/05/21 0333   Vent Information   Vent Type 840   Vent Mode AC/PC   Vt Ordered 0 mL   Rate Set 22 bmp   Peak Flow 0 L/min   Pressure Support 0 cmH20   FiO2  40 %   SpO2 94 %   SpO2/FiO2 ratio 235   Sensitivity 3   PEEP/CPAP 5   I Time/ I Time % 0 s   Humidification Source Heated wire   Humidification Temp 37   Humidification Temp Measured 37   Circuit Condensation Drained   Vent Patient Data   High Peep/I Pressure 20   Peak Inspiratory Pressure 27 cmH2O   Mean Airway Pressure 9.1 cmH20   Rate Measured 23 br/min   Vt Exhaled 441 mL   Minute Volume 8.82 Liters   I:E Ratio 1:4.50   Cough/Sputum   Sputum How Obtained Endotracheal;Suctioned   Cough Productive   Sputum Amount Moderate   Sputum Color Creamy   Tenacity Thick   Spontaneous Breathing Trial (SBT) RT Doc   Pulse 94   Breath Sounds   Right Upper Lobe Diminished   Right Middle Lobe Diminished   Right Lower Lobe Diminished   Left Upper Lobe Diminished   Left Lower Lobe Diminished   Additional Respiratory  Assessments   Resp 22   Position Semi-Woods's   Alarm Settings   High Pressure Alarm 50 cmH2O   Low Minute Volume Alarm 2 L/min   High Respiratory Rate 40 br/min   Patient Observation   Observations ETT SIZE 8.0, SECURED AT 24 LIP LINE. AMBU BAG AT HEAD OF BED. WATER GOOD. Non-Surgical Airway Endo Tracheal Tube   Placement Date/Time: 04/26/21 0586   Timeout: Patient;Procedure; Appropriate Equipment  Mask Ventilation: Ventilated by mask (1)  Placed By: Licensed provider  Inserted by: Dr Chinyere Stevens  Insertion attempts: 1  Airway Device: Endo Tracheal Tube  Size: 8   Secured at 24 cm   Measured From Lips   Secured Location Left   Secured By Commercial tube [email or web address]   Site Condition Dry

## 2021-05-05 NOTE — PROGRESS NOTES
Pt's resp effort appears more labored and suctioned her for moderate thin tan secretions. . /72. No change in HR or labored breathing after suctioning. Pt's ketamine gtt  Increased - see MAR and pt medicated with PRN dose of versed.  Will hold on SBT for now Yamilka Ralph

## 2021-05-05 NOTE — CARE COORDINATION
INTERDISCIPLINARY PLAN OF CARE CONFERENCE    Date/Time: 5/5/2021 12:59 PM  Completed by: Barry Smith, Case Management      Patient Name:  Arcadio Roblero  YOB: 1960  Admitting Diagnosis: Acute on chronic respiratory failure ([facility] [address] [J96.20]     Admit Date/Time:  4/25/2021  2:30 AM    Chart reviewed. Interdisciplinary team contacted or reviewed plan related to patient progress and discharge plans. Disciplines included Case Management, Nursing, and Dietitian. Current Status: ICU, vent, IV antibiotics  PT/OT recommendation for discharge plan of care: tbd    Expected D/C Disposition:  TBD     Discharge Plan Comments: Pt cont in ICU on vent. Pt is from home pta. Will need PT/OT eval when appropriate.      Home O2 in place on admit: Yes

## 2021-05-05 NOTE — PROGRESS NOTES
05/05/21 0005   Vent Information   Vt Ordered 0 mL   Rate Set 22 bmp   Peak Flow 0 L/min   Pressure Support 0 cmH20   FiO2  40 %   SpO2 95 %   SpO2/FiO2 ratio 237.5   Sensitivity 3   PEEP/CPAP 5   I Time/ I Time % 0 s   Vent Patient Data   High Peep/I Pressure 20   Peak Inspiratory Pressure 28 cmH2O   Mean Airway Pressure 9.9 cmH20   Rate Measured 27 br/min   Vt Exhaled 348 mL   Minute Volume 8.16 Liters   I:E Ratio 1:4.50   Spontaneous Breathing Trial (SBT) RT Doc   Pulse 100   Additional Respiratory  Assessments   Resp (!) 31   Alarm Settings   High Pressure Alarm 50 cmH2O   Low Minute Volume Alarm 2 L/min   High Respiratory Rate 40 br/min

## 2021-05-05 NOTE — PROGRESS NOTES
Care rounds completed with Dr Browning and multidisciplinary team.  Reviewed labs, meds, VS (temp/HR/RR), I/O's, assessment, & plan of care for today. See progress note & new orders for details.  Lucia Gama RN

## 2021-05-05 NOTE — PROGRESS NOTES
Pulmonary & Critical Care Medicine ICU Progress Note    CC: Shortness of breath, respiratory failure    Events of Last 24 hours: New low grade fevers    Fentanyl @ 75  Propofol @ 50  Ketamine @ 0.2  Invasive Lines:  RIJ CVC 4/26/21  MV: 4/26/21-  Vent Mode: AC/PC Rate Set: 22 bmp/Vt Ordered: 0 mL/ /FiO2 : 40 %  Recent Labs     05/04/21  0430 05/05/21  0422   PHART 7.395 7.405   [id number] 77.8* 72.0*   PO2ART 99.9 73.6*     IV:   ketamine (KETALAR) infusion 0.1 mg/kg/hr (05/04/21 2200)    fentaNYL 75 mcg/hr (05/05/21 0600)    dextrose      propofol 50 mcg/kg/min (05/05/21 0600)    sodium chloride 25 mL (05/04/21 1800)       Vitals:  Blood pressure 139/64, pulse 98, temperature 100.6 °F (38.1 °C), temperature source Oral, resp. rate 29, height 5' 4\" (1.626 m), weight 153 lb (69.4 kg), SpO2 94 %, not currently breastfeeding. on vent  EXAM:  General: intubated, ill appearing    ENT: Pharynx with ETT. Resp: No crackles. Wheezing with poor air movement  CV: S1, S2. No edema  GI: NT, ND, +BS  Skin: Warm and dry. Neuro: PERRL. Sedated, not following commands.      Scheduled Meds:   methylPREDNISolone  40 mg Intravenous Daily    carboxymethylcellulose PF  1 drop Both Eyes Q4H    artificial tears   Both Eyes Q4H    polyethylene glycol  17 g Oral Daily    cefepime  2,000 mg Intravenous Q8H    insulin lispro  0-12 Units Subcutaneous Q4H    chlorhexidine  15 mL Mouth/Throat BID    aspirin  81 mg Oral Daily    atorvastatin  40 mg Oral Nightly    levothyroxine  125 mcg Oral QAM AC    montelukast  10 mg Oral Nightly    sodium chloride flush  5-40 mL Intravenous 2 times per day    enoxaparin  40 mg Subcutaneous Daily    famotidine  20 mg Oral BID    ipratropium-albuterol  1 ampule Inhalation Q4H     PRN Meds:  midazolam, glucose, dextrose, glucagon (rDNA), dextrose, fentanNYL, sodium chloride flush, sodium chloride, acetaminophen **OR** acetaminophen, ondansetron **OR** ondansetron, albuterol, ibuprofen    Results:  CBC:   Recent Labs     05/03/21  0510 05/04/21  0430 05/05/21  0422   WBC 17.4* 18.8* 27.4*   HGB 9.4* 9.1* 10.3*   HCT 28.1* 28.1* 31.5*   MCV 96.9 97.4 98.0    252 281     BMP:   Recent Labs     05/02/21  1145 05/03/21  0510 05/04/21  0430 05/05/21  0422   * 136 138 138   K 4.0 5.0 4.5 3.4*   CL 90* 91* 93* 94*   CO2 43* 42* 41* 41*   PHOS 3.9  --   --   --    BUN 44* 44* 42* 31*   CREATININE <0.5* <0.5* <0.5* <0.5*     LIVER PROFILE:   No results for input(s): AST, ALT, LIPASE, BILIDIR, BILITOT, ALKPHOS in the last 72 hours. Invalid input(s): AMYLASE,  ALB    Cultures:  4/25/2021 SARS-CoV-2 negative   4/25/2021 blood no growth   4/26/2021 respiratory culture candida  5/1/2021 bronc washings no organisms  5/1/2021 BAL NRF    Films:  CXR 5/5/2021 clear lungs; hyperexpanded    ASSESSMENT:  · Acute on chronic respiratory failure with hypoxemia and hypercapnia   · COPD with AE; severe airtrapping with auto PEEP  · Tobacco abuse  · New fevers  · Leukocytosis    PLAN:  Mechanical ventilation as per my orders. The ventilator was adjusted by me at the bedside for unstable, life threatening respiratory failure. She continues on pressure control ventilation, she has not tolerated the switch to assist control 2/2 air trapping and peak pressure alarms  IV Propofol for sedation, target RASS -2, with daily spontaneous awakening trial.  Versed gtt to off, fentanyl gtt, Ketamine gtt to help with reactive airways   Head of bed 30 degrees or higher at all times  Duonebs Q4  IV Solu-Medrol Q12  Cefepime D#5, Zyvox D#1. Post 4 days of vancomycin  Completed 7 days ceftriaxone and 5 days azithromycin and 4 days vancomycin.   Check Cdiff if diarrhea  Repeat blood, urine, and resp cx's    Fungal cx  LE doppler  Replace K  PCT  ICU care- lovenox, pepcid   NOK is spouse  Total critical care time caring for this patient with life threatening, unstable organ failure, including direct patient contact, management of life support systems, review of data including imaging and labs, discussions with other team members and physicians is 33 minutes so far today, excluding procedures.

## 2021-05-05 NOTE — PROGRESS NOTES
Change pt Ventilator to Ac/VC per MD after SBT, was unable to complete SBT because pts vitals were unstable.  Ventilator settin/380/40/+5

## 2021-05-05 NOTE — PROGRESS NOTES
Comprehensive Nutrition Assessment    Type and Reason for Visit:  Reassess    Nutrition Recommendations/Plan:  1. Continue Nepro TF at goal rate of 25 ml/hr x 20 hours + 30 ml water flushes every 4 hours + one proteinex P2Go once daily via feeding tube. 2. Monitor TF rate, intake, and tolerance + water flushes + administration of one proteinex P2Go once daily. 3. Monitor vent status, sedation type/amount (propofol is currently at 50 mcg x 24 hours which = 554 kcals from lipids), and plan of care + monitor results of TG check obtained today. 4. Please obtain an updated weight for this patient - last weight was obtained on 4/27/21.   5. Monitor nutrition-related labs, bowel function, and weight trends. Nutrition Assessment:  patient continues to improve from a nutritional standpoint AEB patient is tolerating Nepro TF regimen at goal rate + TF is meeting 100% of patient's current, estimated nutrition needs at this time, however, she remains at risk for further compromise d/t altered nutrition-related labs, increased propofol amount so TF is at lower goal rate to prevent over-feeding patient, and need for EN as sole source of nutrition while intubated; will continue Nepro at 25 ml/hr x 20 hours + 30 ml water flushes every 4 hours + one proteinex P2Go once daily via feeding tube    Malnutrition Assessment:  Malnutrition Status:   At risk for malnutrition    Context:  Acute Illness     Findings of the 6 clinical characteristics of malnutrition:  Energy Intake:  Mild decrease in energy intake (Comment)(TF goal rate is lower since patient's propofol intake is higher)  Weight Loss:  No significant weight loss     Body Fat Loss:  No significant body fat loss     Muscle Mass Loss:  Unable to assess(bilateral hands are swollen) Clavicles (pectoralis & deltoids), Hand (interosseous)  Fluid Accumulation:  No significant fluid accumulation     Strength:  Not Performed    Estimated Daily Nutrient Needs:  Energy (kcal):  1360 - 1564 kcals based on 20-23 kcals/kg/CBW; Weight Used for Energy Requirements:  Current     Protein (g):  66 - 72 g protein based on 1.2-1.3 g/kg/IBW;  Weight Used for Protein Requirements:  Ideal        Fluid (ml/day):  1360 - 1564 ml; Method Used for Fluid Requirements:  1 ml/kcal      Nutrition Related Findings:  patient remains intubated and sedated on 50 mcg propofol at this time; she responds to pain; + BM on 5/4/21; no GI issues reported at this time; h/h, K, and Cl are low; BUN is elevated; patient has pepcid, med-dose SSI, synthroid, solumedrol, miralax, fentanyl, and ketamine ordered at this time; patient's , Elizabeth Lopez, was present in patient's room today and he provided some nutrition hx about patient during visit; patient is edentulous and does not have dentures - no difficulties chewing and/or swallowing; patient uses a walker at home, per ; patient is independent at home except for her breathing issues limit her sometimes; patient's  reported that patient's UBW is 120-124# but that she has gained ~ 20# within the past year d/t patient being more sedentary      Wounds:  None       Current Nutrition Therapies:    Current Tube Feeding (TF) Orders:  · Feeding Route: Orogastric  · Formula: Renal  · Schedule: Continuous  · Additives/Modulars: Protein(one proteinex P2Go once daily)  · Water Flushes: 30 ml every 4 hours or per MD guidance  · Current TF & Flush Orders Provides: Nepro with a goal rate of 25 ml/hr x 20 hours = 500 ml TV, 900 kcals, 41 g protein, and 364 ml free water + 26 g protein and 104 kcals from one proteinex P2Go once daily via feeding tube (67 g protein and 1004 kcals total) + 30 ml water flushes every 4 hours or per MD guidance  · Goal TF & Flush Orders Provides: Nepro with a goal rate of 25 ml/hr x 20 hours = 500 ml TV, 900 kcals, 41 g protein, and 364 ml free water + 26 g protein and 104 kcals from one proteinex P2Go once daily via feeding tube (67 g protein and 1004 kcals total) + 30 ml water flushes every 4 hours or per MD guidance      Anthropometric Measures:  · Height: 5' 4\" (162.6 cm)  · Current Body Weight: 153 lb (69.4 kg)(obtained on 4/27/21)   · Admission Body Weight: 150 lb 6.4 oz (68.2 kg)(obtained on 4/25/21; actual weight)    · Usual Body Weight: 135 lb 1.6 oz (61.3 kg)(obtained on 8/23/20; actual weight)     · Ideal Body Weight: 120 lbs; % Ideal Body Weight 127.5 %   · BMI: 26.2  · BMI Categories: Overweight (BMI 25.0-29. 9)       Nutrition Diagnosis:   · Inadequate oral intake related to inadequate protein-energy intake, impaired respiratory function as evidenced by NPO or clear liquid status due to medical condition, intubation      Nutrition Interventions:   Food and/or Nutrient Delivery:  Continue NPO, Continue Current Tube Feeding  Nutrition Education/Counseling:  No recommendation at this time   Coordination of Nutrition Care:  Continue to monitor while inpatient, Interdisciplinary Rounds    Goals:  patient will tolerate Nepro at goal rate of 25 ml/hr x 20 hours without GI distress, without s/s of aspiration, and without additional lab/fluid disturbances       Nutrition Monitoring and Evaluation:   Behavioral-Environmental Outcomes:  None Identified   Food/Nutrient Intake Outcomes:  Enteral Nutrition Intake/Tolerance  Physical Signs/Symptoms Outcomes:  Biochemical Data, Hemodynamic Status, Nutrition Focused Physical Findings, Skin, Weight     Discharge Planning:     Too soon to determine     Electronically signed by Chris Flores RD, LD on 5/5/21 at 4:30 PM EDT    Contact: [phone]

## 2021-05-05 NOTE — PLAN OF CARE
Nutrition Problem #1: Inadequate oral intake  Intervention: Food and/or Nutrient Delivery: Continue NPO, Continue Current Tube Feeding  Nutritional Goals: patient will tolerate Nepro at goal rate of 25 ml/hr x 20 hours without GI distress, without s/s of aspiration, and without additional lab/fluid disturbances

## 2021-05-05 NOTE — PROGRESS NOTES
115 cc of IV Ketamine wasted and witnessed by Duyen Shaver RN     115 cc IV ketamine wasted with Sidney Lozano RN  Electronically signed by Cathie Morley RN on 5/5/2021 at 6:17 PM

## 2021-05-05 NOTE — PROGRESS NOTES
Pt continues to rest quietly and no further changes noted in exam. Vitals and SpO2 stable. All lines and monitoring devices remain in place. Pt repositioned in bed. Continue current plan of care.  Rose Odom RN

## 2021-05-06 ENCOUNTER — APPOINTMENT (OUTPATIENT)
Dept: GENERAL RADIOLOGY | Age: 61
DRG: 870 | End: 2021-05-06
Payer: COMMERCIAL

## 2021-05-06 LAB
ANION GAP SERPL CALCULATED.3IONS-SCNC: 3 MMOL/L (ref 3–16)
ANISOCYTOSIS: ABNORMAL
BANDED NEUTROPHILS RELATIVE PERCENT: 1 % (ref 0–7)
BASE EXCESS ARTERIAL: 14.2 MMOL/L (ref -3–3)
BASOPHILIC STIPPLING: ABNORMAL
BASOPHILS ABSOLUTE: 0 K/UL (ref 0–0.2)
BASOPHILS RELATIVE PERCENT: 0 %
BUN BLDV-MCNC: 27 MG/DL (ref 7–20)
BURR CELLS: ABNORMAL
CALCIUM SERPL-MCNC: 8.8 MG/DL (ref 8.3–10.6)
CARBOXYHEMOGLOBIN ARTERIAL: 0.3 % (ref 0–1.5)
CHLORIDE BLD-SCNC: 95 MMOL/L (ref 99–110)
CO2: 41 MMOL/L (ref 21–32)
CREAT SERPL-MCNC: <0.5 MG/DL (ref 0.6–1.2)
EOSINOPHILS ABSOLUTE: 0.2 K/UL (ref 0–0.6)
EOSINOPHILS RELATIVE PERCENT: 1 %
GFR AFRICAN AMERICAN: >60
GFR NON-AFRICAN AMERICAN: >60
GLUCOSE BLD-MCNC: 105 MG/DL (ref 70–99)
GLUCOSE BLD-MCNC: 107 MG/DL (ref 70–99)
GLUCOSE BLD-MCNC: 120 MG/DL (ref 70–99)
GLUCOSE BLD-MCNC: 131 MG/DL (ref 70–99)
GLUCOSE BLD-MCNC: 222 MG/DL (ref 70–99)
GLUCOSE BLD-MCNC: 93 MG/DL (ref 70–99)
HCO3 ARTERIAL: 40.9 MMOL/L (ref 21–29)
HCT VFR BLD CALC: 29.9 % (ref 36–48)
HEMOGLOBIN, ART, EXTENDED: 11.2 G/DL (ref 12–16)
HEMOGLOBIN: 9.9 G/DL (ref 12–16)
LYMPHOCYTES ABSOLUTE: 2.1 K/UL (ref 1–5.1)
LYMPHOCYTES RELATIVE PERCENT: 9 %
MAGNESIUM: 2 MG/DL (ref 1.8–2.4)
MCH RBC QN AUTO: 31.8 PG (ref 26–34)
MCHC RBC AUTO-ENTMCNC: 33 G/DL (ref 31–36)
MCV RBC AUTO: 96.6 FL (ref 80–100)
METHEMOGLOBIN ARTERIAL: 0.2 %
MONOCYTES ABSOLUTE: 0.9 K/UL (ref 0–1.3)
MONOCYTES RELATIVE PERCENT: 4 %
MYELOCYTE PERCENT: 2 %
NEUTROPHILS ABSOLUTE: 20.1 K/UL (ref 1.7–7.7)
NEUTROPHILS RELATIVE PERCENT: 83 %
O2 CONTENT ARTERIAL: 15 ML/DL
O2 SAT, ARTERIAL: 92 %
O2 THERAPY: ABNORMAL
PCO2 ARTERIAL: 62.7 MMHG (ref 35–45)
PDW BLD-RTO: 14 % (ref 12.4–15.4)
PERFORMED ON: ABNORMAL
PERFORMED ON: NORMAL
PH ARTERIAL: 7.43 (ref 7.35–7.45)
PLATELET # BLD: 265 K/UL (ref 135–450)
PLATELET SLIDE REVIEW: ADEQUATE
PMV BLD AUTO: 8 FL (ref 5–10.5)
PO2 ARTERIAL: 63 MMHG (ref 75–108)
POIKILOCYTES: ABNORMAL
POTASSIUM REFLEX MAGNESIUM: 3.5 MMOL/L (ref 3.5–5.1)
RBC # BLD: 3.09 M/UL (ref 4–5.2)
SLIDE REVIEW: ABNORMAL
SODIUM BLD-SCNC: 139 MMOL/L (ref 136–145)
TCO2 ARTERIAL: 42.8 MMOL/L
URINE CULTURE, ROUTINE: NORMAL
WBC # BLD: 23.4 K/UL (ref 4–11)

## 2021-05-06 PROCEDURE — 83735 ASSAY OF MAGNESIUM: CPT

## 2021-05-06 PROCEDURE — 80048 BASIC METABOLIC PNL TOTAL CA: CPT

## 2021-05-06 PROCEDURE — 82803 BLOOD GASES ANY COMBINATION: CPT

## 2021-05-06 PROCEDURE — 6360000002 HC RX W HCPCS: Performed by: INTERNAL MEDICINE

## 2021-05-06 PROCEDURE — 6370000000 HC RX 637 (ALT 250 FOR IP): Performed by: INTERNAL MEDICINE

## 2021-05-06 PROCEDURE — 94761 N-INVAS EAR/PLS OXIMETRY MLT: CPT

## 2021-05-06 PROCEDURE — 71045 X-RAY EXAM CHEST 1 VIEW: CPT

## 2021-05-06 PROCEDURE — 99233 SBSQ HOSP IP/OBS HIGH 50: CPT | Performed by: INTERNAL MEDICINE

## 2021-05-06 PROCEDURE — 2500000003 HC RX 250 WO HCPCS: Performed by: INTERNAL MEDICINE

## 2021-05-06 PROCEDURE — 2700000000 HC OXYGEN THERAPY PER DAY

## 2021-05-06 PROCEDURE — 94640 AIRWAY INHALATION TREATMENT: CPT

## 2021-05-06 PROCEDURE — 2000000000 HC ICU R&B

## 2021-05-06 PROCEDURE — 2580000003 HC RX 258: Performed by: INTERNAL MEDICINE

## 2021-05-06 PROCEDURE — 93970 EXTREMITY STUDY: CPT

## 2021-05-06 PROCEDURE — 94003 VENT MGMT INPAT SUBQ DAY: CPT

## 2021-05-06 PROCEDURE — 99291 CRITICAL CARE FIRST HOUR: CPT | Performed by: INTERNAL MEDICINE

## 2021-05-06 PROCEDURE — 85025 COMPLETE CBC W/AUTO DIFF WBC: CPT

## 2021-05-06 RX ORDER — DEXMEDETOMIDINE HYDROCHLORIDE 4 UG/ML
.2-1.4 INJECTION, SOLUTION INTRAVENOUS CONTINUOUS
Status: DISCONTINUED | OUTPATIENT
Start: 2021-05-06 | End: 2021-05-12

## 2021-05-06 RX ADMIN — PROPOFOL 50 MCG/KG/MIN: 10 INJECTION, EMULSION INTRAVENOUS at 00:17

## 2021-05-06 RX ADMIN — Medication 0.2 MCG/KG/HR: at 10:38

## 2021-05-06 RX ADMIN — WHITE PETROLATUM 57.7 %-MINERAL OIL 31.9 % EYE OINTMENT: at 20:38

## 2021-05-06 RX ADMIN — INSULIN LISPRO 4 UNITS: 100 INJECTION, SOLUTION INTRAVENOUS; SUBCUTANEOUS at 12:22

## 2021-05-06 RX ADMIN — IPRATROPIUM BROMIDE AND ALBUTEROL SULFATE 1 AMPULE: 2.5; .5 SOLUTION RESPIRATORY (INHALATION) at 03:19

## 2021-05-06 RX ADMIN — PROPOFOL 50 MCG/KG/MIN: 10 INJECTION, EMULSION INTRAVENOUS at 05:08

## 2021-05-06 RX ADMIN — WHITE PETROLATUM 57.7 %-MINERAL OIL 31.9 % EYE OINTMENT: at 13:21

## 2021-05-06 RX ADMIN — IPRATROPIUM BROMIDE AND ALBUTEROL SULFATE 1 AMPULE: 2.5; .5 SOLUTION RESPIRATORY (INHALATION) at 15:27

## 2021-05-06 RX ADMIN — PROPOFOL 50 MCG/KG/MIN: 10 INJECTION, EMULSION INTRAVENOUS at 13:11

## 2021-05-06 RX ADMIN — FAMOTIDINE 20 MG: 20 TABLET ORAL at 20:38

## 2021-05-06 RX ADMIN — IPRATROPIUM BROMIDE AND ALBUTEROL SULFATE 1 AMPULE: 2.5; .5 SOLUTION RESPIRATORY (INHALATION) at 07:23

## 2021-05-06 RX ADMIN — ACETAMINOPHEN 650 MG: 325 TABLET ORAL at 07:49

## 2021-05-06 RX ADMIN — Medication 75 MCG/HR: at 17:18

## 2021-05-06 RX ADMIN — MONTELUKAST SODIUM 10 MG: 10 TABLET, COATED ORAL at 20:41

## 2021-05-06 RX ADMIN — MIDAZOLAM HYDROCHLORIDE 4 MG: 1 INJECTION, SOLUTION INTRAMUSCULAR; INTRAVENOUS at 08:26

## 2021-05-06 RX ADMIN — Medication 15 ML: at 07:43

## 2021-05-06 RX ADMIN — Medication 75 MCG/HR: at 04:50

## 2021-05-06 RX ADMIN — METHYLPREDNISOLONE SODIUM SUCCINATE 40 MG: 40 INJECTION, POWDER, FOR SOLUTION INTRAMUSCULAR; INTRAVENOUS at 07:42

## 2021-05-06 RX ADMIN — Medication 10 ML: at 07:43

## 2021-05-06 RX ADMIN — CARBOXYMETHYLCELLULOSE SODIUM 1 DROP: 10 GEL OPHTHALMIC at 17:21

## 2021-05-06 RX ADMIN — WHITE PETROLATUM 57.7 %-MINERAL OIL 31.9 % EYE OINTMENT: at 17:21

## 2021-05-06 RX ADMIN — IPRATROPIUM BROMIDE AND ALBUTEROL SULFATE 1 AMPULE: 2.5; .5 SOLUTION RESPIRATORY (INHALATION) at 19:24

## 2021-05-06 RX ADMIN — ALBUTEROL SULFATE 2.5 MG: 2.5 SOLUTION RESPIRATORY (INHALATION) at 03:24

## 2021-05-06 RX ADMIN — Medication 15 ML: at 20:41

## 2021-05-06 RX ADMIN — CEFEPIME 2000 MG: 2 INJECTION, POWDER, FOR SOLUTION INTRAVENOUS at 17:48

## 2021-05-06 RX ADMIN — CARBOXYMETHYLCELLULOSE SODIUM 1 DROP: 10 GEL OPHTHALMIC at 09:51

## 2021-05-06 RX ADMIN — IPRATROPIUM BROMIDE AND ALBUTEROL SULFATE 1 AMPULE: 2.5; .5 SOLUTION RESPIRATORY (INHALATION) at 11:16

## 2021-05-06 RX ADMIN — LEVOTHYROXINE SODIUM 125 MCG: 25 TABLET ORAL at 07:42

## 2021-05-06 RX ADMIN — ATORVASTATIN CALCIUM 40 MG: 40 TABLET, FILM COATED ORAL at 20:38

## 2021-05-06 RX ADMIN — CARBOXYMETHYLCELLULOSE SODIUM 1 DROP: 10 GEL OPHTHALMIC at 20:38

## 2021-05-06 RX ADMIN — CARBOXYMETHYLCELLULOSE SODIUM 1 DROP: 10 GEL OPHTHALMIC at 06:15

## 2021-05-06 RX ADMIN — Medication 0.5 MCG/KG/HR: at 21:47

## 2021-05-06 RX ADMIN — PROPOFOL 50 MCG/KG/MIN: 10 INJECTION, EMULSION INTRAVENOUS at 09:21

## 2021-05-06 RX ADMIN — CEFEPIME 2000 MG: 2 INJECTION, POWDER, FOR SOLUTION INTRAVENOUS at 02:24

## 2021-05-06 RX ADMIN — CARBOXYMETHYLCELLULOSE SODIUM 1 DROP: 10 GEL OPHTHALMIC at 02:24

## 2021-05-06 RX ADMIN — CARBOXYMETHYLCELLULOSE SODIUM 1 DROP: 10 GEL OPHTHALMIC at 13:21

## 2021-05-06 RX ADMIN — ALBUTEROL SULFATE 2.5 MG: 2.5 SOLUTION RESPIRATORY (INHALATION) at 07:29

## 2021-05-06 RX ADMIN — WHITE PETROLATUM 57.7 %-MINERAL OIL 31.9 % EYE OINTMENT: at 06:15

## 2021-05-06 RX ADMIN — WHITE PETROLATUM 57.7 %-MINERAL OIL 31.9 % EYE OINTMENT: at 09:51

## 2021-05-06 RX ADMIN — Medication 10 ML: at 20:38

## 2021-05-06 RX ADMIN — ACETAMINOPHEN 650 MG: 325 TABLET ORAL at 00:37

## 2021-05-06 RX ADMIN — IPRATROPIUM BROMIDE AND ALBUTEROL SULFATE 1 AMPULE: 2.5; .5 SOLUTION RESPIRATORY (INHALATION) at 23:18

## 2021-05-06 RX ADMIN — WHITE PETROLATUM 57.7 %-MINERAL OIL 31.9 % EYE OINTMENT: at 02:24

## 2021-05-06 RX ADMIN — ENOXAPARIN SODIUM 40 MG: 40 INJECTION SUBCUTANEOUS at 07:43

## 2021-05-06 RX ADMIN — CEFEPIME 2000 MG: 2 INJECTION, POWDER, FOR SOLUTION INTRAVENOUS at 11:47

## 2021-05-06 RX ADMIN — LINEZOLID 600 MG: 600 INJECTION, SOLUTION INTRAVENOUS at 09:49

## 2021-05-06 RX ADMIN — PROPOFOL 50 MCG/KG/MIN: 10 INJECTION, EMULSION INTRAVENOUS at 21:47

## 2021-05-06 RX ADMIN — LINEZOLID 600 MG: 600 INJECTION, SOLUTION INTRAVENOUS at 22:03

## 2021-05-06 RX ADMIN — ASPIRIN 81 MG: 81 TABLET, CHEWABLE ORAL at 07:42

## 2021-05-06 RX ADMIN — FAMOTIDINE 20 MG: 20 TABLET ORAL at 07:42

## 2021-05-06 ASSESSMENT — PULMONARY FUNCTION TESTS
PIF_VALUE: 32
PIF_VALUE: 21
PIF_VALUE: 29
PIF_VALUE: 33
PIF_VALUE: 25
PIF_VALUE: 27
PIF_VALUE: 36
PIF_VALUE: 37
PIF_VALUE: 29
PIF_VALUE: 30
PIF_VALUE: 27
PIF_VALUE: 28
PIF_VALUE: 29
PIF_VALUE: 34
PIF_VALUE: 25
PIF_VALUE: 31
PIF_VALUE: 36
PIF_VALUE: 29
PIF_VALUE: 24
PIF_VALUE: 22
PIF_VALUE: 28

## 2021-05-06 ASSESSMENT — PAIN SCALES - GENERAL
PAINLEVEL_OUTOF10: 0
PAINLEVEL_OUTOF10: 0

## 2021-05-06 NOTE — PROGRESS NOTES
Pt continues to rest quietly and no further changes noted in exam. Vitals and SpO2 stable. All lines and monitoring devices remain in place. Pt repositioned in bed. Continue current plan of care.  Lucia Gama RN

## 2021-05-06 NOTE — PROGRESS NOTES
05/05/21 2333   Vent Information   Vent Type 840   Vent Mode AC/VC   Vt Ordered 380 mL   Rate Set 22 bmp   Peak Flow 55 L/min   Pressure Support 0 cmH20   FiO2  40 %   SpO2 97 %   SpO2/FiO2 ratio 242.5   Sensitivity 3   PEEP/CPAP 5   I Time/ I Time % 0 s   Humidification Source Heated wire   Humidification Temp 37   Humidification Temp Measured 36.8   Circuit Condensation Drained   Vent Patient Data   High Peep/I Pressure 0   Peak Inspiratory Pressure 33 cmH2O   Mean Airway Pressure 12 cmH20   Rate Measured 22 br/min   Vt Exhaled 408 mL   Minute Volume 8.97 Liters   I:E Ratio 1:2.60   Cough/Sputum   Sputum How Obtained Endotracheal;Suctioned   Cough Productive   Sputum Amount Small   Sputum Color Creamy   Tenacity Thick   Spontaneous Breathing Trial (SBT) RT Doc   Pulse 94   Breath Sounds   Right Upper Lobe Diminished   Right Middle Lobe Diminished   Right Lower Lobe Diminished   Left Upper Lobe Diminished   Left Lower Lobe Diminished   Additional Respiratory  Assessments   Resp 22   Position Semi-Wodos's   Alarm Settings   High Pressure Alarm 50 cmH2O   Low Minute Volume Alarm 2 L/min   High Respiratory Rate 40 br/min   Patient Observation   Observations ETT SIZE 8.0, SECURED AT 24 LIP LINE. AMBU BAG AT HEAD OF BED. WATER GOOD. Non-Surgical Airway Endo Tracheal Tube   Placement Date/Time: 04/26/21 0536   Timeout: Patient;Procedure; Appropriate Equipment  Mask Ventilation: Ventilated by mask (1)  Placed By: Licensed provider  Inserted by: Dr Tamika Shoemaker  Insertion attempts: 1  Airway Device: Endo Tracheal Tube  Size: 8   Secured at 24 cm   Measured From Lips   Secured Location Right   Secured By Commercial tube [email or web address]   Site Condition Dry

## 2021-05-06 NOTE — PROGRESS NOTES
Patient is not able to demonstrate the ability to move from a reclining position to an upright position within the recliner due to Pt on vent and bedrest.     High risk vesicant drug infusing:  _____n_____    Multiple incompatible medications infusing:  ____y_____    CVP Monitoring:  _____n____    Extremely difficult IV access challenge:  ___y_____    Continued need for central line access:  _____y_____    Addressed with physician:  ____y____    RIGHT PATIENT, RIGHT TIME, RIGHT LINE    Abdullahi Aponte RN

## 2021-05-06 NOTE — PROGRESS NOTES
Shift reassessment completed. Increased thick, tan secretions noted at this time. Oxygen saturation decreased to 89%, suctioning performed. Returned to levels in the 90's without desaturation. Temperature 101.2 degrees Farenheit, even after PRN tylenol given previously. Patient repositioned for comfort. RASS appears to be -1 at this time. No additional significant changes at this time.     Electronically signed by Sebastian Mcelroy RN on 5/6/2021 at 5:23 AM    Vitals:    05/06/21 0400   BP: 130/60   Pulse: 105   Resp: 22   Temp: 101.2 °F (38.4 °C)   SpO2: 97%

## 2021-05-06 NOTE — PROGRESS NOTES
05/06/21 1929   Vent Information   $Ventilation $Subsequent Day   Vent Type 840   Vent Mode AC/VC   Vt Ordered 380 mL   Rate Set 22 bmp   Peak Flow 55 L/min   Pressure Support 0 cmH20   FiO2  40 %   SpO2 96 %   SpO2/FiO2 ratio 240   Sensitivity 3   PEEP/CPAP 5   I Time/ I Time % 0 s   Humidification Source Heated wire   Humidification Temp 37   Humidification Temp Measured 36.6   Circuit Condensation Drained   Vent Patient Data   High Peep/I Pressure 0   Peak Inspiratory Pressure 34 cmH2O   Mean Airway Pressure 11 cmH20   Rate Measured 22 br/min   Vt Exhaled 396 mL   Minute Volume 8.49 Liters   I:E Ratio 1:2.60   Plateau Pressure 20 [id number]   Static Compliance 26 mL/cmH2O   Dynamic Compliance 14 mL/cmH2O   Cough/Sputum   Sputum How Obtained Suctioned;Endotracheal   Sputum Amount Small   Sputum Color Creamy   Tenacity Thick   Spontaneous Breathing Trial (SBT) RT Doc   Pulse 78   Breath Sounds   Right Upper Lobe Diminished   Right Middle Lobe Diminished   Right Lower Lobe Diminished   Left Upper Lobe Diminished   Left Lower Lobe Diminished   Additional Respiratory  Assessments   Resp 22   Position Semi-Woods's   Alarm Settings   High Pressure Alarm 50 cmH2O   Low Minute Volume Alarm 2 L/min   High Respiratory Rate 40 br/min   Patient Observation   Observations Ett 8.0   Non-Surgical Airway Endo Tracheal Tube   Placement Date/Time: 04/26/21 0565   Timeout: Patient;Procedure; Appropriate Equipment  Mask Ventilation: Ventilated by mask (1)  Placed By: Licensed provider  Inserted by: Dr Antwan Taylor  Insertion attempts: 1  Airway Device: Endo Tracheal Tube  Size: 8   Secured at 24 cm   Measured From [facility] By Commercial tube [email or web address]   Site Condition Dry

## 2021-05-06 NOTE — PROGRESS NOTES
IM Progress Note    Admit Date:  4/25/2021  11       Interval history:  Acute resp failure, was on bipap, intubated on 4/26   Had bronchoscopy on 5/1/21. Had thick secretions. 5/3   sedated on the vent  Low grade fevers  On ketamine,propofol and fentanyl    5/4  On the vent. Continues to be on ketamine, propofol and fentanyl. 5/5  Tachycardic this am- hence no SBT  Fevers +    5/6   Fevers upto 101     Objective:   BP (!) 126/59   Pulse 101   Temp 101.2 °F (38.4 °C) (Bladder)   Resp 22   Ht 5' 4\" (1.626 m)   Wt 153 lb (69.4 kg)   LMP  (LMP Unknown)   SpO2 94%   BMI 26.26 kg/m²       Intake/Output Summary (Last 24 hours) at 5/6/2021 1056  Last data filed at 5/6/2021 0955  Gross per 24 hour   Intake 2040 ml   Output 1640 ml   Net 400 ml       Physical Exam:  General: middle aged female on vent,   Oral ETT and OG noted  Right IJ TLC . Appears to be not in any distress  Neck: No JVD, no carotid bruit, no thyromegaly  Chest: diminished  kelvin air entry with persistent wheeze/diminished BS  Cardiovascular:  RRR S1S2 heard, no murmurs or gallops  Abdomen:  Soft, undistended, non tender, no organomegaly, BS present  kelvin UE edema +  Extremities: No edema or cyanosis.  Distal pulses well felt  Neurological : sedated        Medications:   Scheduled Medications:    linezolid  600 mg Intravenous Q12H    methylPREDNISolone  40 mg Intravenous Daily    carboxymethylcellulose PF  1 drop Both Eyes Q4H    artificial tears   Both Eyes Q4H    polyethylene glycol  17 g Oral Daily    cefepime  2,000 mg Intravenous Q8H    insulin lispro  0-12 Units Subcutaneous Q4H    chlorhexidine  15 mL Mouth/Throat BID    aspirin  81 mg Oral Daily    atorvastatin  40 mg Oral Nightly    levothyroxine  125 mcg Oral QAM AC    montelukast  10 mg Oral Nightly    sodium chloride flush  5-40 mL Intravenous 2 times per day    enoxaparin  40 mg Subcutaneous Daily    famotidine  20 mg Oral BID    ipratropium-albuterol  1 ampule Inhalation Q4H     I   dexmedetomidine HCl in NaCl 0.2 mcg/kg/hr (05/06/21 1038)    ketamine (KETALAR) infusion 0.15 mg/kg/hr (05/05/21 1740)    fentaNYL 75 mcg/hr (05/06/21 0828)    dextrose      propofol 50 mcg/kg/min (05/06/21 0921)    sodium chloride 25 mL (05/04/21 1800)     midazolam, glucose, dextrose, glucagon (rDNA), dextrose, fentanNYL, sodium chloride flush, sodium chloride, acetaminophen **OR** acetaminophen, ondansetron **OR** ondansetron, albuterol, ibuprofen    Lab Data:  Recent Labs     05/04/21 0430 05/05/21 0422 05/06/21 0620   WBC 18.8* 27.4* 23.4*   HGB 9.1* 10.3* 9.9*   HCT 28.1* 31.5* 29.9*   MCV 97.4 98.0 96.6    281 265     Recent Labs     05/04/21 0430 05/05/21 0422 05/06/21 0620    138 139   K 4.5 3.4* 3.5   CL 93* 94* 95*   CO2 41* 41* 41*   BUN 42* 31* 27*   CREATININE <0.5* <0.5* <0.5*     No results for input(s): CKTOTAL, CKMB, CKMBINDEX, TROPONINI in the last 72 hours. Coagulation:   Lab Results   Component Value Date    INR 1.03 12/26/2019     Cardiac markers:   Lab Results   Component Value Date    TROPONINI <0.01 04/25/2021         Lab Results   Component Value Date    ALT 29 04/25/2021    AST 39 (H) 04/25/2021    ALKPHOS 77 04/25/2021    BILITOT <0.2 04/25/2021       Lab Results   Component Value Date    INR 1.03 12/26/2019    PROTIME 12.0 12/26/2019         CULTURES  COVID: not detected  Blood: pending  Sputum - rare candida     EKG:  I have reviewed the EKG with the following interpretation:   Sinus tachycardia, Nonspecific ST abnormality     XR CHEST 1 VIEW   Final Result   Mild pulmonary vascular congestion. VL Extremity Venous Bilateral   Final Result      XR CHEST PORTABLE   Final Result   1. Stable pulmonary vascular congestion. XR CHEST PORTABLE   Final Result   Tubes and lines as above. No significant interval change from prior study. No overt edema, fusion, extrapleural air or focal consolidation.          XR CHEST PORTABLE Final Result   Stable chest.         XR CHEST PORTABLE   Final Result   Stable chest.         XR CHEST PORTABLE   Final Result   Hazy bibasilar airspace disease, slightly increased on the right, either due   to atelectasis or pneumonia. XR CHEST PORTABLE   Final Result   Stable chest with moderate hyperinflation. Trace atelectasis left lung base. XR CHEST PORTABLE   Final Result   In this patient with underlying COPD, the lungs are clear. Interstitial   opacities described on recent exam are less evident on current study. XR CHEST PORTABLE   Final Result   Increased lung markings bilaterally, may be related to minimal pulmonary   vascular congestion versus bronchitis. XR CHEST PORTABLE   Final Result   Stable chest.      Lungs are hyperinflated with no acute airspace disease. XR CHEST PORTABLE   Final Result   Minimal right basilar airspace disease likely atelectasis. Lungs are   moderately hyperinflated. XR CHEST PORTABLE   Final Result   Appropriate positioning of right central venous catheter. XR CHEST PORTABLE   Final Result   Satisfactory position endotracheal tube and NG tube. No acute airspace disease. Pulmonary vessels upper normal.         XR CHEST PORTABLE   Final Result   Pulmonary edema with bibasilar atelectasis versus pneumonia. Echo 8/25/2020   Summary   Limited imaging due to lung interface.  Parasternal images are unobtainable.   Normal left ventricular systolic function with an estimated ejection   fraction of 55-60%.   No regional wall motion abnormalities are seen.   Normal left ventricular diastolic filling pressure.   Normal systolic pulmonary artery pressure (SPAP) estimated at 20 mmHg (RA   pressure 3 mmHg).   No significant valvular heart disease.                 ASSESSMENT/PLAN:     #Acute on chronic hypoxic/hypercapnic respiratory failure-due to COPD exacerbation, Pneumonia  -Normally on home oxygen 3 L   -ABG

## 2021-05-06 NOTE — PROGRESS NOTES
05/06/21 0319   Vent Information   Vent Type 840   Vent Mode AC/VC   Vt Ordered 380 mL   Rate Set 22 bmp   Peak Flow 55 L/min   Pressure Support 0 cmH20   FiO2  40 %   SpO2 97 %   SpO2/FiO2 ratio 242.5   Sensitivity 3   PEEP/CPAP 5   I Time/ I Time % 0 s   Humidification Source Heated wire   Humidification Temp 37   Humidification Temp Measured 36.8   Circuit Condensation Drained   Vent Patient Data   High Peep/I Pressure 0   Peak Inspiratory Pressure 31 cmH2O   Mean Airway Pressure 12 cmH20   Rate Measured 22 br/min   Vt Exhaled 407 mL   Minute Volume 8.94 Liters   I:E Ratio 1:2.60   Cough/Sputum   Sputum How Obtained Endotracheal;Suctioned   Cough Productive   Sputum Amount Small   Sputum Color Creamy   Tenacity Thick   Spontaneous Breathing Trial (SBT) RT Doc   Pulse 97   Breath Sounds   Right Upper Lobe Diminished   Right Middle Lobe Diminished   Right Lower Lobe Diminished   Left Upper Lobe Diminished   Left Lower Lobe Diminished   Additional Respiratory  Assessments   Resp 22   Position Semi-Woods's   Alarm Settings   High Pressure Alarm 50 cmH2O   Low Minute Volume Alarm 2 L/min   High Respiratory Rate 40 br/min   Patient Observation   Observations ETT SIZE 8.0, SECURED AT 24 LIP LINE. AMBU BAG AT HEAD OF BED. WATER GOOD. Non-Surgical Airway Endo Tracheal Tube   Placement Date/Time: 04/26/21 0559   Timeout: Patient;Procedure; Appropriate Equipment  Mask Ventilation: Ventilated by mask (1)  Placed By: Licensed provider  Inserted by: Dr Milburn Kussmaul  Insertion attempts: 1  Airway Device: Endo Tracheal Tube  Size: 8   Secured at 24 cm   Measured From Lips   Secured Location Left   Secured By Commercial tube [email or web address]   Site Condition Dry

## 2021-05-06 NOTE — PROGRESS NOTES
Diprivan and fentanyl gtts restarted after failed SBT and respirations are more labored and HR in upper 120's Shawna Barnhart RN

## 2021-05-06 NOTE — PROGRESS NOTES
Shift assessment completed. ETT at 577 [facility]. Vent settings AC 22/380/40%/+5. Diminished breath sounds bilaterally. Oxygen saturation in the upper 90's.     Ketamine infusing at 0.15 mg/kg/hr. Propofol infusing at 50 mcg/kg/min.   Fentanyl infusing at 75 mcg//h.      RASS appears to be -2 at this time.     Abdomen is soft and rounded to palpation. Bowel sounds are active and present x4. TF running at 45 mL/h, 5mL residual.     NSR on monitor, VSS.     Central line dressing is clean, dry, and intact. Oral care and mouth suctioning performed.     Electronically signed by Sofy Castro RN on 5/5/2021 at 8:43 PM    Vitals:    05/05/21 2000   BP: (!) 113/55   Pulse: 91   Resp: 22   Temp: 99.9 °F (37.7 °C)   SpO2: 96%

## 2021-05-06 NOTE — PROGRESS NOTES
Pt placed on SBT 5/5, FiO2 40%         05/06/21 0822   Vent Information   Vt Ordered 380 mL   Rate Set 0 bmp   Peak Flow 55 L/min   Pressure Support 5 cmH20   FiO2  40 %   SpO2 90 %   SpO2/FiO2 ratio 225   Sensitivity 2   PEEP/CPAP 5   I Time/ I Time % 0 s   Vent Patient Data   High Peep/I Pressure 0   Peak Inspiratory Pressure 11 cmH2O   Mean Airway Pressure 6.8 cmH20   Rate Measured 26 br/min   Vt Exhaled 388 mL   Minute Volume 10.3 Liters   I:E Ratio 1:3.00   Spontaneous Breathing Trial (SBT) RT Doc   Pulse 113   Additional Respiratory  Assessments   Resp 24   Alarm Settings   High Pressure Alarm 50 cmH2O   Low Minute Volume Alarm 2 L/min   High Respiratory Rate 40 br/min

## 2021-05-06 NOTE — PROGRESS NOTES
Shift reassessment completed. RASS appears to be 0 at this time. PRN versed given for increased agitation, biting ETT. HR was in the 110's. PRN tylenol given for temperature of 100.5. No additional significant changes since previous assessment.      Electronically signed by Mary Esparza RN on 5/6/2021 at 12:57 AM      Vitals:    05/06/21 0000   BP: (!) 139/56   Pulse: 94   Resp: 20   Temp: 100.5 °F (38.1 °C)   SpO2: 97%

## 2021-05-06 NOTE — PROGRESS NOTES
Pulmonary & Critical Care Medicine ICU Progress Note    CC: Shortness of breath, respiratory failure    Events of Last 24 hours: Tm 101.2  Fentanyl @ 75  Propofol @ 50  Ketamine @ 0.15  Invasive Lines:  RIJ CVC 4/26/21  MV: 4/26/21-  Vent Mode: AC/VC Rate Set: 22 bmp/Vt Ordered: 380 mL/ /FiO2 : 40 %  Recent Labs     05/05/21  0422 05/06/21  0620   PHART 7.405 7.432   [id number] 72.0* 62.7*   PO2ART 73.6* 63.0*     IV:   ketamine (KETALAR) infusion 0.15 mg/kg/hr (05/05/21 1740)    fentaNYL Stopped (05/06/21 0818)    dextrose      propofol Stopped (05/06/21 0818)    sodium chloride 25 mL (05/04/21 1800)       Vitals:  Blood pressure (!) 148/63, pulse 123, temperature 101.2 °F (38.4 °C), temperature source Bladder, resp. rate 27, height 5' 4\" (1.626 m), weight 153 lb (69.4 kg), SpO2 (!) 88 %, not currently breastfeeding. on vent  EXAM:  General: intubated, ill appearing    ENT: Pharynx with ETT. Resp: No crackles. Wheezing with poor air movement  CV: S1, S2. No edema  GI: NT, ND, +BS  Skin: Warm and dry. Neuro: PERRL. Sedated, not following commands.      Scheduled Meds:   linezolid  600 mg Intravenous Q12H    methylPREDNISolone  40 mg Intravenous Daily    carboxymethylcellulose PF  1 drop Both Eyes Q4H    artificial tears   Both Eyes Q4H    polyethylene glycol  17 g Oral Daily    cefepime  2,000 mg Intravenous Q8H    insulin lispro  0-12 Units Subcutaneous Q4H    chlorhexidine  15 mL Mouth/Throat BID    aspirin  81 mg Oral Daily    atorvastatin  40 mg Oral Nightly    levothyroxine  125 mcg Oral QAM AC    montelukast  10 mg Oral Nightly    sodium chloride flush  5-40 mL Intravenous 2 times per day    enoxaparin  40 mg Subcutaneous Daily    famotidine  20 mg Oral BID    ipratropium-albuterol  1 ampule Inhalation Q4H     PRN Meds:  midazolam, glucose, dextrose, glucagon (rDNA), dextrose, fentanNYL, sodium chloride flush, sodium chloride, acetaminophen **OR** acetaminophen, ondansetron **OR** ondansetron, albuterol, ibuprofen    Results:  CBC:   Recent Labs     05/04/21  0430 05/05/21  0422 05/06/21  0620   WBC 18.8* 27.4* 23.4*   HGB 9.1* 10.3* 9.9*   HCT 28.1* 31.5* 29.9*   MCV 97.4 98.0 96.6    281 265     BMP:   Recent Labs     05/04/21  0430 05/05/21  0422 05/06/21  0620    138 139   K 4.5 3.4* 3.5   CL 93* 94* 95*   CO2 41* 41* 41*   BUN 42* 31* 27*   CREATININE <0.5* <0.5* <0.5*     LIVER PROFILE:   No results for input(s): AST, ALT, LIPASE, BILIDIR, BILITOT, ALKPHOS in the last 72 hours. Invalid input(s): AMYLASE,  ALB    Cultures:  4/25/2021 SARS-CoV-2 negative   4/25/2021 blood no growth   4/26/2021 respiratory culture candida  5/1/2021 bronc washings no organisms  5/1/2021 BAL NRF  5/5 Blood cx NGTD  5/5/21 Blood fungal cx NGTD  5/5 trach asp:     Films:  LE doppler neg  CXR 5/6/2021Mild pulmonary vascular congestiond    ASSESSMENT:  · Acute on chronic respiratory failure with hypoxemia and hypercapnia   · COPD with AE; severe airtrapping with auto PEEP  · Tobacco abuse  · New fevers  · Leukocytosis    PLAN:  Mechanical ventilation as per my orders. The ventilator was adjusted by me at the bedside for unstable, life threatening respiratory failure. She continues on pressure control ventilation, she has not tolerated the switch to assist control 2/2 air trapping and peak pressure alarms  IV Propofol and Fentanyl gtt for sedation, target RASS -2, with daily spontaneous awakening trial.   Change Ketamine to Precedex. Bronchospasm greatly improved  Head of bed 30 degrees or higher at all times  Duonebs Q4  IV Solu-Medrol Q12  Cefepime D#6, Zyvox D#2. Post 4 days of vancomycin  Completed 7 days ceftriaxone and 5 days azithromycin and 4 days vancomycin.   Check Cdiff if diarrhea  F/u repeat blood, urine, and resp cx, Fungal cx  ICU care- lovenox, pepcid   NOK is spouse  Total critical care time caring for this patient with life threatening, unstable organ failure, including direct

## 2021-05-07 ENCOUNTER — APPOINTMENT (OUTPATIENT)
Dept: GENERAL RADIOLOGY | Age: 61
DRG: 870 | End: 2021-05-07
Payer: COMMERCIAL

## 2021-05-07 LAB
ANION GAP SERPL CALCULATED.3IONS-SCNC: 5 MMOL/L (ref 3–16)
BANDED NEUTROPHILS RELATIVE PERCENT: 2 % (ref 0–7)
BASE EXCESS ARTERIAL: 11.7 MMOL/L (ref -3–3)
BASOPHILS ABSOLUTE: 0 K/UL (ref 0–0.2)
BASOPHILS RELATIVE PERCENT: 0 %
BUN BLDV-MCNC: 35 MG/DL (ref 7–20)
CALCIUM SERPL-MCNC: 8.7 MG/DL (ref 8.3–10.6)
CARBOXYHEMOGLOBIN ARTERIAL: 0.3 % (ref 0–1.5)
CHLORIDE BLD-SCNC: 93 MMOL/L (ref 99–110)
CO2: 36 MMOL/L (ref 21–32)
CREAT SERPL-MCNC: <0.5 MG/DL (ref 0.6–1.2)
CULTURE, RESPIRATORY: NORMAL
EOSINOPHILS ABSOLUTE: 0 K/UL (ref 0–0.6)
EOSINOPHILS RELATIVE PERCENT: 0 %
GFR AFRICAN AMERICAN: >60
GFR NON-AFRICAN AMERICAN: >60
GLUCOSE BLD-MCNC: 104 MG/DL (ref 70–99)
GLUCOSE BLD-MCNC: 109 MG/DL (ref 70–99)
GLUCOSE BLD-MCNC: 109 MG/DL (ref 70–99)
GLUCOSE BLD-MCNC: 122 MG/DL (ref 70–99)
GLUCOSE BLD-MCNC: 128 MG/DL (ref 70–99)
GLUCOSE BLD-MCNC: 151 MG/DL (ref 70–99)
GLUCOSE BLD-MCNC: 74 MG/DL (ref 70–99)
GRAM STAIN RESULT: NORMAL
HCO3 ARTERIAL: 37.9 MMOL/L (ref 21–29)
HCT VFR BLD CALC: 27.5 % (ref 36–48)
HEMATOLOGY PATH CONSULT: NO
HEMOGLOBIN, ART, EXTENDED: 10.1 G/DL (ref 12–16)
HEMOGLOBIN: 9.2 G/DL (ref 12–16)
LYMPHOCYTES ABSOLUTE: 2.7 K/UL (ref 1–5.1)
LYMPHOCYTES RELATIVE PERCENT: 15 %
MAGNESIUM: 2.1 MG/DL (ref 1.8–2.4)
MCH RBC QN AUTO: 32.1 PG (ref 26–34)
MCHC RBC AUTO-ENTMCNC: 33.4 G/DL (ref 31–36)
MCV RBC AUTO: 96.2 FL (ref 80–100)
METHEMOGLOBIN ARTERIAL: 0.3 %
MONOCYTES ABSOLUTE: 0.7 K/UL (ref 0–1.3)
MONOCYTES RELATIVE PERCENT: 4 %
NEUTROPHILS ABSOLUTE: 14.4 K/UL (ref 1.7–7.7)
NEUTROPHILS RELATIVE PERCENT: 79 %
O2 CONTENT ARTERIAL: 13 ML/DL
O2 SAT, ARTERIAL: 91.9 %
O2 THERAPY: ABNORMAL
PCO2 ARTERIAL: 59.4 MMHG (ref 35–45)
PDW BLD-RTO: 13.5 % (ref 12.4–15.4)
PERFORMED ON: ABNORMAL
PERFORMED ON: NORMAL
PH ARTERIAL: 7.42 (ref 7.35–7.45)
PLATELET # BLD: 212 K/UL (ref 135–450)
PLATELET SLIDE REVIEW: ADEQUATE
PMV BLD AUTO: 8.1 FL (ref 5–10.5)
PO2 ARTERIAL: 62.9 MMHG (ref 75–108)
POTASSIUM REFLEX MAGNESIUM: 3.4 MMOL/L (ref 3.5–5.1)
RBC # BLD: 2.86 M/UL (ref 4–5.2)
SLIDE REVIEW: ABNORMAL
SODIUM BLD-SCNC: 134 MMOL/L (ref 136–145)
TCO2 ARTERIAL: 39.7 MMOL/L
TRIGL SERPL-MCNC: 89 MG/DL (ref 0–150)
WBC # BLD: 17.8 K/UL (ref 4–11)

## 2021-05-07 PROCEDURE — 6360000002 HC RX W HCPCS: Performed by: INTERNAL MEDICINE

## 2021-05-07 PROCEDURE — 94640 AIRWAY INHALATION TREATMENT: CPT

## 2021-05-07 PROCEDURE — 6370000000 HC RX 637 (ALT 250 FOR IP): Performed by: INTERNAL MEDICINE

## 2021-05-07 PROCEDURE — 83735 ASSAY OF MAGNESIUM: CPT

## 2021-05-07 PROCEDURE — 85025 COMPLETE CBC W/AUTO DIFF WBC: CPT

## 2021-05-07 PROCEDURE — 84478 ASSAY OF TRIGLYCERIDES: CPT

## 2021-05-07 PROCEDURE — 2580000003 HC RX 258: Performed by: INTERNAL MEDICINE

## 2021-05-07 PROCEDURE — 2500000003 HC RX 250 WO HCPCS: Performed by: INTERNAL MEDICINE

## 2021-05-07 PROCEDURE — 94003 VENT MGMT INPAT SUBQ DAY: CPT

## 2021-05-07 PROCEDURE — 94761 N-INVAS EAR/PLS OXIMETRY MLT: CPT

## 2021-05-07 PROCEDURE — 36600 WITHDRAWAL OF ARTERIAL BLOOD: CPT

## 2021-05-07 PROCEDURE — 80048 BASIC METABOLIC PNL TOTAL CA: CPT

## 2021-05-07 PROCEDURE — 99291 CRITICAL CARE FIRST HOUR: CPT | Performed by: INTERNAL MEDICINE

## 2021-05-07 PROCEDURE — 36592 COLLECT BLOOD FROM PICC: CPT

## 2021-05-07 PROCEDURE — 71045 X-RAY EXAM CHEST 1 VIEW: CPT

## 2021-05-07 PROCEDURE — 2700000000 HC OXYGEN THERAPY PER DAY

## 2021-05-07 PROCEDURE — 99233 SBSQ HOSP IP/OBS HIGH 50: CPT | Performed by: INTERNAL MEDICINE

## 2021-05-07 PROCEDURE — 82803 BLOOD GASES ANY COMBINATION: CPT

## 2021-05-07 PROCEDURE — 2000000000 HC ICU R&B

## 2021-05-07 RX ORDER — POTASSIUM CHLORIDE 29.8 MG/ML
20 INJECTION INTRAVENOUS
Status: COMPLETED | OUTPATIENT
Start: 2021-05-07 | End: 2021-05-07

## 2021-05-07 RX ADMIN — ATORVASTATIN CALCIUM 40 MG: 40 TABLET, FILM COATED ORAL at 19:52

## 2021-05-07 RX ADMIN — WHITE PETROLATUM 57.7 %-MINERAL OIL 31.9 % EYE OINTMENT: at 21:54

## 2021-05-07 RX ADMIN — MIDAZOLAM HYDROCHLORIDE 4 MG: 1 INJECTION, SOLUTION INTRAMUSCULAR; INTRAVENOUS at 17:49

## 2021-05-07 RX ADMIN — LINEZOLID 600 MG: 600 INJECTION, SOLUTION INTRAVENOUS at 09:44

## 2021-05-07 RX ADMIN — MIDAZOLAM HYDROCHLORIDE 4 MG: 1 INJECTION, SOLUTION INTRAMUSCULAR; INTRAVENOUS at 11:57

## 2021-05-07 RX ADMIN — PROPOFOL 50 MCG/KG/MIN: 10 INJECTION, EMULSION INTRAVENOUS at 16:35

## 2021-05-07 RX ADMIN — WHITE PETROLATUM 57.7 %-MINERAL OIL 31.9 % EYE OINTMENT: at 17:28

## 2021-05-07 RX ADMIN — CARBOXYMETHYLCELLULOSE SODIUM 1 DROP: 10 GEL OPHTHALMIC at 17:28

## 2021-05-07 RX ADMIN — CARBOXYMETHYLCELLULOSE SODIUM 1 DROP: 10 GEL OPHTHALMIC at 06:34

## 2021-05-07 RX ADMIN — FAMOTIDINE 20 MG: 20 TABLET ORAL at 19:52

## 2021-05-07 RX ADMIN — CEFEPIME 2000 MG: 2 INJECTION, POWDER, FOR SOLUTION INTRAVENOUS at 17:37

## 2021-05-07 RX ADMIN — POTASSIUM CHLORIDE 20 MEQ: 29.8 INJECTION, SOLUTION INTRAVENOUS at 10:46

## 2021-05-07 RX ADMIN — CEFEPIME 2000 MG: 2 INJECTION, POWDER, FOR SOLUTION INTRAVENOUS at 10:44

## 2021-05-07 RX ADMIN — WHITE PETROLATUM 57.7 %-MINERAL OIL 31.9 % EYE OINTMENT: at 13:19

## 2021-05-07 RX ADMIN — Medication 0.6 MCG/KG/HR: at 21:54

## 2021-05-07 RX ADMIN — PROPOFOL 50 MCG/KG/MIN: 10 INJECTION, EMULSION INTRAVENOUS at 20:37

## 2021-05-07 RX ADMIN — POLYETHYLENE GLYCOL (3350) 17 G: 17 POWDER, FOR SOLUTION ORAL at 08:23

## 2021-05-07 RX ADMIN — CARBOXYMETHYLCELLULOSE SODIUM 1 DROP: 10 GEL OPHTHALMIC at 02:18

## 2021-05-07 RX ADMIN — CARBOXYMETHYLCELLULOSE SODIUM 1 DROP: 10 GEL OPHTHALMIC at 08:23

## 2021-05-07 RX ADMIN — IPRATROPIUM BROMIDE AND ALBUTEROL SULFATE 1 AMPULE: 2.5; .5 SOLUTION RESPIRATORY (INHALATION) at 03:20

## 2021-05-07 RX ADMIN — POTASSIUM CHLORIDE 20 MEQ: 29.8 INJECTION, SOLUTION INTRAVENOUS at 11:58

## 2021-05-07 RX ADMIN — CARBOXYMETHYLCELLULOSE SODIUM 1 DROP: 10 GEL OPHTHALMIC at 13:19

## 2021-05-07 RX ADMIN — WHITE PETROLATUM 57.7 %-MINERAL OIL 31.9 % EYE OINTMENT: at 06:34

## 2021-05-07 RX ADMIN — IPRATROPIUM BROMIDE AND ALBUTEROL SULFATE 1 AMPULE: 2.5; .5 SOLUTION RESPIRATORY (INHALATION) at 23:15

## 2021-05-07 RX ADMIN — CARBOXYMETHYLCELLULOSE SODIUM 1 DROP: 10 GEL OPHTHALMIC at 21:53

## 2021-05-07 RX ADMIN — IPRATROPIUM BROMIDE AND ALBUTEROL SULFATE 1 AMPULE: 2.5; .5 SOLUTION RESPIRATORY (INHALATION) at 07:05

## 2021-05-07 RX ADMIN — Medication 10 ML: at 19:53

## 2021-05-07 RX ADMIN — WHITE PETROLATUM 57.7 %-MINERAL OIL 31.9 % EYE OINTMENT: at 02:18

## 2021-05-07 RX ADMIN — PROPOFOL 50 MCG/KG/MIN: 10 INJECTION, EMULSION INTRAVENOUS at 12:13

## 2021-05-07 RX ADMIN — PROPOFOL 50 MCG/KG/MIN: 10 INJECTION, EMULSION INTRAVENOUS at 02:45

## 2021-05-07 RX ADMIN — IPRATROPIUM BROMIDE AND ALBUTEROL SULFATE 1 AMPULE: 2.5; .5 SOLUTION RESPIRATORY (INHALATION) at 19:31

## 2021-05-07 RX ADMIN — Medication 75 MCG/HR: at 21:54

## 2021-05-07 RX ADMIN — ASPIRIN 81 MG: 81 TABLET, CHEWABLE ORAL at 08:23

## 2021-05-07 RX ADMIN — WHITE PETROLATUM 57.7 %-MINERAL OIL 31.9 % EYE OINTMENT: at 08:23

## 2021-05-07 RX ADMIN — IPRATROPIUM BROMIDE AND ALBUTEROL SULFATE 1 AMPULE: 2.5; .5 SOLUTION RESPIRATORY (INHALATION) at 15:09

## 2021-05-07 RX ADMIN — ENOXAPARIN SODIUM 40 MG: 40 INJECTION SUBCUTANEOUS at 08:24

## 2021-05-07 RX ADMIN — FAMOTIDINE 20 MG: 20 TABLET ORAL at 08:23

## 2021-05-07 RX ADMIN — Medication 75 MCG/HR: at 07:32

## 2021-05-07 RX ADMIN — Medication 15 ML: at 08:23

## 2021-05-07 RX ADMIN — METHYLPREDNISOLONE SODIUM SUCCINATE 40 MG: 40 INJECTION, POWDER, FOR SOLUTION INTRAMUSCULAR; INTRAVENOUS at 08:24

## 2021-05-07 RX ADMIN — MIDAZOLAM HYDROCHLORIDE 4 MG: 1 INJECTION, SOLUTION INTRAMUSCULAR; INTRAVENOUS at 09:50

## 2021-05-07 RX ADMIN — CEFEPIME 2000 MG: 2 INJECTION, POWDER, FOR SOLUTION INTRAVENOUS at 02:18

## 2021-05-07 RX ADMIN — MONTELUKAST SODIUM 10 MG: 10 TABLET, COATED ORAL at 19:52

## 2021-05-07 RX ADMIN — Medication 0.5 MCG/KG/HR: at 07:32

## 2021-05-07 RX ADMIN — Medication 10 ML: at 08:24

## 2021-05-07 RX ADMIN — LEVOTHYROXINE SODIUM 125 MCG: 25 TABLET ORAL at 08:28

## 2021-05-07 RX ADMIN — Medication 15 ML: at 19:52

## 2021-05-07 RX ADMIN — PROPOFOL 50 MCG/KG/MIN: 10 INJECTION, EMULSION INTRAVENOUS at 06:44

## 2021-05-07 RX ADMIN — IPRATROPIUM BROMIDE AND ALBUTEROL SULFATE 1 AMPULE: 2.5; .5 SOLUTION RESPIRATORY (INHALATION) at 11:33

## 2021-05-07 ASSESSMENT — PULMONARY FUNCTION TESTS
PIF_VALUE: 24
PIF_VALUE: 33
PIF_VALUE: 12
PIF_VALUE: 27
PIF_VALUE: 23
PIF_VALUE: 34
PIF_VALUE: 22
PIF_VALUE: 30
PIF_VALUE: 37
PIF_VALUE: 29
PIF_VALUE: 26
PIF_VALUE: 31

## 2021-05-07 NOTE — PROGRESS NOTES
05/06/21 0578   Vent Information   Vent Type 840   Vent Mode AC/VC   Vt Ordered 380 mL   Rate Set 22 bmp   Peak Flow 55 L/min   Pressure Support 0 cmH20   FiO2  40 %   SpO2 97 %   SpO2/FiO2 ratio 242.5   Sensitivity 3   PEEP/CPAP 5   I Time/ I Time % 0 s   Vent Patient Data   High Peep/I Pressure 0   Peak Inspiratory Pressure 31 cmH2O   Mean Airway Pressure 13 cmH20   Rate Measured 24 br/min   Vt Exhaled 489 mL   Minute Volume 9.52 Liters   I:E Ratio 1:2.60   Cough/Sputum   Sputum How Obtained Suctioned;Endotracheal   Sputum Amount Small   Sputum Color Creamy   Tenacity Thick   Spontaneous Breathing Trial (SBT) RT Doc   Pulse 72   Additional Respiratory  Assessments   Resp 23   Alarm Settings   High Pressure Alarm 50 cmH2O   Low Minute Volume Alarm 2 L/min   High Respiratory Rate 40 br/min   Non-Surgical Airway Endo Tracheal Tube   Placement Date/Time: 04/26/21 0528   Timeout: Patient;Procedure; Appropriate Equipment  Mask Ventilation: Ventilated by mask (1)  Placed By: Licensed provider  Inserted by: Dr Farhana Ward  Insertion attempts: 1  Airway Device: Endo Tracheal Tube  Size: 8   Secured Location Left

## 2021-05-07 NOTE — PROGRESS NOTES
Pt placed on SBT 5/5, FiO2 40%         05/07/21 0848   Vent Information   Vt Ordered 380 mL   Rate Set 0 bmp   Peak Flow 65 L/min   Pressure Support 5 cmH20   FiO2  40 %   SpO2 96 %   SpO2/FiO2 ratio 240   Sensitivity 2   PEEP/CPAP 5   I Time/ I Time % 0 s   Vent Patient Data   High Peep/I Pressure 0   Peak Inspiratory Pressure 12 cmH2O   Mean Airway Pressure 6.7 cmH20   Rate Measured 20 br/min   Vt Exhaled 451 mL   Minute Volume 10 Liters   I:E Ratio 1:3.00   Spontaneous Breathing Trial (SBT) RT Doc   Pulse 86   Additional Respiratory  Assessments   Resp 22   Alarm Settings   High Pressure Alarm 50 cmH2O   Low Minute Volume Alarm 2 L/min   High Respiratory Rate 40 br/min

## 2021-05-07 NOTE — PROGRESS NOTES
Pulmonary & Critical Care Medicine ICU Progress Note    CC: Shortness of breath, respiratory failure    Events of Last 24 hours: Tm 100.9. Vomited this morning. Fentanyl @ 75  Propofol @ 50  Precedex @ 0.5  Invasive Lines:  RIJ CVC 4/26/21  MV: 4/26/21-  Vent Mode: AC/VC Rate Set: 22 bmp/Vt Ordered: 380 mL/ /FiO2 : 40 %  Recent Labs     05/06/21  0620 05/07/21  0550   PHART 7.432 7.423   ABL0ICD 62.7* 59.4*   PO2ART 63.0* 62.9*     IV:   dexmedetomidine HCl in NaCl 0.5 mcg/kg/hr (05/07/21 0732)    ketamine (KETALAR) infusion Stopped (05/06/21 1900)    fentaNYL 75 mcg/hr (05/07/21 0732)    dextrose      propofol 50 mcg/kg/min (05/07/21 0644)    sodium chloride 25 mL (05/04/21 1800)       Vitals:  Blood pressure (!) 105/57, pulse 73, temperature 98.1 °F (36.7 °C), temperature source Bladder, resp. rate 21, height 5' 4\" (1.626 m), weight 153 lb (69.4 kg), SpO2 95 %, not currently breastfeeding. on vent  EXAM:  General: intubated, ill appearing    ENT: Pharynx with ETT. Resp: No crackles. Wheezing with poor air movement  CV: S1, S2. No edema  GI: NT, ND, +BS  Skin: Warm and dry. Neuro: PERRL. Sedated, not following commands.      Scheduled Meds:   linezolid  600 mg Intravenous Q12H    methylPREDNISolone  40 mg Intravenous Daily    carboxymethylcellulose PF  1 drop Both Eyes Q4H    artificial tears   Both Eyes Q4H    polyethylene glycol  17 g Oral Daily    cefepime  2,000 mg Intravenous Q8H    insulin lispro  0-12 Units Subcutaneous Q4H    chlorhexidine  15 mL Mouth/Throat BID    aspirin  81 mg Oral Daily    atorvastatin  40 mg Oral Nightly    levothyroxine  125 mcg Oral QAM AC    montelukast  10 mg Oral Nightly    sodium chloride flush  5-40 mL Intravenous 2 times per day    enoxaparin  40 mg Subcutaneous Daily    famotidine  20 mg Oral BID    ipratropium-albuterol  1 ampule Inhalation Q4H     PRN Meds:  midazolam, glucose, dextrose, glucagon (rDNA), dextrose, fentanNYL, sodium chloride flush, sodium chloride, acetaminophen **OR** acetaminophen, ondansetron **OR** ondansetron, albuterol, ibuprofen    Results:  CBC:   Recent Labs     05/05/21 0422 05/06/21  0620 05/07/21  0530   WBC 27.4* 23.4* 17.8*   HGB 10.3* 9.9* 9.2*   HCT 31.5* 29.9* 27.5*   MCV 98.0 96.6 96.2    265 212     BMP:   Recent Labs     05/05/21 0422 05/06/21  0620 05/07/21  0530    139 134*   K 3.4* 3.5 3.4*   CL 94* 95* 93*   CO2 41* 41* 36*   BUN 31* 27* 35*   CREATININE <0.5* <0.5* <0.5*     LIVER PROFILE:   No results for input(s): AST, ALT, LIPASE, BILIDIR, BILITOT, ALKPHOS in the last 72 hours. Invalid input(s): AMYLASE,  ALB    Cultures:  4/25/2021 SARS-CoV-2 negative   4/25/2021 blood no growth   4/26/2021 respiratory culture candida  5/1/2021 bronc washings no organisms  5/1/2021 BAL NRF  5/5 Blood cx NGTD  5/5/21 Blood fungal cx NGTD  5/5 trach asp: NRF    Films:  LE doppler neg  CXR 5/6/2021Mild pulmonary vascular congestiond    ASSESSMENT:  · Acute on chronic respiratory failure with hypoxemia and hypercapnia   · COPD with AE; severe airtrapping with auto PEEP  · Tobacco abuse  · New fevers  · Leukocytosis    PLAN:  Mechanical ventilation as per my orders. The ventilator was adjusted by me at the bedside for unstable, life threatening respiratory failure. She continues on pressure control ventilation, she has not tolerated the switch to assist control 2/2 air trapping and peak pressure alarms  IV Propofol and Fentanyl gtt for sedation, target RASS -2, with daily spontaneous awakening trial.   Change Ketamine to Precedex. Bronchospasm greatly improved  Head of bed 30 degrees or higher at all times  Duonebs Q4  IV Solu-Medrol daily  Completed Cefepime D#7/7, Zyvox D#3. Post 4 days of vancomycin  Completed 7 days ceftriaxone and 5 days azithromycin and 4 days vancomycin.   Replace K  ICU care- ap montez   NOK is spouse  Total critical care time caring for this patient with life threatening, unstable organ failure, including direct patient contact, management of life support systems, review of data including imaging and labs, discussions with other team members and physicians is 33 minutes so far today, excluding procedures.

## 2021-05-07 NOTE — PROGRESS NOTES
05/07/21 0323   Vent Information   Vent Type 840   Vent Mode AC/VC   Vt Ordered 380 mL   Rate Set 22 bmp   Peak Flow 55 L/min   Pressure Support 0 cmH20   FiO2  40 %   SpO2 97 %   SpO2/FiO2 ratio 242.5   Sensitivity 3   PEEP/CPAP 5   I Time/ I Time % 0 s   Vent Patient Data   High Peep/I Pressure 0   Peak Inspiratory Pressure 29 cmH2O   Mean Airway Pressure 12 cmH20   Rate Measured 22 br/min   Vt Exhaled 415 mL   Minute Volume 9.02 Liters   I:E Ratio 1:2.60   Cough/Sputum   Sputum How Obtained Suctioned;Endotracheal   Sputum Amount Small   Sputum Color Creamy   Tenacity Thick   Spontaneous Breathing Trial (SBT) RT Doc   Pulse 71   Additional Respiratory  Assessments   Resp 23   Position Right Side   Alarm Settings   High Pressure Alarm 50 cmH2O   Low Minute Volume Alarm 2 L/min   High Respiratory Rate 40 br/min   Non-Surgical Airway Endo Tracheal Tube   Placement Date/Time: 04/26/21 0528   Timeout: Patient;Procedure; Appropriate Equipment  Mask Ventilation: Ventilated by mask (1)  Placed By: Licensed provider  Inserted by: Dr Krystal Billy  Insertion attempts: 1  Airway Device: Endo Tracheal Tube  Size: 8   Secured Location Right  (from left)

## 2021-05-07 NOTE — PROGRESS NOTES
Pt respirations appear more labored and HR at times as high as 120 RR 32 and SpO down to 90%. Pt face is flushed and she is occ burping air and has vomited a small amt of tube feed. RT to change back to A/C. Large amt of air evacuated from OG tube and pt restarted on diprivan and fentanyl gtts.   at bedside Jenifer

## 2021-05-07 NOTE — PROGRESS NOTES
RESPIRATORY THERAPY ASSESSMENT    Name:  Anabelle Stark Record Number:  [id number]  Age: 56 y.o. Gender: female  : 1960  Today's Date:  2021  Room:  [address] [place] [address]    Assessment     Is the patient being admitted for a COPD or Asthma exacerbation? Yes   (If yes the patient will be seen every 4 hours for the first 24 hours and then reassessed)    Patient Admission Diagnosis      Allergies  Allergies   Allergen Reactions    Promethazine Other (See Comments)     Extreme confusion (1/3/20)    Codeine Swelling       Minimum Predicted Vital Capacity:               Actual Vital Capacity:                    Pulmonary History:COPD and Asthma  Home Oxygen Therapy:  3.5 liters/min via nasal cannula  Home Respiratory Therapy:Albuterol and Umeclidinium Bromide/Vilanterol   Current Respiratory Therapy:  Duoneb Q4, Albuterol Q2 PRN  Treatment Type: Aerosol generator  Medications: Albuterol/Ipratropium    Respiratory Severity Index(RSI)   Patients with orders for inhalation medications, oxygen, or any therapeutic treatment modality will be placed on Respiratory Protocol. They will be assessed with the first treatment and at least every 72 hours thereafter. The following severity scale will be used to determine frequency of treatment intervention.     Smoking History: Severe Exacerbation = 4    Social History  Social History     Tobacco Use    Smoking status: Former Smoker     Packs/day: 0.50     Years: 44.00     Pack years: 22.00     Types: Cigarettes     Quit date: 2020     Years since quittin.7    Smokeless tobacco: Never Used   Substance Use Topics    Alcohol use: Yes     Comment: rarely    Drug use: No       Recent Surgical History: None = 0  Past Surgical History  Past Surgical History:   Procedure Laterality Date     SECTION      x2    CHOLECYSTECTOMY         Level of Consciousness: Comatose = 4    Level of Activity: Bedridden, unresponsive or quadriplegic = 4    Respiratory Pattern: Increased; RR 21-30 = 1    Breath Sounds: Diminshed bilaterally and/or crackles = 2    Sputum  Sputum Color: Creamy, Tenacity: Thick, Sputum How Obtained: Suctioned, Endotracheal  Cough: Strong, productive = 1    Vital Signs   BP (!) 94/58   Pulse 71   Temp 98.5 °F (36.9 °C) (Bladder)   Resp 23   Ht 5' 4\" (1.626 m)   Wt 153 lb (69.4 kg)   LMP  (LMP Unknown)   SpO2 97%   BMI 26.26 kg/m²   SPO2 (COPD values may differ): 86-87% on room air or greater than 92% on FiO2 35- 50% = 3    Peak Flow (asthma only): not applicable = 0    RSI: Greater than 17 = Contact physician        Plan       Goals: medication delivery, mobilize retained secretions, volume expansion and improve oxygenation    Patient/caregiver was educated on the proper method of use for Respiratory Care Devices:  Yes      Level of patient/caregiver understanding able to:   ? Verbalize understanding   ? Demonstrate understanding       ? Teach back        ? Needs reinforcement       ? No available caregiver               ? Other:     Response to education:  On Vent     Is patient being placed on Home Treatment Regimen? No     Does the patient have everything they need prior to discharge? NA     Comments: chart reviewed 7 pt assessed    Plan of Care: maintain current regimen    Electronically signed by Aster Zavala RCP on 5/7/2021 at 4:06 AM    Respiratory Protocol Guidelines     1. Assessment and treatment by Respiratory Therapy will be initiated for medication and therapeutic interventions upon initiation of aerosolized medication. 2. Physician will be contacted for respiratory rate (RR) greater than 35 breaths per minute. Therapy will be held for heart rate (HR) greater than 140 beats per minute, pending direction from physician. 3. Bronchodilators will be administered via Metered Dose Inhaler (MDI) with spacer when the following criteria are met:  a.  Alert and cooperative     b. HR < 140 bpm  c. RR < 30 bpm                d. Can demonstrate a 2-3 second inspiratory hold  4. Bronchodilators will be administered via Hand Held Nebulizer [facility]) to patients when ANY of the following criteria are met  a. Incognizant or uncooperative          b. Patients treated with HHN at Home        c. Unable to demonstrate proper use of MDI with spacer     d. RR > 30 bpm   5. Bronchodilators will be delivered via Metered Dose Inhaler (MDI), HHN, Aerogen to intubated patients on mechanical ventilation. 6. Inhalation medication orders will be delivered and/or substituted as outlined below. Aerosolized Medications Ordering and Administration Guidelines:    1. All Medications will be ordered by a physician, and their frequency and/or modality will be adjusted as defined by the patients Respiratory Severity Index (RSI) score. 2. If the patient does not have documented COPD, consider discontinuing anticholinergics when RSI is less than 9.  3. If the bronchospasm worsens (increased RSI), then the bronchodilator frequency can be increased to a maximum of every 4 hours. If greater than every 4 hours is required, the physician will be contacted. 4. If the bronchospasm improves, the frequency of the bronchodilator can be decreased, based on the patient's RSI, but not less than home treatment regimen frequency. 5. Bronchodilator(s) will be discontinued if patient has a RSI less than 9 and has received no scheduled or as needed treatment for 72  Hrs. Patients Ordered on a Mucolytic Agent:    1. Must always be administered with a bronchodilator. 2. Discontinue if patient experiences worsened bronchospasm, or secretions have lessened to the point that the patient is able to clear them with a cough. Anti-inflammatory and Combination Medications:    1.  If the patient lacks prior history of lung disease, is not using inhaled anti-inflammatory medication at home, and lacks wheezing by examination or by history for at least 24 hours, contact physician for possible discontinuation.

## 2021-05-07 NOTE — PROGRESS NOTES
Shift reassessment completed. RASS appears to be -2 at this time. Bed settings on CLRT q10 minutes. Patient repositioned for comfort. NSR on monitor, oxygen saturation in the 90's. VSS. No significant changes at this time.     Electronically signed by Nicole Ivan RN on 5/7/2021 at 12:29 AM    Vitals:    05/07/21 0000   BP: 105/68   Pulse: 88   Resp: 20   Temp: 98.5 °F (36.9 °C)   SpO2: 94%

## 2021-05-07 NOTE — PROGRESS NOTES
IM Progress Note    Admit Date:  4/25/2021  12       Interval history:  Acute resp failure, was on bipap, intubated on 4/26   Had bronchoscopy on 5/1/21. Had thick secretions. 5/3   sedated on the vent  Low grade fevers  On ketamine,propofol and fentanyl    5/4  On the vent. Continues to be on ketamine, propofol and fentanyl. 5/5  Tachycardic this am- hence no SBT  Fevers +    5/6   Fevers upto 101     5/7  Continues to be on the vent. 40% FiO2 and five of PEEP. No fevers. Objective:   BP (!) 98/54   Pulse 88   Temp 99 °F (37.2 °C) (Bladder)   Resp 23   Ht 5' 4\" (1.626 m)   Wt 153 lb (69.4 kg)   LMP  (LMP Unknown)   SpO2 98%   BMI 26.26 kg/m²       Intake/Output Summary (Last 24 hours) at 5/7/2021 1112  Last data filed at 5/7/2021 1046  Gross per 24 hour   Intake 2236 ml   Output 1260 ml   Net 976 ml       Physical Exam:  General: middle aged female on vent,   Oral ETT and OG noted  Right IJ TLC . Appears to be not in any distress  Neck: No JVD, no carotid bruit, no thyromegaly  Chest: diminished  kelvin air entry with persistent wheeze/diminished BS  Cardiovascular:  RRR S1S2 heard, no murmurs or gallops  Abdomen:  Soft, undistended, non tender, no organomegaly, BS present  kelvin UE edema +  Extremities: No edema or cyanosis.  Distal pulses well felt  Neurological : sedated        Medications:   Scheduled Medications:    potassium chloride  20 mEq Intravenous Q1H    methylPREDNISolone  40 mg Intravenous Daily    carboxymethylcellulose PF  1 drop Both Eyes Q4H    artificial tears   Both Eyes Q4H    polyethylene glycol  17 g Oral Daily    cefepime  2,000 mg Intravenous Q8H    insulin lispro  0-12 Units Subcutaneous Q4H    chlorhexidine  15 mL Mouth/Throat BID    aspirin  81 mg Oral Daily    atorvastatin  40 mg Oral Nightly    levothyroxine  125 mcg Oral QAM AC    montelukast  10 mg Oral Nightly    sodium chloride flush  5-40 mL Intravenous 2 times per day    enoxaparin  40 mg Subcutaneous Daily    famotidine  20 mg Oral BID    ipratropium-albuterol  1 ampule Inhalation Q4H     I   dexmedetomidine HCl in NaCl 0.5 mcg/kg/hr (05/07/21 0732)    ketamine (KETALAR) infusion Stopped (05/06/21 1900)    fentaNYL 75 mcg/hr (05/07/21 0941)    dextrose      propofol 50 mcg/kg/min (05/07/21 0940)    sodium chloride 25 mL (05/04/21 1800)     midazolam, glucose, dextrose, glucagon (rDNA), dextrose, fentanNYL, sodium chloride flush, sodium chloride, acetaminophen **OR** acetaminophen, ondansetron **OR** ondansetron, albuterol, ibuprofen    Lab Data:  Recent Labs     05/05/21 0422 05/06/21 0620 05/07/21 0530   WBC 27.4* 23.4* 17.8*   HGB 10.3* 9.9* 9.2*   HCT 31.5* 29.9* 27.5*   MCV 98.0 96.6 96.2    265 212     Recent Labs     05/05/21 0422 05/06/21 0620 05/07/21  0530    139 134*   K 3.4* 3.5 3.4*   CL 94* 95* 93*   CO2 41* 41* 36*   BUN 31* 27* 35*   CREATININE <0.5* <0.5* <0.5*     No results for input(s): CKTOTAL, CKMB, CKMBINDEX, TROPONINI in the last 72 hours. Coagulation:   Lab Results   Component Value Date    INR 1.03 12/26/2019     Cardiac markers:   Lab Results   Component Value Date    TROPONINI <0.01 04/25/2021         Lab Results   Component Value Date    ALT 29 04/25/2021    AST 39 (H) 04/25/2021    ALKPHOS 77 04/25/2021    BILITOT <0.2 04/25/2021       Lab Results   Component Value Date    INR 1.03 12/26/2019    PROTIME 12.0 12/26/2019         CULTURES  COVID: not detected  Blood: pending  Sputum - rare candida     EKG:  I have reviewed the EKG with the following interpretation:   Sinus tachycardia, Nonspecific ST abnormality     XR CHEST PORTABLE   Final Result   Stable support apparatus. Mild pulmonary vascular congestion, stable in appearance. XR CHEST 1 VIEW   Final Result   Mild pulmonary vascular congestion. VL Extremity Venous Bilateral   Final Result      XR CHEST PORTABLE   Final Result   1. Stable pulmonary vascular congestion. XR CHEST PORTABLE   Final Result   Tubes and lines as above. No significant interval change from prior study. No overt edema, fusion, extrapleural air or focal consolidation. XR CHEST PORTABLE   Final Result   Stable chest.         XR CHEST PORTABLE   Final Result   Stable chest.         XR CHEST PORTABLE   Final Result   Hazy bibasilar airspace disease, slightly increased on the right, either due   to atelectasis or pneumonia. XR CHEST PORTABLE   Final Result   Stable chest with moderate hyperinflation. Trace atelectasis left lung base. XR CHEST PORTABLE   Final Result   In this patient with underlying COPD, the lungs are clear. Interstitial   opacities described on recent exam are less evident on current study. XR CHEST PORTABLE   Final Result   Increased lung markings bilaterally, may be related to minimal pulmonary   vascular congestion versus bronchitis. XR CHEST PORTABLE   Final Result   Stable chest.      Lungs are hyperinflated with no acute airspace disease. XR CHEST PORTABLE   Final Result   Minimal right basilar airspace disease likely atelectasis. Lungs are   moderately hyperinflated. XR CHEST PORTABLE   Final Result   Appropriate positioning of right central venous catheter. XR CHEST PORTABLE   Final Result   Satisfactory position endotracheal tube and NG tube. No acute airspace disease. Pulmonary vessels upper normal.         XR CHEST PORTABLE   Final Result   Pulmonary edema with bibasilar atelectasis versus pneumonia. Echo 8/25/2020   Summary   Limited imaging due to lung interface.  Parasternal images are unobtainable.   Normal left ventricular systolic function with an estimated ejection   fraction of 55-60%.   No regional wall motion abnormalities are seen.   Normal left ventricular diastolic filling pressure.   Normal systolic pulmonary artery pressure (SPAP) estimated at 20 mmHg (RA   pressure 3

## 2021-05-07 NOTE — PROGRESS NOTES
Initial exam completed- See doc flowsheet for assessment findings. Pt resting quietly in bed opens eyes slightly to exam but she does not follow commands. All lines and monitoring devices are in place . Vitals and SpO2 stable. Call light is within easy reach. Plan of care and goals reviewed. Will be stopping sedation soon for SBT - see MAURA Norton RN

## 2021-05-07 NOTE — PROGRESS NOTES
Shift assessment completed. ETT at [facility]. Vent settings AC 22/380/40%/+5. Diminished breath sounds bilaterally with expiratory wheezes. Oxygen saturation in the upper 90's.     Propofol infusing at 50 mcg/kg/min. Precedex infusing at 0.5 mcg/kg/h.    Fentanyl infusing at 75 mcg//h.      RASS appears to be -2 at this time.     Abdomen is soft and rounded to palpation. Bowel sounds are active and present x4. TF running at 45 mL/h, 80 mL residual.     NSR on monitor, VSS.     Central line dressing is clean, dry, and intact.     Oral care and mouth suctioning performed.     Electronically signed by Bjorn Simms RN on 5/6/2021 at 8:58 PM     Vitals:    05/06/21 2000   BP: (!) 102/59   Pulse: 84   Resp: 22   Temp: 99.2 °F (37.3 °C)   SpO2: 95%

## 2021-05-07 NOTE — CARE COORDINATION
INTERDISCIPLINARY PLAN OF CARE CONFERENCE    Date/Time: 5/7/2021 11:45 AM  Completed by: Amauri Tenorio Case Management      Patient Name:  Dora Davis  YOB: 1960  Admitting Diagnosis: Acute on chronic respiratory failure ([facility] [address] [J96.20]     Admit Date/Time:  4/25/2021  2:30 AM    Chart reviewed. Interdisciplinary team contacted or reviewed plan related to patient progress and discharge plans. Disciplines included Case Management, Nursing, and Dietitian. Current Status:ICU; intubated/sedated  PT/OT recommendation for discharge plan of care: NA    Expected D/C Disposition:  tbd  Confirmed plan with patient and/or family No confirmed with: (name) pt intubated/sedated  Discharge Plan Comments: Chart reviewed. Pt from home, IPTA, remains intubated/sedated. Will need PT/OT eval when appropriate to develop dcp. Follow.     Home O2 in place on admit: Yes

## 2021-05-07 NOTE — PROGRESS NOTES
This note also relates to the following rows which could not be included:  Vt Ordered - Cannot attach notes to unvalidated device data  Rate Set - Cannot attach notes to unvalidated device data  Peak Flow - Cannot attach notes to unvalidated device data  Pressure Support - Cannot attach notes to unvalidated device data  Sensitivity - Cannot attach notes to unvalidated device data  PEEP/CPAP - Cannot attach notes to unvalidated device data  I Time/ I Time % - Cannot attach notes to unvalidated device data  High Peep/I Pressure - Cannot attach notes to unvalidated device data  Mean Airway Pressure - Cannot attach notes to unvalidated device data  Rate Measured - Cannot attach notes to unvalidated device data  Minute Volume - Cannot attach notes to unvalidated device data  I:E Ratio - Cannot attach notes to unvalidated device data  Pulse - Cannot attach notes to unvalidated device data  High Pressure Alarm - Cannot attach notes to unvalidated device data  Low Minute Volume Alarm - Cannot attach notes to unvalidated device data  High Respiratory Rate - Cannot attach notes to unvalidated device data       05/07/21 1931   Vent Information   $Ventilation $Subsequent Day   Vent Type 840   Vent Mode AC/VC   FiO2  40 %   SpO2 98 %   SpO2/FiO2 ratio 245   Humidification Source Heated wire   Humidification Temp 37   Humidification Temp Measured 36   Circuit Condensation Drained   Vent Patient Data   Peak Inspiratory Pressure 24 cmH2O   Vt Exhaled 360 mL   Plateau Pressure 20 [id number]   Static Compliance 24 mL/cmH2O   Dynamic Compliance 19 mL/cmH2O   Cough/Sputum   Sputum How Obtained Endotracheal;Suctioned   Cough Productive   Sputum Amount Small   Sputum Color Creamy   Tenacity Thick   Breath Sounds   Right Upper Lobe Diminished; Expiratory Wheezes   Right Middle Lobe Diminished; Expiratory Wheezes   Right Lower Lobe Diminished; Expiratory Wheezes   Left Upper Lobe Diminished; Expiratory Wheezes   Left Lower Lobe Diminished; Expiratory Wheezes   Additional Respiratory  Assessments   Resp 22   Position Semi-Woods's   Alarm Settings   Apnea (secs) 20 secs   Low Exhaled Vt  200 mL   Non-Surgical Airway Endo Tracheal Tube   Placement Date/Time: 04/26/21 0528   Timeout: Patient;Procedure; Appropriate Equipment  Mask Ventilation: Ventilated by mask (1)  Placed By: Licensed provider  Inserted by: Dr Zan Florez  Insertion attempts: 1  Airway Device: Endo Tracheal Tube  Size: 8   Secured at 24 cm   Measured From Lips   Secured Location Left   Secured By Commercial tube [email or web address]   Site Condition Dry

## 2021-05-07 NOTE — PROGRESS NOTES
Care rounds completed with Dr Browning and multidisciplinary team.  Reviewed labs, meds, VS (temp/HR/RR), I/O's, assessment, & plan of care for today. See progress note & new orders for details.  Vincent Andersen RN

## 2021-05-07 NOTE — PROGRESS NOTES
Pt with periods of restlessness and coughing requiring PRN versed to sedate- see MAR.    No other changes noted in exam. Continue current plan of care Alaina Killian RN

## 2021-05-07 NOTE — PLAN OF CARE
Problem: Falls - Risk of:  Goal: Will remain free from falls  Description: Will remain free from falls  Outcome: Ongoing  Goal: Absence of physical injury  Description: Absence of physical injury  Outcome: Ongoing     Problem: Pain:  Goal: Pain level will decrease  Description: Pain level will decrease  Outcome: Ongoing  Goal: Control of acute pain  Description: Control of acute pain  Outcome: Ongoing  Goal: Control of chronic pain  Description: Control of chronic pain  Outcome: Ongoing  Goal: Patient's pain/discomfort is manageable  Description: Patient's pain/discomfort is manageable  Outcome: Ongoing     Problem: Infection:  Goal: Will remain free from infection  Description: Will remain free from infection  Outcome: Ongoing     Problem: Safety:  Goal: Free from accidental physical injury  Description: Free from accidental physical injury  Outcome: Ongoing  Goal: Free from intentional harm  Description: Free from intentional harm  Outcome: Ongoing     Problem: Daily Care:  Goal: Daily care needs are met  Description: Daily care needs are met  Outcome: Ongoing     Problem: Skin Integrity:  Goal: Skin integrity will stabilize  Description: Skin integrity will stabilize  Outcome: Ongoing     Problem: Discharge Planning:  Goal: Patients continuum of care needs are met  Description: Patients continuum of care needs are met  Outcome: Ongoing     Problem: Non-Violent Restraints  Goal: Removal from restraints as soon as assessed to be safe  Outcome: Ongoing  Goal: No harm/injury to patient while restraints in use  Outcome: Ongoing  Goal: Patient's dignity will be maintained  Outcome: Ongoing     Problem: Nutrition  Goal: Optimal nutrition therapy  Outcome: Ongoing  Goal: Understanding of nutritional guidelines  Outcome: Ongoing     Problem: Skin Integrity:  Goal: Will show no infection signs and symptoms  Description: Will show no infection signs and symptoms  Outcome: Ongoing  Goal: Absence of new skin breakdown  Description: Absence of new skin breakdown  Outcome: Ongoing

## 2021-05-07 NOTE — PROGRESS NOTES
145 cc of IV Ketamine wasted and witnessed by Alger Lennox RN Dannette Maffucci RN     Electronically signed by Alger Lennox, RN on 5/7/2021 at 7:25 AM

## 2021-05-07 NOTE — PROGRESS NOTES
Pt returned to previous AC settings  RR 30, SpO2 90%       05/07/21 0939   Vent Information   Vt Ordered 380 mL   Rate Set 22 bmp   Peak Flow 65 L/min   Pressure Support 0 cmH20   FiO2  40 %   SpO2 93 %   SpO2/FiO2 ratio 232.5   Sensitivity 2   PEEP/CPAP 5   I Time/ I Time % 0 s   Vent Patient Data   High Peep/I Pressure 0   Peak Inspiratory Pressure 38 cmH2O   Mean Airway Pressure 12 cmH20   Rate Measured 33 br/min   Vt Exhaled 429 mL   Minute Volume 11.7 Liters   I:E Ratio 1:2.10   Spontaneous Breathing Trial (SBT) RT Doc   Pulse 109   Additional Respiratory  Assessments   Resp 24   Alarm Settings   High Pressure Alarm 50 cmH2O   Low Minute Volume Alarm 2 L/min   High Respiratory Rate 40 br/min

## 2021-05-08 ENCOUNTER — APPOINTMENT (OUTPATIENT)
Dept: GENERAL RADIOLOGY | Age: 61
DRG: 870 | End: 2021-05-08
Payer: COMMERCIAL

## 2021-05-08 LAB
ANION GAP SERPL CALCULATED.3IONS-SCNC: 6 MMOL/L (ref 3–16)
BASE EXCESS ARTERIAL: 9.9 MMOL/L (ref -3–3)
BASOPHILS ABSOLUTE: 0.1 K/UL (ref 0–0.2)
BASOPHILS RELATIVE PERCENT: 0.4 %
BUN BLDV-MCNC: 26 MG/DL (ref 7–20)
CALCIUM SERPL-MCNC: 8.6 MG/DL (ref 8.3–10.6)
CARBOXYHEMOGLOBIN ARTERIAL: 0.3 % (ref 0–1.5)
CHLORIDE BLD-SCNC: 95 MMOL/L (ref 99–110)
CO2: 34 MMOL/L (ref 21–32)
CREAT SERPL-MCNC: <0.5 MG/DL (ref 0.6–1.2)
EOSINOPHILS ABSOLUTE: 0.4 K/UL (ref 0–0.6)
EOSINOPHILS RELATIVE PERCENT: 2.2 %
GFR AFRICAN AMERICAN: >60
GFR NON-AFRICAN AMERICAN: >60
GLUCOSE BLD-MCNC: 103 MG/DL (ref 70–99)
GLUCOSE BLD-MCNC: 103 MG/DL (ref 70–99)
GLUCOSE BLD-MCNC: 109 MG/DL (ref 70–99)
GLUCOSE BLD-MCNC: 134 MG/DL (ref 70–99)
GLUCOSE BLD-MCNC: 165 MG/DL (ref 70–99)
GLUCOSE BLD-MCNC: 85 MG/DL (ref 70–99)
GLUCOSE BLD-MCNC: 93 MG/DL (ref 70–99)
HCO3 ARTERIAL: 35.1 MMOL/L (ref 21–29)
HCT VFR BLD CALC: 28 % (ref 36–48)
HEMOGLOBIN, ART, EXTENDED: 10.3 G/DL (ref 12–16)
HEMOGLOBIN: 9.4 G/DL (ref 12–16)
LYMPHOCYTES ABSOLUTE: 2.2 K/UL (ref 1–5.1)
LYMPHOCYTES RELATIVE PERCENT: 12 %
MAGNESIUM: 2 MG/DL (ref 1.8–2.4)
MCH RBC QN AUTO: 32.1 PG (ref 26–34)
MCHC RBC AUTO-ENTMCNC: 33.5 G/DL (ref 31–36)
MCV RBC AUTO: 96 FL (ref 80–100)
METHEMOGLOBIN ARTERIAL: 0.3 %
MONOCYTES ABSOLUTE: 1.3 K/UL (ref 0–1.3)
MONOCYTES RELATIVE PERCENT: 7.1 %
NEUTROPHILS ABSOLUTE: 14.1 K/UL (ref 1.7–7.7)
NEUTROPHILS RELATIVE PERCENT: 78.3 %
O2 CONTENT ARTERIAL: 13 ML/DL
O2 SAT, ARTERIAL: 86.4 %
O2 THERAPY: ABNORMAL
PCO2 ARTERIAL: 51 MMHG (ref 35–45)
PDW BLD-RTO: 13.9 % (ref 12.4–15.4)
PERFORMED ON: ABNORMAL
PERFORMED ON: NORMAL
PERFORMED ON: NORMAL
PH ARTERIAL: 7.46 (ref 7.35–7.45)
PLATELET # BLD: 250 K/UL (ref 135–450)
PMV BLD AUTO: 8.2 FL (ref 5–10.5)
PO2 ARTERIAL: 49.7 MMHG (ref 75–108)
POTASSIUM REFLEX MAGNESIUM: 3.4 MMOL/L (ref 3.5–5.1)
RBC # BLD: 2.92 M/UL (ref 4–5.2)
SODIUM BLD-SCNC: 135 MMOL/L (ref 136–145)
TCO2 ARTERIAL: 36.7 MMOL/L
WBC # BLD: 18 K/UL (ref 4–11)

## 2021-05-08 PROCEDURE — 99232 SBSQ HOSP IP/OBS MODERATE 35: CPT | Performed by: INTERNAL MEDICINE

## 2021-05-08 PROCEDURE — 85025 COMPLETE CBC W/AUTO DIFF WBC: CPT

## 2021-05-08 PROCEDURE — 2500000003 HC RX 250 WO HCPCS: Performed by: INTERNAL MEDICINE

## 2021-05-08 PROCEDURE — 6370000000 HC RX 637 (ALT 250 FOR IP): Performed by: INTERNAL MEDICINE

## 2021-05-08 PROCEDURE — 94761 N-INVAS EAR/PLS OXIMETRY MLT: CPT

## 2021-05-08 PROCEDURE — 6360000002 HC RX W HCPCS: Performed by: INTERNAL MEDICINE

## 2021-05-08 PROCEDURE — 94003 VENT MGMT INPAT SUBQ DAY: CPT

## 2021-05-08 PROCEDURE — 2700000000 HC OXYGEN THERAPY PER DAY

## 2021-05-08 PROCEDURE — 99291 CRITICAL CARE FIRST HOUR: CPT | Performed by: INTERNAL MEDICINE

## 2021-05-08 PROCEDURE — 2000000000 HC ICU R&B

## 2021-05-08 PROCEDURE — 83735 ASSAY OF MAGNESIUM: CPT

## 2021-05-08 PROCEDURE — 82803 BLOOD GASES ANY COMBINATION: CPT

## 2021-05-08 PROCEDURE — 94640 AIRWAY INHALATION TREATMENT: CPT

## 2021-05-08 PROCEDURE — 80048 BASIC METABOLIC PNL TOTAL CA: CPT

## 2021-05-08 PROCEDURE — 2580000003 HC RX 258: Performed by: INTERNAL MEDICINE

## 2021-05-08 PROCEDURE — 71045 X-RAY EXAM CHEST 1 VIEW: CPT

## 2021-05-08 RX ORDER — POTASSIUM CHLORIDE 750 MG/1
40 TABLET, EXTENDED RELEASE ORAL ONCE
Status: COMPLETED | OUTPATIENT
Start: 2021-05-08 | End: 2021-05-08

## 2021-05-08 RX ADMIN — PROPOFOL 40 MCG/KG/MIN: 10 INJECTION, EMULSION INTRAVENOUS at 20:41

## 2021-05-08 RX ADMIN — Medication 10 ML: at 08:39

## 2021-05-08 RX ADMIN — INSULIN LISPRO 2 UNITS: 100 INJECTION, SOLUTION INTRAVENOUS; SUBCUTANEOUS at 16:34

## 2021-05-08 RX ADMIN — WHITE PETROLATUM 57.7 %-MINERAL OIL 31.9 % EYE OINTMENT: at 20:07

## 2021-05-08 RX ADMIN — ALBUTEROL SULFATE 2.5 MG: 2.5 SOLUTION RESPIRATORY (INHALATION) at 16:16

## 2021-05-08 RX ADMIN — Medication 15 ML: at 08:16

## 2021-05-08 RX ADMIN — POTASSIUM CHLORIDE 40 MEQ: 750 TABLET, EXTENDED RELEASE ORAL at 10:27

## 2021-05-08 RX ADMIN — IPRATROPIUM BROMIDE AND ALBUTEROL SULFATE 1 AMPULE: 2.5; .5 SOLUTION RESPIRATORY (INHALATION) at 07:25

## 2021-05-08 RX ADMIN — MIDAZOLAM HYDROCHLORIDE 4 MG: 1 INJECTION, SOLUTION INTRAMUSCULAR; INTRAVENOUS at 08:15

## 2021-05-08 RX ADMIN — CARBOXYMETHYLCELLULOSE SODIUM 1 DROP: 10 GEL OPHTHALMIC at 08:16

## 2021-05-08 RX ADMIN — IPRATROPIUM BROMIDE AND ALBUTEROL SULFATE 1 AMPULE: 2.5; .5 SOLUTION RESPIRATORY (INHALATION) at 11:54

## 2021-05-08 RX ADMIN — FAMOTIDINE 20 MG: 20 TABLET ORAL at 20:07

## 2021-05-08 RX ADMIN — Medication 0.6 MCG/KG/HR: at 08:17

## 2021-05-08 RX ADMIN — LEVOTHYROXINE SODIUM 125 MCG: 25 TABLET ORAL at 08:40

## 2021-05-08 RX ADMIN — ATORVASTATIN CALCIUM 40 MG: 40 TABLET, FILM COATED ORAL at 20:07

## 2021-05-08 RX ADMIN — WHITE PETROLATUM 57.7 %-MINERAL OIL 31.9 % EYE OINTMENT: at 15:20

## 2021-05-08 RX ADMIN — CARBOXYMETHYLCELLULOSE SODIUM 1 DROP: 10 GEL OPHTHALMIC at 02:14

## 2021-05-08 RX ADMIN — PROPOFOL 50 MCG/KG/MIN: 10 INJECTION, EMULSION INTRAVENOUS at 10:39

## 2021-05-08 RX ADMIN — Medication 75 MCG/HR: at 12:03

## 2021-05-08 RX ADMIN — PROPOFOL 50 MCG/KG/MIN: 10 INJECTION, EMULSION INTRAVENOUS at 01:04

## 2021-05-08 RX ADMIN — ENOXAPARIN SODIUM 40 MG: 40 INJECTION SUBCUTANEOUS at 08:16

## 2021-05-08 RX ADMIN — ACETAMINOPHEN 650 MG: 325 TABLET ORAL at 02:19

## 2021-05-08 RX ADMIN — ALBUTEROL SULFATE 2.5 MG: 2.5 SOLUTION RESPIRATORY (INHALATION) at 07:20

## 2021-05-08 RX ADMIN — PROPOFOL 40 MCG/KG/MIN: 10 INJECTION, EMULSION INTRAVENOUS at 15:20

## 2021-05-08 RX ADMIN — Medication 10 ML: at 20:07

## 2021-05-08 RX ADMIN — CARBOXYMETHYLCELLULOSE SODIUM 1 DROP: 10 GEL OPHTHALMIC at 17:23

## 2021-05-08 RX ADMIN — METHYLPREDNISOLONE SODIUM SUCCINATE 40 MG: 40 INJECTION, POWDER, FOR SOLUTION INTRAMUSCULAR; INTRAVENOUS at 08:16

## 2021-05-08 RX ADMIN — ASPIRIN 81 MG: 81 TABLET, CHEWABLE ORAL at 08:16

## 2021-05-08 RX ADMIN — ALBUTEROL SULFATE 2.5 MG: 2.5 SOLUTION RESPIRATORY (INHALATION) at 11:57

## 2021-05-08 RX ADMIN — IPRATROPIUM BROMIDE AND ALBUTEROL SULFATE 1 AMPULE: 2.5; .5 SOLUTION RESPIRATORY (INHALATION) at 19:13

## 2021-05-08 RX ADMIN — WHITE PETROLATUM 57.7 %-MINERAL OIL 31.9 % EYE OINTMENT: at 10:27

## 2021-05-08 RX ADMIN — PROPOFOL 50 MCG/KG/MIN: 10 INJECTION, EMULSION INTRAVENOUS at 05:07

## 2021-05-08 RX ADMIN — IPRATROPIUM BROMIDE AND ALBUTEROL SULFATE 1 AMPULE: 2.5; .5 SOLUTION RESPIRATORY (INHALATION) at 22:49

## 2021-05-08 RX ADMIN — CARBOXYMETHYLCELLULOSE SODIUM 1 DROP: 10 GEL OPHTHALMIC at 05:07

## 2021-05-08 RX ADMIN — WHITE PETROLATUM 57.7 %-MINERAL OIL 31.9 % EYE OINTMENT: at 02:14

## 2021-05-08 RX ADMIN — MONTELUKAST SODIUM 10 MG: 10 TABLET, COATED ORAL at 20:07

## 2021-05-08 RX ADMIN — CARBOXYMETHYLCELLULOSE SODIUM 1 DROP: 10 GEL OPHTHALMIC at 20:07

## 2021-05-08 RX ADMIN — Medication 15 ML: at 20:08

## 2021-05-08 RX ADMIN — FAMOTIDINE 20 MG: 20 TABLET ORAL at 08:16

## 2021-05-08 RX ADMIN — WHITE PETROLATUM 57.7 %-MINERAL OIL 31.9 % EYE OINTMENT: at 05:08

## 2021-05-08 RX ADMIN — IPRATROPIUM BROMIDE AND ALBUTEROL SULFATE 1 AMPULE: 2.5; .5 SOLUTION RESPIRATORY (INHALATION) at 02:52

## 2021-05-08 RX ADMIN — IPRATROPIUM BROMIDE AND ALBUTEROL SULFATE 1 AMPULE: 2.5; .5 SOLUTION RESPIRATORY (INHALATION) at 16:14

## 2021-05-08 RX ADMIN — Medication 1.1 MCG/KG/HR: at 15:21

## 2021-05-08 RX ADMIN — Medication 1.2 MCG/KG/HR: at 20:40

## 2021-05-08 ASSESSMENT — PULMONARY FUNCTION TESTS
PIF_VALUE: 19
PIF_VALUE: 33
PIF_VALUE: 11
PIF_VALUE: 12
PIF_VALUE: 23
PIF_VALUE: 29
PIF_VALUE: 20
PIF_VALUE: 15
PIF_VALUE: 19
PIF_VALUE: 32
PIF_VALUE: 15
PIF_VALUE: 30
PIF_VALUE: 24
PIF_VALUE: 17
PIF_VALUE: 20
PIF_VALUE: 33
PIF_VALUE: 28
PIF_VALUE: 11
PIF_VALUE: 21

## 2021-05-08 ASSESSMENT — PAIN SCALES - GENERAL
PAINLEVEL_OUTOF10: 3
PAINLEVEL_OUTOF10: 2

## 2021-05-08 NOTE — PROGRESS NOTES
Shift reassessment completed. Oxygen saturation at 90% and dropped to 85%, suctioning performed with scant secretions. RT called. Vent settings 22/380/35%/+5. RASS appears to be -2. Patient has brief periods of eye opening, not following commands at this time. Temperature 100 degrees Farenheit, will continue to monitor. NSR, VSS. No additional significant changes at this time.     Electronically signed by Stiven Aguiar RN on 5/8/2021 at 12:04 AM    Vitals:    05/08/21 0000   BP: (!) 97/52   Pulse: 78   Resp: 21   Temp: 100 °F (37.8 °C)   SpO2: 97%

## 2021-05-08 NOTE — PROGRESS NOTES
Taper of Precedex through late morning and afternoon to current rate of 1.2 mcg/kg/hr ineffective. RASS +1 now. Propofol IV drip rate increased back to 40 mcg/kg/min for RASS +1. Awake,grimacing-nods head appropriately to questions, but double stacking breaths and becoming increasingly noncompliant with vent. Verbal reassurement provided. Monitoring closely.

## 2021-05-08 NOTE — PROGRESS NOTES
Shift assessment completed.     ETT at 24LL. Vent settings AC 22/380/40%/+5. Diminished breath sounds bilaterally with expiratory wheezes. Oxygen saturation is between 97% - 99% at this time.     Propofol infusing at 50 mcg/kg/min. Precedex infusing at 0.6 mcg/kg/h.    Fentanyl infusing at 75 mcg//h.      RASS appears to be -2 at this time. During assessment patient opens eyes briefly, not following instructions to squeeze hands/wiggle toes. However, would open mouth when directed for oral care.     Abdomen is soft and rounded to palpation. Bowel sounds are active and present x4. TF running at 45 mL/h, 5 mL residual.     NSR on monitor, VSS.     Central line dressing is clean, dry, and intact.     Oral care and mouth suctioning performed. Repositioned for comfort, boots removed and heels elevated off bed.     Electronically signed by Nicole Ivan RN on 5/7/2021 at 8:08 PM    Vitals:    05/07/21 2000   BP: 129/72   Pulse: 82   Resp: 20   Temp: 99.3 °F (37.4 °C)   SpO2: 98%

## 2021-05-08 NOTE — PROGRESS NOTES
Shift report given to Radha Sam RN  at bedside. Patient care handed off in stable condition at this time.

## 2021-05-08 NOTE — PROGRESS NOTES
IM Progress Note    Admit Date:  4/25/2021  13       Interval history:  Acute resp failure, was on bipap, intubated on 4/26   Had bronchoscopy on 5/1/21. Had thick secretions. 5/3   sedated on the vent  Low grade fevers  On ketamine,propofol and fentanyl    5/4  On the vent. Continues to be on ketamine, propofol and fentanyl. 5/5  Tachycardic this am- hence no SBT  Fevers +    5/6   Fevers upto 101     5/7  Continues to be on the vent. 40% FiO2 and five of PEEP. No fevers. 5/8  Fever up to 101.5 this morning. Did not do well on breathing trials today. Objective:   BP (!) 220/75   Pulse 84   Temp 101.4 °F (38.6 °C) (Bladder)   Resp 22   Ht 5' 4\" (1.626 m)   Wt 153 lb (69.4 kg)   LMP  (LMP Unknown)   SpO2 93%   BMI 26.26 kg/m²       Intake/Output Summary (Last 24 hours) at 5/8/2021 1242  Last data filed at 5/8/2021 1211  Gross per 24 hour   Intake 2609 ml   Output 1725 ml   Net 884 ml       Physical Exam:  General: middle aged female on vent,   Oral ETT and OG noted  Right IJ TLC . Appears to be not in any distress  Neck: No JVD, no carotid bruit, no thyromegaly  Chest: diminished  kelvin air entry with persistent wheeze/diminished BS  Cardiovascular:  RRR S1S2 heard, no murmurs or gallops  Abdomen:  Soft, undistended, non tender, no organomegaly, BS present  kelvin UE edema +  Extremities: No edema or cyanosis.  Distal pulses well felt  Neurological : sedated        Medications:   Scheduled Medications:    methylPREDNISolone  40 mg Intravenous Daily    carboxymethylcellulose PF  1 drop Both Eyes Q4H    artificial tears   Both Eyes Q4H    polyethylene glycol  17 g Oral Daily    insulin lispro  0-12 Units Subcutaneous Q4H    chlorhexidine  15 mL Mouth/Throat BID    aspirin  81 mg Oral Daily    atorvastatin  40 mg Oral Nightly    levothyroxine  125 mcg Oral QAM AC    montelukast  10 mg Oral Nightly    sodium chloride flush  5-40 mL Intravenous 2 times per day    enoxaparin  40 mg Subcutaneous Daily    famotidine  20 mg Oral BID    ipratropium-albuterol  1 ampule Inhalation Q4H     I   dexmedetomidine HCl in NaCl 0.9 mcg/kg/hr (05/08/21 1135)    fentaNYL 75 mcg/hr (05/08/21 1203)    dextrose      propofol 50 mcg/kg/min (05/08/21 1039)    sodium chloride 25 mL (05/04/21 1800)     midazolam, glucose, dextrose, glucagon (rDNA), dextrose, fentanNYL, sodium chloride flush, sodium chloride, acetaminophen **OR** acetaminophen, ondansetron **OR** ondansetron, albuterol, ibuprofen    Lab Data:  Recent Labs     05/06/21  0620 05/07/21  0530 05/08/21  0510   WBC 23.4* 17.8* 18.0*   HGB 9.9* 9.2* 9.4*   HCT 29.9* 27.5* 28.0*   MCV 96.6 96.2 96.0    212 250     Recent Labs     05/06/21  0620 05/07/21  0530 05/08/21  0510    134* 135*   K 3.5 3.4* 3.4*   CL 95* 93* 95*   CO2 41* 36* 34*   BUN 27* 35* 26*   CREATININE <0.5* <0.5* <0.5*     No results for input(s): CKTOTAL, CKMB, CKMBINDEX, TROPONINI in the last 72 hours. Coagulation:   Lab Results   Component Value Date    INR 1.03 12/26/2019     Cardiac markers:   Lab Results   Component Value Date    TROPONINI <0.01 04/25/2021         Lab Results   Component Value Date    ALT 29 04/25/2021    AST 39 (H) 04/25/2021    ALKPHOS 77 04/25/2021    BILITOT <0.2 04/25/2021       Lab Results   Component Value Date    INR 1.03 12/26/2019    PROTIME 12.0 12/26/2019         CULTURES  COVID: not detected  Blood: pending  Sputum - rare candida     EKG:  I have reviewed the EKG with the following interpretation:   Sinus tachycardia, Nonspecific ST abnormality     XR CHEST PORTABLE   Final Result   Supportive tubing is in normal position. Mild left basilar atelectasis. XR CHEST PORTABLE   Final Result   Stable support apparatus. Mild pulmonary vascular congestion, stable in appearance. XR CHEST 1 VIEW   Final Result   Mild pulmonary vascular congestion.          VL Extremity Venous Bilateral   Final Result      XR CHEST PORTABLE   Final Result   1. Stable pulmonary vascular congestion. XR CHEST PORTABLE   Final Result   Tubes and lines as above. No significant interval change from prior study. No overt edema, fusion, extrapleural air or focal consolidation. XR CHEST PORTABLE   Final Result   Stable chest.         XR CHEST PORTABLE   Final Result   Stable chest.         XR CHEST PORTABLE   Final Result   Hazy bibasilar airspace disease, slightly increased on the right, either due   to atelectasis or pneumonia. XR CHEST PORTABLE   Final Result   Stable chest with moderate hyperinflation. Trace atelectasis left lung base. XR CHEST PORTABLE   Final Result   In this patient with underlying COPD, the lungs are clear. Interstitial   opacities described on recent exam are less evident on current study. XR CHEST PORTABLE   Final Result   Increased lung markings bilaterally, may be related to minimal pulmonary   vascular congestion versus bronchitis. XR CHEST PORTABLE   Final Result   Stable chest.      Lungs are hyperinflated with no acute airspace disease. XR CHEST PORTABLE   Final Result   Minimal right basilar airspace disease likely atelectasis. Lungs are   moderately hyperinflated. XR CHEST PORTABLE   Final Result   Appropriate positioning of right central venous catheter. XR CHEST PORTABLE   Final Result   Satisfactory position endotracheal tube and NG tube. No acute airspace disease. Pulmonary vessels upper normal.         XR CHEST PORTABLE   Final Result   Pulmonary edema with bibasilar atelectasis versus pneumonia. XR CHEST PORTABLE    (Results Pending)      Echo 8/25/2020   Summary   Limited imaging due to lung interface.  Parasternal images are unobtainable.   Normal left ventricular systolic function with an estimated ejection   fraction of 55-60%.   No regional wall motion abnormalities are seen.   Normal left ventricular diastolic filling pressure.   Normal systolic pulmonary artery pressure (SPAP) estimated at 20 mmHg (RA   pressure 3 mmHg).   No significant valvular heart disease.                 ASSESSMENT/PLAN:     #Acute on chronic hypoxic/hypercapnic respiratory failure-due to COPD exacerbation, Pneumonia  -Normally on home oxygen 3 L   -ABG on admission pH 7.2, PCO2 92  - admitted to ICU on BiPAP  - pulmonology consulted. - IV Solu-medrol, HHN , abx initiated   -- failed bipap and worsened leading to intubation and now on vent support  Ketamine to help reactive airways- wean off ketamine, start precedex  On pressure control setting 45% PEEP of 5.        #Pneumonia, Gram positive organism  - Bronchoscopy done. Vancomycin( 4 days) and 3 days of zyvox  Cefepime(completed 7/7) Cultures negative except for candida. High-grade fevers today.          #Sepsis  -Present on admission  -With tachycardia and tachypnea, leukocytosis.    Secondary to pneumonia  Monitor  Improved BP and off levo     #Tobacco abuse  - smoking cessation needed     #Pulmonary Edema  - ECHO last year with normal EF and normal Diastolic function   - IV Lasix 40 mg as tolerated     #Leukocytosis-likely due to chronic steroids  -Trend WBC- remains high   -neg procalcitonin  White count is declining now.        #Hypothyroidism  - home dose of synthroid 125 mcg      #Hyperlipidemia  - on Atorvastatin.     DVT Prophylaxis: Lovenox  Diet: on TF  Code Status: Full Code       Carlos Wetzel MD 5/8/2021 12:42 PM

## 2021-05-08 NOTE — PROGRESS NOTES
Bedside handoff report to [facility] for handoff of care. All ICU monitoring lines in place. IV sedation reviewed.

## 2021-05-08 NOTE — PROGRESS NOTES
Shift reassessment completed. RASS appears to be -1 at this time. Patient rouses more easily to voice and repositioning. Temperature remains unchanged after PRN tylenol given. Oxygen saturation in the mid to low 90's. Patient repositioned for comfort.     Electronically signed by Angel Mcginnis RN on 5/8/2021 at 5:33 AM    Vitals:    05/08/21 0400   BP: (!) 104/55   Pulse: 81   Resp: 22   Temp: 100.6 °F (38.1 °C)   SpO2: (!) 89%

## 2021-05-08 NOTE — PROGRESS NOTES
Eyes open, not following commands. Cristal RT to room, switched to Spont. vent mode for Breathing trial.  Propofol and Fentanyl IV drips paused, TF stopped. Precedex IV drip continuing at 0.6mcg/kg/hr. HOB > 30 degrees. Continuing to requrie BUE soft wrist restraints due to medical status and existing lifesaving medical tubing in use. Monitoring closely. All ICU monitoing leads checked, medical tubing in place and secure.

## 2021-05-08 NOTE — PROGRESS NOTES
Failed SBT. Vitals became unstable. HR increased 129. /78, SpO2 77%. Pt was able to maintain volumes.

## 2021-05-08 NOTE — PROGRESS NOTES
Partial bath and incontinence care provided with linen change at this time. Eyes open and nods appropriately. 4 Eyes Skin Assessment     The patient is being assess for   Shift Handoff  End of shift assessment  I agree that 2 RN's have performed a thorough Head to Toe Skin Assessment on the patient. ALL assessment sites listed below have been assessed. Areas assessed by both nurses:   [x]   Head, Face, and Ears   [x]   Shoulders, Back, and Chest, Abdomen  [x]   Arms, Elbows, and Hands   [x]   Coccyx, Sacrum, and Ischium  [x]   Legs, Feet, and Heels        No scattered abrasions  **SHARE this note so that the co-signing nurse is able to place an eSignature**    Co-signer eSignature: Electronically signed by Delilah Hodgkins, RN on 5/8/21 at 6:05 PM EDT    Does the Patient have Skin Breakdown?   No          Ron Prevention initiated:  Yes   Wound Care Orders initiated:  No      WOC nurse consulted for Pressure Injury (Stage 3,4, Unstageable, DTI, NWPT, Complex wounds)and New or Established Ostomies:  No      Primary Nurse eSignature: Electronically signed by Talon Antony RN on 5/8/21 at 5:25 PM EDT

## 2021-05-08 NOTE — PROGRESS NOTES
05/08/21 0253   Vent Information   Vent Type 840   Vent Mode AC/VC   Vt Ordered 380 mL   Rate Set 22 bmp   Peak Flow 70 L/min   Pressure Support 0 cmH20   FiO2  35 %   SpO2 95 %   SpO2/FiO2 ratio 271.43   Sensitivity 2   PEEP/CPAP 5   I Time/ I Time % 0 s   Humidification Source Heated wire   Humidification Temp 37   Humidification Temp Measured 37   Circuit Condensation Drained   Vent Patient Data   High Peep/I Pressure 0   Peak Inspiratory Pressure 33 cmH2O   Mean Airway Pressure 11 cmH20   Rate Measured 22 br/min   Vt Exhaled 414 mL   Minute Volume 9.09 Liters   I:E Ratio 1:3.60   Plateau Pressure 20 IAZ50   Cough/Sputum   Sputum How Obtained Endotracheal;Suctioned   Cough Productive   Sputum Amount Small   Sputum Color Creamy   Tenacity Thick   Spontaneous Breathing Trial (SBT) RT Doc   Pulse 71   Breath Sounds   Right Upper Lobe Diminished; Expiratory Wheezes   Right Middle Lobe Diminished; Expiratory Wheezes   Right Lower Lobe Diminished; Expiratory Wheezes   Left Upper Lobe Diminished; Expiratory Wheezes   Left Lower Lobe Diminished; Expiratory Wheezes   Additional Respiratory  Assessments   Resp 22   Position Semi-Woods's   Alarm Settings   High Pressure Alarm 50 cmH2O   Low Minute Volume Alarm 2 L/min   Apnea (secs) 20 secs   High Respiratory Rate 40 br/min   Low Exhaled Vt  200 mL   Non-Surgical Airway Endo Tracheal Tube   Placement Date/Time: 04/26/21 0528   Timeout: Patient;Procedure; Appropriate Equipment  Mask Ventilation: Ventilated by mask (1)  Placed By: Licensed provider  Inserted by: Dr Antwan Taylor  Insertion attempts: 1  Airway Device: Endo Tracheal Tube  Size: 8   Secured at 24 cm   Measured From [address] [facility] By [employer] tube [email or web address]   Site Condition Dry

## 2021-05-08 NOTE — PROGRESS NOTES
ibuprofen    Results:  CBC:   Recent Labs     05/06/21  0620 05/07/21  0530 05/08/21  0510   WBC 23.4* 17.8* 18.0*   HGB 9.9* 9.2* 9.4*   HCT 29.9* 27.5* 28.0*   MCV 96.6 96.2 96.0    212 250     BMP:   Recent Labs     05/06/21  0620 05/07/21  0530 05/08/21  0510    134* 135*   K 3.5 3.4* 3.4*   CL 95* 93* 95*   CO2 41* 36* 34*   BUN 27* 35* 26*   CREATININE <0.5* <0.5* <0.5*     LIVER PROFILE:   No results for input(s): AST, ALT, LIPASE, BILIDIR, BILITOT, ALKPHOS in the last 72 hours. Invalid input(s): AMYLASE,  ALB    Cultures:  4/25/2021 SARS-CoV-2 negative   4/25/2021 blood no growth   4/26/2021 respiratory culture candida  5/1/2021 bronc washings no organisms  5/1/2021 BAL NRF  5/5 Blood cx NGTD  5/5/21 Blood fungal cx NGTD  5/5 trach asp: NRF    Films:  LE doppler neg    CXR 5/8/2021  Supportive tubing is in normal position.       Mild left basilar atelectasis. ASSESSMENT:  · Acute on chronic respiratory failure with hypoxemia and hypercapnia   · COPD with AE; severe airtrapping with auto PEEP  · Tobacco abuse  · New fevers  · Leukocytosis    PLAN:  Mechanical ventilation as per my orders. The ventilator was adjusted by me at the bedside for unstable, life threatening respiratory failure. She continues on pressure control ventilation, she has not tolerated the switch to assist control 2/2 air trapping and peak pressure alarms  IV Propofol and Fentanyl gtt for sedation, target RASS -2, with daily spontaneous awakening trial.   Precedex  Head of bed 30 degrees or higher at all times  Duonebs Q4  IV Solu-Medrol daily  Completed Cefepime D#7/7, Zyvox D#3. Post 4 days of vancomycin  Completed 7 days ceftriaxone and 5 days azithromycin and 4 days vancomycin.   Replace K  ICU care- ap montez   NOK is spouse  Total critical care time caring for this patient with life threatening, unstable organ failure, including direct patient contact, management of life support systems, review of data including imaging and labs, discussions with other team members and physicians is 33 minutes so far today, excluding procedures.

## 2021-05-08 NOTE — PROGRESS NOTES
Dr Nam dowd, will consider SBT re-attempt this afternoon , Plan to increase Precedex IV drip for possible SBT/extubation.

## 2021-05-08 NOTE — PROGRESS NOTES
HR 120s, RR>35., Sp02 77%. Eyes open, RASS +2-+3. SBT terminated. Returned to [facility] vent mode per Cristal RT.  100% 02 breaths administered per vent. Fentanyl and Propofol sedation IV drips resumed at previous rates. Incontinent of x.large soft  Brown stool. 0815- Versed 4 mg IVP for continuing distress, 100% 02 breaths on vent x 4 minutes. Desat'ng to 80% at one point, HR 120s, RASS +2  T 101.4/bladder. SBP > 190.    0830- requiring supplemental 100% 02 again, Sp02 94%, RR 30s, down to 24 rpm. Inspiratory volumes remaining > 300.    0837- , Sp02 93%, /68/97, RR 24. Vent settings: AC- rate 22 ,Fi02 35%, Peep+5. See eMAR for sedation IV drip rates.

## 2021-05-09 ENCOUNTER — APPOINTMENT (OUTPATIENT)
Dept: GENERAL RADIOLOGY | Age: 61
DRG: 870 | End: 2021-05-09
Payer: COMMERCIAL

## 2021-05-09 LAB
ANION GAP SERPL CALCULATED.3IONS-SCNC: 3 MMOL/L (ref 3–16)
BASE EXCESS ARTERIAL: 10.3 MMOL/L (ref -3–3)
BASOPHILS ABSOLUTE: 0.1 K/UL (ref 0–0.2)
BASOPHILS RELATIVE PERCENT: 0.5 %
BLOOD CULTURE, ROUTINE: NORMAL
BUN BLDV-MCNC: 25 MG/DL (ref 7–20)
CALCIUM SERPL-MCNC: 8.4 MG/DL (ref 8.3–10.6)
CARBOXYHEMOGLOBIN ARTERIAL: 0.3 % (ref 0–1.5)
CHLORIDE BLD-SCNC: 96 MMOL/L (ref 99–110)
CO2: 34 MMOL/L (ref 21–32)
CREAT SERPL-MCNC: <0.5 MG/DL (ref 0.6–1.2)
CULTURE, BLOOD 2: NORMAL
EOSINOPHILS ABSOLUTE: 0.3 K/UL (ref 0–0.6)
EOSINOPHILS RELATIVE PERCENT: 2.2 %
GFR AFRICAN AMERICAN: >60
GFR NON-AFRICAN AMERICAN: >60
GLUCOSE BLD-MCNC: 106 MG/DL (ref 70–99)
GLUCOSE BLD-MCNC: 112 MG/DL (ref 70–99)
GLUCOSE BLD-MCNC: 118 MG/DL (ref 70–99)
GLUCOSE BLD-MCNC: 127 MG/DL (ref 70–99)
GLUCOSE BLD-MCNC: 138 MG/DL (ref 70–99)
GLUCOSE BLD-MCNC: 93 MG/DL (ref 70–99)
GLUCOSE BLD-MCNC: 95 MG/DL (ref 70–99)
HCO3 ARTERIAL: 35.6 MMOL/L (ref 21–29)
HCT VFR BLD CALC: 26.5 % (ref 36–48)
HEMOGLOBIN, ART, EXTENDED: 9.3 G/DL (ref 12–16)
HEMOGLOBIN: 8.7 G/DL (ref 12–16)
LYMPHOCYTES ABSOLUTE: 1.9 K/UL (ref 1–5.1)
LYMPHOCYTES RELATIVE PERCENT: 12 %
MCH RBC QN AUTO: 31.7 PG (ref 26–34)
MCHC RBC AUTO-ENTMCNC: 32.8 G/DL (ref 31–36)
MCV RBC AUTO: 96.8 FL (ref 80–100)
METHEMOGLOBIN ARTERIAL: 0.3 %
MONOCYTES ABSOLUTE: 1 K/UL (ref 0–1.3)
MONOCYTES RELATIVE PERCENT: 6.1 %
NEUTROPHILS ABSOLUTE: 12.5 K/UL (ref 1.7–7.7)
NEUTROPHILS RELATIVE PERCENT: 79.2 %
O2 CONTENT ARTERIAL: 11 ML/DL
O2 SAT, ARTERIAL: 83.8 %
O2 THERAPY: ABNORMAL
PCO2 ARTERIAL: 52.1 MMHG (ref 35–45)
PDW BLD-RTO: 13.8 % (ref 12.4–15.4)
PERFORMED ON: ABNORMAL
PERFORMED ON: NORMAL
PH ARTERIAL: 7.45 (ref 7.35–7.45)
PLATELET # BLD: 231 K/UL (ref 135–450)
PMV BLD AUTO: 8.1 FL (ref 5–10.5)
PO2 ARTERIAL: 46.7 MMHG (ref 75–108)
POTASSIUM REFLEX MAGNESIUM: 3.8 MMOL/L (ref 3.5–5.1)
RBC # BLD: 2.74 M/UL (ref 4–5.2)
SODIUM BLD-SCNC: 133 MMOL/L (ref 136–145)
TCO2 ARTERIAL: 37.2 MMOL/L
WBC # BLD: 15.7 K/UL (ref 4–11)

## 2021-05-09 PROCEDURE — 6370000000 HC RX 637 (ALT 250 FOR IP): Performed by: INTERNAL MEDICINE

## 2021-05-09 PROCEDURE — 2700000000 HC OXYGEN THERAPY PER DAY

## 2021-05-09 PROCEDURE — 2000000000 HC ICU R&B

## 2021-05-09 PROCEDURE — 6360000002 HC RX W HCPCS: Performed by: INTERNAL MEDICINE

## 2021-05-09 PROCEDURE — 2500000003 HC RX 250 WO HCPCS: Performed by: INTERNAL MEDICINE

## 2021-05-09 PROCEDURE — 31622 DX BRONCHOSCOPE/WASH: CPT

## 2021-05-09 PROCEDURE — 99291 CRITICAL CARE FIRST HOUR: CPT | Performed by: INTERNAL MEDICINE

## 2021-05-09 PROCEDURE — 85025 COMPLETE CBC W/AUTO DIFF WBC: CPT

## 2021-05-09 PROCEDURE — 94640 AIRWAY INHALATION TREATMENT: CPT

## 2021-05-09 PROCEDURE — 82803 BLOOD GASES ANY COMBINATION: CPT

## 2021-05-09 PROCEDURE — 94003 VENT MGMT INPAT SUBQ DAY: CPT

## 2021-05-09 PROCEDURE — 2580000003 HC RX 258: Performed by: INTERNAL MEDICINE

## 2021-05-09 PROCEDURE — 80048 BASIC METABOLIC PNL TOTAL CA: CPT

## 2021-05-09 PROCEDURE — 94761 N-INVAS EAR/PLS OXIMETRY MLT: CPT

## 2021-05-09 PROCEDURE — 71045 X-RAY EXAM CHEST 1 VIEW: CPT

## 2021-05-09 PROCEDURE — 89220 SPUTUM SPECIMEN COLLECTION: CPT

## 2021-05-09 PROCEDURE — 99232 SBSQ HOSP IP/OBS MODERATE 35: CPT | Performed by: INTERNAL MEDICINE

## 2021-05-09 RX ORDER — DIAZEPAM 5 MG/1
5 TABLET ORAL ONCE
Status: COMPLETED | OUTPATIENT
Start: 2021-05-10 | End: 2021-05-10

## 2021-05-09 RX ADMIN — ASPIRIN 81 MG: 81 TABLET, CHEWABLE ORAL at 08:46

## 2021-05-09 RX ADMIN — CARBOXYMETHYLCELLULOSE SODIUM 1 DROP: 10 GEL OPHTHALMIC at 02:11

## 2021-05-09 RX ADMIN — Medication 10 ML: at 08:46

## 2021-05-09 RX ADMIN — Medication 1.2 MCG/HR: at 02:10

## 2021-05-09 RX ADMIN — PROPOFOL 40 MCG/KG/MIN: 10 INJECTION, EMULSION INTRAVENOUS at 08:45

## 2021-05-09 RX ADMIN — ATORVASTATIN CALCIUM 40 MG: 40 TABLET, FILM COATED ORAL at 20:32

## 2021-05-09 RX ADMIN — PROPOFOL 40 MCG/KG/MIN: 10 INJECTION, EMULSION INTRAVENOUS at 20:18

## 2021-05-09 RX ADMIN — WHITE PETROLATUM 57.7 %-MINERAL OIL 31.9 % EYE OINTMENT: at 08:44

## 2021-05-09 RX ADMIN — IPRATROPIUM BROMIDE AND ALBUTEROL SULFATE 1 AMPULE: 2.5; .5 SOLUTION RESPIRATORY (INHALATION) at 10:45

## 2021-05-09 RX ADMIN — ALBUTEROL SULFATE 2.5 MG: 2.5 SOLUTION RESPIRATORY (INHALATION) at 11:37

## 2021-05-09 RX ADMIN — Medication 15 ML: at 20:32

## 2021-05-09 RX ADMIN — Medication 15 ML: at 08:45

## 2021-05-09 RX ADMIN — Medication 1.3 MCG/KG/HR: at 06:43

## 2021-05-09 RX ADMIN — Medication 10 ML: at 20:31

## 2021-05-09 RX ADMIN — WHITE PETROLATUM 57.7 %-MINERAL OIL 31.9 % EYE OINTMENT: at 02:11

## 2021-05-09 RX ADMIN — MONTELUKAST SODIUM 10 MG: 10 TABLET, COATED ORAL at 20:32

## 2021-05-09 RX ADMIN — LEVOTHYROXINE SODIUM 125 MCG: 25 TABLET ORAL at 11:51

## 2021-05-09 RX ADMIN — Medication 1.3 MCG/KG/HR: at 14:59

## 2021-05-09 RX ADMIN — CARBOXYMETHYLCELLULOSE SODIUM 1 DROP: 10 GEL OPHTHALMIC at 16:44

## 2021-05-09 RX ADMIN — ENOXAPARIN SODIUM 40 MG: 40 INJECTION SUBCUTANEOUS at 08:46

## 2021-05-09 RX ADMIN — ALBUTEROL SULFATE 2.5 MG: 2.5 SOLUTION RESPIRATORY (INHALATION) at 07:00

## 2021-05-09 RX ADMIN — PROPOFOL 40 MCG/KG/MIN: 10 INJECTION, EMULSION INTRAVENOUS at 14:06

## 2021-05-09 RX ADMIN — IPRATROPIUM BROMIDE AND ALBUTEROL SULFATE 1 AMPULE: 2.5; .5 SOLUTION RESPIRATORY (INHALATION) at 23:26

## 2021-05-09 RX ADMIN — FAMOTIDINE 20 MG: 20 TABLET ORAL at 08:45

## 2021-05-09 RX ADMIN — Medication 1.3 MCG/KG/HR: at 21:28

## 2021-05-09 RX ADMIN — Medication 75 MCG/HR: at 03:59

## 2021-05-09 RX ADMIN — FAMOTIDINE 20 MG: 20 TABLET ORAL at 20:32

## 2021-05-09 RX ADMIN — MIDAZOLAM HYDROCHLORIDE 4 MG: 1 INJECTION, SOLUTION INTRAMUSCULAR; INTRAVENOUS at 08:44

## 2021-05-09 RX ADMIN — CARBOXYMETHYLCELLULOSE SODIUM 1 DROP: 10 GEL OPHTHALMIC at 14:45

## 2021-05-09 RX ADMIN — Medication 75 MCG/HR: at 14:06

## 2021-05-09 RX ADMIN — PROPOFOL 35 MCG/KG/MIN: 10 INJECTION, EMULSION INTRAVENOUS at 03:24

## 2021-05-09 RX ADMIN — WHITE PETROLATUM 57.7 %-MINERAL OIL 31.9 % EYE OINTMENT: at 20:31

## 2021-05-09 RX ADMIN — METHYLPREDNISOLONE SODIUM SUCCINATE 40 MG: 40 INJECTION, POWDER, FOR SOLUTION INTRAMUSCULAR; INTRAVENOUS at 08:45

## 2021-05-09 RX ADMIN — WHITE PETROLATUM 57.7 %-MINERAL OIL 31.9 % EYE OINTMENT: at 06:03

## 2021-05-09 RX ADMIN — IPRATROPIUM BROMIDE AND ALBUTEROL SULFATE 1 AMPULE: 2.5; .5 SOLUTION RESPIRATORY (INHALATION) at 19:19

## 2021-05-09 RX ADMIN — CARBOXYMETHYLCELLULOSE SODIUM 1 DROP: 10 GEL OPHTHALMIC at 20:32

## 2021-05-09 RX ADMIN — Medication 1.3 MCG/KG/HR: at 11:13

## 2021-05-09 RX ADMIN — IPRATROPIUM BROMIDE AND ALBUTEROL SULFATE 1 AMPULE: 2.5; .5 SOLUTION RESPIRATORY (INHALATION) at 07:15

## 2021-05-09 RX ADMIN — CARBOXYMETHYLCELLULOSE SODIUM 1 DROP: 10 GEL OPHTHALMIC at 06:03

## 2021-05-09 RX ADMIN — IPRATROPIUM BROMIDE AND ALBUTEROL SULFATE 1 AMPULE: 2.5; .5 SOLUTION RESPIRATORY (INHALATION) at 03:00

## 2021-05-09 RX ADMIN — IPRATROPIUM BROMIDE AND ALBUTEROL SULFATE 1 AMPULE: 2.5; .5 SOLUTION RESPIRATORY (INHALATION) at 15:34

## 2021-05-09 ASSESSMENT — PULMONARY FUNCTION TESTS
PIF_VALUE: 21
PIF_VALUE: 24
PIF_VALUE: 17
PIF_VALUE: 28
PIF_VALUE: 23
PIF_VALUE: 24
PIF_VALUE: 26
PIF_VALUE: 20
PIF_VALUE: 20
PIF_VALUE: 18
PIF_VALUE: 11
PIF_VALUE: 17
PIF_VALUE: 16
PIF_VALUE: 17
PIF_VALUE: 18

## 2021-05-09 ASSESSMENT — PAIN SCALES - GENERAL
PAINLEVEL_OUTOF10: 0
PAINLEVEL_OUTOF10: 0
PAINLEVEL_OUTOF10: 2
PAINLEVEL_OUTOF10: 0

## 2021-05-09 NOTE — PROGRESS NOTES
05/09/21 0300   Vent Information   Vent Type 840   Vent Mode AC/VC   Vt Ordered 380 mL   Rate Set 16 bmp   Peak Flow 70 L/min   Pressure Support 0 cmH20   FiO2  35 %   SpO2 97 %   SpO2/FiO2 ratio 277.14   Sensitivity 2   PEEP/CPAP 5   I Time/ I Time % 0 s   Humidification Source Heated wire   Humidification Temp Measured 37   Circuit Condensation Drained   Vent Patient Data   High Peep/I Pressure 0   Peak Inspiratory Pressure 25 cmH2O   Mean Airway Pressure 9.19 cmH20   Rate Measured 18 br/min   Vt Exhaled 412 mL   Minute Volume 7.91 Liters   I:E Ratio 1:5.30   Cough/Sputum   Sputum How Obtained Endotracheal   Cough Productive   Sputum Amount Small   Sputum Color Creamy   Tenacity Thick   Spontaneous Breathing Trial (SBT) RT Doc   Pulse 72   Breath Sounds   Right Upper Lobe Clear   Right Middle Lobe Diminished   Right Lower Lobe Diminished   Left Upper Lobe Clear   Left Lower Lobe Diminished   Additional Respiratory  Assessments   Resp 14   Alarm Settings   High Pressure Alarm 50 cmH2O   Low Minute Volume Alarm 2 L/min   Apnea (secs) 20 secs   High Respiratory Rate 40 br/min   Low Exhaled Vt  200 mL   Patient Observation   Observations 8ett 24 at lip

## 2021-05-09 NOTE — PROGRESS NOTES
RASS appears to be 0.     Propofol infusing at 40 mcg/kg/min. Previously infusing at 35 mcg/kg/min. Patient becoming increasingly restless, waking with sustained eye contact, and not following verbal commands.     Precedex infusing at 1.3 mcg/kg/h. Fentanyl infusing at 75 mcg//h.      Electronically signed by Donavan Arreaga RN on 5/9/2021 at 3:36 AM

## 2021-05-09 NOTE — PROGRESS NOTES
IM Progress Note    Admit Date:  4/25/2021  14       Interval history:  Acute resp failure, was on bipap, intubated on 4/26   Had bronchoscopy on 5/1/21. Had thick secretions. 5/3   sedated on the vent  Low grade fevers  On ketamine,propofol and fentanyl    5/4  On the vent. Continues to be on ketamine, propofol and fentanyl. 5/5  Tachycardic this am- hence no SBT  Fevers +    5/6   Fevers upto 101     5/7  Continues to be on the vent. 40% FiO2 and five of PEEP. No fevers. 5/8  Fever up to 101.5 this morning. Did not do well on breathing trials today. 5/9  Low grade temp  Failed SBT- desated, tachycardic,tachypneic     Objective:   /61   Pulse 99   Temp 98.9 °F (37.2 °C) (Bladder)   Resp 26   Ht 5' 4\" (1.626 m)   Wt 153 lb (69.4 kg)   LMP  (LMP Unknown)   SpO2 94%   BMI 26.26 kg/m²       Intake/Output Summary (Last 24 hours) at 5/9/2021 1306  Last data filed at 5/9/2021 1200  Gross per 24 hour   Intake 1369 ml   Output 2075 ml   Net -706 ml       Physical Exam:  General: middle aged female on vent,   Oral ETT and OG noted  Right IJ TLC . Appears to be not in any distress  Neck: No JVD, no carotid bruit, no thyromegaly  Chest: diminished  kelvin air entry with persistent wheeze/diminished BS  Cardiovascular:  RRR S1S2 heard, no murmurs or gallops  Abdomen:  Soft, undistended, non tender, no organomegaly, BS present  kelvin UE edema +  Extremities: No edema or cyanosis.  Distal pulses well felt  Neurological : sedated        Medications:   Scheduled Medications:    methylPREDNISolone  40 mg Intravenous Daily    carboxymethylcellulose PF  1 drop Both Eyes Q4H    artificial tears   Both Eyes Q4H    polyethylene glycol  17 g Oral Daily    insulin lispro  0-12 Units Subcutaneous Q4H    chlorhexidine  15 mL Mouth/Throat BID    aspirin  81 mg Oral Daily    atorvastatin  40 mg Oral Nightly    levothyroxine  125 mcg Oral QAM AC    montelukast  10 mg Oral Nightly    sodium chloride flush 5-40 mL Intravenous 2 times per day    enoxaparin  40 mg Subcutaneous Daily    famotidine  20 mg Oral BID    ipratropium-albuterol  1 ampule Inhalation Q4H     I   dexmedetomidine HCl in NaCl 1.3 mcg/kg/hr (05/09/21 1113)    fentaNYL 75 mcg/hr (05/09/21 0359)    dextrose      propofol 40 mcg/kg/min (05/09/21 0845)    sodium chloride 25 mL (05/04/21 1800)     midazolam, glucose, dextrose, glucagon (rDNA), dextrose, fentanNYL, sodium chloride flush, sodium chloride, acetaminophen **OR** acetaminophen, ondansetron **OR** ondansetron, albuterol, ibuprofen    Lab Data:  Recent Labs     05/07/21  0530 05/08/21  0510 05/09/21  0550   WBC 17.8* 18.0* 15.7*   HGB 9.2* 9.4* 8.7*   HCT 27.5* 28.0* 26.5*   MCV 96.2 96.0 96.8    250 231     Recent Labs     05/07/21  0530 05/08/21  0510 05/09/21  0550   * 135* 133*   K 3.4* 3.4* 3.8   CL 93* 95* 96*   CO2 36* 34* 34*   BUN 35* 26* 25*   CREATININE <0.5* <0.5* <0.5*     No results for input(s): CKTOTAL, CKMB, CKMBINDEX, TROPONINI in the last 72 hours. Coagulation:   Lab Results   Component Value Date    INR 1.03 12/26/2019     Cardiac markers:   Lab Results   Component Value Date    TROPONINI <0.01 04/25/2021         Lab Results   Component Value Date    ALT 29 04/25/2021    AST 39 (H) 04/25/2021    ALKPHOS 77 04/25/2021    BILITOT <0.2 04/25/2021       Lab Results   Component Value Date    INR 1.03 12/26/2019    PROTIME 12.0 12/26/2019         CULTURES  COVID: not detected  Blood: pending  Sputum - rare candida     EKG:  I have reviewed the EKG with the following interpretation:   Sinus tachycardia, Nonspecific ST abnormality     XR CHEST PORTABLE   Final Result   Supportive tubing is in normal position. No acute cardiopulmonary disease. Improved aeration of the left base. XR CHEST PORTABLE   Final Result   Supportive tubing is in normal position. Mild left basilar atelectasis.          XR CHEST PORTABLE   Final Result   Stable support Parasternal images are unobtainable.   Normal left ventricular systolic function with an estimated ejection   fraction of 55-60%.   No regional wall motion abnormalities are seen.   Normal left ventricular diastolic filling pressure.   Normal systolic pulmonary artery pressure (SPAP) estimated at 20 mmHg (RA   pressure 3 mmHg).   No significant valvular heart disease.                 ASSESSMENT/PLAN:     #Acute on chronic hypoxic/hypercapnic respiratory failure-due to COPD exacerbation, Pneumonia  -Normally on home oxygen 3 L   -ABG on admission pH 7.2, PCO2 92  - admitted to ICU on BiPAP  - pulmonology consulted. - IV Solu-medrol, HHN , abx initiated   -- failed bipap and worsened leading to intubation and now on vent support  Ketamine to help reactive airways- wean off ketamine, start precedex  On pressure control setting 45% PEEP of 5. Failed SBT again today      #Pneumonia, Gram positive organism  - Bronchoscopy done. Vancomycin( 4 days) and 3 days of zyvox  Cefepime(completed 7/7) Cultures negative except for candida. Fevers resolving      #Sepsis  -Present on admission  -With tachycardia and tachypnea, leukocytosis.    Secondary to pneumonia  Monitor  Improved BP and off levo     #Tobacco abuse  - smoking cessation needed     #Pulmonary Edema  - ECHO last year with normal EF and normal Diastolic function   - IV Lasix 40 mg as tolerated     #Leukocytosis-likely due to chronic steroids  -Trend WBC- remains high   -neg procalcitonin  White count is declining now.      #Hypothyroidism  - home dose of synthroid 125 mcg      #Hyperlipidemia  - on Atorvastatin.     DVT Prophylaxis: Lovenox  Diet: on TF  Code Status: Full Code       Kieran Sepulveda MD 5/9/2021 1:06 PM

## 2021-05-09 NOTE — PROGRESS NOTES
Cataract APPEARS BORDERLINE VISUALLY SIGNIFICANT. Pulmonary & Critical Care Medicine ICU Progress Note    CC: Shortness of breath, respiratory failure    Events of Last 24 hours: No fever in 24 hrs. Failed SBT in 40min with hypoxia and agitation. Fentanyl @ 75  Propofol @ 40  Precedex @ 1.3  Invasive Lines:  RIJ CVC 4/26/21  MV: 4/26/21-  Vent Mode: AC/VC Rate Set: 16 bmp/Vt Ordered: 380 mL/ /FiO2 : 45 %  Recent Labs     05/08/21  0700 05/09/21  0550   PHART 7.456* 7.452*   SOE4RYK 51.0* 52.1*   PO2ART 49.7* 46.7*     IV:   dexmedetomidine HCl in NaCl 1.3 mcg/kg/hr (05/09/21 0643)    fentaNYL 75 mcg/hr (05/09/21 0359)    dextrose      propofol 40 mcg/kg/min (05/09/21 0845)    sodium chloride 25 mL (05/04/21 1800)       Vitals:  Blood pressure (!) 86/51, pulse 75, temperature 98.9 °F (37.2 °C), temperature source Bladder, resp. rate 20, height 5' 4\" (1.626 m), weight 153 lb (69.4 kg), SpO2 94 %, not currently breastfeeding. on vent  EXAM:  General: intubated, ill appearing    ENT: Pharynx with ETT. Resp: No crackles. Wheezing with poor air movement  CV: S1, S2. No edema  GI: NT, ND, +BS  Skin: Warm and dry. Neuro: PERRL. Sedated, not following commands.      Scheduled Meds:   methylPREDNISolone  40 mg Intravenous Daily    carboxymethylcellulose PF  1 drop Both Eyes Q4H    artificial tears   Both Eyes Q4H    polyethylene glycol  17 g Oral Daily    insulin lispro  0-12 Units Subcutaneous Q4H    chlorhexidine  15 mL Mouth/Throat BID    aspirin  81 mg Oral Daily    atorvastatin  40 mg Oral Nightly    levothyroxine  125 mcg Oral QAM AC    montelukast  10 mg Oral Nightly    sodium chloride flush  5-40 mL Intravenous 2 times per day    enoxaparin  40 mg Subcutaneous Daily    famotidine  20 mg Oral BID    ipratropium-albuterol  1 ampule Inhalation Q4H     PRN Meds:  midazolam, glucose, dextrose, glucagon (rDNA), dextrose, fentanNYL, sodium chloride flush, sodium chloride, acetaminophen **OR** acetaminophen, ondansetron **OR** ondansetron, albuterol, management of life support systems, review of data including imaging and labs, discussions with other team members and physicians is 33 minutes so far today, excluding procedures.

## 2021-05-09 NOTE — PROGRESS NOTES
Shift assessment completed.     ETT at 24LL. Vent settings AC 16/380/40%/+5. Diminished breath sounds bilaterally with expiratory wheezes. Oxygen saturation is in the mid 90's at this time.     Propofol infusing at 40 mcg/kg/min. Precedex infusing at 1.2 mcg/kg/h.    Fentanyl infusing at 75 mcg//h.      RASS appears to be -2 at this time.     During assessment patient opens eyes briefly, not following most commands at this time. However, would open mouth when directed for oral care.     Abdomen is soft and rounded to palpation. Bowel sounds are active and present x4. TF running at 45 mL/h, 0 mL residual.     NSR on monitor, VSS.     Central line dressing is clean, dry, and intact.     Oral care and mouth suctioning performed.     Electronically signed by Yelena Pastrana RN on 5/8/2021 at 9:29 PM

## 2021-05-09 NOTE — PROGRESS NOTES
This note also relates to the following rows which could not be included:  FiO2  - Cannot attach notes to unvalidated device data  SpO2 - Cannot attach notes to unvalidated device data  Resp - Cannot attach notes to unvalidated device data       05/09/21 1920   Vent Information   $Ventilation $Subsequent Day   Vent Type 840   Vent Mode AC/VC   Vt Ordered 380 mL   Rate Set 16 bmp   Peak Flow 70 L/min   Pressure Support 0 cmH20   Sensitivity 2   PEEP/CPAP 5   I Time/ I Time % 0 s   Humidification Source Heated wire   Humidification Temp 37   Humidification Temp Measured 37   Circuit Condensation Drained   Vent Patient Data   High Peep/I Pressure 0   Peak Inspiratory Pressure 20 cmH2O   Mean Airway Pressure 7.7 cmH20   Rate Measured 16 br/min   Vt Exhaled 430 mL   Minute Volume 6.72 Liters   I:E Ratio 1:5.30   Plateau Pressure 14 FNO48   Static Compliance 48 mL/cmH2O   Dynamic Compliance 29 mL/cmH2O   Cough/Sputum   Sputum How Obtained Endotracheal;Suctioned   Cough Non-productive   Sputum Amount None   Spontaneous Breathing Trial (SBT) RT Doc   Pulse 68   Breath Sounds   Right Upper Lobe Diminished   Right Middle Lobe Diminished   Right Lower Lobe Diminished   Left Upper Lobe Diminished   Left Lower Lobe Diminished   Additional Respiratory  Assessments   Position Semi-Woods's   Alarm Settings   High Pressure Alarm 50 cmH2O   Low Minute Volume Alarm 2 L/min   Apnea (secs) 20 secs   High Respiratory Rate 40 br/min   Low Exhaled Vt  250 mL   Non-Surgical Airway Endo Tracheal Tube   Placement Date/Time: 04/26/21 0528   Timeout: Patient;Procedure; Appropriate Equipment  Mask Ventilation: Ventilated by mask (1)  Placed By: Licensed provider  Inserted by: Dr Sis Pardo  Insertion attempts: 1  Airway Device: Endo Tracheal Tube  Size: 8   Secured at 24 cm   Measured From Lips   Secured Location Right   Secured By Commercial tube [email or web address]   Site Condition Dry

## 2021-05-09 NOTE — PROGRESS NOTES
Assisted MFOUZIA with bronchoscopy. Pt placed on PCV 15 and 100% Fio2 during procedure. Pt. lolita well and without complication. Returned to previous settings after procedure. MD administrater  60 ml for wash and 10ml of hypertonic saline. Samples sent.

## 2021-05-09 NOTE — PROGRESS NOTES
This note also relates to the following rows which could not be included:  Vt Ordered - Cannot attach notes to unvalidated device data  Rate Set - Cannot attach notes to unvalidated device data  Peak Flow - Cannot attach notes to unvalidated device data  Pressure Support - Cannot attach notes to unvalidated device data  Sensitivity - Cannot attach notes to unvalidated device data  PEEP/CPAP - Cannot attach notes to unvalidated device data  I Time/ I Time % - Cannot attach notes to unvalidated device data  High Peep/I Pressure - Cannot attach notes to unvalidated device data  Peak Inspiratory Pressure - Cannot attach notes to unvalidated device data  Mean Airway Pressure - Cannot attach notes to unvalidated device data  Rate Measured - Cannot attach notes to unvalidated device data  Vt Exhaled - Cannot attach notes to unvalidated device data  Minute Volume - Cannot attach notes to unvalidated device data  I:E Ratio - Cannot attach notes to unvalidated device data  Pulse - Cannot attach notes to unvalidated device data  High Pressure Alarm - Cannot attach notes to unvalidated device data  Low Minute Volume Alarm - Cannot attach notes to unvalidated device data  High Respiratory Rate - Cannot attach notes to unvalidated device data       05/08/21 2249   Vent Information   Vent Type 840   Vent Mode AC/VC   FiO2  40 %  (decreased to 35)   SpO2 97 %   SpO2/FiO2 ratio 242.5   Humidification Source Heated wire   Humidification Temp 37   Humidification Temp Measured 37.1   Circuit Condensation Drained   Cough/Sputum   Sputum How Obtained Endotracheal;Suctioned   Cough Productive   Sputum Amount Small   Sputum Color Creamy   Tenacity Thick   Breath Sounds   Right Upper Lobe Diminished; Expiratory Wheezes   Right Middle Lobe Diminished; Expiratory Wheezes   Right Lower Lobe Diminished; Expiratory Wheezes   Left Upper Lobe Diminished; Expiratory Wheezes   Left Lower Lobe Diminished; Expiratory Wheezes   Additional Respiratory Assessments   Resp 15   Position Semi-Woods's   Alarm Settings   Apnea (secs) 20 secs   Low Exhaled Vt  200 mL   Non-Surgical Airway Endo Tracheal Tube   Placement Date/Time: 04/26/21 0528   Timeout: Patient;Procedure; Appropriate Equipment  Mask Ventilation: Ventilated by mask (1)  Placed By: Licensed provider  Inserted by: Dr Yeh Chain  Insertion attempts: 1  Airway Device: Endo Tracheal Tube  Size: 8   Secured at 24 cm   Measured From Lips   Secured Location Left  (moved to left)   Secured By Commercial tube [email or web address]   Site Condition Dry

## 2021-05-09 NOTE — PROGRESS NOTES
0745- SBT. Propofol and Fentanyl iV drips stopped. Continues on IV Precedex at 1.3 mcg/kg/hr  Awake, RASS +1.  0800- RR 30s, HR 100s. Agitated,  Verbal encouragement from staff for breathing trial.    0830- RR 30s, HR 120s, Desat'd to 82%. Moe Grounds, RT to room. RASS +2 to +3  Switched back to [facility] vent mode. Propofol/ Fentanyl IV drips restarted. 0840- Versed 4 mg for RASS +2, RR 30s. 6545- requiring 100% 02 breaths x 4 minutes. Vent Fi02 increased to 45%.

## 2021-05-09 NOTE — PROGRESS NOTES
Shift reassessment completed. Vent settings 16/380/35%/+5. Oxygen saturation in the mid 90's. Propofol infusing at 35 mcg/kg/min. RASS appears to be -2. Patient repositioned for comfort. No additional significant changes at this time.     Electronically signed by Trino Baker RN on 5/9/2021 at 12:33 AM    Vitals:    05/09/21 0000   BP: (!) 102/56   Pulse: 76   Resp: 15   Temp: 98.4 °F (36.9 °C)   SpO2: 96%

## 2021-05-09 NOTE — PROGRESS NOTES
Shift reassessment completed. RASS appears to be -1. Patient opening eyes intermittently to voice. Propofol infusing at 40 mcg/kg/min. Fentanyl infusing at 75 mcg//h. Precedex infusing at 1.3 mcg/kg/h. Patient repositioned for comfort. Suctioning and oral care performed.     Electronically signed by Lauren Oliva RN on 5/9/2021 at 4:52 AM    Vitals:    05/09/21 0400   BP: (!) 95/51   Pulse: 78   Resp: 19   Temp: 99.1 °F (37.3 °C)   SpO2: 95%

## 2021-05-09 NOTE — PROGRESS NOTES
Shift report given to Earle Prado RN  at bedside. Patient care handed off in stable condition at this time.

## 2021-05-09 NOTE — PROGRESS NOTES
Handoff report to [facility], IV drips reviewed, resting in bed, all ICU monitoring leads in place.

## 2021-05-10 ENCOUNTER — APPOINTMENT (OUTPATIENT)
Dept: GENERAL RADIOLOGY | Age: 61
DRG: 870 | End: 2021-05-10
Payer: COMMERCIAL

## 2021-05-10 LAB
ANION GAP SERPL CALCULATED.3IONS-SCNC: 4 MMOL/L (ref 3–16)
BASE EXCESS ARTERIAL: 6 MMOL/L (ref -3–3)
BASE EXCESS ARTERIAL: 8.9 MMOL/L (ref -3–3)
BASOPHILS ABSOLUTE: 0.1 K/UL (ref 0–0.2)
BASOPHILS RELATIVE PERCENT: 0.6 %
BUN BLDV-MCNC: 27 MG/DL (ref 7–20)
CALCIUM SERPL-MCNC: 8.5 MG/DL (ref 8.3–10.6)
CARBOXYHEMOGLOBIN ARTERIAL: 0.3 % (ref 0–1.5)
CARBOXYHEMOGLOBIN ARTERIAL: 0.3 % (ref 0–1.5)
CHLORIDE BLD-SCNC: 98 MMOL/L (ref 99–110)
CO2: 34 MMOL/L (ref 21–32)
CREAT SERPL-MCNC: <0.5 MG/DL (ref 0.6–1.2)
EOSINOPHILS ABSOLUTE: 0.6 K/UL (ref 0–0.6)
EOSINOPHILS RELATIVE PERCENT: 3.7 %
GFR AFRICAN AMERICAN: >60
GFR NON-AFRICAN AMERICAN: >60
GLUCOSE BLD-MCNC: 105 MG/DL (ref 70–99)
GLUCOSE BLD-MCNC: 106 MG/DL (ref 70–99)
GLUCOSE BLD-MCNC: 139 MG/DL (ref 70–99)
GLUCOSE BLD-MCNC: 69 MG/DL (ref 70–99)
GLUCOSE BLD-MCNC: 79 MG/DL (ref 70–99)
GLUCOSE BLD-MCNC: 82 MG/DL (ref 70–99)
GLUCOSE BLD-MCNC: 99 MG/DL (ref 70–99)
HCO3 ARTERIAL: 32.1 MMOL/L (ref 21–29)
HCO3 ARTERIAL: 34.3 MMOL/L (ref 21–29)
HCT VFR BLD CALC: 26.6 % (ref 36–48)
HEMOGLOBIN, ART, EXTENDED: 11.1 G/DL (ref 12–16)
HEMOGLOBIN, ART, EXTENDED: 9.8 G/DL (ref 12–16)
HEMOGLOBIN: 8.7 G/DL (ref 12–16)
LYMPHOCYTES ABSOLUTE: 2.3 K/UL (ref 1–5.1)
LYMPHOCYTES RELATIVE PERCENT: 15 %
MAGNESIUM: 2 MG/DL (ref 1.8–2.4)
MCH RBC QN AUTO: 31.9 PG (ref 26–34)
MCHC RBC AUTO-ENTMCNC: 32.8 G/DL (ref 31–36)
MCV RBC AUTO: 97.2 FL (ref 80–100)
METHEMOGLOBIN ARTERIAL: 0.3 %
METHEMOGLOBIN ARTERIAL: 0.3 %
MONOCYTES ABSOLUTE: 0.9 K/UL (ref 0–1.3)
MONOCYTES RELATIVE PERCENT: 5.8 %
NEUTROPHILS ABSOLUTE: 11.3 K/UL (ref 1.7–7.7)
NEUTROPHILS RELATIVE PERCENT: 74.9 %
O2 CONTENT ARTERIAL: 13 ML/DL
O2 CONTENT ARTERIAL: 14 ML/DL
O2 SAT, ARTERIAL: 91.5 %
O2 SAT, ARTERIAL: 92.2 %
O2 THERAPY: ABNORMAL
O2 THERAPY: ABNORMAL
PCO2 ARTERIAL: 52.3 MMHG (ref 35–45)
PCO2 ARTERIAL: 53.7 MMHG (ref 35–45)
PDW BLD-RTO: 14 % (ref 12.4–15.4)
PERFORMED ON: ABNORMAL
PERFORMED ON: NORMAL
PH ARTERIAL: 7.39 (ref 7.35–7.45)
PH ARTERIAL: 7.43 (ref 7.35–7.45)
PLATELET # BLD: 253 K/UL (ref 135–450)
PMV BLD AUTO: 8.4 FL (ref 5–10.5)
PO2 ARTERIAL: 60.3 MMHG (ref 75–108)
PO2 ARTERIAL: 64.8 MMHG (ref 75–108)
POTASSIUM REFLEX MAGNESIUM: 3.5 MMOL/L (ref 3.5–5.1)
RBC # BLD: 2.74 M/UL (ref 4–5.2)
SODIUM BLD-SCNC: 136 MMOL/L (ref 136–145)
TCO2 ARTERIAL: 33.7 MMOL/L
TCO2 ARTERIAL: 35.9 MMOL/L
WBC # BLD: 15.1 K/UL (ref 4–11)

## 2021-05-10 PROCEDURE — 2500000003 HC RX 250 WO HCPCS: Performed by: INTERNAL MEDICINE

## 2021-05-10 PROCEDURE — 36600 WITHDRAWAL OF ARTERIAL BLOOD: CPT

## 2021-05-10 PROCEDURE — 6360000002 HC RX W HCPCS: Performed by: INTERNAL MEDICINE

## 2021-05-10 PROCEDURE — 80048 BASIC METABOLIC PNL TOTAL CA: CPT

## 2021-05-10 PROCEDURE — 82803 BLOOD GASES ANY COMBINATION: CPT

## 2021-05-10 PROCEDURE — 83735 ASSAY OF MAGNESIUM: CPT

## 2021-05-10 PROCEDURE — 36556 INSERT NON-TUNNEL CV CATH: CPT

## 2021-05-10 PROCEDURE — 6370000000 HC RX 637 (ALT 250 FOR IP): Performed by: INTERNAL MEDICINE

## 2021-05-10 PROCEDURE — 99291 CRITICAL CARE FIRST HOUR: CPT | Performed by: INTERNAL MEDICINE

## 2021-05-10 PROCEDURE — 2700000000 HC OXYGEN THERAPY PER DAY

## 2021-05-10 PROCEDURE — 94761 N-INVAS EAR/PLS OXIMETRY MLT: CPT

## 2021-05-10 PROCEDURE — 2580000003 HC RX 258: Performed by: INTERNAL MEDICINE

## 2021-05-10 PROCEDURE — 99232 SBSQ HOSP IP/OBS MODERATE 35: CPT | Performed by: INTERNAL MEDICINE

## 2021-05-10 PROCEDURE — 94003 VENT MGMT INPAT SUBQ DAY: CPT

## 2021-05-10 PROCEDURE — 94640 AIRWAY INHALATION TREATMENT: CPT

## 2021-05-10 PROCEDURE — 2000000000 HC ICU R&B

## 2021-05-10 PROCEDURE — 71045 X-RAY EXAM CHEST 1 VIEW: CPT

## 2021-05-10 PROCEDURE — 94660 CPAP INITIATION&MGMT: CPT

## 2021-05-10 PROCEDURE — 36592 COLLECT BLOOD FROM PICC: CPT

## 2021-05-10 PROCEDURE — 85025 COMPLETE CBC W/AUTO DIFF WBC: CPT

## 2021-05-10 RX ORDER — CARBOXYMETHYLCELLULOSE SODIUM 10 MG/ML
1 GEL OPHTHALMIC EVERY 4 HOURS PRN
Status: DISCONTINUED | OUTPATIENT
Start: 2021-05-10 | End: 2021-05-14 | Stop reason: HOSPADM

## 2021-05-10 RX ADMIN — WHITE PETROLATUM 57.7 %-MINERAL OIL 31.9 % EYE OINTMENT: at 09:14

## 2021-05-10 RX ADMIN — PROPOFOL 40 MCG/KG/MIN: 10 INJECTION, EMULSION INTRAVENOUS at 01:18

## 2021-05-10 RX ADMIN — ASPIRIN 81 MG: 81 TABLET, CHEWABLE ORAL at 07:49

## 2021-05-10 RX ADMIN — FAMOTIDINE 20 MG: 20 TABLET ORAL at 07:49

## 2021-05-10 RX ADMIN — CARBOXYMETHYLCELLULOSE SODIUM 1 DROP: 10 GEL OPHTHALMIC at 09:14

## 2021-05-10 RX ADMIN — Medication 15 ML: at 21:01

## 2021-05-10 RX ADMIN — ONDANSETRON HYDROCHLORIDE 4 MG: 2 INJECTION, SOLUTION INTRAMUSCULAR; INTRAVENOUS at 12:26

## 2021-05-10 RX ADMIN — ENOXAPARIN SODIUM 40 MG: 40 INJECTION SUBCUTANEOUS at 07:49

## 2021-05-10 RX ADMIN — LEVOTHYROXINE SODIUM 125 MCG: 25 TABLET ORAL at 07:49

## 2021-05-10 RX ADMIN — DIAZEPAM 5 MG: 5 TABLET ORAL at 07:49

## 2021-05-10 RX ADMIN — IPRATROPIUM BROMIDE AND ALBUTEROL SULFATE 1 AMPULE: 2.5; .5 SOLUTION RESPIRATORY (INHALATION) at 19:30

## 2021-05-10 RX ADMIN — WHITE PETROLATUM 57.7 %-MINERAL OIL 31.9 % EYE OINTMENT: at 06:27

## 2021-05-10 RX ADMIN — Medication 1.4 MCG/KG/HR: at 10:48

## 2021-05-10 RX ADMIN — METHYLPREDNISOLONE SODIUM SUCCINATE 40 MG: 40 INJECTION, POWDER, FOR SOLUTION INTRAMUSCULAR; INTRAVENOUS at 07:49

## 2021-05-10 RX ADMIN — Medication 10 ML: at 07:50

## 2021-05-10 RX ADMIN — POLYETHYLENE GLYCOL (3350) 17 G: 17 POWDER, FOR SOLUTION ORAL at 07:50

## 2021-05-10 RX ADMIN — Medication 15 ML: at 07:49

## 2021-05-10 RX ADMIN — PROPOFOL 40 MCG/KG/MIN: 10 INJECTION, EMULSION INTRAVENOUS at 07:11

## 2021-05-10 RX ADMIN — WHITE PETROLATUM 57.7 %-MINERAL OIL 31.9 % EYE OINTMENT: at 02:11

## 2021-05-10 RX ADMIN — IPRATROPIUM BROMIDE AND ALBUTEROL SULFATE 1 AMPULE: 2.5; .5 SOLUTION RESPIRATORY (INHALATION) at 03:44

## 2021-05-10 RX ADMIN — Medication 1.3 MCG/KG/HR: at 15:58

## 2021-05-10 RX ADMIN — Medication 1.3 MCG/KG/HR: at 07:11

## 2021-05-10 RX ADMIN — Medication 75 MCG/HR: at 05:15

## 2021-05-10 RX ADMIN — IPRATROPIUM BROMIDE AND ALBUTEROL SULFATE 1 AMPULE: 2.5; .5 SOLUTION RESPIRATORY (INHALATION) at 15:55

## 2021-05-10 RX ADMIN — CARBOXYMETHYLCELLULOSE SODIUM 1 DROP: 10 GEL OPHTHALMIC at 02:11

## 2021-05-10 RX ADMIN — Medication 1 MCG/KG/HR: at 22:16

## 2021-05-10 RX ADMIN — CARBOXYMETHYLCELLULOSE SODIUM 1 DROP: 10 GEL OPHTHALMIC at 06:27

## 2021-05-10 RX ADMIN — Medication 20 ML: at 21:03

## 2021-05-10 RX ADMIN — IPRATROPIUM BROMIDE AND ALBUTEROL SULFATE 1 AMPULE: 2.5; .5 SOLUTION RESPIRATORY (INHALATION) at 08:15

## 2021-05-10 RX ADMIN — IPRATROPIUM BROMIDE AND ALBUTEROL SULFATE 1 AMPULE: 2.5; .5 SOLUTION RESPIRATORY (INHALATION) at 11:45

## 2021-05-10 RX ADMIN — IPRATROPIUM BROMIDE AND ALBUTEROL SULFATE 1 AMPULE: 2.5; .5 SOLUTION RESPIRATORY (INHALATION) at 23:42

## 2021-05-10 RX ADMIN — Medication 1.3 MCG/KG/HR: at 02:12

## 2021-05-10 ASSESSMENT — PULMONARY FUNCTION TESTS
PIF_VALUE: 25
PIF_VALUE: 23
PIF_VALUE: 12
PIF_VALUE: 26
PIF_VALUE: 29
PIF_VALUE: 12
PIF_VALUE: 24
PIF_VALUE: 27
PIF_VALUE: 25

## 2021-05-10 ASSESSMENT — PAIN SCALES - GENERAL
PAINLEVEL_OUTOF10: 0
PAINLEVEL_OUTOF10: 0

## 2021-05-10 ASSESSMENT — PAIN DESCRIPTION - PROGRESSION
CLINICAL_PROGRESSION: GRADUALLY IMPROVING
CLINICAL_PROGRESSION: GRADUALLY IMPROVING

## 2021-05-10 NOTE — PROGRESS NOTES
Pulmonary & Critical Care Medicine ICU Progress Note    CC: Shortness of breath, acute on chronic respiratory failure    Events of Last 24 hours: patient with anxiety during SBT  Fentanyl @ 75  Propofol @ 40  Precedex @ 1.3      Invasive Lines:  RIJ CVC 4/26/21  MV: 4/26/21-  Vent Mode: AC/VC Rate Set: 16 bmp/Vt Ordered: 380 mL/ /FiO2 : 35 %  Recent Labs     05/09/21  0550 05/10/21  0533   PHART 7.452* 7.435   BHO9RXP 52.1* 52.3*   PO2ART 46.7* 60.3*     IV:   dexmedetomidine HCl in NaCl 1.3 mcg/kg/hr (05/10/21 0711)    fentaNYL 75 mcg/hr (05/10/21 0515)    dextrose      propofol 40 mcg/kg/min (05/10/21 0711)    sodium chloride 25 mL (05/04/21 1800)       Vitals:  Blood pressure (!) 91/58, pulse 61, temperature 98 °F (36.7 °C), temperature source Bladder, resp. rate 17, height 5' 4\" (1.626 m), weight 153 lb (69.4 kg), SpO2 94 %, not currently breastfeeding. on vent  EXAM:  Gen: In mild acute distress. Alert. Eyes: PERRL. No sclera icterus. No conjunctival injection. ENT: No ocular or auricular discharge. Oropharynx clear. Neck: Trachea midline. Normal thyroid. Resp: No accessory muscle use. No crackles. No wheezes. No rhonchi. No dullness on percussion. CV: Regular rate. Regular rhythm. No murmur or rub. Normal S1 and S2.  GI: Abdomen non-tender. Non-distended. No masses. Skin: Warm and dry. No nodules on exposed extremities. No rash on exposed extremities. M/S: No cyanosis. No synovitis or joint deformity. No clubbing. Neuro: Cranial nerves are grossly intact. Moving all extremities. Motor and sensation grossly intact.  + anxious. Follows commands.       Scheduled Meds:   diazePAM  5 mg Oral Once    methylPREDNISolone  40 mg Intravenous Daily    carboxymethylcellulose PF  1 drop Both Eyes Q4H    artificial tears   Both Eyes Q4H    polyethylene glycol  17 g Oral Daily    insulin lispro  0-12 Units Subcutaneous Q4H    chlorhexidine  15 mL Mouth/Throat BID    aspirin  81 mg Oral Daily    Precedex  Valium 5mg PO before SBT today did not help    Head of bed 30 degrees or higher at all times  Duonebs Q4  IV Solu-Medrol daily  Completed Cefepime D#7/7, Zyvox D#3. Post 4 days of vancomycin  Completed 7 days ceftriaxone and 5 days azithromycin and 4 days vancomycin. ICU care- ap montez is spouse    Patient is critically ill with acute respiratory failure. We will adjust NIPPV as above. Critical care time spent reviewing labs/films, examining patient, collaborating with other physicians but excluding procedures for life threatening organ failure is 32 minutes.

## 2021-05-10 NOTE — CARE COORDINATION
INTERDISCIPLINARY PLAN OF CARE CONFERENCE    Date/Time: 5/10/2021 12:57 PM  Completed by: Sejal De Souza, Case Management      Patient Name:  Miriam Villanueva  YOB: 1960  Admitting Diagnosis: Acute on chronic respiratory failure ([facility] [address] [J96.20]     Admit Date/Time:  4/25/2021  2:30 AM    Chart reviewed. Interdisciplinary team contacted or reviewed plan related to patient progress and discharge plans. Disciplines included Case Management, Nursing, and Dietitian. Current Status:ICU LOC,inpt  PT/OT recommendation for discharge plan of care: NA    Expected D/C Disposition:  tbd    Discharge Plan Comments: Reviewed chart. Pt now extubated to BIPAP. Will need PT/OT when appropriate. Following.      Home O2 in place on admit: Yes

## 2021-05-10 NOTE — PROGRESS NOTES
unresponsive or quadriplegic = 4    Respiratory Pattern: Regular Pattern; RR 8-20 = 0    Breath Sounds: Absent bilaterally and/or with wheezes = 3    Sputum  Sputum Color: Creamy, Tenacity: Thick, Sputum How Obtained: Endotracheal, Suctioned  Cough: Strong, spontaneous, non-productive = 0    Vital Signs   BP (!) 104/54   Pulse 69   Temp 98 °F (36.7 °C) (Bladder)   Resp 16   Ht 5' 4\" (1.626 m)   Wt 153 lb (69.4 kg)   LMP  (LMP Unknown)   SpO2 98%   BMI 26.26 kg/m²   SPO2 (COPD values may differ): 86-87% on room air or greater than 92% on FiO2 35- 50% = 3    Peak Flow (asthma only): not applicable = 0    RSI: 98-09 = Q4WA (every four hours while awake) and Q4hrs PRN        Plan       Goals: mobilize retained secretions, volume expansion and improve oxygenation    Patient/caregiver was educated on the proper method of use for Respiratory Care Devices:  no      Level of patient/caregiver understanding able to:   ? Verbalize understanding   ? Demonstrate understanding       ? Teach back        ? Needs reinforcement       ? No available caregiver               ? Other:     Response to education:  on vent     Is patient being placed on Home Treatment Regimen? No     Does the patient have everything they need prior to discharge? NA     Comments: pt assessed and chart reviwed    Plan of Care: duoneb q4, albuterol q2 prn    Electronically signed by Tarsha Gillette RCP on 5/10/2021 at 1:36 AM    Respiratory Protocol Guidelines     1. Assessment and treatment by Respiratory Therapy will be initiated for medication and therapeutic interventions upon initiation of aerosolized medication. 2. Physician will be contacted for respiratory rate (RR) greater than 35 breaths per minute. Therapy will be held for heart rate (HR) greater than 140 beats per minute, pending direction from physician. 3. Bronchodilators will be administered via Metered Dose Inhaler (MDI) with spacer when the following criteria are met:  a.  Alert and cooperative     b. HR < 140 bpm  c. RR < 30 bpm                d. Can demonstrate a 2-3 second inspiratory hold  4. Bronchodilators will be administered via Hand Held Nebulizer [facility]) to patients when ANY of the following criteria are met  a. Incognizant or uncooperative          b. Patients treated with HHN at Home        c. Unable to demonstrate proper use of MDI with spacer     d. RR > 30 bpm   5. Bronchodilators will be delivered via Metered Dose Inhaler (MDI), HHN, Aerogen to intubated patients on mechanical ventilation. 6. Inhalation medication orders will be delivered and/or substituted as outlined below. Aerosolized Medications Ordering and Administration Guidelines:    1. All Medications will be ordered by a physician, and their frequency and/or modality will be adjusted as defined by the patients Respiratory Severity Index (RSI) score. 2. If the patient does not have documented COPD, consider discontinuing anticholinergics when RSI is less than 9.  3. If the bronchospasm worsens (increased RSI), then the bronchodilator frequency can be increased to a maximum of every 4 hours. If greater than every 4 hours is required, the physician will be contacted. 4. If the bronchospasm improves, the frequency of the bronchodilator can be decreased, based on the patient's RSI, but not less than home treatment regimen frequency. 5. Bronchodilator(s) will be discontinued if patient has a RSI less than 9 and has received no scheduled or as needed treatment for 72  Hrs. Patients Ordered on a Mucolytic Agent:    1. Must always be administered with a bronchodilator. 2. Discontinue if patient experiences worsened bronchospasm, or secretions have lessened to the point that the patient is able to clear them with a cough. Anti-inflammatory and Combination Medications:    1.  If the patient lacks prior history of lung disease, is not using inhaled anti-inflammatory medication at home, and lacks wheezing by

## 2021-05-10 NOTE — PROGRESS NOTES
Shift reassessment completed. Vent settings 16/380/35%/+5. Oxygen saturation in the mid to low 90's.     RASS appears to be -2.     NSR on monitor. VSS.     Patient repositioned for comfort. Oral care and suctioning performed.     No additional significant changes at this time.

## 2021-05-10 NOTE — PROGRESS NOTES
+3 edema noted in LUE primarily near forearm and hand, with weeping present.     Electronically signed by Verona Rojas RN on 5/10/2021 at 6:23 AM

## 2021-05-10 NOTE — PROGRESS NOTES
Bedside report given to oncoming RN and [facility]

## 2021-05-10 NOTE — PROGRESS NOTES
Initial exam completed- See doc flowsheet for assessment findings. Pt resting quietly in bed and does not open eyes but does withdraw to painful stimulus. All lines and monitoring devices are in place . Vitals and SpO2 stable. Call light is within easy reach. Plan of care and goals reviewed.  Lady Shaver RN

## 2021-05-10 NOTE — PROGRESS NOTES
IM Progress Note    Admit Date:  4/25/2021  15     Interval history:      Admitted with acute resp failure. Was on bipap, intubated on 4/26   Had bronchoscopy on 5/1/21. Had thick secretions. 5/4  On the vent. Continues to be on ketamine, propofol and fentanyl. 5/5  Tachycardic this am- hence no SBT  Fevers +    5/8  Fever up to 101.5 this morning. Did not do well on breathing trials today. Continued on mechanical vent    5/9  Low grade temp  Failed SBT- desated, tachycardic,tachypneic     5/10  Patient was extubated today. Currently looks fatigued but responding appropriately; she is on BiPAP. Continued on Precedex drip. Low-grade fevers    Objective:   /64   Pulse 88   Temp 100 °F (37.8 °C) (Bladder)   Resp 14   Ht 5' 4\" (1.626 m)   Wt 153 lb (69.4 kg)   LMP  (LMP Unknown)   SpO2 98%   BMI 26.26 kg/m²       Intake/Output Summary (Last 24 hours) at 5/10/2021 1438  Last data filed at 5/10/2021 1300  Gross per 24 hour   Intake 1605 ml   Output 750 ml   Net 855 ml       Physical Exam:  General: middle aged female. she is awake and alert .  looks very fatigued on BiPAP  Appears to be not in any distress  Neck: No JVD, no carotid bruit, no thyromegaly  Chest: diminished  kelvin air entry. Bilateral diffuse rhonchi present  Cardiovascular:  RRR S1S2 heard, no murmurs or gallops  Abdomen:  Soft, undistended, non tender, no organomegaly, BS present  kelvin UE edema +  Extremities: No edema or cyanosis.  Distal pulses well felt  Neurological : Nonfocal        Medications:   Scheduled Medications:    methylPREDNISolone  40 mg Intravenous Daily    polyethylene glycol  17 g Oral Daily    insulin lispro  0-12 Units Subcutaneous Q4H    chlorhexidine  15 mL Mouth/Throat BID    aspirin  81 mg Oral Daily    atorvastatin  40 mg Oral Nightly    levothyroxine  125 mcg Oral QAM AC    montelukast  10 mg Oral Nightly    sodium chloride flush  5-40 mL Intravenous 2 times per day    enoxaparin  40 mg Subcutaneous Daily    famotidine  20 mg Oral BID    ipratropium-albuterol  1 ampule Inhalation Q4H     I   dexmedetomidine HCl in NaCl 1.4 mcg/kg/hr (05/10/21 1048)    fentaNYL Stopped (05/10/21 0910)    dextrose      propofol Stopped (05/10/21 0910)    sodium chloride 25 mL (05/04/21 1800)     carboxymethylcellulose PF, midazolam, glucose, dextrose, glucagon (rDNA), dextrose, fentanNYL, sodium chloride flush, sodium chloride, acetaminophen **OR** acetaminophen, ondansetron **OR** ondansetron, albuterol, ibuprofen    Lab Data:  Recent Labs     05/08/21  0510 05/09/21  0550 05/10/21  0520   WBC 18.0* 15.7* 15.1*   HGB 9.4* 8.7* 8.7*   HCT 28.0* 26.5* 26.6*   MCV 96.0 96.8 97.2    231 253     Recent Labs     05/08/21  0510 05/09/21  0550 05/10/21  0520   * 133* 136   K 3.4* 3.8 3.5   CL 95* 96* 98*   CO2 34* 34* 34*   BUN 26* 25* 27*   CREATININE <0.5* <0.5* <0.5*     No results for input(s): CKTOTAL, CKMB, CKMBINDEX, TROPONINI in the last 72 hours. Coagulation:   Lab Results   Component Value Date    INR 1.03 12/26/2019     Cardiac markers:   Lab Results   Component Value Date    TROPONINI <0.01 04/25/2021         Lab Results   Component Value Date    ALT 29 04/25/2021    AST 39 (H) 04/25/2021    ALKPHOS 77 04/25/2021    BILITOT <0.2 04/25/2021       Lab Results   Component Value Date    INR 1.03 12/26/2019    PROTIME 12.0 12/26/2019         CULTURES  COVID: not detected  Blood: pending  Sputum - rare candida     EKG:  I have reviewed the EKG with the following interpretation:   Sinus tachycardia, Nonspecific ST abnormality       Radiology  XR CHEST PORTABLE   Final Result   Pulmonary edema. XR CHEST PORTABLE   Final Result   Supportive tubing is in normal position. No acute cardiopulmonary disease. Improved aeration of the left base. XR CHEST PORTABLE   Final Result   Supportive tubing is in normal position. Mild left basilar atelectasis.          XR CHEST PORTABLE Final Result   Stable support apparatus. Mild pulmonary vascular congestion, stable in appearance. XR CHEST 1 VIEW   Final Result   Mild pulmonary vascular congestion. VL Extremity Venous Bilateral   Final Result      XR CHEST PORTABLE   Final Result   1. Stable pulmonary vascular congestion. XR CHEST PORTABLE   Final Result   Tubes and lines as above. No significant interval change from prior study. No overt edema, fusion, extrapleural air or focal consolidation. XR CHEST PORTABLE   Final Result   Stable chest.         XR CHEST PORTABLE   Final Result   Stable chest.         XR CHEST PORTABLE   Final Result   Hazy bibasilar airspace disease, slightly increased on the right, either due   to atelectasis or pneumonia. XR CHEST PORTABLE   Final Result   Stable chest with moderate hyperinflation. Trace atelectasis left lung base. XR CHEST PORTABLE   Final Result   In this patient with underlying COPD, the lungs are clear. Interstitial   opacities described on recent exam are less evident on current study. XR CHEST PORTABLE   Final Result   Increased lung markings bilaterally, may be related to minimal pulmonary   vascular congestion versus bronchitis. XR CHEST PORTABLE   Final Result   Stable chest.      Lungs are hyperinflated with no acute airspace disease. XR CHEST PORTABLE   Final Result   Minimal right basilar airspace disease likely atelectasis. Lungs are   moderately hyperinflated. XR CHEST PORTABLE   Final Result   Appropriate positioning of right central venous catheter. XR CHEST PORTABLE   Final Result   Satisfactory position endotracheal tube and NG tube. No acute airspace disease. Pulmonary vessels upper normal.         XR CHEST PORTABLE   Final Result   Pulmonary edema with bibasilar atelectasis versus pneumonia.          XR CHEST PORTABLE    (Results Pending)              Echo 8/25/2020   Summary   Limited imaging due to lung interface. Parasternal images are unobtainable.   Normal left ventricular systolic function with an estimated ejection   fraction of 55-60%.   No regional wall motion abnormalities are seen.   Normal left ventricular diastolic filling pressure.   Normal systolic pulmonary artery pressure (SPAP) estimated at 20 mmHg (RA   pressure 3 mmHg).   No significant valvular heart disease.          ASSESSMENT/PLAN:     #Acute on chronic hypoxic/hypercapnic respiratory failure  -due to COPD exacerbation, Pneumonia  -Normally on home oxygen 3 L   -ABG on admission pH 7.2, PCO2 92  - admitted to ICU on BiPAP  - pulmonology consulted. - IV Solu-medrol, HHN , abx initiated   --> failed bipap and worsened- leading to intubation and mechanical vent  support  -Required ketamine to help reactive airways- weaned off ketamine, started precedex  -> Extubated today 5/20/2021 , on BiPAP. looks very fatigued continued on Precedex drip      #Pneumonia, Gram positive organism  - Bronchoscopy done. - Received vancomycin( 4 days) and 3 days of zyvox  -continued on cefepime(completed 7/7)   -Cultures negative except for candida. Fevers resolving      #Sepsis  -Present on admission  -With tachycardia and tachypnea, leukocytosis.    Secondary to pneumonia  Monitor  Improved BP and off levo     #Tobacco abuse  - smoking cessation needed     #Pulmonary Edema  - ECHO last year with normal EF and normal Diastolic function   - IV Lasix 40 mg as tolerated     #Leukocytosis-likely due to chronic steroids  -Trend WBC- remains high   -neg procalcitonin  White count is declining now.      #Hypothyroidism  - home dose of synthroid 125 mcg      #Hyperlipidemia  - on Atorvastatin.     DVT Prophylaxis: Lovenox  Diet: on TF  Code Status: Full Code       Karli Gonsalez MD 5/10/2021 2:38 PM

## 2021-05-10 NOTE — PROGRESS NOTES
05/09/21 2326   Vent Information   Vent Type 840   Vent Mode AC/VC   Vt Ordered 380 mL   Rate Set 16 bmp   Peak Flow 70 L/min   Pressure Support 0 cmH20   FiO2  45 %  (decreased to 40)   SpO2 99 %   SpO2/FiO2 ratio 220   Sensitivity 2   PEEP/CPAP 5   I Time/ I Time % 0 s   Humidification Source Heated wire   Humidification Temp 37   Humidification Temp Measured 37   Circuit Condensation Drained   Vent Patient Data   High Peep/I Pressure 0   Peak Inspiratory Pressure 24 cmH2O   Mean Airway Pressure 7.9 cmH20   Rate Measured 16 br/min   Vt Exhaled 395 mL   Minute Volume 6.69 Liters   I:E Ratio 1:5.30   Plateau Pressure 15 [id number]   Cough/Sputum   Sputum How Obtained Endotracheal;Suctioned   Cough Non-productive   Sputum Amount None   Spontaneous Breathing Trial (SBT) RT Doc   Pulse 62   Breath Sounds   Right Upper Lobe Diminished   Right Middle Lobe Diminished   Right Lower Lobe Diminished   Left Upper Lobe Diminished   Left Lower Lobe Diminished   Additional Respiratory  Assessments   Resp 17   Position Semi-Woods's   Alarm Settings   High Pressure Alarm 50 cmH2O   Low Minute Volume Alarm 2 L/min   Apnea (secs) 20 secs   High Respiratory Rate 40 br/min   Low Exhaled Vt  250 mL   Non-Surgical Airway Endo Tracheal Tube   Placement Date/Time: 04/26/21 6716   Timeout: Patient;Procedure; Appropriate Equipment  Mask Ventilation: Ventilated by mask (1)  Placed By: Licensed provider  Inserted by: Dr Carly Fink  Insertion attempts: 1  Airway Device: Endo Tracheal Tube  Size: 8   Secured at 24 cm   Measured From Lips   Secured Location Left  (moved to left)   Secured By Commercial tube [email or web address]   Site Condition Dry

## 2021-05-10 NOTE — PROGRESS NOTES
Pt taken off BiPAP per her request and she is more alert. Vitals stable.  Pt placed on 5 lpm N/C  Christophe Martin RN

## 2021-05-10 NOTE — PROGRESS NOTES
Shift reassessment completed.     Vent settings 16/380/40%/+5. Oxygen saturation 98% at this time.     RASS appears to be -2. NSR on monitor. VSS.     Patient repositioned for comfort.      No additional significant changes at this time.     Electronically signed by Maren Valdes RN on 5/10/2021 at 12:03 AM    Vitals:    05/10/21 0000   BP: (!) 104/54   Pulse: 69   Resp: 16   Temp: 98 °F (36.7 °C)   SpO2: 98%

## 2021-05-10 NOTE — PROGRESS NOTES
Pt on SBT after diprivan and fentanyl gtts stopped at 0910  at bedside and update given Abe Mattson RN

## 2021-05-10 NOTE — PROGRESS NOTES
Patient is not able to demonstrate the ability to move from a reclining position to an upright position within the recliner due to Pt on vent and bedrest .     High risk vesicant drug infusing:  ______n___    Multiple incompatible medications infusing:  ____y_____    CVP Monitoring:  ______n___    Extremely difficult IV access challenge:  ____y____    Continued need for central line access:  ____y______    Addressed with physician:  _______y_    RIGHT PATIENT, RIGHT TIME, RIGHT LINE    Oh Arellano RN

## 2021-05-10 NOTE — PROGRESS NOTES
05/10/21 0345   Vent Information   Vent Type 840   Vent Mode AC/VC   Vt Ordered 380 mL   Rate Set 16 bmp   Peak Flow 70 L/min   Pressure Support 0 cmH20   FiO2  40 %  (decreased to 35)   SpO2 98 %   SpO2/FiO2 ratio 245   Sensitivity 2   PEEP/CPAP 5   I Time/ I Time % 0 s   Humidification Source Heated wire   Humidification Temp 37   Humidification Temp Measured 37.2   Circuit Condensation Drained   Vent Patient Data   High Peep/I Pressure 0   Peak Inspiratory Pressure 25 cmH2O   Mean Airway Pressure 8 cmH20   Rate Measured 16 br/min   Vt Exhaled 442 mL   Minute Volume 6.73 Liters   I:E Ratio 1:5.30   Plateau Pressure 17 [id number]   Cough/Sputum   Sputum How Obtained Endotracheal;Suctioned   Cough Productive   Sputum Amount Small   Sputum Color Creamy   Tenacity Thick   Spontaneous Breathing Trial (SBT) RT Doc   Pulse 60   Breath Sounds   Right Upper Lobe Diminished   Right Middle Lobe Diminished   Right Lower Lobe Diminished   Left Upper Lobe Diminished   Left Lower Lobe Diminished   Additional Respiratory  Assessments   Resp 16   Position Semi-Woods's   Alarm Settings   High Pressure Alarm 50 cmH2O   Low Minute Volume Alarm 2 L/min   Apnea (secs) 20 secs   High Respiratory Rate 40 br/min   Low Exhaled Vt  250 mL   Non-Surgical Airway Endo Tracheal Tube   Placement Date/Time: 04/26/21 4159   Timeout: Patient;Procedure; Appropriate Equipment  Mask Ventilation: Ventilated by mask (1)  Placed By: Licensed provider  Inserted by: Dr Hayden Kirkpatrick  Insertion attempts: 1  Airway Device: Endo Tracheal Tube  Size: 8   Secured at 24 cm   Measured From Lips   Secured Location Right   Secured By Commercial tube [email or web address]   Site Condition Dry

## 2021-05-10 NOTE — PROGRESS NOTES
ABG's drawn per policy and procedure from right radial  Chung's test shows adequate collateral circulation.  No bleeding or hematoma at site Pressure to site x 5 minutes and bandage applied  Sandra Motta RN

## 2021-05-10 NOTE — PROGRESS NOTES
Pt resting quietly in bed with all lines and monitoring devices in place. Vitals and SpO2 stable. Pt tolerating BiPAP well and respirations are unlabored.   No changes noted in exam. Continue current plan of care Amanuel Norton RN

## 2021-05-10 NOTE — PROGRESS NOTES
Shift assessment completed.     ETT at 24LL. Vent settings AC 16/380/40%/+5. Diminished breath sounds bilaterally with expiratory wheezes. Oxygen saturation is 98% at this time.     Propofol infusing at 40 mcg/kg/min. Precedex infusing at 1.3 mcg/kg/h.    Fentanyl infusing at 75 mcg//h.      RASS appears to be -2 at this time.     Patient not following commands at this time to  hands or move feet.     Abdomen is soft and rounded to palpation. Bowel sounds are active and present x4. TF running at 45 mL/h, 180 mL residual.     NSR on monitor, VSS.     Central line dressing is clean, dry, and intact.     Oral care and mouth suctioning performed.     Electronically signed by Jian Larry RN on 5/9/2021 at 8:47 PM    Vitals:    05/09/21 2000   BP: (!) 102/56   Pulse: 71   Resp: 15   Temp: 98.1 °F (36.7 °C)   SpO2: 98%

## 2021-05-11 ENCOUNTER — APPOINTMENT (OUTPATIENT)
Dept: GENERAL RADIOLOGY | Age: 61
DRG: 870 | End: 2021-05-11
Payer: COMMERCIAL

## 2021-05-11 LAB
ANION GAP SERPL CALCULATED.3IONS-SCNC: 4 MMOL/L (ref 3–16)
BASE EXCESS ARTERIAL: 9.1 MMOL/L (ref -3–3)
BASOPHILS ABSOLUTE: 0.1 K/UL (ref 0–0.2)
BASOPHILS RELATIVE PERCENT: 0.7 %
BUN BLDV-MCNC: 24 MG/DL (ref 7–20)
CALCIUM SERPL-MCNC: 8.2 MG/DL (ref 8.3–10.6)
CARBOXYHEMOGLOBIN ARTERIAL: 0.2 % (ref 0–1.5)
CHLORIDE BLD-SCNC: 100 MMOL/L (ref 99–110)
CO2: 33 MMOL/L (ref 21–32)
CREAT SERPL-MCNC: <0.5 MG/DL (ref 0.6–1.2)
EOSINOPHILS ABSOLUTE: 0.5 K/UL (ref 0–0.6)
EOSINOPHILS RELATIVE PERCENT: 3.3 %
GFR AFRICAN AMERICAN: >60
GFR NON-AFRICAN AMERICAN: >60
GLUCOSE BLD-MCNC: 78 MG/DL (ref 70–99)
GLUCOSE BLD-MCNC: 82 MG/DL (ref 70–99)
GLUCOSE BLD-MCNC: 85 MG/DL (ref 70–99)
GLUCOSE BLD-MCNC: 90 MG/DL (ref 70–99)
GLUCOSE BLD-MCNC: 93 MG/DL (ref 70–99)
GLUCOSE BLD-MCNC: 95 MG/DL (ref 70–99)
HCO3 ARTERIAL: 33.5 MMOL/L (ref 21–29)
HCT VFR BLD CALC: 26.4 % (ref 36–48)
HEMOGLOBIN, ART, EXTENDED: 9.6 G/DL (ref 12–16)
HEMOGLOBIN: 8.7 G/DL (ref 12–16)
LYMPHOCYTES ABSOLUTE: 2 K/UL (ref 1–5.1)
LYMPHOCYTES RELATIVE PERCENT: 13.8 %
MCH RBC QN AUTO: 31.6 PG (ref 26–34)
MCHC RBC AUTO-ENTMCNC: 32.9 G/DL (ref 31–36)
MCV RBC AUTO: 96 FL (ref 80–100)
METHEMOGLOBIN ARTERIAL: 0.3 %
MONOCYTES ABSOLUTE: 1.1 K/UL (ref 0–1.3)
MONOCYTES RELATIVE PERCENT: 7.7 %
NEUTROPHILS ABSOLUTE: 10.6 K/UL (ref 1.7–7.7)
NEUTROPHILS RELATIVE PERCENT: 74.5 %
O2 CONTENT ARTERIAL: 14 ML/DL
O2 SAT, ARTERIAL: 98.8 %
O2 THERAPY: ABNORMAL
PCO2 ARTERIAL: 45.5 MMHG (ref 35–45)
PDW BLD-RTO: 13.9 % (ref 12.4–15.4)
PERFORMED ON: NORMAL
PH ARTERIAL: 7.49 (ref 7.35–7.45)
PLATELET # BLD: 262 K/UL (ref 135–450)
PMV BLD AUTO: 8.5 FL (ref 5–10.5)
PO2 ARTERIAL: 134.5 MMHG (ref 75–108)
POTASSIUM SERPL-SCNC: 3.1 MMOL/L (ref 3.5–5.1)
RBC # BLD: 2.75 M/UL (ref 4–5.2)
SODIUM BLD-SCNC: 137 MMOL/L (ref 136–145)
TCO2 ARTERIAL: 34.9 MMOL/L
WBC # BLD: 14.3 K/UL (ref 4–11)

## 2021-05-11 PROCEDURE — 6360000002 HC RX W HCPCS: Performed by: INTERNAL MEDICINE

## 2021-05-11 PROCEDURE — 71045 X-RAY EXAM CHEST 1 VIEW: CPT

## 2021-05-11 PROCEDURE — 97530 THERAPEUTIC ACTIVITIES: CPT

## 2021-05-11 PROCEDURE — 97166 OT EVAL MOD COMPLEX 45 MIN: CPT

## 2021-05-11 PROCEDURE — 2000000000 HC ICU R&B

## 2021-05-11 PROCEDURE — 97110 THERAPEUTIC EXERCISES: CPT

## 2021-05-11 PROCEDURE — 87086 URINE CULTURE/COLONY COUNT: CPT

## 2021-05-11 PROCEDURE — 6370000000 HC RX 637 (ALT 250 FOR IP): Performed by: INTERNAL MEDICINE

## 2021-05-11 PROCEDURE — 99233 SBSQ HOSP IP/OBS HIGH 50: CPT | Performed by: INTERNAL MEDICINE

## 2021-05-11 PROCEDURE — 94640 AIRWAY INHALATION TREATMENT: CPT

## 2021-05-11 PROCEDURE — 2700000000 HC OXYGEN THERAPY PER DAY

## 2021-05-11 PROCEDURE — 2500000003 HC RX 250 WO HCPCS: Performed by: INTERNAL MEDICINE

## 2021-05-11 PROCEDURE — 36415 COLL VENOUS BLD VENIPUNCTURE: CPT

## 2021-05-11 PROCEDURE — 85025 COMPLETE CBC W/AUTO DIFF WBC: CPT

## 2021-05-11 PROCEDURE — 2580000003 HC RX 258: Performed by: INTERNAL MEDICINE

## 2021-05-11 PROCEDURE — 80048 BASIC METABOLIC PNL TOTAL CA: CPT

## 2021-05-11 PROCEDURE — 92526 ORAL FUNCTION THERAPY: CPT

## 2021-05-11 PROCEDURE — 99291 CRITICAL CARE FIRST HOUR: CPT | Performed by: INTERNAL MEDICINE

## 2021-05-11 PROCEDURE — 94660 CPAP INITIATION&MGMT: CPT

## 2021-05-11 PROCEDURE — 94761 N-INVAS EAR/PLS OXIMETRY MLT: CPT

## 2021-05-11 PROCEDURE — 82803 BLOOD GASES ANY COMBINATION: CPT

## 2021-05-11 PROCEDURE — 92610 EVALUATE SWALLOWING FUNCTION: CPT

## 2021-05-11 PROCEDURE — 97162 PT EVAL MOD COMPLEX 30 MIN: CPT

## 2021-05-11 PROCEDURE — 87040 BLOOD CULTURE FOR BACTERIA: CPT

## 2021-05-11 RX ORDER — POTASSIUM CHLORIDE 29.8 MG/ML
20 INJECTION INTRAVENOUS PRN
Status: DISCONTINUED | OUTPATIENT
Start: 2021-05-11 | End: 2021-05-14 | Stop reason: HOSPADM

## 2021-05-11 RX ORDER — MAGNESIUM SULFATE 1 G/100ML
1000 INJECTION INTRAVENOUS PRN
Status: DISCONTINUED | OUTPATIENT
Start: 2021-05-11 | End: 2021-05-14 | Stop reason: HOSPADM

## 2021-05-11 RX ADMIN — ASPIRIN 81 MG: 81 TABLET, CHEWABLE ORAL at 08:46

## 2021-05-11 RX ADMIN — Medication 10 ML: at 08:46

## 2021-05-11 RX ADMIN — ENOXAPARIN SODIUM 40 MG: 40 INJECTION SUBCUTANEOUS at 08:46

## 2021-05-11 RX ADMIN — IPRATROPIUM BROMIDE AND ALBUTEROL SULFATE 1 AMPULE: 2.5; .5 SOLUTION RESPIRATORY (INHALATION) at 11:46

## 2021-05-11 RX ADMIN — Medication 1.2 MCG/HR: at 03:36

## 2021-05-11 RX ADMIN — SODIUM CHLORIDE 25 ML: 9 INJECTION, SOLUTION INTRAVENOUS at 20:00

## 2021-05-11 RX ADMIN — MONTELUKAST SODIUM 10 MG: 10 TABLET, COATED ORAL at 20:00

## 2021-05-11 RX ADMIN — POTASSIUM CHLORIDE 20 MEQ: 400 INJECTION, SOLUTION INTRAVENOUS at 08:46

## 2021-05-11 RX ADMIN — Medication 10 ML: at 20:00

## 2021-05-11 RX ADMIN — FAMOTIDINE 20 MG: 20 TABLET ORAL at 08:46

## 2021-05-11 RX ADMIN — FAMOTIDINE 20 MG: 20 TABLET ORAL at 20:00

## 2021-05-11 RX ADMIN — POTASSIUM CHLORIDE 20 MEQ: 400 INJECTION, SOLUTION INTRAVENOUS at 05:51

## 2021-05-11 RX ADMIN — IPRATROPIUM BROMIDE AND ALBUTEROL SULFATE 1 AMPULE: 2.5; .5 SOLUTION RESPIRATORY (INHALATION) at 02:49

## 2021-05-11 RX ADMIN — IPRATROPIUM BROMIDE AND ALBUTEROL SULFATE 1 AMPULE: 2.5; .5 SOLUTION RESPIRATORY (INHALATION) at 23:10

## 2021-05-11 RX ADMIN — ATORVASTATIN CALCIUM 40 MG: 40 TABLET, FILM COATED ORAL at 20:00

## 2021-05-11 RX ADMIN — IPRATROPIUM BROMIDE AND ALBUTEROL SULFATE 1 AMPULE: 2.5; .5 SOLUTION RESPIRATORY (INHALATION) at 07:43

## 2021-05-11 RX ADMIN — METHYLPREDNISOLONE SODIUM SUCCINATE 40 MG: 40 INJECTION, POWDER, FOR SOLUTION INTRAMUSCULAR; INTRAVENOUS at 08:46

## 2021-05-11 RX ADMIN — IPRATROPIUM BROMIDE AND ALBUTEROL SULFATE 1 AMPULE: 2.5; .5 SOLUTION RESPIRATORY (INHALATION) at 19:34

## 2021-05-11 RX ADMIN — IPRATROPIUM BROMIDE AND ALBUTEROL SULFATE 1 AMPULE: 2.5; .5 SOLUTION RESPIRATORY (INHALATION) at 15:57

## 2021-05-11 ASSESSMENT — PAIN SCALES - GENERAL
PAINLEVEL_OUTOF10: 0

## 2021-05-11 ASSESSMENT — PAIN DESCRIPTION - PROGRESSION
CLINICAL_PROGRESSION: GRADUALLY IMPROVING

## 2021-05-11 NOTE — ADT AUTH CERT
leading to intubation and now on vent support   -started on Vancomycin and Cefepime day 3  after bronchoscopy. Ketamine to help reactive airways   On pressure control setting 40% PEEP of 10.        #Pneumonia, Gram positive organism   - Bronchoscopy done. Vancomycin and Cefepime re ordered. Cultures negative except for candida. stop vancomycin.        #Sepsis   -Present on admission   -With tachycardia and tachypnea, leukocytosis.    Secondary to pneumonia   Monitor   Improved BP and off levo       #Tobacco abuse   - smoking cessation needed       #Pulmonary Edema   - Baby Settles year with normal EF and normal Diastolic function    - IV Lasix 40 mg as tolerated       #Leukocytosis-likely due to chronic steroids   -Trend WBC- remains high    -neg procalcitonin           #Hypothyroidism   - home dose of synthroid 125 mcg        #Hyperlipidemia   - on Atorvastatin.       DVT Prophylaxis: Lovenox   Diet: on TF   Code Status: Full Code       Chronic Obstructive Pulmonary Disease - Care Day 8 (5/2/2021) by Palomo Burgos RN       Review Status Review Entered   Completed 5/10/2021 10:56      Criteria Review      Care Day: 8 Care Date: 5/2/2021 Level of Care: ICU    Guideline Day 2    Clinical Status    ( )  Hemodynamic stability    5/10/2021 10:56 AM EDT by Thais Sheriff      99.2 76 24 100/55    ( )  Mechanical and noninvasive ventilation absent    5/10/2021 10:56 AM EDT by Thais Sheriff      pt intubated   93% vent [email or web address]    ( )  Uncompensated acidosis absent    5/10/2021 10:56 AM EDT by Thais Sheriff      co2 43    Activity    ( ) Ambulatory    5/10/2021 10:56 AM EDT by Thais Sheriff      strict bedrest    Routes    (X) Diet as tolerated    5/10/2021 10:56 AM EDT by Thais Sheriff      DIET TUBE FEED CONTINUOUS/CYCLIC NPO; Renal Formula (Nepro);  Orogastric; Continuous; 25; 25; 20    (X) IV fluids, medications    5/10/2021 10:56 AM EDT by Thais Sheriff      cont current meds plus  iv lasix 40 mg x1  cont iv fentanyl gtt @ 17.5/hr (titrate)  cont ketalar gtt @ 6.9/hr  cont propofol gtt @ 20.5/hr (titrate)  prn's  iv fentanyl 25 mcg q1 x1  iv versed 4 mg q1 x1    Interventions    (X) Oxygen    5/10/2021 10:56 AM EDT by Felicita Rout      vent    (X) Pulse oximetry    5/10/2021 10:56 AM EDT by Felicita Rout      continuous    (X) Respiratory therapy    (X) DVT prophylaxis    5/10/2021 10:56 AM EDT by Felicita Rout      cont lovenox    Medications    (X) Systemic corticosteroids    5/10/2021 10:56 AM EDT by Felicita Rout      cont iv solu-medrol 40 mg q12    (X) Inhaled bronchodilators    5/10/2021 10:56 AM EDT by Felicita Rout      cont duonebs    (X) Possible IV antibiotics    5/10/2021 10:56 AM EDT by Felicita Rout      cont iv maxipime and iv vanco     Milestone   Additional Notes   5/2/2021      ICU   Vs see above      Cxr   Stable chest.       Potassium: 5.4 (H)   Chloride: 94 (L)   CO2: 42 (H)   BUN: 41 (H)   Creatinine: <0.5 (L)   Anion Gap: 1 (L)   Glucose: 135 (H)      WBC: 15.2 (H)   RBC: 2.94 (L)   Hemoglobin Quant: 9.5 (L)   Hematocrit: 28.3 (L)      Hemoglobin, Art, Extended: 10.5 (L)   pH, Arterial: 7.387   pCO2, Arterial: 66.6 (H)   pO2, Arterial: 74.5 (L)   HCO3, Arterial: 39.1 (H)   TCO2 (calc), Art: 41.2   Base Excess, Arterial: 12.0 (H)   O2 Sat, Arterial: 94.3   O2 Content, Arterial: 14   Methemoglobin, Arterial: 0.3   Carboxyhgb, Arterial: 0.3      POC Glucose: 126 (H)   POC Glucose: 150 (H)      Sodium: 134 (L)   Chloride: 90 (L)   CO2: 43 (H)   BUN: 44 (H)   Creatinine: <0.5 (L)   Anion Gap: 1 (L)   Glucose: 157 (H)      Procalcitonin: 0.36 (H)      POC Glucose: 125 (H)   POC Glucose: 117 (H)   POC Glucose: 183 (H)      ========================================================      Per pulmo      ASSESSMENT:   · Acute on chronic respiratory failure with hypoxemia and hypercapnia - failed BiPAP with worsening hypercapnia, typically on 3 L home oxygen   Clinically she has worsened   · COPD with AE; severe airtrapping with auto PEEP   · Community acquired pneumonia, worsening 5/1/21 with suspected HCAP/VAP   · Septic shock   · Hyperkalemia, K is rising again    · Small pleural effusions   · Ongoing tobacco abuse       PLAN:   · Mechanical ventilation as per my orders.  The ventilator was adjusted by me at the bedside for unstable, life threatening respiratory failure.  She continues on pressure control ventilation, she has not tolerated the switch to assist control 2/2 air trapping and peak pressure alarms   · IV Propofol for sedation, target RASS -2, with daily spontaneous awakening trial.  Versed gtt to off, fentanyl gtt, Ketamine gtt to help with reactive airways    · Head of bed 30 degrees or higher at all times   · IV Solu-Medrol   · Cefepime and vancomycin day #2, started after bronchoscopy.  Completed 7 days ceftriaxone and 5 days azithromycin and 4 days vancomycin.     · Lasix 40 X 1 and repeat K    · ICU care- lovenox, pepcid    · NOK is spouse   __________________________________________________________      Per IM       ASSESSMENT/PLAN:       #Acute on chronic hypoxic/hypercapnic respiratory failure-due to COPD exacerbation, Pneumonia   -Normally on home oxygen 3 L    -ABG on admission pH 7.2, PCO2 92   - admitted to ICU on BiPAP   - pulmonology consulted.     - IV Solu-medrol, HHN , abx initiated    -- failed bipap and worsened leading to intubation and now on vent support   -started on Vancomycin and Cefepime after bronchoscopy.           #Pneumonia, Gram positive organism   - Bronchoscopy done. Vancomycin and Cefepime re ordered.  Await cultures.            #Sepsis   -Present on admission   -With tachycardia and tachypnea, leukocytosis.    Secondary to pneumonia   Monitor   Improved BP and off levo       #Tobacco abuse   - smoking cessation needed       #Pulmonary Edema   - ECHO last year with normal EF and normal Diastolic function    - IV Lasix 40 mg as tolerated     #Leukocytosis-likely due to chronic steroids   -Trend WBC- remains high    -neg procalcitonin           #Hypothyroidism   - home dose of synthroid 125 mcg        #Hyperlipidemia   - on Atorvastatin.       DVT Prophylaxis: Lovenox   Diet: on TF   Code Status: Full Code    ___________________________________________________________

## 2021-05-11 NOTE — PROGRESS NOTES
mg Oral Nightly    sodium chloride flush  5-40 mL Intravenous 2 times per day    enoxaparin  40 mg Subcutaneous Daily    famotidine  20 mg Oral BID    ipratropium-albuterol  1 ampule Inhalation Q4H     I   dexmedetomidine HCl in NaCl Stopped (05/11/21 0830)    dextrose      sodium chloride 25 mL (05/04/21 1800)     potassium chloride, magnesium sulfate, carboxymethylcellulose PF, glucose, dextrose, glucagon (rDNA), dextrose, sodium chloride flush, sodium chloride, acetaminophen **OR** acetaminophen, ondansetron **OR** ondansetron, albuterol, ibuprofen    Lab Data:  Recent Labs     05/09/21  0550 05/10/21  0520 05/11/21  0406   WBC 15.7* 15.1* 14.3*   HGB 8.7* 8.7* 8.7*   HCT 26.5* 26.6* 26.4*   MCV 96.8 97.2 96.0    253 262     Recent Labs     05/09/21  0550 05/10/21  0520 05/11/21  0406   * 136 137   K 3.8 3.5 3.1*   CL 96* 98* 100   CO2 34* 34* 33*   BUN 25* 27* 24*   CREATININE <0.5* <0.5* <0.5*     No results for input(s): CKTOTAL, CKMB, CKMBINDEX, TROPONINI in the last 72 hours. Coagulation:   Lab Results   Component Value Date    INR 1.03 12/26/2019     Cardiac markers:   Lab Results   Component Value Date    TROPONINI <0.01 04/25/2021         Lab Results   Component Value Date    ALT 29 04/25/2021    AST 39 (H) 04/25/2021    ALKPHOS 77 04/25/2021    BILITOT <0.2 04/25/2021       Lab Results   Component Value Date    INR 1.03 12/26/2019    PROTIME 12.0 12/26/2019         CULTURES  COVID: not detected  Blood: pending  Sputum - rare candida     EKG:  I have reviewed the EKG with the following interpretation:   Sinus tachycardia, Nonspecific ST abnormality       Radiology  XR CHEST PORTABLE   Final Result   Improved appearance of the chest with significant decrease in the amount of   pulmonary vascular congestion. Moderate emphysematous changes. Patient has   been extubated. XR CHEST PORTABLE   Final Result   Pulmonary edema.          XR CHEST PORTABLE   Final Result   Supportive tubing is in normal position. No acute cardiopulmonary disease. Improved aeration of the left base. XR CHEST PORTABLE   Final Result   Supportive tubing is in normal position. Mild left basilar atelectasis. XR CHEST PORTABLE   Final Result   Stable support apparatus. Mild pulmonary vascular congestion, stable in appearance. XR CHEST 1 VIEW   Final Result   Mild pulmonary vascular congestion. VL Extremity Venous Bilateral   Final Result      XR CHEST PORTABLE   Final Result   1. Stable pulmonary vascular congestion. XR CHEST PORTABLE   Final Result   Tubes and lines as above. No significant interval change from prior study. No overt edema, fusion, extrapleural air or focal consolidation. XR CHEST PORTABLE   Final Result   Stable chest.         XR CHEST PORTABLE   Final Result   Stable chest.         XR CHEST PORTABLE   Final Result   Hazy bibasilar airspace disease, slightly increased on the right, either due   to atelectasis or pneumonia. XR CHEST PORTABLE   Final Result   Stable chest with moderate hyperinflation. Trace atelectasis left lung base. XR CHEST PORTABLE   Final Result   In this patient with underlying COPD, the lungs are clear. Interstitial   opacities described on recent exam are less evident on current study. XR CHEST PORTABLE   Final Result   Increased lung markings bilaterally, may be related to minimal pulmonary   vascular congestion versus bronchitis. XR CHEST PORTABLE   Final Result   Stable chest.      Lungs are hyperinflated with no acute airspace disease. XR CHEST PORTABLE   Final Result   Minimal right basilar airspace disease likely atelectasis. Lungs are   moderately hyperinflated. XR CHEST PORTABLE   Final Result   Appropriate positioning of right central venous catheter. XR CHEST PORTABLE   Final Result   Satisfactory position endotracheal tube and NG tube.       No acute airspace disease. Pulmonary vessels upper normal.         XR CHEST PORTABLE   Final Result   Pulmonary edema with bibasilar atelectasis versus pneumonia. XR CHEST PORTABLE    (Results Pending)              Echo 8/25/2020   Summary   Limited imaging due to lung interface. Parasternal images are unobtainable.   Normal left ventricular systolic function with an estimated ejection   fraction of 55-60%.   No regional wall motion abnormalities are seen.   Normal left ventricular diastolic filling pressure.   Normal systolic pulmonary artery pressure (SPAP) estimated at 20 mmHg (RA   pressure 3 mmHg).   No significant valvular heart disease.                    ASSESSMENT/PLAN:     #Acute on chronic hypoxic/hypercapnic respiratory failure  -due to COPD exacerbation, Pneumonia  -Normally on home oxygen 3 L -presently on 4 L  -ABG on admission pH 7.2, PCO2 92  - admitted to ICU on BiPAP  - pulmonology consulted. - IV Solu-medrol, HHN , abx initiated   --> failed bipap and worsened- leading to intubation and mechanical vent  support  -Required ketamine to help reactive airways- weaned off ketamine, started precedex  -> Extubated on 5/20/2021, needing BiPAP intermittently. #Pneumonia, Gram positive organism  - Bronchoscopy done. - Received vancomycin( 4 days) and 3 days of zyvox  -continued on cefepime(completed 7/7)   -Cultures negative except for candida. Fevers resolving      #Sepsis resolved  -Present on admission  -With tachycardia and tachypnea, leukocytosis.    Secondary to pneumonia  Monitor  Improved BP and off levo     #Tobacco abuse  - smoking cessation needed     #Pulmonary Edema  - ECHO last year with normal EF and normal Diastolic function   - IV Lasix 40 mg as tolerated     #Leukocytosis-likely due to chronic steroids  -Trend WBC- remains high   -neg procalcitonin  White count is declining now.      #Hypothyroidism  - home dose of synthroid 125 mcg      #Hyperlipidemia  - on Atorvastatin.     DVT Prophylaxis: Lovenox  Diet: on TF  Code Status: Full Code       Martha Rodríguez MD 5/11/2021 2:27 PM

## 2021-05-11 NOTE — PROGRESS NOTES
08:45 Pt awake a&o to self, year only . Bed alarm on at all times  for safety. Reassessment as charted  09:10 Critical care rounds completed w/ Dr. Sandra Eldridge MD updated of pt current status and all current labs  16:30 Pt awake a&o x4, still confusion @ times, re assessment as charted , Bed alarm remain on for safety   19:00 Bedside report given to night nurse Rosalina Meng  pt stable @ handoff awake a&o , some confusion still bed alarm on at all times for safety

## 2021-05-11 NOTE — PROGRESS NOTES
Speech Language Pathology  Facility/Department: [facility] ICU   CLINICAL BEDSIDE SWALLOW EVALUATION    NAME: Arcadio Roblero  : 1960  MRN: [id number]     Recommendations:  Solids: IDDSI Level 4 Puree  Liquids: IDDSI Level 0 thin, straws OK  Meds: whole/broken in half with water    ST to f/u with dysphagia tx  HOLD PO if pt presents with clinical s/s of aspiration or worsening respiratory status and contact SLP immediately    ADMISSION DATE: 2021  ADMITTING DIAGNOSIS: has Acute respiratory failure with hypercapnia ([address] COPD exacerbation ([address] Tobacco abuse; Hypothyroidism; Acute on chronic respiratory failure with hypoxia ([address] Community acquired bacterial pneumonia; Pneumonia due to organism; Acute exacerbation of chronic obstructive pulmonary disease (COPD) ([address] SVT (supraventricular tachycardia) ([address] Mild protein-calorie malnutrition ([address] COPD, severe ([address] Chronic respiratory failure with hypoxia ([address] Chest pain on breathing; HCAP (healthcare-associated pneumonia); Chronic bilateral pleural effusions; Acute on chronic respiratory failure ([address] Sepsis with acute hypercapnic respiratory failure without septic shock ([address] Sepsis ([address] Mucus plugging of bronchi; and Acute pulmonary edema (HCC) on their problem list.  ONSET DATE: 21    Recent Chest Xray/CT of Chest:    21 XR Chest Portable  Impression   Improved appearance of the chest with significant decrease in the amount of   pulmonary vascular congestion.  Moderate emphysematous changes.  Patient has   been extubated.         05/10/21  XR Chest portable  Impression   Pulmonary edema. Date of Eval: 2021  Evaluating Therapist: Ag Birmingham    Current Diet level:  Current Diet : NPO  Current Liquid Diet : NPO      Primary Complaint  Patient Complaint: Per H&P by MD on 21, \"History Of Present Illness:   The patient is a 56 y.o. female with COPD/asthma, chronic respiratory failure on home oxygen 3 L, normal position, moves easily  Pain Rating: FLACC (rest) - Cry: no cry (awake or asleep)  Pain Rating: FLACC (rest) - Consolability: content, relaxed  Score: FLACC (rest): 0    Reason for Referral  Renée Chapa was referred for a bedside swallow evaluation to assess the efficiency of her swallow function, identify signs and symptoms of aspiration and make recommendations regarding safe dietary consistencies, effective compensatory strategies, and safe eating environment. Impression  Dysphagia Diagnosis: Moderate pharyngeal stage dysphagia;Mild to moderate oral stage dysphagia  Dysphagia Outcome Severity Scale: Level 4: Mild moderate dysphagia- Intermittent supervision/cueing. One - two diet consistencies restricted     Pt is a 56 y.o female who presented to [facility] with SOB and chest pain. RN ok'd entry and reports pt tolerated med pass with thin liquids with meds whole this AM. Pt with prior dysphagia hx with recommendations of IDDSI level 5 Minced and Moist and IDDSI level 0 thin liquids on 1/3/20. Pt with hx of COPD/asthma, chronic respiratory failure, tobacco abuse and hypothyroidism, and O2 at home (3L/min). Pt intubated from -05/10/21. Pt seen upright in bed on 5L/min O2 via NC. Pt alert and oriented to name/. Pt d/o to month/year, situation, location. Pt with noted ex/recept aphasia and dysarthria during conversation, however, not formally assessed this date. Pt following basic commands occasionally. OME reveals reduced overall labial/lingual ROM and strength. Pt symmetrical upon rest and retraction. Consistencies Administered: Dysphagia Minced and Moist (Dysphagia II); Dysphagia Pureed (Dysphagia I); Thin - teaspoon; Thin - cup; Thin - straw; Ice Chips. Pt with suspected premature bolus loss, decreased AP bolus trnaist and labial residue of minced and moist solids. Pt with minimal labial acceptance of PO (1/2 tsp) from spoon.  Pt with disorganized manipulation of bolus with puree and minced and moist understanding of compensatory strategies for improved swallowing safety. General  Chart Reviewed: Yes  Subjective  Subjective: Pt seen upright in bed, alert and cooperative, yet confused. Behavior/Cognition: Requires cueing; Alert; Cooperative;Pleasant mood;Confused  Temperature Spikes Noted: N/A  Respiratory Status: O2 via nasual cannula  O2 Device: Nasal cannula  Liters of Oxygen: 5 L  Communication Observation: Aphasia; Dysarthria  Follows Directions: Simple  Dentition: Edentulous  Patient Positioning: Upright in bed  Baseline Vocal Quality: Weak  Volitional Cough: Congested; Wet  Prior Dysphagia History: Pt intubated from 4/26-5/10/21. Prior BSE on 01/03/20 with recommendations of MM/TL. Consistencies Administered: Dysphagia Minced and Moist (Dysphagia II); Dysphagia Pureed (Dysphagia I); Thin - teaspoon; Thin - cup; Thin - straw; Ice Chips           Vision/Hearing  Vision  Vision: Impaired(pt reports wearing, but could not report for distance or all the time)  Hearing  Hearing: Within functional limits    Oral Motor Deficits  Oral/Motor  Oral Motor: Exceptions to [facility]  Labial ROM: Reduced left; Reduced right  Labial Strength: Reduced  Lingual ROM: Reduced left; Reduced right  Lingual Strength: Reduced  Lingual Coordination: Reduced    Oral Phase Dysfunction  Oral Phase  Oral Phase: Exceptions  Oral Phase Dysfunction  Impaired Mastication: (minced and moist solids)  Decreased Anterior to Posterior Transit: (minced and moist)  Suspected Premature Bolus Loss: (minced and moist)  Lingual/Palatal Residue: (minced and moist)  Oral Phase  Oral Phase - Comment: Pt with suspected premature bolus loss, decreased AP bolus trnaist and labial residue of minced and moist solids. Pt with minimal labial acceptance of PO (1/2 tsp) from spoon. Pt with disorganized manipulation of bolus with puree and minced and moist solids.      Indicators of Pharyngeal Phase Dysfunction   Pharyngeal Phase  Pharyngeal Phase: Exceptions  Indicators of Pharyngeal Phase Dysfunction  Delayed Swallow: All  Decreased Laryngeal Elevation: All  Throat Clearing - Delayed: (miced and moist)  Change in Vital Signs: (minced and moist)  Pharyngeal Phase   Pharyngeal: Pt with suspected reduced overall hyolaryngeal excursion and initiation of swallow with all PO trials given this date. Pt with delayed cough and increase in RR to 35/min with minced and moist solids. Pt with suspected reduced pharyngeal clearance, as pt with frequent double swallow to clear PO. Prognosis  Prognosis  Prognosis for safe diet advancement: fair  Barriers to reach goals: other (comment);language deficits;cognitive deficits  Barriers/Prognosis Comment: Prolonged intubation  Individuals consulted  Consulted and agree with results and recommendations: Patient;RN    Education  Patient Education: Pt edu re: Role of ST, BSE findings/recommendations, safe swallow strategies and risks of aspiration. Pt v/u, however, requires ongoing edu. Patient Education Response: Verbalizes understanding;Needs reinforcement  Safety Devices in place: Yes  Type of devices: Nurse notified; Bed alarm in place; Patient at risk for falls;Call light within reach; Left in bed       Therapy Time  SLP Individual Minutes  Time In: 5466  Time Out: [address]  Minutes: [address] [place] [facility] [place] Pathologist  [phone]

## 2021-05-11 NOTE — PROGRESS NOTES
Comprehensive Nutrition Assessment    Type and Reason for Visit:  Reassess    Nutrition Recommendations/Plan:   1. Continue general diet order + pureed consistency - consistency changes, per SLP. 2. Monitor appetite, meal intake, and po consistency changes. 3. Monitor respiratory status, mental status (confusion), and plan of care. 4. Monitor nutrition-related labs, bowel function, and weight trends. Nutrition Assessment:  patient has declined from a nutritional standpoint AEB patient was extubated and NPO on 5/10/21 and she had a swallow evaluation completed today and pureed solid po consistency was recommended and she is at risk for further compromise d/t confusion (post-extubation), altered nutrition-related labs, and difficulty swallowing/need for altered solid po consistency; will continue general diet order + pureed po consistency and monitor nutrition status    Malnutrition Assessment:  Malnutrition Status: At risk for malnutrition     Context:  Acute Illness     Findings of the 6 clinical characteristics of malnutrition:  Energy Intake:  1 - 75% or less of estimated energy requirements for 7 or more days(TF intake was decreased d/t propofol amount and po intake decreased d/t respiratory dysfunction/need for bipap/anxiety)  Weight Loss:  No significant weight loss     Body Fat Loss:  No significant body fat loss     Muscle Mass Loss:  No significant muscle mass loss Clavicles (pectoralis & deltoids), Hand (interosseous)  Fluid Accumulation:  No significant fluid accumulation     Strength:  Not Performed    Estimated Daily Nutrient Needs:  Energy (kcal):  1360 - 1564 kcals based on 20-23 kcals/kg/CBW; Weight Used for Energy Requirements:  Current     Protein (g):  66 - 72 g protein based on 1.2-1.3 g/kg/IBW;  Weight Used for Protein Requirements:  Ideal        Fluid (ml/day):  1360 - 1564 ml; Method Used for Fluid Requirements:  1 ml/kcal      Nutrition Related Findings:  patient was extubated on 5/10/21; + confusion this am; she was alert and oriented to self only; + SLP evaluation and SLP recommended pureed solid po consistency and thin liquids; + PT/OT ordered today; K, Ca, and h/h are low today; BUN is elevated today; patient has med-dose SSI, synthroid, solumedrol, miralax, pepcid, precedex, and fentanyl ordered at this time; + loose BMs - patient had a BM today      Wounds:  None       Current Nutrition Therapies:    DIET DYSPHAGIA PUREED; Dysphagia Pureed    Anthropometric Measures:  · Height: 5' 4\" (162.6 cm)  · Current Body Weight: 156 lb 12 oz (71.1 kg)(obtained on 5/11/21)   · Admission Body Weight: 150 lb 6.4 oz (68.2 kg)(obtained on 4/25/21; actual weight)    · Usual Body Weight: 135 lb 1.6 oz (61.3 kg)(obtained on 8/23/20; actual weight)     · Ideal Body Weight: 120 lbs; % Ideal Body Weight 130.6 %   · BMI: 26.9  · BMI Categories: Overweight (BMI 25.0-29. 9)       Nutrition Diagnosis:   · Inadequate oral intake related to inadequate protein-energy intake, impaired respiratory function as evidenced by swallow study results, lab values, intake 0-25%, poor dentition      Nutrition Interventions:   Food and/or Nutrient Delivery:  Continue Current Diet  Nutrition Education/Counseling:  No recommendation at this time   Coordination of Nutrition Care:  Continue to monitor while inpatient, Interdisciplinary Rounds    Goals:  patient will accept and consume 50% or greater of meals on general diet order + pureed po consistency x 3 meals per day       Nutrition Monitoring and Evaluation:   Behavioral-Environmental Outcomes:  None Identified   Food/Nutrient Intake Outcomes:  Food and Nutrient Intake  Physical Signs/Symptoms Outcomes:  Biochemical Data, Chewing or Swallowing, Diarrhea, Hemodynamic Status, Nutrition Focused Physical Findings, Skin, Weight     Discharge Planning:     Too soon to determine     Electronically signed by Miguel Ángel Wheat RD, LD on 5/11/21 at 2:58 PM EDT    Contact: [phone]

## 2021-05-11 NOTE — PROGRESS NOTES
05/11/21 0746   NIV Type   Skin Assessment Clean, dry, & intact   Skin Protection for O2 Device Yes   Equipment Type v60   Mode Bilevel   Mask Type Full face mask   Mask Size Medium   Settings/Measurements   IPAP 16 cmH20   CPAP/EPAP 8 cmH2O   Rate Ordered 14   Resp 20   Insp Rise Time (%) 2 %   I Time/ I Time % 30 s   Vt Exhaled 902 mL   Minute Volume 14.3 Liters   Mask Leak (lpm) 45 lpm   Comfort Level Good   Using Accessory Muscles No   SpO2 97   Breath Sounds   Right Upper Lobe Diminished   Right Middle Lobe Diminished   Right Lower Lobe Diminished   Left Upper Lobe Diminished   Left Lower Lobe Diminished   Alarm Settings   Press Low Alarm 5 cmH2O   High Pressure Alarm 30 cmH2O   Apnea (secs) 20 secs   Resp Rate Low Alarm 14   High Respiratory Rate 40 br/min

## 2021-05-11 NOTE — PROGRESS NOTES
Inpatient Physical Therapy Evaluation and Treatment    Unit: ICU  Date:  5/11/2021  Patient Name:    Han Kimbrough  Admitting diagnosis:  Acute on chronic respiratory failure [facility]) [J96.20]  Admit Date:  4/25/2021  Precautions/Restrictions/WB Status/ Lines/ Wounds/ Oxygen: Fall risk, Bed/chair alarm, Lines -IV, Supplemental O2 (5L/min), Baugh catheter, rectal tube and R IJ line, Telemetry, Continuous pulse oximetry     Treatment Time: 1100 - 1140  Treatment Number:  1   Timed Code Treatment Minutes: 30 minutes  Total Treatment Minutes:  40  minutes    Patient Goals for Therapy: \" Go home \"          Discharge Recommendations: SNF  DME needs for discharge: defer to facility       Therapy recommendation for EMS Transport: requires transport by cot due to need of lift equipment for functional transfers    Therapy recommendations for staff:   Assist of 2 with use of Leobardo-Lift for all transfers to/from chair    History of Present Illness: The patient is a 56 y.o. female with COPD/asthma, chronic respiratory failure on home oxygen 3 L, continued tobacco abuse and hypothyroidism who presents to [facility] with c/o shortness of breath and chest pain. Ongoing for the last 2 days. Her symptoms were worsening. She called 911. She was started on a Z-pack yesterday. Her BP is elevated. She is tachypneic and tachycardic. She is requiring BiPAP. Labs with leukocytosis. Initial Venous blood gas with pH 7.2 and PCO2 92. CXR with pulmonary edema with bibasilar atelectasis versus pneumonia. Admitted to ICU on BiPAP. Pulmonology consulted. Patient seen in ICU. She is currently on BiPAP, still in some respiratory distress, tolerating BiPAP well. Oxygen saturations are stable. ABG improving pH is up to 7.29, PCO2 is down to 74. She is awake and alert and well-oriented. She describes being ill for 2 to 3 days now, with increasing shortness of breath and wheezing, increasing cough and coughing up some sputum.   No fevers. She is normally on home oxygen 3 L. She continues to smoke tobacco down to about 4 to 5 cigarettes/day. Arlet Meza Her Covid test on admission was negative   She has not had her Covid vaccine. I spoke to patient's  at bedside. Spoke to patient's ICU nurse    Home Health S4 Level Recommendation:  NA  AM-PAC Mobility Score    AM-PAC Inpatient Mobility Raw Score : 8  AM-PAC Inpatient Mobility without Stair Climbing Raw Score : 7    Preadmission Environment    Pt. Trent Chapman spouse (Asif, works part time) and daughter (works). 24/hr assist available   Home environment:            mobile home/trailer   Steps to enter first floor: 3 steps to enter and bilateral hand rails  Bathroom: Tub/Shower unit, Walk-in Shower, Grab bars, Shower Chair  and Standard height toilet (able to push up from sink if needed)  Equipment owned: Rollator , Shower Chair, pulse ox, reacher, inhaler, nebulizer and home O2 (2-3L) PRN but pt pretty much wears it all the time   Sleeps in flat bed with wedge     Preadmission Status:  Pt. Able to drive: Yes  Pt Fully independent with ADLs: Yes  Pt. Required assistance from family for: Cleaning, Cooking and [address]             Pt does most of the cooking but occasionally needs help from               does most of cleaning and they split laundry  Pt. Fully independent for transfers and gait and walked with no deivce for short distances in home with frequent rest breaks. Uses Rollator in community but has not been out of the house much this year. History of falls          No    Pain   No    Cognition    A&O x4   Able to follow 2 step commands    Subjective  Patient lying supine in bed with spouse present. Pt agreeable to this PT eval & tx. Upper Extremity ROM/Strength  Please see OT evaluation.       Lower Extremity ROM / Strength   AROM WFL: Yes  ROM limitations: N/A    Strength Assessment (measured on a 0-5 scale):  R LE   Quad   2   Ant Tib 2   Hamstring 2   Iliopsoas 2  L LE  Quad   2   Ant Tib  2   Hamstring 2   Iliopsoas 2    Lower Extremity Sensation    WFL    Lower Extremity Proprioception:   Diminished    Coordination and Tone  WFL    Balance  Sitting:  Poor; Max A  and 2 persons  Comments: unable to hold upright sitting position without Max A x 2 with intermittent weakness and difficulty to maintain upright head position. Standing: Not tested; Not Tested  Comments: Unsafe to attempt due to poor trunk control. Bed Mobility   Supine to Sit:    Max A  and 2 persons (Long sitting position attempted in the bed)  Sit to Supine:   Max A  and 2 persons (Long sitting position attempted in the bed)  Rolling: Max A  and 2 persons  Scooting in sitting: Total A and 2 persons  Scooting in supine: Total A and 2 persons    Transfer Training     Sit to stand:   Not Tested  Stand to sit:   Not Tested  Bed to Chair:   Not Tested with use of N/A    Gait gait deferred due to difficulty with transfers; pt ambulated 0 ft. Stair Training deferred, pt unsafe/ not appropriate to complete stairs at this time    Activity Tolerance   Pt completed therapy session with Dizziness noted with position changes  SpO2: 95% at rest  HR: 113 bpm  BP: 162/76    Positioning Needs   Pt in bed and in supported chair position, alarm set, positioned in proper neutral alignment and pressure relief provided. Call light provided and all needs within reach    Exercises Initiated  all completed bilaterally unless indicated  AAROM was given in the form of assisted heel slides, ankle ROM exercises, hip abduction, chin tucks neck ROM in supported sitting for 10 reps of each exercises. Other  None. Patient/Family Education   Pt educated on role of inpatient PT, POC, importance of continued activity, DC recommendations, safety awareness, transfer techniques, pursed lip breathing, energy conservation, pacing activity and calling for assist with mobility.     Assessment  Pt seen

## 2021-05-11 NOTE — PROGRESS NOTES
05/11/21 0253   NIV Type   Mode Bilevel   Mask Type Full face mask   Mask Size Small   Settings/Measurements   IPAP 16 cmH20   CPAP/EPAP 8 cmH2O   Rate Ordered 14   Resp 20   FiO2  40 %   Vt Exhaled 662 mL   Comfort Level Good   SpO2 99

## 2021-05-11 NOTE — PROGRESS NOTES
Diarrhea Progress Note    Data:  Observed two large stools within two hours. Stools initially was soft, brown and progressively became large and liquidly. Action: Alis-care provided. Response: Will place a rectal tube if diarrhea persists. 04:00    Action:  Placed a rectal tube after observing four large liquidly stools.

## 2021-05-11 NOTE — PROGRESS NOTES
Fluid Electrolyte Imbalance Progress Note    Data: Results for Sergio Lagunas (MRN [id number]) as of 5/11/2021 05:15   Ref. Range 5/11/2021 04:06   Potassium Latest Ref Range: 3.5 - 5.1 mmol/L 3.1 (L)        Action:  Notified Physician on duty; Dr. Carly Fink. Response:  Awaiting orders. 05:40    Orders given to infuse KCL 40 meq IVPB via central line.

## 2021-05-11 NOTE — PLAN OF CARE
Problem: Nutrition  Intervention: Swallowing evaluation  Note: Bedside swallow evaluation completed this date. Please see EMR for more details. Intervention: Aspiration precautions  Note: Bedside swallow evaluation completed this date. Please see EMR for more details.      Kurt Fernandez M.S., [address] [facility] Path[facility]  [phone]

## 2021-05-11 NOTE — PROGRESS NOTES
OT  Recommendations for DC    Assessment of Deficits: Pt seen for Occupational therapy evaluation in acute care setting. Pt demonstrated decreased Activity tolerance, ADLs, IADLs, Balance , Bathing, Bed mobility, Dressing, ROM, Safety Awareness, Strength, Transfers, Cognition and Coping Skills. Pt functioning below baseline and will likely benefit from skilled occupational therapy services to maximize safety and independence. Goal(s) : To be met in 3 Visits:  1). Bed to toilet/BSC: Total A via Stedy    To be met in 5 Visits:  1). Supine to/from Sit:  Mod A  2). Upper Body Bathing: Mod A  3). Lower Body Bathing:   Max A  4). Upper Body Dressing: Mod A  5). Lower Body Dressing:  Max A  6). Pt to demonstrate UE exs x 15 reps with minimal cues    Rehabilitation Potential:  Fair for goals listed above. Strengths for achieving goals include: PLOF, Family Support and Pt cooperative  Barriers to achieving goals include:  Complexity of condition     Plan: To be seen 3-5 x/wk while in acute care setting for therapeutic exercises, bed mobility, transfers, dressing, bathing, family/patient education, ADL/IADL retraining, energy conservation training.        Kirill Decker OTR/L #[id number]      If patient discharges from this facility prior to next visit, this note will serve as the Discharge Summary

## 2021-05-11 NOTE — PROGRESS NOTES
Intravenous 2 times per day    enoxaparin  40 mg Subcutaneous Daily    famotidine  20 mg Oral BID    ipratropium-albuterol  1 ampule Inhalation Q4H     PRN Meds:  potassium chloride, magnesium sulfate, carboxymethylcellulose PF, midazolam, glucose, dextrose, glucagon (rDNA), dextrose, fentanNYL, sodium chloride flush, sodium chloride, acetaminophen **OR** acetaminophen, ondansetron **OR** ondansetron, albuterol, ibuprofen    Results:  CBC:   Recent Labs     05/09/21  0550 05/10/21  0520 05/11/21  0406   WBC 15.7* 15.1* 14.3*   HGB 8.7* 8.7* 8.7*   HCT 26.5* 26.6* 26.4*   MCV 96.8 97.2 96.0    253 262     BMP:   Recent Labs     05/09/21  0550 05/10/21  0520 05/11/21  0406   * 136 137   K 3.8 3.5 3.1*   CL 96* 98* 100   CO2 34* 34* 33*   BUN 25* 27* 24*   CREATININE <0.5* <0.5* <0.5*     LIVER PROFILE:   No results for input(s): AST, ALT, LIPASE, BILIDIR, BILITOT, ALKPHOS in the last 72 hours. Invalid input(s): AMYLASE,  ALB    Cultures:  4/25/2021 SARS-CoV-2 negative   4/25/2021 blood no growth   4/26/2021 respiratory culture candida  5/1/2021 bronc washings no organisms  5/1/2021 BAL NRF  5/5 Blood cx NGTD  5/5/21 Blood fungal cx NGTD  5/5 trach asp: NRF    Films:  LE doppler neg    CXR 5/10/2021: hyperinflated with no focal ASD      ASSESSMENT:  · Acute on chronic respiratory failure with hypoxemia and hypercapnia   · COPD with AE; severe airtrapping with auto PEEP  · Tobacco abuse  · Leukocytosis    PLAN:  Non-invasive positive pressure ventilation per my orders. The ventilator was adjusted by me at the bedside for unstable, life threatening respiratory failure. Wean oxygen as tolerated. .   Precedex gtt- wean as able    Head of bed 30 degrees or higher at all times  Duonebs Q4  IV Solu-Medrol daily  Hold abx for now  Re-sent blood and urine cx given low grade fever  Completed Cefepime D#7/7, Zyvox D#3.  Post 4 days of vancomycin  Completed 7 days ceftriaxone and 5 days azithromycin and 4 days vancomycin. ICU care- ap montez   GERALDINE is spouse- spoke with at bedside  PT/OT  SLP eval    Patient is critically ill with acute respiratory failure. We will adjust NIPPV as above. Critical care time spent reviewing labs/films, examining patient, collaborating with other physicians but excluding procedures for life threatening organ failure is 31 minutes.

## 2021-05-11 NOTE — PROGRESS NOTES
05/10/21 0521   NIV Type   Mode Bilevel   Mask Type Full face mask   Mask Size Small   Settings/Measurements   IPAP 16 cmH20   CPAP/EPAP 8 cmH2O   Rate Ordered 14   Resp 18   FiO2  40 %   Vt Exhaled 607 mL   Comfort Level Good   SpO2 96

## 2021-05-11 NOTE — PLAN OF CARE
Nutrition Problem #1: Inadequate oral intake  Intervention: Food and/or Nutrient Delivery: Continue Current Diet  Nutritional Goals: patient will accept and consume 50% or greater of meals on general diet order + pureed po consistency x 3 meals per day

## 2021-05-12 ENCOUNTER — APPOINTMENT (OUTPATIENT)
Dept: GENERAL RADIOLOGY | Age: 61
DRG: 870 | End: 2021-05-12
Payer: COMMERCIAL

## 2021-05-12 LAB
ANION GAP SERPL CALCULATED.3IONS-SCNC: 6 MMOL/L (ref 3–16)
BANDED NEUTROPHILS RELATIVE PERCENT: 2 % (ref 0–7)
BASOPHILS ABSOLUTE: 0 K/UL (ref 0–0.2)
BASOPHILS RELATIVE PERCENT: 0 %
BUN BLDV-MCNC: 21 MG/DL (ref 7–20)
CALCIUM SERPL-MCNC: 8.5 MG/DL (ref 8.3–10.6)
CHLORIDE BLD-SCNC: 101 MMOL/L (ref 99–110)
CO2: 31 MMOL/L (ref 21–32)
CREAT SERPL-MCNC: <0.5 MG/DL (ref 0.6–1.2)
EOSINOPHILS ABSOLUTE: 0.2 K/UL (ref 0–0.6)
EOSINOPHILS RELATIVE PERCENT: 1 %
GFR AFRICAN AMERICAN: >60
GFR NON-AFRICAN AMERICAN: >60
GLUCOSE BLD-MCNC: 116 MG/DL (ref 70–99)
GLUCOSE BLD-MCNC: 71 MG/DL (ref 70–99)
GLUCOSE BLD-MCNC: 80 MG/DL (ref 70–99)
GLUCOSE BLD-MCNC: 85 MG/DL (ref 70–99)
GLUCOSE BLD-MCNC: 87 MG/DL (ref 70–99)
GLUCOSE BLD-MCNC: 89 MG/DL (ref 70–99)
GLUCOSE BLD-MCNC: 94 MG/DL (ref 70–99)
HCT VFR BLD CALC: 28.9 % (ref 36–48)
HEMOGLOBIN: 9.6 G/DL (ref 12–16)
LYMPHOCYTES ABSOLUTE: 2.9 K/UL (ref 1–5.1)
LYMPHOCYTES RELATIVE PERCENT: 16 %
MCH RBC QN AUTO: 31.9 PG (ref 26–34)
MCHC RBC AUTO-ENTMCNC: 33.4 G/DL (ref 31–36)
MCV RBC AUTO: 95.6 FL (ref 80–100)
METAMYELOCYTES RELATIVE PERCENT: 1 %
MONOCYTES ABSOLUTE: 1.1 K/UL (ref 0–1.3)
MONOCYTES RELATIVE PERCENT: 6 %
NEUTROPHILS ABSOLUTE: 14.2 K/UL (ref 1.7–7.7)
NEUTROPHILS RELATIVE PERCENT: 74 %
PDW BLD-RTO: 14 % (ref 12.4–15.4)
PERFORMED ON: ABNORMAL
PERFORMED ON: NORMAL
PLATELET # BLD: 321 K/UL (ref 135–450)
PLATELET SLIDE REVIEW: ADEQUATE
PMV BLD AUTO: 7.5 FL (ref 5–10.5)
POTASSIUM REFLEX MAGNESIUM: 3.6 MMOL/L (ref 3.5–5.1)
RBC # BLD: 3.02 M/UL (ref 4–5.2)
SLIDE REVIEW: ABNORMAL
SODIUM BLD-SCNC: 138 MMOL/L (ref 136–145)
URINE CULTURE, ROUTINE: NORMAL
WBC # BLD: 18.4 K/UL (ref 4–11)

## 2021-05-12 PROCEDURE — 92526 ORAL FUNCTION THERAPY: CPT

## 2021-05-12 PROCEDURE — 36592 COLLECT BLOOD FROM PICC: CPT

## 2021-05-12 PROCEDURE — 6360000002 HC RX W HCPCS: Performed by: INTERNAL MEDICINE

## 2021-05-12 PROCEDURE — 2580000003 HC RX 258: Performed by: INTERNAL MEDICINE

## 2021-05-12 PROCEDURE — 94640 AIRWAY INHALATION TREATMENT: CPT

## 2021-05-12 PROCEDURE — 6370000000 HC RX 637 (ALT 250 FOR IP): Performed by: INTERNAL MEDICINE

## 2021-05-12 PROCEDURE — 80048 BASIC METABOLIC PNL TOTAL CA: CPT

## 2021-05-12 PROCEDURE — 94660 CPAP INITIATION&MGMT: CPT

## 2021-05-12 PROCEDURE — 97530 THERAPEUTIC ACTIVITIES: CPT

## 2021-05-12 PROCEDURE — 74230 X-RAY XM SWLNG FUNCJ C+: CPT

## 2021-05-12 PROCEDURE — 97110 THERAPEUTIC EXERCISES: CPT

## 2021-05-12 PROCEDURE — 99233 SBSQ HOSP IP/OBS HIGH 50: CPT | Performed by: INTERNAL MEDICINE

## 2021-05-12 PROCEDURE — 92611 MOTION FLUOROSCOPY/SWALLOW: CPT

## 2021-05-12 PROCEDURE — 94761 N-INVAS EAR/PLS OXIMETRY MLT: CPT

## 2021-05-12 PROCEDURE — 2700000000 HC OXYGEN THERAPY PER DAY

## 2021-05-12 PROCEDURE — 2060000000 HC ICU INTERMEDIATE R&B

## 2021-05-12 PROCEDURE — 97535 SELF CARE MNGMENT TRAINING: CPT

## 2021-05-12 PROCEDURE — 85025 COMPLETE CBC W/AUTO DIFF WBC: CPT

## 2021-05-12 RX ADMIN — IPRATROPIUM BROMIDE AND ALBUTEROL SULFATE 1 AMPULE: 2.5; .5 SOLUTION RESPIRATORY (INHALATION) at 03:07

## 2021-05-12 RX ADMIN — LEVOTHYROXINE SODIUM 125 MCG: 25 TABLET ORAL at 05:17

## 2021-05-12 RX ADMIN — ATORVASTATIN CALCIUM 40 MG: 40 TABLET, FILM COATED ORAL at 20:23

## 2021-05-12 RX ADMIN — IPRATROPIUM BROMIDE AND ALBUTEROL SULFATE 1 AMPULE: 2.5; .5 SOLUTION RESPIRATORY (INHALATION) at 15:13

## 2021-05-12 RX ADMIN — IPRATROPIUM BROMIDE AND ALBUTEROL SULFATE 1 AMPULE: 2.5; .5 SOLUTION RESPIRATORY (INHALATION) at 23:27

## 2021-05-12 RX ADMIN — MONTELUKAST SODIUM 10 MG: 10 TABLET, COATED ORAL at 20:23

## 2021-05-12 RX ADMIN — IPRATROPIUM BROMIDE AND ALBUTEROL SULFATE 1 AMPULE: 2.5; .5 SOLUTION RESPIRATORY (INHALATION) at 07:07

## 2021-05-12 RX ADMIN — Medication 10 ML: at 20:23

## 2021-05-12 RX ADMIN — DEXTROSE MONOHYDRATE 12.5 G: 25 INJECTION, SOLUTION INTRAVENOUS at 23:59

## 2021-05-12 RX ADMIN — FAMOTIDINE 20 MG: 20 TABLET ORAL at 20:23

## 2021-05-12 RX ADMIN — Medication 10 ML: at 10:00

## 2021-05-12 RX ADMIN — IPRATROPIUM BROMIDE AND ALBUTEROL SULFATE 1 AMPULE: 2.5; .5 SOLUTION RESPIRATORY (INHALATION) at 20:24

## 2021-05-12 RX ADMIN — METHYLPREDNISOLONE SODIUM SUCCINATE 40 MG: 40 INJECTION, POWDER, FOR SOLUTION INTRAMUSCULAR; INTRAVENOUS at 10:00

## 2021-05-12 RX ADMIN — ENOXAPARIN SODIUM 40 MG: 40 INJECTION SUBCUTANEOUS at 10:00

## 2021-05-12 ASSESSMENT — PAIN DESCRIPTION - PROGRESSION
CLINICAL_PROGRESSION: GRADUALLY IMPROVING

## 2021-05-12 NOTE — PROGRESS NOTES
Speech Language Pathology  Facility/Department: [facility] ICU  Dysphagia Daily Treatment Note    NAME: Jennifer Pardo  : 1960  MRN: [id number]    Patient Diagnosis(es):   Patient Active Problem List    Diagnosis Date Noted    Mucus plugging of bronchi     Acute pulmonary edema (HCC)     Sepsis ([address]     Acute on chronic respiratory failure ([address] 2021    Sepsis with acute hypercapnic respiratory failure without septic shock ([address]     Pneumonia due to organism 2020    Chronic bilateral pleural effusions     HCAP (healthcare-associated pneumonia)     Chest pain on breathing     COPD, severe ([address] 2019    Chronic respiratory failure with hypoxia ([address] 2019    Mild protein-calorie malnutrition ([address] 2018    Acute exacerbation of chronic obstructive pulmonary disease (COPD) ([address]     SVT (supraventricular tachycardia) ([address]     Acute on chronic respiratory failure with hypoxia ([address] 2018    Community acquired bacterial pneumonia 2018    Acute respiratory failure with hypercapnia ([address] 09/10/2018    COPD exacerbation ([address] 09/10/2018    Tobacco abuse 09/10/2018    Hypothyroidism 09/10/2018     Allergies: Allergies   Allergen Reactions    Promethazine Other (See Comments)     Extreme confusion (1/3/20)    Codeine Swelling     Onset Date: 2021  Subjective: Pt seen in room at bedside with RN permission. Alert and cooperative. Slight confusion noted. Spouse present. Pain: The patient does not complain of pain     Current Diet: Diet NPO Effective Now- updated immediately following tx session. Pt was on IDDSI 4 puree diet with thin liquids prior to SLP session    Diet Tolerance:  Patient tolerating current diet level without signs/symptoms of penetration / aspiration.       Dysphagia Treatment and Impressions:   Pt seen in room at bedside with RN permission   No notable events overnight   Respiratory Status: Pt with SPO2% of 96 on 3 LPM NC with RR of 20-37 (baseline O2 needs)   Liquid PO Trials:    IDDSI 0 Thin:  Assessed via cup and straw. Anterior bolus loss, suspect delayed swallow onset, coughing after the swallow, throat clearing after the swallow, increased RR, wet vocal quality and suspected premature bolus loss   Solid PO Trials- no additional PO trials given. Given coughing after the swallow and hx of prolonged intubation, PO not recommended pending MBSS which is to be completed this afternoon. MD and RN in agreement with proposed plan/rec's.  Education: SLP edu pt re: Role of SLP, Rationale for dysphagia tx, Aspiration precautions, BSE results, POC, Evidenced based practice for current recommendations and treatment, Rationale for MBS, MBS protocols and procedures, Rationale for NPO status and Importance of oral care to reduce adverse affects in the event of aspiration. Pt verbalized understanding, would benefit from ongoing education and RN aware of recommendations   Assessment: Pt not tolerating current diet. Clinical s/s of aspiration noted with intake of thin liquids via cup and straw sips. Poor carryover of recommended compensatory strategies   Recommendations: SLP recommends to downgrade to NPO; Meds whole in puree   Risk Management: oral care q4 hrs to reduce adverse affects in the event of aspiration. Dysphagia Goals:  Timeframe for Long-term Goals: 7 days (05/18)  Goal 1: Pt will tolerate LRD w/o clinical s/s of aspiration or worsening respiratory status  5/12/2021 : Goal addressed; Ongoing       Short-term Goals  Timeframe for Short-term Goals: 5 days (05/16)  Dysphagia Goals:   Goal 1: The patient will tolerate recommended diet without observed clinical signs of aspiration  5/12/2021 : Goal addressed, see above. Ongoing, progressing. Goal 2: The patient/caregiver will demonstrate understanding of compensatory strategies for improved swallowing safety. 5/12/2021 : Goal addressed, see above.  Ongoing, progressing. Speech/Language/Cog Goals: N/A       Recommendations:  Solid Consistency: NPO  Liquid Consistency: NPO  Medication: Meds whole in puree    Patient/Family/Caregiver Education: See above    Compensatory Strategies: See above    Plan:    Continued Dysphagia treatment with goals per plan of care. Discharge Recommendations: Recommend SLP tx at discharge    If pt discharges from hospital prior to Speech/Swallowing discharge, this note serves as tx and discharge summary.      Total Treatment Time / Charges     Time in Time out Total Time / units   Cognitive Tx         Speech Tx      Dysphagia Tx 0932 0946 14 mins / 1 unit     Signature:  Dominick Hidalgo M.A., Adelaide Aguilar #[id number]  Speech-Language Pathologist  Phone: [phone] [facility], [facility]

## 2021-05-12 NOTE — FLOWSHEET NOTE
05/12/21 0900   Vitals   Temp 99.7 °F (37.6 °C)   Temp Source Oral   Pulse 103   Heart Rate Source Monitor   Resp 27   /61   MAP (mmHg) 81   BP Method Automatic   Oxygen Therapy   SpO2 99 %   O2 Device Nasal cannula   O2 Flow Rate (L/min) 3 L/min   Vital signs stable. Shift assessment, completed, see flow sheet. Alert to self only at the moment. Continues to saturate well on baseline 3L NC. NSR, Respirations are easy, even, and unlabored. Bilateral lung sounds dimished. Baugh in place and draining clear, yellow urine. RIJ CVC, WNL. Call light within reach. Bed in lowest position. Bed alarm on.  Will continue to monitor

## 2021-05-12 NOTE — PROGRESS NOTES
Pulmonary & Critical Care Medicine ICU Progress Note    CC: Shortness of breath, acute on chronic respiratory failure    Events of Last 24 hours: patient off precedex. She wore bipap overnight. Tm 98.6F      Invasive Lines:  RIJ CVC 4/26/21  MV: 4/26/21-  Vent Mode: AC/VC Rate Set: 0 bmp/Vt Ordered: 380 mL/ /FiO2 : 30 %  Recent Labs     05/10/21  1117 05/11/21  0406   PHART 7.394 7.485*   FJJ4HFO 53.7* 45.5*   PO2ART 64.8* 134.5*     IV:   dexmedetomidine HCl in NaCl Stopped (05/11/21 0830)    dextrose      sodium chloride 25 mL (05/11/21 2000)       Vitals:  Blood pressure (!) 144/73, pulse 99, temperature 98.6 °F (37 °C), temperature source Axillary, resp. rate 28, height 5' 4\" (1.626 m), weight 158 lb 11.7 oz (72 kg), SpO2 99 %, not currently breastfeeding. on 3l NC  EXAM:  Gen: In mild acute distress. NCAT. Eyes: PERRL. No sclera icterus. No conjunctival injection. ENT: No ocular or auricular discharge. Oropharynx clear. Neck: Trachea midline. Resp: No accessory muscle use. No crackles. Scattered bilateral wheezes. No rhonchi. No dullness on percussion. CV: Regular rate. Regular rhythm. No murmur or rub. Normal S1 and S2. Trace LE edema. GI: Abdomen non-tender. Non-distended. No masses. Skin: Warm and dry. No nodules on exposed extremities. No rash on exposed extremities. M/S: No cyanosis. No synovitis or joint deformity. No clubbing. Neuro: Cranial nerves are grossly intact. Moving all extremities. Motor and sensation grossly intact.  + anxious. Follows commands. Confused at times.        Scheduled Meds:   methylPREDNISolone  40 mg Intravenous Daily    polyethylene glycol  17 g Oral Daily    insulin lispro  0-12 Units Subcutaneous Q4H    aspirin  81 mg Oral Daily    atorvastatin  40 mg Oral Nightly    levothyroxine  125 mcg Oral QAM AC    montelukast  10 mg Oral Nightly    sodium chloride flush  5-40 mL Intravenous 2 times per day    enoxaparin  40 mg Subcutaneous Daily    famotidine  20 mg Oral BID    ipratropium-albuterol  1 ampule Inhalation Q4H     PRN Meds:  potassium chloride, magnesium sulfate, carboxymethylcellulose PF, glucose, dextrose, glucagon (rDNA), dextrose, sodium chloride flush, sodium chloride, acetaminophen **OR** acetaminophen, ondansetron **OR** ondansetron, albuterol, ibuprofen    Results:  CBC:   Recent Labs     05/10/21  0520 05/11/21  0406 05/12/21  0525   WBC 15.1* 14.3* 18.4*   HGB 8.7* 8.7* 9.6*   HCT 26.6* 26.4* 28.9*   MCV 97.2 96.0 95.6    262 321     BMP:   Recent Labs     05/10/21  0520 05/11/21  0406 05/12/21  0525    137 138   K 3.5 3.1* 3.6   CL 98* 100 101   CO2 34* 33* 31   BUN 27* 24* 21*   CREATININE <0.5* <0.5* <0.5*     LIVER PROFILE:   No results for input(s): AST, ALT, LIPASE, BILIDIR, BILITOT, ALKPHOS in the last 72 hours. Invalid input(s): AMYLASE,  ALB    Cultures:  4/25/2021 SARS-CoV-2 negative   4/25/2021 blood no growth   4/26/2021 respiratory culture candida  5/1/2021 bronc washings no organisms  5/1/2021 BAL NRF  5/5 Blood cx NGTD  5/5/21 Blood fungal cx NGTD  5/5 trach asp: NRF  5/11 blood- ngtd  5/11/21- urine- ngtd        Films:  LE doppler neg    CXR 5/10/2021: hyperinflated with no focal ASD      ASSESSMENT:  · Acute on chronic respiratory failure with hypoxemia and hypercapnia   · COPD with AE; severe airtrapping with auto PEEP  · Tobacco abuse  · Leukocytosis    PLAN:  Non-invasive positive pressure ventilation per my orders. The ventilator was adjusted by me at the bedside for unstable, life threatening respiratory failure. Wean oxygen as tolerated. .   Precedex gtt-d/c    Head of bed 30 degrees or higher at all times  Duonebs Q4  IV Solu-Medrol daily  Hold abx for now  F/u Re-sent blood and urine cx   Completed Cefepime D#7/7, Zyvox D#3. Post 4 days of vancomycin  Completed 7 days ceftriaxone and 5 days azithromycin and 4 days vancomycin.   ICU care- ap montez is spouse- spoke with at bedside  PT/OT  SLP eval- recommended MBS  Ok to leave ICU

## 2021-05-12 NOTE — PROGRESS NOTES
IM Progress Note    Admit Date:  4/25/2021  17     Interval history:      Admitted with acute resp failure. Was on bipap, intubated on 4/26   Had bronchoscopy on 5/1/21. Had thick secretions. 5/4  On the vent. Continues to be on ketamine, propofol and fentanyl. 5/5  Tachycardic this am- hence no SBT  Fevers +    5/8  Fever up to 101.5 this morning. Did not do well on breathing trials today. Continued on mechanical vent    5/9  Low grade temp  Failed SBT- desated, tachycardic,tachypneic     5/10  Patient was extubated today. Currently looks fatigued but responding appropriately; she is on BiPAP. Continued on Precedex drip. Low-grade fevers    5/11-patient is on 4 L of oxygen. She gets very anxious when she needs BiPAP and needs to be started on Precedex. Presently on oxygen. T-max 101.    5/12- on 3 L of oxygen. MBS done no penetration. Anxiety improved. No fever. Objective:   BP (!) 147/68   Pulse 95   Temp 99.7 °F (37.6 °C) (Oral)   Resp 27   Ht 5' 4\" (1.626 m)   Wt 158 lb 11.7 oz (72 kg)   LMP  (LMP Unknown)   SpO2 100%   BMI 27.25 kg/m²       Intake/Output Summary (Last 24 hours) at 5/12/2021 1238  Last data filed at 5/12/2021 0900  Gross per 24 hour   Intake 876 ml   Output 1270 ml   Net -394 ml       Physical Exam:  General: middle aged female. she is awake and alert .  looks very fatigued on 3 L. Appears to be not in any distress  Neck: No JVD, no carotid bruit, no thyromegaly  Chest: diminished  kelvin air entry. Few bilateral wheezes  Cardiovascular:  RRR S1S2 heard, no murmurs or gallops  Abdomen:  Soft, undistended, non tender, no organomegaly, BS present  kelvin UE edema +  Extremities: No edema or cyanosis.  Distal pulses well felt  Neurological : Nonfocal        Medications:   Scheduled Medications:    methylPREDNISolone  40 mg Intravenous Daily    polyethylene glycol  17 g Oral Daily    insulin lispro  0-12 Units Subcutaneous Q4H    aspirin  81 mg Oral Daily    CHEST PORTABLE   Final Result   Minimal right basilar airspace disease likely atelectasis. Lungs are   moderately hyperinflated. XR CHEST PORTABLE   Final Result   Appropriate positioning of right central venous catheter. XR CHEST PORTABLE   Final Result   Satisfactory position endotracheal tube and NG tube. No acute airspace disease. Pulmonary vessels upper normal.         XR CHEST PORTABLE   Final Result   Pulmonary edema with bibasilar atelectasis versus pneumonia. Echo 8/25/2020   Summary   Limited imaging due to lung interface. Parasternal images are unobtainable.   Normal left ventricular systolic function with an estimated ejection   fraction of 55-60%.   No regional wall motion abnormalities are seen.   Normal left ventricular diastolic filling pressure.   Normal systolic pulmonary artery pressure (SPAP) estimated at 20 mmHg (RA   pressure 3 mmHg).   No significant valvular heart disease.            ASSESSMENT/PLAN:     #Acute on chronic hypoxic/hypercapnic respiratory failure  -due to COPD exacerbation, Pneumonia  -Normally on home oxygen 3 L -presently on 4 L  -ABG on admission pH 7.2, PCO2 92  - admitted to ICU on BiPAP  - pulmonology consulted. - IV Solu-medrol, HHN , abx initiated   --> failed bipap and worsened- leading to intubation and mechanical vent  support  -Required ketamine to help reactive airways- weaned off ketamine, started precedex  -> Extubated on 5/20/2021, needing BiPAP intermittently. On 3 L at present. #Pneumonia, Gram positive organism  - Bronchoscopy done. - Received vancomycin( 4 days) and 3 days of zyvox  -continued on cefepime(completed 7/7)   -Cultures negative except for candida. Fevers resolving      #Sepsis resolved  -Present on admission  -With tachycardia and tachypnea, leukocytosis.    Secondary to pneumonia  Monitor  Improved BP and off levo     #Tobacco abuse  - smoking cessation needed     #Pulmonary Edema  - ECHO last year with normal EF and normal Diastolic function   - IV Lasix 40 mg as tolerated     #Leukocytosis-likely due to chronic steroids  -Trend WBC- remains high   -neg procalcitonin  White count is declining now.      #Hypothyroidism  - home dose of synthroid 125 mcg      #Hyperlipidemia  - on Atorvastatin.     DVT Prophylaxis: Lovenox  Diet: on TF  Code Status: Full Code       Transfer to PCU      Drew Walls MD 5/12/2021 12:38 PM

## 2021-05-12 NOTE — PROGRESS NOTES
05/12/21 0309   NIV Type   Mode Bilevel   Mask Type Full face mask   Mask Size Medium   Settings/Measurements   IPAP 16 cmH20   CPAP/EPAP 8 cmH2O   Rate Ordered 14   Resp 28   FiO2  30 %   Vt Exhaled 517 mL   Comfort Level Good   SpO2 97

## 2021-05-12 NOTE — PROGRESS NOTES
Inpatient Physical Therapy Daily Treatment Note    Unit: ICU  Date:  5/12/2021  Patient Name:    Julisa Pozo  Admitting diagnosis:  Acute on chronic respiratory failure [facility]) [J96.20]  Admit Date:  4/25/2021  Precautions/Restrictions: Fall risk, Bed/chair alarm, Lines -IV, Supplemental O2 (4L/min), Baugh catheter and R IJ line, Telemetry, Continuous pulse oximetry       Discharge Recommendations: SNF  DME needs for discharge: defer to facility       Therapy recommendation for EMS Transport: requires transport by cot due to need of lift equipment for functional transfers    Therapy recommendations for staff:   Assist of 2 for bed mobility    History of Present Illness: The patient is a 57 y. o. female with COPD/asthma, chronic respiratory failure on home oxygen 3 L, continued tobacco abuse and hypothyroidism who presents to [facility] with c/o shortness of breath and chest pain.  Ongoing for the last 2 days.  Her symptoms were worsening.  She called 911. Lily Anaya was started on a Z-pack yesterday.    Her BP is elevated.  She is tachypneic and tachycardic.  She is requiring BiPAP.  Labs with leukocytosis.  Initial Venous blood gas with pH 7.2 and PCO2 92.  CXR with pulmonary edema with bibasilar atelectasis versus pneumonia. Admitted to ICU on BiPAP.  Pulmonology consulted.    Patient seen in ICU.   She is currently on BiPAP, still in some respiratory distress, tolerating BiPAP well.  Oxygen saturations are stable.  ABG improving pH is up to 7.29, PCO2 is down to 74.  She is awake and alert and well-oriented.   She describes being ill for 2 to 3 days now, with increasing shortness of breath and wheezing, increasing cough and coughing up some sputum.  No fevers.  She is normally on home oxygen 3 L.  She continues to smoke tobacco down to about 4 to 5 cigarettes/day. Bri Little Her Covid test on admission was negative   She has not had her Covid vaccine.   I spoke to patient's  at bedside.   Spoke to patient's ICU nurse    Home Health S4 Level Recommendation: NA  AM-PAC Mobility Score   AM-PAC Inpatient Mobility Raw Score : 8  AM-PAC Inpatient Mobility without Stair Climbing Raw Score : 7    Treatment Time: 1523 - 1557  Treatment number: 2  Timed Code Treatment Minutes: 34 minutes  Total Treatment Minutes:  34  minutes    Cognition    A&O Person   Able to follow 1 step commands inconsistently, decreased arousal status today. Subjective  Patient lying supine in bed with spouse present   Pt agreeable to this PT tx. Pain   No  Location: N/A  Rating:    NA/10  Pain Medicine Status: No request made     Bed Mobility   Supine to Sit:    unsafe to attempt due to decreased arousal status. Sit to Supine:   unsafe to attempt due to decreased arousal status. Rolling: Total A and 2 persons  Scooting: Total A and 2 persons    Transfer Training  (unsafe to attempt due to decreased arousal status.)   Sit to stand:   Not Tested  Stand to sit:   Not Tested  Bed to Chair:   Not Tested with use of N/A    Gait Training gait deferred due to difficulty with transfers; pt ambulated 0 ft. Stair Training deferred, pt unsafe/not appropriate to complete stairs at this time    Therapeutic Exercise all completed bilaterally unless indicated  Patient was given PROM exercises in the form of ankle pumps, heel slides for 10 reps. Balance  Sitting:  Not tested; Not Tested  Comments:     Standing: Not tested; Not Tested  Comments:     Patient Education      Role of PT, POC, Discharge recommendations, DC recommendations, safety awareness, transfer techniques, pursed lip breathing, energy conservation, pacing activity and calling for assist with mobility. Positioning Needs       Pt in bed, alarm set, positioned in proper neutral alignment and pressure relief provided.    Call light provided and all needs within reach    ROM Measurements N/A  Knee Flexion:  Knee Extension:     Activity Tolerance   Pt completed therapy session with No adverse symptoms noted w/activity. SpO2: 95% on 4L/min of O2  HR: 111 bpm  BP: 144/79    Other  N/A    Assessment :  Patient was extremely lethargic and waxing and waning with arousal status during the session. Rn was notified about the treatment response. Recommending SNF upon discharge as patient functioning well below baseline, demonstrates good rehab potential and unable to return home due to limited or no family support, inability to negotiate stairs to enter home/bedroom/bathroom, burden of care beyond caregiver ability, home environment not conducive to patient recovery and limited safety awareness. Goals (all goals ongoing unless otherwise indicated)  To be met in 3 visits:  1). Independent with LE Ex x 10 reps     To be met in 6 visits:  1). Supine to/from sit: Mod A   2). Sit to/from stand: Mod A   3). Bed to chair: Mod A  using LRAD  4). Gait: Ambulate 5 ft.   with  Max A  and use of LRAD (least restrictive assistive device)  5). Tolerate B LE exercises 3 sets of 10-15 reps  6). Patient will be able to tolerate sitting at the EOB for 10 min with normal vital parameter response. Plan   Continue with plan of care with PT frequency changed to 2-3x/week    Signature: Yoni Mendiola, MS PT, # [id number]    If patient discharges from this facility prior to next visit, this note will serve as the Discharge Summary.

## 2021-05-12 NOTE — PROGRESS NOTES
05/11/21 2320   NIV Type   Mode Bilevel   Mask Type Full face mask   Mask Size Medium   Settings/Measurements   IPAP 16 cmH20   CPAP/EPAP 8 cmH2O   Rate Ordered 14   Resp 26   FiO2  30 %   Vt Exhaled 597 mL   Comfort Level Good   SpO2 96

## 2021-05-12 NOTE — FLOWSHEET NOTE
05/11/21 2000   Vital Signs   Temp 98.3 °F (36.8 °C)   Temp Source Oral   Pulse 106   Heart Rate Source Monitor   Resp 27   BP (!) 156/57   MAP (mmHg) 82   Level of Consciousness Alert (0)   MEWS Score 3   Oxygen Therapy   SpO2 96 %   O2 Device Nasal cannula   O2 Flow Rate (L/min) 3 L/min   Pt. Resting in bed. Call light in reach. Shift assessment completed see flow sheet. Denies any needs at this time. Will continue to monitor.

## 2021-05-12 NOTE — PROCEDURES
INSTRUMENTAL SWALLOW REPORT  MODIFIED BARIUM SWALLOW    Diet Recommendation: IDDSI 4 Puree Solids ; IDDSI 0 Thin Liquids - straws OK; Meds whole with thin liquids  Risk Management: upright for all intake, stay upright for at least 30 mins after intake, small bites/sips, total feed, oral care q4 hrs to reduce adverse affects in the event of aspiration, increase physical mobility as able, alternate bites/sips, slow rate of intake, general aspiration precautions and hold PO and contact SLP if s/s of aspiration or worsening respiratory status develop. Specialist Referrals: Pulm  Ancillary Tests: N/A  Goals: Tolerate LRD  Follow-up exam: Will f/u in tx session    NAME: George Rice   : 1960  MRN: [id number]       Date of Eval: 2021     Ordering Physician: Dr. Raymond Barone  Radiologist: Dr. Lala Barillas     Referring Diagnosis(es): Referring Diagnosis: Oropharyngeal dysphagia    Past Medical History:  has a past medical history of Asthma, COPD (chronic obstructive pulmonary disease) ([address] and Thyroid disease. Past Surgical History:  has a past surgical history that includes Cholecystectomy and  section. Current Diet Solid Consistency: Dysphagia Pureed (Dysphagia I)  Current Diet Liquid Consistency: Thin    Date of Prior Study: N/A  Type of Study: Initial MBS  Results of Prior Study: N/A    Recent CXR: Date 2021  Impression   Improved appearance of the chest with significant decrease in the amount of   pulmonary vascular congestion.  Moderate emphysematous changes.  Patient has   been extubated. Patient Complaints/Reason for Referral:  George Rice was referred for a MBS to assess the efficiency of his/her swallow function, assess for aspiration, and to make recommendations regarding safe dietary consistencies, effective compensatory strategies, and safe eating environment.     Patient complaints and Admission Information: Pt is a 57 y/o woman who was admitted to [facility] on 2021 with stay upright for at least 30 mins after intake, small bites/sips, total feed, oral care q4 hrs to reduce adverse affects in the event of aspiration, increase physical mobility as able, alternate bites/sips, slow rate of intake, general aspiration precautions and hold PO and contact SLP if s/s of aspiration or worsening respiratory status develop. Specialist Referrals: Pulm  Ancillary Tests: N/A  Goals: Tolerate LRD  Follow-up exam: Will f/u in tx session      Treatment Dx and ICD 10: Oropharyngeal dysphagia, R413.12   Patient Position: Lateral and Patient Degrees: The patient was seated upright at 90 degree angle in stretcher bed in right lateral position    Consistencies Administered: Dysphagia Pureed (Dysphagia I); Thin straw; Thin teaspoon; Thin cup    Dysphagia Outcome Severity Scale: Level 5: Mild dysphagia- Distant supervision. May need one diet consistency restricted  Penetration-Aspiration Scale (PAS): 1 - Material does not enter the airway    Recommended Diet:  Solid consistency: Dysphagia Pureed (Dysphagia I)  Liquid consistency: Thin  Liquid administration via: Cup;Straw    Medication administration: Meds in puree    Safe Swallow Protocol:  Supervision: Distant  Compensatory Swallowing Strategies: Assist feed;Remain upright for 30-45 minutes after meals;Eat/Feed slowly; Small bites/sips;Upright as possible for all oral intake    Recommendations/Treatment  Requires SLP Intervention: Yes     D/C Recommendations: To be determined  Postural Changes and/or Swallow Maneuvers: Upright 90 degrees    Recommended Exercises:    Therapeutic Interventions: Patient/Family education; Therapeutic PO trials with SLP; Diet tolerance monitoring;Oral care     Education: Images and recommendations were reviewed with the patient and RN following this exam.   Patient Education: SLP educated the patient re: Role of SLP, anatomical components of swallow structures as they pertain to airway protection, results of assessment,

## 2021-05-13 LAB
ANION GAP SERPL CALCULATED.3IONS-SCNC: 8 MMOL/L (ref 3–16)
BASOPHILS ABSOLUTE: 0.1 K/UL (ref 0–0.2)
BASOPHILS RELATIVE PERCENT: 0.7 %
BUN BLDV-MCNC: 22 MG/DL (ref 7–20)
CALCIUM SERPL-MCNC: 8.5 MG/DL (ref 8.3–10.6)
CHLORIDE BLD-SCNC: 103 MMOL/L (ref 99–110)
CO2: 29 MMOL/L (ref 21–32)
CREAT SERPL-MCNC: <0.5 MG/DL (ref 0.6–1.2)
EOSINOPHILS ABSOLUTE: 0.2 K/UL (ref 0–0.6)
EOSINOPHILS RELATIVE PERCENT: 1.2 %
GFR AFRICAN AMERICAN: >60
GFR NON-AFRICAN AMERICAN: >60
GLUCOSE BLD-MCNC: 112 MG/DL (ref 70–99)
GLUCOSE BLD-MCNC: 142 MG/DL (ref 70–99)
GLUCOSE BLD-MCNC: 75 MG/DL (ref 70–99)
GLUCOSE BLD-MCNC: 77 MG/DL (ref 70–99)
GLUCOSE BLD-MCNC: 90 MG/DL (ref 70–99)
GLUCOSE BLD-MCNC: 96 MG/DL (ref 70–99)
HCT VFR BLD CALC: 27.5 % (ref 36–48)
HEMOGLOBIN: 9.1 G/DL (ref 12–16)
LYMPHOCYTES ABSOLUTE: 2.4 K/UL (ref 1–5.1)
LYMPHOCYTES RELATIVE PERCENT: 15.1 %
MAGNESIUM: 1.7 MG/DL (ref 1.8–2.4)
MCH RBC QN AUTO: 31.6 PG (ref 26–34)
MCHC RBC AUTO-ENTMCNC: 33 G/DL (ref 31–36)
MCV RBC AUTO: 95.6 FL (ref 80–100)
MONOCYTES ABSOLUTE: 1 K/UL (ref 0–1.3)
MONOCYTES RELATIVE PERCENT: 6.6 %
NEUTROPHILS ABSOLUTE: 12.1 K/UL (ref 1.7–7.7)
NEUTROPHILS RELATIVE PERCENT: 76.4 %
PDW BLD-RTO: 13.7 % (ref 12.4–15.4)
PERFORMED ON: ABNORMAL
PERFORMED ON: ABNORMAL
PERFORMED ON: NORMAL
PLATELET # BLD: 347 K/UL (ref 135–450)
PMV BLD AUTO: 7.5 FL (ref 5–10.5)
POTASSIUM REFLEX MAGNESIUM: 3.3 MMOL/L (ref 3.5–5.1)
RBC # BLD: 2.87 M/UL (ref 4–5.2)
SODIUM BLD-SCNC: 140 MMOL/L (ref 136–145)
WBC # BLD: 15.8 K/UL (ref 4–11)

## 2021-05-13 PROCEDURE — 6370000000 HC RX 637 (ALT 250 FOR IP): Performed by: INTERNAL MEDICINE

## 2021-05-13 PROCEDURE — 2060000000 HC ICU INTERMEDIATE R&B

## 2021-05-13 PROCEDURE — 92526 ORAL FUNCTION THERAPY: CPT

## 2021-05-13 PROCEDURE — 85025 COMPLETE CBC W/AUTO DIFF WBC: CPT

## 2021-05-13 PROCEDURE — 94640 AIRWAY INHALATION TREATMENT: CPT

## 2021-05-13 PROCEDURE — 6360000002 HC RX W HCPCS: Performed by: INTERNAL MEDICINE

## 2021-05-13 PROCEDURE — 99232 SBSQ HOSP IP/OBS MODERATE 35: CPT | Performed by: INTERNAL MEDICINE

## 2021-05-13 PROCEDURE — 80048 BASIC METABOLIC PNL TOTAL CA: CPT

## 2021-05-13 PROCEDURE — 83735 ASSAY OF MAGNESIUM: CPT

## 2021-05-13 PROCEDURE — 36592 COLLECT BLOOD FROM PICC: CPT

## 2021-05-13 PROCEDURE — 99233 SBSQ HOSP IP/OBS HIGH 50: CPT | Performed by: INTERNAL MEDICINE

## 2021-05-13 PROCEDURE — 94761 N-INVAS EAR/PLS OXIMETRY MLT: CPT

## 2021-05-13 PROCEDURE — 2580000003 HC RX 258: Performed by: INTERNAL MEDICINE

## 2021-05-13 PROCEDURE — 94660 CPAP INITIATION&MGMT: CPT

## 2021-05-13 PROCEDURE — 2700000000 HC OXYGEN THERAPY PER DAY

## 2021-05-13 RX ORDER — IPRATROPIUM BROMIDE AND ALBUTEROL SULFATE 2.5; .5 MG/3ML; MG/3ML
1 SOLUTION RESPIRATORY (INHALATION) 3 TIMES DAILY
Status: DISCONTINUED | OUTPATIENT
Start: 2021-05-13 | End: 2021-05-14 | Stop reason: HOSPADM

## 2021-05-13 RX ADMIN — METHYLPREDNISOLONE SODIUM SUCCINATE 40 MG: 40 INJECTION, POWDER, FOR SOLUTION INTRAMUSCULAR; INTRAVENOUS at 10:27

## 2021-05-13 RX ADMIN — IPRATROPIUM BROMIDE AND ALBUTEROL SULFATE 1 AMPULE: 2.5; .5 SOLUTION RESPIRATORY (INHALATION) at 20:18

## 2021-05-13 RX ADMIN — POTASSIUM CHLORIDE 20 MEQ: 400 INJECTION, SOLUTION INTRAVENOUS at 10:39

## 2021-05-13 RX ADMIN — IPRATROPIUM BROMIDE AND ALBUTEROL SULFATE 1 AMPULE: 2.5; .5 SOLUTION RESPIRATORY (INHALATION) at 13:56

## 2021-05-13 RX ADMIN — POTASSIUM CHLORIDE 20 MEQ: 400 INJECTION, SOLUTION INTRAVENOUS at 11:48

## 2021-05-13 RX ADMIN — LEVOTHYROXINE SODIUM 125 MCG: 25 TABLET ORAL at 10:14

## 2021-05-13 RX ADMIN — MAGNESIUM SULFATE HEPTAHYDRATE 1000 MG: 1 INJECTION, SOLUTION INTRAVENOUS at 10:39

## 2021-05-13 RX ADMIN — Medication 10 ML: at 21:37

## 2021-05-13 RX ADMIN — FAMOTIDINE 20 MG: 20 TABLET ORAL at 10:14

## 2021-05-13 RX ADMIN — ASPIRIN 81 MG: 81 TABLET, CHEWABLE ORAL at 10:13

## 2021-05-13 RX ADMIN — IPRATROPIUM BROMIDE AND ALBUTEROL SULFATE 1 AMPULE: 2.5; .5 SOLUTION RESPIRATORY (INHALATION) at 07:17

## 2021-05-13 RX ADMIN — ENOXAPARIN SODIUM 40 MG: 40 INJECTION SUBCUTANEOUS at 10:27

## 2021-05-13 RX ADMIN — ATORVASTATIN CALCIUM 40 MG: 40 TABLET, FILM COATED ORAL at 21:37

## 2021-05-13 RX ADMIN — Medication 10 ML: at 10:29

## 2021-05-13 RX ADMIN — DEXTROSE MONOHYDRATE 12.5 G: 25 INJECTION, SOLUTION INTRAVENOUS at 03:58

## 2021-05-13 RX ADMIN — FAMOTIDINE 20 MG: 20 TABLET ORAL at 21:37

## 2021-05-13 RX ADMIN — MONTELUKAST SODIUM 10 MG: 10 TABLET, COATED ORAL at 21:37

## 2021-05-13 RX ADMIN — POLYETHYLENE GLYCOL (3350) 17 G: 17 POWDER, FOR SOLUTION ORAL at 10:14

## 2021-05-13 RX ADMIN — MAGNESIUM SULFATE HEPTAHYDRATE 1000 MG: 1 INJECTION, SOLUTION INTRAVENOUS at 11:42

## 2021-05-13 NOTE — PROGRESS NOTES
IM Progress Note    Admit Date:  4/25/2021  18     Interval history:      Admitted with acute resp failure. Was on bipap, intubated on 4/26   Had bronchoscopy on 5/1/21. Had thick secretions. 5/4  On the vent. Continues to be on ketamine, propofol and fentanyl. 5/5  Tachycardic this am- hence no SBT  Fevers +    5/8  Fever up to 101.5 this morning. Did not do well on breathing trials today. Continued on mechanical vent    5/9  Low grade temp  Failed SBT- desated, tachycardic,tachypneic     5/10  Patient was extubated today. Currently looks fatigued but responding appropriately; she is on BiPAP. Continued on Precedex drip. Low-grade fevers    5/11-patient is on 4 L of oxygen. She gets very anxious when she needs BiPAP and needs to be started on Precedex. Presently on oxygen. T-max 101.    5/12- on 3 L of oxygen. MBS done no penetration. Anxiety improved. No fever. 5/13- feeling better. Used Bipap last night. Agreeable to SNF. Objective:   BP (!) 143/75   Pulse 91   Temp 98.5 °F (36.9 °C) (Oral)   Resp 16   Ht 5' 4\" (1.626 m)   Wt 147 lb 1.6 oz (66.7 kg)   LMP  (LMP Unknown)   SpO2 99%   BMI 25.25 kg/m²       Intake/Output Summary (Last 24 hours) at 5/13/2021 1025  Last data filed at 5/13/2021 0854  Gross per 24 hour   Intake 120 ml   Output 700 ml   Net -580 ml       Physical Exam:  General: middle aged female. she is awake and alert .  looks very fatigued on 3 L. Appears to be not in any distress  Neck: No JVD, no carotid bruit, no thyromegaly  Chest: diminished  kelvin air entry. Minimal wheezes. no crackles  Cardiovascular:  RRR S1S2 heard, no murmurs or gallops  Abdomen:  Soft, undistended, non tender, no organomegaly, BS present  kelvin UE edema +  Extremities: No edema or cyanosis.  Distal pulses well felt  Neurological : Nonfocal        Medications:   Scheduled Medications:    ipratropium-albuterol  1 ampule Inhalation TID    methylPREDNISolone  40 mg Intravenous Daily    polyethylene glycol  17 g Oral Daily    insulin lispro  0-12 Units Subcutaneous Q4H    aspirin  81 mg Oral Daily    atorvastatin  40 mg Oral Nightly    levothyroxine  125 mcg Oral QAM AC    montelukast  10 mg Oral Nightly    sodium chloride flush  5-40 mL Intravenous 2 times per day    enoxaparin  40 mg Subcutaneous Daily    famotidine  20 mg Oral BID     I   dextrose      sodium chloride 25 mL (05/11/21 2000)     potassium chloride, magnesium sulfate, carboxymethylcellulose PF, glucose, dextrose, glucagon (rDNA), dextrose, sodium chloride flush, sodium chloride, acetaminophen **OR** acetaminophen, ondansetron **OR** ondansetron, albuterol, ibuprofen    Lab Data:  Recent Labs     05/11/21 0406 05/12/21 0525 05/13/21 0400   WBC 14.3* 18.4* 15.8*   HGB 8.7* 9.6* 9.1*   HCT 26.4* 28.9* 27.5*   MCV 96.0 95.6 95.6    321 347     Recent Labs     05/11/21 0406 05/12/21 0525 05/13/21 0400    138 140   K 3.1* 3.6 3.3*    101 103   CO2 33* 31 29   BUN 24* 21* 22*   CREATININE <0.5* <0.5* <0.5*     No results for input(s): CKTOTAL, CKMB, CKMBINDEX, TROPONINI in the last 72 hours. Coagulation:   Lab Results   Component Value Date    INR 1.03 12/26/2019     Cardiac markers:   Lab Results   Component Value Date    TROPONINI <0.01 04/25/2021         Lab Results   Component Value Date    ALT 29 04/25/2021    AST 39 (H) 04/25/2021    ALKPHOS 77 04/25/2021    BILITOT <0.2 04/25/2021       Lab Results   Component Value Date    INR 1.03 12/26/2019    PROTIME 12.0 12/26/2019         CULTURES  COVID: not detected  Blood: pending  Sputum - rare candida     EKG:  I have reviewed the EKG with the following interpretation:   Sinus tachycardia, Nonspecific ST abnormality       Radiology  FL MODIFIED BARIUM SWALLOW W VIDEO   Preliminary Result   Essentially unremarkable modified barium swallow with no evidence of   penetration of the larynx/aspiration as described above.       Please see separate speech pathology report for full discussion of findings   and recommendations. XR CHEST PORTABLE   Final Result   Improved appearance of the chest with significant decrease in the amount of   pulmonary vascular congestion. Moderate emphysematous changes. Patient has   been extubated. XR CHEST PORTABLE   Final Result   Pulmonary edema. XR CHEST PORTABLE   Final Result   Supportive tubing is in normal position. No acute cardiopulmonary disease. Improved aeration of the left base. XR CHEST PORTABLE   Final Result   Supportive tubing is in normal position. Mild left basilar atelectasis. XR CHEST PORTABLE   Final Result   Stable support apparatus. Mild pulmonary vascular congestion, stable in appearance. XR CHEST 1 VIEW   Final Result   Mild pulmonary vascular congestion. VL Extremity Venous Bilateral   Final Result      XR CHEST PORTABLE   Final Result   1. Stable pulmonary vascular congestion. XR CHEST PORTABLE   Final Result   Tubes and lines as above. No significant interval change from prior study. No overt edema, fusion, extrapleural air or focal consolidation. XR CHEST PORTABLE   Final Result   Stable chest.         XR CHEST PORTABLE   Final Result   Stable chest.         XR CHEST PORTABLE   Final Result   Hazy bibasilar airspace disease, slightly increased on the right, either due   to atelectasis or pneumonia. XR CHEST PORTABLE   Final Result   Stable chest with moderate hyperinflation. Trace atelectasis left lung base. XR CHEST PORTABLE   Final Result   In this patient with underlying COPD, the lungs are clear. Interstitial   opacities described on recent exam are less evident on current study. XR CHEST PORTABLE   Final Result   Increased lung markings bilaterally, may be related to minimal pulmonary   vascular congestion versus bronchitis.          XR CHEST PORTABLE   Final Result   Stable chest. Lungs are hyperinflated with no acute airspace disease. XR CHEST PORTABLE   Final Result   Minimal right basilar airspace disease likely atelectasis. Lungs are   moderately hyperinflated. XR CHEST PORTABLE   Final Result   Appropriate positioning of right central venous catheter. XR CHEST PORTABLE   Final Result   Satisfactory position endotracheal tube and NG tube. No acute airspace disease. Pulmonary vessels upper normal.         XR CHEST PORTABLE   Final Result   Pulmonary edema with bibasilar atelectasis versus pneumonia. Echo 8/25/2020   Summary   Limited imaging due to lung interface. Parasternal images are unobtainable.   Normal left ventricular systolic function with an estimated ejection   fraction of 55-60%.   No regional wall motion abnormalities are seen.   Normal left ventricular diastolic filling pressure.   Normal systolic pulmonary artery pressure (SPAP) estimated at 20 mmHg (RA   pressure 3 mmHg).   No significant valvular heart disease.            ASSESSMENT/PLAN:     #Acute on chronic hypoxic/hypercapnic respiratory failure  -due to COPD exacerbation, Pneumonia  -Normally on home oxygen 3 L -presently on 4 L  -ABG on admission pH 7.2, PCO2 92  - admitted to ICU on BiPAP  - pulmonology consulted. - IV Solu-medrol, HHN , abx initiated   --> failed bipap and worsened- leading to intubation and mechanical vent  support  -Required ketamine to help reactive airways- weaned off ketamine, started precedex  -> Extubated on 5/20/2021, needing BiPAP intermittently. On 3 L at present. #Pneumonia, Gram positive organism  - Bronchoscopy done. - Received vancomycin( 4 days) and 3 days of zyvox  -continued on cefepime(completed 7/7)   -Cultures negative except for candida. Fevers resolved      #Sepsis resolved  -Present on admission  -With tachycardia and tachypnea, leukocytosis.    Secondary to pneumonia  Monitor  Improved BP and off levo     #Tobacco abuse  - smoking cessation needed     #Pulmonary Edema  - ECHO last year with normal EF and normal Diastolic function   - IV Lasix 40 mg as tolerated     #Leukocytosis-likely due to chronic steroids  -Trend WBC- remains high   -neg procalcitonin  White count is declining now.      #Hypothyroidism  - home dose of synthroid 125 mcg      #Hyperlipidemia  - on Atorvastatin.     DVT Prophylaxis: Lovenox  Diet: on TF  Code Status: Full Code       Discharge planning to SNF      [facility], MD 5/13/2021 10:25 AM

## 2021-05-13 NOTE — PROGRESS NOTES
Pt resting in bed. She keeps stating that she wants to go home but is agreeance with going to SNF for therapy.  at bedside. Eating 25-50% of meals with staff assistance. Fluids encouraged.

## 2021-05-13 NOTE — PROGRESS NOTES
Pulmonary & Critical Care Medicine ICU Progress Note    CC: Shortness of breath, acute on chronic respiratory failure    Events of Last 24 hours: patient feels better, less dyspnea. Invasive Lines:  RIJ CVC 4/26/21  MV: 4/26/21-  Vent Mode: AC/VC Rate Set: 0 bmp/Vt Ordered: 380 mL/ /FiO2 : 30 %  Recent Labs     05/11/21  0406   PHART 7.485*   EYG7TUG 45.5*   PO2ART 134.5*     IV:   dextrose      sodium chloride 25 mL (05/11/21 2000)       Vitals:  Blood pressure (!) 143/75, pulse 91, temperature 98.5 °F (36.9 °C), temperature source Oral, resp. rate 16, height 5' 4\" (1.626 m), weight 147 lb 1.6 oz (66.7 kg), SpO2 99 %, not currently breastfeeding. on 3l NC  EXAM:  Gen: In no acute distress. NCAT. Eyes: PERRL. No sclera icterus. No conjunctival injection. ENT: No ocular or auricular discharge. Oropharynx clear. Neck: Trachea midline. Resp: No accessory muscle use. No crackles. Few bilateral wheezes. No rhonchi. No dullness on percussion. CV: Regular rate. Regular rhythm. No murmur or rub. Normal S1 and S2. Trace LE edema. GI: Abdomen non-tender. Non-distended. No masses. Skin: Warm and dry. No nodules on exposed extremities. No rash on exposed extremities. M/S: No cyanosis. No synovitis or joint deformity. No clubbing. Neuro: Cranial nerves are grossly intact. Moving all extremities. Motor and sensation grossly intact. Conversant.        Scheduled Meds:   ipratropium-albuterol  1 ampule Inhalation TID    methylPREDNISolone  40 mg Intravenous Daily    polyethylene glycol  17 g Oral Daily    insulin lispro  0-12 Units Subcutaneous Q4H    aspirin  81 mg Oral Daily    atorvastatin  40 mg Oral Nightly    levothyroxine  125 mcg Oral QAM AC    montelukast  10 mg Oral Nightly    sodium chloride flush  5-40 mL Intravenous 2 times per day    enoxaparin  40 mg Subcutaneous Daily    famotidine  20 mg Oral BID     PRN Meds:  potassium chloride, magnesium sulfate, carboxymethylcellulose PF, glucose, dextrose, glucagon (rDNA), dextrose, sodium chloride flush, sodium chloride, acetaminophen **OR** acetaminophen, ondansetron **OR** ondansetron, albuterol, ibuprofen    Results:  CBC:   Recent Labs     05/11/21  0406 05/12/21  0525 05/13/21  0400   WBC 14.3* 18.4* 15.8*   HGB 8.7* 9.6* 9.1*   HCT 26.4* 28.9* 27.5*   MCV 96.0 95.6 95.6    321 347     BMP:   Recent Labs     05/11/21  0406 05/12/21  0525 05/13/21  0400    138 140   K 3.1* 3.6 3.3*    101 103   CO2 33* 31 29   BUN 24* 21* 22*   CREATININE <0.5* <0.5* <0.5*     LIVER PROFILE:   No results for input(s): AST, ALT, LIPASE, BILIDIR, BILITOT, ALKPHOS in the last 72 hours. Invalid input(s): AMYLASE,  ALB    Cultures:  4/25/2021 SARS-CoV-2 negative   4/25/2021 blood no growth   4/26/2021 respiratory culture candida  5/1/2021 bronc washings no organisms  5/1/2021 BAL NRF  5/5 Blood cx NGTD  5/5/21 Blood fungal cx NGTD  5/5 trach asp: NRF  5/11 blood- ngtd  5/11/21- urine- ngtd        Films:  LE doppler neg    CXR 5/10/2021: hyperinflated with no focal ASD      ASSESSMENT:  · Acute on chronic respiratory failure with hypoxemia and hypercapnia   · COPD with AE; severe airtrapping with auto PEEP  · Tobacco abuse  · Leukocytosis    PLAN:  Supplemental oxygen to maintain SaO2 >92%; wean as tolerated   Bipap prn  Head of bed 30 degrees or higher at all times  Duonebs Q4  IV Solu-Medrol daily  F/u Re-sent blood and urine cx   Completed Cefepime D#7/7, Zyvox D#3. Post 4 days of vancomycin  Completed 7 days ceftriaxone and 5 days azithromycin and 4 days vancomycin.   NOK is spouse- spoke with at bedside  PT/OT

## 2021-05-13 NOTE — PROGRESS NOTES
Speech Language Pathology  Facility/Department: [facility] ICU  Dysphagia Daily Treatment Note    NAME: Carol Ulloa  : 1960  MRN: [id number]    Patient Diagnosis(es):   Patient Active Problem List    Diagnosis Date Noted    Mucus plugging of bronchi     Acute pulmonary edema (HCC)     Sepsis ([address]     Acute on chronic respiratory failure ([address] 2021    Sepsis with acute hypercapnic respiratory failure without septic shock ([address]     Pneumonia due to organism 2020    Chronic bilateral pleural effusions     HCAP (healthcare-associated pneumonia)     Chest pain on breathing     COPD, severe ([address] 2019    Chronic respiratory failure with hypoxia ([address] 2019    Mild protein-calorie malnutrition ([address] 2018    Acute exacerbation of chronic obstructive pulmonary disease (COPD) ([address]     SVT (supraventricular tachycardia) ([address]     Acute on chronic respiratory failure with hypoxia ([address] 2018    Community acquired bacterial pneumonia 2018    Acute respiratory failure with hypercapnia ([address] 09/10/2018    COPD exacerbation ([address] 09/10/2018    Tobacco abuse 09/10/2018    Hypothyroidism 09/10/2018     Allergies: Allergies   Allergen Reactions    Promethazine Other (See Comments)     Extreme confusion (1/3/20)    Codeine Swelling     Onset Date: 2021  Subjective: Pt seen in room at bedside with RN permission. Alert and cooperative. Continues with slight confusion. No on PCU. Pain: The patient does not complain of pain     Current Diet: DIET DYSPHAGIA PUREED; Dysphagia Pureed    Diet Tolerance:  Patient tolerating current diet level without signs/symptoms of penetration / aspiration. Dysphagia Treatment and Impressions:   Pt seen in room at bedside with RN permission   No notable events overnight.  Pt now in PCU   Respiratory Status: Pt with SPO2% of 94 on 3 LPM NC with RR of 18-20 (baseline O2 needs)   Liquid PO Trials:    IDDSI 0 Thin: Assessed via  straw. No anterior bolus loss, indep bolus hold noted. No coughing, choking, throat clearing, or wet vocal quality. No clinical s/s of aspiration noted. Vitals grossly stable throughout asssessment.  Solid PO Trials- Pt declined all solid PO trials. States she wishes to remain on puree solids today. She reports she may be agreeable to solid upgrade trials tomorrow. Will follow.  Education: SLP edu pt re: Role of SLP, Rationale for dysphagia tx, Aspiration precautions, BSE results, POC, Evidenced based practice for current recommendations and treatment, reviewed MBS results, and Importance of oral care to reduce adverse affects in the event of aspiration. Pt verbalized understanding, would benefit from ongoing education and RN aware of recommendations   Assessment: Pt tolerating current diet. No clinical s/s of aspiration noted with intake of thin liquids viastraw sips. Poor/fair carryover of recommended compensatory strategies   Recommendations: SLP recommends to continue with IDDSI 4 puree solids and thin liquids. Straws ok. Meds PO as tolerated.  Risk Management: upright for all intake, slow rate of intake, small bites/sips, total feed 2/2 bilateral upper extremity weakness, oral care q4 hrs to reduce adverse affects in the event of aspiration, general aspiration precautions, hold PO and contact SLP if s/s of aspiration develop. Dysphagia Goals:  Timeframe for Long-term Goals: 7 days (05/19)  Goal 1: Pt will tolerate LRD w/o clinical s/s of aspiration or worsening respiratory status  5/13/2021 : Goal addressed;  Ongoing       Short-term Goals  Timeframe for Short-term Goals: 5 days (05/17)  Dysphagia Goals:   Timeframe for Long-term Goals: 7 days (05/19)  Goal 1: Pt will tolerate LRD w/o clinical s/s of aspiration or worsening respiratory status   5/13/2021 : Ongoing, progressing.     Short Term:  Goal 1: The patient will tolerate recommended diet with no clinical s/s of aspiration 5/5 5/13/2021 : Goal addressed, see above. Ongoing, progressing. Goal 2: The patient will tolerate therapeutic diet upgrade trials with no clinical s/s of aspiration 5/5 5/13/2021 : Goal addressed, see above. Pt declined. Reports she may be agreeable tomorrow. Ongoing, progressing. Goal 3: The patient/caregiver will demonstrate understanding of compensatory swallow strategies, for improved swallow safety  5/13/2021 : Goal addressed, see above. Ongoing, progressing. Speech/Language/Cog Goals: N/A       Recommendations:  Solid Consistency: IDDSI 4 puree solids  Liquid Consistency: IDDSI 2 thin liquids- straws ok  Medication: Meds whole in puree or PO as tolerated    Patient/Family/Caregiver Education: See above    Compensatory Strategies: See above    Plan:    Continued Dysphagia treatment with goals per plan of care. Discharge Recommendations: Recommend SLP tx at discharge    If pt discharges from hospital prior to Speech/Swallowing discharge, this note serves as tx and discharge summary.      Total Treatment Time / Charges     Time in Time out Total Time / units   Cognitive Tx         Speech Tx      Dysphagia Tx 0801 0822 21 mins / 1 unit     Signature:  Shelli Brower M.A. [facility] #[id number]  Speech-Language Pathologist  Phone: [phone], [phone] [facility], [facility]

## 2021-05-13 NOTE — PROGRESS NOTES
05/13/21 0303   NIV Type   NIV Started/Stopped On   Equipment Type v60   Mode Bilevel   Mask Type Full face mask   Mask Size Medium   Settings/Measurements   IPAP 16 cmH20   CPAP/EPAP 8 cmH2O   Rate Ordered 14   Resp 24   FiO2  30 %   I Time/ I Time % 0.9 s   Vt Exhaled 601 mL   Minute Volume 12 Liters   Mask Leak (lpm) 20 lpm   Comfort Level Good   Using Accessory Muscles No   SpO2 94

## 2021-05-13 NOTE — PROGRESS NOTES
RESPIRATORY THERAPY ASSESSMENT    Name:  Anabelle Peppert Mi Record Number:  [id number]  Age: 56 y.o. Gender: female  : 1960  Today's Date:  2021  Room:  /0308-01    Assessment     Is the patient being admitted for a COPD or Asthma exacerbation? No   (If yes the patient will be seen every 4 hours for the first 24 hours and then reassessed)    Patient Admission Diagnosis      Allergies  Allergies   Allergen Reactions    Promethazine Other (See Comments)     Extreme confusion (1/3/20)    Codeine Swelling       Minimum Predicted Vital Capacity:               Actual Vital Capacity:                    Pulmonary History:COPD and Asthma  Home Oxygen Therapy:  3-5 liters/min via nasal cannula  Home Respiratory Therapy:Albuterol and Umeclidinium Bromide/Vilanterol   Current Respiratory Therapy:  duoneb   Treatment Type: HHN  Medications: Albuterol/Ipratropium    Respiratory Severity Index(RSI)   Patients with orders for inhalation medications, oxygen, or any therapeutic treatment modality will be placed on Respiratory Protocol. They will be assessed with the first treatment and at least every 72 hours thereafter. The following severity scale will be used to determine frequency of treatment intervention.     Smoking History: Pulmonary Disease or Smoking History, Greater than 15 pack year = 2    Social History  Social History     Tobacco Use    Smoking status: Former Smoker     Packs/day: 0.50     Years: 44.00     Pack years: 22.00     Types: Cigarettes     Quit date: 2020     Years since quittin.7    Smokeless tobacco: Never Used   Substance Use Topics    Alcohol use: Yes     Comment: rarely    Drug use: No       Recent Surgical History: None = 0  Past Surgical History  Past Surgical History:   Procedure Laterality Date     SECTION      x2    CHOLECYSTECTOMY         Level of Consciousness: Alert, Oriented, and Cooperative = 0    Level of Activity: Mostly sedentary, minimal walking = 2    Respiratory Pattern: Dyspnea with exertion;Irregular pattern;or RR less than 6 = 2    Breath Sounds: Diminshed bilaterally and/or crackles = 2    Sputum  Sputum Color: Creamy, Tenacity: Thick, Sputum How Obtained: Spontaneous cough  Cough: Strong, spontaneous, non-productive = 0    Vital Signs   /69   Pulse 103   Temp 98.1 °F (36.7 °C) (Oral)   Resp 24   Ht 5' 4\" (1.626 m)   Wt 153 lb 9.6 oz (69.7 kg)   LMP  (LMP Unknown)   SpO2 94%   BMI 26.37 kg/m²   SPO2 (COPD values may differ): 88-89% on room air or greater than 92% on FiO2 28- 35% = 2    Peak Flow (asthma only): not applicable = 0    RSI: 8-56 = TID (three times daily) and Q4hr PRN for dyspnea        Plan       Goals: medication delivery and improve oxygenation    Patient/caregiver was educated on the proper method of use for Respiratory Care Devices:  Yes      Level of patient/caregiver understanding able to:   ? Verbalize understanding   ? Demonstrate understanding       ? Teach back        ? Needs reinforcement       ? No available caregiver               ? Other:     Response to education:  Very Good     Is patient being placed on Home Treatment Regimen? No     Does the patient have everything they need prior to discharge? NA     Comments: Patient chart reviewed, patient assessed. Plan of Care: change patient to duoneb TID per RSI score     Electronically signed by Amaya Smalls RCP on 5/13/2021 at 12:17 AM    Respiratory Protocol Guidelines     1. Assessment and treatment by Respiratory Therapy will be initiated for medication and therapeutic interventions upon initiation of aerosolized medication. 2. Physician will be contacted for respiratory rate (RR) greater than 35 breaths per minute. Therapy will be held for heart rate (HR) greater than 140 beats per minute, pending direction from physician.   3. Bronchodilators will be administered via Metered Dose Inhaler (MDI) with spacer when the following criteria are met:  a. Alert and cooperative     b. HR < 140 bpm  c. RR < 30 bpm                d. Can demonstrate a 2-3 second inspiratory hold  4. Bronchodilators will be administered via Hand Held Nebulizer [facility]) to patients when ANY of the following criteria are met  a. Incognizant or uncooperative          b. Patients treated with HHN at Home        c. Unable to demonstrate proper use of MDI with spacer     d. RR > 30 bpm   5. Bronchodilators will be delivered via Metered Dose Inhaler (MDI), HHN, Aerogen to intubated patients on mechanical ventilation. 6. Inhalation medication orders will be delivered and/or substituted as outlined below. Aerosolized Medications Ordering and Administration Guidelines:    1. All Medications will be ordered by a physician, and their frequency and/or modality will be adjusted as defined by the patients Respiratory Severity Index (RSI) score. 2. If the patient does not have documented COPD, consider discontinuing anticholinergics when RSI is less than 9.  3. If the bronchospasm worsens (increased RSI), then the bronchodilator frequency can be increased to a maximum of every 4 hours. If greater than every 4 hours is required, the physician will be contacted. 4. If the bronchospasm improves, the frequency of the bronchodilator can be decreased, based on the patient's RSI, but not less than home treatment regimen frequency. 5. Bronchodilator(s) will be discontinued if patient has a RSI less than 9 and has received no scheduled or as needed treatment for 72  Hrs. Patients Ordered on a Mucolytic Agent:    1. Must always be administered with a bronchodilator. 2. Discontinue if patient experiences worsened bronchospasm, or secretions have lessened to the point that the patient is able to clear them with a cough. Anti-inflammatory and Combination Medications:    1.  If the patient lacks prior history of lung disease, is not using inhaled anti-inflammatory medication at home, and lacks wheezing by examination or by history for at least 24 hours, contact physician for possible discontinuation.

## 2021-05-13 NOTE — PROGRESS NOTES
Spoke with MD regarding mag level of 1.7, but replacement order is for 1.6 and below.  Okay to give 2g replacement per MD.

## 2021-05-13 NOTE — PROGRESS NOTES
Pt in bed, eyes closed. No distress noted. Call light in reach. Will continue to monitor.   Shawn Menard

## 2021-05-13 NOTE — PROGRESS NOTES
Pt in bed, awake, A/o x 2, confusion noted off and on. . Shift assessment complete, night meds given whole with pudding and sips of water. No C/o pain at this time. Changed and repositioned for comfort. . No other needs expressed. Call light in reach. Bed check in place. Will monitor.   Praful Duval

## 2021-05-14 VITALS
TEMPERATURE: 98.1 F | RESPIRATION RATE: 18 BRPM | HEIGHT: 64 IN | OXYGEN SATURATION: 92 % | HEART RATE: 92 BPM | SYSTOLIC BLOOD PRESSURE: 122 MMHG | WEIGHT: 146.4 LBS | DIASTOLIC BLOOD PRESSURE: 63 MMHG | BODY MASS INDEX: 25 KG/M2

## 2021-05-14 LAB
ANION GAP SERPL CALCULATED.3IONS-SCNC: 6 MMOL/L (ref 3–16)
BASOPHILS ABSOLUTE: 0.1 K/UL (ref 0–0.2)
BASOPHILS RELATIVE PERCENT: 0.8 %
BUN BLDV-MCNC: 16 MG/DL (ref 7–20)
CALCIUM SERPL-MCNC: 8.4 MG/DL (ref 8.3–10.6)
CHLORIDE BLD-SCNC: 103 MMOL/L (ref 99–110)
CO2: 29 MMOL/L (ref 21–32)
CREAT SERPL-MCNC: <0.5 MG/DL (ref 0.6–1.2)
EOSINOPHILS ABSOLUTE: 0.2 K/UL (ref 0–0.6)
EOSINOPHILS RELATIVE PERCENT: 1 %
GFR AFRICAN AMERICAN: >60
GFR NON-AFRICAN AMERICAN: >60
GLUCOSE BLD-MCNC: 106 MG/DL (ref 70–99)
GLUCOSE BLD-MCNC: 163 MG/DL (ref 70–99)
GLUCOSE BLD-MCNC: 83 MG/DL (ref 70–99)
GLUCOSE BLD-MCNC: 89 MG/DL (ref 70–99)
HCT VFR BLD CALC: 27.5 % (ref 36–48)
HEMOGLOBIN: 9.1 G/DL (ref 12–16)
LYMPHOCYTES ABSOLUTE: 2.7 K/UL (ref 1–5.1)
LYMPHOCYTES RELATIVE PERCENT: 18.1 %
MCH RBC QN AUTO: 31.7 PG (ref 26–34)
MCHC RBC AUTO-ENTMCNC: 33 G/DL (ref 31–36)
MCV RBC AUTO: 96.1 FL (ref 80–100)
MONOCYTES ABSOLUTE: 1.1 K/UL (ref 0–1.3)
MONOCYTES RELATIVE PERCENT: 7.7 %
NEUTROPHILS ABSOLUTE: 10.8 K/UL (ref 1.7–7.7)
NEUTROPHILS RELATIVE PERCENT: 72.4 %
PDW BLD-RTO: 13.7 % (ref 12.4–15.4)
PERFORMED ON: ABNORMAL
PERFORMED ON: ABNORMAL
PERFORMED ON: NORMAL
PLATELET # BLD: 347 K/UL (ref 135–450)
PMV BLD AUTO: 7.8 FL (ref 5–10.5)
POTASSIUM REFLEX MAGNESIUM: 3.7 MMOL/L (ref 3.5–5.1)
RBC # BLD: 2.86 M/UL (ref 4–5.2)
SARS-COV-2, NAAT: NOT DETECTED
SODIUM BLD-SCNC: 138 MMOL/L (ref 136–145)
WBC # BLD: 15 K/UL (ref 4–11)

## 2021-05-14 PROCEDURE — 6370000000 HC RX 637 (ALT 250 FOR IP): Performed by: INTERNAL MEDICINE

## 2021-05-14 PROCEDURE — 99233 SBSQ HOSP IP/OBS HIGH 50: CPT | Performed by: INTERNAL MEDICINE

## 2021-05-14 PROCEDURE — 94761 N-INVAS EAR/PLS OXIMETRY MLT: CPT

## 2021-05-14 PROCEDURE — 94660 CPAP INITIATION&MGMT: CPT

## 2021-05-14 PROCEDURE — 97530 THERAPEUTIC ACTIVITIES: CPT

## 2021-05-14 PROCEDURE — 2700000000 HC OXYGEN THERAPY PER DAY

## 2021-05-14 PROCEDURE — 94640 AIRWAY INHALATION TREATMENT: CPT

## 2021-05-14 PROCEDURE — 6360000002 HC RX W HCPCS: Performed by: INTERNAL MEDICINE

## 2021-05-14 PROCEDURE — 99239 HOSP IP/OBS DSCHRG MGMT >30: CPT | Performed by: INTERNAL MEDICINE

## 2021-05-14 PROCEDURE — 87635 SARS-COV-2 COVID-19 AMP PRB: CPT

## 2021-05-14 PROCEDURE — 97110 THERAPEUTIC EXERCISES: CPT

## 2021-05-14 PROCEDURE — 85025 COMPLETE CBC W/AUTO DIFF WBC: CPT

## 2021-05-14 PROCEDURE — 92526 ORAL FUNCTION THERAPY: CPT

## 2021-05-14 PROCEDURE — 80048 BASIC METABOLIC PNL TOTAL CA: CPT

## 2021-05-14 PROCEDURE — 2580000003 HC RX 258: Performed by: INTERNAL MEDICINE

## 2021-05-14 RX ORDER — PREDNISONE 10 MG/1
TABLET ORAL
Qty: 30 TABLET | Refills: 0 | Status: ON HOLD | OUTPATIENT
Start: 2021-05-14 | End: 2022-01-30

## 2021-05-14 RX ORDER — PREDNISONE 10 MG/1
TABLET ORAL
Qty: 30 TABLET | Refills: 0
Start: 2021-05-14 | End: 2021-05-14 | Stop reason: SDUPTHER

## 2021-05-14 RX ORDER — POLYETHYLENE GLYCOL 3350 17 G/17G
17 POWDER, FOR SOLUTION ORAL DAILY
Qty: 527 G | Refills: 1
Start: 2021-05-15 | End: 2021-05-14 | Stop reason: HOSPADM

## 2021-05-14 RX ADMIN — Medication 10 ML: at 08:58

## 2021-05-14 RX ADMIN — METHYLPREDNISOLONE SODIUM SUCCINATE 40 MG: 40 INJECTION, POWDER, FOR SOLUTION INTRAMUSCULAR; INTRAVENOUS at 08:57

## 2021-05-14 RX ADMIN — POLYETHYLENE GLYCOL (3350) 17 G: 17 POWDER, FOR SOLUTION ORAL at 08:57

## 2021-05-14 RX ADMIN — ENOXAPARIN SODIUM 40 MG: 40 INJECTION SUBCUTANEOUS at 08:57

## 2021-05-14 RX ADMIN — IPRATROPIUM BROMIDE AND ALBUTEROL SULFATE 1 AMPULE: 2.5; .5 SOLUTION RESPIRATORY (INHALATION) at 07:02

## 2021-05-14 RX ADMIN — LEVOTHYROXINE SODIUM 125 MCG: 25 TABLET ORAL at 06:48

## 2021-05-14 RX ADMIN — FAMOTIDINE 20 MG: 20 TABLET ORAL at 08:57

## 2021-05-14 RX ADMIN — ASPIRIN 81 MG: 81 TABLET, CHEWABLE ORAL at 08:57

## 2021-05-14 RX ADMIN — IPRATROPIUM BROMIDE AND ALBUTEROL SULFATE 1 AMPULE: 2.5; .5 SOLUTION RESPIRATORY (INHALATION) at 13:36

## 2021-05-14 NOTE — PROGRESS NOTES
Spoke with Korin Moreland regarding pt discharging home and having mitchell in place. Asked to remove. She verified with MD and okay to remove mitchell catheter. MD also aware that pt is going to d/c home and not a SNF.

## 2021-05-14 NOTE — CARE COORDINATION
TC from [facility] @ [facility] and she has precert approved. Met with pt and spouse at bedside who state they have now decided for pt to return home with [facility]. Discussed benefits of STR and spouse states he was told cost of STR will be $90 per day and he \"just can't afford that. \" Pt and spouse supplied with [place] [facility] list and they would like [facility] for SN/PT/OT. 1120 Referral to [facility] at this time. INTERDISCIPLINARY PLAN OF CARE CONFERENCE    Date/Time: 5/14/2021 3:05 PM  Completed by: Donovan Menezes, Case Management      Patient Name:  Sami Aceves  YOB: 1960  Admitting Diagnosis: Acute on chronic respiratory failure ([facility] [J96.20]     Admit Date/Time:  4/25/2021  2:30 AM    Chart reviewed. Interdisciplinary team contacted or reviewed plan related to patient progress and discharge plans. Disciplines included Case Management, Nursing, and Dietitian. Current Status:PCU; PT now recommending ARU  PT/OT recommendation for discharge plan of care: ARU    Expected D/C Disposition:  tbd  Confirmed plan with patient and/or family Yes confirmed with: (name) pt and spouse  Met with:pt and spouse   Discharge Plan Comments: Chart reviewed. Met with pt and spouse after pt worked with PT to get out of bed and spouse now saying he is not able to take the pt home due to her being so weak. PT now recommending ARU and spouse is agreeable to this. Referral to [facility] at this time. Pt will require precert if accepted. TC from [facility] and she states they are not able to accept the pt due to her not being able to tolerate 3 hours per day of therapy. TC to Sara at this time to inquire if [facility] can work with the pt on the copay. Per Sara pt qualifies for Medicaid and they will apply the pt for this. Pt and spouse are agreeable now to STR @ [facility]. transport set up with [employer] for 1900. CM informed pt's spouse and Darshan Cramer RN. Per Amaris Vidal states that copay has been waived.      Discharge timeout done with Joy Porter RN. All discharge needs and concerns addressed. Home O2 in place on admit: Yes  Pt informed of need to bring portable home O2 tank on day of discharge for nursing to connect prior to leaving:  Yes  Verbalized agreement/Understanding:   Yes

## 2021-05-14 NOTE — FLOWSHEET NOTE
05/13/21 1900   Vital Signs   Temp 98.2 °F (36.8 °C)   Temp Source Oral   Pulse 82   Heart Rate Source Monitor   Resp 18   BP (!) 151/77   BP Location Right upper arm   Patient Position High fowlers   Level of Consciousness Alert (0)   MEWS Score 1   Oxygen Therapy   SpO2 96 %   O2 Device Nasal cannula   O2 Flow Rate (L/min) 2.5 L/min   Pt assessment complete. Pt lying quietly in bed. Lung sounds diminished. Pt on 02 2.5  liters via nasal canula. Baugh catheter in place draining clear yellow urine. Nightly medications given with applesauce. Pt repositioned for comfort. No other needs at this time. Call light within reach. Bed exit alarm on.

## 2021-05-14 NOTE — PROGRESS NOTES
Pt is lying in bed with their eyes closed. Respirations are easy and even. Pt on Bipap at this time. Call light within reach bed in lowest position with the wheels locked. Will continue to monitor.  Debby Russell

## 2021-05-14 NOTE — DISCHARGE SUMMARY
Name:  Maddy Noel  Room:  /8626-40  MRN:    [id number]    Discharge Summary      This discharge summary is in conjunction with a complete physical exam done on the day of discharge. Discharging Physician: Alma Levine    Admit: 4/25/2021  Discharge: 5/14/2021     HPI taken from admission H&P:    The patient is a 56 y.o. female with COPD/asthma, chronic respiratory failure on home oxygen 3 L, continued tobacco abuse and hypothyroidism who presents to [facility] with c/o shortness of breath and chest pain. Ongoing for the last 2 days. Her symptoms were worsening. She called 911. She was started on a Z-pack yesterday.       Her BP is elevated. She is tachypneic and tachycardic. She is requiring BiPAP. Labs with leukocytosis. Initial Venous blood gas with pH 7.2 and PCO2 92. CXR with pulmonary edema with bibasilar atelectasis versus pneumonia. Admitted to ICU on BiPAP. Pulmonology consulted.       Patient seen in ICU. She is currently on BiPAP, still in some respiratory distress, tolerating BiPAP well. Oxygen saturations are stable. ABG improving pH is up to 7.29, PCO2 is down to 74. She is awake and alert and well-oriented.      She describes being ill for 2 to 3 days now, with increasing shortness of breath and wheezing, increasing cough and coughing up some sputum. No fevers. She is normally on home oxygen 3 L. She continues to smoke tobacco down to about 4 to 5 cigarettes/day. .     Her Covid test on admission was negative   She has not had her Covid vaccine.     I spoke to patient's  at bedside.    Spoke to patient's ICU nurse    Diagnoses this Admission and Hospital Course   #Acute on chronic hypoxic/hypercapnic respiratory failure  -due to COPD exacerbation, Pneumonia  -Normally on home oxygen 3 L -presently on 4 L  -ABG on admission pH 7.2, PCO2 92  - admitted to ICU on BiPAP  - pulmonology consulted.    - IV Solu-medrol, HHN , abx initiated   --> failed bipap and worsened- leading to intubation and mechanical vent  support  -Required ketamine to help reactive airways- weaned off ketamine, started precedex  -> Extubated on 5/20/2021, needing BiPAP intermittently. On 3 L at present.   - D/c home on prednisone taper     #Pneumonia, Gram positive organism  - Bronchoscopy done. - Received vancomycin( 4 days) and 3 days of zyvox  -continued on cefepime(completed 7/7)   -Cultures negative except for candida.  - Fevers resolved      #Sepsis resolved  -Present on admission  -With tachycardia and tachypnea, leukocytosis.   - Secondary to pneumonia  - Improved BP and off levo     #Tobacco abuse  - smoking cessation needed     #Pulmonary Edema  - ECHO last year with normal EF and normal Diastolic function   - IV Lasix 40 mg as tolerated     #Leukocytosis-likely due to chronic steroids  -Trend WBC- remains high   -neg procalcitonin  White count is declining now.      #Hypothyroidism  - home dose of synthroid 125 mcg      #Hyperlipidemia  - on Atorvastatin. Procedures (Please Review Full Report for Details)  Intubation/Extubation     Consults    Pulmonology       Physical Exam at Discharge:    /63   Pulse 92   Temp 98.1 °F (36.7 °C) (Oral)   Resp 18   Ht 5' 4\" (1.626 m)   Wt 146 lb 6.4 oz (66.4 kg)   LMP  (LMP Unknown)   SpO2 92%   BMI 25.13 kg/m²     General: middle aged female. she is awake and alert .  looks very fatigued on 3 L. Appears to be not in any distress  Neck: No JVD, no carotid bruit, no thyromegaly  Chest: diminished  kelvin air entry. Minimal wheezes. no crackles  Cardiovascular:  RRR S1S2 heard, no murmurs or gallops  Abdomen:  Soft, undistended, non tender, no organomegaly, BS present  kelvin UE edema +  Extremities: No edema or cyanosis.  Distal pulses well felt  Neurological : Nonfocal    CBC:   Recent Labs     05/12/21  0525 05/13/21  0400 05/14/21  0432   WBC 18.4* 15.8* 15.0*   HGB 9.6* 9.1* 9.1*   HCT 28.9* 27.5* 27.5*   MCV 95.6 95.6 96.1   PLT 321 347 347     BMP:   Recent Labs     05/12/21  0525 05/13/21  0400 05/14/21  0432    140 138   K 3.6 3.3* 3.7    103 103   CO2 31 29 29   BUN 21* 22* 16   CREATININE <0.5* <0.5* <0.5*     CULTURES  SARS-CoV-2, NAAT Not Detected    Blood Cx: NGTD  Resp Cx: Candida, rare growth   Resp Cx: BAL >Candida heavy growth   Urine Cx: NGTD    RADIOLOGY  FL MODIFIED BARIUM SWALLOW W VIDEO   Final Result   Essentially unremarkable modified barium swallow with no evidence of   penetration of the larynx/aspiration as described above. Please see separate speech pathology report for full discussion of findings   and recommendations. XR CHEST PORTABLE   Final Result   Improved appearance of the chest with significant decrease in the amount of   pulmonary vascular congestion. Moderate emphysematous changes. Patient has   been extubated. XR CHEST PORTABLE   Final Result   Pulmonary edema. XR CHEST PORTABLE   Final Result   Supportive tubing is in normal position. No acute cardiopulmonary disease. Improved aeration of the left base. XR CHEST PORTABLE   Final Result   Supportive tubing is in normal position. Mild left basilar atelectasis. XR CHEST PORTABLE   Final Result   Stable support apparatus. Mild pulmonary vascular congestion, stable in appearance. XR CHEST 1 VIEW   Final Result   Mild pulmonary vascular congestion. VL Extremity Venous Bilateral   Final Result      XR CHEST PORTABLE   Final Result   1. Stable pulmonary vascular congestion. XR CHEST PORTABLE   Final Result   Tubes and lines as above. No significant interval change from prior study. No overt edema, fusion, extrapleural air or focal consolidation.          XR CHEST PORTABLE   Final Result   Stable chest.         XR CHEST PORTABLE   Final Result   Stable chest.         XR CHEST PORTABLE   Final Result   Hazy bibasilar airspace disease, slightly increased on the spray  Commonly known as: FLONASE  1 spray by Each Nostril route daily     furosemide 20 MG tablet  Commonly known as: Lasix  Take 1 tablet by mouth daily     guaiFENesin 600 MG extended release tablet  Commonly known as: MUCINEX  Take 1 tablet by mouth 2 times daily     levothyroxine 125 MCG tablet  Commonly known as: SYNTHROID  Take 1 tablet by mouth every morning (before breakfast)     montelukast 10 MG tablet  Commonly known as: SINGULAIR  Take 1 tablet by mouth nightly     multivitamin tablet  Take 1 tablet by mouth daily     Trelegy Ellipta 100-62.5-25 MCG/INH Aepb  Generic drug: fluticasone-umeclidin-vilant         * This list has 2 medication(s) that are the same as other medications prescribed for you. Read the directions carefully, and ask your doctor or other care provider to review them with you. Where to Get Your Medications      These medications were sent to [address] [place] [address],[facility], [address] [place] [address], [address]  [address]    Phone: [phone]   · predniSONE 10 MG tablet       Discharged in stable condition to home (was recommended for SNF but unable to afford)    Follow Up:   Follow up with PCP      More than 30 mts spent      Delmy Colindres MD 5/20/2021 5:07 PM

## 2021-05-14 NOTE — PROGRESS NOTES
05/14/21 0025   NIV Type   NIV Started/Stopped On   Equipment Type V60   Mode Bilevel   Mask Type Full face mask   Mask Size Medium   Settings/Measurements   IPAP 16 cmH20   CPAP/EPAP 8 cmH2O   Rate Ordered 14   Resp 25   Insp Rise Time (%) 2 %   FiO2  30 %   I Time/ I Time % 0.9 s   Vt Exhaled 938 mL   Minute Volume 22.6 Liters   Mask Leak (lpm) 26 lpm   Comfort Level Good   Using Accessory Muscles No   SpO2 94   Oxygen Therapy/Pulse Ox   O2 Therapy Oxygen   O2 Device PAP (positive airway pressure)   SpO2 94 %

## 2021-05-14 NOTE — FLOWSHEET NOTE
05/14/21 0843   Vital Signs   Temp 98.1 °F (36.7 °C)   Temp Source Oral   Pulse 92   Heart Rate Source Monitor   Resp 18   /63   BP Location Right upper arm   Patient Position Semi fowlers   Level of Consciousness Alert (0)   MEWS Score 1   Patient Currently in Pain Denies   Oxygen Therapy   SpO2 92 %   Pulse Oximeter Device Mode Continuous   Pulse Oximeter Device Location Left;Finger   O2 Device Nasal cannula   Skin Assessment Clean, dry, & intact   O2 Flow Rate (L/min) 2 L/min     AM assessment complete. Pt a&o to person, place, disoriented to time and situation. Denies any pain or discomfort. Denies any sob. Edema is starting to subside. Call light and bedside table within reach. Will continue to monitor.

## 2021-05-14 NOTE — PROGRESS NOTES
Inpatient Physical Therapy Daily Treatment Note    Unit: ICU  Date:  5/14/2021  Patient Name:    Kendra Rivera  Admitting diagnosis:  Acute on chronic respiratory failure [facility]) [J96.20]  Admit Date:  4/25/2021  Precautions/Restrictions: Fall risk, Bed/chair alarm, Lines -IV, Supplemental O2 (4L/min), Baugh catheter and R IJ line, Telemetry, Continuous pulse oximetry       Discharge Recommendations: SNF  DME needs for discharge: defer to facility       Therapy recommendation for EMS Transport: requires transport by cot due to need of lift equipment for functional transfers. Therapy recommendations for staff:   Assist of 2 with STEDY and gait belt for transfers to/from chair. History of Present Illness: Per Dr. Jacqui Rudolph H&P 4/25: \"The patient is a 57 y. o. female with COPD/asthma, chronic respiratory failure on home oxygen 3 L, continued tobacco abuse and hypothyroidism who [facility] with c/o shortness of breath and chest pain.  Ongoing for the last 2 days.  Her symptoms were worsening.  She called 911. Ernestine Cesar was started on a Z-pack yesterday.    Her BP is elevated.  She is tachypneic and tachycardic.  She is requiring BiPAP.  Labs with leukocytosis.  Initial Venous blood gas with pH 7.2 and PCO2 92.  CXR with pulmonary edema with bibasilar atelectasis versus pneumonia. Admitted to ICU on BiPAP.  Pulmonology consulted.    Patient seen in ICU.   She is currently on BiPAP, still in some respiratory distress, tolerating BiPAP well.  Oxygen saturations are stable.  ABG improving pH is up to 7.29, PCO2 is down to 74.  She is awake and alert and well-oriented.   She describes being ill for 2 to 3 days now, with increasing shortness of breath and wheezing, increasing cough and coughing up some sputum.  No fevers.  She is normally on home oxygen 3 L.  She continues to smoke tobacco down to about 4 to 5 cigarettes/day. Cleraji Fly Her Covid test on admission was negative   She has not had her Covid vaccine.   I spoke to patient's  at bedside.   Spoke to patient's ICU nurse. \"  Intubated 4/26. Extubated 5/10. Home Health S4 Level Recommendation: NA  AM-PAC Mobility Score   AM-PAC Inpatient Mobility Raw Score : 8  AM-PAC Inpatient Mobility without Stair Climbing Raw Score : 7     Treatment Time: 8474-1476  Treatment number: 3  Timed Code Treatment Minutes: 34 minutes  Total Treatment Minutes:  34  minutes    Cognition    A&O Person and Place; knows month, not year. Able to follow 1 step commands. Subjective  Patient lying supine in bed with spouse present   Pt agreeable to this PT tx.   Pt's  present for entire session. Pt and pt's  initially state that they are preparing to go home and believe they will be able to manage at home. After completion of therapy, pt and pt's  are in agreement with therapy that she is not ready to go home and would not be able to get home safely due to her fatigue and weakness. They are agreeable to explore options for further therapy. Cost of SNF has been \"the big thing\" per pt's  in their prior reasoning for declining SNF. Pain   No  Location: N/A  Rating:    NA/10  Pain Medicine Status: No request made     Bed Mobility   Supine to Sit:    Mod A  and 2 persons  Sit to Supine:   Not Tested  Rolling:   Not Tested  Scooting: Max A     Transfer Training     Sit to stand: Max A x 2 for partial sit>stand from EOB. Min A x 2 EOB>STEDY. Stand to sit:   Min A x 2 STEDY>recliner  Bed to Chair:   Total A with use of MARCO ANTONIO STEDY    Gait Training gait deferred due to difficulty with transfers; pt ambulated 0 ft. Stair Training deferred, pt unsafe/not appropriate to complete stairs at this time    Therapeutic Exercise all completed bilaterally unless indicated  Ankle Pumps x 10 reps  Heel slides x 5 reps (AAROM - pt with minimal AROM)  Ankle DF stretch 2 x 30 sec - available PROM to neutral bilat.     Balance  Sitting:  Fair ; CGA and Min A   Comments: at EOB, with fatigue    Standing: Not tested; Not Tested  Comments: Pt unable to fully extend knees (more pronounced on L side) with partial stand from EOB with walker. Patient Education      Role of PT, POC, Discharge recommendations, safety awareness, transfer techniques, energy conservation, pacing activity and calling for assist with mobility. Positioning Needs       Pt reclined in chair, alarm set, positioned in proper neutral alignment and pressure relief provided. Call light provided and all needs within reach    Activity Tolerance   Pt completed therapy session with Dizziness noted with sitting at EOB . BP (mmHg)  HR (bpm)  SpO2 (%)    Supine before activity   128/72  104  93%    EOB - feels \"woozy\"  150/71  101  95%    Seated after activity    124  94%       Other  N/A    Assessment :  Patient was extremely lethargic and waxing and waning with arousal status during the session. Rn was notified about the treatment response. Recommending SNF upon discharge as patient functioning well below baseline, demonstrates good rehab potential and unable to return home due to limited or no family support, inability to negotiate stairs to enter home/bedroom/bathroom, burden of care beyond caregiver ability, home environment not conducive to patient recovery and limited safety awareness. Goals (all goals ongoing unless otherwise indicated)  To be met in 3 visits:  1). Independent with LE Ex x 10 reps - 5/14 goal not met, pt requires max cues     To be met in 6 visits:  1). Supine to/from sit: Mod A   2). Sit to/from stand: Mod A   3). Bed to chair: Mod A  using LRAD  4). Gait: Ambulate 5 ft.   with  Max A  and use of LRAD (least restrictive assistive device)  5). Tolerate B LE exercises 3 sets of 10-15 reps  6). Patient will be able to tolerate sitting at the EOB for 10 min with normal vital parameter response.     Plan   Continue with plan of care with PT frequency changed to 3-5x/week    Signature: Stevie Terrazas, PT, DPT    If patient discharges from this facility prior to next visit, this note will serve as the Discharge Summary.

## 2021-05-14 NOTE — PROGRESS NOTES
[facility] Gave call back number incase they had any questions. Pt left floor in stable condition by [employer].

## 2021-05-14 NOTE — PROGRESS NOTES
Pulmonary & Critical Care Medicine ICU Progress Note    CC: Shortness of breath, acute on chronic respiratory failure    Events of Last 24 hours: patient feels much more alert. Invasive Lines:  RIJ CVC 4/26/21  MV: 4/26/21-  Vent Mode: AC/VC Rate Set: 0 bmp/Vt Ordered: 380 mL/ /FiO2 : 30 %  No results for input(s): PHART, [id number], PO2ART in the last 72 hours. IV:   dextrose      sodium chloride 25 mL (05/11/21 2000)       Vitals:  Blood pressure 122/63, pulse 92, temperature 98.1 °F (36.7 °C), temperature source Oral, resp. rate 18, height 5' 4\" (1.626 m), weight 146 lb 6.4 oz (66.4 kg), SpO2 92 %, not currently breastfeeding. on 2l NC  EXAM:  Gen: In no acute distress. NCAT. Eyes: PERRL. No sclera icterus. No conjunctival injection. ENT: No ocular or auricular discharge. Oropharynx clear. Neck: Trachea midline. Resp: No accessory muscle use. No crackles. No wheezes. No rhonchi. No dullness on percussion. CV: Regular rate. Regular rhythm. No murmur or rub. Normal S1 and S2. Trace LE edema. GI: Abdomen non-tender. Non-distended. No masses. Skin: Warm and dry. No nodules on exposed extremities. No rash on exposed extremities. M/S: No cyanosis. No synovitis or joint deformity. No clubbing. Neuro: Cranial nerves are grossly intact. Moving all extremities. Motor and sensation grossly intact. Conversant. Oriented x 3.       Scheduled Meds:   ipratropium-albuterol  1 ampule Inhalation TID    methylPREDNISolone  40 mg Intravenous Daily    polyethylene glycol  17 g Oral Daily    insulin lispro  0-12 Units Subcutaneous Q4H    aspirin  81 mg Oral Daily    atorvastatin  40 mg Oral Nightly    levothyroxine  125 mcg Oral QAM AC    montelukast  10 mg Oral Nightly    sodium chloride flush  5-40 mL Intravenous 2 times per day    enoxaparin  40 mg Subcutaneous Daily    famotidine  20 mg Oral BID     PRN Meds:  potassium chloride, magnesium sulfate, carboxymethylcellulose PF, glucose, dextrose, glucagon (rDNA), dextrose, sodium chloride flush, sodium chloride, acetaminophen **OR** acetaminophen, ondansetron **OR** ondansetron, albuterol, ibuprofen    Results:  CBC:   Recent Labs     05/12/21  0525 05/13/21  0400 05/14/21  0432   WBC 18.4* 15.8* 15.0*   HGB 9.6* 9.1* 9.1*   HCT 28.9* 27.5* 27.5*   MCV 95.6 95.6 96.1    347 347     BMP:   Recent Labs     05/12/21 0525 05/13/21  0400 05/14/21  0432    140 138   K 3.6 3.3* 3.7    103 103   CO2 31 29 29   BUN 21* 22* 16   CREATININE <0.5* <0.5* <0.5*     LIVER PROFILE:   No results for input(s): AST, ALT, LIPASE, BILIDIR, BILITOT, ALKPHOS in the last 72 hours. Invalid input(s): AMYLASE,  ALB    Cultures:  4/25/2021 SARS-CoV-2 negative   4/25/2021 blood no growth   4/26/2021 respiratory culture candida  5/1/2021 bronc washings no organisms  5/1/2021 BAL NRF  5/5 Blood cx NGTD  5/5/21 Blood fungal cx NGTD  5/5 trach asp: NRF  5/11 blood- ngtd  5/11/21- urine- ngtd        Films:  LE doppler neg    CXR 5/10/2021: hyperinflated with no focal ASD      ASSESSMENT:  · Acute on chronic respiratory failure with hypoxemia and hypercapnia   · COPD with AE; severe airtrapping with auto PEEP  · Tobacco abuse  · Leukocytosis    PLAN:  Supplemental oxygen to maintain SaO2 >92%; wean as tolerated   Bipap prn  Head of bed 30 degrees or higher at all times  Duonebs prn  IV Solu-Medrol daily-Pred taper  Completed Cefepime D#7/7, Zyvox D#3. Post 4 days of vancomycin  Completed 7 days ceftriaxone and 5 days azithromycin and 4 days vancomycin.   PT/OT

## 2021-05-14 NOTE — DISCHARGE INSTR - COC
Continuity of Care Form    Patient Name: Sami Aceves   :  1960  MRN:  [id number]    Admit date:  2021  Discharge date:  2021    Code Status Order: Full Code   Advance Directives:   Advance Care Flowsheet Documentation       Date/Time Healthcare Directive Type of Healthcare Directive Copy in [address] Agent's Name Healthcare Agent's Phone Number    21 1153  No, patient does not have an advance directive for healthcare treatment -- -- -- -- --            Admitting Physician:  Taye Fraser MD  PCP: Luigi Amaya MD    Discharging Nurse: Magnolia Hernandez Unit/Room#: /5516-77  Discharging Unit Phone Number: [phone]    Emergency Contact:   Extended Emergency Contact Information  Primary Emergency Contact: [facility] Friday  Address: Patsy Mcknight [address] [place] [address] Ernestina Pineda[address] Phone: [phone]  Mobile Phone: [phone]  Relation: Spouse  Secondary Emergency Contact: Jase Philip of [address] [place] [address] Phone: [phone]  Relation: Child    Past Surgical History:  Past Surgical History:   Procedure Laterality Date     SECTION      x2    CHOLECYSTECTOMY         Immunization History:   Immunization History   Administered Date(s) Administered    Influenza Vaccine, unspecified formulation 2015, 2017, 2017    Influenza Virus Vaccine 2015, 2017, 2017, 2019    Influenza, Quadv, 6 mo and older, IM, PF (Flulaval, Fluarix) 2019    Influenza, Quadv, Recombinant, IM PF (Flublok 18 yrs and older) 2020    Pneumococcal Polysaccharide ([address]) 2018    Tdap (Boostrix, Adacel) 2014       Active Problems:  Patient Active Problem List   Diagnosis Code    Acute respiratory failure with hypercapnia ([address] J96.02    COPD exacerbation ([facility] J44.1    Tobacco abuse Z72.0    Hypothyroidism E03.9    Acute on chronic respiratory failure with hypoxia ([facility]) J96.21    Community acquired bacterial pneumonia J15.9    Pneumonia due to organism J18.9    Acute exacerbation of chronic obstructive pulmonary disease (COPD) ([facility]) J44.1    SVT (supraventricular tachycardia) ([facility]) I47.1    Mild protein-calorie malnutrition ([facility]) E44.1    COPD, severe ([facility]) J44.9    Chronic respiratory failure with hypoxia ([facility]) J96.11    Chest pain on breathing R07.1    HCAP (healthcare-associated pneumonia) J18.9    Chronic bilateral pleural effusions J90    Acute on chronic respiratory failure ([facility]) J96.20    Sepsis with acute hypercapnic respiratory failure without septic shock ([facility]) A41.9, R65.20, J96.02    Sepsis ([facility]) A41.9    Mucus plugging of bronchi J98.09    Acute pulmonary edema ([facility]) J81.0       Isolation/Infection:   Isolation            No Isolation          Patient Infection Status       Infection Onset Added Last Indicated Last Indicated By Review Planned Expiration Resolved Resolved By    COVID-19 Rule Out 05/14/21 05/14/21 05/14/21 COVID-19, Rapid (Ordered) 05/21/21 05/28/21      Resolved    COVID-19 Rule Out 04/25/21 04/25/21 04/25/21 COVID-19, Rapid (Ordered)   04/25/21 Rule-Out Test Resulted    COVID-19 Rule Out 08/23/20 08/23/20 08/23/20 COVID-19 (Ordered)   08/24/20 Rule-Out Test Resulted            Nurse Assessment:  Last Vital Signs: /63   Pulse 92   Temp 98.1 °F (36.7 °C) (Oral)   Resp 18   Ht 5' 4\" (1.626 m)   Wt 146 lb 6.4 oz (66.4 kg)   LMP  (LMP Unknown)   SpO2 92%   BMI 25.13 kg/m²     Last documented pain score (0-10 scale): Pain Level: 0  Last Weight:   Wt Readings from Last 1 Encounters:   05/14/21 146 lb 6.4 oz (66.4 kg)     Mental Status:  oriented, alert, coherent and confused at times    IV Access:  - None    Nursing Mobility/ADLs:  Walking   Dependent  Transfer  Dependent  Bathing  Dependent  Dressing  Dependent  Toileting  Dependent  Feeding  Dependent  Med Admin  Dependent  Med Delivery   whole and prefers mixed with pudding    Wound Care Documentation and Therapy:        Elimination:  Continence:   · Bowel: No  · Bladder: Yes  Urinary Catheter: Insertion Date: 5/5/2021 for strict I&O monitoring   Colostomy/Ileostomy/Ileal Conduit: No  Rectal Tube-Stool Appearance: Other (Comment)(dino)  Rectal Tube-Stool Color: Isaac Diggs    Date of Last BM: 5/13/2021    Intake/Output Summary (Last 24 hours) at 5/14/2021 1040  Last data filed at 5/14/2021 0849  Gross per 24 hour   Intake 820 ml   Output 1050 ml   Net -230 ml     I/O last 3 completed shifts: In: 36 [P.O.:540; I.V.:280]  Out: 1050 [Urine:1050]    Safety Concerns: At Risk for Falls    Impairments/Disabilities:      Impaired mobility. Nutrition Therapy:  Current Nutrition Therapy:   - Oral Diet:  Minched and moist diet    Routes of Feeding: Oral  Liquids: Thin Liquids  Daily Fluid Restriction: no  Last Modified Barium Swallow with Video (Video Swallowing Test): 5/12/2021    Treatments at the Time of Hospital Discharge:   Respiratory Treatments: as ordered  Oxygen Therapy:  is on oxygen at 2 L/min per nasal cannula.   Ventilator:    - BiPAP   IPAP: 16 cmH20, CPAP/EPAP: 8 cmH2O only when sleeping    Rehab Therapies: Physical Therapy, Occupational Therapy and Speech/Language Therapy  Weight Bearing Status/Restrictions: No weight bearing restirctions  Other Medical Equipment (for information only, NOT a DME order):  wheelchair, bath bench and bedside commode  Other Treatments: as ordered    Patient's personal belongings (please select all that are sent with patient):  None    RN SIGNATURE:  Electronically signed by Quentin Wagner RN on 5/14/21 at 1:16 PM EDT    CASE MANAGEMENT/SOCIAL WORK SECTION    Inpatient Status Date: 4/25/2021    Readmission Risk Assessment Score:  Readmission Risk              Risk of Unplanned Readmission:        15           Discharging to Facility/ Agency   Name: [facility]  Address: [address] [place] [address] [place], [place], [address]  Phone: [phone]  Fax: [phone]  ·       Dialysis Facility (if applicable)   · Name:  · Address:  · Dialysis Schedule:  · Phone:  · Fax:    / signature: Electronically signed by Samia Baltazar RN on 5/14/21 at 12:00 PM EDT    PHYSICIAN SECTION    Prognosis: Good    Condition at Discharge: Stable    Rehab Potential (if transferring to Rehab): Fair    Recommended Labs or Other Treatments After Discharge:PT/OT  HCV and Zoom    Physician Certification: I certify the above information and transfer of Isael Okeefe  is necessary for the continuing treatment of the diagnosis listed and that she requires [place] for less 30 days.      Update Admission H&P: No change in H&P    PHYSICIAN SIGNATURE:  Electronically signed by Princess Gómez MD on 5/14/21 at 10:40 AM EDT

## 2021-05-14 NOTE — PROGRESS NOTES
Occupational Therapy Daily Treatment Note    Unit: PCU  Date:  5/14/2021  Patient Name:    Amy Cespedes  Admitting diagnosis:  Acute on chronic respiratory failure [facility]) [J96.20]  Admit Date:  4/25/2021  Precautions/Restrictions:  Fall risk, Bed/chair alarm, Lines -IV, Supplemental O2 (2L), RIJ, Telemetry and Continuous pulse oximetry        Discharge Recommendations: SNF  DME needs for discharge: defer to facility       Therapy recommendations for staff:   Assist of 2 with bed mobility, Assist of 2 with STEDY and gait belt    AM-PAC Score: AM-PAC Inpatient Daily Activity Raw Score: 12  Home Health S4 Level: NA       Treatment Time:  6471-5218  Treatment number:  3    Timed code treatment minutes: 45 minutes  Total treatment minutes:   45 minutes    History of Present Illness: per Dr Rebekah Lu H&P 4/25/21:  \"The patient is a 56 y.o. female with COPD/asthma, chronic respiratory failure on home oxygen 3 L, continued tobacco abuse and hypothyroidism who presents to [facility] with c/o shortness of breath and chest pain. Ongoing for the last 2 days. Her symptoms were worsening. She called 911. She was started on a Z-pack yesterday.       Her BP is elevated. She is tachypneic and tachycardic. She is requiring BiPAP. Labs with leukocytosis. Initial Venous blood gas with pH 7.2 and PCO2 92. CXR with pulmonary edema with bibasilar atelectasis versus pneumonia. Admitted to ICU on BiPAP. Pulmonology consulted.       Patient seen in ICU. She is currently on BiPAP, still in some respiratory distress, tolerating BiPAP well. Oxygen saturations are stable. ABG improving pH is up to 7.29, PCO2 is down to 74. She is awake and alert and well-oriented.      She describes being ill for 2 to 3 days now, with increasing shortness of breath and wheezing, increasing cough and coughing up some sputum. No fevers. She is normally on home oxygen 3 L. She continues to smoke tobacco down to about 4 to 5 cigarettes/day. .     Her Covid test on admission was negative   She has not had her Covid vaccine.     I spoke to patient's  at bedside. Spoke to patient's ICU nurse\"     Subjective:  Patient in bed, more alert and oriented to person, place and month (stated 2027 for year). Pain   No  Rating: NA  Location:  Pain Medicine Status: No request made      Bed Mobility:   Supine to Sit:  Mod A  and 2 persons  Sit to Supine:  Not Tested  Rolling: Total A and 2 persons  Scooting: Max A     Transfer Training:   Sit to stand: Max A  and 2 persons for partial stand to SW, MIN A of 2 to STEDY with encouragement  Stand to sit:  Min A  and 2 persons to control descent  Bed to Chair:  Total A and via STEDY  Bed to [facility]:   Not Tested  Standard toilet:   Not Tested     Activity Tolerance   Pt completed therapy session with Dizziness/\"wooziness\" noted with sitting at EOB .     BP (mmHg)  HR (bpm)  SpO2 (%)    Supine before activity   128/72  104  93%    EOB - feels \"woozy\"  150/71  101  95%    Seated after activity    124  94%      ADL Training:   Upper body dressing: Not Tested  Upper body bathing:  Not Tested  Lower body dressing: Total A manage socks  Lower body bathing:  Not Tested  Toileting:   Not Tested  Grooming/Hygiene:  Not Tested     Patient lacks UE strength and coordination to activate call light, RN informed; squeeze ball call light recommended. Therapeutic Exercise: NA  N/A     Patient Education:   Role of OT  Recommendations for DC - extensive discussion regarding safety concerns related to a discharge home. Patient unable to come to full stand and will be unable to ascend steps into home, likely unable to transfer into car. Positioning Needs:   Pt up in chair, alarm set, positioned in proper neutral alignment and pressure relief provided.    Call light provided and all needs within reach    Family Present:  Yes, spouse    Assessment: Patient limited by fatigue this date but more alert, extensive discussion regarding d/c plans. With increased alertness and activity tolerance, patient will likely benefit from ARU.  updated. With patient continue as tolerated. GOALS  To be met in 3 Visits:  1). Bed to toilet/BSC: Total A via Stedy (MET 5/14/21, upgrade to MOD A)     To be met in 5 Visits:  1). Supine to/from Sit:             Mod A  2). Upper Body Bathing: Mod A  3). Lower Body Bathing:         Max A  4). Upper Body Dressing: Mod A  5). Lower Body Dressing:       Max A  6).  Pt to demonstrate UE exs x 15 reps with minimal cues      Plan: cont with [address], OTR/L 1285        If patient discharges from this facility prior to next visit, this note will serve as the Discharge Summary

## 2021-05-14 NOTE — PROGRESS NOTES
Speech Language Pathology  Facility/Department: [facility] PCU TELEMETRY  Dysphagia Daily Treatment Note    NAME: Debbie Cartwright  : 1960  MRN: [id number]    Patient Diagnosis(es):   Patient Active Problem List    Diagnosis Date Noted    Mucus plugging of bronchi     Acute pulmonary edema (HCC)     Sepsis ([address]     Acute on chronic respiratory failure ([address] 2021    Sepsis with acute hypercapnic respiratory failure without septic shock ([address]     Pneumonia due to organism 2020    Chronic bilateral pleural effusions     HCAP (healthcare-associated pneumonia)     Chest pain on breathing     COPD, severe ([address] 2019    Chronic respiratory failure with hypoxia ([address] 2019    Mild protein-calorie malnutrition ([address] 2018    Acute exacerbation of chronic obstructive pulmonary disease (COPD) ([address]     SVT (supraventricular tachycardia) ([address]     Acute on chronic respiratory failure with hypoxia ([address] 2018    Community acquired bacterial pneumonia 2018    Acute respiratory failure with hypercapnia ([address] 09/10/2018    COPD exacerbation ([address] 09/10/2018    Tobacco abuse 09/10/2018    Hypothyroidism 09/10/2018     Allergies: Allergies   Allergen Reactions    Promethazine Other (See Comments)     Extreme confusion (1/3/20)    Codeine Swelling     Onset Date: 2021    Subjective: RN ok'd entry of SLP, reporting to SLP pt has had increased PO intake. Pt alert and cooperative, slightly confused. Pt agreeable to tx. Pt's  at bedside. Pain: Denies    Current Diet: DIET DYSPHAGIA PUREED; Dysphagia Pureed    Diet Tolerance:  Patient tolerating current diet level without signs/symptoms of penetration / aspiration. Dysphagia Treatment and Impressions:   Pt seen in room at bedside with RN permission. RN reported to SLP pt with increased PO intake    Pt alert and cooperative, yet confused. Agreeable to PO trials.  Pt's  present for session.  Chart Review/Interivew: Pt tolerating recommended diet, increased PO intake   Respiratory Status: Pt with SPO2% of 92 on 2 LPM NC with RR of 16   Liquid PO Trials:    IDDSI 0 Thin:  Assessed via cup: Delayed post-swallow cough. Suspect mildly delayed trigger of pharyngeal swallow with reduced laryngeal elevation. No coughing or change in vitals.  Solid PO Trials   IDDSI 5 Minced and Moist:   No anterior bolus loss, suspect timely swallow, good A-P bolus transit, good oral clearance, vitals stable and no clinical s/s of aspiration   IDDSI 7 Regular:   No anterior bolus loss, impaired mastication (decreased rotary jaw movement, slow and prolonged), suspect reduced A-P bolus transit, stasis noted in oral cavity post swallow. Vitals stable and no clinical s/s of aspiration      Education: SLP edu pt re: Role of SLP, Rationale for dysphagia tx, Recommended compensatory strategies, Aspiration precautions and POC. SLP edu pt and pt's  re: safe swallow strategies - small bites/sips, slow rate, alternate liquids and solids, sit fully upright. Extensive edu re: diet recommended (minced and moist solids, thin liquids). Pt and  verbalized understanding.  Assessment: Pt tolerating current diet with no clinical s/s of aspiration. Good carryover of recommended compensatory strategies with assistance   Recommendations: SLP recommends to advance to IDDSI 5 Minced and moist Solids; IDDSI 0 Thin Liquids; Meds PO as tolerated or whole in puree. RN made aware of recommendations.  Risk Management: upright for all intake, stay upright for at least 30 mins after intake, small bites/sips, assist feed, alternate bites/sips and slow rate of intake. If the patient exhibits s/s of aspiration and/or worsening respiratory status, hold PO and contact SLP.     Dysphagia Goals:  Timeframe for Long-term Goals: 7 days (05/19)  Goal 1: Pt will tolerate LRD w/o clinical s/s of aspiration or worsening respiratory status - 05/14 - addressed and ongoing - see above     Short-term Goals  Timeframe for Short-term Goals: 5 days (05/17)  Goal 1: The patient will tolerate recommended diet without observed clinical signs of aspiration - 05/14 - addressed and ongoing - see above   Goal 2: The patient/caregiver will demonstrate understanding of compensatory strategies for improved swallowing safety. - 05/14 - addressed and ongoing - see above     Speech/Language/Cog Goals: N/A                        Recommendations:  Solid Consistency: ADVANCE TO IDDSI 5 Minced and moist Solids  Liquid Consistency: IDDSI 0 Thin Liquids  Medication: Meds whole in puree or PO as tolerated    Patient/Family/Caregiver Education: Role of SLP, Rationale for dysphagia tx, Recommended compensatory strategies, Aspiration precautions, Evidenced based practice for current recommendations and treatment and Risks of not following recommendations. SLP provided extensive edu re: safe swallow strategies and recommended diet. Pt needs reinforcement, but pt's  verbalized understanding. Compensatory Strategies: Upright for all intake, Remain upright at least 30 mins after intake, Small bites, Small sips and Alternate solids and liquids    Plan:    Continued Dysphagia treatment with goals per plan of care. Discharge Recommendations: TBD. [facility]    If pt discharges from hospital prior to Speech/Swallowing discharge, this note serves as tx and discharge summary.      Total Treatment Time / Charges     Time in Time out Total Time / units   Cognitive Tx         Speech Tx      Dysphagia Tx 1240 1300 20 min / 1 unit     Signature:  Yo Pickett MA [facility]  Speech Language Pathologist

## 2021-05-14 NOTE — CARE COORDINATION
[facility]  Received referral regarding HC services from [facility]. Sent request to [facility] for [facility] coverage over weekend. [facility] is able to see pt for [facility]. needs. Liaison to follow up with pt prior to discharge. Pt's insurance does requie an auth for [facility] services. Spoke with pt and spouse. Pt's spouse expressed concerns with going home with HC. Spouse states he was present during therapy at [facility] and is worried he will not be able to care for pt at home. Spoke with Ronald Bowden CM in follow up. Ronald Bowden states referral was placed to [address] [place] [address] Drive is unable to admit. Per Ronald Bowden, referral back to [facility] and request to [facility] to assist with OOP cost (financial assistance). Liaison to follow up with [facility] as [facility]. order has been faxed for [facility]. Spoke with Ronald Bowden CM. Pt will be discharged to [facility] today. Cancelled referral to [facility].       Electronically signed by Willian Mckeon RN on 5/14/2021 at 11:46 AM

## 2021-05-14 NOTE — PROGRESS NOTES
05/14/21 0317   NIV Type   Equipment Type v60   Mode Bilevel   Mask Type Full face mask   Mask Size Medium   Settings/Measurements   IPAP 16 cmH20   CPAP/EPAP 8 cmH2O   Rate Ordered 14   Resp 28   FiO2  30 %   Vt Exhaled 499 mL   Mask Leak (lpm) 16 lpm   Comfort Level Good   Using Accessory Muscles No   SpO2 98   Alarm Settings   Alarms On Y

## 2021-05-14 NOTE — PROGRESS NOTES
Pt taken off Bipap and placed on 02 2liters. AM medications given. Denies needs. Call light within reach. Bed exit alarm on.

## 2021-05-15 LAB
BLOOD CULTURE, ROUTINE: NORMAL
CULTURE, BLOOD 2: NORMAL

## 2021-05-20 NOTE — ADT AUTH CERT
Chronic Obstructive Pulmonary Disease - Care Day 19 (5/13/2021) by Palomo Burgos RN       Review Status Review Entered   Completed 5/20/2021 08:56      Criteria Review      Care Day: 19 Care Date: 5/13/2021 Level of Care: Intermediate Care    Guideline Day 2    Clinical Status    (X) * Hemodynamic stability    5/20/2021 8:56 AM EDT by Thais Petite      98.5 16 91 143/75    (X) * Mechanical and noninvasive ventilation absent    (X) * Uncompensated acidosis absent    5/20/2021 8:56 AM EDT by Thais Petite      co2 29    Activity    ( ) Ambulatory    5/20/2021 8:56 AM EDT by Thais Petite      cont same    Routes    (X) Diet as tolerated    5/20/2021 8:56 AM EDT by Thais Petite      cont same    (X) IV fluids, medications    5/20/2021 8:56 AM EDT by Thais Petite      cont current meds plus  iv mag sulfate 1000 mg prn x2  iv KCL 20 meq prn x2    Interventions    (X) Oxygen    5/20/2021 8:56 AM EDT by Thais Petite      2.5-3 L nc    (X) Pulse oximetry    5/20/2021 8:56 AM EDT by Thais Petite      spot checks  92-99%  pt on bipap overnight    (X) Respiratory therapy    (X) DVT prophylaxis    5/20/2021 8:56 AM EDT by Thais Petite      cont same    Medications    (X) Systemic corticosteroids    5/20/2021 8:56 AM EDT by Thais Petite      cont iv solumedrol    (X) Inhaled bronchodilators    5/20/2021 8:56 AM EDT by Kym Massey decreased to 3x daily    * Milestone   Additional Notes   5/13/2021      Pt transferred to IMCU      Vs see above      Potassium: 3.3 (L)   BUN: 22 (H)   Creatinine: <0.5 (L)   Magnesium: 1.70 (L)      WBC: 15.8 (H)   RBC: 2.87 (L)   Hemoglobin Quant: 9.1 (L)   Hematocrit: 27.5 (L)      POC Glucose: 112 (H)   POC Glucose: 142 (H)      =========================================================      Per IM           ASSESSMENT/PLAN:       #Acute on chronic hypoxic/hypercapnic respiratory failure   -due to COPD exacerbation, Pneumonia   -Normally on home oxygen 3 L -presently on 4 L   -ABG on admission pH 7.2, PCO2 92   - admitted to ICU on BiPAP   - pulmonology consulted.     - IV Solu-medrol, HHN , abx initiated    --> failed bipap and worsened- leading to intubation and mechanical vent  support   -Required ketamine to help reactive airways- weaned off ketamine, started precedex   -> Extubated on 5/20/2021, needing BiPAP intermittently.  On 3 L at present.        #Pneumonia, Gram positive organism   - Bronchoscopy done.     - Received vancomycin( 4 days) and 3 days of zyvox  -continued on cefepime(completed 7/7)    -Cultures negative except for candida. Fevers resolved        #Sepsis resolved   -Present on admission   -With tachycardia and tachypnea, leukocytosis.    Secondary to pneumonia   Monitor   Improved BP and off levo       #Tobacco abuse   - smoking cessation needed       #Pulmonary Edema   - ECHO last year with normal EF and normal Diastolic function    - IV Lasix 40 mg as tolerated       #Leukocytosis-likely due to chronic steroids   -Trend WBC- remains high    -neg procalcitonin   White count is declining now.        #Hypothyroidism   - home dose of synthroid 125 mcg        #Hyperlipidemia   - on Atorvastatin.       DVT Prophylaxis: Lovenox   Diet: on TF   Code Status: Full Code            Discharge planning to SNF   _______________________________________________________________      Per pulmo       ASSESSMENT:   · Acute on chronic respiratory failure with hypoxemia and hypercapnia    · COPD with AE; severe airtrapping with auto PEEP   · Tobacco abuse   · Leukocytosis       PLAN:   · Supplemental oxygen to maintain SaO2 >92%; wean as tolerated    · Bipap prn   · Head of bed 30 degrees or higher at all times   · Duonebs Q4   · IV Solu-Medrol daily   · F/u Re-sent blood and urine cx    · Completed Cefepime D#7/7, Zyvox D#3. Post 4 days of vancomycin   · Completed 7 days ceftriaxone and 5 days azithromycin and 4 days vancomycin.    · NOK is spouse- spoke with at bedside   · PT/OT                Chronic Obstructive Pulmonary Disease - Care Day 18 (5/12/2021) by Karthik Elena RN       Review Status Review Entered   Completed 5/20/2021 08:45      Criteria Review      Care Day: 18 Care Date: 5/12/2021 Level of Care: ICU    Guideline Day 2    Clinical Status    ( ) * Hemodynamic stability    5/20/2021 8:45 AM EDT by Donna Carrera      99.7 27 105 130/61 99% 3 L nc    (X) * Mechanical and noninvasive ventilation absent    ( ) * Uncompensated acidosis absent    5/20/2021 8:45 AM EDT by Donna Carrera      co2 31    Activity    ( ) Ambulatory    5/20/2021 8:45 AM EDT by Donna Carrera      bedrest w/ bsc    Routes    (X) Diet as tolerated    5/20/2021 8:45 AM EDT by Donna Carrera      cont same    (X) IV fluids, medications    5/20/2021 8:45 AM EDT by Donna Carrera      cont current meds plus  D50% 12.5 gm prn x2    Interventions    (X) Oxygen    5/20/2021 8:45 AM EDT by Donna Carrera      3 L nc    (X) Pulse oximetry    5/20/2021 8:45 AM EDT by Donna Carrera      continuous  %    (X) Respiratory therapy    (X) DVT prophylaxis    5/20/2021 8:45 AM EDT by Donna Carrera      cont same    Medications    (X) Systemic corticosteroids    5/20/2021 8:45 AM EDT by Donna Carrera      cont iv solumedrol    (X) Inhaled bronchodilators    5/20/2021 8:45 AM EDT by Donna Carrera      cont duonebs    * Milestone   Additional Notes   5/12/2021         ICU*   Vs see above      BUN: 21 (H)   Creatinine: <0.5 (L)      WBC: 18.4 (H)   RBC: 3.02 (L)   Hemoglobin Quant: 9.6 (L)   Hematocrit: 28.9 (L)      POC Glucose: 116 (H)      Insert paula       PT AM-PAC Inpatient Mobility Raw Score : 8      OT AM-PAC Score: AM-PAC Inpatient Daily Activity Raw Score: 9   ==========================================================      Per [facility] med/Pulmo          ASSESSMENT:   · Acute on chronic respiratory failure with hypoxemia and hypercapnia    · COPD with AE; severe airtrapping with auto PEEP   · Tobacco abuse   · Leukocytosis       PLAN:   · Non-invasive positive pressure ventilation per my orders. The ventilator was adjusted by me at the bedside for unstable, life threatening respiratory failure. Wean oxygen as tolerated. .    · Precedex gtt-d/c     · Head of bed 30 degrees or higher at all times   · Duonebs Q4   · IV Solu-Medrol daily   · Hold abx for now   · F/u Re-sent blood and urine cx    · Completed Cefepime D#7/7, Zyvox D#3. Post 4 days of vancomycin   · Completed 7 days ceftriaxone and 5 days azithromycin and 4 days vancomycin. · ICU care- lovenox, pepcid    · NOK is spouse- spoke with at bedside   · PT/OT   · SLP eval- recommended MBS   · Ok to leave ICU   _____________________________________________________________      Per IM   ASSESSMENT/PLAN:       #Acute on chronic hypoxic/hypercapnic respiratory failure   -due to COPD exacerbation, Pneumonia   -Normally on home oxygen 3 L -presently on 4 L   -ABG on admission pH 7.2, PCO2 92   - admitted to ICU on BiPAP   - pulmonology consulted.     - IV Solu-medrol, HHN , abx initiated    --> failed bipap and worsened- leading to intubation and mechanical vent  support   -Required ketamine to help reactive airways- weaned off ketamine, started precedex   -> Extubated on 5/20/2021, needing BiPAP intermittently.  On 3 L at present.        #Pneumonia, Gram positive organism   - Bronchoscopy done.     - Received vancomycin( 4 days) and 3 days of zyvox  -continued on cefepime(completed 7/7)    -Cultures negative except for candida.    Fevers resolving        #Sepsis resolved   -Present on admission   -With tachycardia and tachypnea, leukocytosis.    Secondary to pneumonia   Monitor   Improved BP and off levo       #Tobacco abuse   - smoking cessation needed       #Pulmonary Edema   - ECHO last year with normal EF and normal Diastolic function    - IV Lasix 40 mg as tolerated       #Leukocytosis-likely due to chronic steroids   -Trend WBC- remains high    -neg procalcitonin   White count is declining now.        #Hypothyroidism   - home dose of synthroid 125 mcg        #Hyperlipidemia   - on Atorvastatin.       DVT Prophylaxis: Lovenox   Diet: on TF   Code Status: Full Code            Transfer to [facility]      ______________________________________________________________      Per SLP      Diet Recommendation: IDDSI 4 Puree Solids ; IDDSI 0 Thin Liquids - straws OK; Meds whole with thin liquids   Risk Management: upright for all intake, stay upright for at least 30 mins after intake, small bites/sips, total feed, oral care q4 hrs to reduce adverse affects in the event of aspiration, increase physical mobility as able, alternate bites/sips, slow rate of intake, general aspiration precautions and hold PO and contact SLP if s/s of aspiration or worsening respiratory status develop. Specialist Referrals: Pulm   Ancillary Tests: N/A   Goals: Tolerate LRD   Follow-up exam: Will f/u in tx session           Treatment Dx and ICD 10: Oropharyngeal dysphagia, R413.12    Patient Position: Lateral and Patient Degrees: The patient was seated upright at 90 degree angle in stretcher bed in right lateral position       Consistencies Administered: Dysphagia Pureed (Dysphagia I); Thin straw; Thin teaspoon; Thin cup       Dysphagia Outcome Severity Scale: Level 5: Mild dysphagia- Distant supervision. May need one diet consistency restricted   Penetration-Aspiration Scale (PAS): 1 - Material does not enter the airway       Recommended Diet:   Solid consistency: Dysphagia Pureed (Dysphagia I)   Liquid consistency: Thin   Liquid administration via: Cup;Straw       Medication administration: Meds in puree       Safe Swallow Protocol:   Supervision: Distant   Compensatory Swallowing Strategies: Assist feed;Remain upright for 30-45 minutes after meals;Eat/Feed slowly; Small bites/sips;Upright as possible for all oral intake       Recommendations/Treatment   Requires SLP Intervention: Yes D/C Recommendations: To be determined   Postural Changes and/or Swallow Maneuvers: Upright 90 degrees         Chronic Obstructive Pulmonary Disease - Care Day 17 (5/11/2021) by Antoni Peng, RN       Review Status Review Entered   Completed 5/20/2021 08:34      Criteria Review      Care Day: 17 Care Date: 5/11/2021 Level of Care: ICU    Guideline Day 2    Clinical Status    ( ) * Hemodynamic stability    5/20/2021 8:34 AM EDT by Brandi Joby      100.9 16 82 106/51    ( ) * Mechanical and noninvasive ventilation absent    5/20/2021 8:34 AM EDT by Brandi Joby      pt on bipap fio2 @ 35    ( ) * Uncompensated acidosis absent    5/20/2021 8:34 AM EDT by Brandi Joby      co2 33    Routes    (X) Diet as tolerated    5/20/2021 8:34 AM EDT by Brandi Joby      DIET DYSPHAGIA PUREED; Dysphagia Pureed    (X) IV fluids, medications    5/20/2021 8:34 AM EDT by Brandi Joby      cont current meds  cont iv precedex gtt @ 0.3 ml/hr ( d/c'd @ 0900)  iv KCL 20 meq x2    Interventions    (X) Oxygen    5/20/2021 8:34 AM EDT by Brandi Hemphill      on bipap post extubation    (X) Pulse oximetry    5/20/2021 8:34 AM EDT by Brandi Joby      continuous   96-97%    (X) Respiratory therapy    (X) DVT prophylaxis    5/20/2021 8:34 AM EDT by Brandi Joby      cont same    Medications    (X) Systemic corticosteroids    5/20/2021 8:34 AM EDT by Brandi Joby      cont iv solumedrol    (X) Inhaled bronchodilators    5/20/2021 8:34 AM EDT by Brandi Joby      cont duonebs    * Milestone   Additional Notes   5/11/2021      ICU   Vs see above      Cxr   Improved appearance of the chest with significant decrease in the amount of    pulmonary vascular congestion.  Moderate emphysematous changes.  Patient has been extubated.        Potassium: 3.1 (L)   CO2: 33 (H)   BUN: 24 (H)   Creatinine: <0.5 (L)   Calcium: 8.2 (L)      WBC: 14.3 (H)   RBC: 2.75 (L)   Hemoglobin Quant: 8.7 (L)   Hematocrit: 26.4 (L)         O2 and 3 days of zyvox  -continued on cefepime(completed 7/7)    -Cultures negative except for candida.    Fevers resolving        #Sepsis resolved   -Present on admission   -With tachycardia and tachypnea, leukocytosis.    Secondary to pneumonia   Monitor   Improved BP and off levo       #Tobacco abuse   - smoking cessation needed       #Pulmonary Edema   - ECHO last year with normal EF and normal Diastolic function    - IV Lasix 40 mg as tolerated       #Leukocytosis-likely due to chronic steroids   -Trend WBC- remains high    -neg procalcitonin   White count is declining now.        #Hypothyroidism   - home dose of synthroid 125 mcg        #Hyperlipidemia   - on Atorvastatin.       DVT Prophylaxis: Lovenox   Diet: on TF   Code Status: Full Code

## 2021-05-20 NOTE — TELEPHONE ENCOUNTER
Spoke with pt and informed that she can discuss starting BIPAP at f/u appt 5/26/21. Advised pt to get covid vaccine, moderna or pfizer, whichever one she can get in for at her soonest convenience. Offered to schedule for Pfizer at [facility], but pt wants to think about it and will call office if she decides to schedule.

## 2021-05-20 NOTE — ADT AUTH CERT
Utilization Reviews       Chronic Obstructive Pulmonary Disease - Care Day 18 (5/12/2021) by Silvano Cowden, RN       Review Status Review Entered   Completed 5/20/2021 08:45      Criteria Review      Care Day: 18 Care Date: 5/12/2021 Level of Care: ICU    Guideline Day 2    Clinical Status    ( ) * Hemodynamic stability    5/20/2021 8:45 AM EDT by Rabia Norton      99.7 27 105 130/61 99% 3 L nc    (X) * Mechanical and noninvasive ventilation absent    ( ) * Uncompensated acidosis absent    5/20/2021 8:45 AM EDT by Rabia Norton      co2 31    Activity    ( ) Ambulatory    5/20/2021 8:45 AM EDT by Rabia Norton      bedrest w/ bsc    Routes    (X) Diet as tolerated    5/20/2021 8:45 AM EDT by Rabia Norton      cont same    (X) IV fluids, medications    5/20/2021 8:45 AM EDT by Rabia Norton      cont current meds plus  D50% 12.5 gm prn x2    Interventions    (X) Oxygen    5/20/2021 8:45 AM EDT by Rabia Norton      3 L nc    (X) Pulse oximetry    5/20/2021 8:45 AM EDT by Rabia Norton      continuous  %    (X) Respiratory therapy    (X) DVT prophylaxis    5/20/2021 8:45 AM EDT by Rabia Norton      cont same    Medications    (X) Systemic corticosteroids    5/20/2021 8:45 AM EDT by Rabia Norton      cont iv solumedrol    (X) Inhaled bronchodilators    5/20/2021 8:45 AM EDT by Rabia Norton      cont duonebs    * Milestone   Additional Notes   5/12/2021         ICU*   Vs see above      BUN: 21 (H)   Creatinine: <0.5 (L)      WBC: 18.4 (H)   RBC: 3.02 (L)   Hemoglobin Quant: 9.6 (L)   Hematocrit: 28.9 (L)      POC Glucose: 116 (H)      Insert mitchell       PT AM-PAC Inpatient Mobility Raw Score : 8      OT    ==========================================================      Per [facility] med/Pulmo          ASSESSMENT:   · Acute on chronic respiratory failure with hypoxemia and hypercapnia    · COPD with AE; severe airtrapping with auto PEEP   · Tobacco abuse   · Leukocytosis       PLAN:   ·      #Hypothyroidism   - home dose of synthroid 125 mcg        #Hyperlipidemia   - on Atorvastatin.       DVT Prophylaxis: Lovenox   Diet: on TF   Code Status: Full Code            Transfer to [facility]      ______________________________________________________________      Per SLP      Diet Recommendation: IDDSI 4 Puree Solids ; IDDSI 0 Thin Liquids - straws OK; Meds whole with thin liquids   Risk Management: upright for all intake, stay upright for at least 30 mins after intake, small bites/sips, total feed, oral care q4 hrs to reduce adverse affects in the event of aspiration, increase physical mobility as able, alternate bites/sips, slow rate of intake, general aspiration precautions and hold PO and contact SLP if s/s of aspiration or worsening respiratory status develop. Specialist Referrals: Pulm   Ancillary Tests: N/A   Goals: Tolerate LRD   Follow-up exam: Will f/u in tx session           Treatment Dx and ICD 10: Oropharyngeal dysphagia, R413.12    Patient Position: Lateral and Patient Degrees: The patient was seated upright at 90 degree angle in stretcher bed in right lateral position       Consistencies Administered: Dysphagia Pureed (Dysphagia I); Thin straw; Thin teaspoon; Thin cup       Dysphagia Outcome Severity Scale: Level 5: Mild dysphagia- Distant supervision. May need one diet consistency restricted   Penetration-Aspiration Scale (PAS): 1 - Material does not enter the airway       Recommended Diet:   Solid consistency: Dysphagia Pureed (Dysphagia I)   Liquid consistency: Thin   Liquid administration via: Cup;Straw       Medication administration: Meds in puree       Safe Swallow Protocol:   Supervision: Distant   Compensatory Swallowing Strategies: Assist feed;Remain upright for 30-45 minutes after meals;Eat/Feed slowly; Small bites/sips;Upright as possible for all oral intake       Recommendations/Treatment   Requires SLP Intervention: Yes   D/C Recommendations:  To be determined   Postural Changes -Cultures negative except for candida.    Fevers resolving        #Sepsis resolved   -Present on admission   -With tachycardia and tachypnea, leukocytosis.    Secondary to pneumonia   Monitor   Improved BP and off levo       #Tobacco abuse   - smoking cessation needed       #Pulmonary Edema   - ECHO last year with normal EF and normal Diastolic function    - IV Lasix 40 mg as tolerated       #Leukocytosis-likely due to chronic steroids   -Trend WBC- remains high    -neg procalcitonin   White count is declining now.        #Hypothyroidism   - home dose of synthroid 125 mcg        #Hyperlipidemia   - on Atorvastatin.       DVT Prophylaxis: Lovenox   Diet: on TF   Code Status: Full Code

## 2021-05-25 ENCOUNTER — HOSPITAL ENCOUNTER (OUTPATIENT)
Age: 61
Setting detail: SPECIMEN
Discharge: HOME | End: 2021-05-25
Payer: MEDICARE

## 2021-05-25 LAB
ANISOCYTOSIS: ABNORMAL
ATYPICAL LYMPHOCYTE RELATIVE PERCENT: 1 % (ref 0–6)
BASOPHILS ABSOLUTE: 0.1 K/UL (ref 0–0.2)
BASOPHILS RELATIVE PERCENT: 1 %
EOSINOPHILS ABSOLUTE: 0.1 K/UL (ref 0–0.6)
EOSINOPHILS RELATIVE PERCENT: 1 %
HCT VFR BLD CALC: 29.1 % (ref 36–48)
HEMOGLOBIN: 9.6 G/DL (ref 12–16)
HYPOCHROMIA: ABNORMAL
LYMPHOCYTES ABSOLUTE: 0.5 K/UL (ref 1–5.1)
LYMPHOCYTES RELATIVE PERCENT: 3 %
MCH RBC QN AUTO: 31.2 PG (ref 26–34)
MCHC RBC AUTO-ENTMCNC: 33 G/DL (ref 31–36)
MCV RBC AUTO: 94.3 FL (ref 80–100)
MONOCYTES ABSOLUTE: 0.2 K/UL (ref 0–1.3)
MONOCYTES RELATIVE PERCENT: 2 %
NEUTROPHILS ABSOLUTE: 10.8 K/UL (ref 1.7–7.7)
NEUTROPHILS RELATIVE PERCENT: 92 %
PDW BLD-RTO: 13.7 % (ref 12.4–15.4)
PLATELET # BLD: 388 K/UL (ref 135–450)
PLATELET SLIDE REVIEW: ADEQUATE
PMV BLD AUTO: 8.2 FL (ref 5–10.5)
POIKILOCYTES: ABNORMAL
RBC # BLD: 3.08 M/UL (ref 4–5.2)
SLIDE REVIEW: ABNORMAL
WBC # BLD: 11.7 K/UL (ref 4–11)

## 2021-05-25 PROCEDURE — 85025 COMPLETE CBC W/AUTO DIFF WBC: CPT

## 2021-05-25 PROCEDURE — 36415 COLL VENOUS BLD VENIPUNCTURE: CPT

## 2021-05-26 ENCOUNTER — OFFICE VISIT (OUTPATIENT)
Dept: PULMONOLOGY | Age: 61
End: 2021-05-26
Payer: MEDICARE

## 2021-05-26 VITALS
TEMPERATURE: 98.2 F | DIASTOLIC BLOOD PRESSURE: 68 MMHG | SYSTOLIC BLOOD PRESSURE: 122 MMHG | RESPIRATION RATE: 18 BRPM | WEIGHT: 137 LBS | BODY MASS INDEX: 23.39 KG/M2 | HEIGHT: 64 IN | OXYGEN SATURATION: 97 % | HEART RATE: 107 BPM

## 2021-05-26 DIAGNOSIS — J44.9 COPD, SEVERE (HCC): Primary | ICD-10-CM

## 2021-05-26 DIAGNOSIS — Z72.0 TOBACCO ABUSE: ICD-10-CM

## 2021-05-26 DIAGNOSIS — J96.11 CHRONIC RESPIRATORY FAILURE WITH HYPOXIA (HCC): ICD-10-CM

## 2021-05-26 PROCEDURE — G8926 SPIRO NO PERF OR DOC: HCPCS | Performed by: INTERNAL MEDICINE

## 2021-05-26 PROCEDURE — 1036F TOBACCO NON-USER: CPT | Performed by: INTERNAL MEDICINE

## 2021-05-26 PROCEDURE — 3023F SPIROM DOC REV: CPT | Performed by: INTERNAL MEDICINE

## 2021-05-26 PROCEDURE — 1111F DSCHRG MED/CURRENT MED MERGE: CPT | Performed by: INTERNAL MEDICINE

## 2021-05-26 PROCEDURE — 3017F COLORECTAL CA SCREEN DOC REV: CPT | Performed by: INTERNAL MEDICINE

## 2021-05-26 PROCEDURE — G8427 DOCREV CUR MEDS BY ELIG CLIN: HCPCS | Performed by: INTERNAL MEDICINE

## 2021-05-26 PROCEDURE — 99214 OFFICE O/P EST MOD 30 MIN: CPT | Performed by: INTERNAL MEDICINE

## 2021-05-26 PROCEDURE — G8420 CALC BMI NORM PARAMETERS: HCPCS | Performed by: INTERNAL MEDICINE

## 2021-05-26 NOTE — PROGRESS NOTES
[facility]    Outpatient Follow Up Note    CHIEF COMPLAINT : shortness of breath     HPI:  The patient is a 63 yo woman with hx of COPD, tobacco abuse and asthma who presented to [facility] ER in 12/18 with progressively worsening shortness of breath and associated productive cough. The patient was hospitalized for a COPD exacerbation in 9/18. She smoked a pack per day x 40 years. Since last clinic visit, the patient was hospitalized for acute on chronic respiratory failure from 4/25/2021 through 5/14/2021. She remained on the ventilator for some time. She was eventually extubated to BiPAP which she remained on throughout most of the hospitalization. Currently she feels better. She is still on a steroid taper. There are no changes to past medical history, family history, social history or review of systems(except as noted in the history section) since prior note (all reviewed with patient).     Current Medications:    Current Outpatient Medications:     predniSONE (DELTASONE) 10 MG tablet, 4 tabs for 3 days 3 tabs for 3 days 2 tabs for 3 days 1 tabs for 3 days, Disp: 30 tablet, Rfl: 0    albuterol (PROVENTIL) (2.5 MG/3ML) 0.083% nebulizer solution, INHALE THREE MILLILITERS BY MOUTH EVERY SIX HOURS AS NEEDED FOR WHEEZING OR SHORTNESS OF BREATH, Disp: 120 each, Rfl: 5    guaiFENesin (MUCINEX) 600 MG extended release tablet, Take 1 tablet by mouth 2 times daily, Disp: 30 tablet, Rfl: 0    fluticasone (FLONASE) 50 MCG/ACT nasal spray, 1 spray by Each Nostril route daily, Disp: 1 Bottle, Rfl: 3    levothyroxine (SYNTHROID) 125 MCG tablet, Take 1 tablet by mouth every morning (before breakfast), Disp: 30 tablet, Rfl: 3    furosemide (LASIX) 20 MG tablet, Take 1 tablet by mouth daily, Disp: 30 tablet, Rfl: 0    aspirin 81 MG EC tablet, Take 1 tablet by mouth daily, Disp: 30 tablet, Rfl: 3    atorvastatin (LIPITOR) 40 MG tablet, Take 1 tablet by mouth nightly, Disp: 30 tablet, Rfl: 3    montelukast (SINGULAIR) 10 MG tablet, Take 1 tablet by mouth nightly, Disp: 30 tablet, Rfl: 3    Multiple Vitamin (MULTIVITAMIN) tablet, Take 1 tablet by mouth daily, Disp: 30 tablet, Rfl: 3    Fluticasone-Umeclidin-Vilant (TRELEGY ELLIPTA) 100-62.5-25 MCG/INH AEPB, Inhale 1 puff into the lungs daily, Disp: , Rfl:     albuterol sulfate  (90 Base) MCG/ACT inhaler, Inhale 2 puffs into the lungs every 6 hours as needed for Wheezing, Disp: 1 Inhaler, Rfl: 3        Objective:   PHYSICAL EXAM:        VITALS:  /68   Pulse 107   Temp 98.2 °F (36.8 °C) (Temporal)   Resp 18   Ht 5' 4\" (1.626 m)   Wt 137 lb (62.1 kg)   LMP  (LMP Unknown)   SpO2 97% Comment: 2. 5LPM  BMI 23.52 kg/m²     Gen: In no acute distress. Alert. Thin. Comfortable. NCAT. Eyes: PERRL. No sclera icterus. No conjunctival injection. ENT: No ocular or auricular discharge. Mask in place   Neck: Trachea midline. Resp: No accessory muscle use. No crackles. No wheezes. No rhonchi. No dullness on percussion. Clear to auscultation bilaterally. CV: Regular rate. Regular rhythm. No murmur or rub. Normal S1 and S2. No edema. GI: Abdomen non-tender. Non-distended. Skin: Warm and dry. No nodules on exposed extremities. No rash on exposed extremities. M/S: No cyanosis. No synovitis or joint deformity. No clubbing. Neuro: Cranial nerves are grossly intact. Moving all extremities. Motor and sensation grossly intact. Psych: Oriented x 3. No anxiety or agitation. DATA:    LABS:      PFTs 1/10/19  FVC 1.69 (51%) FEV1 0.68 (26%) FEV1/FVC ratio  40%  TLC 6.92 (133%) DLCO 8.70 (37%) + BD response  6mwt 1060 feet, low sat 89%    6mwt 1/31/19  980 feet, low sat 86% improved with 1l NC      IMAGING:      I personally reviewed and interpreted the following today in the office :     CXR 7/24/19: hyperinflated, no focal airspace disease    CXR (dated 12/28/18:   The heart, mediastinum and pulmonary vascularity are normal.  The lungs are lucent and hyperinflated.  Pattern evidence of underlying COPD.  No acute airspace abnormality.  There are no significant skeletal findings              Assessment:   1. Tobacco abuse     2. COPD severe   3. Chronic respiratory failure with hypoxia (HCC)        Plan:   -- Continue inhaled bronchodilator therapy.  trelegy and flovent per PMD; continue albuterol mdi/nebs-  portable neb machine  - advised not to use duonebs with trelegy  -2L of O2 ATC  - Patient is up to date with Pneumococcal vaccine and Influenza vaccines. - Advised to avoid smoking.  No smoking in 1 month  - on prednisone taper  - flu vaccine 11/20  -  Consider in future criteria for NIPPV for COPD:    An arterial blood gas carbon dioxide reading (PaCO2), done while awake and breathing the patient's usual FIO21, is ? 52 mm Hg, and  Sleep oximetry demonstrates oxygen saturation ? 88% for at least five continuous minutes, done while breathing the patient's usual FIO2, and  Prior to initiating therapy, obstructive sleep apnea (and treatment with continuous positive airway pressure) has been considered and ruled out.      - we would need to  get ABG, ONO2 on baseline O2    - F/u in 3 months

## 2021-06-07 LAB — CULTURE, FUNGUS BLOOD: NORMAL

## 2021-06-14 DIAGNOSIS — J44.9 COPD, SEVERE (HCC): ICD-10-CM

## 2021-06-14 RX ORDER — ALBUTEROL SULFATE 2.5 MG/3ML
SOLUTION RESPIRATORY (INHALATION)
Qty: 120 EACH | Refills: 5 | Status: SHIPPED | OUTPATIENT
Start: 2021-06-14 | End: 2021-11-29

## 2021-11-24 DIAGNOSIS — J44.9 COPD, SEVERE (HCC): ICD-10-CM

## 2021-11-29 RX ORDER — ALBUTEROL SULFATE 2.5 MG/3ML
SOLUTION RESPIRATORY (INHALATION)
Qty: 360 ML | Refills: 1 | Status: SHIPPED | OUTPATIENT
Start: 2021-11-29 | End: 2022-01-17

## 2022-01-14 DIAGNOSIS — J44.9 COPD, SEVERE (HCC): ICD-10-CM

## 2022-01-17 ENCOUNTER — TELEPHONE (OUTPATIENT)
Dept: PULMONOLOGY | Age: 62
End: 2022-01-17

## 2022-01-17 RX ORDER — ALBUTEROL SULFATE 2.5 MG/3ML
SOLUTION RESPIRATORY (INHALATION)
Qty: 360 ML | Refills: 1 | Status: SHIPPED | OUTPATIENT
Start: 2022-01-17 | End: 2022-03-09 | Stop reason: SDUPTHER

## 2022-01-17 NOTE — TELEPHONE ENCOUNTER
Pt will need follow up with Dr. Alvina Anderson. Will schedule when more available dates are open. Phone note created to schedule pt.

## 2022-01-17 NOTE — TELEPHONE ENCOUNTER
Former Dr. Adilson Otero pt, needs to schedule with Dr. Burgess Rosa. Will schedule pt once schedule is open.

## 2022-01-29 ENCOUNTER — APPOINTMENT (OUTPATIENT)
Dept: CT IMAGING | Age: 62
DRG: 208 | End: 2022-01-29
Payer: MEDICARE

## 2022-01-29 ENCOUNTER — APPOINTMENT (OUTPATIENT)
Dept: GENERAL RADIOLOGY | Age: 62
DRG: 208 | End: 2022-01-29
Payer: MEDICARE

## 2022-01-29 ENCOUNTER — HOSPITAL ENCOUNTER (INPATIENT)
Age: 62
LOS: 5 days | Discharge: HOME | DRG: 208 | End: 2022-02-04
Attending: EMERGENCY MEDICINE | Admitting: FAMILY MEDICINE
Payer: MEDICARE

## 2022-01-29 DIAGNOSIS — R91.1 PULMONARY NODULE: ICD-10-CM

## 2022-01-29 DIAGNOSIS — U07.1 PNEUMONIA DUE TO COVID-19 VIRUS: Primary | ICD-10-CM

## 2022-01-29 DIAGNOSIS — J12.82 PNEUMONIA DUE TO COVID-19 VIRUS: Primary | ICD-10-CM

## 2022-01-29 DIAGNOSIS — J44.1 COPD EXACERBATION (HCC): ICD-10-CM

## 2022-01-29 DIAGNOSIS — R07.9 CHEST PAIN, UNSPECIFIED TYPE: ICD-10-CM

## 2022-01-29 DIAGNOSIS — J96.01 ACUTE RESPIRATORY FAILURE WITH HYPOXIA (HCC): ICD-10-CM

## 2022-01-29 DIAGNOSIS — J43.9 PULMONARY EMPHYSEMA, UNSPECIFIED EMPHYSEMA TYPE (HCC): ICD-10-CM

## 2022-01-29 LAB
A/G RATIO: 1.6 (ref 1.1–2.2)
ALBUMIN SERPL-MCNC: 4.2 G/DL (ref 3.4–5)
ALP BLD-CCNC: 83 U/L (ref 40–129)
ALT SERPL-CCNC: 30 U/L (ref 10–40)
ANION GAP SERPL CALCULATED.3IONS-SCNC: 11 MMOL/L (ref 3–16)
AST SERPL-CCNC: 42 U/L (ref 15–37)
BASE EXCESS VENOUS: 4.3 MMOL/L (ref -3–3)
BASOPHILS ABSOLUTE: 0.1 K/UL (ref 0–0.2)
BASOPHILS RELATIVE PERCENT: 1.1 %
BILIRUB SERPL-MCNC: <0.2 MG/DL (ref 0–1)
BILIRUBIN URINE: NEGATIVE
BLOOD, URINE: ABNORMAL
BUN BLDV-MCNC: 11 MG/DL (ref 7–20)
CALCIUM SERPL-MCNC: 8.8 MG/DL (ref 8.3–10.6)
CARBOXYHEMOGLOBIN: 3.7 % (ref 0–1.5)
CHLORIDE BLD-SCNC: 91 MMOL/L (ref 99–110)
CLARITY: CLEAR
CO2: 29 MMOL/L (ref 21–32)
COLOR: YELLOW
CREAT SERPL-MCNC: 0.6 MG/DL (ref 0.6–1.2)
EOSINOPHILS ABSOLUTE: 0 K/UL (ref 0–0.6)
EOSINOPHILS RELATIVE PERCENT: 0 %
EPITHELIAL CELLS, UA: ABNORMAL /HPF (ref 0–5)
GFR AFRICAN AMERICAN: >60
GFR NON-AFRICAN AMERICAN: >60
GLUCOSE BLD-MCNC: 148 MG/DL (ref 70–99)
GLUCOSE URINE: NEGATIVE MG/DL
HCO3 VENOUS: 32.3 MMOL/L (ref 23–29)
HCT VFR BLD CALC: 37.3 % (ref 36–48)
HEMOGLOBIN: 12.5 G/DL (ref 12–16)
INFLUENZA A: NOT DETECTED
INFLUENZA B: NOT DETECTED
KETONES, URINE: 15 MG/DL
LACTIC ACID, SEPSIS: 1.4 MMOL/L (ref 0.4–1.9)
LEUKOCYTE ESTERASE, URINE: NEGATIVE
LYMPHOCYTES ABSOLUTE: 0.5 K/UL (ref 1–5.1)
LYMPHOCYTES RELATIVE PERCENT: 5.5 %
MCH RBC QN AUTO: 31.2 PG (ref 26–34)
MCHC RBC AUTO-ENTMCNC: 33.6 G/DL (ref 31–36)
MCV RBC AUTO: 92.7 FL (ref 80–100)
METHEMOGLOBIN VENOUS: 0.3 %
MICROSCOPIC EXAMINATION: YES
MONOCYTES ABSOLUTE: 0.3 K/UL (ref 0–1.3)
MONOCYTES RELATIVE PERCENT: 3 %
NEUTROPHILS ABSOLUTE: 7.6 K/UL (ref 1.7–7.7)
NEUTROPHILS RELATIVE PERCENT: 90.4 %
NITRITE, URINE: NEGATIVE
O2 SAT, VEN: 83 %
O2 THERAPY: ABNORMAL
PCO2, VEN: 63.6 MMHG (ref 40–50)
PDW BLD-RTO: 13.4 % (ref 12.4–15.4)
PH UA: 6 (ref 5–8)
PH VENOUS: 7.32 (ref 7.35–7.45)
PLATELET # BLD: 144 K/UL (ref 135–450)
PMV BLD AUTO: 8.2 FL (ref 5–10.5)
PO2, VEN: 52.1 MMHG (ref 25–40)
POTASSIUM REFLEX MAGNESIUM: 3.9 MMOL/L (ref 3.5–5.1)
PRO-BNP: 468 PG/ML (ref 0–124)
PROCALCITONIN: 0.08 NG/ML (ref 0–0.15)
PROTEIN UA: 30 MG/DL
RBC # BLD: 4.02 M/UL (ref 4–5.2)
RBC UA: ABNORMAL /HPF (ref 0–4)
SARS-COV-2 RNA, RT PCR: DETECTED
SODIUM BLD-SCNC: 131 MMOL/L (ref 136–145)
SPECIFIC GRAVITY UA: 1.02 (ref 1–1.03)
TCO2 CALC VENOUS: 34 MMOL/L
TOTAL PROTEIN: 6.8 G/DL (ref 6.4–8.2)
TROPONIN: <0.01 NG/ML
URINE REFLEX TO CULTURE: ABNORMAL
URINE TYPE: ABNORMAL
UROBILINOGEN, URINE: 0.2 E.U./DL
WBC # BLD: 8.4 K/UL (ref 4–11)
WBC UA: ABNORMAL /HPF (ref 0–5)

## 2022-01-29 PROCEDURE — 87636 SARSCOV2 & INF A&B AMP PRB: CPT

## 2022-01-29 PROCEDURE — 2580000003 HC RX 258: Performed by: NURSE PRACTITIONER

## 2022-01-29 PROCEDURE — 96365 THER/PROPH/DIAG IV INF INIT: CPT

## 2022-01-29 PROCEDURE — 84145 PROCALCITONIN (PCT): CPT

## 2022-01-29 PROCEDURE — 6360000002 HC RX W HCPCS: Performed by: NURSE PRACTITIONER

## 2022-01-29 PROCEDURE — 96367 TX/PROPH/DG ADDL SEQ IV INF: CPT

## 2022-01-29 PROCEDURE — 71260 CT THORAX DX C+: CPT

## 2022-01-29 PROCEDURE — 83605 ASSAY OF LACTIC ACID: CPT

## 2022-01-29 PROCEDURE — 96366 THER/PROPH/DIAG IV INF ADDON: CPT

## 2022-01-29 PROCEDURE — 96375 TX/PRO/DX INJ NEW DRUG ADDON: CPT

## 2022-01-29 PROCEDURE — 6360000004 HC RX CONTRAST MEDICATION: Performed by: NURSE PRACTITIONER

## 2022-01-29 PROCEDURE — 71045 X-RAY EXAM CHEST 1 VIEW: CPT

## 2022-01-29 PROCEDURE — 85025 COMPLETE CBC W/AUTO DIFF WBC: CPT

## 2022-01-29 PROCEDURE — 93005 ELECTROCARDIOGRAM TRACING: CPT | Performed by: EMERGENCY MEDICINE

## 2022-01-29 PROCEDURE — 99285 EMERGENCY DEPT VISIT HI MDM: CPT

## 2022-01-29 PROCEDURE — 87449 NOS EACH ORGANISM AG IA: CPT

## 2022-01-29 PROCEDURE — 87040 BLOOD CULTURE FOR BACTERIA: CPT

## 2022-01-29 PROCEDURE — 82803 BLOOD GASES ANY COMBINATION: CPT

## 2022-01-29 PROCEDURE — 84484 ASSAY OF TROPONIN QUANT: CPT

## 2022-01-29 PROCEDURE — 83880 ASSAY OF NATRIURETIC PEPTIDE: CPT

## 2022-01-29 PROCEDURE — 80053 COMPREHEN METABOLIC PANEL: CPT

## 2022-01-29 PROCEDURE — 96361 HYDRATE IV INFUSION ADD-ON: CPT

## 2022-01-29 PROCEDURE — 6370000000 HC RX 637 (ALT 250 FOR IP): Performed by: EMERGENCY MEDICINE

## 2022-01-29 PROCEDURE — 81001 URINALYSIS AUTO W/SCOPE: CPT

## 2022-01-29 RX ORDER — 0.9 % SODIUM CHLORIDE 0.9 %
1000 INTRAVENOUS SOLUTION INTRAVENOUS ONCE
Status: COMPLETED | OUTPATIENT
Start: 2022-01-29 | End: 2022-01-29

## 2022-01-29 RX ORDER — DEXAMETHASONE SODIUM PHOSPHATE 10 MG/ML
4 INJECTION, SOLUTION INTRAMUSCULAR; INTRAVENOUS EVERY 6 HOURS
Status: DISCONTINUED | OUTPATIENT
Start: 2022-01-29 | End: 2022-01-30

## 2022-01-29 RX ORDER — IPRATROPIUM BROMIDE AND ALBUTEROL SULFATE 2.5; .5 MG/3ML; MG/3ML
1 SOLUTION RESPIRATORY (INHALATION) ONCE
Status: COMPLETED | OUTPATIENT
Start: 2022-01-29 | End: 2022-01-29

## 2022-01-29 RX ADMIN — SODIUM CHLORIDE 1000 ML: 9 INJECTION, SOLUTION INTRAVENOUS at 21:55

## 2022-01-29 RX ADMIN — DEXAMETHASONE SODIUM PHOSPHATE 4 MG: 10 INJECTION, SOLUTION INTRAMUSCULAR; INTRAVENOUS at 20:55

## 2022-01-29 RX ADMIN — IOPAMIDOL 85 ML: 755 INJECTION, SOLUTION INTRAVENOUS at 21:40

## 2022-01-29 RX ADMIN — DEXTROSE MONOHYDRATE 500 MG: 50 INJECTION, SOLUTION INTRAVENOUS at 23:13

## 2022-01-29 RX ADMIN — IPRATROPIUM BROMIDE AND ALBUTEROL SULFATE 1 AMPULE: .5; 3 SOLUTION RESPIRATORY (INHALATION) at 23:10

## 2022-01-29 RX ADMIN — CEFTRIAXONE SODIUM 1000 MG: 1 INJECTION, POWDER, FOR SOLUTION INTRAMUSCULAR; INTRAVENOUS at 22:43

## 2022-01-29 ASSESSMENT — PAIN DESCRIPTION - PAIN TYPE: TYPE: ACUTE PAIN

## 2022-01-29 ASSESSMENT — ENCOUNTER SYMPTOMS
ABDOMINAL PAIN: 0
RHINORRHEA: 0
COLOR CHANGE: 0
SORE THROAT: 0
SHORTNESS OF BREATH: 1

## 2022-01-29 ASSESSMENT — PAIN DESCRIPTION - LOCATION: LOCATION: CHEST

## 2022-01-29 ASSESSMENT — PAIN SCALES - GENERAL: PAINLEVEL_OUTOF10: 3

## 2022-01-30 PROBLEM — J12.82 PNEUMONIA DUE TO COVID-19 VIRUS: Status: ACTIVE | Noted: 2022-01-30

## 2022-01-30 PROBLEM — J96.21 ACUTE ON CHRONIC RESPIRATORY FAILURE WITH HYPOXIA AND HYPERCAPNIA (HCC): Status: ACTIVE | Noted: 2022-01-30

## 2022-01-30 PROBLEM — J96.22 ACUTE ON CHRONIC RESPIRATORY FAILURE WITH HYPOXIA AND HYPERCAPNIA (HCC): Status: ACTIVE | Noted: 2022-01-30

## 2022-01-30 PROBLEM — U07.1 COVID: Status: ACTIVE | Noted: 2022-01-30

## 2022-01-30 PROBLEM — U07.1 PNEUMONIA DUE TO COVID-19 VIRUS: Status: ACTIVE | Noted: 2022-01-30

## 2022-01-30 LAB
C-REACTIVE PROTEIN: 28.1 MG/L (ref 0–5.1)
D DIMER: <200 NG/ML DDU (ref 0–229)
EKG ATRIAL RATE: 105 BPM
EKG DIAGNOSIS: NORMAL
EKG P AXIS: 86 DEGREES
EKG P-R INTERVAL: 140 MS
EKG Q-T INTERVAL: 334 MS
EKG QRS DURATION: 88 MS
EKG QTC CALCULATION (BAZETT): 441 MS
EKG R AXIS: 95 DEGREES
EKG T AXIS: 71 DEGREES
EKG VENTRICULAR RATE: 105 BPM

## 2022-01-30 PROCEDURE — XW0DXM6 INTRODUCTION OF BARICITINIB INTO MOUTH AND PHARYNX, EXTERNAL APPROACH, NEW TECHNOLOGY GROUP 6: ICD-10-PCS | Performed by: INTERNAL MEDICINE

## 2022-01-30 PROCEDURE — 6360000002 HC RX W HCPCS: Performed by: FAMILY MEDICINE

## 2022-01-30 PROCEDURE — 6370000000 HC RX 637 (ALT 250 FOR IP): Performed by: FAMILY MEDICINE

## 2022-01-30 PROCEDURE — 2580000003 HC RX 258: Performed by: NURSE PRACTITIONER

## 2022-01-30 PROCEDURE — XW033E5 INTRODUCTION OF REMDESIVIR ANTI-INFECTIVE INTO PERIPHERAL VEIN, PERCUTANEOUS APPROACH, NEW TECHNOLOGY GROUP 5: ICD-10-PCS | Performed by: INTERNAL MEDICINE

## 2022-01-30 PROCEDURE — 85379 FIBRIN DEGRADATION QUANT: CPT

## 2022-01-30 PROCEDURE — 93010 ELECTROCARDIOGRAM REPORT: CPT | Performed by: INTERNAL MEDICINE

## 2022-01-30 PROCEDURE — 2580000003 HC RX 258: Performed by: FAMILY MEDICINE

## 2022-01-30 PROCEDURE — 1200000000 HC SEMI PRIVATE

## 2022-01-30 PROCEDURE — XW033H5 INTRODUCTION OF TOCILIZUMAB INTO PERIPHERAL VEIN, PERCUTANEOUS APPROACH, NEW TECHNOLOGY GROUP 5: ICD-10-PCS | Performed by: INTERNAL MEDICINE

## 2022-01-30 PROCEDURE — 6370000000 HC RX 637 (ALT 250 FOR IP): Performed by: INTERNAL MEDICINE

## 2022-01-30 PROCEDURE — 6360000002 HC RX W HCPCS: Performed by: NURSE PRACTITIONER

## 2022-01-30 PROCEDURE — 94640 AIRWAY INHALATION TREATMENT: CPT

## 2022-01-30 PROCEDURE — 86140 C-REACTIVE PROTEIN: CPT

## 2022-01-30 RX ORDER — LIDOCAINE 4 G/G
1 PATCH TOPICAL DAILY
Status: DISCONTINUED | OUTPATIENT
Start: 2022-01-30 | End: 2022-02-04 | Stop reason: HOSPADM

## 2022-01-30 RX ORDER — POLYETHYLENE GLYCOL 3350 17 G/17G
17 POWDER, FOR SOLUTION ORAL DAILY PRN
Status: DISCONTINUED | OUTPATIENT
Start: 2022-01-30 | End: 2022-02-04 | Stop reason: HOSPADM

## 2022-01-30 RX ORDER — FLUTICASONE PROPIONATE 50 MCG
1 SPRAY, SUSPENSION (ML) NASAL DAILY
Status: DISCONTINUED | OUTPATIENT
Start: 2022-01-30 | End: 2022-02-04 | Stop reason: HOSPADM

## 2022-01-30 RX ORDER — FENTANYL CITRATE 50 UG/ML
25 INJECTION, SOLUTION INTRAMUSCULAR; INTRAVENOUS EVERY 4 HOURS PRN
Status: DISCONTINUED | OUTPATIENT
Start: 2022-01-30 | End: 2022-01-31

## 2022-01-30 RX ORDER — ATORVASTATIN CALCIUM 40 MG/1
40 TABLET, FILM COATED ORAL NIGHTLY
Status: DISCONTINUED | OUTPATIENT
Start: 2022-01-30 | End: 2022-02-04 | Stop reason: HOSPADM

## 2022-01-30 RX ORDER — SODIUM CHLORIDE 0.9 % (FLUSH) 0.9 %
5-40 SYRINGE (ML) INJECTION PRN
Status: DISCONTINUED | OUTPATIENT
Start: 2022-01-30 | End: 2022-02-04 | Stop reason: HOSPADM

## 2022-01-30 RX ORDER — MULTIVITAMIN
1 TABLET ORAL DAILY
Status: DISCONTINUED | OUTPATIENT
Start: 2022-01-30 | End: 2022-01-30

## 2022-01-30 RX ORDER — ONDANSETRON 4 MG/1
4 TABLET, ORALLY DISINTEGRATING ORAL EVERY 8 HOURS PRN
Status: DISCONTINUED | OUTPATIENT
Start: 2022-01-30 | End: 2022-02-04 | Stop reason: HOSPADM

## 2022-01-30 RX ORDER — TRAMADOL HYDROCHLORIDE 50 MG/1
50 TABLET ORAL EVERY 6 HOURS PRN
Status: DISCONTINUED | OUTPATIENT
Start: 2022-01-30 | End: 2022-01-31

## 2022-01-30 RX ORDER — SODIUM CHLORIDE 0.9 % (FLUSH) 0.9 %
5-40 SYRINGE (ML) INJECTION EVERY 12 HOURS SCHEDULED
Status: DISCONTINUED | OUTPATIENT
Start: 2022-01-30 | End: 2022-02-04 | Stop reason: HOSPADM

## 2022-01-30 RX ORDER — FUROSEMIDE 20 MG/1
20 TABLET ORAL DAILY
Status: DISCONTINUED | OUTPATIENT
Start: 2022-01-30 | End: 2022-02-04 | Stop reason: HOSPADM

## 2022-01-30 RX ORDER — ASPIRIN 81 MG/1
81 TABLET ORAL DAILY
Status: DISCONTINUED | OUTPATIENT
Start: 2022-01-30 | End: 2022-02-04 | Stop reason: HOSPADM

## 2022-01-30 RX ORDER — HYDRALAZINE HYDROCHLORIDE 20 MG/ML
10 INJECTION INTRAMUSCULAR; INTRAVENOUS EVERY 6 HOURS PRN
Status: DISCONTINUED | OUTPATIENT
Start: 2022-01-30 | End: 2022-02-04 | Stop reason: HOSPADM

## 2022-01-30 RX ORDER — ALBUTEROL SULFATE 90 UG/1
2 AEROSOL, METERED RESPIRATORY (INHALATION) EVERY 6 HOURS PRN
Status: DISCONTINUED | OUTPATIENT
Start: 2022-01-30 | End: 2022-01-31

## 2022-01-30 RX ORDER — ACETAMINOPHEN 650 MG/1
650 SUPPOSITORY RECTAL EVERY 6 HOURS PRN
Status: DISCONTINUED | OUTPATIENT
Start: 2022-01-30 | End: 2022-02-04 | Stop reason: HOSPADM

## 2022-01-30 RX ORDER — PREDNISONE 1 MG/1
5 TABLET ORAL DAILY
Status: ON HOLD | COMMUNITY
End: 2022-02-04 | Stop reason: SDUPTHER

## 2022-01-30 RX ORDER — MONTELUKAST SODIUM 10 MG/1
10 TABLET ORAL NIGHTLY
Status: DISCONTINUED | OUTPATIENT
Start: 2022-01-30 | End: 2022-02-04 | Stop reason: HOSPADM

## 2022-01-30 RX ORDER — M-VIT,TX,IRON,MINS/CALC/FOLIC 27MG-0.4MG
1 TABLET ORAL DAILY
Status: DISCONTINUED | OUTPATIENT
Start: 2022-01-30 | End: 2022-02-04 | Stop reason: HOSPADM

## 2022-01-30 RX ORDER — ACETAMINOPHEN 325 MG/1
650 TABLET ORAL EVERY 6 HOURS PRN
Status: DISCONTINUED | OUTPATIENT
Start: 2022-01-30 | End: 2022-02-04 | Stop reason: HOSPADM

## 2022-01-30 RX ORDER — BUDESONIDE AND FORMOTEROL FUMARATE DIHYDRATE 160; 4.5 UG/1; UG/1
2 AEROSOL RESPIRATORY (INHALATION) 2 TIMES DAILY
Status: DISCONTINUED | OUTPATIENT
Start: 2022-01-30 | End: 2022-01-31

## 2022-01-30 RX ORDER — DEXAMETHASONE 4 MG/1
6 TABLET ORAL DAILY
Status: DISCONTINUED | OUTPATIENT
Start: 2022-01-30 | End: 2022-01-31

## 2022-01-30 RX ORDER — ALBUTEROL SULFATE 90 UG/1
2 AEROSOL, METERED RESPIRATORY (INHALATION) 3 TIMES DAILY
Status: DISCONTINUED | OUTPATIENT
Start: 2022-01-30 | End: 2022-01-31

## 2022-01-30 RX ORDER — GUAIFENESIN 600 MG/1
600 TABLET, EXTENDED RELEASE ORAL 2 TIMES DAILY
Status: DISCONTINUED | OUTPATIENT
Start: 2022-01-30 | End: 2022-01-31

## 2022-01-30 RX ORDER — SODIUM CHLORIDE 9 MG/ML
25 INJECTION, SOLUTION INTRAVENOUS PRN
Status: DISCONTINUED | OUTPATIENT
Start: 2022-01-30 | End: 2022-02-04 | Stop reason: HOSPADM

## 2022-01-30 RX ORDER — ONDANSETRON 2 MG/ML
4 INJECTION INTRAMUSCULAR; INTRAVENOUS EVERY 6 HOURS PRN
Status: DISCONTINUED | OUTPATIENT
Start: 2022-01-30 | End: 2022-02-04 | Stop reason: HOSPADM

## 2022-01-30 RX ADMIN — ENOXAPARIN SODIUM 30 MG: 100 INJECTION SUBCUTANEOUS at 02:47

## 2022-01-30 RX ADMIN — ACETAMINOPHEN 650 MG: 325 TABLET ORAL at 09:37

## 2022-01-30 RX ADMIN — CEFTRIAXONE SODIUM 1000 MG: 1 INJECTION, POWDER, FOR SOLUTION INTRAMUSCULAR; INTRAVENOUS at 23:29

## 2022-01-30 RX ADMIN — MONTELUKAST SODIUM 10 MG: 10 TABLET ORAL at 23:30

## 2022-01-30 RX ADMIN — LEVOTHYROXINE SODIUM 125 MCG: 25 TABLET ORAL at 06:23

## 2022-01-30 RX ADMIN — ENOXAPARIN SODIUM 30 MG: 100 INJECTION SUBCUTANEOUS at 23:30

## 2022-01-30 RX ADMIN — SODIUM CHLORIDE, PRESERVATIVE FREE 10 ML: 5 INJECTION INTRAVENOUS at 09:38

## 2022-01-30 RX ADMIN — Medication 2 PUFF: at 19:09

## 2022-01-30 RX ADMIN — GUAIFENESIN 600 MG: 600 TABLET, EXTENDED RELEASE ORAL at 09:37

## 2022-01-30 RX ADMIN — GUAIFENESIN 600 MG: 600 TABLET, EXTENDED RELEASE ORAL at 23:30

## 2022-01-30 RX ADMIN — ENOXAPARIN SODIUM 30 MG: 100 INJECTION SUBCUTANEOUS at 09:36

## 2022-01-30 RX ADMIN — Medication 2 PUFF: at 08:36

## 2022-01-30 RX ADMIN — TIOTROPIUM BROMIDE INHALATION SPRAY 2 PUFF: 3.12 SPRAY, METERED RESPIRATORY (INHALATION) at 08:34

## 2022-01-30 RX ADMIN — ACETAMINOPHEN 650 MG: 325 TABLET ORAL at 18:40

## 2022-01-30 RX ADMIN — Medication 2 PUFF: at 09:30

## 2022-01-30 RX ADMIN — FUROSEMIDE 20 MG: 20 TABLET ORAL at 09:37

## 2022-01-30 RX ADMIN — FLUTICASONE PROPIONATE 1 SPRAY: 50 SPRAY, METERED NASAL at 09:38

## 2022-01-30 RX ADMIN — TRAMADOL HYDROCHLORIDE 50 MG: 50 TABLET ORAL at 04:44

## 2022-01-30 RX ADMIN — ASPIRIN 81 MG: 81 TABLET, COATED ORAL at 09:37

## 2022-01-30 RX ADMIN — Medication 2 PUFF: at 19:08

## 2022-01-30 RX ADMIN — SODIUM CHLORIDE, PRESERVATIVE FREE 10 ML: 5 INJECTION INTRAVENOUS at 23:25

## 2022-01-30 RX ADMIN — TRAMADOL HYDROCHLORIDE 50 MG: 50 TABLET ORAL at 13:53

## 2022-01-30 RX ADMIN — Medication 1 TABLET: at 09:37

## 2022-01-30 RX ADMIN — Medication 2 PUFF: at 14:06

## 2022-01-30 RX ADMIN — Medication 2 PUFF: at 08:34

## 2022-01-30 RX ADMIN — GUAIFENESIN 600 MG: 600 TABLET, EXTENDED RELEASE ORAL at 02:47

## 2022-01-30 RX ADMIN — ATORVASTATIN CALCIUM 40 MG: 40 TABLET, FILM COATED ORAL at 23:30

## 2022-01-30 RX ADMIN — TRAMADOL HYDROCHLORIDE 50 MG: 50 TABLET ORAL at 23:59

## 2022-01-30 RX ADMIN — DEXAMETHASONE 6 MG: 4 TABLET ORAL at 09:37

## 2022-01-30 ASSESSMENT — PAIN DESCRIPTION - FREQUENCY: FREQUENCY: CONTINUOUS

## 2022-01-30 ASSESSMENT — PAIN SCALES - GENERAL
PAINLEVEL_OUTOF10: 4
PAINLEVEL_OUTOF10: 8
PAINLEVEL_OUTOF10: 7
PAINLEVEL_OUTOF10: 8

## 2022-01-30 ASSESSMENT — PAIN DESCRIPTION - LOCATION
LOCATION: GENERALIZED
LOCATION: BACK;CHEST

## 2022-01-30 ASSESSMENT — PAIN DESCRIPTION - ORIENTATION: ORIENTATION: LEFT;RIGHT

## 2022-01-30 ASSESSMENT — PAIN - FUNCTIONAL ASSESSMENT: PAIN_FUNCTIONAL_ASSESSMENT: ACTIVITIES ARE NOT PREVENTED

## 2022-01-30 ASSESSMENT — PAIN DESCRIPTION - PAIN TYPE
TYPE: ACUTE PAIN
TYPE: CHRONIC PAIN;ACUTE PAIN

## 2022-01-30 ASSESSMENT — PAIN DESCRIPTION - DESCRIPTORS: DESCRIPTORS: TIGHTNESS

## 2022-01-30 ASSESSMENT — PAIN DESCRIPTION - ONSET: ONSET: ON-GOING

## 2022-01-30 ASSESSMENT — PAIN DESCRIPTION - PROGRESSION: CLINICAL_PROGRESSION: NOT CHANGED

## 2022-01-30 NOTE — FLOWSHEET NOTE
01/30/22 0756   Vital Signs   Temp 97.3 °F (36.3 °C)   Temp Source Oral   Pulse 92   Resp 18   /76   BP Location Right upper arm   Patient Position Semi fowlers   Level of Consciousness Alert (0)   MEWS Score 1   Patient Currently in Pain Yes   Pain Assessment   Pain Assessment 0-10   Pain Level 8   Pain Type Chronic pain;Acute pain   Pain Location Back; Chest   Patient's Stated Pain Goal 2   Pain Orientation Left;Right   Pain Descriptors Tightness  (in chest)   Pain Frequency Continuous   Pain Onset On-going   Clinical Progression Not changed   Functional Pain Assessment Activities are not prevented   Non-Pharmaceutical Pain Intervention(s) Rest;Repositioned   Oxygen Therapy   SpO2 94 %   O2 Device Nasal cannula   O2 Flow Rate (L/min) 3 L/min     AM assessment completed and vital signs completed, see flowsheet. Vital signs are WNL. Patient is alert & oriented. Pt reports 8/10 back and chest pain. Patient denies further needs. Side rails up X 2. Bed in the lowest position. Call light within reach.

## 2022-01-30 NOTE — PROGRESS NOTES
Patient admitted to room 228 from ER. Patient oriented to room, call light, bed rails, phone, lights and bathroom. Patient instructed about the schedule of the day including: vital sign frequency, lab draws, possible tests, frequency of MD and staff rounds, daily weights, I &O's and prescribed diet. Bed alarm deferred patient low fall risk and refuses alarm. Telemetry box in place, patient aware of placement and reason. Bed locked, in lowest position, side rails up 2/4, call light within reach. Recliner Assessment:     Patient is able to demonstrate the ability to move from a reclining position to an upright position within the recliner. 4 Eyes Skin Assessment     The patient is being assessed for   Admission    I agree that 2 RN's have performed a thorough Head to Toe Skin Assessment on the patient. ALL assessment sites listed below have been assessed. Areas assessed for pressure by both nurses:   [x]   Head, Face, and Ears   [x]   Shoulders, Back, and Chest, Abdomen  [x]   Arms, Elbows, and Hands   [x]   Coccyx, Sacrum, and Ischium  [x]   Legs, Feet, and Heels    Scattered bruising. Skin Assessed Under all Medical Devices by both nurses:  O2 device tubing              All Mepilex Borders were peeled back and area peeked at by both nurses:  No: na  Please list where Mepilex Borders are located:  na             **SHARE this note so that the co-signing nurse is able to place an eSignature**    Co-signer eSignature: Electronically signed by Pastor Glenn RN on 1/30/22 at 5:05 AM EST    Does the Patient have Skin Breakdown related to pressure?   No              Ron Prevention initiated:  NA   Wound Care Orders initiated:  NA      [facility] nurse consulted for Pressure Injury (Stage 3,4, Unstageable, DTI, NWPT, Complex wounds)and New or Established Ostomies:  NA      Primary Nurse eSignature: Electronically signed by Tamara Flowers RN on 1/30/22 at 5:04 AM EST

## 2022-01-30 NOTE — PROGRESS NOTES
RT Inhaler-Nebulizer Bronchodilator Protocol Note    There is a bronchodilator order in the chart from a provider indicating to follow the RT Bronchodilator Protocol and there is an Initiate RT Inhaler-Nebulizer Bronchodilator Protocol order as well (see protocol at bottom of note). CXR Findings:  XR CHEST PORTABLE    Result Date: 1/29/2022  Changes of COPD with mild peribronchial thickening and scattered non consolidating interstitial pneumonia like infiltrate in both lower lobes. The pneumonia is new from the previous study of 05/11/2021. The findings from the last RT Protocol Assessment were as follows:   History Pulmonary Disease: Chronic pulmonary disease  Respiratory Pattern: Dyspnea on exertion or RR 21-25 bpm  Breath Sounds: Inspiratory and expiratory or bilateral wheezing and/or rhonchi  Cough: Strong, spontaneous, non-productive  Indication for Bronchodilator Therapy:    Bronchodilator Assessment Score: 10    Aerosolized bronchodilator medication orders have been revised according to the RT Inhaler-Nebulizer Bronchodilator Protocol below. Respiratory Therapist to perform RT Therapy Protocol Assessment initially then follow the protocol. Repeat RT Therapy Protocol Assessment PRN for score 0-3 or on second treatment, BID, and PRN for scores above 3. No Indications - adjust the frequency to every 6 hours PRN wheezing or bronchospasm, if no treatments needed after 48 hours then discontinue using Per Protocol order mode. If indication present, adjust the RT bronchodilator orders based on the Bronchodilator Assessment Score as indicated below. Use Inhaler orders unless patient has one or more of the following: on home nebulizer, not able to hold breath for 10 seconds, is not alert and oriented, cannot activate and use MDI correctly, or respiratory rate 25 breaths per minute or more, then use the equivalent nebulizer order(s) with same Frequency and PRN reasons based on the score.   If a patient is on this medication at home then do not decrease Frequency below that used at home. 0-3 - enter or revise RT bronchodilator order(s) to equivalent RT Bronchodilator order with Frequency of every 4 hours PRN for wheezing or increased work of breathing using Per Protocol order mode. 4-6 - enter or revise RT Bronchodilator order(s) to two equivalent RT bronchodilator orders with one order with BID Frequency and one order with Frequency of every 4 hours PRN wheezing or increased work of breathing using Per Protocol order mode. 7-10 - enter or revise RT Bronchodilator order(s) to two equivalent RT bronchodilator orders with one order with TID Frequency and one order with Frequency of every 4 hours PRN wheezing or increased work of breathing using Per Protocol order mode. 11-13 - enter or revise RT Bronchodilator order(s) to one equivalent RT bronchodilator order with QID Frequency and an Albuterol order with Frequency of every 4 hours PRN wheezing or increased work of breathing using Per Protocol order mode. Greater than 13 - enter or revise RT Bronchodilator order(s) to one equivalent RT bronchodilator order with every 4 hours Frequency and an Albuterol order with Frequency of every 2 hours PRN wheezing or increased work of breathing using Per Protocol order mode.          Electronically signed by Cristina Puri RCP on 1/30/2022 at 8:58 AM

## 2022-01-30 NOTE — H&P
Chief Complaint   Patient presents with    Shortness of Breath     SOB with exhertion x 1 week. States her PCP gave her ABX for it. Arrives on baseline o2 of 4L. Got a BT from EMS. Not vaccinated. HPI:  59 y.o. yo female with history of   Past Medical History:   Diagnosis Date    Asthma     COPD (chronic obstructive pulmonary disease) ([facility] [address]     Thyroid disease       presents to [facility] complaining of   Chief Complaint   Patient presents with    Shortness of Breath     SOB with exhertion x 1 week. States her PCP gave her ABX for it. Arrives on baseline o2 of 4L. Got a BT from EMS. Not vaccinated. . Inciting event was nonproductive cough ans dyspnea that began 4 weeks ago, diagnosed w/ covid at that time, she is not vaccinated. She began to feel abit better until 1 week ago she developed more dyspnea w/ exertion, better w/ rest, no sputum production. pcp gave abx w/ no improvement. Denies fever, chills. In the ed she is still covid +, has bl infiltrates on imaging. cta - for pe. She has been hypoxic on home 3 L o2, initially needed 15 L but is now back down to 4L. Will admit for copd exacerbation 2/2 covid and possible pna. ROS:  A complete 14 point review of systems performed and is negative except as noted above. Past medical, surgical, family hx reviewed.     Past Medical History:  Past Medical History:   Diagnosis Date    Asthma     [facility]ch[facility] obstructive [facility] disease) ([facility]     Thyroid disease          Surgical History:  Social History     Socioeconomic History    Marital status:      Spouse name: Not on file    Number of children: Not on file    Years of education: Not on file    Highest education level: Not on file   Occupational History    Not on file   Tobacco Use    Smoking status: Current Some Day Smoker     Packs/day: 0.50     Years: 44.00     Pack years: 22.00     Types: Cigarettes     Last attempt to quit: 8/1/2020     Years since quitting: 1. 4    Smokeless tobacco: Never Used   Vaping Use    Vaping Use: Never used   Substance and Sexual Activity    Alcohol use: Yes     Comment: rarely    Drug use: No    Sexual activity: Not Currently     Partners: Male   Other Topics Concern    Not on file   Social History Narrative    Not on file     Social Determinants of Health     Financial Resource Strain:     Difficulty of Paying Living Expenses: Not on file   Food Insecurity:     Worried About Running Out of Food in the Last Year: Not on file    Yahir of Food in the Last Year: Not on file   Transportation Needs:     Lack of Transportation (Medical): Not on file    Lack of Transportation (Non-Medical): Not on file   Physical Activity:     Days of Exercise per Week: Not on file    Minutes of Exercise per Session: Not on file   Stress:     Feeling of Stress : Not on file   Social Connections:     Frequency of Communication with Friends and Family: Not on file    Frequency of Social Gatherings with Friends and Family: Not on file    Attends [employer] Services: Not on file    Active Member of [address] [place] [address] or Organizations: Not on file    Attends Club or Organization Meetings: Not on file    Marital Status: Not on file   Intimate Partner Violence:     Fear of Current or Ex-Partner: Not on file    Emotionally Abused: Not on file    Physically Abused: Not on file    Sexually Abused: Not on file   Housing Stability:     Unable to Pay for Housing in the Last Year: Not on file    Number of [place] in the Last Year: Not on file    Unstable Housing in the Last Year: Not on file         Family History:  No family history on file. Home Meds:  No current facility-administered medications on file prior to encounter.      Current Outpatient Medications on File Prior to Encounter   Medication Sig Dispense Refill    albuterol (PROVENTIL) (2.5 MG/3ML) 0.083% nebulizer solution INHALE THE CONTENTS OF 1 VIAL VIA NEBULIZER EVERY 6 HOURS AS NEEDED FOR SHORTNESS OF BREATH  OR  WHEEZING 360 mL 1    predniSONE (DELTASONE) 10 MG tablet 4 tabs for 3 days 3 tabs for 3 days 2 tabs for 3 days 1 tabs for 3 days 30 tablet 0    guaiFENesin (MUCINEX) 600 MG extended release tablet Take 1 tablet by mouth 2 times daily 30 tablet 0    fluticasone (FLONASE) 50 MCG/ACT nasal spray 1 spray by Each Nostril route daily 1 Bottle 3    levothyroxine (SYNTHROID) 125 MCG tablet Take 1 tablet by mouth every morning (before breakfast) 30 tablet 3    furosemide (LASIX) 20 MG tablet Take 1 tablet by mouth daily 30 tablet 0    aspirin 81 MG EC tablet Take 1 tablet by mouth daily 30 tablet 3    atorvastatin (LIPITOR) 40 MG tablet Take 1 tablet by mouth nightly 30 tablet 3    montelukast (SINGULAIR) 10 MG tablet Take 1 tablet by mouth nightly 30 tablet 3    Multiple Vitamin (MULTIVITAMIN) tablet Take 1 tablet by mouth daily 30 tablet 3    Fluticasone-Umeclidin-Vilant (TRELEGY ELLIPTA) 100-62.5-25 MCG/INH AEPB Inhale 1 puff into the lungs daily      albuterol sulfate  (90 Base) MCG/ACT inhaler Inhale 2 puffs into the lungs every 6 hours as needed for Wheezing 1 Inhaler 3       Physical Exam:  Vitals:    01/30/22 0055   BP:    Pulse: 104   Resp: 17   Temp:    SpO2: 100%         Based on new provisions and guidance offered in setting of COVID 19 outbreak and in order to preserve personal protective equipment in accordance with the flexibilities announced by CMS limited examination is performed. General: Well appearing, not diaphoretic, no acute distress  Head: Normocephalic, atraumatic  Eyes: No ocular discharge, no scleral injection, no icterus  Ears: Normal external auricles  Nose: No rhinorrhea, no epistaxis  Throat: Not examined, no difficulty swallowing  Pulm: No apparent respiratory distress  Cardio: Normal rate, regular rhythm, no peripheral edema  GI: non-distended  Neuro: Alert and oriented, cn2-12 grossly intact, strength 5/5 bilaterally.   Psych: Cooperative  Skin:

## 2022-01-30 NOTE — ED PROVIDER NOTES
Emergency Department Provider Note     Location: [place] [address] ED  1/29/2022    I independently performed a history and physical on Magnolia Zhou. All diagnostic, treatment, and disposition decisions were made by myself in conjunction with the mid-level provider. I performed a substantive portion of the history and physical exam and MDM. Briefly, this is a 59 y.o. female here for shortness of breath, hypoxia at home, already on home oxygen for emphysema/COPD, not Covid vaccinated. States she has been doing everything she can do at home but getting increasingly short of breath with exertion, sat in 80s per EMS on her home oxygen, she received a neb treatment and placed on 15 L in route and feels better, she is decreased to 4 L nasal cannula on arrival here    ED Triage Vitals [01/29/22 2024]   BP Temp Temp Source Pulse Resp SpO2 Height Weight   (!) 169/83 98.9 °F (37.2 °C) Oral 112 14 93 % 5' 4\" (1.626 m) 140 lb (63.5 kg)      Exam:  no acute distress, A&Ox4, heart RRR, no M/G/R, lungs slightly diminished throughout, and expiratory wheezes, slightly labored with use of accessory muscles, slight tenderness to palpation but no rash or vesicular lesions on the right lateral inferior ribs, no abd tenderness, no peritoneal signs/no rebound/no guarding, wearing O2 nasal cannula      Patient seen and examined. EKG  The Ekg interpreted by me in the absence of a cardiologist shows. Sinus tachycardia, rightward axis, rate 105, QTc 441, no ST segment or T wave changes indicative of acute ischemia, baseline artifact    XR CHEST PORTABLE    Result Date: 1/29/2022  EXAMINATION: ONE XRAY VIEW OF THE CHEST 1/29/2022 7:29 pm COMPARISON: 05/11/2021. HISTORY: ORDERING SYSTEM PROVIDED HISTORY: SOB TECHNOLOGIST PROVIDED HISTORY: Reason for exam:->SOB Reason for Exam: SOB and chest pain, hx of COPD FINDINGS: Hyperinflation was noted with diaphragmatic flattening.   Alis bronchial thickening was noted with scattered non consolidating interstitial infiltration in both lower lobes, new from the previous study. No hilar mass or pneumothorax was noted. Changes of COPD with mild peribronchial thickening and scattered non consolidating interstitial pneumonia like infiltrate in both lower lobes. The pneumonia is new from the previous study of 05/11/2021. CT CHEST PULMONARY EMBOLISM W CONTRAST    Result Date: 1/29/2022  EXAMINATION: CTA OF THE CHEST 1/29/2022 9:39 pm TECHNIQUE: CTA of the chest was performed after the administration of intravenous contrast.  Multiplanar reformatted images are provided for review. MIP images are provided for review. Dose modulation, iterative reconstruction, and/or weight based adjustment of the mA/kV was utilized to reduce the radiation dose to as low as reasonably achievable. COMPARISON: CTA chest 01/17/2007 HISTORY: Reason for Exam: shortness of breath, positive for COVID FINDINGS: Pulmonary Arteries: Pulmonary arteries are adequately opacified for evaluation. No evidence of intraluminal filling defect to suggest pulmonary embolism. Main pulmonary artery is normal in caliber. Mediastinum: No evidence of mediastinal lymphadenopathy. The heart and pericardium demonstrate no acute abnormality. There is no acute abnormality of the thoracic aorta. Lungs/pleura: The lungs are without acute process. Emphysema. There is a a 1 cm noncalcified nodule in the right lung base. No evidence of pleural effusion or pneumothorax. Upper Abdomen: Limited images of the upper abdomen are unremarkable. Soft Tissues/Bones: No acute bone or soft tissue abnormality. No evidence of pulmonary embolism. Emphysema. There is a 1 cm noncalcified nodule in the right lung base. This is new compared to the prior exam of 01/17/2007. Follow-up recommendations are listed below.  RECOMMENDATIONS: Fleischner Society guidelines for follow-up and management of incidentally detected pulmonary nodules: Single Solid Nodule: Nodule size less than 6 mm In a low-risk patient, no routine follow-up. In a high-risk patient, optional CT at 12 months. Nodule size equals 6-8 mm In a low-risk patient, CT at 6-12 months, then consider CT at 18-24 months. In a high-risk patient, CT at 6-12 months, then CT at 18-24 months. Nodule size greater than 8 mm In a low-risk patient, consider CT at 3 months, PET/CT, or tissue sampling. In a high-risk patient, consider CT at 3 months, PET/CT, or tissue sampling. Multiple Solid Nodules: Nodule size less than 6 mm In a low-risk patient, no routine follow-up. In a high-risk patient, optional CT at 12 months. Nodule size equals 6-8 mm In a low-risk patient, CT at 3-6 months, then consider CT at 18-24 months. In a high-risk patient, CT at 3-6 months, then CT at 18-24 months. Nodule size greater than 8 mm In a low-risk patient, CT at 3-6 months, then consider CT at 18-24 months. In a high-risk patient, CT at 3-6 months, then CT at 18-24 months. - Low risk patients include individuals with minimal or absent history of smoking and other known risk factors. - High risk patients include individuals with a history or smoking or known risk factors. Radiology 2017 http://pubs. rsna.org/doi/full/10.1148/radiol. 5151465914       Results for orders placed or performed during the hospital encounter of 01/29/22   COVID-19 & Influenza Combo    Specimen: Nasopharyngeal Swab   Result Value Ref Range    SARS-CoV-2 RNA, RT PCR DETECTED (A) NOT DETECTED    INFLUENZA A NOT DETECTED NOT DETECTED    INFLUENZA B NOT DETECTED NOT DETECTED   CBC auto differential   Result Value Ref Range    WBC 8.4 4.0 - 11.0 K/uL    RBC 4.02 4.00 - 5.20 M/uL    Hemoglobin 12.5 12.0 - 16.0 g/dL    Hematocrit 37.3 36.0 - 48.0 %    MCV 92.7 80.0 - 100.0 fL    MCH 31.2 26.0 - 34.0 pg    MCHC 33.6 31.0 - 36.0 g/dL    RDW 13.4 12.4 - 15.4 %    Platelets 774 943 - 315 K/uL    MPV 8.2 5.0 - 10.5 fL    Neutrophils % 90.4 %    Lymphocytes % 5.5 % Monocytes % 3.0 %    Eosinophils % 0.0 %    Basophils % 1.1 %    Neutrophils Absolute 7.6 1.7 - 7.7 K/uL    Lymphocytes Absolute 0.5 (L) 1.0 - 5.1 K/uL    Monocytes Absolute 0.3 0.0 - 1.3 K/uL    Eosinophils Absolute 0.0 0.0 - 0.6 K/uL    Basophils Absolute 0.1 0.0 - 0.2 K/uL   Comprehensive Metabolic Panel w/ Reflex to MG   Result Value Ref Range    Sodium 131 (L) 136 - 145 mmol/L    Potassium reflex Magnesium 3.9 3.5 - 5.1 mmol/L    Chloride 91 (L) 99 - 110 mmol/L    CO2 29 21 - 32 mmol/L    Anion Gap 11 3 - 16    Glucose 148 (H) 70 - 99 mg/dL    BUN 11 7 - 20 mg/dL    CREATININE 0.6 0.6 - 1.2 mg/dL    GFR Non-African American >60 >60    GFR African American >60 >60    Calcium 8.8 8.3 - 10.6 mg/dL    Total Protein 6.8 6.4 - 8.2 g/dL    Albumin 4.2 3.4 - 5.0 g/dL    Albumin/Globulin Ratio 1.6 1.1 - 2.2    Total Bilirubin <0.2 0.0 - 1.0 mg/dL    Alkaline Phosphatase 83 40 - 129 U/L    ALT 30 10 - 40 U/L    AST 42 (H) 15 - 37 U/L   Blood gas, venous   Result Value Ref Range    pH, Melquiades 7.323 (L) 7.350 - 7.450    pCO2, Melquiades 63.6 (H) 40.0 - 50.0 mmHg    pO2, Melquiades 52.1 (H) 25.0 - 40.0 mmHg    HCO3, Venous 32.3 (H) 23.0 - 29.0 mmol/L    Base Excess, Melquiades 4.3 (H) -3.0 - 3.0 mmol/L    O2 Sat, Melquiades 83 Not Established %    Carboxyhemoglobin 3.7 (H) 0.0 - 1.5 %    MetHgb, Melquiades 0.3 <1.5 %    TC02 (Calc), Melquiades 34 Not Established mmol/L    O2 Therapy Unknown    Lactate, Sepsis   Result Value Ref Range    Lactic Acid, Sepsis 1.4 0.4 - 1.9 mmol/L   Procalcitonin   Result Value Ref Range    Procalcitonin 0.08 0.00 - 0.15 ng/mL   Urinalysis Reflex to Culture    Specimen: Urine, clean catch   Result Value Ref Range    Color, UA Yellow Straw/Yellow    Clarity, UA Clear Clear    Glucose, Ur Negative Negative mg/dL    Bilirubin Urine Negative Negative    Ketones, Urine 15 (A) Negative mg/dL    Specific Gravity, UA 1.025 1.005 - 1.030    Blood, Urine MODERATE (A) Negative    pH, UA 6.0 5.0 - 8.0    Protein, UA 30 (A) Negative mg/dL Urobilinogen, Urine 0.2 <2.0 E.U./dL    Nitrite, Urine Negative Negative    Leukocyte Esterase, Urine Negative Negative    Microscopic Examination YES     Urine Type NotGiven     Urine Reflex to Culture Not Indicated    Brain Natriuretic Peptide   Result Value Ref Range    Pro- (H) 0 - 124 pg/mL   Troponin   Result Value Ref Range    Troponin <0.01 <0.01 ng/mL   Microscopic Urinalysis   Result Value Ref Range    WBC, UA None seen 0 - 5 /HPF    RBC, UA 11-20 (A) 0 - 4 /HPF    Epithelial Cells, UA 0-1 0 - 5 /HPF   EKG 12 Lead   Result Value Ref Range    Ventricular Rate 105 BPM    Atrial Rate 105 BPM    P-R Interval 140 ms    QRS Duration 88 ms    Q-T Interval 334 ms    QTc Calculation (Bazett) 441 ms    P Axis 86 degrees    R Axis 95 degrees    T Axis 71 degrees    Diagnosis       Sinus tachycardiaBiatrial enlargementRightward axisPulmonary disease patternAbnormal ECGNo previous ECGs available         For further details of [facility] emergency department encounter, please see [employer], NP's documentation. 75-year-old female who is already oxygen dependent with COPD, increasing oxygen requirement and dyspnea and found to be Covid positive, Rocephin/azithro had been iniated with concern for bacterial process, given Decadron, she was wheezing a little when I saw her so I added a DuoNeb treatment here as well, denies any chest pain currently, no PE or dissection, low suspicion for ACS, she did have some ketones in urine and mildly low sodium/chloride, given bolus normal saline, she is high risk for decompensation given her severe emphysema with Covid diagnosis, patient is agreeable to admission, NP spoke to hospitalist who agreed to accept.       Orders Placed This Encounter   Procedures    Culture, Blood 1    Culture, Blood 2    COVID-19 & Influenza Combo    XR CHEST PORTABLE    CT CHEST PULMONARY EMBOLISM W CONTRAST    CBC auto differential    Comprehensive Metabolic Panel w/ Reflex to MG    Blood gas, venous    Procalcitonin    Urinalysis Reflex to Culture    Brain Natriuretic Peptide    Troponin    Microscopic Urinalysis    Inpatient consult to Hospitalist    EKG 12 Lead     Orders Placed This Encounter   Medications    dexamethasone (PF) (DECADRON) injection 4 mg    iopamidol (ISOVUE-370) 76 % injection 85 mL    0.9 % sodium chloride bolus    cefTRIAXone (ROCEPHIN) 1000 mg IVPB in 50 mL D5W minibag     Order Specific Question:   Antimicrobial Indications     Answer:   Pneumonia (CAP)    azithromycin (ZITHROMAX) 500 mg in D5W 250ml addavial     Order Specific Question:   Antimicrobial Indications     Answer:   Pneumonia (CAP)    ipratropium-albuterol (DUONEB) nebulizer solution 1 ampule     Order Specific Question:   Initiate RT Bronchodilator Protocol     Answer:   No        Clinical Impression:  1. Pneumonia due to COVID-19 virus    2. Pulmonary nodule    3. Pulmonary emphysema, unspecified emphysema type ([facility] [address]    4. Acute respiratory failure with hypoxia [facility])      Disposition:  Patient admitted to the hospital in stable condition. This chart was generated in part by using Dragon Dictation system and may contain errors related to that system including errors in grammar, punctuation, and spelling, as well as words and phrases that may be inappropriate. If there are any questions or concerns please feel free to contact the dictating provider for clarification.                       Buzz Richardson DO  01/30/22 5701

## 2022-01-30 NOTE — ED NOTES
2215- Perfect serve sent to Dr. Antonio James   [phone]- Dr. Antonio James returned call and spoke with Jaqui Hernandez NP   [facility]  01/29/22 2216       [facility]  01/29/22 3371

## 2022-01-30 NOTE — ED PROVIDER NOTES
[address] ED  EMERGENCY DEPARTMENT ENCOUNTER        Pt Name: Stefany Clark  MRN: [id number]  [place] 1960  Date of evaluation: 1/29/2022  Provider: PAN Fowler - CNP  PCP: Nasreen Watson MD  ED Attending: No att. providers found    [address] [facility]       Chief Complaint   Patient presents with    Shortness of Breath     SOB with exhertion x 1 week. States her PCP gave her ABX for it. Arrives on baseline o2 of 4L. Got a BT from EMS. Not vaccinated. HISTORY OF PRESENT ILLNESS   (Location/Symptom, Timing/Onset, Context/Setting, Quality, Duration, Modifying Factors, Severity)  Note limiting factors. Stefany Clark is a 59 y.o. female for shortness of breath. Onset was 1 week. Context includes patient reports that she has had increasing shortness of breath particularly with exertion over the last week. Patient reports that she has also had 2 increase her oxygen requirement at home. She states that she is typically on 3 L but was up to 4 at home. Patient reports that with her increased shortness of breath she activated EMS and she was told that her saturations were in the 80s when EMS arrived. She was provided with a breathing treatment in route and had her oxygen at 6 L in the ambulance and states that she did feel better. Patient denies chest pain. Alleviating factors include nothing. Aggravating factors include nothing. Pain is 0/10. Nothing has been used for pain today. Nursing Notes were all reviewed and agreed with or any disagreements were addressed  in the HPI. REVIEW OF SYSTEMS  (2-9 systems for level 4, 10 or more for level 5)     Review of Systems   Constitutional: Negative for fever. HENT: Negative for congestion, rhinorrhea and sore throat. Respiratory: Positive for shortness of breath. Cardiovascular: Negative for chest pain. Gastrointestinal: Negative for abdominal pain.    Genitourinary: Negative for decreased urine volume and difficulty urinating. Musculoskeletal: Negative for arthralgias and myalgias. Skin: Negative for color change and rash. Neurological: Negative for dizziness and light-headedness. Psychiatric/Behavioral: Negative for agitation. All other systems reviewed and are negative. Positivesand Pertinent negatives as per HPI. Except as noted above in the ROS, all other systems were reviewed and negative.        PAST MEDICAL HISTORY     Past Medical History:   Diagnosis Date    Asthma     COPD (chronic obstructive pulmonary disease) ([facility] [address]     Thyroid disease          SURGICAL HISTORY       Past Surgical History:   Procedure Laterality Date     SECTION      x2    CHOLECYSTECTOMY           CURRENT MEDICATIONS       Previous Medications    ALBUTEROL (PROVENTIL) (2.5 MG/3ML) 0.083% NEBULIZER SOLUTION    INHALE THE CONTENTS OF 1 VIAL VIA NEBULIZER EVERY 6 HOURS AS NEEDED FOR SHORTNESS OF BREATH  OR  WHEEZING    ALBUTEROL SULFATE  (90 BASE) MCG/ACT INHALER    Inhale 2 puffs into the lungs every 6 hours as needed for Wheezing    ASPIRIN 81 MG EC TABLET    Take 1 tablet by mouth daily    ATORVASTATIN (LIPITOR) 40 MG TABLET    Take 1 tablet by mouth nightly    FLUTICASONE (FLONASE) 50 MCG/ACT NASAL SPRAY    1 spray by Each Nostril route daily    FLUTICASONE-UMECLIDIN-VILANT (TRELEGY ELLIPTA) 100-62.5-25 MCG/INH AEPB    Inhale 1 puff into the lungs daily    FUROSEMIDE (LASIX) 20 MG TABLET    Take 1 tablet by mouth daily    GUAIFENESIN (MUCINEX) 600 MG EXTENDED RELEASE TABLET    Take 1 tablet by mouth 2 times daily    LEVOTHYROXINE (SYNTHROID) 125 MCG TABLET    Take 1 tablet by mouth every morning (before breakfast)    MONTELUKAST (SINGULAIR) 10 MG TABLET    Take 1 tablet by mouth nightly    MULTIPLE VITAMIN (MULTIVITAMIN) TABLET    Take 1 tablet by mouth daily    PREDNISONE (DELTASONE) 10 MG TABLET    4 tabs for 3 days 3 tabs for 3 days 2 tabs for 3 days 1 tabs for 3 days         ALLERGIES Promethazine and Codeine    FAMILY HISTORY     No family history on file. SOCIAL HISTORY       Social History     Socioeconomic History    Marital status:      Spouse name: Not on file    Number of children: Not on file    Years of education: Not on file    Highest education level: Not on file   Occupational History    Not on file   Tobacco Use    Smoking status: Current Some Day Smoker     Packs/day: 0.50     Years: 44.00     Pack years: 22.00     Types: Cigarettes     Last attempt to quit: 2020     Years since quittin.4    Smokeless tobacco: Never Used   Vaping Use    Vaping Use: Never used   Substance and Sexual Activity    Alcohol use: Yes     Comment: rarely    Drug use: No    Sexual activity: Not Currently     Partners: Male   Other Topics Concern    Not on file   Social History Narrative    Not on file     Social Determinants of Health     Financial Resource Strain:     Difficulty of Paying Living Expenses: Not on file   Food Insecurity:     Worried About Running Out of Food in the Last Year: Not on file    Yahir of Food in the Last Year: Not on file   Transportation Needs:     Lack of Transportation (Medical): Not on file    Lack of Transportation (Non-Medical):  Not on file   Physical Activity:     Days of Exercise per Week: Not on file    Minutes of Exercise per Session: Not on file   Stress:     Feeling of Stress : Not on file   Social Connections:     Frequency of Communication with Friends and Family: Not on file    Frequency of Social Gatherings with Friends and Family: Not on file    Attends Advent Services: Not on file    Active Member of Clubs or Organizations: Not on file    Attends Club or Organization Meetings: Not on file    Marital Status: Not on file   Intimate Partner Violence:     Fear of Current or Ex-Partner: Not on file    Emotionally Abused: Not on file    Physically Abused: Not on file    Sexually Abused: Not on file   Housing Stability:     Unable to Pay for Housing in the Last Year: Not on file    Number of Places Lived in the Last Year: Not on file    Unstable Housing in the Last Year: Not on file       SCREENINGS    [place] Coma Scale  Eye Opening: Spontaneous  Best Verbal Response: Oriented  Best Motor Response: Obeys commands  Nicolas Coma Scale Score: 15        PHYSICAL EXAM    (up to 7 for level 4, 8 ormore for level 5)     ED Triage Vitals [01/29/22 2024]   BP Temp Temp Source Pulse Resp SpO2 Height Weight   (!) 169/83 98.9 °F (37.2 °C) Oral 112 14 93 % 5' 4\" (1.626 m) 140 lb (63.5 kg)       Physical Exam  Constitutional:       Appearance: She is well-developed. HENT:      Head: Normocephalic and atraumatic. Cardiovascular:      Rate and Rhythm: Normal rate. Pulmonary:      Effort: Pulmonary effort is normal. No respiratory distress. Breath sounds: Examination of the right-lower field reveals decreased breath sounds. Examination of the left-lower field reveals decreased breath sounds. Decreased breath sounds present. Abdominal:      General: There is no distension. Palpations: Abdomen is soft. Tenderness: There is no abdominal tenderness. Musculoskeletal:         General: Normal range of motion. Cervical back: Normal range of motion. Skin:     General: Skin is warm and dry. Neurological:      Mental Status: She is alert and oriented to person, place, and time.          DIAGNOSTIC RESULTS   LABS:    Labs Reviewed   COVID-19 & INFLUENZA COMBO - Abnormal; Notable for the following components:       Result Value    SARS-CoV-2 RNA, RT PCR DETECTED (*)     All other components within normal limits    Narrative:     Performed at:  [facility],  [place], [facility]   Phone [phone]   CBC WITH AUTO DIFFERENTIAL - Abnormal; Notable for the following components:    Lymphocytes Absolute 0.5 (*)     All other components within normal limits Narrative:     Performed at:  [facility] [address],  [place] [employer]   Phone [phone]   COMPREHENSIVE METABOLIC PANEL W/ REFLEX TO MG FOR LOW K - Abnormal; Notable for the following components:    Sodium 131 (*)     Chloride 91 (*)     Glucose 148 (*)     AST 42 (*)     All other components within normal limits    Narrative:     Performed at:  [facility] [address],  [place], [employer]   Phone [phone]   BLOOD GAS, VENOUS - Abnormal; Notable for the following components:    pH, Melquiades 7.323 (*)     pCO2, Melquiades 63.6 (*)     pO2, Melquiades 52.1 (*)     HCO3, Venous 32.3 (*)     Base Excess, Melquiades 4.3 (*)     Carboxyhemoglobin 3.7 (*)     All other components within normal limits    Narrative:     Performed at:  [facility] [address],  [place] [employer]   Phone [phone]   URINE RT REFLEX TO CULTURE - Abnormal; Notable for the following components:    Ketones, Urine 15 (*)     Blood, Urine MODERATE (*)     Protein, UA 30 (*)     All other components within normal limits    Narrative:     Performed at:  [facility]) [facility],  [place], [employer]   Phone [phone]   BRAIN NATRIURETIC PEPTIDE - Abnormal; Notable for the following components:    Pro- (*)     All other components within normal limits    Narrative:     Performed at:  [facility]) [facility],  [place], [employer]   Phone [phone]   MICROSCOPIC URINALYSIS - Abnormal; Notable for the following components:    RBC, UA 11-20 (*)     All other components within normal limits    Narrative:     Performed at:  [facility]) [facility],  [place] [employer]   Phone [phone]   CULTURE, BLOOD 1   CULTURE, BLOOD 2   LACTATE, SEPSIS    Narrative:     Performed at:  [facility]  [facility] [address],  [place], [facility]   Phone [phone]   PROCALCITONIN    Narrative:     Performed at:  [facility]  [address],  [place], [facility]   Phone [phone]   TROPONIN    Narrative:     Performed at:  [facility]) [facility]  [address],  [place], [facility]   Phone [phone]       All other labs were within normal range or not returned as of this dictation. EKG: All EKG's are interpreted by the Emergency Department Physician who either signs or Co-signs this chart in the absence of a cardiologist.  Please see their note for interpretation of EKG. RADIOLOGY:     Portable chest x-ray interpreted by radiologist for  Impression:    Changes of COPD with mild peribronchial thickening and scattered non   consolidating interstitial pneumonia like infiltrate in both lower lobes. The pneumonia is new from the previous study of 05/11/2021. Interpretation per the Radiologist below, if available at the time of this note:    CT CHEST PULMONARY EMBOLISM W CONTRAST   Final Result   No evidence of pulmonary embolism. Emphysema. There is a 1 cm noncalcified nodule in the right lung base. This is new   compared to the prior exam of 01/17/2007. Follow-up recommendations are   listed below. RECOMMENDATIONS:   Fleischner Society guidelines for follow-up and management of incidentally   detected pulmonary nodules:      Single Solid Nodule:      Nodule size less than 6 mm   In a low-risk patient, no routine follow-up. In a high-risk patient, optional CT at 12 months. Nodule size equals 6-8 mm   In a low-risk patient, CT at 6-12 months, then consider CT at 18-24 months. In a high-risk patient, CT at 6-12 months, then CT at 18-24 months. Nodule size greater than 8 mm         In a low-risk patient, consider CT at 3 months, PET/CT, or tissue sampling.       In a high-risk patient, consider CT at 3 months, PET/CT, or tissue sampling. Multiple Solid Nodules:      Nodule size less than 6 mm   In a low-risk patient, no routine follow-up. In a high-risk patient, optional CT at 12 months. Nodule size equals 6-8 mm   In a low-risk patient, CT at 3-6 months, then consider CT at 18-24 months. In a high-risk patient, CT at 3-6 months, then CT at 18-24 months. Nodule size greater than 8 mm   In a low-risk patient, CT at 3-6 months, then consider CT at 18-24 months. In a high-risk patient, CT at 3-6 months, then CT at 18-24 months. - Low risk patients include individuals with minimal or absent history of   smoking and other known risk factors. - High risk patients include individuals with a history or smoking or known   risk factors. Radiology 2017 http://pubs. rsna.org/doi/full/10.1148/radiol. 5333466892         XR CHEST PORTABLE   Final Result   Changes of COPD with mild peribronchial thickening and scattered non   consolidating interstitial pneumonia like infiltrate in both lower lobes. The pneumonia is new from the previous study of 05/11/2021. No results found.       PROCEDURES   Unless otherwise noted below, none     Procedures    CRITICAL CARE TIME   N/A    CONSULTS:  IP CONSULT TO HOSPITALIST      EMERGENCY DEPARTMENT COURSE and DIFFERENTIAL DIAGNOSIS/MDM:   Vitals:    Vitals:    01/29/22 2024 01/29/22 2032 01/29/22 2102 01/29/22 2202   BP: (!) 169/83 137/70 123/71 126/63   Pulse: 112 109 104 100   Resp: 14 19 18 23   Temp: 98.9 °F (37.2 °C)      TempSrc: Oral      SpO2: 93% 96% 98% 100%   Weight: 140 lb (63.5 kg)      Height: 5' 4\" (1.626 m)          Patient was given the following medications:  Medications   dexamethasone (PF) (DECADRON) injection 4 mg (4 mg IntraVENous Given 1/29/22 2055)   cefTRIAXone (ROCEPHIN) 1000 mg IVPB in 50 mL D5W minibag (1,000 mg IntraVENous New Bag 1/29/22 2243)   azithromycin (ZITHROMAX) 500 mg in D5W 250ml addavial (has no administration in time range)   iopamidol (ISOVUE-370) 76 % injection 85 mL (85 mLs IntraVENous Given 1/29/22 2140)   0.9 % sodium chloride bolus (1,000 mLs IntraVENous New Bag 1/29/22 2155)         Patient was seen and evaluated by myself and Olu Newberry. Patient here for complaints of increased shortness of breath. Patient reports over the last week she has had increased shortness of breath. She reports that she typically uses 3 L of nasal cannula oxygen however has had to increase her oxygen to 4 L. Patient reports that she developed increased shortness of breath today and activated EMS. She reports that when EMS arrived her oxygen saturations were in the 80s. She reports that she was given a breathing treatment and had her oxygen increased in the ambulance and states that she did feel better. Patient reports that she uses daily steroids. Patient does have a smoking history. On exam she is awake and alert she was found to be tachycardic. Lab values have been reviewed and interpreted. Patient was provided with steroids. Chest x-ray and chest CT PE were reviewed. Patient's Covid test was positive. Patient was provided with IV fluids and IV antibiotics. Consult was placed to the hospitalist for admission for acute respiratory failure with hypoxia and Covid pneumonia. Hospitalist has accepted and the patient's care was transferred to the inpatient unit. The patient tolerated their visit well. They were seen and evaluated by the attending physician, No att. providers found who agreed with the assessment and plan. The patient and / or the family were informed of the results of any tests, a time was given to answer questions, a plan was proposed and they agreed with plan. FINAL IMPRESSION      1. Pneumonia due to COVID-19 virus    2. Pulmonary nodule    3. Pulmonary emphysema, unspecified emphysema type ([facility] [address]    4.  Acute respiratory failure with hypoxia (HCC)          DISPOSITION/PLAN DISPOSITION        PATIENT REFERRED TO:  No follow-up provider specified.     DISCHARGE MEDICATIONS:  New Prescriptions    No medications on file       DISCONTINUED MEDICATIONS:  Discontinued Medications    No medications on file              (Please note that portions of this note were completed with a voice recognition program.  Efforts were made to edit the dictations but occasionally words are mis-transcribed.)    PAN Diaz CNP (electronically signed)       PAN Diaz CNP  01/29/22 [address], APRN - CNP  01/29/22 7452

## 2022-01-30 NOTE — ED NOTES
Report given to Gege RN, who assumes care when pt arrives to room 228. Writer will transport pt at this time.        Hipolito Guardado RN  01/30/22 3056

## 2022-01-30 NOTE — ED NOTES
RN entered pt's room, Umm De Luna RN at bedside. Pt to bedside commode for BM. Pt SOB and 91-92% 3L NC. Umm De Luna RN raised O2 to 4L NC. Pt educated to breathe through nose not mouth for O2 saturation and to slow breathing for more controlled breaths. Pt anxious about O2 saturation. Pt returned to bed independently while maintaining O2 level. Sarita Amaya NP and Dr. Maximino Amaya aware. Writer will continue to monitor.       Hipolito Guardado RN  01/29/22 [address], [place]  01/29/22 3800

## 2022-01-30 NOTE — ED NOTES
Pt resting in bed at this time, lights dimmed, no needs. Will continue to monitor.       Danyelle Sarmiento RN  01/30/22 7047

## 2022-01-30 NOTE — PLAN OF CARE
Admitted after midnight. Seen and examined at bedside. Feels very weak, tired, SOB and congested, though better then on presentation. Unvaccinated pt with COVID infection. She is on 3l/min and uses 3-4l/min at baseline with severe COPD - so O2 has remained stable. .   Primary reason for admission is new pulm nodule in high risk patient pending pulm evaluation. This will need follow up.        Raúl Nolasco MD  1/30/2022  12:52 PM

## 2022-01-31 LAB
A/G RATIO: 1.5 (ref 1.1–2.2)
ALBUMIN SERPL-MCNC: 3.9 G/DL (ref 3.4–5)
ALP BLD-CCNC: 78 U/L (ref 40–129)
ALT SERPL-CCNC: 30 U/L (ref 10–40)
ANION GAP SERPL CALCULATED.3IONS-SCNC: 7 MMOL/L (ref 3–16)
AST SERPL-CCNC: 56 U/L (ref 15–37)
BASOPHILS ABSOLUTE: 0.1 K/UL (ref 0–0.2)
BASOPHILS RELATIVE PERCENT: 0.5 %
BILIRUB SERPL-MCNC: <0.2 MG/DL (ref 0–1)
BUN BLDV-MCNC: 15 MG/DL (ref 7–20)
C-REACTIVE PROTEIN: 13.2 MG/L (ref 0–5.1)
CALCIUM SERPL-MCNC: 8.7 MG/DL (ref 8.3–10.6)
CHLORIDE BLD-SCNC: 90 MMOL/L (ref 99–110)
CO2: 35 MMOL/L (ref 21–32)
CREAT SERPL-MCNC: <0.5 MG/DL (ref 0.6–1.2)
D DIMER: <200 NG/ML DDU (ref 0–229)
EOSINOPHILS ABSOLUTE: 0 K/UL (ref 0–0.6)
EOSINOPHILS RELATIVE PERCENT: 0 %
GFR AFRICAN AMERICAN: >60
GFR NON-AFRICAN AMERICAN: >60
GLUCOSE BLD-MCNC: 134 MG/DL (ref 70–99)
HCT VFR BLD CALC: 39.5 % (ref 36–48)
HEMOGLOBIN: 13.2 G/DL (ref 12–16)
L. PNEUMOPHILA SEROGP 1 UR AG: NORMAL
LYMPHOCYTES ABSOLUTE: 1.1 K/UL (ref 1–5.1)
LYMPHOCYTES RELATIVE PERCENT: 9.2 %
MCH RBC QN AUTO: 30.8 PG (ref 26–34)
MCHC RBC AUTO-ENTMCNC: 33.4 G/DL (ref 31–36)
MCV RBC AUTO: 92.3 FL (ref 80–100)
MONOCYTES ABSOLUTE: 0.6 K/UL (ref 0–1.3)
MONOCYTES RELATIVE PERCENT: 5.4 %
NEUTROPHILS ABSOLUTE: 10 K/UL (ref 1.7–7.7)
NEUTROPHILS RELATIVE PERCENT: 84.9 %
PDW BLD-RTO: 13.2 % (ref 12.4–15.4)
PLATELET # BLD: 161 K/UL (ref 135–450)
PMV BLD AUTO: 8.5 FL (ref 5–10.5)
POTASSIUM REFLEX MAGNESIUM: 3.8 MMOL/L (ref 3.5–5.1)
RBC # BLD: 4.28 M/UL (ref 4–5.2)
SODIUM BLD-SCNC: 132 MMOL/L (ref 136–145)
TOTAL PROTEIN: 6.5 G/DL (ref 6.4–8.2)
WBC # BLD: 11.7 K/UL (ref 4–11)

## 2022-01-31 PROCEDURE — 2580000003 HC RX 258: Performed by: FAMILY MEDICINE

## 2022-01-31 PROCEDURE — 6370000000 HC RX 637 (ALT 250 FOR IP): Performed by: INTERNAL MEDICINE

## 2022-01-31 PROCEDURE — 85025 COMPLETE CBC W/AUTO DIFF WBC: CPT

## 2022-01-31 PROCEDURE — 6360000002 HC RX W HCPCS: Performed by: INTERNAL MEDICINE

## 2022-01-31 PROCEDURE — 2700000000 HC OXYGEN THERAPY PER DAY

## 2022-01-31 PROCEDURE — 85379 FIBRIN DEGRADATION QUANT: CPT

## 2022-01-31 PROCEDURE — 99223 1ST HOSP IP/OBS HIGH 75: CPT | Performed by: INTERNAL MEDICINE

## 2022-01-31 PROCEDURE — 86140 C-REACTIVE PROTEIN: CPT

## 2022-01-31 PROCEDURE — 6360000002 HC RX W HCPCS: Performed by: FAMILY MEDICINE

## 2022-01-31 PROCEDURE — 94669 MECHANICAL CHEST WALL OSCILL: CPT

## 2022-01-31 PROCEDURE — 6370000000 HC RX 637 (ALT 250 FOR IP)

## 2022-01-31 PROCEDURE — 2580000003 HC RX 258: Performed by: NURSE PRACTITIONER

## 2022-01-31 PROCEDURE — 94640 AIRWAY INHALATION TREATMENT: CPT

## 2022-01-31 PROCEDURE — 6360000002 HC RX W HCPCS: Performed by: NURSE PRACTITIONER

## 2022-01-31 PROCEDURE — 6370000000 HC RX 637 (ALT 250 FOR IP): Performed by: FAMILY MEDICINE

## 2022-01-31 PROCEDURE — 1200000000 HC SEMI PRIVATE

## 2022-01-31 PROCEDURE — 36415 COLL VENOUS BLD VENIPUNCTURE: CPT

## 2022-01-31 PROCEDURE — 80053 COMPREHEN METABOLIC PANEL: CPT

## 2022-01-31 PROCEDURE — 99233 SBSQ HOSP IP/OBS HIGH 50: CPT | Performed by: INTERNAL MEDICINE

## 2022-01-31 PROCEDURE — 94761 N-INVAS EAR/PLS OXIMETRY MLT: CPT

## 2022-01-31 RX ORDER — METHYLPREDNISOLONE SODIUM SUCCINATE 40 MG/ML
40 INJECTION, POWDER, LYOPHILIZED, FOR SOLUTION INTRAMUSCULAR; INTRAVENOUS EVERY 12 HOURS
Status: DISCONTINUED | OUTPATIENT
Start: 2022-01-31 | End: 2022-02-04 | Stop reason: HOSPADM

## 2022-01-31 RX ORDER — GUAIFENESIN 600 MG/1
600 TABLET, EXTENDED RELEASE ORAL 2 TIMES DAILY
Status: DISCONTINUED | OUTPATIENT
Start: 2022-01-31 | End: 2022-02-04 | Stop reason: HOSPADM

## 2022-01-31 RX ORDER — BUDESONIDE AND FORMOTEROL FUMARATE DIHYDRATE 160; 4.5 UG/1; UG/1
2 AEROSOL RESPIRATORY (INHALATION) 2 TIMES DAILY
Status: DISCONTINUED | OUTPATIENT
Start: 2022-01-31 | End: 2022-02-04 | Stop reason: HOSPADM

## 2022-01-31 RX ORDER — DIMETHICONE, OXYBENZONE, AND PADIMATE O 2; 2.5; 6.6 G/100G; G/100G; G/100G
STICK TOPICAL
Status: COMPLETED
Start: 2022-01-31 | End: 2022-01-31

## 2022-01-31 RX ORDER — TRAMADOL HYDROCHLORIDE 50 MG/1
50 TABLET ORAL EVERY 6 HOURS PRN
Status: DISCONTINUED | OUTPATIENT
Start: 2022-01-31 | End: 2022-02-04 | Stop reason: HOSPADM

## 2022-01-31 RX ORDER — MORPHINE SULFATE 2 MG/ML
2 INJECTION, SOLUTION INTRAMUSCULAR; INTRAVENOUS
Status: DISCONTINUED | OUTPATIENT
Start: 2022-01-31 | End: 2022-01-31

## 2022-01-31 RX ORDER — GUAIFENESIN/DEXTROMETHORPHAN 100-10MG/5
5 SYRUP ORAL EVERY 4 HOURS PRN
Status: DISCONTINUED | OUTPATIENT
Start: 2022-01-31 | End: 2022-01-31

## 2022-01-31 RX ORDER — ALBUTEROL SULFATE 90 UG/1
2 AEROSOL, METERED RESPIRATORY (INHALATION) EVERY 4 HOURS
Status: DISCONTINUED | OUTPATIENT
Start: 2022-01-31 | End: 2022-01-31

## 2022-01-31 RX ORDER — ALBUTEROL SULFATE 90 UG/1
2 AEROSOL, METERED RESPIRATORY (INHALATION) EVERY 4 HOURS PRN
Status: DISCONTINUED | OUTPATIENT
Start: 2022-01-31 | End: 2022-02-04 | Stop reason: HOSPADM

## 2022-01-31 RX ORDER — ALBUTEROL SULFATE 90 UG/1
2 AEROSOL, METERED RESPIRATORY (INHALATION) 4 TIMES DAILY
Status: DISCONTINUED | OUTPATIENT
Start: 2022-01-31 | End: 2022-01-31

## 2022-01-31 RX ORDER — BUSPIRONE HYDROCHLORIDE 10 MG/1
10 TABLET ORAL 3 TIMES DAILY
Status: DISCONTINUED | OUTPATIENT
Start: 2022-01-31 | End: 2022-02-04 | Stop reason: HOSPADM

## 2022-01-31 RX ORDER — AZITHROMYCIN 250 MG/1
500 TABLET, FILM COATED ORAL DAILY
Status: COMPLETED | OUTPATIENT
Start: 2022-01-31 | End: 2022-02-04

## 2022-01-31 RX ADMIN — Medication 2 PUFF: at 00:59

## 2022-01-31 RX ADMIN — MONTELUKAST SODIUM 10 MG: 10 TABLET ORAL at 20:18

## 2022-01-31 RX ADMIN — Medication 2 PUFF: at 08:13

## 2022-01-31 RX ADMIN — SODIUM CHLORIDE, PRESERVATIVE FREE 10 ML: 5 INJECTION INTRAVENOUS at 20:20

## 2022-01-31 RX ADMIN — CEFTRIAXONE SODIUM 1000 MG: 1 INJECTION, POWDER, FOR SOLUTION INTRAMUSCULAR; INTRAVENOUS at 23:30

## 2022-01-31 RX ADMIN — Medication 2 PUFF: at 11:47

## 2022-01-31 RX ADMIN — GUAIFENESIN AND DEXTROMETHORPHAN 5 ML: 100; 10 SYRUP ORAL at 05:43

## 2022-01-31 RX ADMIN — GUAIFENESIN AND DEXTROMETHORPHAN 5 ML: 100; 10 SYRUP ORAL at 10:38

## 2022-01-31 RX ADMIN — Medication 2 PUFF: at 23:15

## 2022-01-31 RX ADMIN — ENOXAPARIN SODIUM 30 MG: 100 INJECTION SUBCUTANEOUS at 20:17

## 2022-01-31 RX ADMIN — ATORVASTATIN CALCIUM 40 MG: 40 TABLET, FILM COATED ORAL at 20:18

## 2022-01-31 RX ADMIN — LEVOTHYROXINE SODIUM 125 MCG: 25 TABLET ORAL at 05:43

## 2022-01-31 RX ADMIN — GUAIFENESIN 600 MG: 600 TABLET, EXTENDED RELEASE ORAL at 13:26

## 2022-01-31 RX ADMIN — AZITHROMYCIN 500 MG: 250 TABLET, FILM COATED ORAL at 13:26

## 2022-01-31 RX ADMIN — Medication 2 PUFF: at 20:10

## 2022-01-31 RX ADMIN — Medication 1 TABLET: at 10:37

## 2022-01-31 RX ADMIN — ENOXAPARIN SODIUM 30 MG: 100 INJECTION SUBCUTANEOUS at 10:37

## 2022-01-31 RX ADMIN — TRAMADOL HYDROCHLORIDE 50 MG: 50 TABLET ORAL at 10:37

## 2022-01-31 RX ADMIN — DEXAMETHASONE 6 MG: 4 TABLET ORAL at 10:38

## 2022-01-31 RX ADMIN — Medication: at 10:39

## 2022-01-31 RX ADMIN — ASPIRIN 81 MG: 81 TABLET, COATED ORAL at 10:38

## 2022-01-31 RX ADMIN — BUSPIRONE HYDROCHLORIDE 10 MG: 10 TABLET ORAL at 20:18

## 2022-01-31 RX ADMIN — FLUTICASONE PROPIONATE 1 SPRAY: 50 SPRAY, METERED NASAL at 10:38

## 2022-01-31 RX ADMIN — Medication 2 PUFF: at 15:34

## 2022-01-31 RX ADMIN — ACETAMINOPHEN 650 MG: 325 TABLET ORAL at 00:54

## 2022-01-31 RX ADMIN — BUSPIRONE HYDROCHLORIDE 10 MG: 10 TABLET ORAL at 18:12

## 2022-01-31 RX ADMIN — ACETAMINOPHEN 650 MG: 325 TABLET ORAL at 08:38

## 2022-01-31 RX ADMIN — GUAIFENESIN 600 MG: 600 TABLET, EXTENDED RELEASE ORAL at 20:18

## 2022-01-31 RX ADMIN — SODIUM CHLORIDE, PRESERVATIVE FREE 10 ML: 5 INJECTION INTRAVENOUS at 10:36

## 2022-01-31 RX ADMIN — FUROSEMIDE 20 MG: 20 TABLET ORAL at 10:38

## 2022-01-31 RX ADMIN — TRAMADOL HYDROCHLORIDE 50 MG: 50 TABLET ORAL at 23:30

## 2022-01-31 RX ADMIN — ACETAMINOPHEN 650 MG: 325 TABLET ORAL at 18:13

## 2022-01-31 RX ADMIN — METHYLPREDNISOLONE SODIUM SUCCINATE 40 MG: 40 INJECTION, POWDER, FOR SOLUTION INTRAMUSCULAR; INTRAVENOUS at 18:13

## 2022-01-31 RX ADMIN — TRAMADOL HYDROCHLORIDE 50 MG: 50 TABLET ORAL at 16:39

## 2022-01-31 RX ADMIN — Medication 2 PUFF: at 05:14

## 2022-01-31 ASSESSMENT — PAIN DESCRIPTION - PROGRESSION
CLINICAL_PROGRESSION: NOT CHANGED
CLINICAL_PROGRESSION: NOT CHANGED

## 2022-01-31 ASSESSMENT — PAIN DESCRIPTION - LOCATION
LOCATION: BACK
LOCATION: BACK;ABDOMEN
LOCATION: BACK;CHEST;HEAD
LOCATION: BACK

## 2022-01-31 ASSESSMENT — PAIN DESCRIPTION - ONSET
ONSET: ON-GOING
ONSET: ON-GOING

## 2022-01-31 ASSESSMENT — PAIN DESCRIPTION - ORIENTATION
ORIENTATION: MID
ORIENTATION: MID
ORIENTATION: LEFT;RIGHT

## 2022-01-31 ASSESSMENT — PAIN DESCRIPTION - DESCRIPTORS
DESCRIPTORS: ACHING
DESCRIPTORS: ACHING;NAGGING
DESCRIPTORS: ACHING;RADIATING;SHARP

## 2022-01-31 ASSESSMENT — PAIN DESCRIPTION - FREQUENCY
FREQUENCY: CONTINUOUS
FREQUENCY: CONTINUOUS

## 2022-01-31 ASSESSMENT — PAIN SCALES - GENERAL
PAINLEVEL_OUTOF10: 10
PAINLEVEL_OUTOF10: 7
PAINLEVEL_OUTOF10: 10
PAINLEVEL_OUTOF10: 10
PAINLEVEL_OUTOF10: 9
PAINLEVEL_OUTOF10: 7
PAINLEVEL_OUTOF10: 5

## 2022-01-31 ASSESSMENT — PAIN DESCRIPTION - PAIN TYPE
TYPE: ACUTE PAIN
TYPE: CHRONIC PAIN
TYPE: ACUTE PAIN
TYPE: CHRONIC PAIN

## 2022-01-31 ASSESSMENT — PAIN - FUNCTIONAL ASSESSMENT: PAIN_FUNCTIONAL_ASSESSMENT: PREVENTS OR INTERFERES SOME ACTIVE ACTIVITIES AND ADLS

## 2022-01-31 NOTE — PROGRESS NOTES
Called into room due to oxygen saturation at 80% on 4 liters. Pt very short of breath appearing. Encouraged deep breaths. Increased oxygen up to 5 liters with saturation of 82%. Increased oxygen to 6 liters with saturation of 89%. Increased oxygen to 7 liters with saturation of 91%. Primary nurse notified.

## 2022-01-31 NOTE — PROGRESS NOTES
Handoff report and transfer of care given to [facility]. Patient in stable condition, denies needs/concerns at this time. Call light within reach.

## 2022-01-31 NOTE — PROGRESS NOTES
RT Inhaler-Nebulizer Bronchodilator Protocol Note    There is a bronchodilator order in the chart from a provider indicating to follow the RT Bronchodilator Protocol and there is an Initiate RT Inhaler-Nebulizer Bronchodilator Protocol order as well (see protocol at bottom of note). CXR Findings:  XR CHEST PORTABLE    Result Date: 1/29/2022  Changes of COPD with mild peribronchial thickening and scattered non consolidating interstitial pneumonia like infiltrate in both lower lobes. The pneumonia is new from the previous study of 05/11/2021. The findings from the last RT Protocol Assessment were as follows:   History Pulmonary Disease: Chronic pulmonary disease  Respiratory Pattern: Dyspnea on exertion or RR 21-25 bpm  Breath Sounds: Intermittent or unilateral wheezes  Cough: Strong, spontaneous, non-productive  Indication for Bronchodilator Therapy: Decreased or absent breath sounds  Bronchodilator Assessment Score: 8    Aerosolized bronchodilator medication orders have been revised according to the RT Inhaler-Nebulizer Bronchodilator Protocol below. Respiratory Therapist to perform RT Therapy Protocol Assessment initially then follow the protocol. Repeat RT Therapy Protocol Assessment PRN for score 0-3 or on second treatment, BID, and PRN for scores above 3. No Indications - adjust the frequency to every 6 hours PRN wheezing or bronchospasm, if no treatments needed after 48 hours then discontinue using Per Protocol order mode. If indication present, adjust the RT bronchodilator orders based on the Bronchodilator Assessment Score as indicated below. Use Inhaler orders unless patient has one or more of the following: on home nebulizer, not able to hold breath for 10 seconds, is not alert and oriented, cannot activate and use MDI correctly, or respiratory rate 25 breaths per minute or more, then use the equivalent nebulizer order(s) with same Frequency and PRN reasons based on the score.   If a patient is on this medication at home then do not decrease Frequency below that used at home. 0-3 - enter or revise RT bronchodilator order(s) to equivalent RT Bronchodilator order with Frequency of every 4 hours PRN for wheezing or increased work of breathing using Per Protocol order mode. 4-6 - enter or revise RT Bronchodilator order(s) to two equivalent RT bronchodilator orders with one order with BID Frequency and one order with Frequency of every 4 hours PRN wheezing or increased work of breathing using Per Protocol order mode. 7-10 - enter or revise RT Bronchodilator order(s) to two equivalent RT bronchodilator orders with one order with TID Frequency and one order with Frequency of every 4 hours PRN wheezing or increased work of breathing using Per Protocol order mode. 11-13 - enter or revise RT Bronchodilator order(s) to one equivalent RT bronchodilator order with QID Frequency and an Albuterol order with Frequency of every 4 hours PRN wheezing or increased work of breathing using Per Protocol order mode. Greater than 13 - enter or revise RT Bronchodilator order(s) to one equivalent RT bronchodilator order with every 4 hours Frequency and an Albuterol order with Frequency of every 2 hours PRN wheezing or increased work of breathing using Per Protocol order mode.          Electronically signed by Ariela Guardado RCP on 1/30/2022 at 7:22 PM

## 2022-01-31 NOTE — FLOWSHEET NOTE
01/31/22 1015   Vital Signs   Temp 96.8 °F (36 °C)   Temp Source Oral   Pulse 94   Heart Rate Source Monitor   Resp 20   /72   BP Location Right upper arm   Patient Position High fowlers   Level of Consciousness Alert (0)   MEWS Score 1   Patient Currently in Pain Yes   Pain Assessment   Pain Assessment 0-10   Pain Level 5   Pain Type Acute pain   Pain Location Back; Chest;Head   Patient's Stated Pain Goal 3   Pain Orientation Left;Right   Pain Descriptors Aching;Nagging   Pain Frequency Continuous   Pain Onset On-going   Clinical Progression Not changed   Functional Pain Assessment Prevents or interferes some active activities and ADLs   Non-Pharmaceutical Pain Intervention(s) Relaxation techniques; Rest   Oxygen Therapy   SpO2 94 %   Pulse Oximeter Device Mode Continuous   Pulse Oximeter Device Location Left;Finger   O2 Device Nasal cannula   Skin Assessment Clean, dry, & intact   Skin Protection for O2 Device N/A   O2 Flow Rate (L/min) 3 L/min     AM assessment completed and vital signs completed, see flowsheet. Vital signs are WNL. Patient is alert & oriented. Patient denies further needs. Side rails up X 2. Bed in the lowest position. Call light within reach.

## 2022-01-31 NOTE — PROGRESS NOTES
RT Inhaler-Nebulizer Bronchodilator Protocol Note    There is a bronchodilator order in the chart from a provider indicating to follow the RT Bronchodilator Protocol and there is an Initiate RT Inhaler-Nebulizer Bronchodilator Protocol order as well (see protocol at bottom of note). CXR Findings:  XR CHEST PORTABLE    Result Date: 1/29/2022  Changes of COPD with mild peribronchial thickening and scattered non consolidating interstitial pneumonia like infiltrate in both lower lobes. The pneumonia is new from the previous study of 05/11/2021. The findings from the last RT Protocol Assessment were as follows:   History Pulmonary Disease: (P) Chronic pulmonary disease  Respiratory Pattern: (P) Mild dyspnea at rest, irregular pattern, or RR 21-25 bpm  Breath Sounds: (P) Severe inspiratory and expiratory wheezing or severely diminished  Cough: (P) Weak, productive  Indication for Bronchodilator Therapy: (P) Wheezing associated with pulm disorder  Bronchodilator Assessment Score: (P) 16    Aerosolized bronchodilator medication orders have been revised according to the RT Inhaler-Nebulizer Bronchodilator Protocol below. Respiratory Therapist to perform RT Therapy Protocol Assessment initially then follow the protocol. Repeat RT Therapy Protocol Assessment PRN for score 0-3 or on second treatment, BID, and PRN for scores above 3. No Indications - adjust the frequency to every 6 hours PRN wheezing or bronchospasm, if no treatments needed after 48 hours then discontinue using Per Protocol order mode. If indication present, adjust the RT bronchodilator orders based on the Bronchodilator Assessment Score as indicated below.   Use Inhaler orders unless patient has one or more of the following: on home nebulizer, not able to hold breath for 10 seconds, is not alert and oriented, cannot activate and use MDI correctly, or respiratory rate 25 breaths per minute or more, then use the equivalent nebulizer order(s)

## 2022-01-31 NOTE — PROGRESS NOTES
Continuous Infusions:   sodium chloride         Data:  CBC:   Recent Labs     01/29/22 2025 01/31/22  0615   WBC 8.4 11.7*   RBC 4.02 4.28   HGB 12.5 13.2   HCT 37.3 39.5   MCV 92.7 92.3   RDW 13.4 13.2    161     BMP:   Recent Labs     01/29/22 2025 01/31/22  0615   * 132*   K 3.9 3.8   CL 91* 90*   CO2 29 35*   BUN 11 15   CREATININE 0.6 <0.5*     BNP: No results for input(s): BNP in the last 72 hours. PT/INR: No results for input(s): PROTIME, INR in the last 72 hours. APTT: No results for input(s): APTT in the last 72 hours. CARDIAC ENZYMES:   Recent Labs     01/29/22 2025   TROPONINI <0.01     FASTING LIPID PANEL:  Lab Results   Component Value Date    TRIG 89 05/07/2021     LIVER PROFILE:   Recent Labs     01/29/22 2025 01/31/22  0615   AST 42* 56*   ALT 30 30   BILITOT <0.2 <0.2   ALKPHOS 83 78      Cultures  Covid detected  Influenza A and B not detected  Blood cultures no growth to date  Urine Legionella antigen negative      Radiology  CT CHEST PULMONARY EMBOLISM W CONTRAST   Final Result   No evidence of pulmonary embolism. Emphysema. There is a 1 cm noncalcified nodule in the right lung base. This is new   compared to the prior exam of 01/17/2007. Follow-up recommendations are   listed below. RECOMMENDATIONS:   Fleischner Society guidelines for follow-up and management of incidentally   detected pulmonary nodules:      Single Solid Nodule:      Nodule size less than 6 mm   In a low-risk patient, no routine follow-up. In a high-risk patient, optional CT at 12 months. Nodule size equals 6-8 mm   In a low-risk patient, CT at 6-12 months, then consider CT at 18-24 months. In a high-risk patient, CT at 6-12 months, then CT at 18-24 months. Nodule size greater than 8 mm         In a low-risk patient, consider CT at 3 months, PET/CT, or tissue sampling. In a high-risk patient, consider CT at 3 months, PET/CT, or tissue sampling.       Multiple Solid following  - PET scan vs biopsy          ADULT DIET; Regular; Low Fat/Low Chol/High Fiber/2 gm Na     Full Code      Plan of care discussed with patient, and with patient's  in the room.   Discussed with patient's nurse       Marlon Carvalho MD, MD 1/31/2022 4:37 PM

## 2022-01-31 NOTE — CONSULTS
Patient is being seen at the request of Christie Rico MD for a consultation for severe COPD with Covid    HISTORY OF PRESENT ILLNESS:   59years old with history of COPD presented with shortness of breath for 5-6. Severe. Worse with exertion and better with resting. Associated with nonproductive cough and wheezes. PCP gave antibiotics last week with no improvement. Found to be hypoxemic. Uses 4LPM O2 most of the time. Smoker 1-2 cig/day. Patient is compliant with inhaled bronchodilators and O2. PAST MEDICAL HISTORY:  Past Medical History:   Diagnosis Date    Asthma     COPD (chronic obstructive pulmonary disease) ([facility] [address]     Thyroid disease      PAST SURGICAL HISTORY:  Past Surgical History:   Procedure Laterality Date     SECTION      x2    CHOLECYSTECTOMY         FAMILY HISTORY:  No lung cancer      SOCIAL HISTORY:   reports that she has been smoking cigarettes. She has a 22.00 pack-year smoking history.  She has never used smokeless tobacco.    Scheduled Meds:   albuterol sulfate HFA  2 puff Inhalation 4x daily    budesonide-formoterol  2 puff Inhalation BID    And    tiotropium  2 puff Inhalation Daily    sodium chloride flush  5-40 mL IntraVENous 2 times per day    enoxaparin  30 mg SubCUTAneous BID    dexamethasone  6 mg Oral Daily    aspirin  81 mg Oral Daily    atorvastatin  40 mg Oral Nightly    fluticasone  1 spray Each Nostril Daily    furosemide  20 mg Oral Daily    levothyroxine  125 mcg Oral QAM AC    montelukast  10 mg Oral Nightly    therapeutic multivitamin-minerals  1 tablet Oral Daily    lidocaine  1 patch TransDERmal Daily    cefTRIAXone (ROCEPHIN) IV  1,000 mg IntraVENous Q24H     Continuous Infusions:   sodium chloride       PRN Meds:  albuterol sulfate HFA, traMADol, guaiFENesin-dextromethorphan, sodium chloride flush, sodium chloride, ondansetron **OR** ondansetron, polyethylene glycol, acetaminophen **OR** acetaminophen, hydrALAZINE    ALLERGIES:  Patient is allergic to promethazine and codeine. REVIEW OF SYSTEMS:  Constitutional: Negative for fever  HENT: Negative for sore throat  Eyes: Negative for redness   Respiratory: + Dyspnea, cough  Cardiovascular: Negative for chest pain  Gastrointestinal: Negative for vomiting, diarrhea   Genitourinary: Negative for hematuria   Musculoskeletal: + Arthralgias   Skin: Negative for rash  Neurological: Negative for syncope  Hematological: Negative for adenopathy  Psychiatric/Behavorial: Negative for anxiety    PHYSICAL EXAM:  Blood pressure 123/70, pulse 81, temperature 98.3 °F (36.8 °C), temperature source Oral, resp. rate 16, height 5' 4\" (1.626 m), weight 140 lb (63.5 kg), SpO2 93 %, not currently breastfeeding.' on 4 L  Gen: + Distress. Ill-appearing  Eyes: PERRL. No sclera icterus. No conjunctival injection. ENT: No discharge. Pharynx clear. Neck: Trachea midline. No obvious mass. Resp: + Accessory muscle use. No crackles. Bilateral wheezes. No rhonchi. No dullness on percussion. CV: Regular rate. Regular rhythm. No murmur or rub. No edema. GI: Non-tender. Non-distended. No hernia. Skin: Warm and dry. No nodule on exposed extremities. Lymph: No cervical LAD. No supraclavicular LAD. M/S: No cyanosis. No joint deformity. No clubbing. Neuro: Awake. Alert. Moves all four extremities. Psych: Oriented x 3. No anxiety. LABS:  CBC:   Recent Labs     01/29/22 2025 01/31/22 0615   WBC 8.4 11.7*   HGB 12.5 13.2   HCT 37.3 39.5   MCV 92.7 92.3    161     BMP:   Recent Labs     01/29/22 2025 01/31/22 0615   * 132*   K 3.9 3.8   CL 91* 90*   CO2 29 35*   BUN 11 15   CREATININE 0.6 <0.5*     LIVER PROFILE:   Recent Labs     01/29/22 2025 01/31/22 0615   AST 42* 56*   ALT 30 30   BILITOT <0.2 <0.2   ALKPHOS 83 78     PT/INR: No results for input(s): PROTIME, INR in the last 72 hours. APTT: No results for input(s): APTT in the last 72 hours.   UA:  Recent Labs     01/29/22  2115   COLORU Yellow   PHUR 6.0   WBCUA None seen   RBCUA 11-20*   CLARITYU Clear   SPECGRAV 1.025   LEUKOCYTESUR Negative   UROBILINOGEN 0.2   BILIRUBINUR Negative   BLOODU MODERATE*   GLUCOSEU Negative     No results for input(s): PHART, ZIH1FCA, PO2ART in the last 72 hours. CT chest 1/29 imaging was reviewed by me and showed   No evidence of pulmonary embolism. Emphysema.    There is a 1 cm noncalcified nodule in the right lung base.  This is new   compared to the prior exam of 01/17/2007.  Follow-up recommendations are   listed below      ASSESSMENT:  · Acute hypoxemic respiratory failure  · COVID-19 viral infection  · Severe COPD with acute exacerbation  · Chronic hypoxemic respiratory failure  · RLL 1 cm nodule on CT 1/39/22-concerning for bronchogenic carcinoma  · Unvaccinated for covid 19     PLAN:  Supplemental oxygen to maintain SaO2 >92%; wean as tolerated  Continuous pulse oximetry  Droplet plus isolation (surgical mask, eye protection, gown, glove)  Supervised self proning  Z-Yuri  Inhaled bronchodilators-Combivent every 4  IV Solu-Medrol 40 every 12  Acapella and Mucinex  Tocilizumab or baricitinib if progressive hypoxemia  Lovenox BID   PET scan versus biopsy versus resection for RLL nodule-could be addressed as an outpatient

## 2022-02-01 PROCEDURE — 2700000000 HC OXYGEN THERAPY PER DAY

## 2022-02-01 PROCEDURE — 94669 MECHANICAL CHEST WALL OSCILL: CPT

## 2022-02-01 PROCEDURE — 2580000003 HC RX 258: Performed by: FAMILY MEDICINE

## 2022-02-01 PROCEDURE — 6370000000 HC RX 637 (ALT 250 FOR IP): Performed by: INTERNAL MEDICINE

## 2022-02-01 PROCEDURE — 6360000002 HC RX W HCPCS: Performed by: FAMILY MEDICINE

## 2022-02-01 PROCEDURE — 94640 AIRWAY INHALATION TREATMENT: CPT

## 2022-02-01 PROCEDURE — 6360000002 HC RX W HCPCS: Performed by: INTERNAL MEDICINE

## 2022-02-01 PROCEDURE — 94761 N-INVAS EAR/PLS OXIMETRY MLT: CPT

## 2022-02-01 PROCEDURE — 99233 SBSQ HOSP IP/OBS HIGH 50: CPT | Performed by: INTERNAL MEDICINE

## 2022-02-01 PROCEDURE — 1200000000 HC SEMI PRIVATE

## 2022-02-01 PROCEDURE — 6360000002 HC RX W HCPCS: Performed by: NURSE PRACTITIONER

## 2022-02-01 PROCEDURE — 6370000000 HC RX 637 (ALT 250 FOR IP): Performed by: FAMILY MEDICINE

## 2022-02-01 PROCEDURE — 2580000003 HC RX 258: Performed by: NURSE PRACTITIONER

## 2022-02-01 RX ORDER — HYDROCODONE BITARTRATE AND ACETAMINOPHEN 5; 325 MG/1; MG/1
1 TABLET ORAL EVERY 4 HOURS PRN
Status: DISCONTINUED | OUTPATIENT
Start: 2022-02-01 | End: 2022-02-04 | Stop reason: HOSPADM

## 2022-02-01 RX ORDER — NICOTINE 21 MG/24HR
1 PATCH, TRANSDERMAL 24 HOURS TRANSDERMAL DAILY
Status: DISCONTINUED | OUTPATIENT
Start: 2022-02-01 | End: 2022-02-04 | Stop reason: HOSPADM

## 2022-02-01 RX ADMIN — SODIUM CHLORIDE, PRESERVATIVE FREE 10 ML: 5 INJECTION INTRAVENOUS at 10:21

## 2022-02-01 RX ADMIN — AZITHROMYCIN 500 MG: 250 TABLET, FILM COATED ORAL at 10:19

## 2022-02-01 RX ADMIN — CEFTRIAXONE SODIUM 1000 MG: 1 INJECTION, POWDER, FOR SOLUTION INTRAMUSCULAR; INTRAVENOUS at 23:54

## 2022-02-01 RX ADMIN — FUROSEMIDE 20 MG: 20 TABLET ORAL at 10:20

## 2022-02-01 RX ADMIN — IPRATROPIUM BROMIDE AND ALBUTEROL 1 PUFF: 20; 100 SPRAY, METERED RESPIRATORY (INHALATION) at 11:25

## 2022-02-01 RX ADMIN — Medication 2 PUFF: at 07:07

## 2022-02-01 RX ADMIN — BUSPIRONE HYDROCHLORIDE 10 MG: 10 TABLET ORAL at 21:36

## 2022-02-01 RX ADMIN — TRAMADOL HYDROCHLORIDE 50 MG: 50 TABLET ORAL at 22:22

## 2022-02-01 RX ADMIN — FLUTICASONE PROPIONATE 1 SPRAY: 50 SPRAY, METERED NASAL at 10:20

## 2022-02-01 RX ADMIN — LEVOTHYROXINE SODIUM 125 MCG: 25 TABLET ORAL at 05:29

## 2022-02-01 RX ADMIN — IPRATROPIUM BROMIDE AND ALBUTEROL 1 PUFF: 20; 100 SPRAY, METERED RESPIRATORY (INHALATION) at 07:07

## 2022-02-01 RX ADMIN — HYDROCODONE BITARTRATE AND ACETAMINOPHEN 1 TABLET: 5; 325 TABLET ORAL at 18:48

## 2022-02-01 RX ADMIN — GUAIFENESIN 600 MG: 600 TABLET, EXTENDED RELEASE ORAL at 10:19

## 2022-02-01 RX ADMIN — HYDROCODONE BITARTRATE AND ACETAMINOPHEN 1 TABLET: 5; 325 TABLET ORAL at 23:47

## 2022-02-01 RX ADMIN — TRAMADOL HYDROCHLORIDE 50 MG: 50 TABLET ORAL at 14:50

## 2022-02-01 RX ADMIN — Medication 1 TABLET: at 10:19

## 2022-02-01 RX ADMIN — ATORVASTATIN CALCIUM 40 MG: 40 TABLET, FILM COATED ORAL at 21:37

## 2022-02-01 RX ADMIN — Medication 2 PUFF: at 19:45

## 2022-02-01 RX ADMIN — IPRATROPIUM BROMIDE AND ALBUTEROL 1 PUFF: 20; 100 SPRAY, METERED RESPIRATORY (INHALATION) at 15:49

## 2022-02-01 RX ADMIN — METHYLPREDNISOLONE SODIUM SUCCINATE 40 MG: 40 INJECTION, POWDER, FOR SOLUTION INTRAMUSCULAR; INTRAVENOUS at 05:29

## 2022-02-01 RX ADMIN — BUSPIRONE HYDROCHLORIDE 10 MG: 10 TABLET ORAL at 10:20

## 2022-02-01 RX ADMIN — ASPIRIN 81 MG: 81 TABLET, COATED ORAL at 10:20

## 2022-02-01 RX ADMIN — MONTELUKAST SODIUM 10 MG: 10 TABLET ORAL at 21:36

## 2022-02-01 RX ADMIN — ACETAMINOPHEN 650 MG: 325 TABLET ORAL at 14:50

## 2022-02-01 RX ADMIN — IPRATROPIUM BROMIDE AND ALBUTEROL 1 PUFF: 20; 100 SPRAY, METERED RESPIRATORY (INHALATION) at 19:46

## 2022-02-01 RX ADMIN — METHYLPREDNISOLONE SODIUM SUCCINATE 40 MG: 40 INJECTION, POWDER, FOR SOLUTION INTRAMUSCULAR; INTRAVENOUS at 18:48

## 2022-02-01 RX ADMIN — ENOXAPARIN SODIUM 30 MG: 100 INJECTION SUBCUTANEOUS at 10:20

## 2022-02-01 RX ADMIN — ACETAMINOPHEN 650 MG: 325 TABLET ORAL at 06:36

## 2022-02-01 RX ADMIN — IPRATROPIUM BROMIDE AND ALBUTEROL 1 PUFF: 20; 100 SPRAY, METERED RESPIRATORY (INHALATION) at 22:51

## 2022-02-01 RX ADMIN — ACETAMINOPHEN 650 MG: 325 TABLET ORAL at 01:36

## 2022-02-01 RX ADMIN — GUAIFENESIN 600 MG: 600 TABLET, EXTENDED RELEASE ORAL at 21:36

## 2022-02-01 RX ADMIN — IPRATROPIUM BROMIDE AND ALBUTEROL 1 PUFF: 20; 100 SPRAY, METERED RESPIRATORY (INHALATION) at 02:59

## 2022-02-01 RX ADMIN — ENOXAPARIN SODIUM 30 MG: 100 INJECTION SUBCUTANEOUS at 21:37

## 2022-02-01 RX ADMIN — BUSPIRONE HYDROCHLORIDE 10 MG: 10 TABLET ORAL at 14:50

## 2022-02-01 RX ADMIN — TRAMADOL HYDROCHLORIDE 50 MG: 50 TABLET ORAL at 06:36

## 2022-02-01 ASSESSMENT — PAIN SCALES - GENERAL
PAINLEVEL_OUTOF10: 5
PAINLEVEL_OUTOF10: 10
PAINLEVEL_OUTOF10: 9
PAINLEVEL_OUTOF10: 9
PAINLEVEL_OUTOF10: 10
PAINLEVEL_OUTOF10: 9
PAINLEVEL_OUTOF10: 9

## 2022-02-01 ASSESSMENT — PAIN DESCRIPTION - LOCATION: LOCATION: BACK;ABDOMEN

## 2022-02-01 ASSESSMENT — PAIN DESCRIPTION - PAIN TYPE: TYPE: ACUTE PAIN

## 2022-02-01 NOTE — PROGRESS NOTES
RT Inhaler-Nebulizer Bronchodilator Protocol Note    There is a bronchodilator order in the chart from a provider indicating to follow the RT Bronchodilator Protocol and there is an Initiate RT Inhaler-Nebulizer Bronchodilator Protocol order as well (see protocol at bottom of note). CXR Findings:  No results found. The findings from the last RT Protocol Assessment were as follows:   History Pulmonary Disease: (P) Chronic pulmonary disease  Respiratory Pattern: (P) Dyspnea on exertion or RR 21-25 bpm  Breath Sounds: (P) Slightly diminished and/or crackles  Cough: (P) Strong, productive  Indication for Bronchodilator Therapy: (P) Decreased or absent breath sounds  Bronchodilator Assessment Score: (P) 7    Aerosolized bronchodilator medication orders have been revised according to the RT Inhaler-Nebulizer Bronchodilator Protocol below. Respiratory Therapist to perform RT Therapy Protocol Assessment initially then follow the protocol. Repeat RT Therapy Protocol Assessment PRN for score 0-3 or on second treatment, BID, and PRN for scores above 3. No Indications - adjust the frequency to every 6 hours PRN wheezing or bronchospasm, if no treatments needed after 48 hours then discontinue using Per Protocol order mode. If indication present, adjust the RT bronchodilator orders based on the Bronchodilator Assessment Score as indicated below. Use Inhaler orders unless patient has one or more of the following: on home nebulizer, not able to hold breath for 10 seconds, is not alert and oriented, cannot activate and use MDI correctly, or respiratory rate 25 breaths per minute or more, then use the equivalent nebulizer order(s) with same Frequency and PRN reasons based on the score. If a patient is on this medication at home then do not decrease Frequency below that used at home.     0-3 - enter or revise RT bronchodilator order(s) to equivalent RT Bronchodilator order with Frequency of every 4 hours PRN for wheezing or increased work of breathing using Per Protocol order mode. 4-6 - enter or revise RT Bronchodilator order(s) to two equivalent RT bronchodilator orders with one order with BID Frequency and one order with Frequency of every 4 hours PRN wheezing or increased work of breathing using Per Protocol order mode. 7-10 - enter or revise RT Bronchodilator order(s) to two equivalent RT bronchodilator orders with one order with TID Frequency and one order with Frequency of every 4 hours PRN wheezing or increased work of breathing using Per Protocol order mode. 11-13 - enter or revise RT Bronchodilator order(s) to one equivalent RT bronchodilator order with QID Frequency and an Albuterol order with Frequency of every 4 hours PRN wheezing or increased work of breathing using Per Protocol order mode. Greater than 13 - enter or revise RT Bronchodilator order(s) to one equivalent RT bronchodilator order with every 4 hours Frequency and an Albuterol order with Frequency of every 2 hours PRN wheezing or increased work of breathing using Per Protocol order mode.          Electronically signed by Yani Reese RCP on 2/1/2022 at 7:18 AM

## 2022-02-01 NOTE — PROGRESS NOTES
Pt asking RT about wearing a BIPAP. RT explained what a BIPAP was and the indications for it and that the MD wants an ABG prior to ordering the BIPAP. The  pt then said that she felt better and did not want an ABGT or the BIPAP. Pt also said that her anxiety would be too much for that. RT also reassured pt that her 02 saturation was well and that she didn't require fi02 above her 4-5L home regiment at this time. Pt stated that the high fi02 made her anxiety feel more at ease. RT decreased fi02 to 6L, reassured pt that she is 96% and pt was ok with that.

## 2022-02-01 NOTE — PROGRESS NOTES
Pulmonary Progress Note    CC: Severe COPD COVID-19    Subjective:   Still with shortness of breath  6 L O2-uses 4 L at home        Intake/Output Summary (Last 24 hours) at 2/1/2022 0842  Last data filed at 2/1/2022 1578  Gross per 24 hour   Intake --   Output 500 ml   Net -500 ml       Exam:   BP (!) 151/80   Pulse 96   Temp 97.3 °F (36.3 °C) (Oral)   Resp 22   Ht 5' 4\" (1.626 m)   Wt 140 lb (63.5 kg)   LMP  (LMP Unknown)   SpO2 95%   BMI 24.03 kg/m²  on 6  Gen:  + Distress. Ill-appearing  Eyes: PERRL. No sclera icterus. No conjunctival injection. ENT: No discharge. Pharynx clear. Neck: Trachea midline. No obvious mass. Resp: + Accessory muscle use. No crackles. Bilateral wheezes. No rhonchi. No dullness on percussion. CV: Regular rate. Regular rhythm. No murmur or rub. No edema. GI: Non-tender. Non-distended. No hernia. Skin: Warm and dry. No nodule on exposed extremities. Lymph: No cervical LAD. No supraclavicular LAD. M/S: No cyanosis. No joint deformity. No clubbing. Neuro: Awake. Alert. Moves all four extremities. Psych: Oriented x 3.  No anxiety    Scheduled Meds:   budesonide-formoterol  2 puff Inhalation BID    azithromycin  500 mg Oral Daily    guaiFENesin  600 mg Oral BID    albuterol-ipratropium  1 puff Inhalation 6 times per day    methylPREDNISolone  40 mg IntraVENous Q12H    busPIRone  10 mg Oral TID    sodium chloride flush  5-40 mL IntraVENous 2 times per day    enoxaparin  30 mg SubCUTAneous BID    aspirin  81 mg Oral Daily    atorvastatin  40 mg Oral Nightly    fluticasone  1 spray Each Nostril Daily    furosemide  20 mg Oral Daily    levothyroxine  125 mcg Oral QAM AC    montelukast  10 mg Oral Nightly    therapeutic multivitamin-minerals  1 tablet Oral Daily    lidocaine  1 patch TransDERmal Daily    cefTRIAXone (ROCEPHIN) IV  1,000 mg IntraVENous Q24H     Continuous Infusions:   sodium chloride       PRN Meds:  albuterol sulfate HFA, traMADol, sodium chloride flush, sodium chloride, ondansetron **OR** ondansetron, polyethylene glycol, acetaminophen **OR** acetaminophen, hydrALAZINE    Labs:  CBC:   Recent Labs     01/29/22 2025 01/31/22  0615   WBC 8.4 11.7*   HGB 12.5 13.2   HCT 37.3 39.5   MCV 92.7 92.3    161     BMP:   Recent Labs     01/29/22 2025 01/31/22  0615   * 132*   K 3.9 3.8   CL 91* 90*   CO2 29 35*   BUN 11 15   CREATININE 0.6 <0.5*     LIVER PROFILE:   Recent Labs     01/29/22 2025 01/31/22  0615   AST 42* 56*   ALT 30 30   BILITOT <0.2 <0.2   ALKPHOS 83 78     PT/INR: No results for input(s): PROTIME, INR in the last 72 hours. APTT: No results for input(s): APTT in the last 72 hours. UA:  Recent Labs     01/29/22 2115   COLORU Yellow   PHUR 6.0   WBCUA None seen   RBCUA 11-20*   CLARITYU Clear   SPECGRAV 1.025   LEUKOCYTESUR Negative   UROBILINOGEN 0.2   BILIRUBINUR Negative   BLOODU MODERATE*   GLUCOSEU Negative     No results for input(s): PHART, FXP7UHR, PO2ART in the last 72 hours. Cultures:   1/29 Legionella not detected  1/29 BC NGTD  1/29 COVID-19 detected    Films:  CT chest 1/29 imaging was reviewed by me and showed   No evidence of pulmonary embolism. Emphysema.    There is a 1 cm noncalcified nodule in the right lung base.  This is new   compared to the prior exam of 01/17/2007.  Follow-up recommendations are   listed below        ASSESSMENT:  · Acute hypoxemic respiratory failure  · COVID-19 viral infection  · Severe COPD with acute exacerbation  · Chronic hypoxemic respiratory failure  · RLL 1 cm nodule on CT 1/39/22-concerning for bronchogenic carcinoma  · Unvaccinated for covid 19      PLAN:  · Supplemental oxygen to maintain SaO2 >92%; wean as tolerated  · Continuous pulse oximetry  · Droplet plus isolation (surgical mask, eye protection, gown, glove)  · Supervised self proning  · Zithromax day #4  · Inhaled bronchodilators-Combivent every 4  · IV Solu-Medrol 40 every 12  · Acapella and

## 2022-02-01 NOTE — FLOWSHEET NOTE
02/01/22 1002   Vital Signs   Temp 98.4 °F (36.9 °C)   Temp Source Oral   Pulse 97   Heart Rate Source Monitor   Resp 24   BP (!) 165/90   BP Location Right upper arm   Patient Position High fowlers   Level of Consciousness Alert (0)   MEWS Score 2   Patient Currently in Pain Yes   Pain Assessment   Pain Assessment 0-10   Pain Level 9   Pain Type Acute pain   Pain Location Back; Abdomen   Patient's Stated Pain Goal 4   Oxygen Therapy   SpO2 96 %   O2 Device Nasal cannula   O2 Flow Rate (L/min) 6 L/min     AM assessment completed and vital signs completed, see flowsheet. Patient is alert & oriented Patient denies further needs. Side rails up X 2. Bed in the lowest position. Call light within reach.

## 2022-02-01 NOTE — CARE COORDINATION
Case Management Assessment  Initial Evaluation      Patient Name: Calista Rios  YOB: 1960  Diagnosis: Pulmonary nodule [R91.1]  Acute respiratory failure with hypoxia ([facility] [address] [J96.01]  Pulmonary emphysema, unspecified emphysema type ([facility] [address] [J43.9]  COVID [U07.1]  Pneumonia due to COVID-19 virus [U07.1, J12.82]  Date / Time: 1/29/2022  8:13 PM    Admission status/Date: 1/30/22 inpt  Chart Reviewed: Yes      Patient Interviewed: Yes   Family Interviewed:  No      Hospitalization in the last 30 days:  No      Health Care Decision Maker :   Primary Decision Maker: Brunilda Howard - Spouse - [phone]    Secondary Decision Maker: Freida Brown - Child - [phone]    (CM - must 1st enter selection under Navigator - emergency contact- [place] Relationship and pick relationship)   Who do you trust or have selected to make healthcare decisions for you      Met with:  Spoke with pt via phone  Interview conducted  (bedside/phone):    Current PCP:  Patsy Moralez 16 required for SNF : Y          3 night stay required -  [employer]  Support Systems/Care Needs: Spouse/Significant Other,Children  Transportation: family    Meal Preparation: self    Housing  Living Arrangements:  Lives 1 story home  Steps: 0  Intent for return to present living arrangements: Yes  Identified Issues: [address] [place] [address]  Active with Home Health Care : No Agency:(Services)  Type of Home Care Services: None  Passport/Waiver : No  :                      Phone Number:    Passport/Waiver Services: N/A          Durable Medical Equiptment   DME Provider: Bashir Phan 26:   Walker_x__Cane_x__RTS___ BSC___Shower Chair___Hospital Bed___W/C____Other________  02 at _4___Liter(s)---wears(frequency)_continuous______ HHN _x__ CPAP___ BiPap___   N/A____      Home O2 Use :  Yes    If No for home O2---if presently on O2 during hospitalization:  Yes  if yes CM to follow for potential DC O2 need  Informed of need for care provider to bring portable home O2 tank on day of discharge for nursing to connect prior to leaving:   Yes  Verbalized agreement/Understanding:   Yes    Community Service Affiliation  Dialysis:  No    · Agency:  · Location:  · Dialysis Schedule:  · Phone:   · Fax: Other Community Services: (ex:PT/OT,Mental Health,Wound Clinic, Cardio/Pul [address])  None    DISCHARGE PLAN: Explained Case Management role/services. Reviewed chart and spoke with pt via phone. Role of CM explained.  lives in 1 story home with spouse and plan return. [facility] IPTA. [facility] is active with [facility] for home O2 PTA. Will follow for posss HHC at [place].

## 2022-02-01 NOTE — PROGRESS NOTES
Progress Note    Admit Date:  1/29/2022    Patient with COPD on home oxygen 4 L; continues to smoke tobacco and not vaccinated against COVID; presenting with increased shortness of breath. she has exacerbation of COPD and she is also positive for COVID-19. Work-up also revealed a new lung nodule. Subjective:  Ms. Natividad Jacobs continues to remain very dyspneic.   she is anxious. BuSpar was started yesterday   complains of rib cage pain from work of breathing. Getting Ultram and this is not helping her   diminished breath sounds , oxygen saturation stable. but patient continues to feel she is getting hypoxic and asking for O2 to be titrated up. O2 increased to 7 L and now brought down to 6 L. Sats maintained. Objective:   BP (!) 158/85   Pulse 104   Temp 98.4 °F (36.9 °C) (Oral)   Resp 20   Ht 5' 4\" (1.626 m)   Wt 140 lb (63.5 kg)   LMP  (LMP Unknown)   SpO2 96%   BMI 24.03 kg/m²       Intake/Output Summary (Last 24 hours) at 2/1/2022 1619  Last data filed at 2/1/2022 8253  Gross per 24 hour   Intake 0 ml   Output 1100 ml   Net -1100 ml         Physical Exam:  Patient seen in [facility] plus precautions  General:  Awake, alert,   She is dyspneic and tachypneic and ill-appearing  She is anxious  Skin:  Warm and dry  Neck:  JVD absent. Neck supple  Chest:  Very diminished breath sounds poor air entry. . No wheezes or rhonchi  Cardiovascular:  RRR ,S1S2 normal  Abdomen:  Soft, non tender, non distended, BS +  Extremities:  No edema. Intact peripheral pulses.  Brisk cap refill, < 2 secs  Neuro: non focal      Medications:   Scheduled Meds:   budesonide-formoterol  2 puff Inhalation BID    azithromycin  500 mg Oral Daily    guaiFENesin  600 mg Oral BID    albuterol-ipratropium  1 puff Inhalation 6 times per day    methylPREDNISolone  40 mg IntraVENous Q12H    busPIRone  10 mg Oral TID    sodium chloride flush  5-40 mL IntraVENous 2 times per day    enoxaparin  30 mg SubCUTAneous BID    aspirin 81 mg Oral Daily    atorvastatin  40 mg Oral Nightly    fluticasone  1 spray Each Nostril Daily    furosemide  20 mg Oral Daily    levothyroxine  125 mcg Oral QAM AC    montelukast  10 mg Oral Nightly    therapeutic multivitamin-minerals  1 tablet Oral Daily    lidocaine  1 patch TransDERmal Daily    cefTRIAXone (ROCEPHIN) IV  1,000 mg IntraVENous Q24H       Continuous Infusions:   sodium chloride         Data:  CBC:   Recent Labs     01/29/22 2025 01/31/22  0615   WBC 8.4 11.7*   RBC 4.02 4.28   HGB 12.5 13.2   HCT 37.3 39.5   MCV 92.7 92.3   RDW 13.4 13.2    161     BMP:   Recent Labs     01/29/22 2025 01/31/22  0615   * 132*   K 3.9 3.8   CL 91* 90*   CO2 29 35*   BUN 11 15   CREATININE 0.6 <0.5*     BNP: No results for input(s): BNP in the last 72 hours. PT/INR: No results for input(s): PROTIME, INR in the last 72 hours. APTT: No results for input(s): APTT in the last 72 hours. CARDIAC ENZYMES:   Recent Labs     01/29/22 2025   TROPONINI <0.01     FASTING LIPID PANEL:  Lab Results   Component Value Date    TRIG 89 05/07/2021     LIVER PROFILE:   Recent Labs     01/29/22 2025 01/31/22  0615   AST 42* 56*   ALT 30 30   BILITOT <0.2 <0.2   ALKPHOS 83 78      Cultures  Covid detected  Influenza A and B not detected  Blood cultures no growth to date  Urine Legionella antigen negative      Radiology  CT CHEST PULMONARY EMBOLISM W CONTRAST   Final Result   No evidence of pulmonary embolism. Emphysema. There is a 1 cm noncalcified nodule in the right lung base. This is new   compared to the prior exam of 01/17/2007. Follow-up recommendations are   listed below. RECOMMENDATIONS:   Fleischner Society guidelines for follow-up and management of incidentally   detected pulmonary nodules:      Single Solid Nodule:      Nodule size less than 6 mm   In a low-risk patient, no routine follow-up. In a high-risk patient, optional CT at 12 months.       Nodule size equals 6-8 mm   In a low-risk patient, CT at 6-12 months, then consider CT at 18-24 months. In a high-risk patient, CT at 6-12 months, then CT at 18-24 months. Nodule size greater than 8 mm         In a low-risk patient, consider CT at 3 months, PET/CT, or tissue sampling. In a high-risk patient, consider CT at 3 months, PET/CT, or tissue sampling. Multiple Solid Nodules:      Nodule size less than 6 mm   In a low-risk patient, no routine follow-up. In a high-risk patient, optional CT at 12 months. Nodule size equals 6-8 mm   In a low-risk patient, CT at 3-6 months, then consider CT at 18-24 months. In a high-risk patient, CT at 3-6 months, then CT at 18-24 months. Nodule size greater than 8 mm   In a low-risk patient, CT at 3-6 months, then consider CT at 18-24 months. In a high-risk patient, CT at 3-6 months, then CT at 18-24 months. - Low risk patients include individuals with minimal or absent history of   smoking and other known risk factors. - High risk patients include individuals with a history or smoking or known   risk factors. Radiology 2017 http://pubs. rsna.org/doi/full/10.1148/radiol. 6487730070         XR CHEST PORTABLE   Final Result   Changes of COPD with mild peribronchial thickening and scattered non   consolidating interstitial pneumonia like infiltrate in both lower lobes. The pneumonia is new from the previous study of 05/11/2021. Assessment:  Active Problems:    COPD exacerbation (HCC)    Hypothyroidism    Acute on chronic respiratory failure with hypoxia and hypercapnia (HCC)    Pneumonia due to COVID-19 virus    COVID    Acute respiratory failure with hypoxia (HCC)    COVID-19 virus infection    Pulmonary nodule    Chronic hypoxemic respiratory failure (HCC)    Anxiety  Resolved Problems:    * No resolved hospital problems.  *      Plan:    # Acute on chronic hypoxic respiratory failure  # COVID 19 PNA  #Exacerbation of severe COPD  -Unvaccinated patient. Continued tobacco abuse  -Pulmonology consulted  -Keep on droplet plus precautions  -Continue inhaled bronchodilators.  -Patient on Decadron->  switched to IV Solu-Medrol. Continue  -Continue empiric antibiotics Zithromax  -Oxygen saturations currently stable,  on 6L  -Lovenox twice daily    #Severe anxiety  -Added BuSpar    #Tobacco abuse  -Patient advised to quit  -Start nicotine patch    #Musculoskeletal chest pain  -On Ultram.  Add Norco    # Hyponatremia  -Monitor sodium levels    #Right lung nodule  -Pulmonology following  - PET scan vs biopsy          ADULT DIET; Regular; Low Fat/Low Chol/High Fiber/2 gm Na     Full Code      Plan of care discussed with patient, and with patient's  in the room.   Discussed with patient's nurse, and respiratory therapist          Yolanda Morales MD, MD 2/1/2022 4:19 PM

## 2022-02-02 LAB
BLOOD CULTURE, ROUTINE: NORMAL
CULTURE, BLOOD 2: NORMAL

## 2022-02-02 PROCEDURE — 6370000000 HC RX 637 (ALT 250 FOR IP): Performed by: INTERNAL MEDICINE

## 2022-02-02 PROCEDURE — 1200000000 HC SEMI PRIVATE

## 2022-02-02 PROCEDURE — 6370000000 HC RX 637 (ALT 250 FOR IP): Performed by: FAMILY MEDICINE

## 2022-02-02 PROCEDURE — 6360000002 HC RX W HCPCS: Performed by: INTERNAL MEDICINE

## 2022-02-02 PROCEDURE — 6360000002 HC RX W HCPCS: Performed by: NURSE PRACTITIONER

## 2022-02-02 PROCEDURE — 87449 NOS EACH ORGANISM AG IA: CPT

## 2022-02-02 PROCEDURE — 99233 SBSQ HOSP IP/OBS HIGH 50: CPT | Performed by: INTERNAL MEDICINE

## 2022-02-02 PROCEDURE — 94640 AIRWAY INHALATION TREATMENT: CPT

## 2022-02-02 PROCEDURE — 97535 SELF CARE MNGMENT TRAINING: CPT

## 2022-02-02 PROCEDURE — 2700000000 HC OXYGEN THERAPY PER DAY

## 2022-02-02 PROCEDURE — 2580000003 HC RX 258: Performed by: FAMILY MEDICINE

## 2022-02-02 PROCEDURE — 94669 MECHANICAL CHEST WALL OSCILL: CPT

## 2022-02-02 PROCEDURE — 97530 THERAPEUTIC ACTIVITIES: CPT

## 2022-02-02 PROCEDURE — 94761 N-INVAS EAR/PLS OXIMETRY MLT: CPT

## 2022-02-02 PROCEDURE — 2580000003 HC RX 258: Performed by: NURSE PRACTITIONER

## 2022-02-02 PROCEDURE — 97165 OT EVAL LOW COMPLEX 30 MIN: CPT

## 2022-02-02 PROCEDURE — 99232 SBSQ HOSP IP/OBS MODERATE 35: CPT | Performed by: INTERNAL MEDICINE

## 2022-02-02 PROCEDURE — 6360000002 HC RX W HCPCS: Performed by: FAMILY MEDICINE

## 2022-02-02 RX ADMIN — LEVOTHYROXINE SODIUM 125 MCG: 25 TABLET ORAL at 06:27

## 2022-02-02 RX ADMIN — GUAIFENESIN 600 MG: 600 TABLET, EXTENDED RELEASE ORAL at 08:23

## 2022-02-02 RX ADMIN — HYDROCODONE BITARTRATE AND ACETAMINOPHEN 1 TABLET: 5; 325 TABLET ORAL at 12:29

## 2022-02-02 RX ADMIN — HYDROCODONE BITARTRATE AND ACETAMINOPHEN 1 TABLET: 5; 325 TABLET ORAL at 08:24

## 2022-02-02 RX ADMIN — BUSPIRONE HYDROCHLORIDE 10 MG: 10 TABLET ORAL at 08:24

## 2022-02-02 RX ADMIN — ATORVASTATIN CALCIUM 40 MG: 40 TABLET, FILM COATED ORAL at 20:35

## 2022-02-02 RX ADMIN — ENOXAPARIN SODIUM 30 MG: 100 INJECTION SUBCUTANEOUS at 08:25

## 2022-02-02 RX ADMIN — HYDROCODONE BITARTRATE AND ACETAMINOPHEN 1 TABLET: 5; 325 TABLET ORAL at 20:36

## 2022-02-02 RX ADMIN — SODIUM CHLORIDE, PRESERVATIVE FREE 10 ML: 5 INJECTION INTRAVENOUS at 08:24

## 2022-02-02 RX ADMIN — BUSPIRONE HYDROCHLORIDE 10 MG: 10 TABLET ORAL at 20:36

## 2022-02-02 RX ADMIN — IPRATROPIUM BROMIDE AND ALBUTEROL 1 PUFF: 20; 100 SPRAY, METERED RESPIRATORY (INHALATION) at 04:25

## 2022-02-02 RX ADMIN — AZITHROMYCIN 500 MG: 250 TABLET, FILM COATED ORAL at 08:24

## 2022-02-02 RX ADMIN — METHYLPREDNISOLONE SODIUM SUCCINATE 40 MG: 40 INJECTION, POWDER, FOR SOLUTION INTRAMUSCULAR; INTRAVENOUS at 16:36

## 2022-02-02 RX ADMIN — ENOXAPARIN SODIUM 30 MG: 100 INJECTION SUBCUTANEOUS at 20:36

## 2022-02-02 RX ADMIN — GUAIFENESIN 600 MG: 600 TABLET, EXTENDED RELEASE ORAL at 20:36

## 2022-02-02 RX ADMIN — Medication 2 PUFF: at 19:29

## 2022-02-02 RX ADMIN — TRAMADOL HYDROCHLORIDE 50 MG: 50 TABLET ORAL at 06:27

## 2022-02-02 RX ADMIN — ONDANSETRON HYDROCHLORIDE 4 MG: 2 INJECTION, SOLUTION INTRAMUSCULAR; INTRAVENOUS at 20:36

## 2022-02-02 RX ADMIN — HYDROCODONE BITARTRATE AND ACETAMINOPHEN 1 TABLET: 5; 325 TABLET ORAL at 03:58

## 2022-02-02 RX ADMIN — Medication 2 PUFF: at 07:54

## 2022-02-02 RX ADMIN — BUSPIRONE HYDROCHLORIDE 10 MG: 10 TABLET ORAL at 12:31

## 2022-02-02 RX ADMIN — IPRATROPIUM BROMIDE AND ALBUTEROL 1 PUFF: 20; 100 SPRAY, METERED RESPIRATORY (INHALATION) at 07:53

## 2022-02-02 RX ADMIN — FUROSEMIDE 20 MG: 20 TABLET ORAL at 08:23

## 2022-02-02 RX ADMIN — FLUTICASONE PROPIONATE 1 SPRAY: 50 SPRAY, METERED NASAL at 08:24

## 2022-02-02 RX ADMIN — CEFTRIAXONE SODIUM 1000 MG: 1 INJECTION, POWDER, FOR SOLUTION INTRAMUSCULAR; INTRAVENOUS at 20:41

## 2022-02-02 RX ADMIN — Medication 1 TABLET: at 08:24

## 2022-02-02 RX ADMIN — ASPIRIN 81 MG: 81 TABLET, COATED ORAL at 08:24

## 2022-02-02 RX ADMIN — SODIUM CHLORIDE, PRESERVATIVE FREE 10 ML: 5 INJECTION INTRAVENOUS at 20:36

## 2022-02-02 RX ADMIN — ACETAMINOPHEN 650 MG: 325 TABLET ORAL at 03:09

## 2022-02-02 RX ADMIN — HYDROCODONE BITARTRATE AND ACETAMINOPHEN 1 TABLET: 5; 325 TABLET ORAL at 16:37

## 2022-02-02 RX ADMIN — MONTELUKAST SODIUM 10 MG: 10 TABLET ORAL at 20:36

## 2022-02-02 RX ADMIN — METHYLPREDNISOLONE SODIUM SUCCINATE 40 MG: 40 INJECTION, POWDER, FOR SOLUTION INTRAMUSCULAR; INTRAVENOUS at 06:27

## 2022-02-02 ASSESSMENT — PAIN SCALES - GENERAL
PAINLEVEL_OUTOF10: 10
PAINLEVEL_OUTOF10: 8
PAINLEVEL_OUTOF10: 4
PAINLEVEL_OUTOF10: 5
PAINLEVEL_OUTOF10: 4
PAINLEVEL_OUTOF10: 3
PAINLEVEL_OUTOF10: 10
PAINLEVEL_OUTOF10: 10
PAINLEVEL_OUTOF10: 9
PAINLEVEL_OUTOF10: 10

## 2022-02-02 ASSESSMENT — PAIN DESCRIPTION - PAIN TYPE: TYPE: ACUTE PAIN;CHRONIC PAIN

## 2022-02-02 ASSESSMENT — PAIN DESCRIPTION - LOCATION: LOCATION: HEAD;BACK;RIB CAGE

## 2022-02-02 NOTE — PROGRESS NOTES
Lost IV access. This RN attempted two times. Mili RN on night shift attempted 2x's. Clinical RN was contacted and updated of the situation.  Pt will get an ultrasound guided IV per clinical.

## 2022-02-02 NOTE — PROGRESS NOTES
Inpatient Occupational Therapy  Evaluation and Treatment    Unit: [facility]  Date:  2/2/2022  Patient Name:    Debbie Cartwright  Admitting diagnosis:  Pulmonary nodule [R91.1]  Acute respiratory failure with hypoxia ([facility] [J96.01]  Pulmonary emphysema, unspecified emphysema type ([facility] [J43.9]  COVID [U07.1]  Pneumonia due to COVID-19 virus [U07.1, J12.82]  Admit Date:  1/29/2022  Precautions/Restrictions/WB Status/ Lines/ Wounds/ Oxygen: fall risk, IV, bed/chair alarm, purewick catheter, supplemental O2 (5L), telemetry, continuous pulse ox and Droplet Plus precautions (+ COVID 19)    Treatment Time:  14:42-15:17  Treatment Number: 1     Billable Treatment Time: 25 minutes   Total Treatment Time:  35 minutes    Patient Goals for Therapy:  \" to go home \"      Discharge Recommendations: Home with 24/7 assist and home therapy  DME needs for discharge: Needs Met       Therapy recommendations for staff:   Assist of 1 with use of rolling walker (RW) and gait belt for all transfers to/from BSC/chair    History of Present Illness: Patient with COPD on home oxygen 4 L; continues to smoke tobacco and not vaccinated against COVID; presenting with increased shortness of breath. she has exacerbation of COPD and she is also positive for COVID-19. Work-up also revealed a new lung nodule. Pulmonary consulted    Home Health S4 Level Recommendation:  Level 4 Sick  AM-PAC Score: AM-PAC Inpatient Daily Activity Raw Score: 13    Preadmission Environment    Pt. Marina Ramirez spouse (Asif, works part time) and daughter (works).  24/hr assist available   Home environment:            mobile home/trailer   Steps to enter first floor: 3 steps to enter and bilateral hand rails  Bathroom: Tub/Shower unit, Walk-in Shower, Grab bars, Shower Chair  and Standard height toilet (able to push up from sink if needed)  Equipment owned: Rollator , Shower Chair, pulse ox, reacher, inhaler, nebulizer and home O2 (2-3L)   Sleeps in flat bed with wedge    Preadmission Status:  Pt. Able to drive: Yes  Pt Fully independent with ADLs: Yes  Pt. Required assistance from family for: Cleaning, Cooking and [address]             Pt does most of the cooking but occasionally needs help from               does most of cleaning and they split laundry  Pt. Fully independent for transfers and gait and walked with no deivce for short distances in home with frequent rest breaks. Uses Rollator in community but has not been out of the house much this year. History of falls          No    Pain  Yes  Rating:severe  Location: chest and back  Pain Medicine Status: Pain med requested      Cognition    A&O x4   Able to follow 1 step commands    Subjective  Patient lying supine in bed with no family present   Pt agreeable to this OT eval & tx. Upper Extremity ROM:    WFL,  pt able to perform all bed mobility, transfers, and gait without ROM limitation. Upper Extremity Strength:    BUE strength impaired but not formally assessed w/ MMT due to pain    Upper Extremity Sensation    WFL    Upper Extremity Proprioception:  WFL    Coordination and Tone  Diminished    Balance  Functional Sitting Balance:  WFL  Functional Standing Balance:Impaired    Bed mobility:    Supine to sit:   SBA  Sit to supine:   SBA  Rolling:    SBA  Scooting in sitting:  SBA  Scooting to head of bed:   SBA    Bridging:   Min A    Transfers:  (patient declined standing at this time)   Sit to stand:  Not Tested  Stand to sit:  Not Tested  Bed to chair:   Not Tested  Standard toilet: Not Tested  Bed to [facility]:  Not Tested    Dressing:      UE:   Not Tested  LE:    Mod A    Bathing:    UE:  Not Tested  LE:  Mod A    Eating:   Not Tested    Toileting: Mod A    Activity Tolerance   Pt completed therapy session with shortness of breath and Pain noted with position changes  SpO2: 90-94% on 5L of O2  HR: 100-115 bpm  BP:     Positioning Needs: In bed, call light and needs in reach.       Exercise / Activities Initiated:   N/A    Patient/Family Education:   Role of OT  Recommendations for DC    Assessment of Deficits: Pt seen for Occupational therapy evaluation in acute care setting. Pt demonstrated decreased Activity tolerance, ADLs, IADLs, Balance , Bathing, Bed mobility, Dressing, ROM, Safety Awareness, Strength, Transfers and Coping Skills. Pt functioning below baseline and will likely benefit from skilled occupational therapy services to maximize safety and independence. Goal(s) : To be met in 3 Visits:  1). Bed to toilet/BSC: CGA and with use of RW    To be met in 5 Visits:  1). Supine to/from Sit:  Independent  2). Upper Body Bathing:   Independent  3). Lower Body Bathing:   Min A  4). Upper Body Dressing:  Independent  5). Lower Body Dressing:  Min A  6). Pt to demonstrate UE exs x 15 reps with minimal cues    Rehabilitation Potential:  Fair for goals listed above. Strengths for achieving goals include: PLOF and Pt cooperative  Barriers to achieving goals include:  Complexity of condition     Plan: To be seen 3-5 x/wk while in acute care setting for therapeutic exercises, bed mobility, transfers, dressing, bathing, family/patient education, ADL/IADL retraining, energy conservation training.        SHERLEY YoungR/L #[id number]      If patient discharges from this facility prior to next visit, this note will serve as the Discharge Summary

## 2022-02-02 NOTE — PROGRESS NOTES
RT Inhaler-Nebulizer Bronchodilator Protocol Note    There is a bronchodilator order in the chart from a provider indicating to follow the RT Bronchodilator Protocol and there is an Initiate RT Inhaler-Nebulizer Bronchodilator Protocol order as well (see protocol at bottom of note). CXR Findings:  No results found. The findings from the last RT Protocol Assessment were as follows:   History Pulmonary Disease: (P) Chronic pulmonary disease  Respiratory Pattern: (P) Dyspnea on exertion or RR 21-25 bpm  Breath Sounds: (P) Intermittent or unilateral wheezes  Cough: (P) Strong, spontaneous, non-productive  Indication for Bronchodilator Therapy: (P) Wheezing associated with pulm disorder  Bronchodilator Assessment Score: (P) 8    Aerosolized bronchodilator medication orders have been revised according to the RT Inhaler-Nebulizer Bronchodilator Protocol below. Respiratory Therapist to perform RT Therapy Protocol Assessment initially then follow the protocol. Repeat RT Therapy Protocol Assessment PRN for score 0-3 or on second treatment, BID, and PRN for scores above 3. No Indications - adjust the frequency to every 6 hours PRN wheezing or bronchospasm, if no treatments needed after 48 hours then discontinue using Per Protocol order mode. If indication present, adjust the RT bronchodilator orders based on the Bronchodilator Assessment Score as indicated below. Use Inhaler orders unless patient has one or more of the following: on home nebulizer, not able to hold breath for 10 seconds, is not alert and oriented, cannot activate and use MDI correctly, or respiratory rate 25 breaths per minute or more, then use the equivalent nebulizer order(s) with same Frequency and PRN reasons based on the score. If a patient is on this medication at home then do not decrease Frequency below that used at home.     0-3 - enter or revise RT bronchodilator order(s) to equivalent RT Bronchodilator order with Frequency of every 4 hours PRN for wheezing or increased work of breathing using Per Protocol order mode. 4-6 - enter or revise RT Bronchodilator order(s) to two equivalent RT bronchodilator orders with one order with BID Frequency and one order with Frequency of every 4 hours PRN wheezing or increased work of breathing using Per Protocol order mode. 7-10 - enter or revise RT Bronchodilator order(s) to two equivalent RT bronchodilator orders with one order with TID Frequency and one order with Frequency of every 4 hours PRN wheezing or increased work of breathing using Per Protocol order mode. 11-13 - enter or revise RT Bronchodilator order(s) to one equivalent RT bronchodilator order with QID Frequency and an Albuterol order with Frequency of every 4 hours PRN wheezing or increased work of breathing using Per Protocol order mode. Greater than 13 - enter or revise RT Bronchodilator order(s) to one equivalent RT bronchodilator order with every 4 hours Frequency and an Albuterol order with Frequency of every 2 hours PRN wheezing or increased work of breathing using Per Protocol order mode.        Electronically signed by Leonardo Martínez RCP on 2/2/2022 at 7:59 AM

## 2022-02-02 NOTE — PLAN OF CARE
Problem: Airway Clearance - Ineffective  Goal: Achieve or maintain patent airway  2/2/2022 0933 by Julio Sainz RN  Outcome: Ongoing     Problem: Gas Exchange - Impaired  Goal: Absence of hypoxia  2/2/2022 0933 by Julio Sainz RN  Outcome: Ongoing     Problem: Gas Exchange - Impaired  Goal: Promote optimal lung function  2/2/2022 0933 by Julio Sainz RN  Outcome: Ongoing     Problem: Breathing Pattern - Ineffective  Goal: Ability to achieve and maintain a regular respiratory rate  2/2/2022 0933 by Julio Sainz RN  Outcome: Ongoing     Problem: Body Temperature -  Risk of, Imbalanced  Goal: Ability to maintain a body temperature within defined limits  2/2/2022 0933 by Julio Sainz RN  Outcome: Ongoing     Problem: Body Temperature -  Risk of, Imbalanced  Goal: Will regain or maintain usual level of consciousness  2/2/2022 0933 by Julio Sainz RN  Outcome: Ongoing     Problem:  Body Temperature -  Risk of, Imbalanced  Goal: Complications related to the disease process, condition or treatment will be avoided or minimized  2/2/2022 0933 by Julio Sainz RN  Outcome: Ongoing     Problem: Isolation Precautions - Risk of Spread of Infection  Goal: Prevent transmission of infection  2/2/2022 0933 by Julio Sainz RN  Outcome: Ongoing     Problem: Nutrition Deficits  Goal: Optimize nutritional status  2/2/2022 0933 by Julio Sainz RN  Outcome: Ongoing     Problem: Risk for Fluid Volume Deficit  Goal: Maintain normal heart rhythm  2/2/2022 0933 by Julio Sainz RN  Outcome: Ongoing     Problem: Risk for Fluid Volume Deficit  Goal: Maintain absence of muscle cramping  2/2/2022 0933 by Julio Sainz RN  Outcome: Ongoing     Problem: Risk for Fluid Volume Deficit  Goal: Maintain normal serum potassium, sodium, calcium, phosphorus, and pH  2/2/2022 0933 by Julio Sainz RN  Outcome: Ongoing     Problem: Loneliness or Risk for Loneliness  Goal: Demonstrate positive use of time alone when socialization is not possible  2/2/2022 0933 by Alex Harris RN  Outcome: Ongoing     Problem: Fatigue  Goal: Verbalize increase energy and improved vitality  2/2/2022 0933 by Alex Harris RN  Outcome: Ongoing     Problem: Patient Education: Go to Patient Education Activity  Goal: Patient/Family Education  2/2/2022 0933 by Alex Harris RN  Outcome: Ongoing     Problem: Pain:  Goal: Pain level will decrease  Description: Pain level will decrease  2/2/2022 0933 by Alex Harris RN  Outcome: Ongoing     Problem: Pain:  Goal: Control of acute pain  Description: Control of acute pain  2/2/2022 0933 by Alex Harris RN  Outcome: Ongoing     Problem: Pain:  Goal: Control of chronic pain  Description: Control of chronic pain  2/2/2022 0933 by Alex Harris RN  Outcome: Ongoing     Problem: Falls - Risk of:  Goal: Will remain free from falls  Description: Will remain free from falls  2/2/2022 0933 by Alex Harris RN  Outcome: Ongoing     Problem: Falls - Risk of:  Goal: Absence of physical injury  Description: Absence of physical injury  2/2/2022 0933 by Alex Harris RN  Outcome: Ongoing

## 2022-02-02 NOTE — PROGRESS NOTES
RT Inhaler-Nebulizer Bronchodilator Protocol Note    There is a bronchodilator order in the chart from a provider indicating to follow the RT Bronchodilator Protocol and there is an Initiate RT Inhaler-Nebulizer Bronchodilator Protocol order as well (see protocol at bottom of note). CXR Findings:  No results found. The findings from the last RT Protocol Assessment were as follows:   History Pulmonary Disease: Chronic pulmonary disease  Respiratory Pattern: Dyspnea on exertion or RR 21-25 bpm  Breath Sounds: Slightly diminished and/or crackles  Cough: Strong, productive  Indication for Bronchodilator Therapy: Decreased or absent breath sounds  Bronchodilator Assessment Score: 7    Aerosolized bronchodilator medication orders have been revised according to the RT Inhaler-Nebulizer Bronchodilator Protocol below. Respiratory Therapist to perform RT Therapy Protocol Assessment initially then follow the protocol. Repeat RT Therapy Protocol Assessment PRN for score 0-3 or on second treatment, BID, and PRN for scores above 3. No Indications - adjust the frequency to every 6 hours PRN wheezing or bronchospasm, if no treatments needed after 48 hours then discontinue using Per Protocol order mode. If indication present, adjust the RT bronchodilator orders based on the Bronchodilator Assessment Score as indicated below. Use Inhaler orders unless patient has one or more of the following: on home nebulizer, not able to hold breath for 10 seconds, is not alert and oriented, cannot activate and use MDI correctly, or respiratory rate 25 breaths per minute or more, then use the equivalent nebulizer order(s) with same Frequency and PRN reasons based on the score. If a patient is on this medication at home then do not decrease Frequency below that used at home.     0-3 - enter or revise RT bronchodilator order(s) to equivalent RT Bronchodilator order with Frequency of every 4 hours PRN for wheezing or increased work of breathing using Per Protocol order mode. 4-6 - enter or revise RT Bronchodilator order(s) to two equivalent RT bronchodilator orders with one order with BID Frequency and one order with Frequency of every 4 hours PRN wheezing or increased work of breathing using Per Protocol order mode. 7-10 - enter or revise RT Bronchodilator order(s) to two equivalent RT bronchodilator orders with one order with TID Frequency and one order with Frequency of every 4 hours PRN wheezing or increased work of breathing using Per Protocol order mode. 11-13 - enter or revise RT Bronchodilator order(s) to one equivalent RT bronchodilator order with QID Frequency and an Albuterol order with Frequency of every 4 hours PRN wheezing or increased work of breathing using Per Protocol order mode. Greater than 13 - enter or revise RT Bronchodilator order(s) to one equivalent RT bronchodilator order with every 4 hours Frequency and an Albuterol order with Frequency of every 2 hours PRN wheezing or increased work of breathing using Per Protocol order mode.          Electronically signed by Leslie Pham RCP on 2/1/2022 at 7:56 PM Cardiac Monitor/Defib/ACLS/Rescue Kit/O2/BVM

## 2022-02-02 NOTE — CARE COORDINATION
INTERDISCIPLINARY PLAN OF CARE CONFERENCE    Date/Time: 2/2/2022 3:48 PM  Completed by: Sabrina Coleman RN, Case Management      Patient Name:  Trisha Anguiano  YOB: 1960  Admitting Diagnosis: Pulmonary nodule [R91.1]  Acute respiratory failure with hypoxia ([facility] [address] [J96.01]  Pulmonary emphysema, unspecified emphysema type ([address] [J43.9]  COVID [U07.1]  Pneumonia due to COVID-19 virus [U07.1, J12.82]     Admit Date/Time:  1/29/2022  8:13 PM    Chart reviewed. Interdisciplinary team contacted or reviewed plan related to patient progress and discharge plans. Disciplines included Case Management, Nursing, and Dietitian. Current Status: Stable  PT/OT recommendation for discharge plan of care: pt refused    Expected D/C Disposition:  Home  Confirmed plan with patient  Yes     Met with: spoke with pt via phone  Discharge Plan Comments:  Reviewed chart and spoke with pt via phone. Plan remains to return home with family at [place]. Will follow for poss [facility] needs at [place]. Home O2 in place on admit: Yes  Pt informed of need to bring portable home O2 tank on day of discharge for nursing to connect prior to leaving:  Yes  Verbalized agreement/Understanding:   Yes

## 2022-02-02 NOTE — PROGRESS NOTES
Progress Note    Admit Date:  1/29/2022    Patient with COPD on home oxygen 4 L; continues to smoke tobacco and not vaccinated against COVID; presenting with increased shortness of breath. she has exacerbation of COPD and she is also positive for COVID-19. Work-up also revealed a new lung nodule. Pulmonary consulted    Patient does have significant issues with anxiety. We have started her on BuSpar  Also added Norco for musculoskeletal chest pain from work of breathing    Subjective:  Ms. Juanis Paz is looking a little better today . she is less dyspneic . slept better  Oxygen saturation stable , currently O2 weaned down to 5 L . She appears less anxious today . Jose Pearson Objective:   BP (!) 159/84   Pulse 90   Temp 97 °F (36.1 °C) (Oral)   Resp 20   Ht 5' 4\" (1.626 m)   Wt 140 lb (63.5 kg)   LMP  (LMP Unknown)   SpO2 92%   BMI 24.03 kg/m²       Intake/Output Summary (Last 24 hours) at 2/2/2022 1418  Last data filed at 2/2/2022 0230  Gross per 24 hour   Intake --   Output 700 ml   Net -700 ml         Physical Exam:  Patient seen in droplet plus precautions  General:  Awake, alert, well-oriented. She is in no distress   ill-appearing  Skin:  Warm and dry  Neck:  JVD absent. Neck supple  Chest:  Very diminished breath sounds. No wheezes or rhonchi  Cardiovascular:  RRR ,S1S2 normal  Abdomen:  Soft, non tender, non distended, BS +  Extremities:  No edema. Intact peripheral pulses.  Brisk cap refill, < 2 secs  Neuro: non focal      Medications:   Scheduled Meds:   albuterol-ipratropium  1 puff Inhalation TID    nicotine  1 patch TransDERmal Daily    budesonide-formoterol  2 puff Inhalation BID    azithromycin  500 mg Oral Daily    guaiFENesin  600 mg Oral BID    methylPREDNISolone  40 mg IntraVENous Q12H    busPIRone  10 mg Oral TID    sodium chloride flush  5-40 mL IntraVENous 2 times per day    enoxaparin  30 mg SubCUTAneous BID    aspirin  81 mg Oral Daily    atorvastatin  40 mg Oral Nightly then CT at 18-24 months. Nodule size greater than 8 mm         In a low-risk patient, consider CT at 3 months, PET/CT, or tissue sampling. In a high-risk patient, consider CT at 3 months, PET/CT, or tissue sampling. Multiple Solid Nodules:      Nodule size less than 6 mm   In a low-risk patient, no routine follow-up. In a high-risk patient, optional CT at 12 months. Nodule size equals 6-8 mm   In a low-risk patient, CT at 3-6 months, then consider CT at 18-24 months. In a high-risk patient, CT at 3-6 months, then CT at 18-24 months. Nodule size greater than 8 mm   In a low-risk patient, CT at 3-6 months, then consider CT at 18-24 months. In a high-risk patient, CT at 3-6 months, then CT at 18-24 months. - Low risk patients include individuals with minimal or absent history of   smoking and other known risk factors. - High risk patients include individuals with a history or smoking or known   risk factors. Radiology 2017 http://pubs. rsna.org/doi/full/10.1148/radiol. [id number]         XR CHEST PORTABLE   Final Result   Changes of COPD with mild peribronchial thickening and scattered non   consolidating interstitial pneumonia like infiltrate in both lower lobes. The pneumonia is new from the previous study of 05/11/2021. Assessment:  Active Problems:    COPD exacerbation (HCC)    Hypothyroidism    Acute on chronic respiratory failure with hypoxia and hypercapnia (HCC)    Pneumonia due to COVID-19 virus    COVID    Acute respiratory failure with hypoxia (HCC)    COVID-19 virus infection    Pulmonary nodule    Chronic hypoxemic respiratory failure (HCC)    Anxiety  Resolved Problems:    * No resolved hospital problems. *      Plan:    # Acute on chronic hypoxic respiratory failure  # COVID 19 PNA  #Exacerbation of severe COPD  -Unvaccinated patient.   Continued tobacco abuse  -Pulmonology consulted  -Keep on droplet plus precautions  -Continue inhaled bronchodilators.  -Patient on Decadron->  switched to IV Solu-Medrol. Continue  -Continue empiric antibiotics Zithromax  -Oxygen saturations currently stable,  on 6L-> 5L . ( home O2 4 L )  -Lovenox twice daily    #Severe anxiety  -Added BuSpar - continue     #Tobacco abuse  -Patient advised to quit  -Started on  nicotine patch    #Musculoskeletal chest pain  -On Ultram.  Added Norco    # Hyponatremia  -Monitor sodium levels    #Right lung nodule  -Pulmonology following  - PET scan vs biopsy - as outpt          ADULT DIET;  Regular; Low Fat/Low Chol/High Fiber/2 gm Na     Full Code       Marlon Carvalho MD, MD 2/2/2022 2:18 PM

## 2022-02-02 NOTE — PROGRESS NOTES
Pulmonary Progress Note    CC: Severe COPD COVID-19    Subjective:   Dyspnea on exertion  O2 trending down, 5 L O2-uses 4 L at home        Intake/Output Summary (Last 24 hours) at 2/2/2022 0908  Last data filed at 2/2/2022 0230  Gross per 24 hour   Intake 0 ml   Output 700 ml   Net -700 ml       Exam:   BP (!) 159/84   Pulse 90   Temp 97 °F (36.1 °C) (Oral)   Resp 20   Ht 5' 4\" (1.626 m)   Wt 140 lb (63.5 kg)   LMP  (LMP Unknown)   SpO2 92%   BMI 24.03 kg/m²  on 6  Gen:  Mild distress. Ill-appearing  Eyes: PERRL. No sclera icterus. No conjunctival injection. ENT: No discharge. Pharynx clear. Neck: Trachea midline. No obvious mass. Resp:  Mild accessory muscle use. No crackles. Scattered wheezes. No rhonchi. No dullness on percussion. CV: Regular rate. Regular rhythm. No murmur or rub. No edema. GI: Non-tender. Non-distended. No hernia. Skin: Warm and dry. No nodule on exposed extremities. Lymph: No cervical LAD. No supraclavicular LAD. M/S: No cyanosis. No joint deformity. No clubbing. Neuro: Awake. Alert. Moves all four extremities. Psych: Oriented x 3.  No anxiety    Scheduled Meds:   albuterol-ipratropium  1 puff Inhalation TID    nicotine  1 patch TransDERmal Daily    budesonide-formoterol  2 puff Inhalation BID    azithromycin  500 mg Oral Daily    guaiFENesin  600 mg Oral BID    methylPREDNISolone  40 mg IntraVENous Q12H    busPIRone  10 mg Oral TID    sodium chloride flush  5-40 mL IntraVENous 2 times per day    enoxaparin  30 mg SubCUTAneous BID    aspirin  81 mg Oral Daily    atorvastatin  40 mg Oral Nightly    fluticasone  1 spray Each Nostril Daily    furosemide  20 mg Oral Daily    levothyroxine  125 mcg Oral QAM AC    montelukast  10 mg Oral Nightly    therapeutic multivitamin-minerals  1 tablet Oral Daily    lidocaine  1 patch TransDERmal Daily    cefTRIAXone (ROCEPHIN) IV  1,000 mg IntraVENous Q24H     Continuous Infusions:   sodium chloride       PRN Meds:  HYDROcodone 5 mg - acetaminophen, albuterol sulfate HFA, traMADol, sodium chloride flush, sodium chloride, ondansetron **OR** ondansetron, polyethylene glycol, acetaminophen **OR** acetaminophen, hydrALAZINE    Labs:  CBC:   Recent Labs     01/31/22 0615   WBC 11.7*   HGB 13.2   HCT 39.5   MCV 92.3        BMP:   Recent Labs     01/31/22 0615   *   K 3.8   CL 90*   CO2 35*   BUN 15   CREATININE <0.5*     LIVER PROFILE:   Recent Labs     01/31/22 0615   AST 56*   ALT 30   BILITOT <0.2   ALKPHOS 78     PT/INR: No results for input(s): PROTIME, INR in the last 72 hours. APTT: No results for input(s): APTT in the last 72 hours. UA:  No results for input(s): NITRITE, COLORU, PHUR, LABCAST, WBCUA, RBCUA, MUCUS, TRICHOMONAS, YEAST, BACTERIA, CLARITYU, SPECGRAV, LEUKOCYTESUR, UROBILINOGEN, BILIRUBINUR, BLOODU, GLUCOSEU, AMORPHOUS in the last 72 hours. Invalid input(s): KETONESU  No results for input(s): PHART, TEF6PEE, PO2ART in the last 72 hours. Cultures:   1/29 Legionella not detected  1/29 BC NGTD  1/29 COVID-19 detected    Films:  CT chest 1/29 imaging was reviewed by me and showed   No evidence of pulmonary embolism. Emphysema.    There is a 1 cm noncalcified nodule in the right lung base.  This is new   compared to the prior exam of 01/17/2007.  Follow-up recommendations are   listed below        ASSESSMENT:  · Acute hypoxemic respiratory failure  · COVID-19 viral infection  · Severe COPD with acute exacerbation  · Chronic hypoxemic respiratory failure  · RLL 1 cm nodule on CT 1/39/22-concerning for bronchogenic carcinoma  · Unvaccinated for covid 19      PLAN:  · Supplemental oxygen to maintain SaO2 >92%; wean as tolerated  · Continuous pulse oximetry  · Droplet plus isolation (surgical mask, eye protection, gown, glove)  · Supervised self proning  · Zithromax day # 5  · Inhaled bronchodilators-Combivent every 4  · IV Solu-Medrol 40 every 12  · Acapella and Mucinex  · Hold off Tocilizumab or baricitinib  given improvement  · Lovenox BID   · PT OT  · Might need rehab at discharge  · PET scan versus biopsy versus resection for RLL nodule-could be addressed as an outpatient

## 2022-02-02 NOTE — PROGRESS NOTES
Physical Therapy    Patient refused to participate in PT evaluation today due to increased pain levels and wanted to stay in the bed. RN was informed about the pain levels. Patient will be followed up later as schedule permits. No charge.      Tevin Fernandez MS PT, # [id number]

## 2022-02-03 LAB
GLUCOSE BLD-MCNC: 180 MG/DL (ref 70–99)
PERFORMED ON: ABNORMAL
STREP PNEUMONIAE ANTIGEN, URINE: NORMAL

## 2022-02-03 PROCEDURE — 99232 SBSQ HOSP IP/OBS MODERATE 35: CPT | Performed by: INTERNAL MEDICINE

## 2022-02-03 PROCEDURE — 6370000000 HC RX 637 (ALT 250 FOR IP): Performed by: INTERNAL MEDICINE

## 2022-02-03 PROCEDURE — 2580000003 HC RX 258: Performed by: FAMILY MEDICINE

## 2022-02-03 PROCEDURE — 1200000000 HC SEMI PRIVATE

## 2022-02-03 PROCEDURE — 97530 THERAPEUTIC ACTIVITIES: CPT

## 2022-02-03 PROCEDURE — 2580000003 HC RX 258: Performed by: NURSE PRACTITIONER

## 2022-02-03 PROCEDURE — 6360000002 HC RX W HCPCS: Performed by: NURSE PRACTITIONER

## 2022-02-03 PROCEDURE — 6360000002 HC RX W HCPCS: Performed by: FAMILY MEDICINE

## 2022-02-03 PROCEDURE — 2700000000 HC OXYGEN THERAPY PER DAY

## 2022-02-03 PROCEDURE — 6370000000 HC RX 637 (ALT 250 FOR IP): Performed by: FAMILY MEDICINE

## 2022-02-03 PROCEDURE — 97162 PT EVAL MOD COMPLEX 30 MIN: CPT

## 2022-02-03 PROCEDURE — 94640 AIRWAY INHALATION TREATMENT: CPT

## 2022-02-03 PROCEDURE — 6360000002 HC RX W HCPCS: Performed by: INTERNAL MEDICINE

## 2022-02-03 PROCEDURE — 94761 N-INVAS EAR/PLS OXIMETRY MLT: CPT

## 2022-02-03 PROCEDURE — 94669 MECHANICAL CHEST WALL OSCILL: CPT

## 2022-02-03 RX ORDER — DEXTROSE MONOHYDRATE 50 MG/ML
100 INJECTION, SOLUTION INTRAVENOUS PRN
Status: DISCONTINUED | OUTPATIENT
Start: 2022-02-03 | End: 2022-02-04 | Stop reason: HOSPADM

## 2022-02-03 RX ORDER — NICOTINE POLACRILEX 4 MG
15 LOZENGE BUCCAL PRN
Status: DISCONTINUED | OUTPATIENT
Start: 2022-02-03 | End: 2022-02-04 | Stop reason: HOSPADM

## 2022-02-03 RX ORDER — DEXTROSE MONOHYDRATE 25 G/50ML
12.5 INJECTION, SOLUTION INTRAVENOUS PRN
Status: DISCONTINUED | OUTPATIENT
Start: 2022-02-03 | End: 2022-02-03 | Stop reason: ALTCHOICE

## 2022-02-03 RX ADMIN — Medication 2 PUFF: at 19:16

## 2022-02-03 RX ADMIN — Medication 1 TABLET: at 08:20

## 2022-02-03 RX ADMIN — BUSPIRONE HYDROCHLORIDE 10 MG: 10 TABLET ORAL at 13:45

## 2022-02-03 RX ADMIN — HYDROCODONE BITARTRATE AND ACETAMINOPHEN 1 TABLET: 5; 325 TABLET ORAL at 13:45

## 2022-02-03 RX ADMIN — ATORVASTATIN CALCIUM 40 MG: 40 TABLET, FILM COATED ORAL at 21:28

## 2022-02-03 RX ADMIN — GUAIFENESIN 600 MG: 600 TABLET, EXTENDED RELEASE ORAL at 21:28

## 2022-02-03 RX ADMIN — ASPIRIN 81 MG: 81 TABLET, COATED ORAL at 08:20

## 2022-02-03 RX ADMIN — FUROSEMIDE 20 MG: 20 TABLET ORAL at 08:20

## 2022-02-03 RX ADMIN — Medication 2 PUFF: at 08:08

## 2022-02-03 RX ADMIN — ENOXAPARIN SODIUM 30 MG: 100 INJECTION SUBCUTANEOUS at 21:28

## 2022-02-03 RX ADMIN — Medication 2 PUFF: at 23:35

## 2022-02-03 RX ADMIN — ACETAMINOPHEN 650 MG: 325 TABLET ORAL at 17:03

## 2022-02-03 RX ADMIN — MONTELUKAST SODIUM 10 MG: 10 TABLET ORAL at 21:28

## 2022-02-03 RX ADMIN — FLUTICASONE PROPIONATE 1 SPRAY: 50 SPRAY, METERED NASAL at 08:22

## 2022-02-03 RX ADMIN — AZITHROMYCIN 500 MG: 250 TABLET, FILM COATED ORAL at 08:20

## 2022-02-03 RX ADMIN — METHYLPREDNISOLONE SODIUM SUCCINATE 40 MG: 40 INJECTION, POWDER, FOR SOLUTION INTRAMUSCULAR; INTRAVENOUS at 05:43

## 2022-02-03 RX ADMIN — HYDROCODONE BITARTRATE AND ACETAMINOPHEN 1 TABLET: 5; 325 TABLET ORAL at 01:17

## 2022-02-03 RX ADMIN — SODIUM CHLORIDE 25 ML: 9 INJECTION, SOLUTION INTRAVENOUS at 21:32

## 2022-02-03 RX ADMIN — METHYLPREDNISOLONE SODIUM SUCCINATE 40 MG: 40 INJECTION, POWDER, FOR SOLUTION INTRAMUSCULAR; INTRAVENOUS at 17:02

## 2022-02-03 RX ADMIN — TRAMADOL HYDROCHLORIDE 50 MG: 50 TABLET ORAL at 08:20

## 2022-02-03 RX ADMIN — Medication 10 ML: at 05:43

## 2022-02-03 RX ADMIN — ACETAMINOPHEN 650 MG: 325 TABLET ORAL at 01:53

## 2022-02-03 RX ADMIN — HYDROCODONE BITARTRATE AND ACETAMINOPHEN 1 TABLET: 5; 325 TABLET ORAL at 05:43

## 2022-02-03 RX ADMIN — ENOXAPARIN SODIUM 30 MG: 100 INJECTION SUBCUTANEOUS at 08:21

## 2022-02-03 RX ADMIN — BUSPIRONE HYDROCHLORIDE 10 MG: 10 TABLET ORAL at 08:20

## 2022-02-03 RX ADMIN — TRAMADOL HYDROCHLORIDE 50 MG: 50 TABLET ORAL at 17:03

## 2022-02-03 RX ADMIN — INSULIN LISPRO 1 UNITS: 100 INJECTION, SOLUTION INTRAVENOUS; SUBCUTANEOUS at 21:37

## 2022-02-03 RX ADMIN — TRAMADOL HYDROCHLORIDE 50 MG: 50 TABLET ORAL at 01:53

## 2022-02-03 RX ADMIN — ACETAMINOPHEN 650 MG: 325 TABLET ORAL at 08:20

## 2022-02-03 RX ADMIN — HYDROCODONE BITARTRATE AND ACETAMINOPHEN 1 TABLET: 5; 325 TABLET ORAL at 19:26

## 2022-02-03 RX ADMIN — CEFTRIAXONE SODIUM 1000 MG: 1 INJECTION, POWDER, FOR SOLUTION INTRAMUSCULAR; INTRAVENOUS at 21:33

## 2022-02-03 RX ADMIN — BUSPIRONE HYDROCHLORIDE 10 MG: 10 TABLET ORAL at 21:28

## 2022-02-03 RX ADMIN — SODIUM CHLORIDE, PRESERVATIVE FREE 10 ML: 5 INJECTION INTRAVENOUS at 08:28

## 2022-02-03 RX ADMIN — HYDROCODONE BITARTRATE AND ACETAMINOPHEN 1 TABLET: 5; 325 TABLET ORAL at 23:18

## 2022-02-03 RX ADMIN — GUAIFENESIN 600 MG: 600 TABLET, EXTENDED RELEASE ORAL at 08:20

## 2022-02-03 RX ADMIN — LEVOTHYROXINE SODIUM 125 MCG: 25 TABLET ORAL at 05:43

## 2022-02-03 ASSESSMENT — PAIN DESCRIPTION - LOCATION
LOCATION: HEAD;BACK;RIB CAGE
LOCATION: BACK;SHOULDER
LOCATION: GENERALIZED
LOCATION: HEAD;BACK;RIB CAGE
LOCATION: HEAD;BACK;RIB CAGE

## 2022-02-03 ASSESSMENT — PAIN - FUNCTIONAL ASSESSMENT: PAIN_FUNCTIONAL_ASSESSMENT: ACTIVITIES ARE NOT PREVENTED

## 2022-02-03 ASSESSMENT — PAIN SCALES - GENERAL
PAINLEVEL_OUTOF10: 7
PAINLEVEL_OUTOF10: 10
PAINLEVEL_OUTOF10: 0
PAINLEVEL_OUTOF10: 9
PAINLEVEL_OUTOF10: 10
PAINLEVEL_OUTOF10: 0
PAINLEVEL_OUTOF10: 9
PAINLEVEL_OUTOF10: 10
PAINLEVEL_OUTOF10: 0
PAINLEVEL_OUTOF10: 10
PAINLEVEL_OUTOF10: 7
PAINLEVEL_OUTOF10: 0

## 2022-02-03 ASSESSMENT — PAIN DESCRIPTION - DESCRIPTORS
DESCRIPTORS: ACHING
DESCRIPTORS: ACHING

## 2022-02-03 ASSESSMENT — PAIN DESCRIPTION - PAIN TYPE
TYPE: ACUTE PAIN;CHRONIC PAIN
TYPE: ACUTE PAIN
TYPE: ACUTE PAIN;CHRONIC PAIN
TYPE: CHRONIC PAIN
TYPE: ACUTE PAIN;CHRONIC PAIN

## 2022-02-03 ASSESSMENT — PAIN DESCRIPTION - ONSET: ONSET: ON-GOING

## 2022-02-03 ASSESSMENT — PAIN DESCRIPTION - ORIENTATION: ORIENTATION: OTHER (COMMENT)

## 2022-02-03 ASSESSMENT — PAIN DESCRIPTION - FREQUENCY: FREQUENCY: CONTINUOUS

## 2022-02-03 NOTE — PROGRESS NOTES
Pulmonary Progress Note  Hospital Day: 6    CC: Severe COPD COVID-19 in an unvaccinated patient    Subjective:   Pt refused PT yesterday d/t fatigue & pain but participated today  On 6 L today; baseline is 4 L at home      Intake/Output Summary (Last 24 hours) at 2/3/2022 1018  Last data filed at 2/3/2022 0359  Gross per 24 hour   Intake 677.81 ml   Output 1100 ml   Net -422.19 ml     Exam:   /77   Pulse 108   Temp 97.5 °F (36.4 °C) (Oral)   Resp 20   Ht 5' 4\" (1.626 m)   Wt 140 lb (63.5 kg)   LMP  (LMP Unknown)   SpO2 91%   BMI 24.03 kg/m²  on 6 L  Constitutional:  Ill appearing   HENT:  Oropharynx is clear and moist.   Neck: No tracheal deviation present. Cardiovascular: Normal heart sounds. No lower extremity edema. Pulmonary/Chest: No wheezes. No rhonchi. No rales. No decreased breath sounds. No accessory muscle usage or stridor. Musculoskeletal: No cyanosis. No clubbing. Skin: Skin is warm and dry. Psychiatric: Normal mood and affect. Neurologic: speech fluent, alert and oriented, strength symmetric      I performed the above physical exam independently in its entirety.   Suzi Antunez MD on 2/3/22 at 7:53 PM EST     Scheduled Meds:   albuterol-ipratropium  1 puff Inhalation TID    nicotine  1 patch TransDERmal Daily    budesonide-formoterol  2 puff Inhalation BID    azithromycin  500 mg Oral Daily    guaiFENesin  600 mg Oral BID    methylPREDNISolone  40 mg IntraVENous Q12H    busPIRone  10 mg Oral TID    sodium chloride flush  5-40 mL IntraVENous 2 times per day    enoxaparin  30 mg SubCUTAneous BID    aspirin  81 mg Oral Daily    atorvastatin  40 mg Oral Nightly    fluticasone  1 spray Each Nostril Daily    furosemide  20 mg Oral Daily    levothyroxine  125 mcg Oral QAM AC    montelukast  10 mg Oral Nightly    therapeutic multivitamin-minerals  1 tablet Oral Daily    lidocaine  1 patch TransDERmal Daily    cefTRIAXone (ROCEPHIN) IV  1,000 mg IntraVENous Q24H Continuous Infusions:   sodium chloride       PRN Meds:  HYDROcodone 5 mg - acetaminophen, albuterol sulfate HFA, traMADol, sodium chloride flush, sodium chloride, ondansetron **OR** ondansetron, polyethylene glycol, acetaminophen **OR** acetaminophen, hydrALAZINE    Labs:  CBC:   No results for input(s): WBC, HGB, HCT, MCV, PLT in the last 72 hours. BMP:   No results for input(s): NA, K, CL, CO2, PHOS, BUN, CREATININE, CA in the last 72 hours. LIVER PROFILE:   No results for input(s): AST, ALT, LIPASE, BILIDIR, BILITOT, ALKPHOS in the last 72 hours. Invalid input(s): AMYLASE,  ALB  PT/INR: No results for input(s): PROTIME, INR in the last 72 hours. APTT: No results for input(s): APTT in the last 72 hours. UA:  No results for input(s): NITRITE, COLORU, PHUR, LABCAST, WBCUA, RBCUA, MUCUS, TRICHOMONAS, YEAST, BACTERIA, CLARITYU, SPECGRAV, LEUKOCYTESUR, UROBILINOGEN, BILIRUBINUR, BLOODU, GLUCOSEU, AMORPHOUS in the last 72 hours. Invalid input(s): KETONESU  No results for input(s): PHART, MOG4EAF, PO2ART in the last 72 hours. Cultures:   1/29/2022 Strep pneumo & Legionella not detected  1/29/2022 BC NG  1/29/2022 COVID-19 detected    Films: imaging was reviewed by me and showed  CXR 1/29/2022  Changes of COPD with mild peribronchial thickening and scattered non   consolidating interstitial pneumonia like infiltrate in both lower lobes. The pneumonia is new from the previous study of 05/11/2021. CT Chest 1/29/2022    Mediastinum: No evidence of mediastinal lymphadenopathy.  The heart and   pericardium demonstrate no acute abnormality.  There is no acute abnormality   of the thoracic aorta.       Lungs/pleura: The lungs are without acute process. Ware Aimee is a a 1 cm noncalcified nodule in the right lung base.  No evidence of pleural effusion or pneumothorax. Impression   No evidence of pulmonary embolism. Emphysema. There is a 1 cm noncalcified nodule in the right lung base.  This is new   compared to the prior exam of 01/17/2007.  Follow-up recommendations are   listed below.      ASSESSMENT:  · Acute on chronic hypoxemic respiratory failure; uses 4 L O2 at home  · COVID-19 viral infection in an unvaccinated patient   · Severe COPD, with acute exacerbation  · RLL 1 cm nodule on CT 1/29/22 - concerning for bronchogenic carcinoma  · Anxiety     PLAN:  COVID-19 isolation, droplet plus  Supplemental oxygen to maintain SaO2 >92%; wean as tolerated    · Supervised self proning  · Zithromax D#5 / Ceftriaxone D#6  · Inhaled bronchodilators - Combivent q4  · IV Solu-Medrol 40 mg BID D#4   · Add SSI   · Acapella and Mucinex  · Lovenox 30 BID   · PT/OT  · Might need rehab at discharge  · PET scan vs biopsy vs resection for RLL nodule - could be addressed as an outpatient (previous Dr. Elvera Aase pt; recently saw Dr. Ordoñez Query)    I contributed to updating portions of this encounter note.    Lawrence Gaspar PA-C on 2/3/22 at 11:00 AM EST

## 2022-02-03 NOTE — PLAN OF CARE
Problem: Airway Clearance - Ineffective  Goal: Achieve or maintain patent airway  Outcome: Ongoing     Problem: Gas Exchange - Impaired  Goal: Absence of hypoxia  Outcome: Ongoing     Problem: Breathing Pattern - Ineffective  Goal: Ability to achieve and maintain a regular respiratory rate  Outcome: Ongoing     Problem: Pain:  Goal: Pain level will decrease  Description: Pain level will decrease  Outcome: Ongoing  Goal: Control of acute pain  Description: Control of acute pain  Outcome: Ongoing

## 2022-02-03 NOTE — PROGRESS NOTES
Pt a/o. Am assessment completed see flow sheet. Pain meds given as requested at 0820, and 1345 with relief due to respirations easy and even, eyes closed at 1515.

## 2022-02-03 NOTE — PROGRESS NOTES
Patient c/o pain rated 10/10 all over, back , head ribs, hips. Tylenol 650 mg and Tramadol 50 mg PO given at this time.

## 2022-02-03 NOTE — PROGRESS NOTES
Shift assessment complete. VSS. Patient A/OX4. C/o pain 10/10 in head, back, and ribs. PRN Norco given per orders. Pt c/o nausea. PRN Zofran given. Scheduled meds given. See MAR. Patient very anxious. Removed pure wick. Assisted patient to [facility]. Urine sample sent per orders. Patient admits to some incontinence at home but says she usually ambulates to . Instructed patient to use call light if needing to use BSC or needing depends changed. Patient agreeable. Pt denies needs at this time. Call light within reach. Bed alarm placed for safety.

## 2022-02-03 NOTE — PROGRESS NOTES
Progress Note    Admit Date:  1/29/2022    Patient with COPD on home oxygen 4 L; continues to smoke tobacco and not vaccinated against COVID; presenting with increased shortness of breath. she has exacerbation of COPD and she is also positive for COVID-19. Work-up also revealed a new lung nodule. Pulmonary consulted    Patient does have significant issues with anxiety. We have started her on BuSpar  Also added Norco for musculoskeletal chest pain from work of breathing    Subjective:  Ms. Marie Oviedo is is slowly improving. She appears less dyspneic and less anxious today. Oxygen saturation stable on 6 L. Objective:   /72   Pulse 111   Temp 97 °F (36.1 °C) (Oral)   Resp 16   Ht 5' 4\" (1.626 m)   Wt 140 lb (63.5 kg)   LMP  (LMP Unknown)   SpO2 92%   BMI 24.03 kg/m²       Intake/Output Summary (Last 24 hours) at 2/3/2022 1414  Last data filed at 2/3/2022 1323  Gross per 24 hour   Intake 1157.81 ml   Output 1100 ml   Net 57.81 ml         Physical Exam:  Patient seen in droplet plus precautions  General:  Awake, alert, well-oriented. She is in no distress  Chronically ill-appearing  Skin:  Warm and dry  Neck:  JVD absent. Neck supple  Chest:  Very diminished breath sounds. Mild expiratory wheezes . no rhonchi . Cardiovascular:  RRR ,S1S2 normal  Abdomen:  Soft, non tender, non distended, BS +  Extremities:  No edema. Intact peripheral pulses.  Brisk cap refill, < 2 secs  Neuro: non focal      Medications:   Scheduled Meds:   albuterol-ipratropium  1 puff Inhalation TID    nicotine  1 patch TransDERmal Daily    budesonide-formoterol  2 puff Inhalation BID    azithromycin  500 mg Oral Daily    guaiFENesin  600 mg Oral BID    methylPREDNISolone  40 mg IntraVENous Q12H    busPIRone  10 mg Oral TID    sodium chloride flush  5-40 mL IntraVENous 2 times per day    enoxaparin  30 mg SubCUTAneous BID    aspirin  81 mg Oral Daily    atorvastatin  40 mg Oral Nightly    fluticasone  1 spray Each Nostril Daily    furosemide  20 mg Oral Daily    levothyroxine  125 mcg Oral QAM AC    montelukast  10 mg Oral Nightly    therapeutic multivitamin-minerals  1 tablet Oral Daily    lidocaine  1 patch TransDERmal Daily    cefTRIAXone (ROCEPHIN) IV  1,000 mg IntraVENous Q24H       Continuous Infusions:   sodium chloride         Data:  CBC:   No results for input(s): WBC, RBC, HGB, HCT, MCV, RDW, PLT in the last 72 hours. BMP:   No results for input(s): NA, K, CL, CO2, PHOS, BUN, CREATININE, CA in the last 72 hours. BNP: No results for input(s): BNP in the last 72 hours. PT/INR: No results for input(s): PROTIME, INR in the last 72 hours. APTT: No results for input(s): APTT in the last 72 hours. CARDIAC ENZYMES:   No results for input(s): CKMB, CKMBINDEX, TROPONINI in the last 72 hours. Invalid input(s): CKTOTAL;3  FASTING LIPID PANEL:  Lab Results   Component Value Date    TRIG 89 05/07/2021     LIVER PROFILE:   No results for input(s): AST, ALT, ALB, BILIDIR, BILITOT, ALKPHOS in the last 72 hours. Cultures  Covid detected  Influenza A and B not detected  Blood cultures no growth to date  Urine Legionella antigen negative      Radiology  CT CHEST PULMONARY EMBOLISM W CONTRAST   Final Result   No evidence of pulmonary embolism. Emphysema. There is a 1 cm noncalcified nodule in the right lung base. This is new   compared to the prior exam of 01/17/2007. Follow-up recommendations are   listed below. RECOMMENDATIONS:   Fleischner Society guidelines for follow-up and management of incidentally   detected pulmonary nodules:      Single Solid Nodule:      Nodule size less than 6 mm   In a low-risk patient, no routine follow-up. In a high-risk patient, optional CT at 12 months. Nodule size equals 6-8 mm   In a low-risk patient, CT at 6-12 months, then consider CT at 18-24 months. In a high-risk patient, CT at 6-12 months, then CT at 18-24 months.       Nodule size greater than 8 mm In a low-risk patient, consider CT at 3 months, PET/CT, or tissue sampling. In a high-risk patient, consider CT at 3 months, PET/CT, or tissue sampling. Multiple Solid Nodules:      Nodule size less than 6 mm   In a low-risk patient, no routine follow-up. In a high-risk patient, optional CT at 12 months. Nodule size equals 6-8 mm   In a low-risk patient, CT at 3-6 months, then consider CT at 18-24 months. In a high-risk patient, CT at 3-6 months, then CT at 18-24 months. Nodule size greater than 8 mm   In a low-risk patient, CT at 3-6 months, then consider CT at 18-24 months. In a high-risk patient, CT at 3-6 months, then CT at 18-24 months. - Low risk patients include individuals with minimal or absent history of   smoking and other known risk factors. - High risk patients include individuals with a history or smoking or known   risk factors. Radiology 2017 http://pubs. rsna.org/doi/full/10.1148/radiol. [id number]         XR CHEST PORTABLE   Final Result   Changes of COPD with mild peribronchial thickening and scattered non   consolidating interstitial pneumonia like infiltrate in both lower lobes. The pneumonia is new from the previous study of 05/11/2021. Assessment:  Active Problems:    COPD exacerbation (HCC)    Hypothyroidism    Acute on chronic respiratory failure with hypoxia and hypercapnia (HCC)    Pneumonia due to COVID-19 virus    COVID    Acute respiratory failure with hypoxia (HCC)    COVID-19 virus infection    Pulmonary nodule    Chronic hypoxemic respiratory failure (HCC)    Anxiety  Resolved Problems:    * No resolved hospital problems. *      Plan:    # Acute on chronic hypoxic respiratory failure  # COVID 19 PNA  #Exacerbation of severe COPD  -Unvaccinated patient. Continued tobacco abuse  -Pulmonology consulted  -Keep on droplet plus precautions  -Continue inhaled bronchodilators.  -Patient on Decadron->  switched to IV Solu-Medrol. Continue  -Continue empiric antibiotics Zithromax  -Oxygen saturations currently stable,  on 6L . ( home O2 4 L )  -Lovenox twice daily    #Severe anxiety  -Added BuSpar - continue     #Tobacco abuse  -Patient advised to quit  -Started on  nicotine patch    #Musculoskeletal chest pain  -On Ultram.  Added Norco    # Hyponatremia  -Monitor sodium levels    #Right lung nodule  -Pulmonology following  - PET scan vs biopsy - as outpt          ADULT DIET;  Regular; Low Fat/Low Chol/High Fiber/2 gm Na     Full Code       Yolanda Morales MD, MD 2/3/2022 2:14 PM

## 2022-02-03 NOTE — PROGRESS NOTES
Physical Therapy  Inpatient Physical Therapy Evaluation and Treatment    Unit: [facility]  Date:  2/3/2022  Patient Name:    Brianna Valdivia  Admitting diagnosis:  Pulmonary nodule [R91.1]  Acute respiratory failure with hypoxia ([facility] [address] [J96.01]  Pulmonary emphysema, unspecified emphysema type ([facility] [J43.9]  COVID [U07.1]  Pneumonia due to COVID-19 virus [U07.1, J12.82]  Admit Date:  1/29/2022  Precautions/Restrictions/WB Status/ Lines/ Wounds/ Oxygen: Fall risk, Bed/chair alarm, Lines -IV and Supplemental O2 (6L), Telemetry, Continuous pulse oximetry and Isolation Precautions: Droplet Plus - COVID    Treatment Time:  1067-2540  Treatment Number:  1   Timed Code Treatment Minutes: 23 minutes  Total Treatment Minutes:  33  minutes    Patient Goals for Therapy: \" to get stronger \"          Discharge Recommendations: Home 24 hr supervision  and Home PT  DME needs for discharge: Needs Met       Therapy recommendation for EMS Transport: can transport by wheelchair    Therapy recommendations for staff:   Assist of 1 with use of No AD for all transfers to/from [facility]  to/from [facility]    History of Present Illness: Patient with COPD on home oxygen 4 L; continues to smoke tobacco and not vaccinated against COVID; presenting with increased shortness of breath.   she has exacerbation of COPD and she is also positive for COVID-19. Work-up also revealed a new lung nodule. Pulmonary consulted    Home Health S4 Level Recommendation:  Level 1 Standard  AM-PAC Mobility Score    AM-PAC Inpatient Mobility Raw Score : 19       Preadmission Environment    Pt. Celeste Coffey spouse (Asif, works part time) and daughter (works).  24/hr assist available   Home environment:            mobile home/trailer   Steps to enter first floor: 3 steps to enter and bilateral hand rails  Bathroom: Tub/Shower unit, Walk-in Shower, Grab bars, Shower Chair  and Standard height toilet (able to push up from sink if needed)  Equipment owned: Rollator , Shower Chair, pulse completed therapy session with SOB noted with activity   SpO2 88-90% on 6 L with activity; -120 bpm    Positioning Needs   Pt in bed, alarm set, positioned in proper neutral alignment and pressure relief provided. Call light provided and all needs within reach    Exercises Initiated  Concepcion deferred secondary to treatment focus on functional mobility  NA    Patient/Family Education   Pt educated on role of inpatient PT, POC, importance of continued activity, DC recommendations, transfer techniques, pursed lip breathing and calling for assist with mobility. Assessment  Pt seen for Physical Therapy evaluation in acute care setting. Pt demonstrated decreased Activity tolerance, Balance, Safety and Strength as well as decreased independence with Ambulation, Bed Mobility  and Transfers. Pt demo drop in SpO2 on 6 L with activity. Requires extended rest break to recover. Recommending Home 24 hr supervision and with home PT upon discharge as patient functioning below baseline level and would benefit from continued therapy services. Pt would also benefit from OP pulmonary rehab in the future    Goals : To be met in 3 visits:  1). Independent with LE Ex x 10 reps    To be met in 6 visits:  1). Supine to/from sit: Independent  2). Sit to/from stand: Independent  3). Bed to chair: Independent  4). Gait: Ambulate 50 ft.  with  Supervision and use of rolling walker (RW)  5). Tolerate B LE exercises 3 sets of 10-15 reps  6). Ascend/descend 3 steps with CGA with use of hand rail bilateral and N/A    Rehabilitation Potential: Fair  Strengths for achieving goals include:   Pt motivated, PLOF, Family Support and Pt cooperative   Barriers to achieving goals include:    Pain and O2 requirements    Plan    To be seen 3-5 x / week  while in acute care setting for therapeutic exercises, bed mobility, transfers, progressive gait training, balance training, and family/patient education.     Signature: Hari Arellano, PT, DPT      If patient discharges from this facility prior to next visit, this note will serve as the Discharge Summary.

## 2022-02-03 NOTE — PLAN OF CARE
Problem: Airway Clearance - Ineffective  Goal: Achieve or maintain patent airway  2/3/2022 1639 by Alberto Dunham RN  Outcome: Ongoing  2/3/2022 0436 by Ryann Mancilla RN  Outcome: Ongoing     Problem: Gas Exchange - Impaired  Goal: Absence of hypoxia  2/3/2022 1639 by Alberto Dunham RN  Outcome: Ongoing  2/3/2022 0436 by Ryann Mancilla RN  Outcome: Ongoing     Problem: Breathing Pattern - Ineffective  Goal: Ability to achieve and maintain a regular respiratory rate  2/3/2022 0436 by Ryann Mancilla RN  Outcome: Ongoing   Respirations easy and even. 02 sat 92-97% on 5l nc at this time. No s/s of hypoxia noted. Patient alert and oriented, and able to make needs known. Call light in reach.

## 2022-02-03 NOTE — PROGRESS NOTES
RT Inhaler-Nebulizer Bronchodilator Protocol Note    There is a bronchodilator order in the chart from a provider indicating to follow the RT Bronchodilator Protocol and there is an Initiate RT Inhaler-Nebulizer Bronchodilator Protocol order as well (see protocol at bottom of note). CXR Findings:  No results found. The findings from the last RT Protocol Assessment were as follows:   History Pulmonary Disease: Chronic pulmonary disease  Respiratory Pattern: Dyspnea on exertion or RR 21-25 bpm  Breath Sounds: Intermittent or unilateral wheezes  Cough: Strong, spontaneous, non-productive  Indication for Bronchodilator Therapy: Wheezing associated with pulm disorder  Bronchodilator Assessment Score: 8    Aerosolized bronchodilator medication orders have been revised according to the RT Inhaler-Nebulizer Bronchodilator Protocol below. Respiratory Therapist to perform RT Therapy Protocol Assessment initially then follow the protocol. Repeat RT Therapy Protocol Assessment PRN for score 0-3 or on second treatment, BID, and PRN for scores above 3. No Indications - adjust the frequency to every 6 hours PRN wheezing or bronchospasm, if no treatments needed after 48 hours then discontinue using Per Protocol order mode. If indication present, adjust the RT bronchodilator orders based on the Bronchodilator Assessment Score as indicated below. Use Inhaler orders unless patient has one or more of the following: on home nebulizer, not able to hold breath for 10 seconds, is not alert and oriented, cannot activate and use MDI correctly, or respiratory rate 25 breaths per minute or more, then use the equivalent nebulizer order(s) with same Frequency and PRN reasons based on the score. If a patient is on this medication at home then do not decrease Frequency below that used at home.     0-3 - enter or revise RT bronchodilator order(s) to equivalent RT Bronchodilator order with Frequency of every 4 hours PRN for wheezing or increased work of breathing using Per Protocol order mode. 4-6 - enter or revise RT Bronchodilator order(s) to two equivalent RT bronchodilator orders with one order with BID Frequency and one order with Frequency of every 4 hours PRN wheezing or increased work of breathing using Per Protocol order mode. 7-10 - enter or revise RT Bronchodilator order(s) to two equivalent RT bronchodilator orders with one order with TID Frequency and one order with Frequency of every 4 hours PRN wheezing or increased work of breathing using Per Protocol order mode. 11-13 - enter or revise RT Bronchodilator order(s) to one equivalent RT bronchodilator order with QID Frequency and an Albuterol order with Frequency of every 4 hours PRN wheezing or increased work of breathing using Per Protocol order mode. Greater than 13 - enter or revise RT Bronchodilator order(s) to one equivalent RT bronchodilator order with every 4 hours Frequency and an Albuterol order with Frequency of every 2 hours PRN wheezing or increased work of breathing using Per Protocol order mode. RT to enter RT Home Evaluation for COPD & MDI Assessment order using Per Protocol order mode.     Electronically signed by Arleen Espinoza on 2/3/2022 at 8:14 AM

## 2022-02-04 ENCOUNTER — HOSPITAL ENCOUNTER (INPATIENT)
Age: 62
LOS: 9 days | Discharge: HOME | DRG: 208 | End: 2022-02-13
Attending: STUDENT IN AN ORGANIZED HEALTH CARE EDUCATION/TRAINING PROGRAM | Admitting: INTERNAL MEDICINE
Payer: MEDICARE

## 2022-02-04 ENCOUNTER — APPOINTMENT (OUTPATIENT)
Dept: GENERAL RADIOLOGY | Age: 62
DRG: 208 | End: 2022-02-04
Payer: MEDICARE

## 2022-02-04 VITALS
HEIGHT: 64 IN | BODY MASS INDEX: 23.9 KG/M2 | OXYGEN SATURATION: 96 % | DIASTOLIC BLOOD PRESSURE: 73 MMHG | RESPIRATION RATE: 18 BRPM | HEART RATE: 114 BPM | WEIGHT: 140 LBS | TEMPERATURE: 97.5 F | SYSTOLIC BLOOD PRESSURE: 145 MMHG

## 2022-02-04 DIAGNOSIS — J96.02 ACUTE RESPIRATORY FAILURE WITH HYPOXIA AND HYPERCAPNIA (HCC): Primary | ICD-10-CM

## 2022-02-04 DIAGNOSIS — J96.01 ACUTE RESPIRATORY FAILURE WITH HYPOXIA AND HYPERCAPNIA (HCC): Primary | ICD-10-CM

## 2022-02-04 DIAGNOSIS — R06.89 CO2 NARCOSIS: ICD-10-CM

## 2022-02-04 DIAGNOSIS — E87.1 HYPONATREMIA: ICD-10-CM

## 2022-02-04 DIAGNOSIS — U07.1 COVID-19 VIRUS INFECTION: ICD-10-CM

## 2022-02-04 DIAGNOSIS — R77.8 ELEVATED TROPONIN: ICD-10-CM

## 2022-02-04 LAB
A/G RATIO: 2.2 (ref 1.1–2.2)
ALBUMIN SERPL-MCNC: 4.6 G/DL (ref 3.4–5)
ALP BLD-CCNC: 98 U/L (ref 40–129)
ALT SERPL-CCNC: 55 U/L (ref 10–40)
AMPHETAMINE SCREEN, URINE: ABNORMAL
ANION GAP SERPL CALCULATED.3IONS-SCNC: 9 MMOL/L (ref 3–16)
AST SERPL-CCNC: 76 U/L (ref 15–37)
BACTERIA: ABNORMAL /HPF
BARBITURATE SCREEN URINE: ABNORMAL
BASE EXCESS ARTERIAL: 10.1 MMOL/L (ref -3–3)
BASE EXCESS ARTERIAL: 5.4 MMOL/L (ref -3–3)
BASE EXCESS VENOUS: 3.5 MMOL/L (ref -3–3)
BASE EXCESS VENOUS: 9.7 MMOL/L (ref -3–3)
BASOPHILS ABSOLUTE: 0.1 K/UL (ref 0–0.2)
BASOPHILS RELATIVE PERCENT: 0.5 %
BENZODIAZEPINE SCREEN, URINE: ABNORMAL
BILIRUB SERPL-MCNC: 0.4 MG/DL (ref 0–1)
BILIRUBIN URINE: ABNORMAL
BLOOD, URINE: ABNORMAL
BUN BLDV-MCNC: 25 MG/DL (ref 7–20)
CALCIUM SERPL-MCNC: 9 MG/DL (ref 8.3–10.6)
CANNABINOID SCREEN URINE: ABNORMAL
CARBOXYHEMOGLOBIN ARTERIAL: 0.3 % (ref 0–1.5)
CARBOXYHEMOGLOBIN ARTERIAL: 0.6 % (ref 0–1.5)
CARBOXYHEMOGLOBIN: 0.9 % (ref 0–1.5)
CARBOXYHEMOGLOBIN: 1 % (ref 0–1.5)
CHLORIDE BLD-SCNC: 73 MMOL/L (ref 99–110)
CLARITY: CLEAR
CO2: 40 MMOL/L (ref 21–32)
COCAINE METABOLITE SCREEN URINE: ABNORMAL
COLOR: YELLOW
CREAT SERPL-MCNC: 0.7 MG/DL (ref 0.6–1.2)
EOSINOPHILS ABSOLUTE: 0 K/UL (ref 0–0.6)
EOSINOPHILS RELATIVE PERCENT: 0 %
EPITHELIAL CELLS, UA: ABNORMAL /HPF (ref 0–5)
ESTIMATED AVERAGE GLUCOSE: 125.5 MG/DL
ETHANOL: NORMAL MG/DL (ref 0–0.08)
GFR AFRICAN AMERICAN: >60
GFR NON-AFRICAN AMERICAN: >60
GLUCOSE BLD-MCNC: 110 MG/DL (ref 70–99)
GLUCOSE BLD-MCNC: 131 MG/DL (ref 70–99)
GLUCOSE BLD-MCNC: 273 MG/DL (ref 70–99)
GLUCOSE URINE: 100 MG/DL
HBA1C MFR BLD: 6 %
HCO3 ARTERIAL: 35.2 MMOL/L (ref 21–29)
HCO3 ARTERIAL: 45.4 MMOL/L (ref 21–29)
HCO3 VENOUS: 33.8 MMOL/L (ref 23–29)
HCO3 VENOUS: 42 MMOL/L (ref 23–29)
HCT VFR BLD CALC: 41.3 % (ref 36–48)
HEMOGLOBIN, ART, EXTENDED: 13.8 G/DL (ref 12–16)
HEMOGLOBIN, ART, EXTENDED: 14.7 G/DL (ref 12–16)
HEMOGLOBIN: 13.8 G/DL (ref 12–16)
HYALINE CASTS: ABNORMAL /LPF (ref 0–2)
KETONES, URINE: 15 MG/DL
LACTIC ACID, SEPSIS: 1.4 MMOL/L (ref 0.4–1.9)
LACTIC ACID, SEPSIS: 2.2 MMOL/L (ref 0.4–1.9)
LEUKOCYTE ESTERASE, URINE: NEGATIVE
LYMPHOCYTES ABSOLUTE: 0.6 K/UL (ref 1–5.1)
LYMPHOCYTES RELATIVE PERCENT: 3.7 %
Lab: ABNORMAL
MCH RBC QN AUTO: 30.4 PG (ref 26–34)
MCHC RBC AUTO-ENTMCNC: 33.4 G/DL (ref 31–36)
MCV RBC AUTO: 91.1 FL (ref 80–100)
METHADONE SCREEN, URINE: ABNORMAL
METHEMOGLOBIN ARTERIAL: 0.1 %
METHEMOGLOBIN ARTERIAL: 0.2 %
METHEMOGLOBIN VENOUS: 0.1 %
METHEMOGLOBIN VENOUS: 0.1 %
MICROSCOPIC EXAMINATION: YES
MONOCYTES ABSOLUTE: 1.3 K/UL (ref 0–1.3)
MONOCYTES RELATIVE PERCENT: 7.6 %
MUCUS: ABNORMAL /LPF
NEUTROPHILS ABSOLUTE: 15 K/UL (ref 1.7–7.7)
NEUTROPHILS RELATIVE PERCENT: 88.2 %
NITRITE, URINE: NEGATIVE
O2 CONTENT, VEN: 18 VOL %
O2 SAT, ARTERIAL: 94.8 %
O2 SAT, ARTERIAL: 95.4 %
O2 SAT, VEN: 93 %
O2 SAT, VEN: 99 %
O2 THERAPY: ABNORMAL
OPIATE SCREEN URINE: POSITIVE
OSMOLALITY: 276 MOSM/KG (ref 280–301)
OXYCODONE URINE: ABNORMAL
PCO2 ARTERIAL: 140.2 MMHG (ref 35–45)
PCO2 ARTERIAL: 79.3 MMHG (ref 35–45)
PCO2, VEN: 101.4 MMHG (ref 40–50)
PCO2, VEN: 81.9 MMHG (ref 40–50)
PDW BLD-RTO: 13.1 % (ref 12.4–15.4)
PERFORMED ON: ABNORMAL
PERFORMED ON: ABNORMAL
PH ARTERIAL: 7.13 (ref 7.35–7.45)
PH ARTERIAL: 7.26 (ref 7.35–7.45)
PH UA: 6
PH UA: 6 (ref 5–8)
PH VENOUS: 7.23 (ref 7.35–7.45)
PH VENOUS: 7.24 (ref 7.35–7.45)
PHENCYCLIDINE SCREEN URINE: ABNORMAL
PLATELET # BLD: 276 K/UL (ref 135–450)
PMV BLD AUTO: 8 FL (ref 5–10.5)
PO2 ARTERIAL: 107.2 MMHG (ref 75–108)
PO2 ARTERIAL: 86.4 MMHG (ref 75–108)
PO2, VEN: 169.3 MMHG (ref 25–40)
PO2, VEN: 78.2 MMHG (ref 25–40)
POTASSIUM REFLEX MAGNESIUM: 4.2 MMOL/L (ref 3.5–5.1)
PRO-BNP: ABNORMAL PG/ML (ref 0–124)
PROCALCITONIN: 0.16 NG/ML (ref 0–0.15)
PROPOXYPHENE SCREEN: ABNORMAL
PROTEIN UA: 100 MG/DL
RBC # BLD: 4.53 M/UL (ref 4–5.2)
RBC UA: ABNORMAL /HPF (ref 0–4)
SODIUM BLD-SCNC: 122 MMOL/L (ref 136–145)
SPECIFIC GRAVITY UA: 1.02 (ref 1–1.03)
TCO2 ARTERIAL: 37.6 MMOL/L
TCO2 ARTERIAL: 49.7 MMOL/L
TCO2 CALC VENOUS: 36 MMOL/L
TCO2 CALC VENOUS: 45 MMOL/L
TOTAL PROTEIN: 6.7 G/DL (ref 6.4–8.2)
TROPONIN: 0.07 NG/ML
TROPONIN: 0.09 NG/ML
URINE REFLEX TO CULTURE: YES
URINE TYPE: ABNORMAL
UROBILINOGEN, URINE: 0.2 E.U./DL
WBC # BLD: 17 K/UL (ref 4–11)
WBC UA: ABNORMAL /HPF (ref 0–5)

## 2022-02-04 PROCEDURE — 99232 SBSQ HOSP IP/OBS MODERATE 35: CPT | Performed by: INTERNAL MEDICINE

## 2022-02-04 PROCEDURE — 93005 ELECTROCARDIOGRAM TRACING: CPT | Performed by: PHYSICIAN ASSISTANT

## 2022-02-04 PROCEDURE — 36600 WITHDRAWAL OF ARTERIAL BLOOD: CPT

## 2022-02-04 PROCEDURE — 2580000003 HC RX 258: Performed by: STUDENT IN AN ORGANIZED HEALTH CARE EDUCATION/TRAINING PROGRAM

## 2022-02-04 PROCEDURE — 6370000000 HC RX 637 (ALT 250 FOR IP): Performed by: INTERNAL MEDICINE

## 2022-02-04 PROCEDURE — 80307 DRUG TEST PRSMV CHEM ANLYZR: CPT

## 2022-02-04 PROCEDURE — 94640 AIRWAY INHALATION TREATMENT: CPT

## 2022-02-04 PROCEDURE — 99285 EMERGENCY DEPT VISIT HI MDM: CPT

## 2022-02-04 PROCEDURE — 84484 ASSAY OF TROPONIN QUANT: CPT

## 2022-02-04 PROCEDURE — 83036 HEMOGLOBIN GLYCOSYLATED A1C: CPT

## 2022-02-04 PROCEDURE — 1200000000 HC SEMI PRIVATE

## 2022-02-04 PROCEDURE — 6360000002 HC RX W HCPCS: Performed by: FAMILY MEDICINE

## 2022-02-04 PROCEDURE — 71045 X-RAY EXAM CHEST 1 VIEW: CPT

## 2022-02-04 PROCEDURE — 81001 URINALYSIS AUTO W/SCOPE: CPT

## 2022-02-04 PROCEDURE — 2580000003 HC RX 258: Performed by: FAMILY MEDICINE

## 2022-02-04 PROCEDURE — 94660 CPAP INITIATION&MGMT: CPT

## 2022-02-04 PROCEDURE — 82803 BLOOD GASES ANY COMBINATION: CPT

## 2022-02-04 PROCEDURE — 84145 PROCALCITONIN (PCT): CPT

## 2022-02-04 PROCEDURE — 87086 URINE CULTURE/COLONY COUNT: CPT

## 2022-02-04 PROCEDURE — 83930 ASSAY OF BLOOD OSMOLALITY: CPT

## 2022-02-04 PROCEDURE — 87040 BLOOD CULTURE FOR BACTERIA: CPT

## 2022-02-04 PROCEDURE — 2580000003 HC RX 258: Performed by: INTERNAL MEDICINE

## 2022-02-04 PROCEDURE — 2700000000 HC OXYGEN THERAPY PER DAY

## 2022-02-04 PROCEDURE — 83880 ASSAY OF NATRIURETIC PEPTIDE: CPT

## 2022-02-04 PROCEDURE — 93005 ELECTROCARDIOGRAM TRACING: CPT | Performed by: STUDENT IN AN ORGANIZED HEALTH CARE EDUCATION/TRAINING PROGRAM

## 2022-02-04 PROCEDURE — 80053 COMPREHEN METABOLIC PANEL: CPT

## 2022-02-04 PROCEDURE — 6360000002 HC RX W HCPCS: Performed by: PHYSICIAN ASSISTANT

## 2022-02-04 PROCEDURE — 6360000002 HC RX W HCPCS: Performed by: INTERNAL MEDICINE

## 2022-02-04 PROCEDURE — 2000000000 HC ICU R&B

## 2022-02-04 PROCEDURE — 94669 MECHANICAL CHEST WALL OSCILL: CPT

## 2022-02-04 PROCEDURE — 6370000000 HC RX 637 (ALT 250 FOR IP): Performed by: FAMILY MEDICINE

## 2022-02-04 PROCEDURE — 85025 COMPLETE CBC W/AUTO DIFF WBC: CPT

## 2022-02-04 PROCEDURE — 83605 ASSAY OF LACTIC ACID: CPT

## 2022-02-04 PROCEDURE — 36415 COLL VENOUS BLD VENIPUNCTURE: CPT

## 2022-02-04 PROCEDURE — 82077 ASSAY SPEC XCP UR&BREATH IA: CPT

## 2022-02-04 PROCEDURE — 94761 N-INVAS EAR/PLS OXIMETRY MLT: CPT

## 2022-02-04 RX ORDER — ONDANSETRON 2 MG/ML
4 INJECTION INTRAMUSCULAR; INTRAVENOUS ONCE
Status: COMPLETED | OUTPATIENT
Start: 2022-02-04 | End: 2022-02-04

## 2022-02-04 RX ORDER — PREDNISONE 10 MG/1
TABLET ORAL
Qty: 40 TABLET | Refills: 0 | Status: ON HOLD | OUTPATIENT
Start: 2022-02-04 | End: 2022-02-13 | Stop reason: SDUPTHER

## 2022-02-04 RX ORDER — 0.9 % SODIUM CHLORIDE 0.9 %
1000 INTRAVENOUS SOLUTION INTRAVENOUS ONCE
Status: COMPLETED | OUTPATIENT
Start: 2022-02-04 | End: 2022-02-04

## 2022-02-04 RX ORDER — NICOTINE 21 MG/24HR
1 PATCH, TRANSDERMAL 24 HOURS TRANSDERMAL DAILY
Qty: 30 PATCH | Refills: 1 | Status: SHIPPED | OUTPATIENT
Start: 2022-02-05

## 2022-02-04 RX ORDER — IPRATROPIUM BROMIDE AND ALBUTEROL SULFATE 2.5; .5 MG/3ML; MG/3ML
1 SOLUTION RESPIRATORY (INHALATION) ONCE
Status: COMPLETED | OUTPATIENT
Start: 2022-02-04 | End: 2022-02-05

## 2022-02-04 RX ORDER — BUSPIRONE HYDROCHLORIDE 10 MG/1
10 TABLET ORAL 3 TIMES DAILY
Qty: 90 TABLET | Refills: 1 | Status: SHIPPED | OUTPATIENT
Start: 2022-02-04

## 2022-02-04 RX ORDER — GUAIFENESIN/DEXTROMETHORPHAN 100-10MG/5
5 SYRUP ORAL EVERY 4 HOURS PRN
Status: DISCONTINUED | OUTPATIENT
Start: 2022-02-04 | End: 2022-02-04 | Stop reason: HOSPADM

## 2022-02-04 RX ORDER — LIDOCAINE 4 G/G
1 PATCH TOPICAL DAILY
Qty: 30 PATCH | Refills: 1 | Status: SHIPPED | OUTPATIENT
Start: 2022-02-05

## 2022-02-04 RX ORDER — PREDNISONE 1 MG/1
5 TABLET ORAL DAILY
COMMUNITY
Start: 2022-02-20

## 2022-02-04 RX ORDER — MORPHINE SULFATE 4 MG/ML
4 INJECTION, SOLUTION INTRAMUSCULAR; INTRAVENOUS ONCE
Status: COMPLETED | OUTPATIENT
Start: 2022-02-04 | End: 2022-02-05

## 2022-02-04 RX ORDER — FUROSEMIDE 20 MG/1
20 TABLET ORAL DAILY
Qty: 30 TABLET | Refills: 1 | Status: ON HOLD | OUTPATIENT
Start: 2022-02-04 | End: 2022-02-13 | Stop reason: HOSPADM

## 2022-02-04 RX ORDER — HYDROCODONE BITARTRATE AND ACETAMINOPHEN 5; 325 MG/1; MG/1
1 TABLET ORAL EVERY 6 HOURS PRN
Qty: 20 TABLET | Refills: 0 | Status: ON HOLD | OUTPATIENT
Start: 2022-02-04 | End: 2022-02-13 | Stop reason: HOSPADM

## 2022-02-04 RX ORDER — GUAIFENESIN/DEXTROMETHORPHAN 100-10MG/5
5 SYRUP ORAL EVERY 4 HOURS PRN
Qty: 120 ML | Refills: 0 | Status: SHIPPED | OUTPATIENT
Start: 2022-02-04 | End: 2022-02-14

## 2022-02-04 RX ORDER — METHYLPREDNISOLONE SODIUM SUCCINATE 125 MG/2ML
125 INJECTION, POWDER, LYOPHILIZED, FOR SOLUTION INTRAMUSCULAR; INTRAVENOUS ONCE
Status: COMPLETED | OUTPATIENT
Start: 2022-02-04 | End: 2022-02-05

## 2022-02-04 RX ADMIN — HYDROCODONE BITARTRATE AND ACETAMINOPHEN 1 TABLET: 5; 325 TABLET ORAL at 07:58

## 2022-02-04 RX ADMIN — ENOXAPARIN SODIUM 30 MG: 100 INJECTION SUBCUTANEOUS at 07:57

## 2022-02-04 RX ADMIN — ONDANSETRON HYDROCHLORIDE 4 MG: 2 INJECTION, SOLUTION INTRAMUSCULAR; INTRAVENOUS at 23:59

## 2022-02-04 RX ADMIN — GUAIFENESIN 600 MG: 600 TABLET, EXTENDED RELEASE ORAL at 07:58

## 2022-02-04 RX ADMIN — FLUTICASONE PROPIONATE 1 SPRAY: 50 SPRAY, METERED NASAL at 08:08

## 2022-02-04 RX ADMIN — FUROSEMIDE 20 MG: 20 TABLET ORAL at 07:58

## 2022-02-04 RX ADMIN — GUAIFENESIN AND DEXTROMETHORPHAN 5 ML: 100; 10 SYRUP ORAL at 15:30

## 2022-02-04 RX ADMIN — SODIUM CHLORIDE, PRESERVATIVE FREE 10 ML: 5 INJECTION INTRAVENOUS at 07:59

## 2022-02-04 RX ADMIN — BUSPIRONE HYDROCHLORIDE 10 MG: 10 TABLET ORAL at 12:32

## 2022-02-04 RX ADMIN — CEFEPIME 2000 MG: 2 INJECTION, POWDER, FOR SOLUTION INTRAMUSCULAR; INTRAVENOUS at 23:45

## 2022-02-04 RX ADMIN — ASPIRIN 81 MG: 81 TABLET, COATED ORAL at 07:58

## 2022-02-04 RX ADMIN — HYDROCODONE BITARTRATE AND ACETAMINOPHEN 1 TABLET: 5; 325 TABLET ORAL at 12:32

## 2022-02-04 RX ADMIN — AZITHROMYCIN 500 MG: 250 TABLET, FILM COATED ORAL at 07:58

## 2022-02-04 RX ADMIN — SODIUM CHLORIDE 1000 ML: 9 INJECTION, SOLUTION INTRAVENOUS at 20:21

## 2022-02-04 RX ADMIN — TRAMADOL HYDROCHLORIDE 50 MG: 50 TABLET ORAL at 15:30

## 2022-02-04 RX ADMIN — BUSPIRONE HYDROCHLORIDE 10 MG: 10 TABLET ORAL at 07:58

## 2022-02-04 RX ADMIN — LEVOTHYROXINE SODIUM 125 MCG: 25 TABLET ORAL at 06:49

## 2022-02-04 RX ADMIN — Medication 2 PUFF: at 07:49

## 2022-02-04 RX ADMIN — Medication 1 TABLET: at 07:58

## 2022-02-04 RX ADMIN — HYDROCODONE BITARTRATE AND ACETAMINOPHEN 1 TABLET: 5; 325 TABLET ORAL at 03:25

## 2022-02-04 RX ADMIN — METHYLPREDNISOLONE SODIUM SUCCINATE 40 MG: 40 INJECTION, POWDER, FOR SOLUTION INTRAMUSCULAR; INTRAVENOUS at 06:49

## 2022-02-04 ASSESSMENT — PAIN DESCRIPTION - FREQUENCY: FREQUENCY: CONTINUOUS

## 2022-02-04 ASSESSMENT — PAIN SCALES - GENERAL
PAINLEVEL_OUTOF10: 10
PAINLEVEL_OUTOF10: 8
PAINLEVEL_OUTOF10: 7
PAINLEVEL_OUTOF10: 0
PAINLEVEL_OUTOF10: 3
PAINLEVEL_OUTOF10: 8
PAINLEVEL_OUTOF10: 0

## 2022-02-04 ASSESSMENT — PAIN DESCRIPTION - LOCATION: LOCATION: GENERALIZED

## 2022-02-04 ASSESSMENT — ENCOUNTER SYMPTOMS
COUGH: 1
SHORTNESS OF BREATH: 1
VOMITING: 0
BACK PAIN: 1
ABDOMINAL PAIN: 0

## 2022-02-04 ASSESSMENT — PAIN - FUNCTIONAL ASSESSMENT: PAIN_FUNCTIONAL_ASSESSMENT: ACTIVITIES ARE NOT PREVENTED

## 2022-02-04 ASSESSMENT — PAIN DESCRIPTION - PAIN TYPE: TYPE: ACUTE PAIN

## 2022-02-04 ASSESSMENT — PAIN DESCRIPTION - ONSET: ONSET: ON-GOING

## 2022-02-04 ASSESSMENT — PAIN DESCRIPTION - DESCRIPTORS: DESCRIPTORS: ACHING

## 2022-02-04 NOTE — ED PROVIDER NOTES
[address] ED  EMERGENCY DEPARTMENT ENCOUNTER        Pt Name: Remington Lamar  MRN: [id number]  [place] 1960  Date of evaluation: 2/4/2022  Provider: Pipo Dahl PA-C  PCP: Monique Crews MD    Shared Visit or Autonomous Visit:  I have seen and evaluated this patient with my supervising physician Tanya Hernandez DO.    CHIEF COMPLAINT       Chief Complaint   Patient presents with    Shortness of Breath     squad states pt was dx with covid pneumonia and had an o2 of 80% on 5L. Pt is not oriented to person, time, or place       HISTORY OF PRESENT ILLNESS   (Location/Symptom, Timing/Onset, Context/Setting, Quality, Duration, Modifying Factors, Severity)  Note limiting factors. Remington Lamar is a 59 y.o. female brought in by EMS for shortness of breath. She was just released from the hospital today. Had been admitted for Covid 19 pneumonia. She has COPD and chronic respiratory failure normally on 4 L nasal cannula oxygen. Per EMS she was on 5 L and at 80%. Currently on 10 L nonrebreather and given DuoNeb by respiratory on arrival. She is awake but not responding. Suspect CO2 retention and respiratory here starting her on bipap. The history is provided by the EMS personnel. records reviewed  Nursing Notes were reviewed    REVIEW OF SYSTEMS    (2-9 systems for level 4, 10 or more for level 5)     Review of Systems   Unable to perform ROS: Acuity of condition   Constitutional: Negative for fever. Respiratory: Positive for cough and shortness of breath. Cardiovascular: Negative for chest pain and leg swelling. Gastrointestinal: Negative for abdominal pain and vomiting. Musculoskeletal: Positive for back pain. Positives and Pertinent negatives as per HPI.        PAST MEDICAL HISTORY     Past Medical History:   Diagnosis Date    Asthma     COPD (chronic obstructive pulmonary disease) ([facility] [address]     Thyroid disease          SURGICAL HISTORY     Past Surgical History: Procedure Laterality Date     SECTION      x2    CHOLECYSTECTOMY           CURRENTMEDICATIONS       Previous Medications    ALBUTEROL (PROVENTIL) (2.5 MG/3ML) 0.083% NEBULIZER SOLUTION    INHALE THE CONTENTS OF 1 VIAL VIA NEBULIZER EVERY 6 HOURS AS NEEDED FOR SHORTNESS OF BREATH  OR  WHEEZING    ALBUTEROL SULFATE  (90 BASE) MCG/ACT INHALER    Inhale 2 puffs into the lungs every 6 hours as needed for Wheezing    ASPIRIN 81 MG EC TABLET    Take 1 tablet by mouth daily    BUSPIRONE (BUSPAR) 10 MG TABLET    Take 1 tablet by mouth 3 times daily    FLUTICASONE (FLONASE) 50 MCG/ACT NASAL SPRAY    1 spray by Each Nostril route daily    FLUTICASONE-UMECLIDIN-VILANT (TRELEGY ELLIPTA) 100-62.5-25 MCG/INH AEPB    Inhale 1 puff into the lungs daily    FUROSEMIDE (LASIX) 20 MG TABLET    Take 1 tablet by mouth daily    GUAIFENESIN (MUCINEX) 600 MG EXTENDED RELEASE TABLET    Take 1 tablet by mouth 2 times daily    GUAIFENESIN-DEXTROMETHORPHAN (ROBITUSSIN DM) 100-10 MG/5ML SYRUP    Take 5 mLs by mouth every 4 hours as needed for Cough    HYDROCODONE-ACETAMINOPHEN (NORCO) 5-325 MG PER TABLET    Take 1 tablet by mouth every 6 hours as needed for Pain for up to 5 days. LEVOTHYROXINE (SYNTHROID) 125 MCG TABLET    Take 1 tablet by mouth every morning (before breakfast)    LIDOCAINE 4 % EXTERNAL PATCH    Place 1 patch onto the skin daily    MONTELUKAST (SINGULAIR) 10 MG TABLET    Take 1 tablet by mouth nightly    MULTIPLE VITAMIN (MULTIVITAMIN) TABLET    Take 1 tablet by mouth daily    NICOTINE (NICODERM CQ) 21 MG/24HR    Place 1 patch onto the skin daily    PREDNISONE (DELTASONE) 10 MG TABLET    Take 4 tabs a day for 4 days ,   Then 3 tabs a day for 4 days,  Then 2 tabs a day for 4 days ,  Then 1 tab a day for 4 days. after that go back to 5 mg daily    PREDNISONE (DELTASONE) 5 MG TABLET    Take 1 tablet by mouth daily         ALLERGIES     Promethazine and Codeine    FAMILYHISTORY     History reviewed.  No pertinent family history. SOCIAL HISTORY       Social History     Socioeconomic History    Marital status:      Spouse name: None    Number of children: None    Years of education: None    Highest education level: None   Occupational History    None   Tobacco Use    Smoking status: Current Some Day Smoker     Packs/day: 0.50     Years: 44.00     Pack years: 22.00     Types: Cigarettes     Last attempt to quit: 2020     Years since quittin.5    Smokeless tobacco: Never Used   Vaping Use    Vaping Use: Never used   Substance and Sexual Activity    Alcohol use: Yes     Comment: rarely    Drug use: No    Sexual activity: Not Currently     Partners: Male   Other Topics Concern    None   Social History Narrative    None     Social Determinants of Health     Financial Resource Strain:     Difficulty of Paying Living Expenses: Not on file   Food Insecurity:     Worried About Running Out of Food in the Last Year: Not on file    Yahir of Food in the Last Year: Not on file   Transportation Needs:     Lack of Transportation (Medical): Not on file    Lack of Transportation (Non-Medical):  Not on file   Physical Activity:     Days of Exercise per Week: Not on file    Minutes of Exercise per Session: Not on file   Stress:     Feeling of Stress : Not on file   Social Connections:     Frequency of Communication with Friends and Family: Not on file    Frequency of Social Gatherings with Friends and Family: Not on file    Attends Jew Services: Not on file    Active Member of Clubs or Organizations: Not on file    Attends Club or Organization Meetings: Not on file    Marital Status: Not on file   Intimate Partner Violence:     Fear of Current or Ex-Partner: Not on file    Emotionally Abused: Not on file    Physically Abused: Not on file    Sexually Abused: Not on file   Housing Stability:     Unable to Pay for Housing in the Last Year: Not on file    Number of [place] in the Last Year: Not on file    Unstable Housing in the Last Year: Not on file       SCREENINGS    Nicolas Coma Scale  Eye Opening: Spontaneous  Best Verbal Response: None  Best Motor Response: None  Nicolas Coma Scale Score: 6        PHYSICAL EXAM    (up to 7 for level 4, 8 or more for level 5)     ED Triage Vitals [02/04/22 1842]   BP Temp Temp Source Pulse Resp SpO2 Height Weight   (!) 207/141 97 °F (36.1 °C) Axillary 131 20 96 % 5' 4\" (1.626 m) 140 lb (63.5 kg)       Physical Exam  Vitals and nursing note reviewed. Constitutional:       Appearance: She is well-developed. She is ill-appearing. Interventions: Face mask in place. Comments: On nonrebreather mask   HENT:      Head: Normocephalic and atraumatic. Mouth/Throat:      Pharynx: Oropharynx is clear. No oropharyngeal exudate or posterior oropharyngeal erythema. Eyes:      Conjunctiva/sclera: Conjunctivae normal.      Pupils: Pupils are equal, round, and reactive to light. Neck:      Vascular: No JVD. Cardiovascular:      Rate and Rhythm: Regular rhythm. Tachycardia present. Pulses:           Radial pulses are 2+ on the right side and 2+ on the left side. Posterior tibial pulses are 2+ on the right side and 2+ on the left side. Pulmonary:      Breath sounds: Decreased breath sounds and wheezing present. No rales. Abdominal:      General: Bowel sounds are normal. There is no distension. Palpations: Abdomen is soft. Abdomen is not rigid. There is no mass. Tenderness: There is no abdominal tenderness. There is no guarding or rebound. Musculoskeletal:         General: Normal range of motion. Cervical back: Normal range of motion and neck supple. Right lower leg: No edema. Left lower leg: No edema. Skin:     General: Skin is warm and dry. Findings: No rash. Neurological:      Cranial Nerves: No cranial nerve deficit. Sensory: No sensory deficit. Motor: No abnormal muscle tone. Coordination: Coordination normal.      Comments: Awake. Eyes open. Starring off. Not responding to verbal stimuli.     Psychiatric:         Behavior: Behavior normal.         DIAGNOSTIC RESULTS   LABS:    Labs Reviewed   CBC WITH AUTO DIFFERENTIAL - Abnormal; Notable for the following components:       Result Value    WBC 17.0 (*)     Neutrophils Absolute 15.0 (*)     Lymphocytes Absolute 0.6 (*)     All other components within normal limits    Narrative:     Performed at:  [facility],  [employer]   Phone [phone]   COMPREHENSIVE METABOLIC PANEL W/ REFLEX TO MG FOR LOW K - Abnormal; Notable for the following components:    Sodium 122 (*)     Chloride 73 (*)     CO2 40 (*)     Glucose 273 (*)     BUN 25 (*)     ALT 55 (*)     AST 76 (*)     All other components within normal limits    Narrative:     Performed at:  [facility],  [employer]   Phone [phone]   TROPONIN - Abnormal; Notable for the following components:    Troponin 0.09 (*)     All other components within normal limits    Narrative:     Performed at:  [facility],  [employer]   Phone [phone]   BRAIN NATRIURETIC PEPTIDE - Abnormal; Notable for the following components:    Pro-BNP 18,724 (*)     All other components within normal limits    Narrative:     Performed at:  [facility] [address],  [facility]   Phone [phone]   BLOOD GAS, VENOUS - Abnormal; Notable for the following components:    pH, Melquiades 7.235 (*)     pCO2, Melquiades 101.4 (*)     pO2, Melquiades 169.3 (*)     HCO3, Venous 42.0 (*)     Base Excess, Melquiades 9.7 (*)     All other components within normal limits    Narrative:     Performed at:  [facility],  [employer]   Phone [phone]   LACTATE, Mucus, UA 1+ (*)     WBC, UA 10-20 (*)     Bacteria, UA 1+ (*)     All other components within normal limits    Narrative:     Performed at:  [facility] [address],  [place], [employer]   Phone [phone]   TROPONIN - Abnormal; Notable for the following components:    Troponin 0.07 (*)     All other components within normal limits    Narrative:     Performed at:  [place] [address],  [place], [employer]   Phone [phone]   BLOOD GAS, VENOUS - Abnormal; Notable for the following components:    pH, Melquiades 7.233 (*)     pCO2, Melquiades 81.9 (*)     pO2, Melquiades 78.2 (*)     HCO3, Venous 33.8 (*)     Base Excess, Melquiades 3.5 (*)     All other components within normal limits    Narrative:     Performed at:  [place] [address],  [place], [employer]   Phone [phone]   BLOOD GAS, ARTERIAL - Abnormal; Notable for the following components:    pH, Arterial 7.265 (*)     pCO2, Arterial 79.3 (*)     HCO3, Arterial 35.2 (*)     Base Excess, Arterial 5.4 (*)     All other components within normal limits    Narrative:     Wyatt CLARKE tel. [phone],  Chemistry results called to and read back by Bennett ROGER, 02/04/2022  23:31, by Andrea Gunderson  Performed at:  [facility] [address],  [place], [employer]   Phone [phone]   OSMOLALITY - Abnormal; Notable for the following components:    Osmolality 276 (*)     All other components within normal limits    Narrative:     Performed at:  [facility]) [facility],  [place], [employer]   Phone [phone]   CULTURE, BLOOD 2   CULTURE, BLOOD 1   CULTURE, URINE   CULTURE, RESPIRATORY   LACTATE, SEPSIS    Narrative:     Performed at:  [facility] [address],  [place], [employer]   Phone [phone]   ETHANOL    Narrative:     Performed at:  [facility],  [place], [facility]   Phone [phone]   BASIC METABOLIC PANEL   BRAIN NATRIURETIC PEPTIDE   TROPONIN   TROPONIN   ELECTROLYTES URINE RANDOM   UREA NITROGEN, URINE   OSMOLALITY, URINE   CREATININE, RANDOM URINE     Results for orders placed or performed during the hospital encounter of 02/04/22   CBC Auto Differential   Result Value Ref Range    WBC 17.0 (H) 4.0 - 11.0 K/uL    RBC 4.53 4.00 - 5.20 M/uL    Hemoglobin 13.8 12.0 - 16.0 g/dL    Hematocrit 41.3 36.0 - 48.0 %    MCV 91.1 80.0 - 100.0 fL    MCH 30.4 26.0 - 34.0 pg    MCHC 33.4 31.0 - 36.0 g/dL    RDW 13.1 12.4 - 15.4 %    Platelets 824 674 - 691 K/uL    MPV 8.0 5.0 - 10.5 fL    Neutrophils % 88.2 %    Lymphocytes % 3.7 %    Monocytes % 7.6 %    Eosinophils % 0.0 %    Basophils % 0.5 %    Neutrophils Absolute 15.0 (H) 1.7 - 7.7 K/uL    Lymphocytes Absolute 0.6 (L) 1.0 - 5.1 K/uL    Monocytes Absolute 1.3 0.0 - 1.3 K/uL    Eosinophils Absolute 0.0 0.0 - 0.6 K/uL    Basophils Absolute 0.1 0.0 - 0.2 K/uL   Comprehensive Metabolic Panel w/ Reflex to MG   Result Value Ref Range    Sodium 122 (L) 136 - 145 mmol/L    Potassium reflex Magnesium 4.2 3.5 - 5.1 mmol/L    Chloride 73 (L) 99 - 110 mmol/L    CO2 40 (H) 21 - 32 mmol/L    Anion Gap 9 3 - 16    Glucose 273 (H) 70 - 99 mg/dL    BUN 25 (H) 7 - 20 mg/dL    CREATININE 0.7 0.6 - 1.2 mg/dL    GFR Non-African American >60 >60    GFR African American >60 >60    Calcium 9.0 8.3 - 10.6 mg/dL    Total Protein 6.7 6.4 - 8.2 g/dL    Albumin 4.6 3.4 - 5.0 g/dL    Albumin/Globulin Ratio 2.2 1.1 - 2.2    Total Bilirubin 0.4 0.0 - 1.0 mg/dL    Alkaline Phosphatase 98 40 - 129 U/L    ALT 55 (H) 10 - 40 U/L    AST 76 (H) 15 - 37 U/L   Troponin   Result Value Ref Range    Troponin 0.09 (H) <0.01 ng/mL   Brain Natriuretic Peptide   Result Value Ref Range    Pro-BNP 18,724 (H) 0 - 124 pg/mL   Blood gas, venous   Result Value Ref Range    pH, Melquiades 7.235 (L) 7.350 - 7.450    pCO2, Melquiades 101.4 (H) 40.0 - 50.0 mmHg    pO2, Melquiades 169.3 (H) 25.0 - 40.0 mmHg    HCO3, Venous 42.0 (H) 23.0 - 29.0 mmol/L    Base Excess, Melquiades 9.7 (H) -3.0 - 3.0 mmol/L    O2 Sat, Melquiades 99 Not Established %    Carboxyhemoglobin 1.0 0.0 - 1.5 %    MetHgb, Melquiades 0.1 <1.5 %    TC02 (Calc), Melquiades 45 Not Established mmol/L    O2 Therapy Unknown    Lactate, Sepsis   Result Value Ref Range    Lactic Acid, Sepsis 2.2 (H) 0.4 - 1.9 mmol/L   Lactate, Sepsis   Result Value Ref Range    Lactic Acid, Sepsis 1.4 0.4 - 1.9 mmol/L   Urinalysis Reflex to Culture    Specimen: Urine, clean catch   Result Value Ref Range    Color, UA Yellow Straw/Yellow    Clarity, UA Clear Clear    Glucose, Ur 100 (A) Negative mg/dL    Bilirubin Urine SMALL (A) Negative    Ketones, Urine 15 (A) Negative mg/dL    Specific Gravity, UA 1.025 1.005 - 1.030    Blood, Urine MODERATE (A) Negative    pH, UA 6.0 5.0 - 8.0    Protein,  (A) Negative mg/dL    Urobilinogen, Urine 0.2 <2.0 E.U./dL    Nitrite, Urine Negative Negative    Leukocyte Esterase, Urine Negative Negative    Microscopic Examination YES     Urine Type NotGiven     Urine Reflex to Culture Yes    Urine Drug Screen   Result Value Ref Range    Amphetamine Screen, Urine Neg Negative <1000ng/mL    Barbiturate Screen, Ur Neg Negative <200 ng/mL    Benzodiazepine Screen, Urine Neg Negative <200 ng/mL    Cannabinoid Scrn, Ur Neg Negative <50 ng/mL    Cocaine Metabolite Screen, Urine Neg Negative <300 ng/mL    Opiate Scrn, Ur POSITIVE (A) Negative <300 ng/mL    PCP Screen, Urine Neg Negative <25 ng/mL    Methadone Screen, Urine Neg Negative <300 ng/mL    Propoxyphene Scrn, Ur Neg Negative <300 ng/mL    Oxycodone Urine Neg Negative <100 ng/ml    pH, UA 6.0     Drug Screen Comment: see below    Ethanol   Result Value Ref Range    Ethanol Lvl None Detected mg/dL   Procalcitonin   Result Value Ref Range    Procalcitonin 0.16 (H) 0.00 - 0.15 ng/mL   Blood gas, arterial   Result Value Ref Range pH, Arterial 7.128 (LL) 7.350 - 7.450    pCO2, Arterial 140.2 (HH) 35.0 - 45.0 mmHg    pO2, Arterial 107.2 75.0 - 108.0 mmHg    HCO3, Arterial 45.4 (H) 21.0 - 29.0 mmol/L    Base Excess, Arterial 10.1 (H) -3.0 - 3.0 mmol/L    Hemoglobin, Art, Extended 14.7 12.0 - 16.0 g/dL    O2 Sat, Arterial 95.4 >92 %    Carboxyhgb, Arterial 0.3 0.0 - 1.5 %    Methemoglobin, Arterial 0.2 <1.5 %    TCO2, Arterial 49.7 Not Established mmol/L    O2 Therapy Unknown    Microscopic Urinalysis   Result Value Ref Range    Hyaline Casts, UA 6-10 (A) 0 - 2 /LPF    Mucus, UA 1+ (A) None Seen /LPF    WBC, UA 10-20 (A) 0 - 5 /HPF    RBC, UA 3-4 0 - 4 /HPF    Epithelial Cells, UA 2-5 0 - 5 /HPF    Bacteria, UA 1+ (A) None Seen /HPF   Troponin   Result Value Ref Range    Troponin 0.07 (H) <0.01 ng/mL   Blood gas, venous   Result Value Ref Range    pH, Melquiades 7.233 (L) 7.350 - 7.450    pCO2, Melquiades 81.9 (H) 40.0 - 50.0 mmHg    pO2, Melquiades 78.2 (H) 25.0 - 40.0 mmHg    HCO3, Venous 33.8 (H) 23.0 - 29.0 mmol/L    Base Excess, Melquiades 3.5 (H) -3.0 - 3.0 mmol/L    O2 Sat, Melquiades 93 Not Established %    Carboxyhemoglobin 0.9 0.0 - 1.5 %    MetHgb, Melquiades 0.1 <1.5 %    TC02 (Calc), Melquiades 36 Not Established mmol/L    O2 Content, Melquiades 18 Not Established VOL %    O2 Therapy Unknown    Blood gas, arterial   Result Value Ref Range    pH, Arterial 7.265 (L) 7.350 - 7.450    pCO2, Arterial 79.3 (HH) 35.0 - 45.0 mmHg    pO2, Arterial 86.4 75.0 - 108.0 mmHg    HCO3, Arterial 35.2 (H) 21.0 - 29.0 mmol/L    Base Excess, Arterial 5.4 (H) -3.0 - 3.0 mmol/L    Hemoglobin, Art, Extended 13.8 12.0 - 16.0 g/dL    O2 Sat, Arterial 94.8 >92 %    Carboxyhgb, Arterial 0.6 0.0 - 1.5 %    Methemoglobin, Arterial 0.1 <1.5 %    TCO2, Arterial 37.6 Not Established mmol/L    O2 Therapy Unknown    Osmolality   Result Value Ref Range    Osmolality 276 (L) 280 - 301 mOsm/kg   EKG 12 Lead   Result Value Ref Range    Ventricular Rate 120 BPM    Atrial Rate 120 BPM    P-R Interval 132 ms    QRS Duration 88 ms    Q-T Interval 346 ms    QTc Calculation (Bazett) 489 ms    P Axis 85 degrees    R Axis 88 degrees    T Axis 70 degrees    Diagnosis       Sinus tachycardiaBiatrial enlargementPulmonary disease patternSeptal infarct , age undeterminedAbnormal ECGWhen compared with ECG of 29-JAN-2022 20:29,Septal infarct is now Present       All other labs were within normal range or not returned as of this dictation. EKG: All EKG's are interpreted by the Emergency Department Physician in the absence of a cardiologist.  Please see their note for interpretation of EKG. RADIOLOGY:   Non-plain film images such as CT, Ultrasound and MRI are read by the radiologist. Plain radiographic images are visualized andpreliminarily interpreted by the  ED Provider with the below findings:        Interpretation perthe Radiologist below, if available at the time of this note:    XR CHEST PORTABLE   Final Result   COPD with resolving central pulmonary congestion. Slowly resolving bibasilar opacities. Tiny right pleural effusion which is less prominent. XR CHEST PORTABLE    Result Date: 2/4/2022  EXAMINATION: ONE XRAY VIEW OF THE CHEST 2/4/2022 6:46 pm COMPARISON: 01/29/2022 HISTORY: ORDERING SYSTEM PROVIDED HISTORY: sob, +covid TECHNOLOGIST PROVIDED HISTORY: Reason for exam:->sob, +covid Reason for Exam: shortness of breath, covid positive FINDINGS: The heart is normal.  The pulmonary vessels are less prominent. The lungs are hyperinflated and emphysematous with hazy bibasilar interstitial and linear densities which are less prominent. There is minimal blunting of the right costophrenic sulcus which is less prominent. The bones are intact. COPD with resolving central pulmonary congestion. Slowly resolving bibasilar opacities. Tiny right pleural effusion which is less prominent.        PROCEDURES   Unless otherwise noted below, none     Procedures    CRITICAL CARE TIME   Critical care provided for this patient of which 35 min were spend on critical care and decision making. 0 min spent on procedures. There was imminent failure of an organ system which required critical intervention to prevent clinically significant progression of life threatening deterioration of the patient's condition to the point of disability or death. CONSULTS:  IP CONSULT TO HOSPITALIST  PHARMACY TO DOSE VANCOMYCIN      EMERGENCY DEPARTMENT COURSE and DIFFERENTIAL DIAGNOSIS/MDM:   Vitals:    Vitals:    02/04/22 2131 02/04/22 2203 02/04/22 2219 02/04/22 2224   BP: (!) 144/74 (!) 144/72  (!) 144/88   Pulse: 110 113  116   Resp: 23 28 25 25   Temp:       TempSrc:       SpO2: 100% 96% 94% 97%   Weight:       Height:           Patient was given thefollowing medications:  Medications   cefepime (MAXIPIME) 2000 mg IVPB minibag in NS (2,000 mg IntraVENous New Bag 2/4/22 2345)   vancomycin (VANCOCIN) 1,250 mg in dextrose 5 % 250 mL IVPB (has no administration in time range)   morphine sulfate (PF) injection 4 mg (has no administration in time range)   ondansetron (ZOFRAN) injection 4 mg (has no administration in time range)   ipratropium-albuterol (DUONEB) nebulizer solution 1 ampule (has no administration in time range)   ipratropium-albuterol (DUONEB) nebulizer solution 1 ampule (has no administration in time range)   methylPREDNISolone sodium (SOLU-MEDROL) injection 125 mg (has no administration in time range)   0.9 % sodium chloride bolus (0 mLs IntraVENous Stopped 2/4/22 2120)         ED course  Patient brought in by EMS for shortness of breath hypoxia she has COVID-19 was just released from the hospital today. She has chronic respiratory failure on nasal cannula oxygen at home. Per record review normally on 4 L nasal cannula oxygen, she was on 5 L and was at 80%. Arrives on nonrebreather 10 L, DuoNeb given, she was awake but not responsive, suspect CO2 retention and BiPAP started by respiratory.   Initially unable to obtain any history from the patient. EKG, chest x-ray, ABG and other labs ordered. 7:59 PM EST  Patient reevaluated. Improving on BiPAP. She is now more alert. 9:32 PM EST  On bipap. Patient reevaluated she is wake and alert she is talking to me answering my questions appropriately oriented x3. Told me her name that she is at [facility] and the month is February. States she has covid. Has had cough and shortness of breath. Denies chest pain. I discussed her test results with her and plan for admission she is in agreement. Chest x-ray showing COPD with resolving central pulmonary congestion. Slowly resolving bibasilar opacities tiny right pleural effusion is less prominent. BNP [id number]. Initial ABG pH 7.128 and PCO2 140, on repeat ABG pH 7.265 PCO2 79. White count is 17, she is on steroids. Hemoglobin 13.8. Sodium is low 122 initially was given 1 L normal saline and will give additional slow fluids. Potassium 4.2. CO2 elevated at 40. Glucose 273. Anion gap normal 9. Renal function is normal.  ALT mildly elevated 55 AST 76. Troponin elevated 0.09 this was repeated and is 0.07. She denied any chest pain to me. 11:43 PM EST   Patient reevaluated stable on BiPAP. Complains of pain in her back and requesting pain medication this was ordered. Alert and oriented x3. FINAL IMPRESSION      1. Acute respiratory failure with hypoxia and hypercapnia (HCC)    2. COVID-19 virus infection    3. CO2 narcosis    4. Hyponatremia    5. Elevated troponin          DISPOSITION/PLAN   DISPOSITION     PATIENT REFERREDTO:  No follow-up provider specified.     DISCHARGE MEDICATIONS:  New Prescriptions    No medications on file       DISCONTINUED MEDICATIONS:  Discontinued Medications    No medications on file              (Please note that portions ofthis note were completed with a voice recognition program.  Efforts were made to edit the dictations but occasionally words are mis-transcribed.)    Jody Angeles PA-C (electronically signed)           Kolby Cortez PA-C  02/04/22 8598

## 2022-02-04 NOTE — PROGRESS NOTES
Pt alert and oriented, in bed, pt c/o pain, stated pain \"all over\" 10/10  told pt its not time for pain medication yet, not due till 2330. Shift assessment and vitals completed, medications given, call light within reach, told -pt to call for any needs, will continue to monitor.  Sotero Posada RN

## 2022-02-04 NOTE — DISCHARGE SUMMARY
Name:  Santos Zarate  Room:  3073/3100-77  MRN:    [id number]    Discharge Summary      This discharge summary is in conjunction with a complete physical exam done on the day of discharge. Attending Physician: Dr. Adrian Skinner  Discharging Physician: Dr. Jannetta Simmonds: 1/29/2022  Discharge:   2/4/2022    HPI:      Shortness of Breath       SOB with exhertion x 1 week. States her PCP gave her ABX for it. Arrives on baseline o2 of 4L. Got a BT from EMS. Not vaccinated. . Inciting event was nonproductive cough ans dyspnea that began 4 weeks ago, diagnosed w/ covid at that time, she is not vaccinated. She began to feel abit better until 1 week ago she developed more dyspnea w/ exertion, better w/ rest, no sputum production. pcp gave abx w/ no improvement. Denies fever, chills. In the ed she is still covid +, has bl infiltrates on imaging. cta - for pe. She has been hypoxic on home 3 L o2, initially needed 15 L but is now back down to 4L. Will admit for copd exacerbation 2/2 covid and possible pna. Diagnoses this Admission and Hospital Course   # Acute on chronic hypoxic respiratory failure  # COVID 19 PNA  #Exacerbation of severe COPD  -Unvaccinated patient. Continued tobacco abuse  -Pulmonology consulted  -Keep on droplet plus precautions  -Continue inhaled bronchodilators.  -Patient on Decadron->  switched to IV Solu-Medrol  -Continued empiric antibiotics Zithromax  -Oxygen saturations currently stable,  on 5L . ( home O2 4 L )  -Lovenox twice daily  Patient is stable today and has been slowly improving for the last couple of days. Oxygen saturation stable between 4 to 5 L. Patient asks to be placed on 5 L mostly when anxious but her sats have remained stable. She can be discharged home on oral prednisone slow taper. Continue MDI as at home. Follow-up with pulmonology     #Severe anxiety  -Added BuSpar . This has helped her.   I am giving her a prescription for this      #Tobacco abuse  -Patient advised to quit  -Started on  nicotine patch     #Musculoskeletal chest pain  --  Added Norco     # Hyponatremia  -Monitored sodium levels     #Right lung nodule  -Pulmonology following  - PET scan vs biopsy - as outpt         [facility]       Procedures (Please Review Full Report for Details)  N/A    Consults    Pulmonology       Physical Exam at Discharge:    BP (!) 158/78   Pulse 113   Temp 97.7 °F (36.5 °C) (Oral)   Resp 18   Ht 5' 4\" (1.626 m)   Wt 140 lb (63.5 kg)   LMP  (LMP Unknown)   SpO2 97%   BMI 24.03 kg/m²   Patient seen in droplet plus precautions  General:  Awake, alert, well-oriented. She is in no distress  Chronically ill-appearing  Skin:  Warm and dry  Neck:  JVD absent. Neck supple  Chest:  Very diminished breath sounds. Mild expiratory wheezes . no rhonchi . Cardiovascular:  RRR ,S1S2 normal  Abdomen:  Soft, non tender, non distended, BS +  Extremities:  No edema. Intact peripheral pulses. Brisk cap refill, < 2 secs  Neuro: non focal      U/A:    Lab Results   Component Value Date    COLORU Yellow 01/29/2022    WBCUA None seen 01/29/2022    RBCUA 11-20 01/29/2022    CLARITYU Clear 01/29/2022    SPECGRAV 1.025 01/29/2022    LEUKOCYTESUR Negative 01/29/2022    BLOODU MODERATE 01/29/2022    GLUCOSEU Negative 01/29/2022       CULTURES  Influenza A and B not detected  Blood cultures no growth to date  Urine Legionella antigen negative    RADIOLOGY  CT CHEST PULMONARY EMBOLISM W CONTRAST   Final Result   No evidence of pulmonary embolism. Emphysema. There is a 1 cm noncalcified nodule in the right lung base. This is new   compared to the prior exam of 01/17/2007. Follow-up recommendations are   listed below. RECOMMENDATIONS:   Fleischner Society guidelines for follow-up and management of incidentally   detected pulmonary nodules:      Single Solid Nodule:      Nodule size less than 6 mm   In a low-risk patient, no routine follow-up.    In a high-risk patient, optional CT at 12 months. Nodule size equals 6-8 mm   In a low-risk patient, CT at 6-12 months, then consider CT at 18-24 months. In a high-risk patient, CT at 6-12 months, then CT at 18-24 months. Nodule size greater than 8 mm         In a low-risk patient, consider CT at 3 months, PET/CT, or tissue sampling. In a high-risk patient, consider CT at 3 months, PET/CT, or tissue sampling. Multiple Solid Nodules:      Nodule size less than 6 mm   In a low-risk patient, no routine follow-up. In a high-risk patient, optional CT at 12 months. Nodule size equals 6-8 mm   In a low-risk patient, CT at 3-6 months, then consider CT at 18-24 months. In a high-risk patient, CT at 3-6 months, then CT at 18-24 months. Nodule size greater than 8 mm   In a low-risk patient, CT at 3-6 months, then consider CT at 18-24 months. In a high-risk patient, CT at 3-6 months, then CT at 18-24 months. - Low risk patients include individuals with minimal or absent history of   smoking and other known risk factors. - High risk patients include individuals with a history or smoking or known   risk factors. Radiology 2017 http://pubs. rsna.org/doi/full/10.1148/radiol. 9510608249         XR CHEST PORTABLE   Final Result   Changes of COPD with mild peribronchial thickening and scattered non   consolidating interstitial pneumonia like infiltrate in both lower lobes. The pneumonia is new from the previous study of 05/11/2021. Discharge Medications     Medication List      START taking these medications    busPIRone 10 MG tablet  Commonly known as: BUSPAR  Take 1 tablet by mouth 3 times daily     guaiFENesin-dextromethorphan 100-10 MG/5ML syrup  Commonly known as: ROBITUSSIN DM  Take 5 mLs by mouth every 4 hours as needed for Cough     HYDROcodone-acetaminophen 5-325 MG per tablet  Commonly known as: NORCO  Take 1 tablet by mouth every 6 hours as needed for Pain for up to 5 days.      lidocaine 4 % external patch  Place 1 patch onto the skin daily  Start taking on: February 5, 2022     nicotine 21 MG/24HR  Commonly known as: [id number] [facility] 1 patch onto the skin daily  Start taking on: February 5, 2022        CHANGE how you take these medications    * predniSONE 10 MG tablet  Commonly known as: DELTASONE  Take 4 tabs a day for 4 days ,   Then 3 tabs a day for 4 days,  Then 2 tabs a day for 4 days ,  Then 1 tab a day for 4 days. after that go back to 5 mg daily  What changed: You were already taking a medication with the same name, and this prescription was added. Make sure you understand how and when to take each. * predniSONE 5 MG tablet  Commonly known as: Galdino Cobb  Start taking on: February 20, 2022  What changed: These instructions start on February 20, 2022. If you are unsure what to do until then, ask your doctor or other care provider. * This list has 2 medication(s) that are the same as other medications prescribed for you. Read the directions carefully, and ask your doctor or other care provider to review them with you.             CONTINUE taking these medications    * albuterol sulfate  (90 Base) MCG/ACT inhaler  Inhale 2 puffs into the lungs every 6 hours as needed for Wheezing     * albuterol (2.5 MG/3ML) 0.083% nebulizer solution  Commonly known as: PROVENTIL  INHALE THE CONTENTS OF 1 VIAL VIA NEBULIZER EVERY 6 HOURS AS NEEDED FOR SHORTNESS OF BREATH  OR  WHEEZING     aspirin 81 MG EC tablet  Take 1 tablet by mouth daily     fluticasone 50 MCG/ACT nasal spray  Commonly known as: FLONASE  1 spray by Each Nostril route daily     furosemide 20 MG tablet  Commonly known as: LASIX  Take 1 tablet by mouth daily     guaiFENesin 600 MG extended release tablet  Commonly known as: MUCINEX  Take 1 tablet by mouth 2 times daily     levothyroxine 125 MCG tablet  Commonly known as: SYNTHROID  Take 1 tablet by mouth every morning (before breakfast)     montelukast 10 MG tablet  Commonly known as: SINGULAIR  Take 1 tablet by mouth nightly     multivitamin tablet  Take 1 tablet by mouth daily     Trelegy Ellipta 100-62.5-25 MCG/INH Aepb  Generic drug: fluticasone-umeclidin-vilant         * This list has 2 medication(s) that are the same as other medications prescribed for you. Read the directions carefully, and ask your doctor or other care provider to review them with you. Where to Get Your Medications      You can get these medications from any pharmacy    Bring a paper prescription for each of these medications  · busPIRone 10 MG tablet  · furosemide 20 MG tablet  · guaiFENesin-dextromethorphan 100-10 MG/5ML syrup  · HYDROcodone-acetaminophen 5-325 MG per tablet  · lidocaine 4 % external patch  · nicotine 21 MG/24HR  · predniSONE 10 MG tablet           Discharged in stable condition to home. D/C home with [facility]. Total time 35 minutes. > 50%  dominated by counseling and coordination of care. Follow Up:   Follow up with PCP, pulmonology     Unruly Cooper MD   2/4/2022

## 2022-02-04 NOTE — PROGRESS NOTES
Prescriptions 7 in total,  and discharge instructions given. Pt verbalized understanding/ denies questions/ needs at this time. Waiting on family to arrive for discharge home.

## 2022-02-04 NOTE — CARE COORDINATION
[facility]  Received referral regarding HC services from [facility]. [facility] can service for SN, PT/OT with SOC beginning of next week. OK per Natali Curiel CM. Telephone call to pt due to COVID isolation. Spoke with pt. Agreeable to [facility] for SN, PT/OT. Verified demographics. Pt aware SOC will be beginning of next week. Telephone call to Dr. Val Peng office. Office closed due to inclement weather. Faxed referral with orders to [facility].       Electronically signed by Aileen Braga RN on 2/4/2022 at 4:06 PM

## 2022-02-04 NOTE — PROGRESS NOTES
RT Inhaler-Nebulizer Bronchodilator Protocol Note    There is a bronchodilator order in the chart from a provider indicating to follow the RT Bronchodilator Protocol and there is an Initiate RT Inhaler-Nebulizer Bronchodilator Protocol order as well (see protocol at bottom of note). CXR Findings:  No results found. The findings from the last RT Protocol Assessment were as follows:   History Pulmonary Disease: Chronic pulmonary disease  Respiratory Pattern: Dyspnea on exertion or RR 21-25 bpm  Breath Sounds: Slightly diminished and/or crackles  Cough: Strong, spontaneous, non-productive  Indication for Bronchodilator Therapy: Decreased or absent breath sounds  Bronchodilator Assessment Score: 6    Aerosolized bronchodilator medication orders have been revised according to the RT Inhaler-Nebulizer Bronchodilator Protocol below. Respiratory Therapist to perform RT Therapy Protocol Assessment initially then follow the protocol. Repeat RT Therapy Protocol Assessment PRN for score 0-3 or on second treatment, BID, and PRN for scores above 3. No Indications - adjust the frequency to every 6 hours PRN wheezing or bronchospasm, if no treatments needed after 48 hours then discontinue using Per Protocol order mode. If indication present, adjust the RT bronchodilator orders based on the Bronchodilator Assessment Score as indicated below. Use Inhaler orders unless patient has one or more of the following: on home nebulizer, not able to hold breath for 10 seconds, is not alert and oriented, cannot activate and use MDI correctly, or respiratory rate 25 breaths per minute or more, then use the equivalent nebulizer order(s) with same Frequency and PRN reasons based on the score. If a patient is on this medication at home then do not decrease Frequency below that used at home.     0-3 - enter or revise RT bronchodilator order(s) to equivalent RT Bronchodilator order with Frequency of every 4 hours PRN for wheezing

## 2022-02-04 NOTE — PROGRESS NOTES
Pulmonary Progress Note  Hospital Day: 7    CC: Severe COPD COVID-19 in an unvaccinated patient    Subjective:   Down to 5 L today; baseline is 4 L at home  Feels okay except c/o pain that is chronic       Intake/Output Summary (Last 24 hours) at 2/4/2022 1006  Last data filed at 2/3/2022 1809  Gross per 24 hour   Intake 360 ml   Output --   Net 360 ml     Exam:   BP (!) 158/78   Pulse 113   Temp 97.7 °F (36.5 °C) (Oral)   Resp 18   Ht 5' 4\" (1.626 m)   Wt 140 lb (63.5 kg)   LMP  (LMP Unknown)   SpO2 92%   BMI 24.03 kg/m²  on 5 L  Constitutional:  No acute distress. HENT:  Oropharynx is clear and moist.   Neck: No tracheal deviation present. Cardiovascular: Normal heart sounds. No lower extremity edema. Pulmonary/Chest: No wheezes. No rhonchi. No rales. +decreased breath sounds. No accessory muscle usage or stridor. Musculoskeletal: No cyanosis. No clubbing. Skin: Skin is warm and dry. Psychiatric: Normal mood and affect. Neurologic: speech fluent, alert and oriented, strength symmetric     I performed the above physical exam independently in its entirety.   Eli Hinson MD on 2/4/22 at 12:41 PM EST     Scheduled Meds:   insulin lispro  0-12 Units SubCUTAneous TID     insulin lispro  0-6 Units SubCUTAneous Nightly    albuterol-ipratropium  1 puff Inhalation TID    nicotine  1 patch TransDERmal Daily    budesonide-formoterol  2 puff Inhalation BID    guaiFENesin  600 mg Oral BID    methylPREDNISolone  40 mg IntraVENous Q12H    busPIRone  10 mg Oral TID    sodium chloride flush  5-40 mL IntraVENous 2 times per day    enoxaparin  30 mg SubCUTAneous BID    aspirin  81 mg Oral Daily    atorvastatin  40 mg Oral Nightly    fluticasone  1 spray Each Nostril Daily    furosemide  20 mg Oral Daily    levothyroxine  125 mcg Oral QAM AC    montelukast  10 mg Oral Nightly    therapeutic multivitamin-minerals  1 tablet Oral Daily    lidocaine  1 patch TransDERmal Daily    cefTRIAXone (ROCEPHIN) IV  1,000 mg IntraVENous Q24H     Continuous Infusions:   dextrose      sodium chloride 25 mL (02/03/22 2132)     PRN Meds:  glucose, glucagon (rDNA), dextrose, dextrose bolus (hypoglycemia) **OR** dextrose bolus (hypoglycemia), HYDROcodone 5 mg - acetaminophen, albuterol sulfate HFA, traMADol, sodium chloride flush, sodium chloride, ondansetron **OR** ondansetron, polyethylene glycol, acetaminophen **OR** acetaminophen, hydrALAZINE    Labs:  CBC:   No results for input(s): WBC, HGB, HCT, MCV, PLT in the last 72 hours. BMP:   No results for input(s): NA, K, CL, CO2, PHOS, BUN, CREATININE, CA in the last 72 hours. LIVER PROFILE:   No results for input(s): AST, ALT, LIPASE, BILIDIR, BILITOT, ALKPHOS in the last 72 hours. Invalid input(s): AMYLASE,  ALB  PT/INR: No results for input(s): PROTIME, INR in the last 72 hours. APTT: No results for input(s): APTT in the last 72 hours. UA:  No results for input(s): NITRITE, COLORU, PHUR, LABCAST, WBCUA, RBCUA, MUCUS, TRICHOMONAS, YEAST, BACTERIA, CLARITYU, SPECGRAV, LEUKOCYTESUR, UROBILINOGEN, BILIRUBINUR, BLOODU, GLUCOSEU, AMORPHOUS in the last 72 hours. Invalid input(s): KETONESU  No results for input(s): PHART, KUC1AWZ, PO2ART in the last 72 hours. Cultures:   1/29/2022 Strep pneumo & Legionella not detected  1/29/2022 BC NG  1/29/2022 COVID-19 detected    Films: imaging was reviewed by me and showed  CXR 1/29/2022  Changes of COPD with mild peribronchial thickening and scattered non   consolidating interstitial pneumonia like infiltrate in both lower lobes. The pneumonia is new from the previous study of 05/11/2021. CT Chest 1/29/2022    Mediastinum: No evidence of mediastinal lymphadenopathy.  The heart and   pericardium demonstrate no acute abnormality.  There is no acute abnormality   of the thoracic aorta.       Lungs/pleura: The lungs are without acute process. Magdalen Juan Pablo is a a 1 cm noncalcified nodule in the right lung base.  No evidence of pleural effusion or pneumothorax. Impression   No evidence of pulmonary embolism. Emphysema. There is a 1 cm noncalcified nodule in the right lung base.  This is new   compared to the prior exam of 01/17/2007.  Follow-up recommendations are   listed below.      ASSESSMENT:  · Acute on chronic hypoxemic respiratory failure; uses 4 L O2 at home  · COVID-19 viral infection in an unvaccinated patient   · Severe COPD, with acute exacerbation  · RLL 1 cm nodule on CT 1/29/22 - concerning for bronchogenic carcinoma  · Anxiety     PLAN:  COVID-19 isolation, droplet plus  Supplemental oxygen to maintain SaO2 >92%; wean as tolerated    · Supervised self proning  · Zithromax D#6 / Ceftriaxone D#7  · Inhaled bronchodilators - Combivent q4  · IV Solu-Medrol 40 mg BID D#5  · SSI   · Acapella and Mucinex  · Prophylaxis: Lovenox 30 BID   · PT/OT  · Might need rehab at discharge  · PET scan vs biopsy vs resection for RLL nodule - could be addressed as an outpatient (previous Dr. Breonna Cummins pt; recently saw Dr. Ricci Live)    I contributed to updating portions of this encounter note.    Lawrence Gaspar PA-C on 2/4/22 at 10:11 AM EST

## 2022-02-04 NOTE — PLAN OF CARE
Problem: Airway Clearance - Ineffective  Goal: Achieve or maintain patent airway  2/4/2022 0416 by Constance Clemente RN  Outcome: Ongoing  2/3/2022 1639 by Barbie Otero RN  Outcome: Ongoing     Problem: Gas Exchange - Impaired  Goal: Absence of hypoxia  2/4/2022 0416 by Constance Clemente RN  Outcome: Ongoing  2/3/2022 1639 by Barbie Otero RN  Outcome: Ongoing  Goal: Promote optimal lung function  Outcome: Ongoing     Problem: Breathing Pattern - Ineffective  Goal: Ability to achieve and maintain a regular respiratory rate  Outcome: Ongoing     Problem:  Body Temperature -  Risk of, Imbalanced  Goal: Ability to maintain a body temperature within defined limits  Outcome: Ongoing  Goal: Will regain or maintain usual level of consciousness  Outcome: Ongoing  Goal: Complications related to the disease process, condition or treatment will be avoided or minimized  Outcome: Ongoing     Problem: Isolation Precautions - Risk of Spread of Infection  Goal: Prevent transmission of infection  Outcome: Ongoing     Problem: Nutrition Deficits  Goal: Optimize nutritional status  Outcome: Ongoing     Problem: Risk for Fluid Volume Deficit  Goal: Maintain normal heart rhythm  Outcome: Ongoing  Goal: Maintain absence of muscle cramping  Outcome: Ongoing  Goal: Maintain normal serum potassium, sodium, calcium, phosphorus, and pH  Outcome: Ongoing     Problem: Loneliness or Risk for Loneliness  Goal: Demonstrate positive use of time alone when socialization is not possible  Outcome: Ongoing     Problem: Fatigue  Goal: Verbalize increase energy and improved vitality  Outcome: Ongoing     Problem: Patient Education: Go to Patient Education Activity  Goal: Patient/Family Education  Outcome: Ongoing     Problem: Pain:  Goal: Pain level will decrease  Description: Pain level will decrease  Outcome: Ongoing  Goal: Control of acute pain  Description: Control of acute pain  Outcome: Ongoing  Goal: Control of chronic pain  Description: Control of chronic pain  Outcome: Ongoing     Problem: Falls - Risk of:  Goal: Will remain free from falls  Description: Will remain free from falls  Outcome: Ongoing  Goal: Absence of physical injury  Description: Absence of physical injury  Outcome: Ongoing

## 2022-02-04 NOTE — PROGRESS NOTES
Patient discharged to family vehicle per wheelchair, 02 4l nc on, by transport, all Prescriptions and discharge instructions, and all belongings with patient.

## 2022-02-04 NOTE — CARE COORDINATION
DISCHARGE ORDER  Date/Time 2022 4:01 PM  Completed by: Dioni Bartlett RN, Case Management    Patient Name: Fernanda Armando      : 1960  Admitting Diagnosis: Pulmonary nodule [R91.1]  Acute respiratory failure with hypoxia ([facility] [address] [J96.01]  Pulmonary emphysema, unspecified emphysema type ([address] [J43.9]  COVID [U07.1]  Pneumonia due to COVID-19 virus [U07.1, J12.82]      Admit order Date and Status:2022  (verify MD's last order for status of admission)      Noted discharge order. If applicable PT/OT recommendation at Discharge: home with 24hr supervision and home therapy  DME recommendation by PT/OT:needs met  Confirmed discharge plan: Yes  with whom__pt_____________  If pt confirmed DC plan does family need to be contacted by CM No if yes who______  Discharge Plan: Chart reviewed. Spoke with pt and she is returning home with family and is agreeable to [facility], pt has no preference in agency. TC to Woo Kim with [facility] and she is aware pt will discharge home today and states she can do a [facility] on Monday. Pt active with [facility] and will discharge on home dose of 5L. Pt states family will bring portable O2 tank from home. No further dc needs voiced or identified. Reviewed chart. Role of discharge planner explained and patient verbalized understanding. Discharge order is noted. Has Home O2 in place on admit:  Yes  Informed of need to bring portable home O2 tank on day of discharge for nursing to connect prior to leaving:   Yes  Verbalized agreement/Understanding:   Yes  Pt is being d/c'd to home today. Pt's O2 sats are 96% on 5L. Discharge timeout done with Woo Kim. All discharge needs and concerns addressed.

## 2022-02-04 NOTE — PROGRESS NOTES
Pt a/o. Am assessment completed see flow sheet. Pain meds given as requested, and then patient resting with eyes closed, relaxed facial expressions noted. Call light in reach and patient able to make needs known.

## 2022-02-04 NOTE — DISCHARGE INSTR - COC
Discharge [address] Instructions History        Expand All  Collapse All    Malini Oliveira RN  22 1546  Continuity of Care Form        PHYSICIAN SECTION     Prognosis: Fair     Condition at Discharge: Stable     Rehab Potential (if transferring to Rehab): Fair     Recommended Labs or Other Treatments After Discharge:      Physician Certification: I certify the above information and transfer of Han Kimbrough  is necessary for the continuing treatment of the diagnosis listed and that she requires Home Care for less 30 days.       Update Admission H&P: No change in H&P     PHYSICIAN SIGNATURE:  Electronically signed by Nessa Cheung MD on 22 at 3:22 PM EST        Malini Oliveira RN  22 2301 [facility] Sera Hines MD  22 1522  Continuity of Care Form     Patient Name: Han Kimbrough   :  1960                       MRN:  [id number]     Admit date:  2022                      Discharge date:  2022     Code Status Order: Full Code   Advance Directives:       Admitting Physician:  Marylin Alvarez MD  PCP: Uma Nicholas MD     Discharging Nurse: [facility] Unit/Room#: 0228/0228-01  Discharging Unit Phone Number: ***     Emergency Contact:   Extended Emergency Contact Information  Primary Emergency Contact: Damian Iverson  Address: [address] Phone: [phone]  Mobile Phone: [phone]  Relation: Spouse  Secondary Emergency Contact: Ana Paula [address] Phone: [phone]  Relation: Child     Past Surgical History:        Past Surgical History:   Procedure Laterality Date     SECTION         x2    CHOLECYSTECTOMY             Immunization History:        Immunization History   Administered Date(s) Administered    Influenza Vaccine, unspecified formulation 2015, 2017, 2017    Influenza Virus Vaccine 2015, 2017, 12/27/2017, 09/18/2019    Influenza, Harrison Elias, 6 mo and older, IM, PF (Flulaval, Fluarix) 01/03/2019    Influenza, Quadv, Recombinant, IM PF (Flublok 18 yrs and older) 11/11/2020    Pneumococcal Polysaccharide ([id number]) 07/28/2018    Tdap (Boostrix, Adacel) 06/02/2014         Active Problems:       Patient Active Problem List   Diagnosis Code    Acute respiratory failure with hypercapnia ([facility]) J96.02    COPD exacerbation ([facility]) J44.1    Tobacco abuse Z72.0    Hypothyroidism E03.9    Acute on chronic respiratory failure with hypoxia ([facility]) J96.21    Community acquired bacterial pneumonia J15.9    Pneumonia due to organism J18.9    Acute exacerbation of chronic obstructive pulmonary disease (COPD) ([facility] [address] J44.1    SVT (supraventricular tachycardia) ([facility]) I47.1    Mild protein-calorie malnutrition ([facility]) E44.1    COPD, severe ([facility]) J44.9    Chronic respiratory failure with hypoxia ([facility]) J96.11    Chest pain on breathing R07.1    HCAP (healthcare-associated pneumonia) J18.9    Chronic bilateral pleural effusions J90    Acute on chronic respiratory failure ([address] J96.20    Sepsis with acute hypercapnic respiratory failure without septic shock ([address] A41.9, R65.20, J96.02    Sepsis ([facility] [address] A41.9    Mucus plugging of bronchi T17.500A    Acute pulmonary edema ([facility]) J81.0    Acute on chronic respiratory failure with hypoxia and hypercapnia ([facility]) J96.21, J96.22    Pneumonia due to COVID-19 virus U07.1, J12.82    COVID U07.1    Acute respiratory failure with hypoxia ([facility]) J96.01    COVID-19 virus infection U07.1    Pulmonary nodule R91.1    Chronic hypoxemic respiratory failure ([facility]) J96.11    Anxiety F41.9    Acute and chronic respiratory failure with hypoxia ([facility]) J96.21         Isolation/Infection:   Isolation               Droplet Plus             Patient Infection Status         Infection Onset Added Last Indicated Last Indicated By Review Planned Expiration Resolved Resolved By     COVID-19 01/29/22 01/29/22 01/29/22 COVID-19 & Influenza Combo 02/05/22 02/12/22         Resolved     COVID-19 (Rule Out) 01/29/22 01/29/22 01/29/22 COVID-19 & Influenza Combo (Ordered)     01/29/22 Rule-Out Test Resulted     COVID-19 (Rule Out) 05/14/21 05/14/21 05/14/21 COVID-19, Rapid (Ordered)     05/14/21 Rule-Out Test Resulted     COVID-19 (Rule Out) 04/25/21 04/25/21 04/25/21 COVID-19, Rapid (Ordered)     04/25/21 Rule-Out Test Resulted     COVID-19 (Rule Out) 08/23/20 08/23/20 08/23/20 COVID-19 (Ordered)     08/24/20 Rule-Out Test Resulted                Nurse Assessment:  Last Vital Signs: BP (!) 158/78   Pulse 113   Temp 97.7 °F (36.5 °C) (Oral)   Resp 18   Ht 5' 4\" (1.626 m)   Wt 140 lb (63.5 kg)   LMP  (LMP Unknown)   SpO2 97%   BMI 24.03 kg/m²     Last documented pain score (0-10 scale): Pain Level: 0  Last Weight:       Wt Readings from Last 1 Encounters:   01/29/22 140 lb (63.5 kg)      Mental Status:  {IP PT MENTAL STATUS:20030}     IV Access:  { HIRAM IV ACCESS:596742791}     Nursing Mobility/ADLs:  Walking            {CHP DME SPYD:831790065}  Transfer            {CHP DME GZEY:855495087}  Bathing             {CHP DME DJGU:283577010}  Dressing           {CHP DME ULED:119984403}  Toileting           {CHP DME GFXK:588770625}  Feeding            {CHP DME XIXV:617582466}  Med Admin       {P DME PYED:090895838}  Med Delivery   { HIRAM MED Delivery:527459913}     Wound Care Documentation and Therapy:     Elimination:  Continence: Bowel: {YES / KL:10781}  Bladder: {YES / AE:53759}  Urinary Catheter: {Urinary Catheter:403578975}   Colostomy/Ileostomy/Ileal Conduit: {YES / UO:70815}     Date of Last BM: ***     Intake/Output Summary (Last 24 hours) at 2/4/2022 1521  Last data filed at 2/3/2022 1809      Gross per 24 hour   Intake 120 ml   Output --   Net 120 ml      I/O last 3 completed shifts:   In: 840 [P.O.:840]  Out: 1100 [Urine:1100]     Safety Concerns:     [address] Safety Concerns:203692904}     Impairments/Disabilities:      [address] Impairments/Disabilities:993426079}     Nutrition Therapy:  Current Nutrition Therapy:   [address] HIRAM Diet List:918780483}     Routes of Feeding: {CHP DME Other Feedings:911006256}  Liquids: {Slp liquid thickness:33710}  Daily Fluid Restriction: {CHP DME Yes amt example:811232408}  Last Modified Barium Swallow with Video (Video Swallowing Test): {Done Not Done GWY}     Treatments at the Time of Hospital Discharge:   Respiratory Treatments: ***  Oxygen Therapy:  {Therapy; copd oxygen:29173}  Ventilator:    { CC Vent QQIH:264508875}     Rehab Therapies: {THERAPEUTIC INTERVENTION:3412192784}  Weight Bearing Status/Restrictions: { CC Weight Bearin}  Other Medical Equipment (for information only, NOT a DME order):  {EQUIPMENT:912051875}  Other Treatments: ***     Patient's personal belongings (please select all that are sent with patient):  {[facility] DME Belongings:140787337}     RN SIGNATURE:  {Esignature:353125512}     CASE MANAGEMENT/SOCIAL WORK SECTION     Inpatient Status Date: 2022     Readmission Risk Assessment Score:  Readmission Risk              Risk of Unplanned Readmission:  16            Discharging to Facility/ Agency   Name: [facility]  Address:  [facility]:[phone]  Fax:     Dialysis Facility (if applicable)   Name:  Address:  Dialysis Schedule:  Phone:  Fax:     / signature: Electronically signed by Francisco Mitchell RN on 22 at 3:58 PM EST     PHYSICIAN SECTION     Prognosis: Fair     Condition at Discharge: Stable     Rehab Potential (if transferring to Rehab): Fair     Recommended Labs or Other Treatments After Discharge:      Physician Certification: I certify the above information and transfer of Efrain Toth  is necessary for the continuing treatment of the diagnosis listed and that she requires Home Care for less 30 days.       Update Admission H&P: No change in H&P     PHYSICIAN SIGNATURE:  Electronically signed by Abigail Nunez MD on 22 at 3:22 PM EST Erica Barrios MD  02/04/22 1522                                                                                                                                                                                                                                                                                                                                                                                            Continuity of Care Form      PHYSICIAN SECTION    Prognosis: Fair    Condition at Discharge: Stable    Rehab Potential (if transferring to Rehab): Fair    Recommended Labs or Other Treatments After Discharge: SN, PT/OT  HCV and Zoom Programs  OK for [facility] beginning of next week 5/5-2/6    Physician Certification: I certify the above information and transfer of Cameron Stoner  is necessary for the continuing treatment of the diagnosis listed and that she requires Home Care for less 30 days.      Update Admission H&P: No change in H&P    PHYSICIAN SIGNATURE:  Electronically signed by Erica Barrios MD on 2/4/22 at 3:22 PM EST

## 2022-02-04 NOTE — PROGRESS NOTES
RT Inhaler-Nebulizer Bronchodilator Protocol Note    There is a bronchodilator order in the chart from a provider indicating to follow the RT Bronchodilator Protocol and there is an Initiate RT Inhaler-Nebulizer Bronchodilator Protocol order as well (see protocol at bottom of note). CXR Findings:  No results found. The findings from the last RT Protocol Assessment were as follows:   History Pulmonary Disease: Chronic pulmonary disease  Respiratory Pattern: Dyspnea on exertion or RR 21-25 bpm  Breath Sounds: Slightly diminished and/or crackles  Cough: Strong, spontaneous, non-productive  Indication for Bronchodilator Therapy: Decreased or absent breath sounds  Bronchodilator Assessment Score: 6    Aerosolized bronchodilator medication orders have been revised according to the RT Inhaler-Nebulizer Bronchodilator Protocol below. Respiratory Therapist to perform RT Therapy Protocol Assessment initially then follow the protocol. Repeat RT Therapy Protocol Assessment PRN for score 0-3 or on second treatment, BID, and PRN for scores above 3. No Indications - adjust the frequency to every 6 hours PRN wheezing or bronchospasm, if no treatments needed after 48 hours then discontinue using Per Protocol order mode. If indication present, adjust the RT bronchodilator orders based on the Bronchodilator Assessment Score as indicated below. Use Inhaler orders unless patient has one or more of the following: on home nebulizer, not able to hold breath for 10 seconds, is not alert and oriented, cannot activate and use MDI correctly, or respiratory rate 25 breaths per minute or more, then use the equivalent nebulizer order(s) with same Frequency and PRN reasons based on the score. If a patient is on this medication at home then do not decrease Frequency below that used at home.     0-3 - enter or revise RT bronchodilator order(s) to equivalent RT Bronchodilator order with Frequency of every 4 hours PRN for wheezing or increased work of breathing using Per Protocol order mode. 4-6 - enter or revise RT Bronchodilator order(s) to two equivalent RT bronchodilator orders with one order with BID Frequency and one order with Frequency of every 4 hours PRN wheezing or increased work of breathing using Per Protocol order mode. 7-10 - enter or revise RT Bronchodilator order(s) to two equivalent RT bronchodilator orders with one order with TID Frequency and one order with Frequency of every 4 hours PRN wheezing or increased work of breathing using Per Protocol order mode. 11-13 - enter or revise RT Bronchodilator order(s) to one equivalent RT bronchodilator order with QID Frequency and an Albuterol order with Frequency of every 4 hours PRN wheezing or increased work of breathing using Per Protocol order mode. Greater than 13 - enter or revise RT Bronchodilator order(s) to one equivalent RT bronchodilator order with every 4 hours Frequency and an Albuterol order with Frequency of every 2 hours PRN wheezing or increased work of breathing using Per Protocol order mode.          Electronically signed by Jasper Garland RCP on 2/3/2022 at 7:19 PM

## 2022-02-05 ENCOUNTER — APPOINTMENT (OUTPATIENT)
Dept: GENERAL RADIOLOGY | Age: 62
DRG: 208 | End: 2022-02-05
Payer: MEDICARE

## 2022-02-05 LAB
A/G RATIO: 1.9 (ref 1.1–2.2)
ALBUMIN SERPL-MCNC: 3.5 G/DL (ref 3.4–5)
ALBUMIN SERPL-MCNC: 4 G/DL (ref 3.4–5)
ALP BLD-CCNC: 77 U/L (ref 40–129)
ALT SERPL-CCNC: 47 U/L (ref 10–40)
ANION GAP SERPL CALCULATED.3IONS-SCNC: 5 MMOL/L (ref 3–16)
AST SERPL-CCNC: 55 U/L (ref 15–37)
BASE EXCESS ARTERIAL: 10.2 MMOL/L (ref -3–3)
BASOPHILS ABSOLUTE: 0 K/UL (ref 0–0.2)
BASOPHILS RELATIVE PERCENT: 0.1 %
BILIRUB SERPL-MCNC: <0.2 MG/DL (ref 0–1)
BUN BLDV-MCNC: 21 MG/DL (ref 7–20)
BUN BLDV-MCNC: 26 MG/DL (ref 7–20)
BUN BLDV-MCNC: 27 MG/DL (ref 7–20)
CALCIUM SERPL-MCNC: 8 MG/DL (ref 8.3–10.6)
CALCIUM SERPL-MCNC: 8 MG/DL (ref 8.3–10.6)
CALCIUM SERPL-MCNC: 8.4 MG/DL (ref 8.3–10.6)
CARBOXYHEMOGLOBIN ARTERIAL: 0 % (ref 0–1.5)
CHLORIDE BLD-SCNC: 79 MMOL/L (ref 99–110)
CHLORIDE BLD-SCNC: 82 MMOL/L (ref 99–110)
CHLORIDE BLD-SCNC: 87 MMOL/L (ref 99–110)
CO2: 37 MMOL/L (ref 21–32)
CO2: 39 MMOL/L (ref 21–32)
CO2: 39 MMOL/L (ref 21–32)
CREAT SERPL-MCNC: <0.5 MG/DL (ref 0.6–1.2)
EKG ATRIAL RATE: 120 BPM
EKG ATRIAL RATE: 120 BPM
EKG ATRIAL RATE: 132 BPM
EKG DIAGNOSIS: NORMAL
EKG P AXIS: 83 DEGREES
EKG P AXIS: 85 DEGREES
EKG P AXIS: 88 DEGREES
EKG P-R INTERVAL: 128 MS
EKG P-R INTERVAL: 128 MS
EKG P-R INTERVAL: 132 MS
EKG Q-T INTERVAL: 334 MS
EKG Q-T INTERVAL: 344 MS
EKG Q-T INTERVAL: 346 MS
EKG QRS DURATION: 88 MS
EKG QRS DURATION: 88 MS
EKG QRS DURATION: 94 MS
EKG QTC CALCULATION (BAZETT): 486 MS
EKG QTC CALCULATION (BAZETT): 489 MS
EKG QTC CALCULATION (BAZETT): 494 MS
EKG R AXIS: 238 DEGREES
EKG R AXIS: 84 DEGREES
EKG R AXIS: 88 DEGREES
EKG T AXIS: 70 DEGREES
EKG T AXIS: 73 DEGREES
EKG T AXIS: 75 DEGREES
EKG VENTRICULAR RATE: 120 BPM
EKG VENTRICULAR RATE: 120 BPM
EKG VENTRICULAR RATE: 132 BPM
EOSINOPHILS ABSOLUTE: 0 K/UL (ref 0–0.6)
EOSINOPHILS RELATIVE PERCENT: 0 %
GFR AFRICAN AMERICAN: >60
GFR NON-AFRICAN AMERICAN: >60
GLUCOSE BLD-MCNC: 135 MG/DL (ref 70–99)
GLUCOSE BLD-MCNC: 138 MG/DL (ref 70–99)
GLUCOSE BLD-MCNC: 158 MG/DL (ref 70–99)
GLUCOSE BLD-MCNC: 161 MG/DL (ref 70–99)
HCO3 ARTERIAL: 36.9 MMOL/L (ref 21–29)
HCT VFR BLD CALC: 36.8 % (ref 36–48)
HEMOGLOBIN, ART, EXTENDED: 11.9 G/DL (ref 12–16)
HEMOGLOBIN: 12.2 G/DL (ref 12–16)
LYMPHOCYTES ABSOLUTE: 0.1 K/UL (ref 1–5.1)
LYMPHOCYTES RELATIVE PERCENT: 1 %
MCH RBC QN AUTO: 30.5 PG (ref 26–34)
MCHC RBC AUTO-ENTMCNC: 33.3 G/DL (ref 31–36)
MCV RBC AUTO: 91.5 FL (ref 80–100)
METHEMOGLOBIN ARTERIAL: 0 %
MONOCYTES ABSOLUTE: 0.6 K/UL (ref 0–1.3)
MONOCYTES RELATIVE PERCENT: 4.2 %
NEUTROPHILS ABSOLUTE: 13.2 K/UL (ref 1.7–7.7)
NEUTROPHILS RELATIVE PERCENT: 94.7 %
O2 SAT, ARTERIAL: 98.9 %
O2 THERAPY: ABNORMAL
PCO2 ARTERIAL: 60.3 MMHG (ref 35–45)
PDW BLD-RTO: 12.9 % (ref 12.4–15.4)
PERFORMED ON: ABNORMAL
PH ARTERIAL: 7.41 (ref 7.35–7.45)
PHOSPHORUS: 2.2 MG/DL (ref 2.5–4.9)
PLATELET # BLD: 245 K/UL (ref 135–450)
PMV BLD AUTO: 7.1 FL (ref 5–10.5)
PO2 ARTERIAL: 152.9 MMHG (ref 75–108)
POTASSIUM REFLEX MAGNESIUM: 4.3 MMOL/L (ref 3.5–5.1)
POTASSIUM SERPL-SCNC: 3.9 MMOL/L (ref 3.5–5.1)
POTASSIUM SERPL-SCNC: 4.3 MMOL/L (ref 3.5–5.1)
PRO-BNP: ABNORMAL PG/ML (ref 0–124)
RBC # BLD: 4.02 M/UL (ref 4–5.2)
SODIUM BLD-SCNC: 123 MMOL/L (ref 136–145)
SODIUM BLD-SCNC: 126 MMOL/L (ref 136–145)
SODIUM BLD-SCNC: 129 MMOL/L (ref 136–145)
TCO2 ARTERIAL: 38.8 MMOL/L
TOTAL PROTEIN: 6.1 G/DL (ref 6.4–8.2)
TROPONIN: 0.03 NG/ML
TROPONIN: 0.04 NG/ML
URINE CULTURE, ROUTINE: NORMAL
WBC # BLD: 14 K/UL (ref 4–11)

## 2022-02-05 PROCEDURE — 6370000000 HC RX 637 (ALT 250 FOR IP): Performed by: INTERNAL MEDICINE

## 2022-02-05 PROCEDURE — 1200000000 HC SEMI PRIVATE

## 2022-02-05 PROCEDURE — 94660 CPAP INITIATION&MGMT: CPT

## 2022-02-05 PROCEDURE — 93010 ELECTROCARDIOGRAM REPORT: CPT | Performed by: INTERNAL MEDICINE

## 2022-02-05 PROCEDURE — 71045 X-RAY EXAM CHEST 1 VIEW: CPT

## 2022-02-05 PROCEDURE — 99232 SBSQ HOSP IP/OBS MODERATE 35: CPT | Performed by: PHYSICIAN ASSISTANT

## 2022-02-05 PROCEDURE — 80053 COMPREHEN METABOLIC PANEL: CPT

## 2022-02-05 PROCEDURE — 6360000002 HC RX W HCPCS

## 2022-02-05 PROCEDURE — 2500000003 HC RX 250 WO HCPCS: Performed by: INTERNAL MEDICINE

## 2022-02-05 PROCEDURE — 6370000000 HC RX 637 (ALT 250 FOR IP)

## 2022-02-05 PROCEDURE — 94002 VENT MGMT INPAT INIT DAY: CPT

## 2022-02-05 PROCEDURE — 2580000003 HC RX 258: Performed by: PHYSICIAN ASSISTANT

## 2022-02-05 PROCEDURE — 85025 COMPLETE CBC W/AUTO DIFF WBC: CPT

## 2022-02-05 PROCEDURE — 2580000003 HC RX 258

## 2022-02-05 PROCEDURE — 36415 COLL VENOUS BLD VENIPUNCTURE: CPT

## 2022-02-05 PROCEDURE — 82803 BLOOD GASES ANY COMBINATION: CPT

## 2022-02-05 PROCEDURE — 99291 CRITICAL CARE FIRST HOUR: CPT | Performed by: INTERNAL MEDICINE

## 2022-02-05 PROCEDURE — 6360000002 HC RX W HCPCS: Performed by: PHYSICIAN ASSISTANT

## 2022-02-05 PROCEDURE — 36600 WITHDRAWAL OF ARTERIAL BLOOD: CPT

## 2022-02-05 PROCEDURE — 5A1945Z RESPIRATORY VENTILATION, 24-96 CONSECUTIVE HOURS: ICD-10-PCS | Performed by: INTERNAL MEDICINE

## 2022-02-05 PROCEDURE — 6360000002 HC RX W HCPCS: Performed by: INTERNAL MEDICINE

## 2022-02-05 PROCEDURE — 2580000003 HC RX 258: Performed by: INTERNAL MEDICINE

## 2022-02-05 PROCEDURE — 89220 SPUTUM SPECIMEN COLLECTION: CPT

## 2022-02-05 PROCEDURE — 2500000003 HC RX 250 WO HCPCS

## 2022-02-05 PROCEDURE — 93005 ELECTROCARDIOGRAM TRACING: CPT | Performed by: EMERGENCY MEDICINE

## 2022-02-05 PROCEDURE — 2700000000 HC OXYGEN THERAPY PER DAY

## 2022-02-05 PROCEDURE — 0BH17EZ INSERTION OF ENDOTRACHEAL AIRWAY INTO TRACHEA, VIA NATURAL OR ARTIFICIAL OPENING: ICD-10-PCS | Performed by: INTERNAL MEDICINE

## 2022-02-05 PROCEDURE — 87205 SMEAR GRAM STAIN: CPT

## 2022-02-05 PROCEDURE — 94640 AIRWAY INHALATION TREATMENT: CPT

## 2022-02-05 PROCEDURE — 80202 ASSAY OF VANCOMYCIN: CPT

## 2022-02-05 PROCEDURE — 2000000000 HC ICU R&B

## 2022-02-05 PROCEDURE — 83036 HEMOGLOBIN GLYCOSYLATED A1C: CPT

## 2022-02-05 PROCEDURE — 6370000000 HC RX 637 (ALT 250 FOR IP): Performed by: PHYSICIAN ASSISTANT

## 2022-02-05 PROCEDURE — 84484 ASSAY OF TROPONIN QUANT: CPT

## 2022-02-05 PROCEDURE — 87070 CULTURE OTHR SPECIMN AEROBIC: CPT

## 2022-02-05 PROCEDURE — 94761 N-INVAS EAR/PLS OXIMETRY MLT: CPT

## 2022-02-05 PROCEDURE — 2500000003 HC RX 250 WO HCPCS: Performed by: EMERGENCY MEDICINE

## 2022-02-05 RX ORDER — FENTANYL CITRATE-0.9 % NACL/PF 10 MCG/ML
12.5-2 PLASTIC BAG, INJECTION (ML) INTRAVENOUS CONTINUOUS
Status: DISCONTINUED | OUTPATIENT
Start: 2022-02-06 | End: 2022-02-09

## 2022-02-05 RX ORDER — POLYETHYLENE GLYCOL 3350 17 G/17G
17 POWDER, FOR SOLUTION ORAL DAILY PRN
Status: DISCONTINUED | OUTPATIENT
Start: 2022-02-05 | End: 2022-02-13 | Stop reason: HOSPADM

## 2022-02-05 RX ORDER — FUROSEMIDE 10 MG/ML
20 INJECTION INTRAMUSCULAR; INTRAVENOUS ONCE
Status: COMPLETED | OUTPATIENT
Start: 2022-02-05 | End: 2022-02-05

## 2022-02-05 RX ORDER — SODIUM CHLORIDE 0.9 % (FLUSH) 0.9 %
5-40 SYRINGE (ML) INJECTION EVERY 12 HOURS SCHEDULED
Status: DISCONTINUED | OUTPATIENT
Start: 2022-02-05 | End: 2022-02-13 | Stop reason: HOSPADM

## 2022-02-05 RX ORDER — PROPOFOL 10 MG/ML
5-50 INJECTION, EMULSION INTRAVENOUS
Status: DISCONTINUED | OUTPATIENT
Start: 2022-02-05 | End: 2022-02-05

## 2022-02-05 RX ORDER — PROPOFOL 10 MG/ML
10 INJECTION, EMULSION INTRAVENOUS CONTINUOUS
Status: DISCONTINUED | OUTPATIENT
Start: 2022-02-05 | End: 2022-02-09

## 2022-02-05 RX ORDER — DEXTROSE MONOHYDRATE 50 MG/ML
100 INJECTION, SOLUTION INTRAVENOUS PRN
Status: DISCONTINUED | OUTPATIENT
Start: 2022-02-05 | End: 2022-02-13 | Stop reason: HOSPADM

## 2022-02-05 RX ORDER — SODIUM CHLORIDE 9 MG/ML
25 INJECTION, SOLUTION INTRAVENOUS PRN
Status: DISCONTINUED | OUTPATIENT
Start: 2022-02-05 | End: 2022-02-05 | Stop reason: SDUPTHER

## 2022-02-05 RX ORDER — ALBUTEROL SULFATE 90 UG/1
2 AEROSOL, METERED RESPIRATORY (INHALATION) EVERY 6 HOURS PRN
Status: DISCONTINUED | OUTPATIENT
Start: 2022-02-05 | End: 2022-02-05

## 2022-02-05 RX ORDER — SODIUM CHLORIDE 9 MG/ML
INJECTION, SOLUTION INTRAVENOUS CONTINUOUS
Status: DISCONTINUED | OUTPATIENT
Start: 2022-02-05 | End: 2022-02-08

## 2022-02-05 RX ORDER — ROCURONIUM BROMIDE 10 MG/ML
60 INJECTION, SOLUTION INTRAVENOUS ONCE
Status: COMPLETED | OUTPATIENT
Start: 2022-02-05 | End: 2022-02-05

## 2022-02-05 RX ORDER — SODIUM CHLORIDE 9 MG/ML
INJECTION, SOLUTION INTRAVENOUS CONTINUOUS
Status: DISCONTINUED | OUTPATIENT
Start: 2022-02-05 | End: 2022-02-05 | Stop reason: SDUPTHER

## 2022-02-05 RX ORDER — CARBOXYMETHYLCELLULOSE SODIUM 10 MG/ML
1 GEL OPHTHALMIC EVERY 4 HOURS
Status: DISCONTINUED | OUTPATIENT
Start: 2022-02-05 | End: 2022-02-09

## 2022-02-05 RX ORDER — HYDROCODONE BITARTRATE AND ACETAMINOPHEN 5; 325 MG/1; MG/1
1 TABLET ORAL EVERY 6 HOURS PRN
Status: DISCONTINUED | OUTPATIENT
Start: 2022-02-05 | End: 2022-02-13 | Stop reason: HOSPADM

## 2022-02-05 RX ORDER — CHLORHEXIDINE GLUCONATE 0.12 MG/ML
15 RINSE ORAL 2 TIMES DAILY
Status: DISCONTINUED | OUTPATIENT
Start: 2022-02-05 | End: 2022-02-09

## 2022-02-05 RX ORDER — BUSPIRONE HYDROCHLORIDE 10 MG/1
10 TABLET ORAL 3 TIMES DAILY
Status: DISCONTINUED | OUTPATIENT
Start: 2022-02-05 | End: 2022-02-13 | Stop reason: HOSPADM

## 2022-02-05 RX ORDER — PREDNISONE 20 MG/1
40 TABLET ORAL
Status: DISCONTINUED | OUTPATIENT
Start: 2022-02-07 | End: 2022-02-06

## 2022-02-05 RX ORDER — WHITE PETROLATUM 57.7 %-MINERAL OIL 31.9 % EYE OINTMENT
EVERY 4 HOURS
Status: DISCONTINUED | OUTPATIENT
Start: 2022-02-05 | End: 2022-02-09

## 2022-02-05 RX ORDER — ALBUTEROL SULFATE 90 UG/1
4 AEROSOL, METERED RESPIRATORY (INHALATION) EVERY 4 HOURS PRN
Status: DISCONTINUED | OUTPATIENT
Start: 2022-02-05 | End: 2022-02-08

## 2022-02-05 RX ORDER — SODIUM CHLORIDE 0.9 % (FLUSH) 0.9 %
5-40 SYRINGE (ML) INJECTION PRN
Status: DISCONTINUED | OUTPATIENT
Start: 2022-02-05 | End: 2022-02-13 | Stop reason: HOSPADM

## 2022-02-05 RX ORDER — LIDOCAINE 4 G/G
1 PATCH TOPICAL DAILY
Status: DISCONTINUED | OUTPATIENT
Start: 2022-02-05 | End: 2022-02-13 | Stop reason: HOSPADM

## 2022-02-05 RX ORDER — ETOMIDATE 2 MG/ML
20 INJECTION INTRAVENOUS ONCE
Status: COMPLETED | OUTPATIENT
Start: 2022-02-05 | End: 2022-02-05

## 2022-02-05 RX ORDER — SODIUM CHLORIDE 0.9 % (FLUSH) 0.9 %
10 SYRINGE (ML) INJECTION PRN
Status: DISCONTINUED | OUTPATIENT
Start: 2022-02-05 | End: 2022-02-05 | Stop reason: SDUPTHER

## 2022-02-05 RX ORDER — SODIUM CHLORIDE 9 MG/ML
25 INJECTION, SOLUTION INTRAVENOUS PRN
Status: DISCONTINUED | OUTPATIENT
Start: 2022-02-05 | End: 2022-02-13 | Stop reason: HOSPADM

## 2022-02-05 RX ORDER — ALBUTEROL SULFATE 90 UG/1
2 AEROSOL, METERED RESPIRATORY (INHALATION)
Status: DISCONTINUED | OUTPATIENT
Start: 2022-02-05 | End: 2022-02-05

## 2022-02-05 RX ORDER — SODIUM CHLORIDE 0.9 % (FLUSH) 0.9 %
10 SYRINGE (ML) INJECTION EVERY 12 HOURS SCHEDULED
Status: DISCONTINUED | OUTPATIENT
Start: 2022-02-05 | End: 2022-02-05 | Stop reason: SDUPTHER

## 2022-02-05 RX ORDER — DEXTROSE MONOHYDRATE 25 G/50ML
12.5 INJECTION, SOLUTION INTRAVENOUS PRN
Status: DISCONTINUED | OUTPATIENT
Start: 2022-02-05 | End: 2022-02-05 | Stop reason: CLARIF

## 2022-02-05 RX ORDER — IPRATROPIUM BROMIDE AND ALBUTEROL SULFATE 2.5; .5 MG/3ML; MG/3ML
1 SOLUTION RESPIRATORY (INHALATION)
Status: DISCONTINUED | OUTPATIENT
Start: 2022-02-05 | End: 2022-02-07

## 2022-02-05 RX ORDER — IPRATROPIUM BROMIDE AND ALBUTEROL SULFATE 2.5; .5 MG/3ML; MG/3ML
1 SOLUTION RESPIRATORY (INHALATION) EVERY 4 HOURS PRN
Status: DISCONTINUED | OUTPATIENT
Start: 2022-02-05 | End: 2022-02-05

## 2022-02-05 RX ORDER — GUAIFENESIN/DEXTROMETHORPHAN 100-10MG/5
5 SYRUP ORAL EVERY 4 HOURS PRN
Status: DISCONTINUED | OUTPATIENT
Start: 2022-02-05 | End: 2022-02-13 | Stop reason: HOSPADM

## 2022-02-05 RX ORDER — NICOTINE POLACRILEX 4 MG
15 LOZENGE BUCCAL PRN
Status: DISCONTINUED | OUTPATIENT
Start: 2022-02-05 | End: 2022-02-13 | Stop reason: HOSPADM

## 2022-02-05 RX ORDER — ACETAMINOPHEN 650 MG/1
650 SUPPOSITORY RECTAL EVERY 6 HOURS PRN
Status: DISCONTINUED | OUTPATIENT
Start: 2022-02-05 | End: 2022-02-13 | Stop reason: HOSPADM

## 2022-02-05 RX ORDER — ASPIRIN 81 MG/1
81 TABLET ORAL DAILY
Status: DISCONTINUED | OUTPATIENT
Start: 2022-02-05 | End: 2022-02-13 | Stop reason: HOSPADM

## 2022-02-05 RX ORDER — NICOTINE 21 MG/24HR
1 PATCH, TRANSDERMAL 24 HOURS TRANSDERMAL DAILY
Status: DISCONTINUED | OUTPATIENT
Start: 2022-02-05 | End: 2022-02-13 | Stop reason: HOSPADM

## 2022-02-05 RX ORDER — SODIUM CHLORIDE 9 MG/ML
INJECTION, SOLUTION INTRAVENOUS CONTINUOUS
Status: DISCONTINUED | OUTPATIENT
Start: 2022-02-05 | End: 2022-02-05

## 2022-02-05 RX ORDER — MIDAZOLAM HYDROCHLORIDE 1 MG/ML
2 INJECTION INTRAMUSCULAR; INTRAVENOUS
Status: DISCONTINUED | OUTPATIENT
Start: 2022-02-05 | End: 2022-02-10

## 2022-02-05 RX ORDER — METHYLPREDNISOLONE SODIUM SUCCINATE 40 MG/ML
40 INJECTION, POWDER, LYOPHILIZED, FOR SOLUTION INTRAMUSCULAR; INTRAVENOUS EVERY 12 HOURS
Status: DISCONTINUED | OUTPATIENT
Start: 2022-02-05 | End: 2022-02-06

## 2022-02-05 RX ORDER — ACETAMINOPHEN 325 MG/1
650 TABLET ORAL EVERY 6 HOURS PRN
Status: DISCONTINUED | OUTPATIENT
Start: 2022-02-05 | End: 2022-02-13 | Stop reason: HOSPADM

## 2022-02-05 RX ORDER — MONTELUKAST SODIUM 10 MG/1
10 TABLET ORAL NIGHTLY
Status: DISCONTINUED | OUTPATIENT
Start: 2022-02-05 | End: 2022-02-13 | Stop reason: HOSPADM

## 2022-02-05 RX ORDER — PREDNISONE 1 MG/1
5 TABLET ORAL
Status: DISCONTINUED | OUTPATIENT
Start: 2022-02-05 | End: 2022-02-05 | Stop reason: ALTCHOICE

## 2022-02-05 RX ORDER — FENTANYL CITRATE 50 UG/ML
50 INJECTION, SOLUTION INTRAMUSCULAR; INTRAVENOUS
Status: DISCONTINUED | OUTPATIENT
Start: 2022-02-05 | End: 2022-02-09

## 2022-02-05 RX ADMIN — METHYLPREDNISOLONE SODIUM SUCCINATE 40 MG: 40 INJECTION, POWDER, FOR SOLUTION INTRAMUSCULAR; INTRAVENOUS at 09:41

## 2022-02-05 RX ADMIN — IPRATROPIUM BROMIDE AND ALBUTEROL SULFATE 1 AMPULE: 2.5; .5 SOLUTION RESPIRATORY (INHALATION) at 16:30

## 2022-02-05 RX ADMIN — SODIUM CHLORIDE: 9 INJECTION, SOLUTION INTRAVENOUS at 23:00

## 2022-02-05 RX ADMIN — BUSPIRONE HYDROCHLORIDE 10 MG: 10 TABLET ORAL at 23:16

## 2022-02-05 RX ADMIN — ENOXAPARIN SODIUM 30 MG: 100 INJECTION SUBCUTANEOUS at 09:40

## 2022-02-05 RX ADMIN — VANCOMYCIN HYDROCHLORIDE 1000 MG: 1 INJECTION, POWDER, LYOPHILIZED, FOR SOLUTION INTRAVENOUS at 11:43

## 2022-02-05 RX ADMIN — MIDAZOLAM HYDROCHLORIDE 2 MG: 1 INJECTION, SOLUTION INTRAMUSCULAR; INTRAVENOUS at 18:51

## 2022-02-05 RX ADMIN — FAMOTIDINE 20 MG: 10 INJECTION, SOLUTION INTRAVENOUS at 11:38

## 2022-02-05 RX ADMIN — FENTANYL CITRATE 50 MCG/HR: 0.05 INJECTION, SOLUTION INTRAMUSCULAR; INTRAVENOUS at 11:49

## 2022-02-05 RX ADMIN — ETOMIDATE 20 MG: 2 INJECTION, SOLUTION INTRAVENOUS at 10:56

## 2022-02-05 RX ADMIN — PROPOFOL 15 MCG/KG/MIN: 10 INJECTION, EMULSION INTRAVENOUS at 23:22

## 2022-02-05 RX ADMIN — PROPOFOL 10 MCG/KG/MIN: 10 INJECTION, EMULSION INTRAVENOUS at 11:19

## 2022-02-05 RX ADMIN — BUSPIRONE HYDROCHLORIDE 10 MG: 10 TABLET ORAL at 09:41

## 2022-02-05 RX ADMIN — FAMOTIDINE 20 MG: 10 INJECTION, SOLUTION INTRAVENOUS at 23:16

## 2022-02-05 RX ADMIN — LEVOTHYROXINE SODIUM 125 MCG: 0.03 TABLET ORAL at 07:42

## 2022-02-05 RX ADMIN — MIDAZOLAM HYDROCHLORIDE 2 MG: 1 INJECTION, SOLUTION INTRAMUSCULAR; INTRAVENOUS at 22:16

## 2022-02-05 RX ADMIN — METHYLPREDNISOLONE SODIUM SUCCINATE 125 MG: 125 INJECTION, POWDER, FOR SOLUTION INTRAMUSCULAR; INTRAVENOUS at 00:06

## 2022-02-05 RX ADMIN — SODIUM CHLORIDE: 9 INJECTION, SOLUTION INTRAVENOUS at 05:08

## 2022-02-05 RX ADMIN — CEFEPIME 2000 MG: 2 INJECTION, POWDER, FOR SOLUTION INTRAMUSCULAR; INTRAVENOUS at 23:13

## 2022-02-05 RX ADMIN — ASPIRIN 81 MG: 81 TABLET, COATED ORAL at 09:41

## 2022-02-05 RX ADMIN — MIDAZOLAM HYDROCHLORIDE 2 MG: 1 INJECTION, SOLUTION INTRAMUSCULAR; INTRAVENOUS at 12:39

## 2022-02-05 RX ADMIN — MONTELUKAST SODIUM 10 MG: 10 TABLET, COATED ORAL at 23:16

## 2022-02-05 RX ADMIN — CARBOXYMETHYLCELLULOSE SODIUM 1 DROP: 10 GEL OPHTHALMIC at 23:14

## 2022-02-05 RX ADMIN — WHITE PETROLATUM 57.7 %-MINERAL OIL 31.9 % EYE OINTMENT: at 18:51

## 2022-02-05 RX ADMIN — ROCURONIUM BROMIDE 60 MG: 10 INJECTION, SOLUTION INTRAVENOUS at 10:57

## 2022-02-05 RX ADMIN — IPRATROPIUM BROMIDE AND ALBUTEROL SULFATE 1 AMPULE: .5; 2.5 SOLUTION RESPIRATORY (INHALATION) at 00:07

## 2022-02-05 RX ADMIN — IPRATROPIUM BROMIDE AND ALBUTEROL SULFATE 1 AMPULE: 2.5; .5 SOLUTION RESPIRATORY (INHALATION) at 18:46

## 2022-02-05 RX ADMIN — CHLORHEXIDINE GLUCONATE 0.12% ORAL RINSE 15 ML: 1.2 LIQUID ORAL at 18:51

## 2022-02-05 RX ADMIN — IPRATROPIUM BROMIDE AND ALBUTEROL SULFATE 1 AMPULE: 2.5; .5 SOLUTION RESPIRATORY (INHALATION) at 23:05

## 2022-02-05 RX ADMIN — MORPHINE SULFATE 4 MG: 4 INJECTION INTRAVENOUS at 00:06

## 2022-02-05 RX ADMIN — WHITE PETROLATUM 57.7 %-MINERAL OIL 31.9 % EYE OINTMENT: at 23:14

## 2022-02-05 RX ADMIN — Medication 100 MCG/HR: at 23:59

## 2022-02-05 RX ADMIN — ENOXAPARIN SODIUM 30 MG: 100 INJECTION SUBCUTANEOUS at 23:14

## 2022-02-05 RX ADMIN — METHYLPREDNISOLONE SODIUM SUCCINATE 40 MG: 40 INJECTION, POWDER, FOR SOLUTION INTRAMUSCULAR; INTRAVENOUS at 23:16

## 2022-02-05 RX ADMIN — FUROSEMIDE 20 MG: 10 INJECTION, SOLUTION INTRAMUSCULAR; INTRAVENOUS at 10:34

## 2022-02-05 RX ADMIN — SODIUM CHLORIDE: 9 INJECTION, SOLUTION INTRAVENOUS at 13:12

## 2022-02-05 RX ADMIN — VANCOMYCIN HYDROCHLORIDE 1250 MG: 10 INJECTION, POWDER, LYOPHILIZED, FOR SOLUTION INTRAVENOUS at 00:35

## 2022-02-05 RX ADMIN — CEFEPIME 2000 MG: 2 INJECTION, POWDER, FOR SOLUTION INTRAMUSCULAR; INTRAVENOUS at 11:37

## 2022-02-05 ASSESSMENT — PAIN SCALES - GENERAL
PAINLEVEL_OUTOF10: 10
PAINLEVEL_OUTOF10: 0
PAINLEVEL_OUTOF10: 0

## 2022-02-05 ASSESSMENT — PULMONARY FUNCTION TESTS
PIF_VALUE: 30
PIF_VALUE: 35
PIF_VALUE: 24
PIF_VALUE: 26

## 2022-02-05 NOTE — PROGRESS NOTES
02/05/22 1846   Vent Information   $Ventilation $Initial Day   Vent Type 840   Vent Mode AC/VC   Vt Ordered 350 mL   Rate Set 22 bmp   Peak Flow 55 L/min   FiO2  50 %   SpO2 91 %   SpO2/FiO2 ratio 182   Sensitivity 3   PEEP/CPAP 6   Humidification Source HME   Vent Patient Data   Peak Inspiratory Pressure 24 cmH2O   Mean Airway Pressure 11 cmH20   Rate Measured 23 br/min   Vt Exhaled 351 mL   Minute Volume 8.44 Liters   I:E Ratio 1:2.9   Plateau Pressure 17 [id number]   Cough/Sputum   Sputum How Obtained Suctioned;Endotracheal   Spontaneous Breathing Trial (SBT) RT Doc   Pulse 132   Additional Respiratory  Assessments   Resp 25   Alarm Settings   High Pressure Alarm 40 cmH2O   Low Minute Volume Alarm 4 L/min   Apnea (secs) 20 secs   High Respiratory Rate 40 br/min   Low Exhaled Vt  250 mL   Non-Surgical Airway 02/05/22 Endo Tracheal Tube   Placement Date/Time: 02/05/22 1100   Timeout: Patient;Procedure;Site/Side;Appropriate Equipment; Consent Confirmed;Site prepped with chlorhexidine;Correct Position  Mask Ventilation: Ventilated by mask (1)  Placed By: In ED;Licensed provider  Inserted . ..    Secured at 24 cm   Measured From [address] By Commercial tube [email or web address]   Site Condition Dry

## 2022-02-05 NOTE — PROGRESS NOTES
02/05/22 1737   Vent Information   Vent Type 840   Vent Mode AC/VC   Vt Ordered 35 mL   Rate Set 26 bmp   Peak Flow 55 L/min   FiO2  40 %   SpO2 97 %   SpO2/FiO2 ratio 242.5   Sensitivity 3   PEEP/CPAP 6   Humidification Source HME   Vent Patient Data   Peak Inspiratory Pressure 26 cmH2O   Mean Airway Pressure 11 cmH20   Rate Measured 26 br/min   Vt Exhaled 348 mL   Minute Volume 12.1 Liters   I:E Ratio 1:3.1   Plateau Pressure 21 WMI14   Cough/Sputum   Sputum How Obtained Endotracheal   Cough Productive   Sputum Amount Large   Sputum Color Creamy   Tenacity Thick; Thin   Breath Sounds   Right Upper Lobe Crackles   Right Middle Lobe Crackles   Right Lower Lobe Crackles   Left Upper Lobe Crackles   Left Lower Lobe Crackles   Additional Respiratory  Assessments   Resp 26   Alarm Settings   High Pressure Alarm 40 cmH2O   Low Minute Volume Alarm 4 L/min   Apnea (secs) 20 secs   High Respiratory Rate 40 br/min   Low Exhaled Vt  250 mL   Non-Surgical Airway 02/05/22 Endo Tracheal Tube   Placement Date/Time: 02/05/22 1100   Timeout: Patient;Procedure;Site/Side;Appropriate Equipment; Consent Confirmed;Site prepped with chlorhexidine;Correct Position  Mask Ventilation: Ventilated by mask (1)  Placed By: In ED;Licensed provider  Inserted . ..    Secured at 24 cm   Measured From Lips   Secured Location Right   Secured By Commercial tube [email or web address]   Site Condition Dry

## 2022-02-05 NOTE — H&P
Hospital Medicine History & Physical      PCP: Rosemarie Mahoney MD    Date of Service: Pt seen/examined on 22 and admitted on 22 to Inpatient    Chief Complaint   Patient presents with    Shortness of Breath     squad states pt was dx with covid pneumonia and had an o2 of 80% on 5L. Pt is not oriented to person, time, or place       History Of Present Illness: The patient is a 59 y.o. female with PMH below, presents with SOB, increased O2 demand, resp distress, MS change. PT was just DC from here earlier today. She is unable to give Hx at this time. She is on NIV. Per ED and EMR, EMS reported pt was found in resp distress and satting 80% on 5L. She is normally on 4L O2 at home. She was almost immediately transitioned to BIPAP in the ED  resp distress. She is awake but does not interact very well. Past Medical History:        Diagnosis Date    Asthma     COPD (chronic obstructive pulmonary disease) ([facility] [address]     Thyroid disease        Past Surgical History:        Procedure Laterality Date     SECTION      x2    CHOLECYSTECTOMY         Medications Prior to Admission:    Prior to Admission medications    Medication Sig Start Date End Date Taking? Authorizing Provider   HYDROcodone-acetaminophen (NORCO) 5-325 MG per tablet Take 1 tablet by mouth every 6 hours as needed for Pain for up to 5 days. 22  Isa Garber MD   busPIRone (BUSPAR) 10 MG tablet Take 1 tablet by mouth 3 times daily 22   Isa Garber MD   predniSONE (DELTASONE) 10 MG tablet Take 4 tabs a day for 4 days ,   Then 3 tabs a day for 4 days,  Then 2 tabs a day for 4 days ,  Then 1 tab a day for 4 days.    after that go back to 5 mg daily 22   Isa Garber MD   predniSONE (DELTASONE) 5 MG tablet Take 1 tablet by mouth daily 22   Isa Garber MD   guaiFENesin-dextromethorphan [facility] DM) 100-10 MG/5ML syrup Take 5 mLs by mouth every 4 hours as needed for Cough 22 Karli Gonsalez MD   lidocaine 4 % external patch Place 1 patch onto the skin daily 2/5/22   Karli Gonsalez MD   furosemide (LASIX) 20 MG tablet Take 1 tablet by mouth daily 2/4/22   Karli Gonsalez MD   nicotine (NICODERM CQ) 21 MG/24HR Place 1 patch onto the skin daily 2/5/22   Karli Gonsalez MD   albuterol (PROVENTIL) (2.5 MG/3ML) 0.083% nebulizer solution INHALE THE CONTENTS OF 1 VIAL VIA NEBULIZER EVERY 6 HOURS AS NEEDED FOR SHORTNESS OF BREATH  OR  WHEEZING 1/17/22   Justin Guallpa MD   guaiFENesin (MUCINEX) 600 MG extended release tablet Take 1 tablet by mouth 2 times daily 8/26/20   PAN Cheema CNP   fluticasone [facility]) 50 MCG/ACT nasal spray 1 spray by Each Nostril route daily 8/27/20   PAN Cheema CNP   levothyroxine (SYNTHROID) 125 MCG tablet Take 1 tablet by mouth every morning (before breakfast) 8/27/20   PAN Cheema CNP   aspirin 81 MG EC tablet Take 1 tablet by mouth daily 1/7/20   Karli Gonsalez MD   montelukast (SINGULAIR) 10 MG tablet Take 1 tablet by mouth nightly 1/6/20   Karli Gonsalez MD   Multiple Vitamin (MULTIVITAMIN) tablet Take 1 tablet by mouth daily 1/6/20   Karli Gonsalez MD   Fluticasone-Umeclidin-Vilant (TRELEGY ELLIPTA) 588-48.2-64 MCG/INH AEPB Inhale 1 puff into the lungs daily 7/5/19   Historical Provider, MD   albuterol sulfate  (90 Base) MCG/ACT inhaler Inhale 2 puffs into the lungs every 6 hours as needed for Wheezing 1/17/19   Efren Hayden MD       Allergies:  Promethazine and Codeine    Social History:    TOBACCO:   reports that she has been smoking cigarettes. She has a 22.00 pack-year smoking history. She has never used smokeless tobacco.  ETOH:   reports current alcohol use. Family History:  Reviewed in detail and negative for DM, Early CAD, Cancer (except as below). Positive as follows:    History reviewed. No pertinent family history. REVIEW OF SYSTEMS:   Unable to obtain at this time.       PHYSICAL EXAM PERFORMED:  BP (!) 144/88   Pulse 116   Temp 97 °F (36.1 °C) (Axillary)   Resp 25   Ht 5' 4\" (1.626 m)   Wt 140 lb (63.5 kg)   LMP  (LMP Unknown)   SpO2 97%   BMI 24.03 kg/m²     GEN:  Awake and alert, does not follow commands very well. No NIV. HEENT:  NC/AT,EOMI, semi dry MM, no erythema/exudates or visible masses. CVS:  Normal S1,S2. Tachy RRR. Without M/G/R.   LUNG:   Course mechanical central BS. Some rhonchi, wheezes, diminished at bases. Tachypnea. Increased WOB. Some acc mm use. ABD:  Soft, ND/NT, BS+ x4. Without G/R.  EXT: 2+ pulses, no c/c/e. Brisk cap refill. PSY:  Thought process confused, affect anxious. RUIZ:  CN III-XII intact, moves all 4 spontaneously, sensory grossly intact. SKIN: No rash or lesions on visible skin. Chart review shows recent radiographs:  XR CHEST PORTABLE    Result Date: 2/4/2022  EXAMINATION: ONE XRAY VIEW OF THE CHEST 2/4/2022 6:46 pm COMPARISON: 01/29/2022 HISTORY: ORDERING SYSTEM PROVIDED HISTORY: sob, +covid TECHNOLOGIST PROVIDED HISTORY: Reason for exam:->sob, +covid Reason for Exam: shortness of breath, covid positive FINDINGS: The heart is normal.  The pulmonary vessels are less prominent. The lungs are hyperinflated and emphysematous with hazy bibasilar interstitial and linear densities which are less prominent. There is minimal blunting of the right costophrenic sulcus which is less prominent. The bones are intact. COPD with resolving central pulmonary congestion. Slowly resolving bibasilar opacities. Tiny right pleural effusion which is less prominent. XR CHEST PORTABLE    Result Date: 1/29/2022  EXAMINATION: ONE XRAY VIEW OF THE CHEST 1/29/2022 7:29 pm COMPARISON: 05/11/2021. HISTORY: ORDERING SYSTEM PROVIDED HISTORY: SOB TECHNOLOGIST PROVIDED HISTORY: Reason for exam:->SOB Reason for Exam: SOB and chest pain, hx of COPD FINDINGS: Hyperinflation was noted with diaphragmatic flattening.   Alis bronchial thickening was noted with scattered non consolidating interstitial infiltration in both lower lobes, new from the previous study. No hilar mass or pneumothorax was noted. Changes of COPD with mild peribronchial thickening and scattered non consolidating interstitial pneumonia like infiltrate in both lower lobes. The pneumonia is new from the previous study of 05/11/2021. CT CHEST PULMONARY EMBOLISM W CONTRAST    Result Date: 1/29/2022  EXAMINATION: CTA OF THE CHEST 1/29/2022 9:39 pm TECHNIQUE: CTA of the chest was performed after the administration of intravenous contrast.  Multiplanar reformatted images are provided for review. MIP images are provided for review. Dose modulation, iterative reconstruction, and/or weight based adjustment of the mA/kV was utilized to reduce the radiation dose to as low as reasonably achievable. COMPARISON: CTA chest 01/17/2007 HISTORY: Reason for Exam: shortness of breath, positive for COVID FINDINGS: Pulmonary Arteries: Pulmonary arteries are adequately opacified for evaluation. No evidence of intraluminal filling defect to suggest pulmonary embolism. Main pulmonary artery is normal in caliber. Mediastinum: No evidence of mediastinal lymphadenopathy. The heart and pericardium demonstrate no acute abnormality. There is no acute abnormality of the thoracic aorta. Lungs/pleura: The lungs are without acute process. Emphysema. There is a a 1 cm noncalcified nodule in the right lung base. No evidence of pleural effusion or pneumothorax. Upper Abdomen: Limited images of the upper abdomen are unremarkable. Soft Tissues/Bones: No acute bone or soft tissue abnormality. No evidence of pulmonary embolism. Emphysema. There is a 1 cm noncalcified nodule in the right lung base. This is new compared to the prior exam of 01/17/2007. Follow-up recommendations are listed below.  RECOMMENDATIONS: Fleischner Society guidelines for follow-up and management of incidentally detected pulmonary nodules: Single Solid Nodule: Nodule size less than 6 mm In a low-risk patient, no routine follow-up. In a high-risk patient, optional CT at 12 months. Nodule size equals 6-8 mm In a low-risk patient, CT at 6-12 months, then consider CT at 18-24 months. In a high-risk patient, CT at 6-12 months, then CT at 18-24 months. Nodule size greater than 8 mm In a low-risk patient, consider CT at 3 months, PET/CT, or tissue sampling. In a high-risk patient, consider CT at 3 months, PET/CT, or tissue sampling. Multiple Solid Nodules: Nodule size less than 6 mm In a low-risk patient, no routine follow-up. In a high-risk patient, optional CT at 12 months. Nodule size equals 6-8 mm In a low-risk patient, CT at 3-6 months, then consider CT at 18-24 months. In a high-risk patient, CT at 3-6 months, then CT at 18-24 months. Nodule size greater than 8 mm In a low-risk patient, CT at 3-6 months, then consider CT at 18-24 months. In a high-risk patient, CT at 3-6 months, then CT at 18-24 months. - Low risk patients include individuals with minimal or absent history of smoking and other known risk factors. - High risk patients include individuals with a history or smoking or known risk factors. Radiology 2017 http://pubs. rsna.org/doi/full/10.1148/radiol. 4441745830       EKG:    EKG 12 Lead [[id number]]    Collected: 02/04/22 1937    Updated: 02/04/22 1946     Ventricular Rate 120 BPM    Atrial Rate 120 BPM    P-R Interval 132 ms    QRS Duration 88 ms    Q-T Interval 346 ms    QTc Calculation (Bazett) 489 ms    P Axis 85 degrees    R Axis 88 degrees    T Axis 70 degrees    Diagnosis Sinus tachycardiaBiatrial enlargementPulmonary disease patternSeptal infarct , age undeterminedAbnormal ECGWhen compared with ECG of 29-JAN-2022 20:29,Septal infarct is now Present         CBC:  Recent Labs     02/04/22 1926   WBC 17.0*   HGB 13.8   HCT 41.3           RENAL  Recent Labs     02/04/22 1926   *   K 4.2   CL 73*   CO2 40*   BUN 25*   CREATININE 0.7   GLUCOSE 273*       Hemoglobin a1c:  Lab Results   Component Value Date    LABA1C 6.0 02/04/2022    LABA1C 5.6 12/30/2018       LFT'S:  Recent Labs     02/04/22 1926   AST 76*   ALT 55*   BILITOT 0.4   ALKPHOS 98       CARDIAC ENZYMES:   Recent Labs     02/04/22 1926 02/04/22 2122   TROPONINI 0.09* 0.07*     Lab Results   Component Value Date    PROBNP 18,724 (H) 02/04/2022    PROBNP 468 (H) 01/29/2022    PROBNP 101 04/25/2021       LACTIC ACID:  Recent Labs     02/04/22 1926 02/04/22 2122   LACTSEPSIS 2.2* 1.4       U/A:  Recent Labs     02/04/22 1953   LEUKOCYTESUR Negative   BACTERIA 1+*   WBCUA 10-20*   COLORU Yellow   RBCUA 3-4   MUCUS 1+*   CLARITYU Clear   SPECGRAV 1.025   BLOODU MODERATE*   GLUCOSEU 100*       Urine Tox Screen:  Recent Labs     02/04/22 1953   LABAMPH Neg   BARBSCNU Neg   LABBENZ Neg   CANSU Neg   COCAIMETSCRU Neg   OPIATESCREENURINE POSITIVE*   PHENCYCLIDINESCREENURINE Neg   LABMETH Neg   PROPOX Neg   PHUR 6.0  6.0   OXYCODONEUR Neg       ABG:   Recent Labs     02/04/22  1855   PHART 7.128*   PSK1WHG 140.2*   PO2ART 107.2   XTS1CDV 45.4*   R5EBKBLD 95.4   FXY0BEU 49.7   BEART 10.1*       VBG:  Recent Labs     02/04/22 1926 02/04/22 2122   PHVEN 7.235* 7.233*   TXL2CBA 101.4* 81.9*   ZUQ9NCL 42.0* 33.8*   PO2VEN 169.3* 78.2*   E8SQKFXG 99 93        PHYSICIAN CERTIFICATION  I certify that Darlene Baptiste is expected to be hospitalized for 2 midnights based on the following assessment and plan:    ASSESSMENT/PLAN:  1. Acute on chronic respiratory failure with hypoxia and hypercapnia, likely related to COVID PNA and aeCOPD, bact PNA? Supplemental O2 to maintain SPO2 ? 92%, continuous pulse ox. Wean as tolerated to home O2 of 4 L. Continue BiPAP per my orders until seen by Pulm Service. Pulm c/s. Pt was just DC from here ~2.5h prior to representation back to the ER. 2. Sepsis, HCAP? IV vanco and cefepime. No need for pressors at this time. F/u Cx. PCT 0.16.   No need for pressors at this time. 3. COVID PNA, Dx on 1/29. Pharmacy to dose IV remdesivir/tociluzimab. IBD, Lx, droplet, Robitussin DM. Pulm c/s  4. AeCOPD, IV ABX as above. IV solumedrol, cont home PO 5 mg pred daily. PRN/LUNA intensive NEB therapy. Check respiratory culture. Pulm consult. Hold home regimen for now. 5. Hyponatremia, 126 corrected ([facility] [place]). Last was 132 on 1/31. Renal panel q6h, gentle IVF, Nephro c/s. 6. Elevated troponin, 0.09, 0.07. EKG shows diffuse ST depression w/ at least some elevation in V1&V2. Favor hypoxia/resp distress as cause but ACS is certainly a consideration. Serial trops, repeat EKG now and in am.    7. Acute encephalopathy, awake but not responding very well. Multifactorial.  Supportive measures. 8. Tobacco Abuse, unable to  cessation at this time 2/2 clinical condition, 21 mg nicotine patch. 9. Known RLL 1 cm mass concerning for bronchogenic carcinoma. Plan for OP w/u per Dr. Janet Sanchez note from earlier today. 10. Lactic acidosis, 2.2, 1.4, resolved. 11. Prolonged Qtc, 489, avoid QT prolonging agents as able. 12. Chronic pain, cont home Norco.      DVT Prophylaxis: Int Lx  Diet: NPO  Code Status: Full Code   PT/OT Eval Status: Will order if needed and as patient condition allows  Dispo - Admit to inpatient ICU    Total critical care time caring for this patient with life threatening, unstable organ failure, including direct patient contact, management of life support systems, review of data including imaging and labs, discussions with other team members and physicians is 32 minutes so far today, excluding procedures. Maricruz Okeefe MD    Thank you Cande Spence MD for the opportunity to be involved in this patient's care. If you have any questions or concerns please feel free to contact me via the [employer] at [phone]. This chart was generated using the [address] [place] [address] [address]  [employer] system.  I created this record but it may contain dictation errors given the limitations of this technology.

## 2022-02-05 NOTE — ED NOTES
While medicating patient, observed that patient mental status had declined and her respiratory effort has increased. Pt had 3 episodes of apnea before trying to sit up gasping for air stating should could not breath. Dr. Garcia Likens called to room. Respiratory paged.   Pulmonology KARLEY Reis NP with hospitalists at bedside for eval.     Kayy Abad RN  02/05/22 9885

## 2022-02-05 NOTE — ED PROVIDER NOTES
I independently examined and evaluated Se President. I personally saw the patient and performed a substantive portion of the visit including all aspects of the medical decision making. In brief, this 59-year-old female presents to the ED in severe respiratory distress and altered mental status. Upon arrival she is not answering questions or responding to me. She was recently discharged after hospitalization for COVID pneumonia. Upon arrival she was on nonrebreather with oxygen saturations in the mid 90s. Despite this she is very tachypneic with only shallow respirations. She was immediately placed on BiPAP with improvement. Focused exam revealed   General: Obtunded with respiratory distress  Skin: warm, intact, no pallor noted   Head: Normocephalic, atraumatic   Eye: Normal conjunctiva   Cardiac: Tachycardic. Normal peripheral perfusion  Respiratory: Tachypneic and in respiratory distress on nonrebreather  Musculoskeletal: No deformity, full ROM. Neurological: Obtunded    ED course: Presenting in respiratory distress, known COVID diagnosis. She is placed on BiPAP. Initial ABG shows severe hypercapnic respiratory failure with a CO2 of 140. She is monitored closely in the ED, oxygen saturations in the mid 90s on BiPAP. Her mental status does slowly improve with time. Repeat blood gas 30 minutes later shows CO2 trending down to 101. Blood gases continue to trend down. Patient's mental status at this time is much improved and she is alert and oriented x3. At this point she is able to tell me that she is having back pain. Denies any chest pain. Is not feeling short of breath on BiPAP. X-ray does show bilateral opacities that are actually improving from previous. She does have wheezing and is treated with DuoNeb. Discussed with Dr. Maryellen Ricks who agreed to admit the patient to his service.     ECG #1  The Ekg interpreted by me shows sinus tachycardia with a rate of 120 bpm.  ST depressions in lead II, 3, aVF. , QRS 88, QTc 489. ECG#2  Sinus tachycardia with a rate of 120 bpm.  Normal axis. No acute injury pattern. , QRS 88, QTc 486. Critical care    Critical care time 43 minutes exclusive from separate billable procedures that were performed. The following was considered in the determination of critical care but not limited to the level of medical decision making, intensive cardiac and/or respiratory monitoring, frequent vital sign monitoring, evaluation of laboratory studies, evaluation of radiographic studies, oxygen monitoring, and constant monitoring and speaking to family at bedside. All diagnostic, treatment, and disposition decisions were made by myself in conjunction with the advanced practice provider. For all further details of the patient's emergency department visit, please see the advanced practice provider's documentation. Comment: Please note this report has been produced using speech recognition software and may contain errors related to that system including errors in grammar, punctuation, and spelling, as well as words and phrases that may be inappropriate. If there are any questions or concerns please feel free to contact the dictating provider for clarification.        Serge Miramontes DO  02/05/22 0020

## 2022-02-05 NOTE — CONSULTS
Pharmacy Note  Vancomycin Consult    Cara Flores is a 59 y.o. female started on Vancomycin for HCAP; consult received from Dr. Wyatt Agudelo to manage therapy. Also receiving the following antibiotics: cefepime. Patient Active Problem List   Diagnosis    Acute respiratory failure with hypercapnia (HCC)    COPD exacerbation (HCC)    Tobacco abuse    Hypothyroidism    Acute on chronic respiratory failure with hypoxia ([facility] [address]    Community acquired bacterial pneumonia    Pneumonia due to organism    Acute exacerbation of chronic obstructive pulmonary disease (COPD) (HCC)    SVT (supraventricular tachycardia) (HCC)    Mild protein-calorie malnutrition (HCC)    COPD, severe (HCC)    Chronic respiratory failure with hypoxia (HCC)    Chest pain on breathing    HCAP (healthcare-associated pneumonia)    Chronic bilateral pleural effusions    Acute on chronic respiratory failure (HCC)    Sepsis with acute hypercapnic respiratory failure without septic shock (HCC)    Sepsis (HCC)    Mucus plugging of bronchi    Acute pulmonary edema (HCC)    Acute on chronic respiratory failure with hypoxia and hypercapnia (HCC)    Pneumonia due to COVID-19 virus    COVID    Acute respiratory failure with hypoxia (HCC)    COVID-19 virus infection    Pulmonary nodule    Chronic hypoxemic respiratory failure (HCC)    Anxiety    Acute and chronic respiratory failure with hypoxia (HCC)     Allergies:  Promethazine and Codeine     Temp max: 97    Recent Labs     02/04/22  1926 02/04/22  2259   BUN 25* 26*   CREATININE 0.7 <0.5*   WBC 17.0*  --        Intake/Output Summary (Last 24 hours) at 2/5/2022 6234  Last data filed at 2/4/2022 2120  Gross per 24 hour   Intake 1000 ml   Output --   Net 1000 ml     Culture Date      Source                       Results      Ht Readings from Last 1 Encounters:   02/04/22 5' 4\" (1.626 m)        Wt Readings from Last 1 Encounters:   02/04/22 140 lb (63.5 kg)       Body mass index is 24.03 kg/m².     CrCl cannot be calculated (This lab value cannot be used to calculate CrCl because it is not a number: <0.5). Goal AUC Level: 400 - 600    Assessment/Plan:  Will initiate Vancomycin with a one time loading dose of 1250 mg x1, followed by 1000 mg IV every 12 hours. A vancomycin level has been ordered for 2/5 @ 2300. Expected level per Insight plotting = 10 mcg/ml. Insight Summary:    Loading dose: 1250 mg  Regimen: 1000 mg IV every 12 hours. Start time: 00:35 on 02/05/2022  Exposure target: AUC24 (range)400-600 mg/L.hr   AUC24,ss: 458 mg/L.hr  Probability of AUC24 > 400: 64 %  Ctrough,ss: 13.2 mg/L  Probability of Ctrough,ss > 20: 20 %  Probability of nephrotoxicity (Lodise CHANDLER 2009): 8 %        Thank you for the consult. Will continue to follow.

## 2022-02-05 NOTE — CONSULTS
Pulmonary & Critical Care Medicine ICU Consultation Note  Hospital Day: 2    Patient is being seen at the request of Dr. Alyssa Harrison for a consultation for severe COPD with COVID-19 pneumonia    HISTORY OF PRESENT ILLNESS: 60 yo female who was recently d/c'd yesterday after 7-day admission for COVID-19 pneumonia & severe COPD with exacerbation; 2 hours after d/c on 4 L O2, she returned to the ED by squad who reportedly found her disoriented with SpO2 of 80% on 5 L. She arrived disoriented on 100% nonrebreather and was placed on BiPAP shortly after arrival.  Pt initially awake but not responding with ABG 7.13/140/107, which improved to 7.27/79/86 with improved mentation on BiPAP therapy. Pt is still somnolent and only able to nod yes/no to questions. Spoke with pt's  to obtain collateral history & he feels that she was not any better when discharged than on admission. However, she was clearly improved when I saw her on 2/3/22 & 22; she was breathing comfortably on her home oxygen and her only complaint was chronic pain, she was asking to go home. Pt complained of acute on chronic pain at home, in abdomen & back. She did not take any of the discharge medications, including the Norco. In the ED, patient was tiring on BiPAP and she requested intubation. I saw the patient on mechanical ventilation. Her BP were in the 70's, her autoPEEP was 8-10 with total PEEP of 17. I allowed prolonged exhalation and then dropped rate and decreased PEEP to 6 to match the intrinsic PEEP present after prior maneuvers.  BP improved to 90's with MAP > 70    PAST MEDICAL HISTORY:  Past Medical History:   Diagnosis Date    Asthma     COPD [facility]chronic obstructive pulmonary disease) ([facility] [address]     Thyroid disease      PAST SURGICAL HISTORY:  Past Surgical History:   Procedure Laterality Date     SECTION      x2    CHOLECYSTECTOMY       FAMILY HISTORY: no lung cancer    SOCIAL HISTORY:   reports that she has been smoking cigarettes. She has a 22.00 pack-year smoking history. She has never used smokeless tobacco.    Scheduled Meds:   aspirin  81 mg Oral Daily    busPIRone  10 mg Oral TID    levothyroxine  125 mcg Oral QAM AC    montelukast  10 mg Oral Nightly    lidocaine  1 patch TransDERmal Daily    nicotine  1 patch TransDERmal Daily    enoxaparin  30 mg SubCUTAneous BID    sodium chloride flush  5-40 mL IntraVENous 2 times per day    methylPREDNISolone  40 mg IntraVENous Q12H    Followed by   Mitesh Koehler ON 2/7/2022] predniSONE  40 mg Oral Daily with breakfast    vancomycin  1,000 mg IntraVENous Q12H    albuterol-ipratropium  1 puff Inhalation Q4H While awake    cefepime  2,000 mg IntraVENous Q12H     Continuous Infusions:   sodium chloride      propofol       REVIEW OF SYSTEMS:  Unable to obtain due to patient's current medical state    Vascular access: peripherals    MV:  2/5/2022     / / /FiO2 : 60 %  Recent Labs     02/04/22 1855 02/04/22 2259   PHART 7.128* 7.265*   [id number] 140.2* 79.3*   PO2ART 107.2 86.4       Blood pressure (!) 69/50, pulse 132, temperature 98.1 °F (36.7 °C), temperature source Axillary, resp. rate 16, height 5' 4\" (1.626 m), weight 140 lb (63.5 kg), SpO2 99 %, not currently breastfeeding.'  Vent    PHYSICAL EXAM:  General: intubated, ill appearing    ENT: Pharynx with ETT. Resp: No crackles. No wheezing. Very poor air movement  CV: S1, S2. No edema  GI: NT, ND, +BS  Skin: Warm and dry. Neuro: PERRL. Sedated, not following commands.  Patellar reflexes are symmetric      LABS:  CBC:   Recent Labs     02/04/22 1926 02/05/22  0650   WBC 17.0* 14.0*   HGB 13.8 12.2   HCT 41.3 36.8   MCV 91.1 91.5    245     BMP:   Recent Labs     02/04/22 1926 02/04/22 2259 02/05/22  0650   * 123* 126*   K 4.2 3.9 4.3   CL 73* 79* 82*   CO2 40* 39* 39*   BUN 25* 26* 21*   CREATININE 0.7 <0.5* <0.5*     LIVER PROFILE:   Recent Labs     02/04/22  1926 02/05/22  0650   AST 76* 55*   ALT 55* 47* BILITOT 0.4 <0.2   ALKPHOS 98 77     PT/INR: No results for input(s): PROTIME, INR in the last 72 hours. APTT: No results for input(s): APTT in the last 72 hours. UA:  Recent Labs     02/04/22 1953   COLORU Yellow   PHUR 6.0  6.0   WBCUA 10-20*   RBCUA 3-4   MUCUS 1+*   BACTERIA 1+*   CLARITYU Clear   SPECGRAV 1.025   LEUKOCYTESUR Negative   UROBILINOGEN 0.2   BILIRUBINUR SMALL*   BLOODU MODERATE*   GLUCOSEU 100*     Recent Labs     02/04/22  1855 02/04/22  2259   PHART 7.128* 7.265*   [id number] 140.2* 79.3*   PO2ART 107.2 86.4     Cultures:   1/29/2022 Strep pneumo & Legionella not detected  1/29/2022 BC NG  1/29/2022 COVID-19 detected  2/4/2022 BC pending  2/4/2022 urine pending    Procalcitonin 0.16  proBNP 10,600    Films: imaging was reviewed by me and showed  CXR 1/29/2022  Changes of COPD with mild peribronchial thickening and scattered non   consolidating interstitial pneumonia like infiltrate in both lower lobes. The pneumonia is new from the previous study of 05/11/2021. CT Chest 1/29/2022    Mediastinum: No evidence of mediastinal lymphadenopathy.  The heart and   pericardium demonstrate no acute abnormality.  There is no acute abnormality   of the thoracic aorta.       Lungs/pleura: The lungs are without acute process. [place] is a a 1 cm noncalcified nodule in the right lung base.  No evidence of pleural effusion or pneumothorax. Impression   No evidence of pulmonary embolism. Emphysema. There is a 1 cm noncalcified nodule in the right lung base.  This is new   compared to the prior exam of 01/17/2007.  Follow-up recommendations are   listed below. CXR 2/4/2022  COPD with resolving central pulmonary congestion. Slowly resolving bibasilar opacities. Tiny right pleural effusion which is less prominent.      CXR 2/5/2022 hyperinflation, ETT okay     ASSESSMENT:  · Acute on chronic hypoxemic & hypercapnic respiratory failure; baseline 3-4 L O2   · COVID-19 pneumonia in an unvaccinated patient   · Severe COPD, with acute exacerbation - she was clearly improved when I saw her on 2/3/22 & 2/4/22; she was breathing comfortably on her home oxygen and her only complaint was chronic pain, she was asking to go home  · Elevated troponin 0.07 --> 0.03  · Hyponatremia  · Prolonged QTc interval, 489  · RLL 1 cm pulmonary nodule on CT 1/29/22 - concerning for bronchogenic carcinoma  · Anxiety, severe  · Current smoker     PLAN:  COVID-19 isolation, droplet plus  Mechanical ventilation as per my orders. The ventilator was adjusted by me at the bedside for unstable, life threatening respiratory failure. IV Propofol for sedation, target RASS -2, with daily spontaneous awakening trial.  Fentanyl gtt as needed  Vancomycin/Cefepime D#1. Prior admission completed 6 days Azithromycin, 7 days Ceftriaxone   Inhaled bronchodilators   IV Solu-Medrol 40 mg BID   On continuous NS at 76   SSI    Nephrology has been consulted; despite elevated BNP, clinically she appears dry    Prophylaxis: Lovenox 30 BID   Eventual PET scan vs biopsy vs resection for RLL nodule - could be addressed as an outpatient (previous Dr. Chris Peña pt; recently saw Dr. Aura Duncan)     Total critical care time caring for this patient with life threatening, unstable organ failure, including direct patient contact, management of life support systems, review of data including imaging and labs, discussions with other team members and physicians is 31 minutes so far today, excluding procedures. _____________________________________________  I contributed to updating portions of this encounter note.    Lawrence Gaspar PA-C on 2/5/22 at 7:56 AM EST

## 2022-02-05 NOTE — ED NOTES
[facility]      Dallas Basilio RN  02/04/22 6577

## 2022-02-05 NOTE — ED NOTES
Pt resting in bed and remains on bipap. VS stable and recorded. Pt repositioned in bed and linen straightened up. Pt denies needs at this time.      Angeles Gauthier RN  02/05/22 2945

## 2022-02-05 NOTE — ED NOTES
Bed: 02  Expected date:   Expected time:   Means of arrival:   Comments:     Fazal Alarcon RN  02/05/22 1346

## 2022-02-05 NOTE — PROGRESS NOTES
Comprehensive Nutrition Assessment    Type and Reason for Visit:  Initial,Consult (Consult TF ordering and management)    Nutrition Recommendations/Plan:   1. TF recommendations- ADULT TUBE FEEDING; Orogastric; Peptide Based High Protein- Vital HP with a goal rate of 60 ml/hr x 20 hours. Start with 20 ml and increased by 20 ml every 4 hours, as tolerated by patient, until goal rate can be achieved and maintained. Water flushes 30 ml every 4 hours for tube patency. If TROPHIC rate is indicated- Vital HP @ trophic rate of 10 ml/hr x 20 hours with 30 ml water flushes every 4 hours for tube patency. 2. Monitor TF start, rate, intake, tolerance + water flushes. 3. Monitor respiratory status, intubation status and type/amount sedation. 4. Monitor weight trends, bowel function, nutrition related labs, and POC. Nutrition Assessment:  Patient is nutritionally compromised AEB recent admission (1/29-2/4) for COVID-19 PNA, pt was just d/c'd yesterday (2/4) + returned to ED a few hours later with severe respiratory distress + AMS, minimal po intake during previous admission + reported loss of appetite, and pt was intubated today (2/5), and patient is at risk for further compromise d/t increased nutrient neesd r/t COVID-19 virus, need for EN as sole source of nutrition while intubated, altered nutrition related labs, and hx of COPD + chronic respiratory failure; Will provide TF recommendations for when it is appropriate to begin TF    Malnutrition Assessment:  Malnutrition Status:   At risk for malnutrition (Comment)    Context:  Acute Illness     Findings of the 6 clinical characteristics of malnutrition:  Energy Intake:  1 - 75% or less of estimated energy requirements for 7 or more days (minimal po intake x 6 days previous admission (1/29-2/4) + NPO x 1 day admission)  Weight Loss:  Unable to assess (CBW is estimated)     Body Fat Loss:  Unable to assess (COVID-19+)     Muscle Mass Loss:  Unable to assess (COVID-19+)    Fluid Accumulation:  No significant fluid accumulation     Strength:  Not Performed    Estimated Daily Nutrient Needs:  Energy (kcal):  0140-7441 kcals based on 20-23 kcals/kg/CBW; Weight Used for Energy Requirements:  Current     Protein (g):   g protein based on 1.5-1.7 g/kg/CBW; Weight Used for Protein Requirements:  Current        Fluid (ml/day):  2698-7787 ml; Method Used for Fluid Requirements:         Nutrition Related Findings:  pt currently ICU hold in in ED bed 02/02; Pt was intubated today (2/5), currently remains intubated and sedated with 10 mcg of Propofol; OG was placed; recent admission (1/29-2/4) for COVID-19 PNA, pt was just d/c'd yesterday (2/4) + returned to ED a few hours later with severe respiratory distress + AMS; hx of COPD + chronic respiratory failure, on 4L O2 baseline; per flowsheets on 2/4, pt reported loss of appetite prior to this admission; per chart review, pt po intake during previous admission (1/29-2/4) was minimal; last documented BM from previous admission 1/31?; Na, Cl, and Creat are low; BUN, BG, and ALT/AST are elevated; patient has fentanyl in NaCL, propofol, aspirin, buspar, maxipime, lovenox, pepcid, synthroid, solumedrol, singulair, prednisone, and Vanc in D5 ordered at this time; Wounds:  None       Current Nutrition Therapies:    Current Tube Feeding (TF) Orders:  · Feeding Route: Orogastric  · Formula: Peptide Based High Protein (Vital HP)  · Schedule: Continuous  · Additives/Modulars:    · Water Flushes: 30 ml every 4 hours for tube patency  · Current TF & Flush Orders Provides: TF recommendations only  · Goal TF & Flush Orders Provides: If trophic rate is indicated- Vital HP @ trophic rate of 10 ml/hr= 200 ml TV, 200 kcals, 17 g protein, 167 ml free water + 30 ml water flushes every 4 hours for tube patency.  Goal TF= Vital HP with goal rate of 60 ml/hr x 20 hours= 1200 ml TV, 1200 kcals, 105 g protein, 1003 ml free water + 30 ml water flushes every 4 hours for tube patency      Anthropometric Measures:  · Height: 5' 4\" (162.6 cm)  · Current Body Weight: 140 lb (63.5 kg) (obtained 2/4; estimated weight)   · Admission Body Weight: 140 lb (63.5 kg) (obtained 2/4; estimated weight)    · Usual Body Weight: 140 lb 3.2 oz (63.6 kg) (obtained 12/29/21 per past encounters)     · Ideal Body Weight: 120 lbs; % Ideal Body Weight 116.7 %   · BMI: 24  · BMI Categories: Normal Weight (BMI 18.5-24. 9)       Nutrition Diagnosis:   · Inadequate oral intake related to inadequate protein-energy intake,impaired respiratory function,increase demand for energy/nutrients as evidenced by NPO or clear liquid status due to medical condition,intubation,nutrition support - enteral nutrition,lab values,other (comment),poor intake prior to admission (COVID-19 virus)    Nutrition Interventions:   Food and/or Nutrient Delivery:  Continue NPO  Nutrition Education/Counseling:  No recommendation at this time   Coordination of Nutrition Care:  Continue to monitor while inpatient,Interdisciplinary Rounds    Goals:  When appropriate to start TF, patient will tolerate Vital HP @ goal rate of 60 ml/hr x 20 hours without s/s of aspiration and without GI distress       Nutrition Monitoring and Evaluation:   Behavioral-Environmental Outcomes:  None Identified   Food/Nutrient Intake Outcomes:  Diet Advancement/Tolerance,Enteral Nutrition Intake/Tolerance,IVF Intake  Physical Signs/Symptoms Outcomes:  Biochemical Data,GI Status,Hemodynamic Status,Skin,Weight     Discharge Planning:     Too soon to determine     Electronically signed by Francia Davison RD, LD on 2/5/22 at 1:20 PM EST    Contact: [phone]

## 2022-02-05 NOTE — PROGRESS NOTES
RT Nebulizer Bronchodilator Protocol Note    There is a bronchodilator order in the chart from a provider indicating to follow the RT Bronchodilator Protocol and there is an Initiate RT Bronchodilator Protocol order as well (see protocol at bottom of note). CXR Findings:  XR CHEST PORTABLE    Result Date: 2/5/2022  Probable scarring in the left midlung Otherwise, no acute cardiopulmonary process     XR CHEST PORTABLE    Result Date: 2/4/2022  COPD with resolving central pulmonary congestion. Slowly resolving bibasilar opacities. Tiny right pleural effusion which is less prominent. The findings from the last RT Protocol Assessment were as follows:  Smoking: (P) Chronic pulmonary disease  Respiratory Pattern: (P) Use of accessory muscles, prolonged exhalation, or RR 26-30 bpm  Breath Sounds: (P) Intermittent or unilateral wheezes  Cough: (P) Unable to generate effective cough  Indication for Bronchodilator Therapy: (P) On home bronchodilators  Bronchodilator Assessment Score: (P) 16    Aerosolized bronchodilator medication orders have been revised according to the RT Nebulizer Bronchodilator Protocol below. Respiratory Therapist to perform RT Therapy Protocol Assessment initially then follow the protocol. Repeat RT Therapy Protocol Assessment PRN for score 0-3 or on second treatment, BID, and PRN for scores above 3. No Indications - adjust the frequency to every 6 hours PRN wheezing or bronchospasm, if no treatments needed after 48 hours then discontinue using Per Protocol order mode. If indication present, adjust the RT bronchodilator orders based on the Bronchodilator Assessment Score as indicated below. If a patient is on this medication at home then do not decrease Frequency below that used at home.     0-3 - enter or revise RT bronchodilator order(s) to equivalent RT Bronchodilator order with Frequency of every 4 hours PRN for wheezing or increased work of breathing using Per Protocol order mode.       4-6 - enter or revise RT Bronchodilator order(s) to two equivalent RT bronchodilator orders with one order with BID Frequency and one order with Frequency of every 4 hours PRN wheezing or increased work of breathing using Per Protocol order mode. 7-10 - enter or revise RT Bronchodilator order(s) to two equivalent RT bronchodilator orders with one order with TID Frequency and one order with Frequency of every 4 hours PRN wheezing or increased work of breathing using Per Protocol order mode. 11-13 - enter or revise RT Bronchodilator order(s) to one equivalent RT bronchodilator order with QID Frequency and an Albuterol order with Frequency of every 4 hours PRN wheezing or increased work of breathing using Per Protocol order mode. Greater than 13 - enter or revise RT Bronchodilator order(s) to one equivalent RT bronchodilator order with every 4 hours Frequency and an Albuterol order with Frequency of every 2 hours PRN wheezing or increased work of breathing using Per Protocol order mode. RT to enter RT Home Evaluation for COPD & MDI Assessment order using Per Protocol order mode.     Electronically signed by Augustus Antonio RCP on 2/5/2022 at 4:47 PM

## 2022-02-05 NOTE — ED NOTES
Bed: 02  Expected date: 2/5/22  Expected time: 10:39 AM  Means of arrival:   Comments:     Jc Gannon RN  02/05/22 2408

## 2022-02-05 NOTE — ED PROVIDER NOTES
Patient currently in ICU hold within the emergency department. She has been having serial ABGs to monitor her respiratory status as she was significantly hypercapnic upon initial arrival with respiratory acidosis which improved with application of BiPAP. She has been on BiPAP throughout the night morning but has been having increased labored breathing and states that she feels that she cannot breathe and is feeling very tired physically from the breathing. I examined the patient at bedside she does have bilateral expiratory wheezes. She does have bronchodilators within her orders and I advised to administer the bronchodilators and reassess. ABG being also drawn. The hospitalist and critical care team was notified and have also been at bedside to reevaluate. On reevaluation again, patient was requesting intubation after I discussed the potential for intubation with her if her respiratory status were to continue to decline. After notifying the inpatient team, decision was made for intubation at this time by me, please see procedure note. Patient will be sedated for comfort and further care provided by the inpatient critical care and hospitalist team.     PROCEDURE:  ENDOTRACHEAL INTUBATION  The benefits, risks, and alternatives were discussed with Kendra Rivera or their surrogate. An opportunity to ask questions was provided, and consent was given for the procedure. Kendra Rivera was pre-oxygenated as best as possible. Suction was ready. The patient was sedated, then paralyzed; please see the chart for the medications and dosages administered. The vocal cords were visualized, and the endotracheal tube was inserted on first attempt without complication. Tracheal position was confirmed using auscultation, capnometry, tube condensation, and chest x-ray. The tube was appropriately secured. Sedation was provided for endotracheal tube and ventilatory comfort.        Art Dias MD  02/05/22 1900

## 2022-02-05 NOTE — ED NOTES
2714  [employer] sent to hospitalist, nurse requesting hospitalist or designee come see the patient ASAP, due to deteriorating condition.      Perez Sudbury  02/05/22 1002

## 2022-02-05 NOTE — ED NOTES
60 mg rocuronium given by La Magallon RN per verbal order with readback from Dr. Joseph Lange.      Massimo Valero RN  02/05/22 8063

## 2022-02-05 NOTE — PROGRESS NOTES
Progress Note    Admit Date:  2/4/2022    61F admitted to [facility] 1/29-2/4/22 for acute on chronic hypoxic respiratory failure, COVID-19 pneumonia, and exacerbation of severe COPD discharged home on 4 L nasal cannula oxygen on 2/4/2022. Return to the ER later on 2/4/2022 in respiratory distress, immediately placed on BiPAP. Following morning patient with worsening breathing, increased fatigue, requesting to be intubated. Patient intubated by ER provider. Admitted to ICU    Subjective:  Ms. Maria Esther Antunez is seen on BiPAP. She is awake, alert, oriented x 4. She reports feeling tired from her increased work of breathing. She is requesting to be intubated  - ABG on admit with a pH of 7.12 and a PCO2 of 140.   Repeat ABG after being on BiPAP showed significant improvement with a pH of 7.26 and a PCO2 of 79 (repeat was drawn at 2259 on 2/4/22)  - patient subsequently intubated by ER MD       Objective:   Patient Vitals for the past 4 hrs:   BP Pulse Resp SpO2 Height   02/05/22 1239 -- -- -- -- 5' 4\" (1.626 m)   02/05/22 1216 (!) 72/52 109 28 100 % --   02/05/22 1215 (!) 69/48 110 28 100 % --   02/05/22 1211 (!) 60/44 110 28 100 % --   02/05/22 1206 (!) 55/38 107 25 100 % --   02/05/22 1157 (!) 158/86 120 20 100 % --   02/05/22 1149 (!) 158/86 114 28 100 % --   02/05/22 1146 -- 117 28 100 % --   02/05/22 1142 (!) 163/88 125 28 100 % --   02/05/22 1130 (!) 159/83 122 28 100 % --   02/05/22 1121 (!) 175/88 116 28 100 % --   02/05/22 1113 (!) 84/57 113 28 99 % --   02/05/22 1108 (!) 63/45 130 28 100 % --   02/05/22 1102 (!) 69/50 132 16 99 % --   02/05/22 1030 (!) 144/84 124 26 94 % --   02/05/22 1000 (!) 158/79 126 29 95 % --   02/05/22 0930 (!) 142/67 117 20 96 % --   02/05/22 0915 -- 134 30 97 % --   02/05/22 0900 124/88 125 27 96 % --            Intake/Output Summary (Last 24 hours) at 2/5/2022 1248  Last data filed at 2/5/2022 1141  Gross per 24 hour   Intake 1000 ml   Output 1000 ml   Net 0 ml       Physical Exam:      Gen: + resp distress. Alert. Eyes: PERRL. No sclera icterus. No conjunctival injection. ENT: No discharge. Pharynx clear. Neck: No JVD. Trachea midline. Resp: + accessory muscle use. No crackles. No wheezes. No rhonchi. Wearing BiPAP  CV: Tachycardic. Regular rhythm. No murmur. No rub. No edema. Capillary Refill: Brisk,< 3 seconds   Peripheral Pulses: +2 palpable, equal bilaterally   GI: Non-tender. Non-distended. Normal bowel sounds. Skin: Warm and dry. No nodule on exposed extremities. No rash on exposed extremities. M/S: No cyanosis. No joint deformity. No clubbing. Neuro: Awake. Grossly nonfocal    Psych: Oriented x 3. +anxiety, no agitation.        Scheduled Meds:   aspirin  81 mg Oral Daily    busPIRone  10 mg Oral TID    levothyroxine  125 mcg Oral QAM AC    montelukast  10 mg Oral Nightly    lidocaine  1 patch TransDERmal Daily    nicotine  1 patch TransDERmal Daily    enoxaparin  30 mg SubCUTAneous BID    sodium chloride flush  5-40 mL IntraVENous 2 times per day    methylPREDNISolone  40 mg IntraVENous Q12H    Followed by   Rupesh Oliver ON 2/7/2022] predniSONE  40 mg Oral Daily with breakfast    vancomycin  1,000 mg IntraVENous Q12H    albuterol-ipratropium  1 puff Inhalation Q4H While awake    polyvinyl alcohol  1 drop Both Eyes Q4H    And    artificial tears   Both Eyes Q4H    chlorhexidine  15 mL Mouth/Throat BID    famotidine (PEPCID) injection  20 mg IntraVENous BID    cefepime  2,000 mg IntraVENous Q12H       Continuous Infusions:   sodium chloride      fentaNYL (SUBLIMAZE) infusion 25 mcg/hr (02/05/22 1207)    propofol 10 mcg/kg/min (02/05/22 1211)       PRN Meds:  guaiFENesin-dextromethorphan, HYDROcodone-acetaminophen, polyethylene glycol, acetaminophen **OR** acetaminophen, sodium chloride flush, sodium chloride, fentanNYL, albuterol sulfate HFA **AND** ipratropium, midazolam      Data:  CBC:   Recent Labs     02/04/22  1926 02/05/22  0650   WBC 17.0* 14.0*   HGB 13.8 pulm c/s for vent management   - Solumedrol 40 mg BID   - vanc and cefepime D#1 for possible superimposed bacterial PNA   - inhaled bronchodilators     #Elevated troponin   -0.09, 0.07, 0.04, 0.03, respectively  -Patient denies chest pain  -EKG with nonspecific EKG changes, cardiologist has reviewed EKGs, I reviewed with Dr. Link Mendez as well   -Suspect elevation secondary to demand mismatch in acute respiratory failure. Continue to monitor on telemetry  - ASA added on admit, continue for now     #Severe anxiety   - cont buspar     #Tobacco dependence  - nicotine patch ordered      #Hyponatremia   - [id number]  - check urine studies   - improved after IV NS.   Continue NS infusion and monitor BMP q12 hours  - nephro c/s    #Right lung nodule   - per pulm c/s last admit: PET scan vs biopsy vs resection for RLL nodule - could be addressed as an outpatient     #Chronic pain   - PRN norco    #Hypothyroidism  - home dose of synthroid continued     DVT Prophylaxis: Lovenox   Diet: Diet NPO Exceptions are: Sips of Water with Meds, Ice Chips  Code Status: Full Code    Hospital course and plan of care discussed with Dr. Yesenia Cain PA-C  2/5/2022 1:06 PM

## 2022-02-05 NOTE — PROGRESS NOTES
RT Inhaler-Nebulizer Bronchodilator Protocol Note    There is a bronchodilator order in the chart from a provider indicating to follow the RT Bronchodilator Protocol and there is an Initiate RT Inhaler-Nebulizer Bronchodilator Protocol order as well (see protocol at bottom of note). CXR Findings:  XR CHEST PORTABLE    Result Date: 2/4/2022  COPD with resolving central pulmonary congestion. Slowly resolving bibasilar opacities. Tiny right pleural effusion which is less prominent. The findings from the last RT Protocol Assessment were as follows:   History Pulmonary Disease: Chronic pulmonary disease  Respiratory Pattern: Use of accessory muscles, prolonged exhalation, or RR 26-30 bpm  Breath Sounds: Intermittent or unilateral wheezes  Cough: Strong, productive  Indication for Bronchodilator Therapy: Wheezing associated with pulm disorder  Bronchodilator Assessment Score: 13    Aerosolized bronchodilator medication orders have been revised according to the RT Inhaler-Nebulizer Bronchodilator Protocol below. Respiratory Therapist to perform RT Therapy Protocol Assessment initially then follow the protocol. Repeat RT Therapy Protocol Assessment PRN for score 0-3 or on second treatment, BID, and PRN for scores above 3. No Indications - adjust the frequency to every 6 hours PRN wheezing or bronchospasm, if no treatments needed after 48 hours then discontinue using Per Protocol order mode. If indication present, adjust the RT bronchodilator orders based on the Bronchodilator Assessment Score as indicated below. Use Inhaler orders unless patient has one or more of the following: on home nebulizer, not able to hold breath for 10 seconds, is not alert and oriented, cannot activate and use MDI correctly, or respiratory rate 25 breaths per minute or more, then use the equivalent nebulizer order(s) with same Frequency and PRN reasons based on the score.   If a patient is on this medication at home then do not decrease Frequency below that used at home. 0-3 - enter or revise RT bronchodilator order(s) to equivalent RT Bronchodilator order with Frequency of every 4 hours PRN for wheezing or increased work of breathing using Per Protocol order mode. 4-6 - enter or revise RT Bronchodilator order(s) to two equivalent RT bronchodilator orders with one order with BID Frequency and one order with Frequency of every 4 hours PRN wheezing or increased work of breathing using Per Protocol order mode. 7-10 - enter or revise RT Bronchodilator order(s) to two equivalent RT bronchodilator orders with one order with TID Frequency and one order with Frequency of every 4 hours PRN wheezing or increased work of breathing using Per Protocol order mode. 11-13 - enter or revise RT Bronchodilator order(s) to one equivalent RT bronchodilator order with QID Frequency and an Albuterol order with Frequency of every 4 hours PRN wheezing or increased work of breathing using Per Protocol order mode. Greater than 13 - enter or revise RT Bronchodilator order(s) to one equivalent RT bronchodilator order with every 4 hours Frequency and an Albuterol order with Frequency of every 2 hours PRN wheezing or increased work of breathing using Per Protocol order mode.      Electronically signed by Danyelle Carlton RCP on 2/5/2022 at 3:05 AM

## 2022-02-05 NOTE — CONSULTS
[facility] Consult Note           Reason for Consult:  Hyponatremia  Requesting Physician:  Dr. Sandra Arellano    Chief Complaint: Shortness of breath  History Obtained From:  patient, electronic medical record    History of Present Ilness:    59year old female with severe COPD admitted with worsening shortness of breath. We have been asked to assist in further mgmt of her Hyponatremia. Readmitted after ~1 week stay for COVID PNA/COPD exacerbation. Returns with worsening shortness of breath, change in mental status. Now on mechanical ventilation. Has been hypotensive, better with IVF's. SNa 123-126 since admission. +weak, no fevers. Past Medical History:        Diagnosis Date    Asthma     COPD (chronic obstructive pulmonary disease) ([facility] [address]     Thyroid disease        Past Surgical History:        Procedure Laterality Date     SECTION      x2    CHOLECYSTECTOMY         Home Medications:    No current facility-administered medications on file prior to encounter. Current Outpatient Medications on File Prior to Encounter   Medication Sig Dispense Refill    HYDROcodone-acetaminophen (NORCO) 5-325 MG per tablet Take 1 tablet by mouth every 6 hours as needed for Pain for up to 5 days. 20 tablet 0    busPIRone (BUSPAR) 10 MG tablet Take 1 tablet by mouth 3 times daily 90 tablet 1    predniSONE (DELTASONE) 10 MG tablet Take 4 tabs a day for 4 days ,   Then 3 tabs a day for 4 days,  Then 2 tabs a day for 4 days ,  Then 1 tab a day for 4 days.    after that go back to 5 mg daily 40 tablet 0    [START ON 2022] predniSONE (DELTASONE) 5 MG tablet Take 1 tablet by mouth daily      guaiFENesin-dextromethorphan (ROBITUSSIN DM) 100-10 MG/5ML syrup Take 5 mLs by mouth every 4 hours as needed for Cough 120 mL 0    lidocaine 4 % external patch Place 1 patch onto the skin daily 30 patch 1    furosemide (LASIX) 20 MG tablet Take 1 tablet by mouth daily 30 tablet 1    nicotine (NICODERM CQ) 21 MG/24HR Place 1 patch onto the skin daily 30 patch 1    albuterol (PROVENTIL) (2.5 MG/3ML) 0.083% nebulizer solution INHALE THE CONTENTS OF 1 VIAL VIA NEBULIZER EVERY 6 HOURS AS NEEDED FOR SHORTNESS OF BREATH  OR  WHEEZING 360 mL 1    guaiFENesin (MUCINEX) 600 MG extended release tablet Take 1 tablet by mouth 2 times daily 30 tablet 0    fluticasone (FLONASE) 50 MCG/ACT nasal spray 1 spray by Each Nostril route daily 1 Bottle 3    levothyroxine (SYNTHROID) 125 MCG tablet Take 1 tablet by mouth every morning (before breakfast) 30 tablet 3    aspirin 81 MG EC tablet Take 1 tablet by mouth daily 30 tablet 3    montelukast (SINGULAIR) 10 MG tablet Take 1 tablet by mouth nightly 30 tablet 3    Multiple Vitamin (MULTIVITAMIN) tablet Take 1 tablet by mouth daily 30 tablet 3    Fluticasone-Umeclidin-Vilant (TRELEGY ELLIPTA) 100-62.5-25 MCG/INH AEPB Inhale 1 puff into the lungs daily      albuterol sulfate  (90 Base) MCG/ACT inhaler Inhale 2 puffs into the lungs every 6 hours as needed for Wheezing 1 Inhaler 3       Allergies:  Promethazine and Codeine    Social History:    Social History     Socioeconomic History    Marital status:      Spouse name: Not on file    Number of children: Not on file    Years of education: Not on file    Highest education level: Not on file   Occupational History    Not on file   Tobacco Use    Smoking status: Current Some Day Smoker     Packs/day: 0.50     Years: 44.00     Pack years: 22.00     Types: Cigarettes     Last attempt to quit: 2020     Years since quittin.5    Smokeless tobacco: Never Used   Vaping Use    Vaping Use: Never used   Substance and Sexual Activity    Alcohol use: Yes     Comment: rarely    Drug use: No    Sexual activity: Not Currently     Partners: Male   Other Topics Concern    Not on file   Social History Narrative    Not on file     Social Determinants of Health     Financial Resource Strain:     Difficulty of Paying Living Expenses: Not on file   Food Insecurity:     Worried About Running Out of Food in the Last Year: Not on file    Yhair of Food in the Last Year: Not on file   Transportation Needs:     Lack of Transportation (Medical): Not on file    Lack of Transportation (Non-Medical): Not on file   Physical Activity:     Days of Exercise per Week: Not on file    Minutes of Exercise per Session: Not on file   Stress:     Feeling of Stress : Not on file   Social Connections:     Frequency of Communication with Friends and Family: Not on file    Frequency of Social Gatherings with Friends and Family: Not on file    Attends Muslim Services: Not on file    Active Member of [address] [place] [address] [employer] or Organizations: Not on file    Attends Club or Organization Meetings: Not on file    Marital Status: Not on file   Intimate Partner Violence:     Fear of Current or Ex-Partner: Not on file    Emotionally Abused: Not on file    Physically Abused: Not on file    Sexually Abused: Not on file   Housing Stability:     Unable to Pay for Housing in the Last Year: Not on file    Number of [place] in the Last Year: Not on file    Unstable Housing in the Last Year: Not on file       Family History:   Deferred as means to protect PPE due to shortage & due to CORONAVIRUS risk. Review of Systems:   Deferred as means to protect PPE due to shortage & due to CORONAVIRUS risk. Physical exam:   Constitutional:  VITALS:  /69   Pulse 119   Temp 98.8 °F (37.1 °C) (Axillary)   Resp 16   Ht 5' 4\" (1.626 m)   Wt 130 lb 4.7 oz (59.1 kg)   LMP  (LMP Unknown)   SpO2 97%   BMI 22.36 kg/m²   Exam deferred as means to protect PPE due to shortage & due to CORONAVIRUS risk.     Data/  CBC:   Lab Results   Component Value Date    WBC 14.0 02/05/2022    RBC 4.02 02/05/2022    HGB 12.2 02/05/2022    HCT 36.8 02/05/2022    MCV 91.5 02/05/2022    MCH 30.5 02/05/2022    MCHC 33.3 02/05/2022    RDW 12.9 02/05/2022     02/05/2022    MPV 7.1 02/05/2022     BMP:    Lab Results   Component Value Date     02/05/2022    K 4.3 02/05/2022    CL 82 02/05/2022    CO2 39 02/05/2022    BUN 21 02/05/2022    LABALBU 4.0 02/05/2022    CREATININE <0.5 02/05/2022    CALCIUM 8.4 02/05/2022    GFRAA >60 02/05/2022    LABGLOM >60 02/05/2022    GLUCOSE 158 02/05/2022         Assessment/    - Hyponatremia - acute, suspected pre-renal state +/- SIADH state in setting of COVID    - COVID PNA    - Severe COPD with exacerbation      Plan/    - IVF trial with NS 75 ml/hour, may need change to ~2% saline if sodium goes down with saline  - Home lasix on hold  - Urine osmolality, urine electrolytes  - Trend labs, bp's, & urine output      Thank you for the consultation. Please do not hesitate to call with questions. ____________________________________  Latia Pulido MD  [facility]  [email or web address]  Office: [phone]

## 2022-02-05 NOTE — PLAN OF CARE
Nutrition Problem #1: Inadequate oral intake  Intervention: Food and/or Nutrient Delivery: Continue NPO  Nutritional Goals: When appropriate to start TF, patient will tolerate Vital HP @ goal rate of 60 ml/hr x 20 hours without s/s of aspiration and without GI distress

## 2022-02-06 ENCOUNTER — APPOINTMENT (OUTPATIENT)
Dept: GENERAL RADIOLOGY | Age: 62
DRG: 208 | End: 2022-02-06
Payer: MEDICARE

## 2022-02-06 LAB
ALBUMIN SERPL-MCNC: 3.5 G/DL (ref 3.4–5)
ALBUMIN SERPL-MCNC: 3.6 G/DL (ref 3.4–5)
ALBUMIN SERPL-MCNC: 3.6 G/DL (ref 3.4–5)
ALP BLD-CCNC: 76 U/L (ref 40–129)
ALT SERPL-CCNC: 39 U/L (ref 10–40)
ANION GAP SERPL CALCULATED.3IONS-SCNC: 6 MMOL/L (ref 3–16)
ANION GAP SERPL CALCULATED.3IONS-SCNC: 6 MMOL/L (ref 3–16)
AST SERPL-CCNC: 34 U/L (ref 15–37)
BASE EXCESS ARTERIAL: 3.8 MMOL/L (ref -3–3)
BASOPHILS ABSOLUTE: 0 K/UL (ref 0–0.2)
BASOPHILS RELATIVE PERCENT: 0.1 %
BILIRUB SERPL-MCNC: 0.3 MG/DL (ref 0–1)
BILIRUBIN DIRECT: <0.2 MG/DL (ref 0–0.3)
BILIRUBIN, INDIRECT: ABNORMAL MG/DL (ref 0–1)
BUN BLDV-MCNC: 30 MG/DL (ref 7–20)
BUN BLDV-MCNC: 32 MG/DL (ref 7–20)
CALCIUM SERPL-MCNC: 8.2 MG/DL (ref 8.3–10.6)
CALCIUM SERPL-MCNC: 8.3 MG/DL (ref 8.3–10.6)
CARBOXYHEMOGLOBIN ARTERIAL: 0.3 % (ref 0–1.5)
CHLORIDE BLD-SCNC: 88 MMOL/L (ref 99–110)
CHLORIDE BLD-SCNC: 91 MMOL/L (ref 99–110)
CO2: 34 MMOL/L (ref 21–32)
CO2: 35 MMOL/L (ref 21–32)
CREAT SERPL-MCNC: <0.5 MG/DL (ref 0.6–1.2)
CREAT SERPL-MCNC: <0.5 MG/DL (ref 0.6–1.2)
EOSINOPHILS ABSOLUTE: 0 K/UL (ref 0–0.6)
EOSINOPHILS RELATIVE PERCENT: 0 %
ESTIMATED AVERAGE GLUCOSE: 125.5 MG/DL
GFR AFRICAN AMERICAN: >60
GFR AFRICAN AMERICAN: >60
GFR NON-AFRICAN AMERICAN: >60
GFR NON-AFRICAN AMERICAN: >60
GLUCOSE BLD-MCNC: 133 MG/DL (ref 70–99)
GLUCOSE BLD-MCNC: 138 MG/DL (ref 70–99)
GLUCOSE BLD-MCNC: 144 MG/DL (ref 70–99)
GLUCOSE BLD-MCNC: 163 MG/DL (ref 70–99)
GLUCOSE BLD-MCNC: 166 MG/DL (ref 70–99)
GLUCOSE BLD-MCNC: 180 MG/DL (ref 70–99)
GLUCOSE BLD-MCNC: 185 MG/DL (ref 70–99)
HBA1C MFR BLD: 6 %
HCO3 ARTERIAL: 29.3 MMOL/L (ref 21–29)
HCT VFR BLD CALC: 31.9 % (ref 36–48)
HEMOGLOBIN, ART, EXTENDED: 11.4 G/DL (ref 12–16)
HEMOGLOBIN: 10.8 G/DL (ref 12–16)
LYMPHOCYTES ABSOLUTE: 0.2 K/UL (ref 1–5.1)
LYMPHOCYTES RELATIVE PERCENT: 1.3 %
MCH RBC QN AUTO: 31 PG (ref 26–34)
MCHC RBC AUTO-ENTMCNC: 33.8 G/DL (ref 31–36)
MCV RBC AUTO: 91.7 FL (ref 80–100)
METHEMOGLOBIN ARTERIAL: 0.3 %
MONOCYTES ABSOLUTE: 0.8 K/UL (ref 0–1.3)
MONOCYTES RELATIVE PERCENT: 5.8 %
NEUTROPHILS ABSOLUTE: 13.3 K/UL (ref 1.7–7.7)
NEUTROPHILS RELATIVE PERCENT: 92.8 %
O2 SAT, ARTERIAL: 96.9 %
O2 THERAPY: ABNORMAL
PCO2 ARTERIAL: 48.2 MMHG (ref 35–45)
PDW BLD-RTO: 13.1 % (ref 12.4–15.4)
PERFORMED ON: ABNORMAL
PH ARTERIAL: 7.4 (ref 7.35–7.45)
PHOSPHORUS: 1.4 MG/DL (ref 2.5–4.9)
PHOSPHORUS: 4.5 MG/DL (ref 2.5–4.9)
PLATELET # BLD: 235 K/UL (ref 135–450)
PMV BLD AUTO: 7.8 FL (ref 5–10.5)
PO2 ARTERIAL: 91.2 MMHG (ref 75–108)
POTASSIUM SERPL-SCNC: 4.1 MMOL/L (ref 3.5–5.1)
POTASSIUM SERPL-SCNC: 4.5 MMOL/L (ref 3.5–5.1)
RBC # BLD: 3.48 M/UL (ref 4–5.2)
SODIUM BLD-SCNC: 129 MMOL/L (ref 136–145)
SODIUM BLD-SCNC: 131 MMOL/L (ref 136–145)
TCO2 ARTERIAL: 30.7 MMOL/L
TOTAL PROTEIN: 5.4 G/DL (ref 6.4–8.2)
VANCOMYCIN TROUGH: 11.2 UG/ML (ref 10–20)
WBC # BLD: 14.4 K/UL (ref 4–11)

## 2022-02-06 PROCEDURE — 2500000003 HC RX 250 WO HCPCS: Performed by: INTERNAL MEDICINE

## 2022-02-06 PROCEDURE — 2500000003 HC RX 250 WO HCPCS

## 2022-02-06 PROCEDURE — 6360000002 HC RX W HCPCS: Performed by: INTERNAL MEDICINE

## 2022-02-06 PROCEDURE — 6370000000 HC RX 637 (ALT 250 FOR IP): Performed by: INTERNAL MEDICINE

## 2022-02-06 PROCEDURE — 6370000000 HC RX 637 (ALT 250 FOR IP)

## 2022-02-06 PROCEDURE — 82803 BLOOD GASES ANY COMBINATION: CPT

## 2022-02-06 PROCEDURE — 94640 AIRWAY INHALATION TREATMENT: CPT

## 2022-02-06 PROCEDURE — 99233 SBSQ HOSP IP/OBS HIGH 50: CPT | Performed by: INTERNAL MEDICINE

## 2022-02-06 PROCEDURE — 85025 COMPLETE CBC W/AUTO DIFF WBC: CPT

## 2022-02-06 PROCEDURE — 6360000002 HC RX W HCPCS

## 2022-02-06 PROCEDURE — 2580000003 HC RX 258: Performed by: PHYSICIAN ASSISTANT

## 2022-02-06 PROCEDURE — 71045 X-RAY EXAM CHEST 1 VIEW: CPT

## 2022-02-06 PROCEDURE — 2000000000 HC ICU R&B

## 2022-02-06 PROCEDURE — 2580000003 HC RX 258: Performed by: INTERNAL MEDICINE

## 2022-02-06 PROCEDURE — 94003 VENT MGMT INPAT SUBQ DAY: CPT

## 2022-02-06 PROCEDURE — 80069 RENAL FUNCTION PANEL: CPT

## 2022-02-06 PROCEDURE — 36415 COLL VENOUS BLD VENIPUNCTURE: CPT

## 2022-02-06 PROCEDURE — 1200000000 HC SEMI PRIVATE

## 2022-02-06 PROCEDURE — 99291 CRITICAL CARE FIRST HOUR: CPT | Performed by: INTERNAL MEDICINE

## 2022-02-06 PROCEDURE — 80076 HEPATIC FUNCTION PANEL: CPT

## 2022-02-06 RX ORDER — PREDNISONE 20 MG/1
40 TABLET ORAL
Status: DISCONTINUED | OUTPATIENT
Start: 2022-02-12 | End: 2022-02-09

## 2022-02-06 RX ORDER — METHYLPREDNISOLONE SODIUM SUCCINATE 40 MG/ML
40 INJECTION, POWDER, LYOPHILIZED, FOR SOLUTION INTRAMUSCULAR; INTRAVENOUS EVERY 12 HOURS
Status: DISCONTINUED | OUTPATIENT
Start: 2022-02-06 | End: 2022-02-09

## 2022-02-06 RX ADMIN — WHITE PETROLATUM 57.7 %-MINERAL OIL 31.9 % EYE OINTMENT: at 00:29

## 2022-02-06 RX ADMIN — CEFEPIME 2000 MG: 2 INJECTION, POWDER, FOR SOLUTION INTRAMUSCULAR; INTRAVENOUS at 23:22

## 2022-02-06 RX ADMIN — FAMOTIDINE 20 MG: 10 INJECTION, SOLUTION INTRAVENOUS at 08:59

## 2022-02-06 RX ADMIN — Medication 10 ML: at 08:59

## 2022-02-06 RX ADMIN — WHITE PETROLATUM 57.7 %-MINERAL OIL 31.9 % EYE OINTMENT: at 20:03

## 2022-02-06 RX ADMIN — CARBOXYMETHYLCELLULOSE SODIUM 1 DROP: 10 GEL OPHTHALMIC at 14:55

## 2022-02-06 RX ADMIN — CARBOXYMETHYLCELLULOSE SODIUM 1 DROP: 10 GEL OPHTHALMIC at 03:28

## 2022-02-06 RX ADMIN — CEFEPIME 2000 MG: 2 INJECTION, POWDER, FOR SOLUTION INTRAMUSCULAR; INTRAVENOUS at 11:22

## 2022-02-06 RX ADMIN — MIDAZOLAM HYDROCHLORIDE 2 MG: 1 INJECTION, SOLUTION INTRAMUSCULAR; INTRAVENOUS at 20:02

## 2022-02-06 RX ADMIN — BUSPIRONE HYDROCHLORIDE 10 MG: 10 TABLET ORAL at 14:55

## 2022-02-06 RX ADMIN — SODIUM CHLORIDE: 9 INJECTION, SOLUTION INTRAVENOUS at 21:30

## 2022-02-06 RX ADMIN — PROPOFOL 30 MCG/KG/MIN: 10 INJECTION, EMULSION INTRAVENOUS at 23:37

## 2022-02-06 RX ADMIN — METHYLPREDNISOLONE SODIUM SUCCINATE 40 MG: 40 INJECTION, POWDER, FOR SOLUTION INTRAMUSCULAR; INTRAVENOUS at 08:59

## 2022-02-06 RX ADMIN — VANCOMYCIN HYDROCHLORIDE 1000 MG: 1 INJECTION, POWDER, LYOPHILIZED, FOR SOLUTION INTRAVENOUS at 12:30

## 2022-02-06 RX ADMIN — CARBOXYMETHYLCELLULOSE SODIUM 1 DROP: 10 GEL OPHTHALMIC at 11:22

## 2022-02-06 RX ADMIN — BUSPIRONE HYDROCHLORIDE 10 MG: 10 TABLET ORAL at 08:59

## 2022-02-06 RX ADMIN — CARBOXYMETHYLCELLULOSE SODIUM 1 DROP: 10 GEL OPHTHALMIC at 05:37

## 2022-02-06 RX ADMIN — WHITE PETROLATUM 57.7 %-MINERAL OIL 31.9 % EYE OINTMENT: at 03:28

## 2022-02-06 RX ADMIN — Medication 50 MCG/HR: at 17:17

## 2022-02-06 RX ADMIN — IPRATROPIUM BROMIDE AND ALBUTEROL SULFATE 1 AMPULE: 2.5; .5 SOLUTION RESPIRATORY (INHALATION) at 19:19

## 2022-02-06 RX ADMIN — MONTELUKAST SODIUM 10 MG: 10 TABLET, COATED ORAL at 20:04

## 2022-02-06 RX ADMIN — ENOXAPARIN SODIUM 30 MG: 100 INJECTION SUBCUTANEOUS at 08:59

## 2022-02-06 RX ADMIN — IPRATROPIUM BROMIDE AND ALBUTEROL SULFATE 1 AMPULE: 2.5; .5 SOLUTION RESPIRATORY (INHALATION) at 08:09

## 2022-02-06 RX ADMIN — MIDAZOLAM HYDROCHLORIDE 2 MG: 1 INJECTION, SOLUTION INTRAMUSCULAR; INTRAVENOUS at 17:16

## 2022-02-06 RX ADMIN — CARBOXYMETHYLCELLULOSE SODIUM 1 DROP: 10 GEL OPHTHALMIC at 18:49

## 2022-02-06 RX ADMIN — FENTANYL CITRATE 50 MCG: 50 INJECTION INTRAMUSCULAR; INTRAVENOUS at 20:15

## 2022-02-06 RX ADMIN — ASPIRIN 81 MG: 81 TABLET, COATED ORAL at 08:59

## 2022-02-06 RX ADMIN — IPRATROPIUM BROMIDE AND ALBUTEROL SULFATE 1 AMPULE: 2.5; .5 SOLUTION RESPIRATORY (INHALATION) at 15:36

## 2022-02-06 RX ADMIN — VANCOMYCIN HYDROCHLORIDE 1000 MG: 1 INJECTION, POWDER, LYOPHILIZED, FOR SOLUTION INTRAVENOUS at 01:16

## 2022-02-06 RX ADMIN — IPRATROPIUM BROMIDE AND ALBUTEROL SULFATE 1 AMPULE: 2.5; .5 SOLUTION RESPIRATORY (INHALATION) at 22:59

## 2022-02-06 RX ADMIN — LEVOTHYROXINE SODIUM 125 MCG: 0.03 TABLET ORAL at 05:37

## 2022-02-06 RX ADMIN — ENOXAPARIN SODIUM 30 MG: 100 INJECTION SUBCUTANEOUS at 20:03

## 2022-02-06 RX ADMIN — BUSPIRONE HYDROCHLORIDE 10 MG: 10 TABLET ORAL at 20:03

## 2022-02-06 RX ADMIN — CHLORHEXIDINE GLUCONATE 0.12% ORAL RINSE 15 ML: 1.2 LIQUID ORAL at 08:59

## 2022-02-06 RX ADMIN — CARBOXYMETHYLCELLULOSE SODIUM 1 DROP: 10 GEL OPHTHALMIC at 23:22

## 2022-02-06 RX ADMIN — WHITE PETROLATUM 57.7 %-MINERAL OIL 31.9 % EYE OINTMENT: at 09:00

## 2022-02-06 RX ADMIN — SODIUM PHOSPHATE, MONOBASIC, MONOHYDRATE 40 MMOL: 276; 142 INJECTION, SOLUTION INTRAVENOUS at 18:52

## 2022-02-06 RX ADMIN — METHYLPREDNISOLONE SODIUM SUCCINATE 40 MG: 40 INJECTION, POWDER, FOR SOLUTION INTRAMUSCULAR; INTRAVENOUS at 20:03

## 2022-02-06 RX ADMIN — WHITE PETROLATUM 57.7 %-MINERAL OIL 31.9 % EYE OINTMENT: at 12:36

## 2022-02-06 RX ADMIN — FAMOTIDINE 20 MG: 10 INJECTION, SOLUTION INTRAVENOUS at 20:03

## 2022-02-06 RX ADMIN — IPRATROPIUM BROMIDE AND ALBUTEROL SULFATE 1 AMPULE: 2.5; .5 SOLUTION RESPIRATORY (INHALATION) at 12:07

## 2022-02-06 RX ADMIN — WHITE PETROLATUM 57.7 %-MINERAL OIL 31.9 % EYE OINTMENT: at 16:17

## 2022-02-06 RX ADMIN — PROPOFOL 15 MCG/KG/MIN: 10 INJECTION, EMULSION INTRAVENOUS at 17:16

## 2022-02-06 ASSESSMENT — PAIN SCALES - GENERAL
PAINLEVEL_OUTOF10: 0
PAINLEVEL_OUTOF10: 6
PAINLEVEL_OUTOF10: 0
PAINLEVEL_OUTOF10: 6
PAINLEVEL_OUTOF10: 0
PAINLEVEL_OUTOF10: 0

## 2022-02-06 ASSESSMENT — PULMONARY FUNCTION TESTS
PIF_VALUE: 31
PIF_VALUE: 36
PIF_VALUE: 28
PIF_VALUE: 33
PIF_VALUE: 32
PIF_VALUE: 32

## 2022-02-06 NOTE — PROGRESS NOTES
Vancomycin lab results finally posted. Trough on 1 q12h = 11.2. Continue same dose and schedule. OK to give dose now.

## 2022-02-06 NOTE — PROGRESS NOTES
This note also relates to the following rows which could not be included:  Pulse - Cannot attach notes to unvalidated device data       02/05/22 2305   Vent Information   Vent Type 840   Vent Mode AC/VC   Vt Ordered 350 mL   Rate Set 22 bmp   Peak Flow 55 L/min   FiO2  50 %  (decreased to 40)   SpO2 97 %   SpO2/FiO2 ratio 194   Sensitivity 3   PEEP/CPAP 6   Humidification Source HME   Vent Patient Data   Peak Inspiratory Pressure 35 cmH2O   Mean Airway Pressure 12 cmH20   Rate Measured 22 br/min   Vt Exhaled 423 mL   Minute Volume 9 Liters   I:E Ratio 1:2.9   Plateau Pressure 18 ALF35   Cough/Sputum   Sputum How Obtained Endotracheal;Suctioned   Cough Productive   Sputum Amount Small   Sputum Color Creamy   Tenacity Thick   Breath Sounds   Right Upper Lobe Diminished   Right Middle Lobe Diminished   Right Lower Lobe Diminished   Left Upper Lobe Diminished   Left Lower Lobe Diminished   Additional Respiratory  Assessments   Resp 16   Position Reverse Trendelenburg   Alarm Settings   High Pressure Alarm 40 cmH2O   Low Minute Volume Alarm 4 L/min   Apnea (secs) 20 secs   High Respiratory Rate 40 br/min   Low Exhaled Vt  250 mL   Non-Surgical Airway 02/05/22 Endo Tracheal Tube   Placement Date/Time: 02/05/22 1100   Timeout: Patient;Procedure;Site/Side;Appropriate Equipment; Consent Confirmed;Site prepped with chlorhexidine;Correct Position  Mask Ventilation: Ventilated by mask (1)  Placed By: In ED;Licensed provider  Inserted . ..    Secured at 23 cm   Measured From Lips   Secured Location Right  (moved to right)   Secured By Commercial tube [email or web address]   Site Condition Dry

## 2022-02-06 NOTE — PROGRESS NOTES
Reassessment completed, see flow sheet. Sedation down to propofol at 5 mcg/kg/min and fentanyl at 75 mcg/hr. Pt responding to commands and nodding appropriately, but calm and goes back to sleep quickly after stimulation. RASS -2.    BP soft but stable, MAP 65-75. AM labs & ABG collected w/o issue. No other changes noted.

## 2022-02-06 NOTE — PROGRESS NOTES
[facility] Progress Note           Subjective/   59y.o. year old female who we are seeing in consultation for Hyponatremia. HPI:  Sodium levels improving with NS, urine output of 2.2 liters over last 24 hours. Remains on ventilator. ROS:  No fevers, +weak. Objective/   GEN:  BP (!) 140/62   Pulse 81   Temp 97.4 °F (36.3 °C) (Axillary)   Resp 18   Ht 5' 4\" (1.626 m)   Wt 130 lb 11.7 oz (59.3 kg)   LMP  (LMP Unknown)   SpO2 98%   BMI 22.44 kg/m²   Exam deferred as means to protect PPE due to shortage & due to CORONAVIRUS risk. Data/  Recent Labs     02/04/22  1926 02/05/22  0650 02/06/22  0432   WBC 17.0* 14.0* 14.4*   HGB 13.8 12.2 10.8*   HCT 41.3 36.8 31.9*   MCV 91.1 91.5 91.7    245 235     Recent Labs     02/05/22  0650 02/05/22  2325 02/06/22  1100   * 129* 129*   K 4.3 4.3 4.5   CL 82* 87* 88*   CO2 39* 37* 35*   GLUCOSE 158* 138* 138*   PHOS  --  2.2* 1.4*   BUN 21* 27* 30*   CREATININE <0.5* <0.5* <0.5*   LABGLOM >60 >60 >60   GFRAA >60 >60 >60       Assessment/     - Hyponatremia - acute, suspected pre-renal state +/- SIADH state in setting of COVID   Suspect former as improving with IVF's     - COVID PNA     - Severe COPD with exacerbation    Plan/     - Continue IVF trial with NS 75 ml/hour  - Home lasix on hold  - Urine osmolality, urine electrolytes  - Trend labs, bp's, & urine output    Case d/w Nursing    ____________________________________  Linda Garcia MD  [facility]  [email or web address]  Office: [phone]

## 2022-02-06 NOTE — PROGRESS NOTES
02/06/22 0311   Vent Information   $Ventilation $Subsequent Day   Vent Type 840   Vent Mode AC/VC   Vt Ordered 350 mL   Rate Set 22 bmp   Peak Flow 55 L/min   FiO2  40 %   SpO2 94 %   SpO2/FiO2 ratio 235   Sensitivity 3   PEEP/CPAP 6   Humidification Source Heated wire   Humidification Temp Measured 36.8   Circuit Condensation Drained   Vent Patient Data   Peak Inspiratory Pressure 28 cmH2O   Mean Airway Pressure 11 cmH20   Rate Measured 22 br/min   Vt Exhaled 415 mL   Minute Volume 9.1 Liters   I:E Ratio 1:2.9   Cough/Sputum   Sputum How Obtained Endotracheal   Cough Productive   Sputum Amount Small   Sputum Color Creamy   Tenacity Thick   Spontaneous Breathing Trial (SBT) RT Doc   Pulse 89   Breath Sounds   Right Upper Lobe Diminished   Right Middle Lobe Diminished   Right Lower Lobe Diminished   Left Upper Lobe Diminished   Left Lower Lobe Diminished   Additional Respiratory  Assessments   Resp 22   Alarm Settings   High Pressure Alarm 40 cmH2O   Low Minute Volume Alarm 4 L/min   Apnea (secs) 20 secs   High Respiratory Rate 40 br/min   Low Exhaled Vt  250 mL   Patient Observation   Observations 7.5 ett 23 at lip

## 2022-02-06 NOTE — PROGRESS NOTES
Rounding completed with Dr. Mayte Benavides and multidisciplinary team. POC, labs, lines and assessment reviewed.    - Failed SBT  - Start TF

## 2022-02-06 NOTE — PROGRESS NOTES
Pulmonary & Critical Care Medicine ICU Progress Note    CC: COVID-19 pneumonia, had improved while hospitalized and was discharged, same day as discharge return to the ED with new hypercapnia and markedly worsening hypoxemia, required intubation in the emergency department    Events of Last 24 hours:   Intubated  FC per RN  Failed SBT today     Invasive Lines: Peripheral    MV: 2022 for hypercapnia, failed BiPAP  Vent Mode: AC/VC Rate Set: 22 bmp/Vt Ordered: 350 mL/ /FiO2 : 40 %  Recent Labs     22  1349 22  0400   PHART 7.405 7. 401   WUI0RAA 60.3* 48.2*   PO2ART 152.9* 91.2       IV:   sodium chloride      propofol 5 mcg/kg/min (22)    sodium chloride 75 mL/hr at 22    dextrose      IV infusion builder      fentaNYL 75 mcg/hr (22)       Vitals:  Blood pressure (!) 93/53, pulse 86, temperature 98.7 °F (37.1 °C), temperature source Axillary, resp. rate 22, height 5' 4\" (1.626 m), weight 130 lb 11.7 oz (59.3 kg), SpO2 97 %, not currently breastfeeding. on vent  Temp  Av.5 °F (36.9 °C)  Min: 98.1 °F (36.7 °C)  Max: 98.8 °F (37.1 °C)    Intake/Output Summary (Last 24 hours) at 2022 4129  Last data filed at 2022 0615  Gross per 24 hour   Intake 2727.26 ml   Output 2175 ml   Net 552.26 ml     EXAM:  General: intubated, ill appearing    ENT: Pharynx with ETT. Resp: No crackles. No wheezing. CV: S1, S2. No edema  GI: NT, ND, +BS  Skin: Warm and dry. Neuro: PERRL. Awake, communicative and following commands.  Patellar reflexes are symmetric     Scheduled Meds:   aspirin  81 mg Oral Daily    busPIRone  10 mg Oral TID    levothyroxine  125 mcg Oral QAM AC    montelukast  10 mg Oral Nightly    lidocaine  1 patch TransDERmal Daily    nicotine  1 patch TransDERmal Daily    enoxaparin  30 mg SubCUTAneous BID    sodium chloride flush  5-40 mL IntraVENous 2 times per day    methylPREDNISolone  40 mg IntraVENous Q12H    Followed by   Lily Mohr ON 2/7/2022] predniSONE  40 mg Oral Daily with breakfast    vancomycin  1,000 mg IntraVENous Q12H    carboxymethylcellulose PF  1 drop Both Eyes Q4H    And    artificial tears   Both Eyes Q4H    chlorhexidine  15 mL Mouth/Throat BID    famotidine (PEPCID) injection  20 mg IntraVENous BID    ipratropium-albuterol  1 ampule Inhalation Q4H WA    insulin lispro  0-6 Units SubCUTAneous TID WC    insulin lispro  0-3 Units SubCUTAneous Nightly    cefepime  2,000 mg IntraVENous Q12H     PRN Meds:  guaiFENesin-dextromethorphan, HYDROcodone-acetaminophen, polyethylene glycol, acetaminophen **OR** acetaminophen, sodium chloride flush, sodium chloride, fentanNYL, albuterol sulfate HFA **AND** ipratropium, midazolam, glucose, glucagon (rDNA), dextrose, dextrose bolus (hypoglycemia) **OR** dextrose bolus (hypoglycemia)    Results:  CBC:   Recent Labs     02/04/22 1926 02/05/22  0650 02/06/22  0432   WBC 17.0* 14.0* 14.4*   HGB 13.8 12.2 10.8*   HCT 41.3 36.8 31.9*   MCV 91.1 91.5 91.7    245 235     BMP:   Recent Labs     02/04/22  2259 02/05/22  0650 02/05/22  2325   * 126* 129*   K 3.9 4.3 4.3   CL 79* 82* 87*   CO2 39* 39* 37*   PHOS  --   --  2.2*   BUN 26* 21* 27*   CREATININE <0.5* <0.5* <0.5*     LIVER PROFILE:   Recent Labs     02/04/22 1926 02/05/22  0650 02/06/22  0431   AST 76* 55* 34   ALT 55* 47* 39   BILIDIR  --   --  <0.2   BILITOT 0.4 <0.2 0.3   ALKPHOS 98 77 76     Cultures:   1/29/2022 Strep pneumo & Legionella not detected  1/29/2022 BC NG  1/29/2022 COVID-19 detected  2/4/2022 BC NGTD  2/4/2022 urine NG  2/5/2022 tracheal aspirate sent    Procalcitonin 0.16  proBNP 10,600    Films:   CT Chest 1/29/2022    Mediastinum: No evidence of mediastinal lymphadenopathy.  The heart and   pericardium demonstrate no acute abnormality.  There is no acute abnormality   of the thoracic aorta.       Lungs/pleura:  The lungs are without acute process.  Emphysema.  There is a a 1 cm noncalcified nodule in physicians is 32 minutes so far today, excluding procedures.

## 2022-02-06 NOTE — PROGRESS NOTES
Reassessment completed, see flow sheet. FiO2 decreased to 40% per RT, SpO2 95%. PEEP remains at 6. BP soft, decreasing sedation as tolerated by pt. Will watch closely as pt only has PIVs and will need a line if unable to correct BP with sedation adjustments. Pt's  Rosa M Puckett called & updated on pt status. Na+ 129, 2% NS not started per Dr. Angel Friedman, continuing 0.9% NS. No other changes noted.

## 2022-02-06 NOTE — PROGRESS NOTES
Progress Note    Admit Date:  2/4/2022    61F admitted to [facility] 1/29-2/4/22 for acute on chronic hypoxic respiratory failure, COVID-19 pneumonia, and exacerbation of severe COPD discharged home on 4 L nasal cannula oxygen on 2/4/2022. Return to the ER later on 2/4/2022 in respiratory distress, immediately placed on BiPAP. Following morning patient with worsening breathing, increased fatigue, requesting to be intubated. Patient intubated by ER provider. Admitted to ICU    Subjective:  Ms. Antonio Merchant is is currently intubated, sedated on mechanical ventilation. Failed SBT today . FiO2 40% PEEP of 5. Objective:   Patient Vitals for the past 4 hrs:   BP Temp Temp src Pulse Resp SpO2   02/06/22 1800 (!) 100/55 -- -- 80 18 94 %   02/06/22 1700 108/71 -- -- 90 10 92 %   02/06/22 1600 (!) 140/62 97.4 °F (36.3 °C) Axillary 81 18 98 %   02/06/22 1500 130/68 -- -- 91 17 91 %            Intake/Output Summary (Last 24 hours) at 2/6/2022 1812  Last data filed at 2/6/2022 1724  Gross per 24 hour   Intake 4062.75 ml   Output 1550 ml   Net 2512.75 ml       Physical Exam:  Patient seen in droplet plus precautions  Gen: Intubated, sedated on mechanical ventilation  Eyes: PERRL. No sclera icterus. No conjunctival injection. ENT: No discharge. Pharynx clear. Neck: No JVD. Trachea midline. Resp: Diminished breath sounds . no crackles. No wheezes. No rhonchi. CV:  Regular rate and  rhythm. No murmur. No rub. No edema. Capillary Refill: Brisk,< 3 seconds   Peripheral Pulses: +2 palpable, equal bilaterally   GI: Non-tender. Non-distended. Normal bowel sounds. Skin: Warm and dry. No nodule on exposed extremities. No rash on exposed extremities. M/S: No cyanosis. No joint deformity. No clubbing.    Neuro: Sedated  Psych: Sedated      Scheduled Meds:   methylPREDNISolone  40 mg IntraVENous Q12H    Followed by   Wei Telles ON 2/12/2022] predniSONE  40 mg Oral Daily with breakfast    sodium phosphate IVPB  40 mmol IntraVENous Once    aspirin  81 mg Oral Daily    busPIRone  10 mg Oral TID    levothyroxine  125 mcg Oral QAM AC    montelukast  10 mg Oral Nightly    lidocaine  1 patch TransDERmal Daily    nicotine  1 patch TransDERmal Daily    enoxaparin  30 mg SubCUTAneous BID    sodium chloride flush  5-40 mL IntraVENous 2 times per day    vancomycin  1,000 mg IntraVENous Q12H    carboxymethylcellulose PF  1 drop Both Eyes Q4H    And    artificial tears   Both Eyes Q4H    chlorhexidine  15 mL Mouth/Throat BID    famotidine (PEPCID) injection  20 mg IntraVENous BID    ipratropium-albuterol  1 ampule Inhalation Q4H WA    insulin lispro  0-6 Units SubCUTAneous TID WC    insulin lispro  0-3 Units SubCUTAneous Nightly    cefepime  2,000 mg IntraVENous Q12H       Continuous Infusions:   sodium chloride      propofol 15 mcg/kg/min (02/06/22 1724)    sodium chloride 75 mL/hr at 02/06/22 1724    dextrose      IV infusion builder      fentaNYL 75 mcg/hr (02/06/22 1724)       PRN Meds:  guaiFENesin-dextromethorphan, HYDROcodone-acetaminophen, polyethylene glycol, acetaminophen **OR** acetaminophen, sodium chloride flush, sodium chloride, fentanNYL, albuterol sulfate HFA **AND** ipratropium, midazolam, glucose, glucagon (rDNA), dextrose, dextrose bolus (hypoglycemia) **OR** dextrose bolus (hypoglycemia)      Data:  CBC:   Recent Labs     02/04/22 1926 02/05/22  0650 02/06/22  0432   WBC 17.0* 14.0* 14.4*   HGB 13.8 12.2 10.8*   HCT 41.3 36.8 31.9*   MCV 91.1 91.5 91.7    245 235     BMP:   Recent Labs     02/05/22  0650 02/05/22  2325 02/06/22  1100   * 129* 129*   K 4.3 4.3 4.5   CL 82* 87* 88*   CO2 39* 37* 35*   PHOS  --  2.2* 1.4*   BUN 21* 27* 30*   CREATININE <0.5* <0.5* <0.5*     LIVER PROFILE:   Recent Labs     02/04/22 1926 02/05/22  0650 02/06/22  0431   AST 76* 55* 34   ALT 55* 47* 39   BILIDIR  --   --  <0.2   BILITOT 0.4 <0.2 0.3   ALKPHOS 98 77 76     PT/INR: No results for input(s): 0.04, 0.03, respectively  -Patient denies chest pain  -EKG with nonspecific EKG changes, cardiologist has reviewed EKGs, I reviewed with Dr. Rashi Muhammad as well   -Suspect elevation secondary to demand mismatch in acute respiratory failure. Continue to monitor on telemetry  - ASA added on admit, continue for now     #Severe anxiety   - cont buspar     #Tobacco dependence  - nicotine patch ordered      #Hyponatremia   - [id number]  - check urine studies   - improved after IV NS.   Continue NS infusion and monitor BMP q12 hours  - nephro c/s    #Right lung nodule   - per pulm c/s last admit: PET scan vs biopsy vs resection for RLL nodule - could be addressed as an outpatient     #Chronic pain   - PRN norco    #Hypothyroidism  - home dose of synthroid continued     #Hypophosphatemia  -Replete    DVT Prophylaxis: Lovenox   Diet: Diet NPO Exceptions are: Sips of Water with Meds, Ice Chips  ADULT TUBE FEEDING; Orogastric; Peptide Based High Protein; Continuous; 20; Yes; 20; Q 4 hours; 60; 30; Q 4 hours  Code Status: Full Code     Mack Brower MD  2/6/2022 6:12 PM

## 2022-02-06 NOTE — PROGRESS NOTES
Patient admitted to ICU for respiratory failure secondary to COVID-19. Telemetry monitor hooked up to patient, ST on monitor. Breathing pattern appears unlabored, lung sounds diminished. Patient currently intubated with ETT at 23 LL, settings are 22 RR, 350 VT, 6 of PEEP and 50% FiO2. BUE strength is strong, BLE strength is strong. PERRLA. Abdomen appears soft, bowel sounds active. IV access is PIV x2. Urinary catheter in place draining clear, ramona urine. No presence of edema. Patient is not able to demonstrate the ability to move to and from a reclining position. CHG bath provided for pt.    4 Eyes Skin Assessment   The patient is being assess for   Admission    I agree that 2 RN's have performed a thorough Head to Toe Skin Assessment on the patient. ALL assessment sites listed below have been assessed. Areas assessed by both nurses:   [x]   Head, Face, and Ears   [x]   Shoulders, Back, and Chest, Abdomen  [x]   Arms, Elbows, and Hands   [x]   Coccyx, Sacrum, and Ischium  [x]   Legs, Feet, and Heels    No signs of skin breakdown, preventative foam dressing placed on sacrum. Co-signer eSignature: Electronically signed by Hakeem Bar RN on 2/5/22 at 10:42 PM EST    Does the Patient have Skin Breakdown?   No          Ron Prevention initiated:  Yes   Wound Care Orders initiated:  No      [facility] nurse consulted for Pressure Injury (Stage 3,4, Unstageable, DTI, NWPT, Complex wounds)and New or Established Ostomies:  N/A      Primary Nurse eSignature: Electronically signed by Minal Munoz RN on 2/5/22 at 10:39 PM EST

## 2022-02-07 ENCOUNTER — APPOINTMENT (OUTPATIENT)
Dept: GENERAL RADIOLOGY | Age: 62
DRG: 208 | End: 2022-02-07
Payer: MEDICARE

## 2022-02-07 ENCOUNTER — TELEPHONE (OUTPATIENT)
Dept: CASE MANAGEMENT | Age: 62
End: 2022-02-07

## 2022-02-07 LAB
ANION GAP SERPL CALCULATED.3IONS-SCNC: 3 MMOL/L (ref 3–16)
BASE EXCESS ARTERIAL: 6.3 MMOL/L (ref -3–3)
BASE EXCESS ARTERIAL: 7.1 MMOL/L (ref -3–3)
BASE EXCESS ARTERIAL: 9.7 MMOL/L (ref -3–3)
BASOPHILS ABSOLUTE: 0 K/UL (ref 0–0.2)
BASOPHILS RELATIVE PERCENT: 0 %
BUN BLDV-MCNC: 32 MG/DL (ref 7–20)
CALCIUM SERPL-MCNC: 8.6 MG/DL (ref 8.3–10.6)
CARBOXYHEMOGLOBIN ARTERIAL: 0.3 % (ref 0–1.5)
CHLORIDE BLD-SCNC: 95 MMOL/L (ref 99–110)
CO2: 36 MMOL/L (ref 21–32)
CREAT SERPL-MCNC: <0.5 MG/DL (ref 0.6–1.2)
CULTURE, RESPIRATORY: ABNORMAL
CULTURE, RESPIRATORY: ABNORMAL
EOSINOPHILS ABSOLUTE: 0 K/UL (ref 0–0.6)
EOSINOPHILS RELATIVE PERCENT: 0 %
GFR AFRICAN AMERICAN: >60
GFR NON-AFRICAN AMERICAN: >60
GLUCOSE BLD-MCNC: 151 MG/DL (ref 70–99)
GLUCOSE BLD-MCNC: 165 MG/DL (ref 70–99)
GLUCOSE BLD-MCNC: 190 MG/DL (ref 70–99)
GLUCOSE BLD-MCNC: 203 MG/DL (ref 70–99)
GLUCOSE BLD-MCNC: 215 MG/DL (ref 70–99)
GLUCOSE BLD-MCNC: 225 MG/DL (ref 70–99)
GRAM STAIN RESULT: ABNORMAL
HCO3 ARTERIAL: 34.9 MMOL/L (ref 21–29)
HCO3 ARTERIAL: 35.9 MMOL/L (ref 21–29)
HCO3 ARTERIAL: 37.6 MMOL/L (ref 21–29)
HCT VFR BLD CALC: 29.9 % (ref 36–48)
HEMOGLOBIN, ART, EXTENDED: 10.6 G/DL (ref 12–16)
HEMOGLOBIN, ART, EXTENDED: 10.7 G/DL (ref 12–16)
HEMOGLOBIN, ART, EXTENDED: 10.7 G/DL (ref 12–16)
HEMOGLOBIN: 10 G/DL (ref 12–16)
LYMPHOCYTES ABSOLUTE: 0.5 K/UL (ref 1–5.1)
LYMPHOCYTES RELATIVE PERCENT: 4 %
MCH RBC QN AUTO: 30.9 PG (ref 26–34)
MCHC RBC AUTO-ENTMCNC: 33.5 G/DL (ref 31–36)
MCV RBC AUTO: 92.4 FL (ref 80–100)
METHEMOGLOBIN ARTERIAL: 0 %
METHEMOGLOBIN ARTERIAL: 0.3 %
METHEMOGLOBIN ARTERIAL: 0.3 %
MONOCYTES ABSOLUTE: 0.3 K/UL (ref 0–1.3)
MONOCYTES RELATIVE PERCENT: 2 %
MYELOCYTE PERCENT: 1 %
NEUTROPHILS ABSOLUTE: 12.4 K/UL (ref 1.7–7.7)
NEUTROPHILS RELATIVE PERCENT: 93 %
O2 SAT, ARTERIAL: 89.6 %
O2 SAT, ARTERIAL: 96.2 %
O2 SAT, ARTERIAL: 96.5 %
O2 THERAPY: ABNORMAL
ORGANISM: ABNORMAL
PCO2 ARTERIAL: 70.9 MMHG (ref 35–45)
PCO2 ARTERIAL: 75.9 MMHG (ref 35–45)
PCO2 ARTERIAL: 78 MMHG (ref 35–45)
PDW BLD-RTO: 13.4 % (ref 12.4–15.4)
PERFORMED ON: ABNORMAL
PH ARTERIAL: 7.28 (ref 7.35–7.45)
PH ARTERIAL: 7.28 (ref 7.35–7.45)
PH ARTERIAL: 7.34 (ref 7.35–7.45)
PLATELET # BLD: 298 K/UL (ref 135–450)
PLATELET SLIDE REVIEW: ADEQUATE
PMV BLD AUTO: 7.3 FL (ref 5–10.5)
PO2 ARTERIAL: 100.5 MMHG (ref 75–108)
PO2 ARTERIAL: 65.9 MMHG (ref 75–108)
PO2 ARTERIAL: 90.7 MMHG (ref 75–108)
POTASSIUM REFLEX MAGNESIUM: 4.4 MMOL/L (ref 3.5–5.1)
RBC # BLD: 3.24 M/UL (ref 4–5.2)
SLIDE REVIEW: ABNORMAL
SODIUM BLD-SCNC: 134 MMOL/L (ref 136–145)
TCO2 ARTERIAL: 37.3 MMOL/L
TCO2 ARTERIAL: 38.3 MMOL/L
TCO2 ARTERIAL: 39.7 MMOL/L
TRIGL SERPL-MCNC: 210 MG/DL (ref 0–150)
WBC # BLD: 13.2 K/UL (ref 4–11)

## 2022-02-07 PROCEDURE — 6360000002 HC RX W HCPCS

## 2022-02-07 PROCEDURE — 2000000000 HC ICU R&B

## 2022-02-07 PROCEDURE — 6360000002 HC RX W HCPCS: Performed by: INTERNAL MEDICINE

## 2022-02-07 PROCEDURE — 6370000000 HC RX 637 (ALT 250 FOR IP): Performed by: INTERNAL MEDICINE

## 2022-02-07 PROCEDURE — 6370000000 HC RX 637 (ALT 250 FOR IP)

## 2022-02-07 PROCEDURE — 94761 N-INVAS EAR/PLS OXIMETRY MLT: CPT

## 2022-02-07 PROCEDURE — 2580000003 HC RX 258: Performed by: PHYSICIAN ASSISTANT

## 2022-02-07 PROCEDURE — 94003 VENT MGMT INPAT SUBQ DAY: CPT

## 2022-02-07 PROCEDURE — 36415 COLL VENOUS BLD VENIPUNCTURE: CPT

## 2022-02-07 PROCEDURE — 2580000003 HC RX 258: Performed by: INTERNAL MEDICINE

## 2022-02-07 PROCEDURE — 2500000003 HC RX 250 WO HCPCS: Performed by: INTERNAL MEDICINE

## 2022-02-07 PROCEDURE — 85025 COMPLETE CBC W/AUTO DIFF WBC: CPT

## 2022-02-07 PROCEDURE — 84478 ASSAY OF TRIGLYCERIDES: CPT

## 2022-02-07 PROCEDURE — 94640 AIRWAY INHALATION TREATMENT: CPT

## 2022-02-07 PROCEDURE — 2500000003 HC RX 250 WO HCPCS

## 2022-02-07 PROCEDURE — 2700000000 HC OXYGEN THERAPY PER DAY

## 2022-02-07 PROCEDURE — 99291 CRITICAL CARE FIRST HOUR: CPT | Performed by: INTERNAL MEDICINE

## 2022-02-07 PROCEDURE — 99233 SBSQ HOSP IP/OBS HIGH 50: CPT | Performed by: INTERNAL MEDICINE

## 2022-02-07 PROCEDURE — 71045 X-RAY EXAM CHEST 1 VIEW: CPT

## 2022-02-07 PROCEDURE — 80048 BASIC METABOLIC PNL TOTAL CA: CPT

## 2022-02-07 PROCEDURE — 82803 BLOOD GASES ANY COMBINATION: CPT

## 2022-02-07 RX ORDER — IPRATROPIUM BROMIDE AND ALBUTEROL SULFATE 2.5; .5 MG/3ML; MG/3ML
1 SOLUTION RESPIRATORY (INHALATION) EVERY 4 HOURS
Status: DISCONTINUED | OUTPATIENT
Start: 2022-02-08 | End: 2022-02-08

## 2022-02-07 RX ADMIN — PROPOFOL 35 MCG/KG/MIN: 10 INJECTION, EMULSION INTRAVENOUS at 20:03

## 2022-02-07 RX ADMIN — CEFEPIME 2000 MG: 2 INJECTION, POWDER, FOR SOLUTION INTRAMUSCULAR; INTRAVENOUS at 22:18

## 2022-02-07 RX ADMIN — METHYLPREDNISOLONE SODIUM SUCCINATE 40 MG: 40 INJECTION, POWDER, FOR SOLUTION INTRAMUSCULAR; INTRAVENOUS at 21:12

## 2022-02-07 RX ADMIN — ENOXAPARIN SODIUM 30 MG: 100 INJECTION SUBCUTANEOUS at 09:41

## 2022-02-07 RX ADMIN — BUSPIRONE HYDROCHLORIDE 10 MG: 10 TABLET ORAL at 09:41

## 2022-02-07 RX ADMIN — WHITE PETROLATUM 57.7 %-MINERAL OIL 31.9 % EYE OINTMENT: at 17:06

## 2022-02-07 RX ADMIN — FAMOTIDINE 20 MG: 10 INJECTION, SOLUTION INTRAVENOUS at 09:41

## 2022-02-07 RX ADMIN — CARBOXYMETHYLCELLULOSE SODIUM 1 DROP: 10 GEL OPHTHALMIC at 05:05

## 2022-02-07 RX ADMIN — CHLORHEXIDINE GLUCONATE 0.12% ORAL RINSE 15 ML: 1.2 LIQUID ORAL at 20:03

## 2022-02-07 RX ADMIN — CARBOXYMETHYLCELLULOSE SODIUM 1 DROP: 10 GEL OPHTHALMIC at 16:00

## 2022-02-07 RX ADMIN — FAMOTIDINE 20 MG: 10 INJECTION, SOLUTION INTRAVENOUS at 21:12

## 2022-02-07 RX ADMIN — IPRATROPIUM BROMIDE AND ALBUTEROL SULFATE 1 AMPULE: 2.5; .5 SOLUTION RESPIRATORY (INHALATION) at 02:53

## 2022-02-07 RX ADMIN — WHITE PETROLATUM 57.7 %-MINERAL OIL 31.9 % EYE OINTMENT: at 13:00

## 2022-02-07 RX ADMIN — Medication 10 ML: at 09:42

## 2022-02-07 RX ADMIN — METHYLPREDNISOLONE SODIUM SUCCINATE 40 MG: 40 INJECTION, POWDER, FOR SOLUTION INTRAMUSCULAR; INTRAVENOUS at 09:41

## 2022-02-07 RX ADMIN — SODIUM CHLORIDE: 9 INJECTION, SOLUTION INTRAVENOUS at 18:46

## 2022-02-07 RX ADMIN — Medication 125 MCG/HR: at 18:45

## 2022-02-07 RX ADMIN — CHLORHEXIDINE GLUCONATE 0.12% ORAL RINSE 15 ML: 1.2 LIQUID ORAL at 09:42

## 2022-02-07 RX ADMIN — LEVOTHYROXINE SODIUM 125 MCG: 0.03 TABLET ORAL at 05:05

## 2022-02-07 RX ADMIN — IPRATROPIUM BROMIDE AND ALBUTEROL SULFATE 1 AMPULE: 2.5; .5 SOLUTION RESPIRATORY (INHALATION) at 12:06

## 2022-02-07 RX ADMIN — ENOXAPARIN SODIUM 30 MG: 100 INJECTION SUBCUTANEOUS at 21:12

## 2022-02-07 RX ADMIN — Medication 125 MCG/HR: at 01:45

## 2022-02-07 RX ADMIN — CEFEPIME 2000 MG: 2 INJECTION, POWDER, FOR SOLUTION INTRAMUSCULAR; INTRAVENOUS at 11:10

## 2022-02-07 RX ADMIN — Medication 125 MCG/HR: at 11:08

## 2022-02-07 RX ADMIN — WHITE PETROLATUM 57.7 %-MINERAL OIL 31.9 % EYE OINTMENT: at 00:18

## 2022-02-07 RX ADMIN — WHITE PETROLATUM 57.7 %-MINERAL OIL 31.9 % EYE OINTMENT: at 05:05

## 2022-02-07 RX ADMIN — Medication 125 MCG/HR: at 20:40

## 2022-02-07 RX ADMIN — IPRATROPIUM BROMIDE AND ALBUTEROL SULFATE 1 AMPULE: 2.5; .5 SOLUTION RESPIRATORY (INHALATION) at 22:37

## 2022-02-07 RX ADMIN — BUSPIRONE HYDROCHLORIDE 10 MG: 10 TABLET ORAL at 15:00

## 2022-02-07 RX ADMIN — IPRATROPIUM BROMIDE AND ALBUTEROL SULFATE 1 AMPULE: 2.5; .5 SOLUTION RESPIRATORY (INHALATION) at 19:05

## 2022-02-07 RX ADMIN — MONTELUKAST SODIUM 10 MG: 10 TABLET, COATED ORAL at 21:11

## 2022-02-07 RX ADMIN — WHITE PETROLATUM 57.7 %-MINERAL OIL 31.9 % EYE OINTMENT: at 09:45

## 2022-02-07 RX ADMIN — VANCOMYCIN HYDROCHLORIDE 1000 MG: 1 INJECTION, POWDER, LYOPHILIZED, FOR SOLUTION INTRAVENOUS at 22:56

## 2022-02-07 RX ADMIN — VANCOMYCIN HYDROCHLORIDE 1000 MG: 1 INJECTION, POWDER, LYOPHILIZED, FOR SOLUTION INTRAVENOUS at 12:36

## 2022-02-07 RX ADMIN — PROPOFOL 35 MCG/KG/MIN: 10 INJECTION, EMULSION INTRAVENOUS at 18:47

## 2022-02-07 RX ADMIN — PROPOFOL 35 MCG/KG/MIN: 10 INJECTION, EMULSION INTRAVENOUS at 14:13

## 2022-02-07 RX ADMIN — VANCOMYCIN HYDROCHLORIDE 1000 MG: 1 INJECTION, POWDER, LYOPHILIZED, FOR SOLUTION INTRAVENOUS at 00:17

## 2022-02-07 RX ADMIN — PROPOFOL 35 MCG/KG/MIN: 10 INJECTION, EMULSION INTRAVENOUS at 06:05

## 2022-02-07 RX ADMIN — Medication 10 ML: at 20:04

## 2022-02-07 RX ADMIN — IPRATROPIUM BROMIDE AND ALBUTEROL SULFATE 1 AMPULE: 2.5; .5 SOLUTION RESPIRATORY (INHALATION) at 07:48

## 2022-02-07 RX ADMIN — CARBOXYMETHYLCELLULOSE SODIUM 1 DROP: 10 GEL OPHTHALMIC at 18:50

## 2022-02-07 RX ADMIN — CARBOXYMETHYLCELLULOSE SODIUM 1 DROP: 10 GEL OPHTHALMIC at 01:56

## 2022-02-07 RX ADMIN — CARBOXYMETHYLCELLULOSE SODIUM 1 DROP: 10 GEL OPHTHALMIC at 09:41

## 2022-02-07 RX ADMIN — BUSPIRONE HYDROCHLORIDE 10 MG: 10 TABLET ORAL at 21:11

## 2022-02-07 RX ADMIN — WHITE PETROLATUM 57.7 %-MINERAL OIL 31.9 % EYE OINTMENT: at 20:03

## 2022-02-07 RX ADMIN — IPRATROPIUM BROMIDE AND ALBUTEROL SULFATE 1 AMPULE: 2.5; .5 SOLUTION RESPIRATORY (INHALATION) at 16:24

## 2022-02-07 RX ADMIN — ASPIRIN 81 MG: 81 TABLET, COATED ORAL at 09:41

## 2022-02-07 RX ADMIN — CARBOXYMETHYLCELLULOSE SODIUM 1 DROP: 10 GEL OPHTHALMIC at 22:19

## 2022-02-07 ASSESSMENT — PAIN SCALES - GENERAL
PAINLEVEL_OUTOF10: 0

## 2022-02-07 ASSESSMENT — PULMONARY FUNCTION TESTS
PIF_VALUE: 35
PIF_VALUE: 37
PIF_VALUE: 34
PIF_VALUE: 30
PIF_VALUE: 33

## 2022-02-07 NOTE — PROGRESS NOTES
This note also relates to the following rows which could not be included:  Pulse - Cannot attach notes to unvalidated device data       02/06/22 2300   Vent Information   Vent Type 840   Vent Mode AC/VC   Vt Ordered 350 mL   Rate Set 18 bmp   Peak Flow 55 L/min   FiO2  40 %  (increased to 50%)   SpO2 (!) 83 %   SpO2/FiO2 ratio 207.5   Sensitivity 3   PEEP/CPAP 5  (increased to 8)   Humidification Source Heated wire   Humidification Temp 37   Humidification Temp Measured 37   Circuit Condensation Drained   Vent Patient Data   Peak Inspiratory Pressure 32 cmH2O   Mean Airway Pressure 14 cmH20   Rate Measured 18 br/min   Vt Exhaled 418 mL   Minute Volume 7.5 Liters   I:E Ratio 1:2.5   Plateau Pressure 19 WPW12   Cough/Sputum   Sputum How Obtained Endotracheal;Suctioned   Cough Productive   Sputum Amount Small   Sputum Color Creamy   Tenacity Thick   Breath Sounds   Right Upper Lobe Diminished   Right Middle Lobe Diminished   Right Lower Lobe Diminished   Left Upper Lobe Diminished   Left Lower Lobe Diminished   Additional Respiratory  Assessments   Resp 20   Position Semi-Woods's   Alarm Settings   High Pressure Alarm 40 cmH2O   Low Minute Volume Alarm 4 L/min   Apnea (secs) 20 secs   High Respiratory Rate 40 br/min   Low Exhaled Vt  250 mL   Non-Surgical Airway 02/05/22 Endo Tracheal Tube   Placement Date/Time: 02/05/22 1100   Timeout: Patient;Procedure;Site/Side;Appropriate Equipment; Consent Confirmed;Site prepped with chlorhexidine;Correct Position  Mask Ventilation: Ventilated by mask (1)  Placed By: In ED;Licensed provider  Inserted . ..    Secured at 23 cm   Measured From Lips   Secured Location Right  (moved to right)   Secured By Commercial tube [email or web address]   Site Condition Dry

## 2022-02-07 NOTE — PROGRESS NOTES
Nephrology Progress Note   [facility]      This patient is a 59year old female whom we are following for hyponatremia. Subjective: The patient was seen and examined. Serum sodium 131mmol/L last night. Family History: No family at bedside  ROS: Unable to obtain since intubated, sedated      Vitals:  BP (!) 116/58   Pulse 99   Temp 97.1 °F (36.2 °C) (Axillary)   Resp 22   Ht 5' 4\" (1.626 m)   Wt 135 lb 2.3 oz (61.3 kg)   LMP  (LMP Unknown)   SpO2 96%   BMI 23.20 kg/m²   I/O last 3 completed shifts: In: 12 [I.V.:3684. 1; NG/GT:1087; IV Piggyback:1727.9]  Out: 2200 [Urine:2200]  I/O this shift:  In: 183 [NG/GT:183]  Out: -     Physical Exam:  Physical Exam  Vitals reviewed. Constitutional:       Interventions: She is sedated and intubated. HENT:      Head: Normocephalic and atraumatic. Cardiovascular:      Rate and Rhythm: Tachycardia present. Pulmonary:      Effort: She is intubated. Comments: Equal chest expansion  Abdominal:      General: There is no distension. Musculoskeletal:      Right lower leg: No edema. Left lower leg: No edema.            Medications:   insulin lispro  0-6 Units SubCUTAneous Q4H    methylPREDNISolone  40 mg IntraVENous Q12H    Followed by   Aramis Ac ON 2/12/2022] predniSONE  40 mg Oral Daily with breakfast    aspirin  81 mg Oral Daily    busPIRone  10 mg Oral TID    levothyroxine  125 mcg Oral QAM AC    montelukast  10 mg Oral Nightly    lidocaine  1 patch TransDERmal Daily    nicotine  1 patch TransDERmal Daily    enoxaparin  30 mg SubCUTAneous BID    sodium chloride flush  5-40 mL IntraVENous 2 times per day    vancomycin  1,000 mg IntraVENous Q12H    carboxymethylcellulose PF  1 drop Both Eyes Q4H    And    artificial tears   Both Eyes Q4H    chlorhexidine  15 mL Mouth/Throat BID    famotidine (PEPCID) injection  20 mg IntraVENous BID    ipratropium-albuterol  1 ampule Inhalation Q4H WA    cefepime  2,000 mg IntraVENous Q12H Labs:  Recent Labs     02/05/22  0650 02/06/22  0432 02/07/22  0425   WBC 14.0* 14.4* 13.2*   HGB 12.2 10.8* 10.0*   HCT 36.8 31.9* 29.9*   MCV 91.5 91.7 92.4    235 298     Recent Labs     02/05/22  2325 02/06/22  1100 02/06/22  2253   * 129* 131*   K 4.3 4.5 4.1   CL 87* 88* 91*   CO2 37* 35* 34*   GLUCOSE 138* 138* 180*   PHOS 2.2* 1.4* 4.5   BUN 27* 30* 32*   CREATININE <0.5* <0.5* <0.5*   LABGLOM >60 >60 >60   GFRAA >60 >60 >60           Assessment/      - Hyponatremia - acute, suspected pre-renal state +/- SIADH state in setting of COVID              Suspect former as improving with IVF's    Urine osmolality, urine electrolytes ordered on 2/4 but was not done     - COVID PNA     - Severe COPD with exacerbation     Plan/      - Continue NS at 75 ml/hour  - Home lasix on hold  - BMP daily.       Please do not hesitate to contact me at [phone] if with questions. Thank you! Steven Hobbs MD  [facility] [employer]  2/7/2022

## 2022-02-07 NOTE — PROGRESS NOTES
RT Inhaler-Nebulizer Bronchodilator Protocol Note    There is a bronchodilator order in the chart from a provider indicating to follow the RT Bronchodilator Protocol and there is an Initiate RT Inhaler-Nebulizer Bronchodilator Protocol order as well (see protocol at bottom of note). CXR Findings:  XR CHEST PORTABLE    Result Date: 2/7/2022  1. Endotracheal tube remains in appropriate position. 2. No new findings identified. XR CHEST PORTABLE    Result Date: 2/6/2022  Stable examination with findings of COPD. XR CHEST PORTABLE    Result Date: 2/6/2022  No acute process. Stable exam with stable lines and tubes. Stable findings of COPD. The findings from the last RT Protocol Assessment were as follows:   History Pulmonary Disease: (P) Chronic pulmonary disease  Respiratory Pattern: (P) Use of accessory muscles, prolonged exhalation, or RR 26-30 bpm  Breath Sounds: (P) Slightly diminished and/or crackles  Cough: (P) Unable to generate effective cough  Indication for Bronchodilator Therapy: (P) On home bronchodilators  Bronchodilator Assessment Score: (P) 14    Aerosolized bronchodilator medication orders have been revised according to the RT Inhaler-Nebulizer Bronchodilator Protocol below. Respiratory Therapist to perform RT Therapy Protocol Assessment initially then follow the protocol. Repeat RT Therapy Protocol Assessment PRN for score 0-3 or on second treatment, BID, and PRN for scores above 3. No Indications - adjust the frequency to every 6 hours PRN wheezing or bronchospasm, if no treatments needed after 48 hours then discontinue using Per Protocol order mode. If indication present, adjust the RT bronchodilator orders based on the Bronchodilator Assessment Score as indicated below.   Use Inhaler orders unless patient has one or more of the following: on home nebulizer, not able to hold breath for 10 seconds, is not alert and oriented, cannot activate and use MDI correctly, or respiratory rate 25 breaths per minute or more, then use the equivalent nebulizer order(s) with same Frequency and PRN reasons based on the score. If a patient is on this medication at home then do not decrease Frequency below that used at home. 0-3 - enter or revise RT bronchodilator order(s) to equivalent RT Bronchodilator order with Frequency of every 4 hours PRN for wheezing or increased work of breathing using Per Protocol order mode. 4-6 - enter or revise RT Bronchodilator order(s) to two equivalent RT bronchodilator orders with one order with BID Frequency and one order with Frequency of every 4 hours PRN wheezing or increased work of breathing using Per Protocol order mode. 7-10 - enter or revise RT Bronchodilator order(s) to two equivalent RT bronchodilator orders with one order with TID Frequency and one order with Frequency of every 4 hours PRN wheezing or increased work of breathing using Per Protocol order mode. 11-13 - enter or revise RT Bronchodilator order(s) to one equivalent RT bronchodilator order with QID Frequency and an Albuterol order with Frequency of every 4 hours PRN wheezing or increased work of breathing using Per Protocol order mode. Greater than 13 - enter or revise RT Bronchodilator order(s) to one equivalent RT bronchodilator order with every 4 hours Frequency and an Albuterol order with Frequency of every 2 hours PRN wheezing or increased work of breathing using Per Protocol order mode.          Electronically signed by Silvia Hernández RCP on 2/7/2022 at 4:25 PM

## 2022-02-07 NOTE — PROGRESS NOTES
8:25 PM  Shift assessment completed, see flow sheet. PM meds administered per MAR. SpO2 90% on 40% and +5.  LS very tight/wheezy, breathing trx x2 administered + IV solumedrol which helped some. HR & BP increased, PRN versed & fentanyl administered, sedation increased d/t RASS +1. Fentanyl infusing at 100 mcg/hr and propofol infusing at 20 mcg/kg/min. Pt following commands & nodding appropriately to questions. Mitchell catheter draining clear, ramona urine with minimal UO. TF infusing at goal rate, GRV 50. Oral care completed. Repositioning pt q2h & PRN. 11:51 PM  Pt continued to desat despite increased sedation d/t peak pressuring. Sedation increased, propofol currently at 35 and fentanyl is at 125. Pt is now a RASS -4 in order to keep SpO2 90% and up. Vent settings also increased to 50% and +8 d/t SpO2 down to 83%. STAT CXR obtained d/t minimal lung sounds auscultated on L side, no PTX per radiologist.  Will keep pt sedated enough to prevent further increase in vent settings. 12:28 AM  CHG bath, mitchell care, and linen change provided. GRV 75 ml, tolerating TF at goal rate. 4:44 AM  Peak pressures ranging from 30-36. SpO2 94% on 50% and +8. Sedation unchanged, propofol at 35 and fentanyl at 125, RASS -3 to -4. ABG collected w/o issue. 5:59 AM  pCO2 78 on ABG, redrawn with similar results. MD Abrams notified. 6:21 AM  RR increased from 18 to 22 d/t increased pCO2 and decreased pH per MD Abrams. Adjustments made, RT notified. 7:19 AM  EOS report given to RODRÍGUEZ Walls. Pt stable at this time, care transferred.

## 2022-02-07 NOTE — PROGRESS NOTES
02/07/22 0253   Vent Information   $Ventilation $Subsequent Day   Vent Type 840   Vent Mode AC/VC   Vt Ordered 350 mL   Rate Set 18 bmp   Peak Flow 55 L/min   FiO2  50 %   SpO2 93 %   SpO2/FiO2 ratio 186   Sensitivity 3   PEEP/CPAP 8   Humidification Source Heated wire   Humidification Temp 37   Humidification Temp Measured 37   Circuit Condensation Drained   Vent Patient Data   Peak Inspiratory Pressure 30 cmH2O   Mean Airway Pressure 14 cmH20   Rate Measured 18 br/min   Vt Exhaled 422 mL   Minute Volume 7.6 Liters   I:E Ratio 1:2.5   Plateau Pressure 23 BTB19   Cough/Sputum   Sputum How Obtained Endotracheal;Suctioned   Cough Productive   Sputum Amount Small   Sputum Color Creamy   Tenacity Thick   Spontaneous Breathing Trial (SBT) RT Doc   Pulse 101   Breath Sounds   Right Upper Lobe Diminished   Right Middle Lobe Diminished   Right Lower Lobe Diminished   Left Upper Lobe Diminished   Left Lower Lobe Diminished   Additional Respiratory  Assessments   Resp 18   Position Semi-Woods's   Alarm Settings   High Pressure Alarm 40 cmH2O   Low Minute Volume Alarm 4 L/min   Apnea (secs) 20 secs   High Respiratory Rate 40 br/min   Low Exhaled Vt  250 mL   Non-Surgical Airway 02/05/22 Endo Tracheal Tube   Placement Date/Time: 02/05/22 1100   Timeout: Patient;Procedure;Site/Side;Appropriate Equipment; Consent Confirmed;Site prepped with chlorhexidine;Correct Position  Mask Ventilation: Ventilated by mask (1)  Placed By: In ED;Licensed provider  Inserted . ..    Secured at 23 cm   Measured From Lips   Secured Location Right   Secured By Commercial tube [email or web address]   Site Condition Dry

## 2022-02-07 NOTE — PROGRESS NOTES
02/06/22 1919   Vent Information   Vent Type 840   Vent Mode AC/VC   Vt Ordered 350 mL   Rate Set 18 bmp   Peak Flow 55 L/min   FiO2  40 %   SpO2 91 %   SpO2/FiO2 ratio 227.5   Sensitivity 3   PEEP/CPAP 5   Humidification Source Heated wire   Humidification Temp 37   Humidification Temp Measured 37   Circuit Condensation Drained   Vent Patient Data   Peak Inspiratory Pressure 36 cmH2O   Mean Airway Pressure 11 cmH20   Rate Measured 18 br/min   Vt Exhaled 364 mL   Minute Volume 7.5 Liters   I:E Ratio 1:3.8   Plateau Pressure 19 ODC58   Static Compliance 26 mL/cmH2O   Dynamic Compliance 12 mL/cmH2O   Cough/Sputum   Sputum How Obtained Endotracheal;Suctioned   Cough Productive   Sputum Amount Small   Sputum Color Yellow;Creamy   Tenacity Thick   Spontaneous Breathing Trial (SBT) RT Doc   Pulse 81   Breath Sounds   Right Upper Lobe Diminished   Right Middle Lobe Diminished   Right Lower Lobe Diminished   Left Upper Lobe Diminished   Left Lower Lobe Diminished   Additional Respiratory  Assessments   Resp 15   Position Reverse Trendelenburg   Alarm Settings   High Pressure Alarm 40 cmH2O   Low Minute Volume Alarm 4 L/min   Apnea (secs) 20 secs   High Respiratory Rate 40 br/min   Low Exhaled Vt  250 mL   Non-Surgical Airway 02/05/22 Endo Tracheal Tube   Placement Date/Time: 02/05/22 1100   Timeout: Patient;Procedure;Site/Side;Appropriate Equipment; Consent Confirmed;Site prepped with chlorhexidine;Correct Position  Mask Ventilation: Ventilated by mask (1)  Placed By: In ED;Licensed provider  Inserted . ..    Secured at 23 cm   Measured From Lips   Secured Location Left   Secured By Commercial tube [email or web address]   Site Condition Dry

## 2022-02-07 NOTE — PROGRESS NOTES
Progress Note    Admit Date:  2/4/2022    61F admitted to [facility] 1/29-2/4/22 for acute on chronic hypoxic respiratory failure, COVID-19 pneumonia, and exacerbation of severe COPD discharged home on 4 L nasal cannula oxygen on 2/4/2022. Return to the ER later on 2/4/2022 in respiratory distress, immediately placed on BiPAP. Following morning patient with worsening breathing, increased fatigue, requesting to be intubated. Patient intubated by ER provider. Admitted to ICU    Subjective:  Ms. Boby Bell is is currently intubated, sedated on mechanical ventilation. .  FiO2 50% PEEP of 8        Objective:   Patient Vitals for the past 4 hrs:   BP Temp Temp src Pulse Resp SpO2   02/07/22 0700 110/61 -- -- 98 22 95 %   02/07/22 0600 127/64 -- -- 94 18 94 %   02/07/22 0500 127/65 -- -- 104 18 95 %   02/07/22 0400 120/64 98.8 °F (37.1 °C) Axillary 103 18 94 %            Intake/Output Summary (Last 24 hours) at 2/7/2022 9785  Last data filed at 2/7/2022 8632  Gross per 24 hour   Intake 3771.72 ml   Output 1025 ml   Net 2746.72 ml       Physical Exam:  Patient seen in droplet plus precautions  Gen:  Middle aged female sedated on vent,   Oral ETT and OG noted   Eyes: PERRL. No sclera icterus. No conjunctival injection. .   Neck: No JVD. Trachea midline. Resp: Diminished breath sounds . no crackles. ++ wheezes. No rhonchi. CV:  Regular rate and  rhythm. No murmur. No rub. No edema. Capillary Refill: Brisk,< 3 seconds   Peripheral Pulses: +2 palpable, equal bilaterally   GI: Non-tender. Non-distended. Normal bowel sounds. Skin: Warm and dry. No nodule on exposed extremities. No rash on exposed extremities. M/S: No cyanosis. No joint deformity. No clubbing.    Neuro: Sedated        Scheduled Meds:   insulin lispro  0-6 Units SubCUTAneous Q4H    methylPREDNISolone  40 mg IntraVENous Q12H    Followed by   Pina Reardon ON 2/12/2022] predniSONE  40 mg Oral Daily with breakfast    aspirin  81 mg Oral Daily    busPIRone  10 mg Oral TID    levothyroxine  125 mcg Oral QAM AC    montelukast  10 mg Oral Nightly    lidocaine  1 patch TransDERmal Daily    nicotine  1 patch TransDERmal Daily    enoxaparin  30 mg SubCUTAneous BID    sodium chloride flush  5-40 mL IntraVENous 2 times per day    vancomycin  1,000 mg IntraVENous Q12H    carboxymethylcellulose PF  1 drop Both Eyes Q4H    And    artificial tears   Both Eyes Q4H    chlorhexidine  15 mL Mouth/Throat BID    famotidine (PEPCID) injection  20 mg IntraVENous BID    ipratropium-albuterol  1 ampule Inhalation Q4H WA    cefepime  2,000 mg IntraVENous Q12H       Continuous Infusions:   sodium chloride      propofol 35 mcg/kg/min (02/07/22 1002)    sodium chloride 75 mL/hr at 02/07/22 0608    dextrose      IV infusion builder      fentaNYL 125 mcg/hr (02/07/22 0608)       PRN Meds:  guaiFENesin-dextromethorphan, HYDROcodone-acetaminophen, polyethylene glycol, acetaminophen **OR** acetaminophen, sodium chloride flush, sodium chloride, fentanNYL, albuterol sulfate HFA **AND** ipratropium, midazolam, glucose, glucagon (rDNA), dextrose, dextrose bolus (hypoglycemia) **OR** dextrose bolus (hypoglycemia)      Data:  CBC:   Recent Labs     02/05/22  0650 02/06/22  0432 02/07/22  0425   WBC 14.0* 14.4* 13.2*   HGB 12.2 10.8* 10.0*   HCT 36.8 31.9* 29.9*   MCV 91.5 91.7 92.4    235 298     BMP:   Recent Labs     02/05/22  2325 02/06/22  1100 02/06/22  2253   * 129* 131*   K 4.3 4.5 4.1   CL 87* 88* 91*   CO2 37* 35* 34*   PHOS 2.2* 1.4* 4.5   BUN 27* 30* 32*   CREATININE <0.5* <0.5* <0.5*     LIVER PROFILE:   Recent Labs     02/04/22  1926 02/05/22  0650 02/06/22  0431   AST 76* 55* 34   ALT 55* 47* 39   BILIDIR  --   --  <0.2   BILITOT 0.4 <0.2 0.3   ALKPHOS 98 77 76     PT/INR: No results for input(s): PROTIME, INR in the last 72 hours. CULTURES  Blood: NGTD  Sputum: ordered  Urine: ordered      RADIOLOGY  XR CHEST PORTABLE   Final Result   1.  Endotracheal tube remains in appropriate position. 2. No new findings identified. XR CHEST PORTABLE   Final Result   Stable examination with findings of COPD. XR CHEST PORTABLE   Final Result   No acute process. Stable exam with stable lines and tubes. Stable findings of COPD. XR CHEST 1 VIEW   Final Result   Endotracheal tube satisfactory position above the alley      OG side-port in gastric fundus, tip not visualized      Otherwise, no acute abnormalities seen in the chest         XR CHEST PORTABLE   Final Result   Probable scarring in the left midlung      Otherwise, no acute cardiopulmonary process         XR CHEST PORTABLE   Final Result   COPD with resolving central pulmonary congestion. Slowly resolving bibasilar opacities. Tiny right pleural effusion which is less prominent. XR CHEST PORTABLE    (Results Pending)       CT chest PE 1/29/22  Impression   No evidence of pulmonary embolism.       Emphysema.       There is a 1 cm noncalcified nodule in the right lung base.  This is new   compared to the prior exam of 01/17/2007.  Follow-up recommendations are   listed below. Assessment/Plan:    #Acute on chronic hypoxic and hypercarbic respiratory failure  #COVID 19 Pneumonia   #Severe COPD with AE  - droplet + isolation   - tested positive for COVID 19 on 1/29/22. Admitted 1/29-2/4/22 treated with decadron, zithromax (6 days) and rocephin (7 days) at that time. Dc'd home on 4-5L and returned to ER later same day and placed on BiPAP with pH 7.1 and PCO2 140. - Initially improved with BiPAP on repeat ABG, but morning of 2/5 patient reported extreme fatigue from work of breathing and requested intubation. Patient intubated by ER MD on 2/5/22   - pulm c/s for vent management   -Remains on mechanical ventilation today. Oxygen saturation stable.    -  Cont Solumedrol 40 mg BID   - vanc and cefepime D3 for possible superimposed bacterial PNA   - inhaled bronchodilators     #Elevated troponin   -0.09, 0.07, 0.04, 0.03, respectively  -Patient denies chest pain  -EKG with nonspecific EKG changes, cardiologist has reviewed EKGs, I reviewed with Dr. Memory Merlin as well   -Suspect elevation secondary to demand mismatch in acute respiratory failure.   Continue to monitor on telemetry  - ASA added on admit, continue for now     #Severe anxiety   - cont buspar     #Hyponatremia   - [id number]  - improved after IV NS.    - nephro c/s    #Right lung nodule   - per pulm c/s last admit: PET scan vs biopsy vs resection for RLL nodule - could be addressed as an outpatient     #Chronic pain   -  Was on PRN norco    #Hypothyroidism  - home dose of synthroid continued     #Hypophosphatemia  -Repleted    #Tobacco dependence  - nicotine patch ordered    - cessation recommended    DVT Prophylaxis: Lovenox   Diet: Diet NPO Exceptions are: Sips of Water with Meds, Ice Chips  ADULT TUBE FEEDING; Orogastric; Peptide Based High Protein; Continuous; 20; Yes; 20; Q 4 hours; 60; 30; Q 4 hours  Code Status: Full Code    Arturo Roach MD, 2/7/2022 2:59 PM

## 2022-02-07 NOTE — PROGRESS NOTES
Dr. Eliza Saravia called back regarding ABG results. Per MD, increase RR to 24. Will make RT aware of changes.

## 2022-02-07 NOTE — PROGRESS NOTES
Pt intubated and sedated. Intubated with # 7.5 at the # 23 LL. Vent settings are AV 22 / 350 / 45% / 5. Breath sounds are very diminished. Heart rhythm is NSR on the bedside monitor with rates in the 90s. No edema noted. Scheduled meds given per orders. OG at 60cm. TF running at goal rate of 60ml/hr. Pt tolerating well. PIVs working well. Propofol infusing. Fetanyl infusing. NS infusing. RASS -3  CPOT 0    Baugh patent and draining well.

## 2022-02-07 NOTE — PROGRESS NOTES
Report given to Dia Vila, [facility]. POC transferred at this time. - Propofol infusing at 15 mcg/kg/min. - Fentanyl infusing at 75 mcg//h.

## 2022-02-07 NOTE — PROGRESS NOTES
Vancomycin Day: 3    Patient's labs, cultures, vitals, and vancomycin regimen reviewed. No changes today.

## 2022-02-07 NOTE — TELEPHONE ENCOUNTER
Imaging report CT CHEST PULM 1/29/2022 with F/U imaging recommendations routed to Pulmonologist Dr. Andreas Celestni. Did make aware pt currently inpatient.     Thank you,  Marilu Cai Lung Navigator  [phone]

## 2022-02-07 NOTE — PROGRESS NOTES
Lab called with panic pC02 level of 70.9, which is improved since this AM. RR on vent was previously increased on night shift to 22. Will make Dr. Angela López aware.       ABG Results    pH 7.342   pC02 70.9   p02 90.7   HC03 37.6

## 2022-02-07 NOTE — CARE COORDINATION
Readmission Assessment  Previous Disposition: Home with Family (home with spouse and [facility])  Who is being Interviewed: Caregiver  What was the patient's/caregiver's perception as to why they think they needed to return back to the hospital?: Did not realize care needs would be so extensive  Did you visit your Primary Care Physician after you left the hospital, before you returned this time?: No  Why weren't you able to visit your PCP?:  (admit too quickly back to the hospital)  Did you see a specialist, such as Cardiac, Pulmonary, Orthopedic Physician, etc. after you left the hospital?: No  Who advised the patient to return to the hospital?: Self-referral  Does the patient report anything that got in the way of taking their medications? :  (na, pt is on Ventilator)  In our efforts to provide the best possible care to you and others like you, can you think of anything that we could have done to help you after you left the hospital the first time, so that you might not have needed to return so soon?: Other (Comment) (re-admit with hypoxia and pain per spouse)    Case Management Assessment  Initial Evaluation      Patient Name: Willis Dickens  YOB: 1960  Diagnosis: Hyponatremia [E87.1]  Elevated troponin [R77.8]  CO2 narcosis [R06.89]  Acute respiratory failure with hypoxia and hypercapnia ([address] [J96.01, J96.02]  Acute on chronic respiratory failure with hypoxia and hypercapnia ([address] [L42.81, J96.22]  COVID-19 virus infection [U07.1]  Date / Time: 2/4/2022  6:36 PM    Admission status/Date:inpt  Chart Reviewed: Yes      Patient Interviewed: No   Family Interviewed:  Yes - pt's spouse      Hospitalization in the last 30 days:  Yes      Health Care Decision Maker :   Primary Decision Maker: Lydia Roy - Spouse - [phone]    Secondary Decision Maker: Kamlesh Love Child - [phone]    (CM - must 1st enter selection under Navigator - emergency contact- Health Care Decision Maker Relationship and pick relationship)   Who do you trust or have selected to make healthcare decisions for you      Met with: pt's spouse via TC  Interview conducted  (bedside/phone):    Current PCP:   Nguyễn Driver MD      Financial  Commercial  Precert required for SNF : Y, N          3 night stay required - Y, N    ADLS  Support Systems/Care Needs: Spouse/Significant Other  Transportation: family    Meal Preparation: self    Housing  Living Arrangements: home with spouse  Steps: four  Intent for return to present living arrangements: tbd  Identified Issues: no    Home Care Information  Active with [address] : Yes [facility]:(Services)  Type of Home Care Services: None  Passport/Waiver : No  :                      Phone Number:    Passport/Waiver Services: no          Durable Medical Equiptment   DME Provider: [employer]  Equipment:   Walker_x__Cane_x__RTS___ BSC___Shower Chair___Hospital Bed___W/C____Other________  02 at _4___Liter(s)---wears(frequency)__cont_____ HHN ___ CPAP___ BiPap___   N/A____      Home O2 Use :  Yes    If No for home O2---if presently on O2 during hospitalization:  Yes  if yes CM to follow for potential DC O2 need  Informed of need for care provider to bring portable home O2 tank on day of discharge for nursing to connect prior to leaving:   Yes  Verbalized agreement/Understanding:   Yes    Community Service Affiliation  Dialysis:  No    · Agency:  · Location:  · Dialysis Schedule:  · Phone:   · Fax: Other Community Services: (ex:PT/OT,Mental Health,Wound Clinic, Cardio/Pul [address])    DISCHARGE PLAN: Explained Case Management role/services. Reviewed chart. Pt cont in ICU on Ventilator,C-19+. Pt from home with spouse. Pt active with [employer] for home O2,uses 4 liters at baseline. Pt recently d/c'd to home with [facility],however, returned to the hospital prior to start of care with c r/t pain and hypoxia per spouse. Following.

## 2022-02-07 NOTE — PROGRESS NOTES
Pulmonary & Critical Care Medicine ICU Progress Note    CC: COVID-19 pneumonia, had improved while hospitalized and was discharged, same day as discharge return to the ED with new hypercapnia and markedly worsening hypoxemia, required intubation in the emergency department    Events of Last 24 hours:   Sedated on vent    Invasive Lines: Peripheral    MV: 2/5/2022 for hypercapnia, failed BiPAP  Vent Mode: AC/VC Rate Set: (S) 22 bmp (Increased from 18 per MD Abrams)/Vt Ordered: 350 mL/ /FiO2 : 50 %  Recent Labs     02/07/22  0428 02/07/22  0530   PHART 7.281* 7.281*   SCH1MPL 75.9* 78.0*   PO2ART 65.9* 100.5       IV:   sodium chloride      propofol 35 mcg/kg/min (02/07/22 0608)    sodium chloride 75 mL/hr at 02/07/22 0608    dextrose      IV infusion builder      fentaNYL 125 mcg/hr (02/07/22 1470)       Vitals:  Blood pressure 110/61, pulse 98, temperature 97.1 °F (36.2 °C), temperature source Axillary, resp. rate 22, height 5' 4\" (1.626 m), weight 135 lb 2.3 oz (61.3 kg), SpO2 95 %, not currently breastfeeding. on vent    Intake/Output Summary (Last 24 hours) at 2/7/2022 0754  Last data filed at 2/7/2022 7113  Gross per 24 hour   Intake 3771.72 ml   Output 1025 ml   Net 2746.72 ml     EXAM:  General: intubated, ill appearing    ENT: Pharynx with ETT. Resp: No crackles. No wheezing. CV: S1, S2. No edema  GI: NT, ND, +BS  Skin: Warm and dry. Neuro: PERRL.  Sedated on vent    Scheduled Meds:   insulin lispro  0-6 Units SubCUTAneous Q4H    methylPREDNISolone  40 mg IntraVENous Q12H    Followed by   Mark Barker ON 2/12/2022] predniSONE  40 mg Oral Daily with breakfast    aspirin  81 mg Oral Daily    busPIRone  10 mg Oral TID    levothyroxine  125 mcg Oral QAM AC    montelukast  10 mg Oral Nightly    lidocaine  1 patch TransDERmal Daily    nicotine  1 patch TransDERmal Daily    enoxaparin  30 mg SubCUTAneous BID    sodium chloride flush  5-40 mL IntraVENous 2 times per day    vancomycin  1,000 mg IntraVENous Q12H    carboxymethylcellulose PF  1 drop Both Eyes Q4H    And    artificial tears   Both Eyes Q4H    chlorhexidine  15 mL Mouth/Throat BID    famotidine (PEPCID) injection  20 mg IntraVENous BID    ipratropium-albuterol  1 ampule Inhalation Q4H WA    cefepime  2,000 mg IntraVENous Q12H     PRN Meds:  guaiFENesin-dextromethorphan, HYDROcodone-acetaminophen, polyethylene glycol, acetaminophen **OR** acetaminophen, sodium chloride flush, sodium chloride, fentanNYL, albuterol sulfate HFA **AND** ipratropium, midazolam, glucose, glucagon (rDNA), dextrose, dextrose bolus (hypoglycemia) **OR** dextrose bolus (hypoglycemia)    Results:  CBC:   Recent Labs     02/05/22  0650 02/06/22  0432 02/07/22  0425   WBC 14.0* 14.4* 13.2*   HGB 12.2 10.8* 10.0*   HCT 36.8 31.9* 29.9*   MCV 91.5 91.7 92.4    235 298     BMP:   Recent Labs     02/05/22  2325 02/06/22  1100 02/06/22  2253   * 129* 131*   K 4.3 4.5 4.1   CL 87* 88* 91*   CO2 37* 35* 34*   PHOS 2.2* 1.4* 4.5   BUN 27* 30* 32*   CREATININE <0.5* <0.5* <0.5*     LIVER PROFILE:   Recent Labs     02/04/22  1926 02/05/22  0650 02/06/22  0431   AST 76* 55* 34   ALT 55* 47* 39   BILIDIR  --   --  <0.2   BILITOT 0.4 <0.2 0.3   ALKPHOS 98 77 76     Cultures:   1/29/2022 Strep pneumo & Legionella not detected  1/29/2022 BC NG  1/29/2022 COVID-19 detected  2/4/2022 BC NGTD  2/4/2022 urine NG  2/5/2022 tracheal aspirate sent    Procalcitonin 0.16  proBNP 10,600    Films:   CT Chest 1/29/2022    Mediastinum: No evidence of mediastinal lymphadenopathy.  The heart and   pericardium demonstrate no acute abnormality.  There is no acute abnormality   of the thoracic aorta.       Lungs/pleura: The lungs are without acute process. Sue Mayorga is a a 1 cm noncalcified nodule in the right lung base.  No evidence of pleural effusion or pneumothorax. Impression   No evidence of pulmonary embolism. Emphysema.    There is a 1 cm noncalcified nodule in the right lung base.  This is new   compared to the prior exam of 01/17/2007.  Follow-up recommendations are   listed below. CXR 2/8/2022 clear lungs    ASSESSMENT:  · Acute on chronic hypoxemic & hypercapnic respiratory failure; baseline 3-4 L O2   · COVID-19 pneumonia in an unvaccinated patient   · Severe COPD, with acute exacerbation - she was clearly improved when I saw her on 2/3/22 & 2/4/22; she was breathing comfortably on her home oxygen and her only complaint was chronic pain, she was asking to go home then markedly worsened shortly after leaving hospital, unclear precipitant  · Hyponatremia, slowly correcting, nephrology following  · RLL 1 cm pulmonary nodule on CT 1/29/22 - concerning for bronchogenic carcinoma  · Anxiety, severe  · Chronic pain  · Tobacco abuse     PLAN:  Droplet Plus Airborne Precautions   Mechanical ventilation as per my orders. The ventilator was adjusted by me at the bedside for unstable, life threatening respiratory failure. IV Propofol for sedation, target RASS -2, with daily spontaneous awakening trial.  Fentanyl gtt    Vancomycin/Cefepime D#3. Prior admission completed 6 days Azithromycin, 7 days Ceftriaxone   Inhaled bronchodilators   IV Solu-Medrol 40 mg BID   IVF per nephrology   SSI    TF   Prophylaxis: Lovenox 30 BID   Eventual PET scan vs biopsy vs resection for RLL nodule - could be addressed as an outpatient (now followed by Dr. Santos Azevedo)   Total critical care time caring for this patient with life threatening, unstable organ failure, including direct patient contact, management of life support systems, review of data including imaging and labs, discussions with other team members and physicians is 32 minutes so far today, excluding procedures.

## 2022-02-08 ENCOUNTER — APPOINTMENT (OUTPATIENT)
Dept: GENERAL RADIOLOGY | Age: 62
DRG: 208 | End: 2022-02-08
Payer: MEDICARE

## 2022-02-08 LAB
ANION GAP SERPL CALCULATED.3IONS-SCNC: 4 MMOL/L (ref 3–16)
BANDED NEUTROPHILS RELATIVE PERCENT: 2 % (ref 0–7)
BASE EXCESS ARTERIAL: 7 MMOL/L (ref -3–3)
BASE EXCESS ARTERIAL: 7.5 MMOL/L (ref -3–3)
BASE EXCESS ARTERIAL: 8.4 MMOL/L (ref -3–3)
BASOPHILS ABSOLUTE: 0 K/UL (ref 0–0.2)
BASOPHILS RELATIVE PERCENT: 0 %
BLOOD CULTURE, ROUTINE: NORMAL
BUN BLDV-MCNC: 35 MG/DL (ref 7–20)
CALCIUM SERPL-MCNC: 8.9 MG/DL (ref 8.3–10.6)
CARBOXYHEMOGLOBIN ARTERIAL: 0.1 % (ref 0–1.5)
CARBOXYHEMOGLOBIN ARTERIAL: 0.1 % (ref 0–1.5)
CARBOXYHEMOGLOBIN ARTERIAL: 0.3 % (ref 0–1.5)
CHLORIDE BLD-SCNC: 93 MMOL/L (ref 99–110)
CO2: 35 MMOL/L (ref 21–32)
CREAT SERPL-MCNC: <0.5 MG/DL (ref 0.6–1.2)
CULTURE, BLOOD 2: NORMAL
EOSINOPHILS ABSOLUTE: 0 K/UL (ref 0–0.6)
EOSINOPHILS RELATIVE PERCENT: 0 %
GFR AFRICAN AMERICAN: >60
GFR NON-AFRICAN AMERICAN: >60
GLUCOSE BLD-MCNC: 107 MG/DL (ref 70–99)
GLUCOSE BLD-MCNC: 119 MG/DL (ref 70–99)
GLUCOSE BLD-MCNC: 139 MG/DL (ref 70–99)
GLUCOSE BLD-MCNC: 151 MG/DL (ref 70–99)
GLUCOSE BLD-MCNC: 155 MG/DL (ref 70–99)
GLUCOSE BLD-MCNC: 158 MG/DL (ref 70–99)
GLUCOSE BLD-MCNC: 220 MG/DL (ref 70–99)
GLUCOSE BLD-MCNC: 99 MG/DL (ref 70–99)
HCO3 ARTERIAL: 33.5 MMOL/L (ref 21–29)
HCO3 ARTERIAL: 34.5 MMOL/L (ref 21–29)
HCO3 ARTERIAL: 35.2 MMOL/L (ref 21–29)
HCT VFR BLD CALC: 28.5 % (ref 36–48)
HEMOGLOBIN, ART, EXTENDED: 10.1 G/DL (ref 12–16)
HEMOGLOBIN, ART, EXTENDED: 10.4 G/DL (ref 12–16)
HEMOGLOBIN, ART, EXTENDED: 10.8 G/DL (ref 12–16)
HEMOGLOBIN: 9.4 G/DL (ref 12–16)
LYMPHOCYTES ABSOLUTE: 0.5 K/UL (ref 1–5.1)
LYMPHOCYTES RELATIVE PERCENT: 3 %
MCH RBC QN AUTO: 30.5 PG (ref 26–34)
MCHC RBC AUTO-ENTMCNC: 32.9 G/DL (ref 31–36)
MCV RBC AUTO: 92.8 FL (ref 80–100)
METHEMOGLOBIN ARTERIAL: 0.3 %
MONOCYTES ABSOLUTE: 1.1 K/UL (ref 0–1.3)
MONOCYTES RELATIVE PERCENT: 6 %
MYELOCYTE PERCENT: 3 %
NEUTROPHILS ABSOLUTE: 16.2 K/UL (ref 1.7–7.7)
NEUTROPHILS RELATIVE PERCENT: 86 %
O2 SAT, ARTERIAL: 96.6 %
O2 SAT, ARTERIAL: 96.7 %
O2 SAT, ARTERIAL: 97.1 %
O2 THERAPY: ABNORMAL
PCO2 ARTERIAL: 55.3 MMHG (ref 35–45)
PCO2 ARTERIAL: 61.5 MMHG (ref 35–45)
PCO2 ARTERIAL: 66.8 MMHG (ref 35–45)
PDW BLD-RTO: 13.5 % (ref 12.4–15.4)
PERFORMED ON: ABNORMAL
PERFORMED ON: NORMAL
PH ARTERIAL: 7.33 (ref 7.35–7.45)
PH ARTERIAL: 7.38 (ref 7.35–7.45)
PH ARTERIAL: 7.4 (ref 7.35–7.45)
PLATELET # BLD: 379 K/UL (ref 135–450)
PLATELET SLIDE REVIEW: ADEQUATE
PMV BLD AUTO: 7.3 FL (ref 5–10.5)
PO2 ARTERIAL: 91 MMHG (ref 75–108)
PO2 ARTERIAL: 94.6 MMHG (ref 75–108)
PO2 ARTERIAL: 97 MMHG (ref 75–108)
POTASSIUM REFLEX MAGNESIUM: 4.9 MMOL/L (ref 3.5–5.1)
RBC # BLD: 3.07 M/UL (ref 4–5.2)
SLIDE REVIEW: ABNORMAL
SODIUM BLD-SCNC: 132 MMOL/L (ref 136–145)
TCO2 ARTERIAL: 35.2 MMOL/L
TCO2 ARTERIAL: 36.6 MMOL/L
TCO2 ARTERIAL: 37.1 MMOL/L
VANCOMYCIN RANDOM: 15 UG/ML
WBC # BLD: 17.8 K/UL (ref 4–11)

## 2022-02-08 PROCEDURE — 80202 ASSAY OF VANCOMYCIN: CPT

## 2022-02-08 PROCEDURE — 36415 COLL VENOUS BLD VENIPUNCTURE: CPT

## 2022-02-08 PROCEDURE — 6370000000 HC RX 637 (ALT 250 FOR IP): Performed by: INTERNAL MEDICINE

## 2022-02-08 PROCEDURE — 99291 CRITICAL CARE FIRST HOUR: CPT | Performed by: INTERNAL MEDICINE

## 2022-02-08 PROCEDURE — 2700000000 HC OXYGEN THERAPY PER DAY

## 2022-02-08 PROCEDURE — 94761 N-INVAS EAR/PLS OXIMETRY MLT: CPT

## 2022-02-08 PROCEDURE — 94640 AIRWAY INHALATION TREATMENT: CPT

## 2022-02-08 PROCEDURE — 6360000002 HC RX W HCPCS: Performed by: INTERNAL MEDICINE

## 2022-02-08 PROCEDURE — 6360000002 HC RX W HCPCS

## 2022-02-08 PROCEDURE — 2500000003 HC RX 250 WO HCPCS

## 2022-02-08 PROCEDURE — 85025 COMPLETE CBC W/AUTO DIFF WBC: CPT

## 2022-02-08 PROCEDURE — 6370000000 HC RX 637 (ALT 250 FOR IP)

## 2022-02-08 PROCEDURE — 99233 SBSQ HOSP IP/OBS HIGH 50: CPT | Performed by: INTERNAL MEDICINE

## 2022-02-08 PROCEDURE — 71045 X-RAY EXAM CHEST 1 VIEW: CPT

## 2022-02-08 PROCEDURE — 80048 BASIC METABOLIC PNL TOTAL CA: CPT

## 2022-02-08 PROCEDURE — 2580000003 HC RX 258: Performed by: INTERNAL MEDICINE

## 2022-02-08 PROCEDURE — 94003 VENT MGMT INPAT SUBQ DAY: CPT

## 2022-02-08 PROCEDURE — 36600 WITHDRAWAL OF ARTERIAL BLOOD: CPT

## 2022-02-08 PROCEDURE — 2500000003 HC RX 250 WO HCPCS: Performed by: INTERNAL MEDICINE

## 2022-02-08 PROCEDURE — 2000000000 HC ICU R&B

## 2022-02-08 PROCEDURE — 82803 BLOOD GASES ANY COMBINATION: CPT

## 2022-02-08 RX ORDER — IPRATROPIUM BROMIDE AND ALBUTEROL SULFATE 2.5; .5 MG/3ML; MG/3ML
1 SOLUTION RESPIRATORY (INHALATION) EVERY 4 HOURS PRN
Status: DISCONTINUED | OUTPATIENT
Start: 2022-02-08 | End: 2022-02-09

## 2022-02-08 RX ORDER — DIMETHICONE, OXYBENZONE, AND PADIMATE O 2; 2.5; 6.6 G/100G; G/100G; G/100G
STICK TOPICAL
Status: COMPLETED
Start: 2022-02-08 | End: 2022-02-08

## 2022-02-08 RX ADMIN — CARBOXYMETHYLCELLULOSE SODIUM 1 DROP: 10 GEL OPHTHALMIC at 05:13

## 2022-02-08 RX ADMIN — METHYLPREDNISOLONE SODIUM SUCCINATE 40 MG: 40 INJECTION, POWDER, FOR SOLUTION INTRAMUSCULAR; INTRAVENOUS at 20:55

## 2022-02-08 RX ADMIN — WHITE PETROLATUM 57.7 %-MINERAL OIL 31.9 % EYE OINTMENT: at 20:55

## 2022-02-08 RX ADMIN — PROPOFOL 15 MCG/KG/MIN: 10 INJECTION, EMULSION INTRAVENOUS at 11:19

## 2022-02-08 RX ADMIN — PROPOFOL 35 MCG/KG/MIN: 10 INJECTION, EMULSION INTRAVENOUS at 02:54

## 2022-02-08 RX ADMIN — BUSPIRONE HYDROCHLORIDE 10 MG: 10 TABLET ORAL at 09:12

## 2022-02-08 RX ADMIN — CHLORHEXIDINE GLUCONATE 0.12% ORAL RINSE 15 ML: 1.2 LIQUID ORAL at 20:54

## 2022-02-08 RX ADMIN — BUSPIRONE HYDROCHLORIDE 10 MG: 10 TABLET ORAL at 14:21

## 2022-02-08 RX ADMIN — CHLORHEXIDINE GLUCONATE 0.12% ORAL RINSE 15 ML: 1.2 LIQUID ORAL at 09:12

## 2022-02-08 RX ADMIN — WHITE PETROLATUM 57.7 %-MINERAL OIL 31.9 % EYE OINTMENT: at 00:07

## 2022-02-08 RX ADMIN — CARBOXYMETHYLCELLULOSE SODIUM 1 DROP: 10 GEL OPHTHALMIC at 23:28

## 2022-02-08 RX ADMIN — WHITE PETROLATUM 57.7 %-MINERAL OIL 31.9 % EYE OINTMENT: at 09:12

## 2022-02-08 RX ADMIN — Medication: at 16:58

## 2022-02-08 RX ADMIN — CARBOXYMETHYLCELLULOSE SODIUM 1 DROP: 10 GEL OPHTHALMIC at 11:22

## 2022-02-08 RX ADMIN — FENTANYL CITRATE 50 MCG: 50 INJECTION INTRAMUSCULAR; INTRAVENOUS at 21:14

## 2022-02-08 RX ADMIN — IPRATROPIUM BROMIDE AND ALBUTEROL SULFATE 1 AMPULE: 2.5; .5 SOLUTION RESPIRATORY (INHALATION) at 08:36

## 2022-02-08 RX ADMIN — LEVOTHYROXINE SODIUM 125 MCG: 0.03 TABLET ORAL at 05:12

## 2022-02-08 RX ADMIN — IPRATROPIUM BROMIDE AND ALBUTEROL 2 PUFF: 20; 100 SPRAY, METERED RESPIRATORY (INHALATION) at 19:31

## 2022-02-08 RX ADMIN — FAMOTIDINE 20 MG: 10 INJECTION, SOLUTION INTRAVENOUS at 09:12

## 2022-02-08 RX ADMIN — WHITE PETROLATUM 57.7 %-MINERAL OIL 31.9 % EYE OINTMENT: at 04:19

## 2022-02-08 RX ADMIN — METHYLPREDNISOLONE SODIUM SUCCINATE 40 MG: 40 INJECTION, POWDER, FOR SOLUTION INTRAMUSCULAR; INTRAVENOUS at 09:12

## 2022-02-08 RX ADMIN — CEFEPIME 2000 MG: 2 INJECTION, POWDER, FOR SOLUTION INTRAMUSCULAR; INTRAVENOUS at 11:27

## 2022-02-08 RX ADMIN — FAMOTIDINE 20 MG: 10 INJECTION, SOLUTION INTRAVENOUS at 20:55

## 2022-02-08 RX ADMIN — ASPIRIN 81 MG: 81 TABLET, COATED ORAL at 09:12

## 2022-02-08 RX ADMIN — IPRATROPIUM BROMIDE AND ALBUTEROL SULFATE 1 AMPULE: 2.5; .5 SOLUTION RESPIRATORY (INHALATION) at 14:49

## 2022-02-08 RX ADMIN — ENOXAPARIN SODIUM 30 MG: 100 INJECTION SUBCUTANEOUS at 09:12

## 2022-02-08 RX ADMIN — Medication 10 ML: at 09:12

## 2022-02-08 RX ADMIN — Medication 10 ML: at 20:55

## 2022-02-08 RX ADMIN — ENOXAPARIN SODIUM 30 MG: 100 INJECTION SUBCUTANEOUS at 20:54

## 2022-02-08 RX ADMIN — VANCOMYCIN HYDROCHLORIDE 1000 MG: 1 INJECTION, POWDER, LYOPHILIZED, FOR SOLUTION INTRAVENOUS at 14:23

## 2022-02-08 RX ADMIN — Medication 125 MCG/HR: at 04:40

## 2022-02-08 RX ADMIN — CEFEPIME 2000 MG: 2 INJECTION, POWDER, FOR SOLUTION INTRAMUSCULAR; INTRAVENOUS at 23:30

## 2022-02-08 RX ADMIN — IPRATROPIUM BROMIDE AND ALBUTEROL SULFATE 1 AMPULE: 2.5; .5 SOLUTION RESPIRATORY (INHALATION) at 02:48

## 2022-02-08 RX ADMIN — MIDAZOLAM HYDROCHLORIDE 2 MG: 1 INJECTION, SOLUTION INTRAMUSCULAR; INTRAVENOUS at 03:50

## 2022-02-08 RX ADMIN — CARBOXYMETHYLCELLULOSE SODIUM 1 DROP: 10 GEL OPHTHALMIC at 02:54

## 2022-02-08 ASSESSMENT — PAIN SCALES - GENERAL
PAINLEVEL_OUTOF10: 8
PAINLEVEL_OUTOF10: 8
PAINLEVEL_OUTOF10: 0

## 2022-02-08 ASSESSMENT — PULMONARY FUNCTION TESTS
PIF_VALUE: 29
PIF_VALUE: 35

## 2022-02-08 ASSESSMENT — PAIN SCALES - WONG BAKER: WONGBAKER_NUMERICALRESPONSE: 8

## 2022-02-08 ASSESSMENT — PAIN DESCRIPTION - ONSET: ONSET: ON-GOING

## 2022-02-08 ASSESSMENT — PAIN DESCRIPTION - PROGRESSION: CLINICAL_PROGRESSION: NOT CHANGED

## 2022-02-08 ASSESSMENT — PAIN DESCRIPTION - PAIN TYPE: TYPE: ACUTE PAIN

## 2022-02-08 ASSESSMENT — PAIN DESCRIPTION - FREQUENCY: FREQUENCY: CONTINUOUS

## 2022-02-08 ASSESSMENT — PAIN DESCRIPTION - LOCATION: LOCATION: GENERALIZED

## 2022-02-08 ASSESSMENT — PAIN DESCRIPTION - DESCRIPTORS: DESCRIPTORS: ACHING

## 2022-02-08 ASSESSMENT — PAIN - FUNCTIONAL ASSESSMENT: PAIN_FUNCTIONAL_ASSESSMENT: PREVENTS OR INTERFERES SOME ACTIVE ACTIVITIES AND ADLS

## 2022-02-08 ASSESSMENT — PAIN DESCRIPTION - ORIENTATION: ORIENTATION: OTHER (COMMENT)

## 2022-02-08 NOTE — PROGRESS NOTES
RT Inhaler-Nebulizer Bronchodilator Protocol Note    There is a bronchodilator order in the chart from a provider indicating to follow the RT Bronchodilator Protocol and there is an Initiate RT Inhaler-Nebulizer Bronchodilator Protocol order as well (see protocol at bottom of note). CXR Findings:  XR CHEST PORTABLE    Result Date: 2/7/2022  1. Endotracheal tube remains in appropriate position. 2. No new findings identified. XR CHEST PORTABLE    Result Date: 2/6/2022  Stable examination with findings of COPD. XR CHEST PORTABLE    Result Date: 2/6/2022  No acute process. Stable exam with stable lines and tubes. Stable findings of COPD. The findings from the last RT Protocol Assessment were as follows:   History Pulmonary Disease: Chronic pulmonary disease  Respiratory Pattern: Use of accessory muscles, prolonged exhalation, or RR 26-30 bpm  Breath Sounds: Slightly diminished and/or crackles  Cough: Weak, non-productive  Indication for Bronchodilator Therapy: Decreased or absent breath sounds  Bronchodilator Assessment Score: 13    Aerosolized bronchodilator medication orders have been revised according to the RT Inhaler-Nebulizer Bronchodilator Protocol below. Respiratory Therapist to perform RT Therapy Protocol Assessment initially then follow the protocol. Repeat RT Therapy Protocol Assessment PRN for score 0-3 or on second treatment, BID, and PRN for scores above 3. No Indications - adjust the frequency to every 6 hours PRN wheezing or bronchospasm, if no treatments needed after 48 hours then discontinue using Per Protocol order mode. If indication present, adjust the RT bronchodilator orders based on the Bronchodilator Assessment Score as indicated below.   Use Inhaler orders unless patient has one or more of the following: on home nebulizer, not able to hold breath for 10 seconds, is not alert and oriented, cannot activate and use MDI correctly, or respiratory rate 25 breaths per minute or more, then use the equivalent nebulizer order(s) with same Frequency and PRN reasons based on the score. If a patient is on this medication at home then do not decrease Frequency below that used at home. 0-3 - enter or revise RT bronchodilator order(s) to equivalent RT Bronchodilator order with Frequency of every 4 hours PRN for wheezing or increased work of breathing using Per Protocol order mode. 4-6 - enter or revise RT Bronchodilator order(s) to two equivalent RT bronchodilator orders with one order with BID Frequency and one order with Frequency of every 4 hours PRN wheezing or increased work of breathing using Per Protocol order mode. 7-10 - enter or revise RT Bronchodilator order(s) to two equivalent RT bronchodilator orders with one order with TID Frequency and one order with Frequency of every 4 hours PRN wheezing or increased work of breathing using Per Protocol order mode. 11-13 - enter or revise RT Bronchodilator order(s) to one equivalent RT bronchodilator order with QID Frequency and an Albuterol order with Frequency of every 4 hours PRN wheezing or increased work of breathing using Per Protocol order mode. Greater than 13 - enter or revise RT Bronchodilator order(s) to one equivalent RT bronchodilator order with every 4 hours Frequency and an Albuterol order with Frequency of every 2 hours PRN wheezing or increased work of breathing using Per Protocol order mode.          Electronically signed by Kaye Emerson RCP on 2/7/2022 at 11:45 PM

## 2022-02-08 NOTE — PROGRESS NOTES
Pt extubated to 7 L NC per RT. Pt tolerating well. Reassessment completed. Pt resting well and asking for her . Call to  to update him on pt condition and pt spoke with him via telephone.

## 2022-02-08 NOTE — PROGRESS NOTES
[facility] Progress Note           Subjective/   59y.o. year old female who we are seeing in consultation for Hyponatremia. HPI:  Sodium levels improving with NS, urine output of 1.1 liters over last 24 hours. Remains on ventilator. ROS:  No fevers, +weak. Objective/   GEN:  /61   Pulse 84   Temp 98.6 °F (37 °C) (Axillary)   Resp 28   Ht 5' 4\" (1.626 m)   Wt 142 lb 3.2 oz (64.5 kg)   LMP  (LMP Unknown)   SpO2 96%   BMI 24.41 kg/m²   Exam deferred as means to protect PPE due to shortage & due to CORONAVIRUS risk. Data/  Recent Labs     02/06/22  0432 02/07/22  0425 02/08/22  0412   WBC 14.4* 13.2* 17.8*   HGB 10.8* 10.0* 9.4*   HCT 31.9* 29.9* 28.5*   MCV 91.7 92.4 92.8    298 379     Recent Labs     02/05/22  2325 02/05/22  2325 02/06/22  1100 02/06/22  1100 02/06/22  2253 02/07/22  1257 02/08/22  0412   *   < > 129*   < > 131* 134* 132*   K 4.3   < > 4.5   < > 4.1 4.4 4.9   CL 87*   < > 88*   < > 91* 95* 93*   CO2 37*   < > 35*   < > 34* 36* 35*   GLUCOSE 138*   < > 138*   < > 180* 190* 158*   PHOS 2.2*  --  1.4*  --  4.5  --   --    BUN 27*   < > 30*   < > 32* 32* 35*   CREATININE <0.5*   < > <0.5*   < > <0.5* <0.5* <0.5*   LABGLOM >60   < > >60   < > >60 >60 >60   GFRAA >60   < > >60   < > >60 >60 >60    < > = values in this interval not displayed. Assessment/     - Hyponatremia - acute, suspected pre-renal state +/- SIADH state in setting of COVID   Suspect former as improving with IVF's     - COVID PNA - requiring ventilator support     - Severe COPD with exacerbation    Plan/     - Trial off IVF's  - Home lasix on hold  - Trend labs, bp's, & urine output    Case d/w Nursing    ____________________________________  Anyi Gu MD  [facility]  [email or web address]  Office: [phone]

## 2022-02-08 NOTE — PROGRESS NOTES
P extubated to NC at 7lpm         02/08/22 1515   Oxygen Therapy/Pulse Ox   O2 Therapy Oxygen humidified   O2 Device High flow nasal cannula   O2 Flow Rate (L/min) 7 L/min   Resp 16   SpO2 96 %

## 2022-02-08 NOTE — PROGRESS NOTES
RT Inhaler-Nebulizer Bronchodilator Protocol Note    There is a bronchodilator order in the chart from a provider indicating to follow the RT Bronchodilator Protocol and there is an Initiate RT Inhaler-Nebulizer Bronchodilator Protocol order as well (see protocol at bottom of note). CXR Findings:  XR CHEST PORTABLE    Result Date: 2/8/2022  No significant change     XR CHEST PORTABLE    Result Date: 2/7/2022  1. Endotracheal tube remains in appropriate position. 2. No new findings identified. XR CHEST PORTABLE    Result Date: 2/6/2022  Stable examination with findings of COPD. The findings from the last RT Protocol Assessment were as follows:   History Pulmonary Disease: Chronic pulmonary disease  Respiratory Pattern: Mild dyspnea at rest, irregular pattern, or RR 21-25 bpm  Breath Sounds: Slightly diminished and/or crackles  Cough: Weak, productive  Indication for Bronchodilator Therapy: Decreased or absent breath sounds  Bronchodilator Assessment Score: 10    Aerosolized bronchodilator medication orders have been revised according to the RT Inhaler-Nebulizer Bronchodilator Protocol below. Respiratory Therapist to perform RT Therapy Protocol Assessment initially then follow the protocol. Repeat RT Therapy Protocol Assessment PRN for score 0-3 or on second treatment, BID, and PRN for scores above 3. No Indications - adjust the frequency to every 6 hours PRN wheezing or bronchospasm, if no treatments needed after 48 hours then discontinue using Per Protocol order mode. If indication present, adjust the RT bronchodilator orders based on the Bronchodilator Assessment Score as indicated below.   Use Inhaler orders unless patient has one or more of the following: on home nebulizer, not able to hold breath for 10 seconds, is not alert and oriented, cannot activate and use MDI correctly, or respiratory rate 25 breaths per minute or more, then use the equivalent nebulizer order(s) with same Frequency and PRN reasons based on the score. If a patient is on this medication at home then do not decrease Frequency below that used at home. 0-3 - enter or revise RT bronchodilator order(s) to equivalent RT Bronchodilator order with Frequency of every 4 hours PRN for wheezing or increased work of breathing using Per Protocol order mode. 4-6 - enter or revise RT Bronchodilator order(s) to two equivalent RT bronchodilator orders with one order with BID Frequency and one order with Frequency of every 4 hours PRN wheezing or increased work of breathing using Per Protocol order mode. 7-10 - enter or revise RT Bronchodilator order(s) to two equivalent RT bronchodilator orders with one order with TID Frequency and one order with Frequency of every 4 hours PRN wheezing or increased work of breathing using Per Protocol order mode. 11-13 - enter or revise RT Bronchodilator order(s) to one equivalent RT bronchodilator order with QID Frequency and an Albuterol order with Frequency of every 4 hours PRN wheezing or increased work of breathing using Per Protocol order mode. Greater than 13 - enter or revise RT Bronchodilator order(s) to one equivalent RT bronchodilator order with every 4 hours Frequency and an Albuterol order with Frequency of every 2 hours PRN wheezing or increased work of breathing using Per Protocol order mode.          Electronically signed by Andrew Araya RCP on 2/8/2022 at 6:48 PM

## 2022-02-08 NOTE — PROGRESS NOTES
EOS report given to RODRÍGUEZ Walls. Sedation: Gtts infusing at the same rates. Pt began following commands and responding to voice after CHG bath at midnight. But once pt was left unstimulated she began only responding to pain again. Sedation not decreased d/t pt's SpO2 dropping quickly when she peak pressures, PRN versed given once for peak pressures in the 40s causing desaturation to 86%. Oxygenation: SpO2 dropped to 85-89% on a couple occasions overnight but after suctioning and giving a 100% boost on vent, SpO2 typically would stay 90 and up for hours afterwards. Around 0400 she began desatting almost immediately after the 100% boost was done, so FiO2 was increased to 45%. pCO2 improved this AM but still high despite rate increase yesterday, MD Bergeron notified and orders received to increase rate to 28. ABG redrawn an hour later, with pCO2 down to 55.3. Pt stable at this time, care transferred.

## 2022-02-08 NOTE — PROGRESS NOTES
Progress Note    Admit Date:  2/4/2022    61F admitted to [facility] 1/29-2/4/22 for acute on chronic hypoxic respiratory failure, COVID-19 pneumonia, and exacerbation of severe COPD discharged home on 4 L nasal cannula oxygen on 2/4/2022. Return to the ER later on 2/4/2022 in respiratory distress, immediately placed on BiPAP. Following morning patient with worsening breathing, increased fatigue, requesting to be intubated. Patient intubated by ER provider. Admitted to ICU    Subjective:  Ms. Marli Noguera seen wide awake on vent, ongoing weaning trials. Comfortable and following commands    Objective:   Patient Vitals for the past 4 hrs:   BP Temp Temp src Pulse Resp SpO2   02/08/22 0700 120/64 -- -- 88 28 93 %   02/08/22 0600 123/60 -- -- 94 22 95 %   02/08/22 0530 -- -- -- 96 28 97 %   02/08/22 0500 (!) 122/56 -- -- 99 24 97 %   02/08/22 0400 (!) 109/55 99.9 °F (37.7 °C) Axillary 106 24 92 %            Intake/Output Summary (Last 24 hours) at 2/8/2022 0758  Last data filed at 2/8/2022 7146  Gross per 24 hour   Intake 4301.88 ml   Output 1050 ml   Net 3251.88 ml       Physical Exam:  Patient seen in droplet plus precautions  Gen:  Middle aged female seen awake on vent   Oral ETT and OG noted   Eyes: PERRL. No sclera icterus. No conjunctival injection. .   Neck: No JVD. Trachea midline. Resp:  Improved bilateral  breath sounds . resolved wheeze   CV:  Regular rate and  rhythm. No murmur. No rub. No edema. Capillary Refill: Brisk,< 3 seconds   Peripheral Pulses: +2 palpable, equal bilaterally   GI: Non-tender. Non-distended. Normal bowel sounds. Skin: Warm and dry. No nodule on exposed extremities. No rash on exposed extremities. M/S: No cyanosis. No joint deformity. No clubbing.    Neuro: awake, following commands         Scheduled Meds:   insulin lispro  0-6 Units SubCUTAneous Q4H    ipratropium-albuterol  1 ampule Inhalation Q4H    methylPREDNISolone  40 mg IntraVENous Q12H    Followed by   Creola Felty ON 2/12/2022] predniSONE  40 mg Oral Daily with breakfast    aspirin  81 mg Oral Daily    busPIRone  10 mg Oral TID    levothyroxine  125 mcg Oral QAM AC    montelukast  10 mg Oral Nightly    lidocaine  1 patch TransDERmal Daily    nicotine  1 patch TransDERmal Daily    enoxaparin  30 mg SubCUTAneous BID    sodium chloride flush  5-40 mL IntraVENous 2 times per day    vancomycin  1,000 mg IntraVENous Q12H    carboxymethylcellulose PF  1 drop Both Eyes Q4H    And    artificial tears   Both Eyes Q4H    chlorhexidine  15 mL Mouth/Throat BID    famotidine (PEPCID) injection  20 mg IntraVENous BID    cefepime  2,000 mg IntraVENous Q12H       Continuous Infusions:   sodium chloride      propofol 35 mcg/kg/min (02/08/22 8539)    sodium chloride 75 mL/hr at 02/08/22 0613    dextrose      IV infusion builder      fentaNYL 125 mcg/hr (02/08/22 0613)       PRN Meds:  guaiFENesin-dextromethorphan, HYDROcodone-acetaminophen, polyethylene glycol, acetaminophen **OR** acetaminophen, sodium chloride flush, sodium chloride, fentanNYL, albuterol sulfate HFA **AND** ipratropium, midazolam, glucose, glucagon (rDNA), dextrose, dextrose bolus (hypoglycemia) **OR** dextrose bolus (hypoglycemia)      Data:  CBC:   Recent Labs     02/06/22  0432 02/07/22  0425 02/08/22  0412   WBC 14.4* 13.2* 17.8*   HGB 10.8* 10.0* 9.4*   HCT 31.9* 29.9* 28.5*   MCV 91.7 92.4 92.8    298 379     BMP:   Recent Labs     02/05/22  2325 02/05/22  2325 02/06/22  1100 02/06/22  1100 02/06/22  2253 02/07/22  1257 02/08/22  0412   *   < > 129*   < > 131* 134* 132*   K 4.3   < > 4.5   < > 4.1 4.4 4.9   CL 87*   < > 88*   < > 91* 95* 93*   CO2 37*   < > 35*   < > 34* 36* 35*   PHOS 2.2*  --  1.4*  --  4.5  --   --    BUN 27*   < > 30*   < > 32* 32* 35*   CREATININE <0.5*   < > <0.5*   < > <0.5* <0.5* <0.5*    < > = values in this interval not displayed.      LIVER PROFILE:   Recent Labs     02/06/22  0431   AST 34   ALT 39   BILIDIR <0.2   BILITOT Patient intubated by ER MD on 2/5/22   - pulm c/s for vent management   -  Cont Solumedrol 40 mg BID   - vanc and cefepime D4 for possible superimposed bacterial PNA   - inhaled bronchodilators   - improving symptoms , ongoing weaning trials. Likely extubation today     #Elevated troponin   -0.09, 0.07, 0.04, 0.03, respectively  -Patient denies chest pain  -EKG with nonspecific EKG changes, cardiologist has reviewed EKGs, I reviewed with Dr. Nandini Good as well   -Suspect elevation secondary to demand mismatch in acute respiratory failure.   Continue to monitor on telemetry  - ASA added on admit, continue for now     #Severe anxiety   - cont buspar     #Hyponatremia   - [id number]  - improved after IV NS.    - nephro c/s    #Right lung nodule   - per pulm c/s last admit: PET scan vs biopsy vs resection for RLL nodule - could be addressed as an outpatient     #Chronic pain   -  Was on PRN norco    #Hypothyroidism  - home dose of synthroid continued     #Hypophosphatemia  -Repleted    #Tobacco dependence  - nicotine patch ordered    - cessation recommended    DVT Prophylaxis: Lovenox   Diet: Diet NPO Exceptions are: Sips of Water with Meds, Ice Chips  ADULT TUBE FEEDING; Orogastric; Peptide Based High Protein; Continuous; 20; Yes; 20; Q 4 hours; 60; 30; Q 4 hours  Code Status: Full Code    Marsha Michele MD, 2/8/2022 7:58 AM

## 2022-02-08 NOTE — PROGRESS NOTES
02/07/22 2240   Vent Information   Vent Type 840   Vent Mode AC/VC   Vt Ordered 350 mL   Rate Set 24 bmp   Peak Flow 40 L/min   FiO2  40 %   SpO2 93 %   SpO2/FiO2 ratio 232.5   Sensitivity 3   PEEP/CPAP 5   Vent Patient Data   Peak Inspiratory Pressure 33 cmH2O   Mean Airway Pressure 15 cmH20   Rate Measured 24 br/min   Vt Exhaled 412 mL   Minute Volume 9.89 Liters   I:E Ratio 1:1.6   Cough/Sputum   Sputum How Obtained Suctioned;Endotracheal   Sputum Amount Small   Sputum Color Creamy   Tenacity Thick   Spontaneous Breathing Trial (SBT) RT Doc   Pulse 87   Breath Sounds   Right Upper Lobe Diminished   Right Middle Lobe Diminished   Right Lower Lobe Diminished   Left Upper Lobe Diminished   Left Lower Lobe Diminished   Additional Respiratory  Assessments   Resp 24   Position Semi-Woods's

## 2022-02-08 NOTE — PROGRESS NOTES
Pulmonary & Critical Care Medicine ICU Progress Note    CC: COVID-19 pneumonia, had improved while hospitalized and was discharged, same day as discharge return to the ED with new hypercapnia and markedly worsening hypoxemia, required intubation in the emergency department    Events of Last 24 hours: Following commands on SBT but is anxious    Invasive Lines: Peripheral    MV: 2/5/2022 for hypercapnia, failed BiPAP  Vent Mode: AC/VC Rate Set: 28 bmp/Vt Ordered: 350 mL/ /FiO2 : 45 %  Recent Labs     02/08/22  0329 02/08/22  0709   PHART 7.331* 7.400   DLE8NQK 66.8* 55.3*   PO2ART 97.0 94.6       IV:   sodium chloride      propofol 35 mcg/kg/min (02/08/22 0613)    dextrose      IV infusion builder      fentaNYL 125 mcg/hr (02/08/22 0160)       Vitals:  Blood pressure 132/64, pulse 91, temperature 98.6 °F (37 °C), temperature source Axillary, resp. rate 28, height 5' 4\" (1.626 m), weight 142 lb 3.2 oz (64.5 kg), SpO2 95 %, not currently breastfeeding. on vent    Intake/Output Summary (Last 24 hours) at 2/8/2022 1018  Last data filed at 2/8/2022 4141  Gross per 24 hour   Intake 4118.88 ml   Output 1050 ml   Net 3068.88 ml     EXAM:  General: intubated, ill appearing    ENT: Pharynx with ETT. Resp: No crackles. No wheezing. CV: S1, S2. No edema  GI: NT, ND, +BS  Skin: Warm and dry. Neuro: PERRL.  Sedated on vent    Scheduled Meds:   insulin lispro  0-6 Units SubCUTAneous Q4H    ipratropium-albuterol  1 ampule Inhalation Q4H    methylPREDNISolone  40 mg IntraVENous Q12H    Followed by   Yuvonne Friend ON 2/12/2022] predniSONE  40 mg Oral Daily with breakfast    aspirin  81 mg Oral Daily    busPIRone  10 mg Oral TID    levothyroxine  125 mcg Oral QAM AC    montelukast  10 mg Oral Nightly    lidocaine  1 patch TransDERmal Daily    nicotine  1 patch TransDERmal Daily    enoxaparin  30 mg SubCUTAneous BID    sodium chloride flush  5-40 mL IntraVENous 2 times per day    vancomycin  1,000 mg IntraVENous Q12H    carboxymethylcellulose PF  1 drop Both Eyes Q4H    And    artificial tears   Both Eyes Q4H    chlorhexidine  15 mL Mouth/Throat BID    famotidine (PEPCID) injection  20 mg IntraVENous BID    cefepime  2,000 mg IntraVENous Q12H     PRN Meds:  guaiFENesin-dextromethorphan, HYDROcodone-acetaminophen, polyethylene glycol, acetaminophen **OR** acetaminophen, sodium chloride flush, sodium chloride, fentanNYL, albuterol sulfate HFA **AND** ipratropium, midazolam, glucose, glucagon (rDNA), dextrose, dextrose bolus (hypoglycemia) **OR** dextrose bolus (hypoglycemia)    Results:  CBC:   Recent Labs     02/06/22  0432 02/07/22  0425 02/08/22  0412   WBC 14.4* 13.2* 17.8*   HGB 10.8* 10.0* 9.4*   HCT 31.9* 29.9* 28.5*   MCV 91.7 92.4 92.8    298 379     BMP:   Recent Labs     02/05/22  2325 02/05/22  2325 02/06/22  1100 02/06/22  1100 02/06/22  2253 02/07/22  1257 02/08/22  0412   *   < > 129*   < > 131* 134* 132*   K 4.3   < > 4.5   < > 4.1 4.4 4.9   CL 87*   < > 88*   < > 91* 95* 93*   CO2 37*   < > 35*   < > 34* 36* 35*   PHOS 2.2*  --  1.4*  --  4.5  --   --    BUN 27*   < > 30*   < > 32* 32* 35*   CREATININE <0.5*   < > <0.5*   < > <0.5* <0.5* <0.5*    < > = values in this interval not displayed. LIVER PROFILE:   Recent Labs     02/06/22 0431   AST 34   ALT 39   BILIDIR <0.2   BILITOT 0.3   ALKPHOS 76     Cultures:   1/29/2022 Strep pneumo & Legionella not detected  1/29/2022 BC NG  1/29/2022 COVID-19 detected  2/4/2022 BC NGTD  2/4/2022 urine NG  2/5/2022 tracheal aspirate: light growth aspergillus    Procalcitonin 0.16  proBNP 10,600    Films:   CT Chest 1/29/2022    Mediastinum: No evidence of mediastinal lymphadenopathy.  The heart and   pericardium demonstrate no acute abnormality.  There is no acute abnormality   of the thoracic aorta.       Lungs/pleura:  The lungs are without acute process. Rosi Big is a a 1 cm noncalcified nodule in the right lung base.  No evidence of pleural effusion or pneumothorax. Impression   No evidence of pulmonary embolism. Emphysema. There is a 1 cm noncalcified nodule in the right lung base.  This is new   compared to the prior exam of 01/17/2007.  Follow-up recommendations are   listed below. CXR 2/8/2022 clear lungs    ASSESSMENT:  · Acute on chronic hypoxemic & hypercapnic respiratory failure; baseline 3-4 L O2   · COVID-19 pneumonia in an unvaccinated patient   · Severe COPD, with acute exacerbation - she was clearly improved when I saw her on 2/3/22 & 2/4/22; she was breathing comfortably on her home oxygen and her only complaint was chronic pain, she was asking to go home then markedly worsened shortly after leaving hospital, unclear precipitant  · Hyponatremia, slowly correcting, nephrology following  · Aspergillus in sputum -likely colonizer given lack of significant CXR findings  · RLL 1 cm pulmonary nodule on CT 1/29/22 - concerning for bronchogenic carcinoma  · Anxiety, severe  · Chronic pain  · Tobacco abuse     PLAN:  Droplet Plus Airborne Precautions   Mechanical ventilation as per my orders. The ventilator was adjusted by me at the bedside for unstable, life threatening respiratory failure. IV Propofol for sedation, target RASS -2, with daily spontaneous awakening trial.  Fentanyl gtt    Vancomycin/Cefepime D#4.  Prior admission completed 6 days Azithromycin, 7 days Ceftriaxone   Inhaled bronchodilators   IV Solu-Medrol 40 mg BID   IVF per nephrology - stopping today  SSI    TF   Prophylaxis: Lovenox 30 BID   Eventual PET scan vs biopsy vs resection for RLL nodule - could be addressed as an outpatient (now followed by Dr. Ramona Stout)   Total critical care time caring for this patient with life threatening, unstable organ failure, including direct patient contact, management of life support systems, review of data including imaging and labs, discussions with other team members and physicians is 32 minutes so far today, excluding procedures.

## 2022-02-08 NOTE — PROGRESS NOTES
02/07/22 1909   Vent Information   Vent Type 840   Vent Mode AC/VC   Vt Ordered 350 mL   Rate Set 24 bmp   Peak Flow 40 L/min   FiO2  40 %   SpO2 96 %   SpO2/FiO2 ratio 240   Sensitivity 3   PEEP/CPAP 5   Humidification Source Heated wire   Humidification Temp 37   Humidification Temp Measured 36.6   Circuit Condensation Drained   Vent Patient Data   Peak Inspiratory Pressure 34 cmH2O   Mean Airway Pressure 16 cmH20   Rate Measured 24 br/min   Vt Exhaled 417 mL   Minute Volume 9.99 Liters   I:E Ratio 1:1.6   Plateau Pressure 23 VFK41   Static Compliance 23 mL/cmH2O   Dynamic Compliance 14 mL/cmH2O   Cough/Sputum   Sputum How Obtained Suctioned;Endotracheal   Sputum Amount Small   Sputum Color Creamy   Tenacity Thick   Spontaneous Breathing Trial (SBT) RT Doc   Pulse 88   Breath Sounds   Right Upper Lobe Diminished   Right Middle Lobe Diminished   Right Lower Lobe Diminished   Left Upper Lobe Diminished   Left Lower Lobe Diminished   Additional Respiratory  Assessments   Resp 18   Position Semi-Woods's   Alarm Settings   High Pressure Alarm 40 cmH2O   Low Minute Volume Alarm 4 L/min   Apnea (secs) 20 secs   High Respiratory Rate 40 br/min   Low Exhaled Vt  250 mL   Patient Observation   Observations ETT 7.5   Non-Surgical Airway 02/05/22 Endo Tracheal Tube   Placement Date/Time: 02/05/22 1100   Timeout: Patient;Procedure;Site/Side;Appropriate Equipment; Consent Confirmed;Site prepped with chlorhexidine;Correct Position  Mask Ventilation: Ventilated by mask (1)  Placed By: In ED;Licensed provider  Inserted . ..    Secured at 23 cm   Measured From Lips   Secured Location Right   Secured By Commercial tube [email or web address]   Site Condition Dry

## 2022-02-08 NOTE — PROGRESS NOTES
02/08/22 0250   Vent Information   Vent Type 840   Vent Mode AC/VC   Vt Ordered 350 mL   Rate Set 24 bmp   Peak Flow 40 L/min   FiO2  40 %   SpO2 90 %   SpO2/FiO2 ratio 225   Sensitivity 3   PEEP/CPAP 5   Vent Patient Data   Peak Inspiratory Pressure 29 cmH2O   Mean Airway Pressure 14 cmH20   Rate Measured 24 br/min   Vt Exhaled 408 mL   Minute Volume 9.79 Liters   I:E Ratio 1:1.6   Cough/Sputum   Sputum How Obtained Suctioned;Endotracheal   Sputum Amount Small   Sputum Color Creamy   Tenacity Thick   Spontaneous Breathing Trial (SBT) RT Doc   Pulse 87   Breath Sounds   Right Upper Lobe Diminished   Right Middle Lobe Diminished   Right Lower Lobe Diminished   Left Upper Lobe Diminished   Left Lower Lobe Diminished   Additional Respiratory  Assessments   Resp 24   Position Semi-Woods's   Non-Surgical Airway 02/05/22 Endo Tracheal Tube   Placement Date/Time: 02/05/22 1100   Timeout: Patient;Procedure;Site/Side;Appropriate Equipment; Consent Confirmed;Site prepped with chlorhexidine;Correct Position  Mask Ventilation: Ventilated by mask (1)  Placed By: In ED;Licensed provider  Inserted . ..    [employer]

## 2022-02-08 NOTE — PROGRESS NOTES
Shift assessment completed, see flow sheet. PM meds administered per MAR. Intubated with a 7.5 ETT at 23 LL, SpO2 93% on 40% and 5+. LS diminished. Sedated with propofol at 35 mcg/kg/min and fentanyl at 125 mcg/hr. RASS -3 and CPOT 0. Baugh catheter in place draining clear, ramona urine. OG in place at 60 with TF infusing at goal rate of 60 ml/hr, GRV 75. PIV x2 in place and functioning appropriately, dressings C/D/I. Restraints in use for pt safety. Repositioning pt q2h and PRN.

## 2022-02-09 ENCOUNTER — APPOINTMENT (OUTPATIENT)
Dept: GENERAL RADIOLOGY | Age: 62
DRG: 208 | End: 2022-02-09
Payer: MEDICARE

## 2022-02-09 LAB
ANION GAP SERPL CALCULATED.3IONS-SCNC: 4 MMOL/L (ref 3–16)
BASOPHILS ABSOLUTE: 0 K/UL (ref 0–0.2)
BASOPHILS RELATIVE PERCENT: 0 %
BUN BLDV-MCNC: 31 MG/DL (ref 7–20)
CALCIUM SERPL-MCNC: 9.3 MG/DL (ref 8.3–10.6)
CHLORIDE BLD-SCNC: 94 MMOL/L (ref 99–110)
CO2: 38 MMOL/L (ref 21–32)
CREAT SERPL-MCNC: <0.5 MG/DL (ref 0.6–1.2)
EOSINOPHILS ABSOLUTE: 0 K/UL (ref 0–0.6)
EOSINOPHILS RELATIVE PERCENT: 0 %
GFR AFRICAN AMERICAN: >60
GFR NON-AFRICAN AMERICAN: >60
GLUCOSE BLD-MCNC: 103 MG/DL (ref 70–99)
GLUCOSE BLD-MCNC: 110 MG/DL (ref 70–99)
GLUCOSE BLD-MCNC: 119 MG/DL (ref 70–99)
GLUCOSE BLD-MCNC: 119 MG/DL (ref 70–99)
GLUCOSE BLD-MCNC: 125 MG/DL (ref 70–99)
GLUCOSE BLD-MCNC: 125 MG/DL (ref 70–99)
HCT VFR BLD CALC: 30.1 % (ref 36–48)
HEMOGLOBIN: 9.9 G/DL (ref 12–16)
LYMPHOCYTES ABSOLUTE: 0.8 K/UL (ref 1–5.1)
LYMPHOCYTES RELATIVE PERCENT: 3 %
MCH RBC QN AUTO: 30.5 PG (ref 26–34)
MCHC RBC AUTO-ENTMCNC: 32.9 G/DL (ref 31–36)
MCV RBC AUTO: 92.7 FL (ref 80–100)
MONOCYTES ABSOLUTE: 1.8 K/UL (ref 0–1.3)
MONOCYTES RELATIVE PERCENT: 7 %
NEUTROPHILS ABSOLUTE: 22.7 K/UL (ref 1.7–7.7)
NEUTROPHILS RELATIVE PERCENT: 90 %
PDW BLD-RTO: 13.7 % (ref 12.4–15.4)
PERFORMED ON: ABNORMAL
PLATELET # BLD: 467 K/UL (ref 135–450)
PLATELET SLIDE REVIEW: ADEQUATE
PMV BLD AUTO: 7 FL (ref 5–10.5)
POTASSIUM REFLEX MAGNESIUM: 5 MMOL/L (ref 3.5–5.1)
RBC # BLD: 3.25 M/UL (ref 4–5.2)
SODIUM BLD-SCNC: 136 MMOL/L (ref 136–145)
TRIGL SERPL-MCNC: 199 MG/DL (ref 0–150)
WBC # BLD: 25.2 K/UL (ref 4–11)

## 2022-02-09 PROCEDURE — 36415 COLL VENOUS BLD VENIPUNCTURE: CPT

## 2022-02-09 PROCEDURE — 6370000000 HC RX 637 (ALT 250 FOR IP): Performed by: INTERNAL MEDICINE

## 2022-02-09 PROCEDURE — 99233 SBSQ HOSP IP/OBS HIGH 50: CPT | Performed by: INTERNAL MEDICINE

## 2022-02-09 PROCEDURE — 2580000003 HC RX 258: Performed by: INTERNAL MEDICINE

## 2022-02-09 PROCEDURE — 6360000002 HC RX W HCPCS: Performed by: INTERNAL MEDICINE

## 2022-02-09 PROCEDURE — 94640 AIRWAY INHALATION TREATMENT: CPT

## 2022-02-09 PROCEDURE — 84478 ASSAY OF TRIGLYCERIDES: CPT

## 2022-02-09 PROCEDURE — 2060000000 HC ICU INTERMEDIATE R&B

## 2022-02-09 PROCEDURE — 92526 ORAL FUNCTION THERAPY: CPT

## 2022-02-09 PROCEDURE — 94660 CPAP INITIATION&MGMT: CPT

## 2022-02-09 PROCEDURE — 85025 COMPLETE CBC W/AUTO DIFF WBC: CPT

## 2022-02-09 PROCEDURE — 6370000000 HC RX 637 (ALT 250 FOR IP)

## 2022-02-09 PROCEDURE — 92610 EVALUATE SWALLOWING FUNCTION: CPT

## 2022-02-09 PROCEDURE — 80048 BASIC METABOLIC PNL TOTAL CA: CPT

## 2022-02-09 RX ORDER — PREDNISONE 20 MG/1
40 TABLET ORAL DAILY
Status: DISCONTINUED | OUTPATIENT
Start: 2022-02-09 | End: 2022-02-12

## 2022-02-09 RX ADMIN — WHITE PETROLATUM 57.7 %-MINERAL OIL 31.9 % EYE OINTMENT: at 04:36

## 2022-02-09 RX ADMIN — IPRATROPIUM BROMIDE AND ALBUTEROL 2 PUFF: 20; 100 SPRAY, METERED RESPIRATORY (INHALATION) at 23:03

## 2022-02-09 RX ADMIN — CARBOXYMETHYLCELLULOSE SODIUM 1 DROP: 10 GEL OPHTHALMIC at 06:23

## 2022-02-09 RX ADMIN — WHITE PETROLATUM 57.7 %-MINERAL OIL 31.9 % EYE OINTMENT: at 00:37

## 2022-02-09 RX ADMIN — IPRATROPIUM BROMIDE AND ALBUTEROL 2 PUFF: 20; 100 SPRAY, METERED RESPIRATORY (INHALATION) at 15:00

## 2022-02-09 RX ADMIN — ASPIRIN 81 MG: 81 TABLET, COATED ORAL at 10:47

## 2022-02-09 RX ADMIN — ENOXAPARIN SODIUM 30 MG: 100 INJECTION SUBCUTANEOUS at 21:16

## 2022-02-09 RX ADMIN — Medication 10 ML: at 10:48

## 2022-02-09 RX ADMIN — CARBOXYMETHYLCELLULOSE SODIUM 1 DROP: 10 GEL OPHTHALMIC at 04:36

## 2022-02-09 RX ADMIN — VANCOMYCIN HYDROCHLORIDE 1000 MG: 1 INJECTION, POWDER, LYOPHILIZED, FOR SOLUTION INTRAVENOUS at 00:33

## 2022-02-09 RX ADMIN — ENOXAPARIN SODIUM 30 MG: 100 INJECTION SUBCUTANEOUS at 10:47

## 2022-02-09 RX ADMIN — PREDNISONE 40 MG: 20 TABLET ORAL at 10:47

## 2022-02-09 RX ADMIN — CEFEPIME 2000 MG: 2 INJECTION, POWDER, FOR SOLUTION INTRAMUSCULAR; INTRAVENOUS at 22:51

## 2022-02-09 RX ADMIN — Medication 10 ML: at 21:11

## 2022-02-09 RX ADMIN — IPRATROPIUM BROMIDE AND ALBUTEROL 2 PUFF: 20; 100 SPRAY, METERED RESPIRATORY (INHALATION) at 12:14

## 2022-02-09 RX ADMIN — IPRATROPIUM BROMIDE AND ALBUTEROL 2 PUFF: 20; 100 SPRAY, METERED RESPIRATORY (INHALATION) at 19:13

## 2022-02-09 RX ADMIN — BUSPIRONE HYDROCHLORIDE 10 MG: 10 TABLET ORAL at 10:47

## 2022-02-09 RX ADMIN — CEFEPIME 2000 MG: 2 INJECTION, POWDER, FOR SOLUTION INTRAMUSCULAR; INTRAVENOUS at 10:50

## 2022-02-09 RX ADMIN — IPRATROPIUM BROMIDE AND ALBUTEROL 2 PUFF: 20; 100 SPRAY, METERED RESPIRATORY (INHALATION) at 07:47

## 2022-02-09 ASSESSMENT — PAIN SCALES - WONG BAKER
WONGBAKER_NUMERICALRESPONSE: 0

## 2022-02-09 ASSESSMENT — PAIN SCALES - GENERAL
PAINLEVEL_OUTOF10: 6
PAINLEVEL_OUTOF10: 0

## 2022-02-09 NOTE — PROGRESS NOTES
Blood pressure (!) 156/78, pulse 105, temperature 99.1 °F (37.3 °C), temperature source Axillary, resp. rate 15, height 5' 4\" (1.626 m), weight 142 lb 3.2 oz (64.5 kg), SpO2 99 %, not currently breastfeeding. Patient on BiPAP with s a setting of 14/7 and 50% FiO2. Patient tolerating well at this time. Blood sugar WNL. No other changes since prior assessment at this time.    Electronically signed by Sergio Atwood RN on 2/9/2022 at 12:36 AM

## 2022-02-09 NOTE — PROGRESS NOTES
Blood pressure (!) 157/78, pulse 106, temperature 98.8 °F (37.1 °C), temperature source Oral, resp. rate 17, height 5' 4\" (1.626 m), weight 142 lb 3.2 oz (64.5 kg), SpO2 96 %, not currently breastfeeding. Patient is Alert/Oriented x 4. Patient is on 8L O2 via HFNC. Oxygen saturation is staying > 95%. Rhonchi and expiratory wheezing note throughout lungs. Encouraging patient to cough up the mucous as frequent as she is able. Cough is weak but she is trying to get it up. Abdomen soft, BS hypoactive. Edema continues to BUE and BLE with +1. Blood sugar monitored with result of 99. Evening medication pills held due to NPO for swallow eval tomorrow. Patient is complaining of pain. PRN pain medication given at this time. Will continue to monitor.     Electronically signed by Bel Jc RN on 2/8/2022 at 9:19 PM

## 2022-02-09 NOTE — PROGRESS NOTES
Speech Language Pathology  Facility/Department: [facility] ICU   CLINICAL BEDSIDE SWALLOW EVALUATION    NAME: Sherlyn Garzon  : 1960  MRN: [id number]    ADMISSION DATE: 2022  ADMITTING DIAGNOSIS: has Acute respiratory failure with hypoxia and hypercapnia ([address] COPD exacerbation ([address] Tobacco abuse; Hypothyroidism; Acute on chronic respiratory failure with hypoxia ([address] Community acquired bacterial pneumonia; Pneumonia due to organism; Acute exacerbation of chronic obstructive pulmonary disease (COPD) ([address] Elevated troponin; SVT (supraventricular tachycardia) ([address] Mild protein-calorie malnutrition ([address] COPD, severe ([address] Chronic respiratory failure with hypoxia ([address] Chest pain on breathing; HCAP (healthcare-associated pneumonia); Chronic bilateral pleural effusions; Acute on chronic respiratory failure ([address] Sepsis with acute hypercapnic respiratory failure without septic shock ([address] Sepsis ([address] Mucus plugging of bronchi; Acute pulmonary edema ([address] Acute on chronic respiratory failure with hypoxia and hypercapnia ([address] Pneumonia due to COVID-19 virus; COVID; Acute respiratory failure with hypoxia ([address] COVID-19 virus infection; Pulmonary nodule; Chronic hypoxemic respiratory failure ([address] Anxiety; Acute and chronic respiratory failure with hypoxia ([address] and Hyponatremia on their problem list.  ONSET DATE: 22 (admit to ICU)    Recent Chest Xray/CT of Chest: (22)  Stable examination with findings of COPD. Date of Eval: 2022  Evaluating Therapist: RAMONITA Mcghee    Current Diet level:  Current Diet : NPO  Current Liquid Diet : NPO    Chief Complaint   Patient presents with    Shortness of Breath       squad states pt was dx with covid pneumonia and had an o2 of 80% on 5L.  Pt is not oriented to person, time, or place         History Of Present Illness per Dr Abner Webb :    \"The patient is a 59 y.o. female with PMH below, presents with SOB, increased O2 demand, resp distress, MS change. PT was just DC from here earlier today. She is unable to give Hx at this time. She is on NIV. Per ED and EMR, EMS reported pt was found in resp distress and satting 80% on 5L. She is normally on 4L O2 at home. She was almost immediately transitioned to BIPAP in the ED 2/2 resp distress. She is awake but does not interact very well. \"    Pt was intubated, extubated 2/8/22, on BiPap overnight and now on 7L per HFNC    Vitals:    02/09/22 1300   BP: (!) 161/79   Pulse: 94   Resp: 23   Temp:    SpO2: 97%     Pain:  Pt denied pain currently, no sore throat    Reason for Referral  Cameron Stoner was referred for a bedside swallow evaluation to assess the efficiency of her swallow function, identify signs and symptoms of aspiration and make recommendations regarding safe dietary consistencies, effective compensatory strategies, and safe eating environment. Medical record review/interview: Pt is known to this service from admission 05/2022 with MBS: \"mild oropharyngeal dysphagia, likely acute-on-chronic related to hx of dysphagia, reduced physical mobility, increased O2 demands, respiratory illness, AMS, impaired cognition, fatigue, disuse atrophy, generalized weakness, impaired respiratory-swallow coordination and intubation. Swallow safety is preserved; swallow efficiency is preserved. Pt appears to be at low risk for aspiration PNA, and low risk for malnutrition/dehydration. Diet modification is indicated given fatigue and suspected disuse atrophy of swallow structures. Swallow prognosis is fair/ good given suspected disuse atrophy of swallow musculature, reduced physical mobility but with consideration of lack of penetration/aspiration noted on instrumental assessment. Patient appears to be a fair candidate for behavioral swallow rehabilitation. \"  Pt reports eating soft foods at baseline, no acute concerns related to swallowing, recalls prior swallow assessments.   Predisposing dysphagia risk factors: COPD, Other chronic respiratory illness, Hx of recurrent intubation, Chronic Respiratory Failure and Hx of dysphagia  Clinical signs of possible chronic dysphagia: weight loss and hx of dysphagia  Precipitating dysphagia risk factors: reduced physical mobility, increased O2 demands, recent intubation and recurrent intubation    Cranial nerve exam:   CN V (trigeminal): ophthalmic, maxillary, and mandibular facial sensation- WNL  CN VII (facial): WNL  CN IX/X (glossopharyngeal/vagus): MPT: weak; pitch range: Impaired and Reduced; vocal quality: WFL; cough: Weak- perceptually and Congested  CN XII (hypoglossal): WFL    Oral Care Status: reduced saliva flow, mild xerostomia    PO trials:   Ice: function oral control, swallowed whole, no s/s aspiration   IDDSI 0 (thin):   - 5cc bolus (tsp): no anterior bolus loss , suspect functional A-P bolus transit, oral clearance grossly WFL and no clinical s/s of aspiration  - Cup: no anterior bolus loss , swallow timing subjectively appears timely, no clinical s/s of aspiration and vitals stable, immediate belching post swallow  - Straw: no anterior bolus loss , swallow timing subjectively appears timely, no clinical s/s of aspiration and vitals stable, immediate belching post swallow  IDDSI 4 (puree): no anterior bolus loss , swallow timing subjectively appears timely, oral clearance grossly WFL, no clinical s/s of aspiration and vitals stable  IDDSI 5 (minced and moist): DNT, pr declined, not clinically appropriate due to fatigue with puree  IDDSI 6 (soft and bite sized): DNT, pt declined  IDDSI 7 (regular): DNT and Pt declined  3 oz water: PASS, pt took in single sips without initiation of successive swallows    Clinical signs of oropharyngeal dysphagia likely acute-on-chronic related to hx of dysphagia, increased O2 demands, respiratory illness, generalized weakness and co-morbidities. Swallow prognosis is good. Instrumental swallow study is not indicated.   Given lack of aspiration noted on instrumental assessment with recent admission pt is safe for oral diet at this time    Instrumentation: not indicated at this time, done after a prolonged intubation last May  Diet recommendation: IDDSI 4 Puree Solids; IDDSI 0 Thin Liquids; Meds PO as tolerated  Risk management: upright for all intake, stay upright for at least 30 mins after intake, assist feed and slow rate of intake    Impression  Dysphagia Diagnosis: Mild oral stage dysphagia; Suspected needs further assessment;Mild pharyngeal stage dysphagia  Dysphagia Impression : Mild oral dysphagia with Suspected mild pharyngeal dysphagia. Consistent belching post swallow of all textures. Limited PO accepted as pt quickly fatigues. She requests liquid after every swallow of puree but denies sensation of pharyngeal residue. Sp02 95% before, during and after PO trials. RR 24 prior to PO, 17-25 during and after PO. Swallow function supports resuming PO with modified textures, aspiration precautions and reflux precautions. Dysphagia Outcome Severity Scale: Level 5: Mild dysphagia- Distant supervision. May need one diet consistency restricted     Treatment Plan  Requires SLP Intervention: Yes  Duration/Frequency of Treatment: 3-5x/wk  D/C Recommendations: 24 hour supervision/assistance       Recommended Diet and Intervention  Diet Solids Recommendation: Dysphagia Pureed (Dysphagia I)  Liquid Consistency Recommendation: Thin  Recommended Form of Meds: PO  Recommendations: Assist feed;Dysphagia treatment  Therapeutic Interventions: Therapeutic PO trials with SLP; Diet tolerance monitoring    Compensatory Swallowing Strategies  Compensatory Swallowing Strategies: Alternate solids and liquids;Assist feed;Eat/Feed slowly; Remain upright for 30-45 minutes after meals;Small bites/sips;Upright as possible for all oral intake    Treatment/Goals  Short-term Goals  Timeframe for Short-term Goals: 7 days (2/16/22)  Long-term Goals  Timeframe for Long-term Goals: 10 days (2/19/22)  Goal 1: Pt will demonstrate clincally safe swallow of soft solids and thin liquids without overt s/s aspiration or other respiratory distress  Dysphagia Goals: The patient will tolerate recommended diet without observed clinical signs of aspiration; The patient/caregiver will demonstrate understanding of compensatory strategies for improved swallowing safety. ;The patient will tolerate mechanical soft foods 10/10. General  Chart Reviewed: Yes  Behavior/Cognition: Alert;Pleasant mood; Cooperative;Lethargic  Respiratory Status: O2 via nasual cannula  Breath Sounds: Wet  O2 Device: Nasal cannula  Liters of Oxygen: 7 L  Communication Observation: Functional  Follows Directions: Simple  Dentition: Edentulous  Patient Positioning: Upright in bed  Baseline Vocal Quality: Normal (with reduced endurance, loudness, pitch range)  Volitional Cough: Weak;Wet  Prior Dysphagia History: as above, see MBS recs from 05/2021  Consistencies Administered: Dysphagia Pureed (Dysphagia I); Thin - cup; Thin - straw       Vision/Hearing  Vision  Vision: Impaired  Vision Exceptions: Wears glasses at all times (glasses not present)  Hearing  Hearing: Within functional limits    Oral Motor Deficits  Oral/Motor  Oral Motor: Within functional limits    Oral Phase Dysfunction  Oral Phase  Oral Phase: WNL  Oral Phase  Oral Phase - Comment: Functional oral bolus acceptance, preparation, and transfer for consistencies assessed. Pt declined to attempt solids due to generalized weakness and quick fatigue across very limited PO. She is edentulous and has dentures (not present). Reports she often eats without her dentures in place. Indicators of Pharyngeal Phase Dysfunction   Pharyngeal Phase  Pharyngeal Phase: Exceptions  Indicators of Pharyngeal Phase Dysfunction  Delayed Swallow: Thin - straw; Thin - cup;Puree  Decreased Laryngeal Elevation: Puree; Thin - cup; Thin - straw  Pharyngeal Phase   Pharyngeal: Suspect pharyngeal delay with reduced laryngeal elevation per palpation. Pt swallowed X1-2 per small pureed bolus and once per thin liquid bolus. Consistent immediate belching post swallow with all textures, which she states is not typical for her. No clear indication of aspiration appreciated. Prognosis  Prognosis  Prognosis for safe diet advancement: good  Barriers to reach goals: fatigue  Barriers/Prognosis Comment: Pt known to improve endurance clinically with previous admission (May 2021) and pureed diet was clinically advanced to minced/moist prior to discharge.   Individuals consulted  Consulted and agree with results and recommendations: Patient;RN    Education  Patient Education: SLP educated the patient re: Role of SLP, rationale for completion of assessment, anatomical components of swallow structures as they pertain to airway protection, results of assessment, recommendations and POC  Patient Education Response: Verbalizes understanding  Safety Devices in place: Yes  Type of devices: Left in bed;Call light within reach       Therapy Time  SLP Individual Minutes  Time In: 6294  Time Out: 1320  Minutes: [address] [place], [place]  2/9/2022 1:37 PM

## 2022-02-09 NOTE — PROGRESS NOTES
Shift assessment complete- see flowsheets. Pt is A&Ox4. VSS  Respirations are easy, even and unlabored. Pt is on 5L HFNC. PIV WDL. Flushed at this time. Plan of care and goals reviewed. Call light within reach. Bed in lowest position with wheels locked. No needs expressed at this time.

## 2022-02-09 NOTE — PROGRESS NOTES
PRN fentanyl pulled earlier in shift due to patient complaining of pain. PRN never used due to patient falling asleep and not requesting it anymore. Fentanyl wasted with myself and Jorge Sparks. RN  Pharmacists made aware as well. Electronically signed by Araseli Brower RN on 2/9/2022 at 6:39 AM     Witnessed waste of Fentanyl 100mcg with Trent Solomon RN.      Electronically signed by Manas Mcconnell RN on 2/9/2022 at 6:42 AM

## 2022-02-09 NOTE — PROGRESS NOTES
Pharmacy Vancomycin Consult     Vancomycin Day: 5  Current Dosin mg q12h  Current indication: HAP    Recent Labs     22  2253 22  0425 22  1257 22  0412   BUN   < >  --  32* 35*   CREATININE   < >  --  <0.5* <0.5*   WBC  --  13.2*  --  17.8*    < > = values in this interval not displayed. Trough: 15  Estimated AUC: 459 @ 8% tox    Assessment/Plan:  Continue current dose of 1000 mg q12h.  Next trough:  @ Stephanie GaonaD, [address], 2022 12:18 AM

## 2022-02-09 NOTE — PLAN OF CARE
Nutrition Problem #1: Inadequate oral intake  Intervention: Food and/or Nutrient Delivery: Continue NPO  Nutritional Goals: patient will remain in NPO status until medically cleared/cleared by SLP for po diet advancement with appropriate po diet consistencies

## 2022-02-09 NOTE — PROGRESS NOTES
Blood pressure (!) 160/75, pulse 104, temperature 99.1 °F (37.3 °C), temperature source Axillary, resp. rate 21, height 5' 4\" (1.626 m), weight 142 lb 3.2 oz (64.5 kg), SpO2 100 %, not currently breastfeeding. Patient wore Bi-Pap from 12 a.m to now at 6:28. Patient now switched back to 8L HFNC. Oxygen saturation 99%. No other changes since prior assessment.      Electronically signed by Natividad Asher RN on 2/9/2022 at 6:28 AM

## 2022-02-09 NOTE — PROGRESS NOTES
[facility] Progress Note           Subjective/   59y.o. year old female who we are seeing in consultation for Hyponatremia. HPI:  Sodium levels improving with NS, now off, urine output of 2.3 liters over last 24 hours. Shortness of breath better, now extubated. ROS:  No fevers, +weak. Objective/   GEN:  BP (!) 160/75   Pulse 104   Temp 99.1 °F (37.3 °C) (Axillary)   Resp 18   Ht 5' 4\" (1.626 m)   Wt 141 lb 8.6 oz (64.2 kg)   LMP  (LMP Unknown)   SpO2 99%   BMI 24.29 kg/m²   Exam deferred as means to protect PPE due to shortage & due to CORONAVIRUS risk. Data/  Recent Labs     02/07/22  0425 02/08/22  0412 02/09/22  0410   WBC 13.2* 17.8* 25.2*   HGB 10.0* 9.4* 9.9*   HCT 29.9* 28.5* 30.1*   MCV 92.4 92.8 92.7    379 467*     Recent Labs     02/06/22  1100 02/06/22  1100 02/06/22  2253 02/06/22  2253 02/07/22  1257 02/08/22  0412 02/09/22  0410   *   < > 131*   < > 134* 132* 136   K 4.5   < > 4.1  --  4.4 4.9 5.0   CL 88*   < > 91*   < > 95* 93* 94*   CO2 35*   < > 34*   < > 36* 35* 38*   GLUCOSE 138*   < > 180*   < > 190* 158* 125*   PHOS 1.4*  --  4.5  --   --   --   --    BUN 30*   < > 32*   < > 32* 35* 31*   CREATININE <0.5*   < > <0.5*   < > <0.5* <0.5* <0.5*   LABGLOM >60   < > >60   < > >60 >60 >60   GFRAA >60   < > >60   < > >60 >60 >60    < > = values in this interval not displayed. Assessment/     - Hyponatremia - acute, suspected pre-renal state +/- SIADH state in setting of COVID   Suspect former as improving with IVF's     - COVID PNA - requiring ventilator support     - Severe COPD with exacerbation    Plan/     - Trial off IVF's  - Home lasix on hold  - Trend labs, bp's, & urine output    Case d/w Nursing  No further recommendations at this time  Will sign off for now  Call with questions    ____________________________________  Rodrigo Valenzuela MD  [facility]  [email or web address]  Office: [phone]

## 2022-02-09 NOTE — PROGRESS NOTES
Pt intubated and sedated. Intubated with # 7.5 at the # 24 LL. Vent settings are AC 28 / 350 / 45% / 5. Breath sounds are diminished with rhonchi. Heart rhythm is NSR on the bedside monitor with rates in the 80s. Scheduled meds given per orders. OG at 60cm. TF running at goal rate of 60ml/hr. Pt tolerating well. Propofol infusing. Fetanyl infusing. NS infusing. RASS -2  CPOT 0    Baugh patent and draining without issues.

## 2022-02-09 NOTE — PROGRESS NOTES
Pulmonary & Critical Care Medicine ICU Progress Note    CC: COVID-19 pneumonia and COPD    Events of Last 24 hours:   Extubated yesterday and no on 4L NC  Used Bipap over night    Invasive Lines: Peripheral    MV: 2/5-2/8/22  Vent Mode: AC/VC Rate Set: 28 bmp/Vt Ordered: 350 mL/ /FiO2 : 50 %  Recent Labs     02/08/22  0709 02/08/22  1400   PHART 7.400 7.376   [id number] 55.3* 61.5*   PO2ART 94.6 91.0       IV:   sodium chloride      propofol 15 mcg/kg/min (02/08/22 1119)    dextrose      fentaNYL 125 mcg/hr (02/08/22 0811)       Vitals:  Blood pressure (!) 160/75, pulse 104, temperature 99.1 °F (37.3 °C), temperature source Axillary, resp. rate 18, height 5' 4\" (1.626 m), weight 141 lb 8.6 oz (64.2 kg), SpO2 99 %, not currently breastfeeding. on 4 L    Constitutional:  No acute distress. Eyes: PERRL. Conjunctivae anicteric. ENT: Normal nose. Normal tongue. Neck:  Trachea is midline. No thyroid tenderness. Respiratory: No accessory muscle usage. decreased breath sounds. No wheezes. No rales. No Rhonchi. Cardiovascular: Normal S1S2. No digit clubbing. No digit cyanosis. No LE edema. Psychiatric: No anxiety or Agitation. Alert and Oriented to person, place and time.     Scheduled Meds:   albuterol-ipratropium  2 puff Inhalation Q4H While awake    insulin lispro  0-6 Units SubCUTAneous Q4H    methylPREDNISolone  40 mg IntraVENous Q12H    Followed by   Abdullahi Lopez ON 2/12/2022] predniSONE  40 mg Oral Daily with breakfast    aspirin  81 mg Oral Daily    busPIRone  10 mg Oral TID    levothyroxine  125 mcg Oral QAM AC    montelukast  10 mg Oral Nightly    lidocaine  1 patch TransDERmal Daily    nicotine  1 patch TransDERmal Daily    enoxaparin  30 mg SubCUTAneous BID    sodium chloride flush  5-40 mL IntraVENous 2 times per day    vancomycin  1,000 mg IntraVENous Q12H    carboxymethylcellulose PF  1 drop Both Eyes Q4H    And    artificial tears   Both Eyes Q4H    chlorhexidine  15 mL Mouth/Throat BID    famotidine (PEPCID) injection  20 mg IntraVENous BID    cefepime  2,000 mg IntraVENous Q12H     PRN Meds:  albuterol-ipratropium, ipratropium-albuterol, guaiFENesin-dextromethorphan, HYDROcodone-acetaminophen, polyethylene glycol, acetaminophen **OR** acetaminophen, sodium chloride flush, sodium chloride, fentanNYL, midazolam, glucose, glucagon (rDNA), dextrose, dextrose bolus (hypoglycemia) **OR** dextrose bolus (hypoglycemia)    Results:  CBC:   Recent Labs     02/07/22  0425 02/08/22  0412 02/09/22  0410   WBC 13.2* 17.8* 25.2*   HGB 10.0* 9.4* 9.9*   HCT 29.9* 28.5* 30.1*   MCV 92.4 92.8 92.7    379 467*     BMP:   Recent Labs     02/06/22  1100 02/06/22  1100 02/06/22  2253 02/06/22  2253 02/07/22  1257 02/08/22  0412 02/09/22  0410   *   < > 131*   < > 134* 132* 136   K 4.5   < > 4.1  --  4.4 4.9 5.0   CL 88*   < > 91*   < > 95* 93* 94*   CO2 35*   < > 34*   < > 36* 35* 38*   PHOS 1.4*  --  4.5  --   --   --   --    BUN 30*   < > 32*   < > 32* 35* 31*   CREATININE <0.5*   < > <0.5*   < > <0.5* <0.5* <0.5*    < > = values in this interval not displayed. LIVER PROFILE:   No results for input(s): AST, ALT, LIPASE, BILIDIR, BILITOT, ALKPHOS in the last 72 hours. Invalid input(s): AMYLASE,  ALB  Cultures:   1/29/2022 Strep pneumo & Legionella not detected  1/29/2022 BC NG  1/29/2022 COVID-19 detected  2/4/2022 BC NGTD  2/4/2022 urine NG  2/5/2022 tracheal aspirate: light growth aspergillus    Procalcitonin 0.16  proBNP 10,600    Films:   CT Chest 1/29/2022    Mediastinum: No evidence of mediastinal lymphadenopathy.  The heart and   pericardium demonstrate no acute abnormality.  There is no acute abnormality   of the thoracic aorta.       Lungs/pleura: The lungs are without acute process. Kinnie Repress is a a 1 cm noncalcified nodule in the right lung base.  No evidence of pleural effusion or pneumothorax. Impression   No evidence of pulmonary embolism. Emphysema.    There is a 1 cm noncalcified nodule in the right lung base.  This is new   compared to the prior exam of 01/17/2007.  Follow-up recommendations are   listed below. CXR 2/8/2022 clear lungs    ASSESSMENT:  · Acute on chronic hypoxemic & hypercapnic respiratory failure; baseline 3-4 L O2   · COVID-19 pneumonia in an unvaccinated patient   · Severe COPD, with acute exacerbation - she was clearly improved when I saw her on 2/3/22 & 2/4/22; she was breathing comfortably on her home oxygen and her only complaint was chronic pain, she was asking to go home then markedly worsened shortly after leaving hospital, unclear precipitant  · Hyponatremia, slowly correcting, nephrology following  · Aspergillus in sputum -likely colonizer given lack of significant CXR findings  · RLL 1 cm pulmonary nodule on CT 1/29/22 - concerning for bronchogenic carcinoma  · Anxiety, severe  · Chronic pain  · Tobacco abuse     PLAN:  Droplet Plus Airborne Precautions   Bipap QHS for now  Supplemental oxygen to maintain SaO2 >92%; wean as tolerated   Stop Vancomycin and continue Cefepime D#5/7.  Prior admission completed 6 days Azithromycin, 7 days Ceftriaxone   Inhaled bronchodilators   Change solumedrol to a prednisone taper  Nephrology following  SSI    TF   Prophylaxis: Lovenox 30 BID   Eventual PET scan vs biopsy vs resection for RLL nodule - could be addressed as an outpatient (now followed by Dr. Shady Cornell)   PT/OT/SLP  [id number] Chandrika Jackson to transfer to [address] [place] [address]

## 2022-02-09 NOTE — PROGRESS NOTES
predniSONE  40 mg Oral Daily with breakfast    aspirin  81 mg Oral Daily    busPIRone  10 mg Oral TID    levothyroxine  125 mcg Oral QAM AC    montelukast  10 mg Oral Nightly    lidocaine  1 patch TransDERmal Daily    nicotine  1 patch TransDERmal Daily    enoxaparin  30 mg SubCUTAneous BID    sodium chloride flush  5-40 mL IntraVENous 2 times per day    vancomycin  1,000 mg IntraVENous Q12H    carboxymethylcellulose PF  1 drop Both Eyes Q4H    And    artificial tears   Both Eyes Q4H    chlorhexidine  15 mL Mouth/Throat BID    famotidine (PEPCID) injection  20 mg IntraVENous BID    cefepime  2,000 mg IntraVENous Q12H       Continuous Infusions:   sodium chloride      propofol 15 mcg/kg/min (02/08/22 1119)    dextrose      fentaNYL 125 mcg/hr (02/08/22 0613)       PRN Meds:  albuterol-ipratropium, ipratropium-albuterol, guaiFENesin-dextromethorphan, HYDROcodone-acetaminophen, polyethylene glycol, acetaminophen **OR** acetaminophen, sodium chloride flush, sodium chloride, fentanNYL, midazolam, glucose, glucagon (rDNA), dextrose, dextrose bolus (hypoglycemia) **OR** dextrose bolus (hypoglycemia)      Data:  CBC:   Recent Labs     02/07/22  0425 02/08/22  0412 02/09/22  0410   WBC 13.2* 17.8* 25.2*   HGB 10.0* 9.4* 9.9*   HCT 29.9* 28.5* 30.1*   MCV 92.4 92.8 92.7    379 467*     BMP:   Recent Labs     02/06/22  1100 02/06/22  1100 02/06/22  2253 02/06/22  2253 02/07/22  1257 02/08/22  0412 02/09/22  0410   *   < > 131*   < > 134* 132* 136   K 4.5   < > 4.1  --  4.4 4.9 5.0   CL 88*   < > 91*   < > 95* 93* 94*   CO2 35*   < > 34*   < > 36* 35* 38*   PHOS 1.4*  --  4.5  --   --   --   --    BUN 30*   < > 32*   < > 32* 35* 31*   CREATININE <0.5*   < > <0.5*   < > <0.5* <0.5* <0.5*    < > = values in this interval not displayed.        Blood: NGTD  Sputum: aspergillus     Urine: ordered      RADIOLOGY  XR CHEST PORTABLE   Final Result   No significant change         XR CHEST PORTABLE   Final Result   1. Endotracheal tube remains in appropriate position. 2. No new findings identified. XR CHEST PORTABLE   Final Result   Stable examination with findings of COPD. XR CHEST PORTABLE   Final Result   No acute process. Stable exam with stable lines and tubes. Stable findings of COPD. XR CHEST 1 VIEW   Final Result   Endotracheal tube satisfactory position above the alley      OG side-port in gastric fundus, tip not visualized      Otherwise, no acute abnormalities seen in the chest         XR CHEST PORTABLE   Final Result   Probable scarring in the left midlung      Otherwise, no acute cardiopulmonary process         XR CHEST PORTABLE   Final Result   COPD with resolving central pulmonary congestion. Slowly resolving bibasilar opacities. Tiny right pleural effusion which is less prominent. CT chest PE 1/29/22  Impression   No evidence of pulmonary embolism.       Emphysema.       There is a 1 cm noncalcified nodule in the right lung base.  This is new   compared to the prior exam of 01/17/2007.  Follow-up recommendations are   listed below. Assessment/Plan:    #Acute on chronic hypoxic and hypercarbic respiratory failure  #COVID 19 Pneumonia   #Severe COPD with AE  - droplet + isolation   - tested positive for COVID 19 on 1/29/22. Admitted 1/29-2/4/22 treated with decadron, zithromax (6 days) and rocephin (7 days) at that time. Dc'd home on 4-5L and returned to ER later same day and placed on BiPAP with pH 7.1 and PCO2 140. - Initially improved with BiPAP on repeat ABG, but morning of 2/5 patient reported extreme fatigue from work of breathing and requested intubation.   Patient intubated by ER MD on 2/5/22   - pulm c/s for vent management   -  Cont Solumedrol 40 mg BID   - vanc and cefepime D5 for possible superimposed bacterial PNA   - inhaled bronchodilators   - improving symptoms , off vent , now using bipap prn        #Elevated troponin   -0.09, 0.07, 0.04, 0.03, respectively  -Patient denies chest pain  -EKG with nonspecific EKG changes, cardiologist has reviewed EKGs, I reviewed with Dr. Patricia Stoddard as well   -Suspect elevation secondary to demand mismatch in acute respiratory failure.   Continue to monitor on telemetry  - ASA added on admit, continue for now     #Severe anxiety   - cont buspar     #Hyponatremia   - 677>147>734  - improved after IV NS.    - nephro c/s    #Right lung nodule   - per pulm c/s last admit: PET scan vs biopsy vs resection for RLL nodule - could be addressed as an outpatient     #Chronic pain   -  Was on PRN norco    #Hypothyroidism  - home dose of synthroid continued         #Tobacco dependence  - nicotine patch ordered    - cessation recommended    DVT Prophylaxis: Lovenox   Diet: Diet NPO Exceptions are: Sips of Water with Meds, Ice Chips  Code Status: Full Code    Pete Magaña MD, 2/9/2022 7:05 AM

## 2022-02-09 NOTE — PROGRESS NOTES
02/09/22 0319   NIV Type   $NIV $Daily Charge   Equipment Type v60   Mode Bilevel   Mask Type Full face mask   Mask Size Small   Settings/Measurements   IPAP 14 cmH20   CPAP/EPAP 7 cmH2O   Rate Ordered 14   Resp 17   FiO2  50 %   Vt Exhaled 643 mL   Minute Volume 13.7 Liters   Mask Leak (lpm) 15 lpm   Comfort Level Good   Using Accessory Muscles No   SpO2 97   Alarm Settings   Alarms On Y   Vitals   SpO2 99 %

## 2022-02-09 NOTE — PROGRESS NOTES
Comprehensive Nutrition Assessment    Type and Reason for Visit:  Reassess    Nutrition Recommendations/Plan:   1. Continue NPO status until patient is medically cleared + cleared by SLP for po diet advancement with appropriate po diet consistencies. 2. Monitor for diet advancement + appetite and po intake once diet is advanced. 3. Monitor respiratory status and plan of care. 4. Monitor nutrition-related labs, bowel function (last documented BM was on 1/31/22), and weight trends. Nutrition Assessment:  patient is declining from a nutritional standpoint AEB she was extubated on 2/8/22 and she is NPO at this time (until she is seen by SLP) and she is at risk for further compromise d/t last documented BM was on 1/31/22, increased nutrition needs r/t COVID-19 virus, and altered nutrition-related labs; will continue NPO status and monitor nutrition plan of care    Malnutrition Assessment:  Malnutrition Status: At risk for malnutrition   Context:  Acute Illness     Findings of the 6 clinical characteristics of malnutrition:  Energy Intake:  1 - 75% or less of estimated energy requirements for 7 or more days  Weight Loss:  No significant weight loss     Body Fat Loss:  Unable to assess (COVID-19 +)     Muscle Mass Loss:  Unable to assess    Fluid Accumulation:  No significant fluid accumulation     Strength:  Not Performed    Estimated Daily Nutrient Needs:  Energy (kcal):  1280 - 1472 kcals based on 20-23 kcals/kg/CBW; Weight Used for Energy Requirements:  Current     Protein (g):  96 - 109 g protein based on 1.5-1.7 g/kg/CBW;  Weight Used for Protein Requirements:  Current        Fluid (ml/day):  1280 - 1472 ml; Method Used for Fluid Requirements:  1 ml/kcal      Nutrition Related Findings:  patient was extubated on 2/8/22; she is A & O x 4; SLP evaluation needed prior to diet advancement; h/h and Cl are low; BUN is elevated; patient has lovenox, low-dose SSI, synthroid, and prednisone ordered at this time; abdomen is soft and bowel sounds are active; last documented BM was on 1/31/22      Wounds:  None       Current Nutrition Therapies:    Diet NPO Exceptions are: Sips of Water with Meds, Ice Chips    Anthropometric Measures:  · Height: 5' 4\" (162.6 cm)  · Current Body Weight: 141 lb 8.6 oz (64.2 kg) (obtained on 2/9/22; actual weight)   · Admission Body Weight: 130 lb 4.7 oz (59.1 kg) (obtained on 2/5/22; actual weight)    · Usual Body Weight: 130 lb 4.7 oz (59.1 kg) (obtained on 2/5/22; actual weight)     · Ideal Body Weight: 120 lbs; % Ideal Body Weight 117.9 %   · BMI: 24.3   · BMI Categories: Normal Weight (BMI 18.5-24. 9)       Nutrition Diagnosis:   · Inadequate oral intake related to inadequate protein-energy intake,impaired respiratory function,increase demand for energy/nutrients as evidenced by NPO or clear liquid status due to medical condition,poor intake prior to admission,lab values,constipation,other (comment) (COVID-19 +)      Nutrition Interventions:   Food and/or Nutrient Delivery:  Continue NPO  Nutrition Education/Counseling:  No recommendation at this time   Coordination of Nutrition Care:  Continue to monitor while inpatient,Speech Therapy,Swallow Evaluation,Interdisciplinary Rounds    Goals:  patient will remain in NPO status until medically cleared/cleared by SLP for po diet advancement with appropriate po diet consistencies       Nutrition Monitoring and Evaluation:   Behavioral-Environmental Outcomes:  None Identified   Food/Nutrient Intake Outcomes:  Diet Advancement/Tolerance  Physical Signs/Symptoms Outcomes:  Biochemical Data,Chewing or Swallowing,Constipation,GI Status,Hemodynamic Status,Skin,Weight     Discharge Planning:     Too soon to determine     Electronically signed by Ernestina Sevilla RD, LD on 2/9/22 at 10:32 AM EST    Contact: [phone]

## 2022-02-09 NOTE — PLAN OF CARE
Problem: Nutrition  Intervention: Aspiration precautions  Note: Evaluation completed  Mariposa Jaramillo MS CCC-SLP  Speech Language Pathologist        Problem: Nutrition  Intervention: Swallowing evaluation  Note: Evaluation completed  Mariposa Jaramillo MS CCC-SLP  Speech Language Pathologist

## 2022-02-10 ENCOUNTER — APPOINTMENT (OUTPATIENT)
Dept: CT IMAGING | Age: 62
DRG: 208 | End: 2022-02-10
Payer: MEDICARE

## 2022-02-10 LAB
ANION GAP SERPL CALCULATED.3IONS-SCNC: 8 MMOL/L (ref 3–16)
ATYPICAL LYMPHOCYTE RELATIVE PERCENT: 3 % (ref 0–6)
BANDED NEUTROPHILS RELATIVE PERCENT: 2 % (ref 0–7)
BASOPHILS ABSOLUTE: 0 K/UL (ref 0–0.2)
BASOPHILS RELATIVE PERCENT: 0 %
BUN BLDV-MCNC: 42 MG/DL (ref 7–20)
CALCIUM SERPL-MCNC: 9.2 MG/DL (ref 8.3–10.6)
CHLORIDE BLD-SCNC: 91 MMOL/L (ref 99–110)
CO2: 38 MMOL/L (ref 21–32)
CREAT SERPL-MCNC: <0.5 MG/DL (ref 0.6–1.2)
D DIMER: 235 NG/ML DDU (ref 0–229)
EKG ATRIAL RATE: 117 BPM
EKG DIAGNOSIS: NORMAL
EKG P AXIS: 85 DEGREES
EKG P-R INTERVAL: 130 MS
EKG Q-T INTERVAL: 330 MS
EKG QRS DURATION: 86 MS
EKG QTC CALCULATION (BAZETT): 460 MS
EKG R AXIS: 78 DEGREES
EKG T AXIS: 74 DEGREES
EKG VENTRICULAR RATE: 117 BPM
EOSINOPHILS ABSOLUTE: 0.3 K/UL (ref 0–0.6)
EOSINOPHILS RELATIVE PERCENT: 1 %
GFR AFRICAN AMERICAN: >60
GFR NON-AFRICAN AMERICAN: >60
GLUCOSE BLD-MCNC: 106 MG/DL (ref 70–99)
GLUCOSE BLD-MCNC: 116 MG/DL (ref 70–99)
GLUCOSE BLD-MCNC: 119 MG/DL (ref 70–99)
GLUCOSE BLD-MCNC: 130 MG/DL (ref 70–99)
GLUCOSE BLD-MCNC: 131 MG/DL (ref 70–99)
GLUCOSE BLD-MCNC: 134 MG/DL (ref 70–99)
HCT VFR BLD CALC: 32.4 % (ref 36–48)
HEMOGLOBIN: 10.6 G/DL (ref 12–16)
LYMPHOCYTES ABSOLUTE: 2.8 K/UL (ref 1–5.1)
LYMPHOCYTES RELATIVE PERCENT: 7 %
MCH RBC QN AUTO: 30.4 PG (ref 26–34)
MCHC RBC AUTO-ENTMCNC: 32.6 G/DL (ref 31–36)
MCV RBC AUTO: 93.2 FL (ref 80–100)
MONOCYTES ABSOLUTE: 2.5 K/UL (ref 0–1.3)
MONOCYTES RELATIVE PERCENT: 9 %
MYELOCYTE PERCENT: 2 %
NEUTROPHILS ABSOLUTE: 22.5 K/UL (ref 1.7–7.7)
NEUTROPHILS RELATIVE PERCENT: 76 %
PDW BLD-RTO: 13.6 % (ref 12.4–15.4)
PERFORMED ON: ABNORMAL
PLATELET # BLD: 544 K/UL (ref 135–450)
PLATELET SLIDE REVIEW: ABNORMAL
PMV BLD AUTO: 7.6 FL (ref 5–10.5)
POTASSIUM REFLEX MAGNESIUM: 3.9 MMOL/L (ref 3.5–5.1)
RBC # BLD: 3.48 M/UL (ref 4–5.2)
RBC # BLD: NORMAL 10*6/UL
SLIDE REVIEW: ABNORMAL
SODIUM BLD-SCNC: 137 MMOL/L (ref 136–145)
TROPONIN: 0.02 NG/ML
WBC # BLD: 28.1 K/UL (ref 4–11)

## 2022-02-10 PROCEDURE — 84484 ASSAY OF TROPONIN QUANT: CPT

## 2022-02-10 PROCEDURE — 85379 FIBRIN DEGRADATION QUANT: CPT

## 2022-02-10 PROCEDURE — 6370000000 HC RX 637 (ALT 250 FOR IP): Performed by: INTERNAL MEDICINE

## 2022-02-10 PROCEDURE — 2060000000 HC ICU INTERMEDIATE R&B

## 2022-02-10 PROCEDURE — 94761 N-INVAS EAR/PLS OXIMETRY MLT: CPT

## 2022-02-10 PROCEDURE — 94660 CPAP INITIATION&MGMT: CPT

## 2022-02-10 PROCEDURE — 97162 PT EVAL MOD COMPLEX 30 MIN: CPT

## 2022-02-10 PROCEDURE — 99233 SBSQ HOSP IP/OBS HIGH 50: CPT | Performed by: INTERNAL MEDICINE

## 2022-02-10 PROCEDURE — 6360000002 HC RX W HCPCS: Performed by: INTERNAL MEDICINE

## 2022-02-10 PROCEDURE — 97110 THERAPEUTIC EXERCISES: CPT

## 2022-02-10 PROCEDURE — 71260 CT THORAX DX C+: CPT

## 2022-02-10 PROCEDURE — 97530 THERAPEUTIC ACTIVITIES: CPT

## 2022-02-10 PROCEDURE — 94640 AIRWAY INHALATION TREATMENT: CPT

## 2022-02-10 PROCEDURE — 85025 COMPLETE CBC W/AUTO DIFF WBC: CPT

## 2022-02-10 PROCEDURE — 80048 BASIC METABOLIC PNL TOTAL CA: CPT

## 2022-02-10 PROCEDURE — 2580000003 HC RX 258: Performed by: INTERNAL MEDICINE

## 2022-02-10 PROCEDURE — 97166 OT EVAL MOD COMPLEX 45 MIN: CPT

## 2022-02-10 PROCEDURE — 2700000000 HC OXYGEN THERAPY PER DAY

## 2022-02-10 PROCEDURE — 6360000004 HC RX CONTRAST MEDICATION: Performed by: INTERNAL MEDICINE

## 2022-02-10 PROCEDURE — 36415 COLL VENOUS BLD VENIPUNCTURE: CPT

## 2022-02-10 RX ADMIN — IOPAMIDOL 85 ML: 755 INJECTION, SOLUTION INTRAVENOUS at 08:57

## 2022-02-10 RX ADMIN — IPRATROPIUM BROMIDE AND ALBUTEROL 2 PUFF: 20; 100 SPRAY, METERED RESPIRATORY (INHALATION) at 18:41

## 2022-02-10 RX ADMIN — Medication 10 ML: at 22:20

## 2022-02-10 RX ADMIN — ENOXAPARIN SODIUM 30 MG: 100 INJECTION SUBCUTANEOUS at 22:19

## 2022-02-10 RX ADMIN — Medication 10 ML: at 11:52

## 2022-02-10 RX ADMIN — IPRATROPIUM BROMIDE AND ALBUTEROL 2 PUFF: 20; 100 SPRAY, METERED RESPIRATORY (INHALATION) at 14:26

## 2022-02-10 RX ADMIN — METOPROLOL TARTRATE 12.5 MG: 25 TABLET, FILM COATED ORAL at 22:19

## 2022-02-10 RX ADMIN — CEFEPIME 2000 MG: 2 INJECTION, POWDER, FOR SOLUTION INTRAMUSCULAR; INTRAVENOUS at 22:26

## 2022-02-10 RX ADMIN — BUSPIRONE HYDROCHLORIDE 10 MG: 10 TABLET ORAL at 22:19

## 2022-02-10 RX ADMIN — IPRATROPIUM BROMIDE AND ALBUTEROL 2 PUFF: 20; 100 SPRAY, METERED RESPIRATORY (INHALATION) at 11:12

## 2022-02-10 RX ADMIN — PREDNISONE 40 MG: 20 TABLET ORAL at 11:50

## 2022-02-10 RX ADMIN — ASPIRIN 81 MG: 81 TABLET, COATED ORAL at 11:51

## 2022-02-10 RX ADMIN — LEVOTHYROXINE SODIUM 125 MCG: 0.03 TABLET ORAL at 11:50

## 2022-02-10 RX ADMIN — BUSPIRONE HYDROCHLORIDE 10 MG: 10 TABLET ORAL at 11:51

## 2022-02-10 RX ADMIN — HYDROCODONE BITARTRATE AND ACETAMINOPHEN 1 TABLET: 5; 325 TABLET ORAL at 22:24

## 2022-02-10 RX ADMIN — ENOXAPARIN SODIUM 30 MG: 100 INJECTION SUBCUTANEOUS at 11:52

## 2022-02-10 RX ADMIN — IPRATROPIUM BROMIDE AND ALBUTEROL 2 PUFF: 20; 100 SPRAY, METERED RESPIRATORY (INHALATION) at 07:32

## 2022-02-10 RX ADMIN — CEFEPIME 2000 MG: 2 INJECTION, POWDER, FOR SOLUTION INTRAMUSCULAR; INTRAVENOUS at 12:04

## 2022-02-10 RX ADMIN — HYDROCODONE BITARTRATE AND ACETAMINOPHEN 1 TABLET: 5; 325 TABLET ORAL at 11:52

## 2022-02-10 RX ADMIN — METOPROLOL TARTRATE 12.5 MG: 25 TABLET, FILM COATED ORAL at 11:51

## 2022-02-10 RX ADMIN — MONTELUKAST SODIUM 10 MG: 10 TABLET, COATED ORAL at 22:20

## 2022-02-10 ASSESSMENT — PAIN SCALES - WONG BAKER: WONGBAKER_NUMERICALRESPONSE: 0

## 2022-02-10 ASSESSMENT — PAIN SCALES - GENERAL
PAINLEVEL_OUTOF10: 8
PAINLEVEL_OUTOF10: 8

## 2022-02-10 ASSESSMENT — PAIN DESCRIPTION - LOCATION: LOCATION: GENERALIZED

## 2022-02-10 ASSESSMENT — PAIN DESCRIPTION - PAIN TYPE: TYPE: ACUTE PAIN

## 2022-02-10 NOTE — PROGRESS NOTES
02/10/22 0310   NIV Type   $NIV $Daily Charge   NIV Started/Stopped On   Equipment Type V60   Mode Bilevel   Mask Type Full face mask   Mask Size Small   Settings/Measurements   IPAP 14 cmH20   CPAP/EPAP 7 cmH2O   Rate Ordered 14   Resp 18   Insp Rise Time (%) 2 %   FiO2  50 %   I Time/ I Time % 1 s   Vt Exhaled 592 mL   Minute Volume 10.5 Liters   Mask Leak (lpm) 34 lpm   Comfort Level Good   Using Accessory Muscles No   SpO2 96   Vitals   SpO2 96 %   Oxygen Therapy   O2 Device PAP (positive airway pressure)   Oxygen Therapy/Pulse Ox   O2 Therapy Oxygen   $Oxygen $Daily Charge   $Pulse Oximeter $Spot check (multiple/continuous)

## 2022-02-10 NOTE — PROGRESS NOTES
Pt HR in 190's at this time.  [facility] CHRISTINE-ER at bedside. 12 lead EKG obtained.  Will continue to monitor

## 2022-02-10 NOTE — PROGRESS NOTES
Speech Language Pathology  SLP Attempt Note        Name: Han Kimbrough  : 1960  Medical Diagnosis: Hyponatremia [E87.1]  Elevated troponin [R77.8]  CO2 narcosis [R06.89]  Acute respiratory failure with hypoxia and hypercapnia ([address] [J96.01, J96.02]  Acute on chronic respiratory failure with hypoxia and hypercapnia (HCC) [J96.21, J96.22]  COVID-19 virus infection [U07.1]    SLP attempted to see pt. Pt was on Bi-PAP. Will attempt at later time pending SLP schedule and pt conditions. No charges filed.  Thank you,       Gutierrez Valdovinos M.A., [address] [facility]  Speech-Language Pathologist  Phone: [phone]

## 2022-02-10 NOTE — PROGRESS NOTES
Progress Note    Admit Date:  2/4/2022    61F admitted to [facility] 1/29-2/4/22 for acute on chronic hypoxic respiratory failure, COVID-19 pneumonia, and exacerbation of severe COPD discharged home on 4 L nasal cannula oxygen on 2/4/2022. Return to the ER later on 2/4/2022 in respiratory distress, immediately placed on BiPAP. Following morning patient with worsening breathing, increased fatigue, requesting to be intubated. Patient intubated by ER provider. Admitted to ICU    Extubated to bipap 2/8     Subjective:  Ms. Mora Jalloh  Seen up in bed, used bipap overnight and now on 6 L o2  Feels some better  Back mimi nimproved     Objective:   Patient Vitals for the past 4 hrs:   BP Temp Temp src Pulse Resp SpO2   02/10/22 1151 (!) 151/85 -- -- 110 -- --   02/10/22 1130 (!) 151/79 97.6 °F (36.4 °C) Tympanic 107 28 90 %   02/10/22 1113 -- -- -- -- (!) 31 90 %            Intake/Output Summary (Last 24 hours) at 2/10/2022 1357  Last data filed at 2/10/2022 5362  Gross per 24 hour   Intake --   Output 1000 ml   Net -1000 ml       Physical Exam:  Patient seen in droplet plus precautions  Gen:  Middle aged female seen up in bed, fully awake, alert   Mildly dyspneic   Eyes: PERRL. No sclera icterus. No conjunctival injection. .   Neck: No JVD. Trachea midline. Resp:  Improved bilateral  breath sounds . resolved wheeze , tachypneic  CV:  Regular rate and  rhythm. No murmur. No rub. No edema. Capillary Refill: Brisk,< 3 seconds   Peripheral Pulses: +2 palpable, equal bilaterally   GI: Non-tender. Non-distended. Normal bowel sounds. Skin: Warm and dry. No nodule on exposed extremities. No rash on exposed extremities. M/S: No cyanosis. No joint deformity. No clubbing.    Neuro: non focal      Scheduled Meds:   metoprolol tartrate  12.5 mg Oral BID    predniSONE  40 mg Oral Daily    albuterol-ipratropium  2 puff Inhalation Q4H While awake    insulin lispro  0-6 Units SubCUTAneous Q4H    aspirin  81 mg Oral Daily    busPIRone 10 mg Oral TID    levothyroxine  125 mcg Oral QAM AC    montelukast  10 mg Oral Nightly    lidocaine  1 patch TransDERmal Daily    nicotine  1 patch TransDERmal Daily    enoxaparin  30 mg SubCUTAneous BID    sodium chloride flush  5-40 mL IntraVENous 2 times per day    cefepime  2,000 mg IntraVENous Q12H       Continuous Infusions:   sodium chloride      dextrose         PRN Meds:  albuterol-ipratropium, guaiFENesin-dextromethorphan, HYDROcodone-acetaminophen, polyethylene glycol, acetaminophen **OR** acetaminophen, sodium chloride flush, sodium chloride, glucose, glucagon (rDNA), dextrose, dextrose bolus (hypoglycemia) **OR** dextrose bolus (hypoglycemia)      Data:  CBC:   Recent Labs     02/08/22  0412 02/09/22  0410 02/10/22  0509   WBC 17.8* 25.2* 28.1*   HGB 9.4* 9.9* 10.6*   HCT 28.5* 30.1* 32.4*   MCV 92.8 92.7 93.2    467* 544*     BMP:   Recent Labs     02/08/22  0412 02/09/22  0410 02/10/22  0509   * 136 137   K 4.9 5.0 3.9   CL 93* 94* 91*   CO2 35* 38* 38*   BUN 35* 31* 42*   CREATININE <0.5* <0.5* <0.5*       Blood: NGTD  Sputum: aspergillus     Urine: ordered      RADIOLOGY  CT CHEST PULMONARY EMBOLISM W CONTRAST   Final Result   Motion limited. No central pulmonary embolus identified. There is moderate   coronary artery calcification      Increased filling defects seen in the lower lobe airways bilaterally with   increased basilar consolidation, either atelectasis or pneumonia      Underlying emphysema. Dominant pulmonary nodule right lower lobe is not   significantly changed. Consider short-term follow-up chest CT versus PET-CT   is described previously      RECOMMENDATIONS:   Unavailable         XR CHEST PORTABLE   Final Result   No significant change         XR CHEST PORTABLE   Final Result   1. Endotracheal tube remains in appropriate position. 2. No new findings identified. XR CHEST PORTABLE   Final Result   Stable examination with findings of COPD. XR CHEST PORTABLE   Final Result   No acute process. Stable exam with stable lines and tubes. Stable findings of COPD. XR CHEST 1 VIEW   Final Result   Endotracheal tube satisfactory position above the alley      OG side-port in gastric fundus, tip not visualized      Otherwise, no acute abnormalities seen in the chest         XR CHEST PORTABLE   Final Result   Probable scarring in the left midlung      Otherwise, no acute cardiopulmonary process         XR CHEST PORTABLE   Final Result   COPD with resolving central pulmonary congestion. Slowly resolving bibasilar opacities. Tiny right pleural effusion which is less prominent. CT chest PE 1/29/22  Impression   No evidence of pulmonary embolism.       Emphysema.       There is a 1 cm noncalcified nodule in the right lung base.  This is new   compared to the prior exam of 01/17/2007.  Follow-up recommendations are   listed below. Assessment/Plan:    #Acute on chronic hypoxic and hypercarbic respiratory failure  #COVID 19 Pneumonia   #Severe COPD with AE  - droplet + isolation   - tested positive for COVID 19 on 1/29/22. Admitted 1/29-2/4/22 treated with decadron, zithromax (6 days) and rocephin (7 days) at that time. Dc'd home on 4-5L and returned to ER later same day and placed on BiPAP with pH 7.1 and PCO2 140. - Initially improved with BiPAP on repeat ABG, but morning of 2/5 patient reported extreme fatigue from work of breathing and requested intubation.   Patient intubated by ER MD on 2/5/22   - pulm c/s for vent management   - treated with Solumedrol 40 mg BID   - vanc and cefepime D6 for possible superimposed bacterial PNA   - inhaled bronchodilators   - improving symptoms , off vent , now using bipap prn   - repeat CTA neg for PE     #Elevated troponin   -0.09, 0.07, 0.04, 0.03, respectively  -Patient denies chest pain  -EKG with nonspecific EKG changes, cardiologist has reviewed EKGs, I reviewed with  Elif Garcia as well   -Suspect elevation secondary to demand mismatch in acute respiratory failure.   Continue to monitor on telemetry  - ASA added on admit, continue for now     #Severe anxiety   - cont buspar     #Hyponatremia   - [id number]>679  - improved after IV NS.    - nephro c/s    #Right lung nodule   - per pulm c/s last admit: PET scan vs biopsy vs resection for RLL nodule - could be addressed as an outpatient     #Chronic back pain   -  Was on PRN norco- lidocaine patch    #Hypothyroidism  - home dose of synthroid continued         #Tobacco dependence  - nicotine patch ordered    - cessation recommended    DVT Prophylaxis: Lovenox   Diet: Diet NPO Exceptions are: Sips of Water with Meds, Ice Chips  Code Status: Full Code     Start PT    Carmina Arteaga MD, 2/10/2022 1:57 PM

## 2022-02-10 NOTE — PROGRESS NOTES
Inpatient Physical Therapy Evaluation and Treatment    Unit: [facility] (bed on 3E overflow)  Date:  2/10/2022  Patient Name:    Padma Guaman  Admitting diagnosis:  Hyponatremia [E87.1]  Elevated troponin [R77.8]  CO2 narcosis [R06.89]  Acute respiratory failure with hypoxia and hypercapnia ([facility] [address] [J96.01, J96.02]  Acute on chronic respiratory failure with hypoxia and hypercapnia (HCC) [N16.82, J96.22]  COVID-19 virus infection [U07.1]  Admit Date:  2/4/2022  Precautions/Restrictions/WB Status/ Lines/ Wounds/ Oxygen: Fall risk, Bed/chair alarm, Lines -IV, Supplemental O2 (5L) and Purewick catheter, Telemetry, Continuous pulse oximetry and Isolation Precautions: Droplet Plus - COVID. Cleared for bed-level activity (rolling) only today due to desaturation. Treatment Time:  13:30-14:05  Treatment Number:  1   Timed Code Treatment Minutes: 25 minutes  Total Treatment Minutes:  35  minutes    Patient Goals for Therapy: to be able to walk with a walker for distance needed to get between her home bedroom and bathroom. Wants to d/c to home. Discharge Recommendations: SNF   DME needs for discharge: defer to facility (has rollator if she discharges home)       Therapy recommendation for EMS Transport: requires transport by cot due to need for lift equipment to transfer. Therapy recommendations for staff:   Assist of 1-2 for bed mobility. Assist of 2 for sup>sit when cleared by nsg. History of Present Illness: Per Dr. Samy Mahajan progress note 2/09: \"61F admitted to [facility] 1/29-2/4/22 for acute on chronic hypoxic respiratory failure, COVID-19 pneumonia, and exacerbation of severe COPD discharged home on 4 L nasal cannula oxygen on 2/4/2022. Return to the ER later on 2/4/2022 in respiratory distress, immediately placed on BiPAP. Following morning patient with worsening breathing, increased fatigue, requesting to be intubated. Patient intubated by ER provider.  Admitted to ICU\"  Intubated 2/05   Extubated 2/08    Home Health S4 Level Recommendation:  NA  AM-PAC Mobility Score       AM-PAC Inpatient Mobility without Stair Climbing Raw Score : 6    Preadmission Environment    Pt. Carlota Mcginnis spouse (Asif, works part time) and daughter (works). 24/hr assist available   Home environment:            mobile home/trailer   Steps to enter first floor: 3 steps to enter and bilateral hand rails  Bathroom: Tub/Shower unit, Walk-in Shower, Grab bars, Shower Chair  and Standard height toilet (able to push up from sink if needed)  Equipment owned: Rollator , Shower Chair, pulse ox, reacher, inhaler, nebulizer and home O2 (2-3L)   Sleeps in flat bed with wedge      Preadmission Status:  Pt. Able to drive: Yes  Pt Fully independent with ADLs: Yes  Pt. Required assistance from family for: Cleaning, Cooking and [address]             Pt does most of the cooking but occasionally needs help from               does most of cleaning and they split laundry  Pt. Fully independent for transfers and gait and walked with no deivce for short distances in home with frequent rest breaks. Uses Rollator in community but has not been out of the house much this year.   History of falls          No    Pain   Yes  Location: stomach  Rating: mild /10  Pain Medicine Status: No request made    Cognition    A&O Person, Place and Situation. Able to follow 2 step commands    Subjective  Patient lying supine in bed with no family present. Pt agreeable to this PT eval & tx. Upper Extremity ROM/Strength  Please see OT evaluation. Lower Extremity ROM / Strength   AROM WFL: No - hip/knee AROM limited by weakness. Ankle AROM WFL. PROM WFL. BLE strength impaired, but not formally assessed with MMT. Assessed in supine.   R LE   Quad   2-   Ant Tib  3+   Hamstring 4- 2-   Iliopsoas 2-  L LE  Quad   2-   Ant Tib  3+   Hamstring 2-   Iliopsoas 2-    Lower Extremity Sensation    [facility]    Lower Extremity Proprioception:   NT    Coordination and Tone  NT    Balance  Sitting:  Not tested; N/A  Comments: NA    Standing: Not tested; N/A  Comments: NA    Bed Mobility   Supine to Sit:    Not Tested  Sit to Supine:   Not Tested  Rolling:   Not Tested  Scooting in sitting: Not Tested  Scooting in supine:  Not Tested    Transfer Training     Sit to stand:   Not Tested  Stand to sit:   Not Tested  Bed to Chair:   Not Tested with use of N/A    Gait gait deferred due to medical status; pt ambulated 0 ft. Stair Training deferred, pt unsafe/ not appropriate to complete stairs at this time    Activity Tolerance   Pt completed therapy session with No adverse symptoms noted w/activity. Vitals stable throughout, with exception of 1 run of tachycardia up to 160 bpm for ~1 minute. Positioning Needs   Pt in bed, alarm set, positioned in proper neutral alignment and pressure relief provided. Call light provided and all needs within reach    Exercises Initiated  all completed bilaterally unless indicated and completed in supine position  Ankle Pumps x 10 reps   Hip ABD LLE x 5 reps (heavy AAROM; trace contraction only). Pt requests to stop. \"please don't make me do this\"  Encouraged regular UE/LE ROM while seated in chair; pt acknowledged understanding. Other  None. Patient/Family Education   Pt educated on role of inpatient PT, POC, importance of continued activity, DC recommendations, HEP and calling for assist with mobility. Assessment  Pt seen for Physical Therapy evaluation in acute care setting. Pt demonstrated decreased Activity tolerance, Balance, ROM, Safety and Strength as well as decreased independence with Ambulation, Bed Mobility  and Transfers. Pt tolerates very little AROM/AAROM in supine today. Severely fatigued. This evaluation limited as pt was not agreeable even to supine rolling. Expect that patient will need SNF although she is adamant about going home at this time.      Recommending SNF upon discharge as patient functioning well below baseline, demonstrates good rehab potential and unable to return home due to inability to negotiate stairs to enter home/bedroom/bathroom, burden of care beyond caregiver ability, home environment not conducive to patient recovery and limited safety awareness. Goals : To be met in 3 visits:  1). Independent with LE Ex x 10 reps    To be met in 6 visits:  1). Supine to/from sit: Min A   2). Sit to/from stand: Min A   3). Bed to chair: Mod A   4). Gait: Ambulate 20 ft.   with  Min A  and use of LRAD (least restrictive assistive device)  5). Tolerate B LE exercises 3 sets of 10-15 reps    Rehabilitation Potential: Good  Strengths for achieving goals include:   Pt motivated, PLOF, Family Support and Pt cooperative   Barriers to achieving goals include:    Weakness    Plan    To be seen 3-5 x / week  while in acute care setting for therapeutic exercises, bed mobility, transfers, progressive gait training, balance training, and family/patient education. Signature: Tacos aBer PT, DPT    If patient discharges from this facility prior to next visit, this note will serve as the Discharge Summary.

## 2022-02-10 NOTE — PROGRESS NOTES
RT Inhaler-Nebulizer Bronchodilator Protocol Note    There is a bronchodilator order in the chart from a provider indicating to follow the RT Bronchodilator Protocol and there is an Initiate RT Inhaler-Nebulizer Bronchodilator Protocol order as well (see protocol at bottom of note). CXR Findings:  No results found. The findings from the last RT Protocol Assessment were as follows:   History Pulmonary Disease: (P) Chronic pulmonary disease  Respiratory Pattern: (P) Use of accessory muscles, prolonged exhalation, or RR 26-30 bpm  Breath Sounds: (P) Slightly diminished and/or crackles  Cough: (P) Weak, non-productive  Indication for Bronchodilator Therapy: (P) Decreased or absent breath sounds  Bronchodilator Assessment Score: (P) 13    Aerosolized bronchodilator medication orders have been revised according to the RT Inhaler-Nebulizer Bronchodilator Protocol below. Respiratory Therapist to perform RT Therapy Protocol Assessment initially then follow the protocol. Repeat RT Therapy Protocol Assessment PRN for score 0-3 or on second treatment, BID, and PRN for scores above 3. No Indications - adjust the frequency to every 6 hours PRN wheezing or bronchospasm, if no treatments needed after 48 hours then discontinue using Per Protocol order mode. If indication present, adjust the RT bronchodilator orders based on the Bronchodilator Assessment Score as indicated below. Use Inhaler orders unless patient has one or more of the following: on home nebulizer, not able to hold breath for 10 seconds, is not alert and oriented, cannot activate and use MDI correctly, or respiratory rate 25 breaths per minute or more, then use the equivalent nebulizer order(s) with same Frequency and PRN reasons based on the score. If a patient is on this medication at home then do not decrease Frequency below that used at home.     0-3 - enter or revise RT bronchodilator order(s) to equivalent RT Bronchodilator order with Frequency of every 4 hours PRN for wheezing or increased work of breathing using Per Protocol order mode. 4-6 - enter or revise RT Bronchodilator order(s) to two equivalent RT bronchodilator orders with one order with BID Frequency and one order with Frequency of every 4 hours PRN wheezing or increased work of breathing using Per Protocol order mode. 7-10 - enter or revise RT Bronchodilator order(s) to two equivalent RT bronchodilator orders with one order with TID Frequency and one order with Frequency of every 4 hours PRN wheezing or increased work of breathing using Per Protocol order mode. 11-13 - enter or revise RT Bronchodilator order(s) to one equivalent RT bronchodilator order with QID Frequency and an Albuterol order with Frequency of every 4 hours PRN wheezing or increased work of breathing using Per Protocol order mode. Greater than 13 - enter or revise RT Bronchodilator order(s) to one equivalent RT bronchodilator order with every 4 hours Frequency and an Albuterol order with Frequency of every 2 hours PRN wheezing or increased work of breathing using Per Protocol order mode.          Electronically signed by Unique Jackson RCP on 2/10/2022 at 3:40 PM

## 2022-02-10 NOTE — PROGRESS NOTES
Inpatient Occupational Therapy  Evaluation and Treatment    Unit: 3 [facility] over flow   Date:  2/10/2022  Patient Name:    Nubia Peters  Admitting diagnosis:  Hyponatremia [E87.1]  Elevated troponin [R77.8]  CO2 narcosis [R06.89]  Acute respiratory failure with hypoxia and hypercapnia ([facility] [address] [J96.01, J96.02]  Acute on chronic respiratory failure with hypoxia and hypercapnia (HCC) [Z81.83, J96.22]  COVID-19 virus infection [U07.1]  Admit Date:  2/4/2022  Precautions/Restrictions/WB Status/ Lines/ Wounds/ Oxygen:   Fall risk, Bed/chair alarm, Lines -IV, Supplemental O2 (5L) and Purewick catheter, Telemetry, Continuous pulse oximetry and Isolation Precautions: Droplet Plus - COVID. Cleared for bed-level activity (rolling) only today due to desaturation. Treatment Time: 1303-1809   Treatment Number: 1   Timed code treatment minutes 25 minutes   Total Treatment minutes:   35   minutes    Patient Goals for Therapy:  \" go home  \"      Discharge Recommendations: SNF  DME needs for discharge: defer to facility       Therapy recommendations for staff:   Assist of 1  To 2 for bed mobility     History of Present Illness: per H&P 1-29-22    59 y.o. female for shortness of breath. Onset was 1 week. Context includes patient reports that she has had increasing shortness of breath particularly with exertion over the last week. Patient reports that she has also had 2 increase her oxygen requirement at home. She states that she is typically on 3 L but was up to 4 at home. Patient reports that with her increased shortness of breath she activated EMS and she was told that her saturations were in the 80s when EMS arrived. She was provided with a breathing treatment in route and had her oxygen at 6 L in the ambulance and states that she did feel better. Patient denies chest pain. Alleviating factors include nothing. Aggravating factors include nothing. Pain is 0/10. Nothing has been used for pain today.     Pt was evaluated by OT on 2-2-22   Pt D/C from this hospital to home on 2-4-22 and pt readmitted later the same day and  Per EMS she was on 5 L and at 80%. Currently on 10 L nonrebreather and given DuoNeb by respiratory on arrival. She is awake but not responding. Suspect CO2 retention and respiratory here starting her on bipap. The history is provided by the EMS personnel  patient subsequently intubated by ER MD 2-5-22 extubated on 2-7-22 to [facility] S4 Level Recommendation:  NA  AM-PAC Score: 11  Preadmission Environment    Pt. Narcisa Cancino spouse (Asif, works part time) and daughter (works). 24/hr assist available   Home environment:            mobile home/trailer   Steps to enter first floor: 3 steps to enter and bilateral hand rails  Bathroom: Tub/Shower unit, Walk-in Shower, Grab bars, Shower Chair  and Standard height toilet (able to push up from sink if needed)  Equipment owned: Rollator , Shower Chair, pulse ox, reacher, inhaler, nebulizer and home O2 (2-3L)   Sleeps in flat bed with wedge      Preadmission Status:  Pt. Able to drive: Yes  Pt Fully independent with ADLs: Yes  Pt. Required assistance from family for: Cleaning, Cooking and [address]             Pt does most of the cooking but occasionally needs help from               does most of cleaning and they split laundry  Pt. Fully independent for transfers and gait and walked with no deivce for short distances in home with frequent rest breaks. Uses Rollator in community but has not been out of the house much this year.   History of falls          No    Pain  No      Cognition    A&O Person, Place and Situation Pt unaware of specific date. Able to follow 2 step commands    Subjective  Patient lying supine in bed with no family present. Pt agreeable to this OT eval & tx.      Upper Extremity ROM:    WFL through observation     Upper Extremity Strength:    Not tested at this time  Upper Extremity Sensation    [place] Proprioception:  NT    Coordination and Tone  WFL    Balance  Functional Sitting Balance:  NT  Functional Standing Balance:NT    Bed mobility:    Supine to sit:   Not Tested  Sit to supine:   Not Tested  Rolling:    Not Tested  Scooting in sitting:  Not Tested  Scooting to head of bed:   Not Tested    Bridging:   Not Tested    Transfers:    Sit to stand:  Not Tested  Stand to sit:  Not Tested  Bed to chair:   Not Tested  Standard toilet: Not Tested  Bed to [facility]:  Not Tested    Dressing:      UE:   Not Tested  LE:    Not Tested    Bathing:    UE:  Supervision to bathe face  LE:  Not Tested    Eating:   Pt able to get an ice cube out of the cup and eat it with Supervision     Toileting:  Not Tested    Activity Tolerance   Pt completed therapy session with SOB, anxiety about moving     Positioning Needs:   Pt in bed, no alarm needed, positioned in proper neutral alignment and pressure relief provided. Exercise / Activities Initiated:   Hand flex/ext:  x5  Scapular retraction:  x5  Scapular protraction:  x5  Shoulder shrugs:  x5  Elbow flex/ ext x3     Patient/Family Education:   Role of OT  Energy conservation techniques    Assessment of Deficits: Pt seen for Occupational therapy evaluation in acute care setting. Pt demonstrated decreased Activity tolerance, ADLs, Balance , Bed mobility and Transfers. Pt functioning below baseline and will likely benefit from skilled occupational therapy services to maximize safety and independence. Goal(s) : To be met in 3 Visits:  1). Bed to toilet/BSC: Mod A     To be met in 5 Visits:  1). Supine to/from Sit:  Min A   2). Upper Body Bathing:   Min A   3). Lower Body Bathing:   Min A  and Mod A   4). Upper Body Dressing:  Min A   5). Lower Body Dressing: Mod A  and Max A   6). Pt to demonstrate UE exs x 15 reps with minimal cues    Rehabilitation Potential:  Fair for goals listed above.     Strengths for achieving goals include: PLOF  Barriers to achieving goals include: Complexity of condition     Plan: To be seen 3-5 x/wk while in acute care setting for therapeutic exercises, bed mobility, transfers, dressing, bathing, family/patient education, ADL/IADL retraining, energy conservation training.      Myra London OTR/L [id number]          If patient discharges from this facility prior to next visit, this note will serve as the Discharge Summary

## 2022-02-10 NOTE — PROGRESS NOTES
Pulmonary Progress Note    CC: COVID-19 pneumonia and COPD    Events of Last 24 hours:   Episode of tachycardia overnight and the nocturnist ordered a CTPA    Invasive Lines: Peripheral    MV: 2/5-2/8/22    Vitals:  Blood pressure (!) 151/85, pulse 110, temperature 97.6 °F (36.4 °C), temperature source Tympanic, resp. rate 28, height 5' 4\" (1.626 m), weight 141 lb 8.6 oz (64.2 kg), SpO2 90 %, not currently breastfeeding. on 4 L  Constitutional:  No acute distress. Eyes: PERRL. Conjunctivae anicteric. ENT: Normal nose. Normal tongue. Neck:  Trachea is midline. No thyroid tenderness. Respiratory: + accessory muscle usage. decreased breath sounds. No wheezes. No rales. + Rhonchi. Cardiovascular: Normal S1S2. No digit clubbing. No digit cyanosis. No LE edema. Psychiatric: No anxiety or Agitation. Alert and Oriented to person, place and time.     Scheduled Meds:   metoprolol tartrate  12.5 mg Oral BID    predniSONE  40 mg Oral Daily    albuterol-ipratropium  2 puff Inhalation Q4H While awake    insulin lispro  0-6 Units SubCUTAneous Q4H    aspirin  81 mg Oral Daily    busPIRone  10 mg Oral TID    levothyroxine  125 mcg Oral QAM AC    montelukast  10 mg Oral Nightly    lidocaine  1 patch TransDERmal Daily    nicotine  1 patch TransDERmal Daily    enoxaparin  30 mg SubCUTAneous BID    sodium chloride flush  5-40 mL IntraVENous 2 times per day    cefepime  2,000 mg IntraVENous Q12H     PRN Meds:  albuterol-ipratropium, guaiFENesin-dextromethorphan, HYDROcodone-acetaminophen, polyethylene glycol, acetaminophen **OR** acetaminophen, sodium chloride flush, sodium chloride, glucose, glucagon (rDNA), dextrose, dextrose bolus (hypoglycemia) **OR** dextrose bolus (hypoglycemia)    Results:  CBC:   Recent Labs     02/08/22  0412 02/09/22  0410 02/10/22  0509   WBC 17.8* 25.2* 28.1*   HGB 9.4* 9.9* 10.6*   HCT 28.5* 30.1* 32.4*   MCV 92.8 92.7 93.2    467* 544*     BMP:   Recent Labs 02/08/22  0412 02/09/22  0410 02/10/22  0509   * 136 137   K 4.9 5.0 3.9   CL 93* 94* 91*   CO2 35* 38* 38*   BUN 35* 31* 42*   CREATININE <0.5* <0.5* <0.5*     LIVER PROFILE:   No results for input(s): AST, ALT, LIPASE, BILIDIR, BILITOT, ALKPHOS in the last 72 hours. Invalid input(s): AMYLASE,  ALB  Cultures:   1/29/2022 Strep pneumo & Legionella not detected  1/29/2022 BC NG  1/29/2022 COVID-19 detected  2/4/2022 BC NGTD  2/4/2022 urine NG  2/5/2022 tracheal aspirate: light growth aspergillus - likely colonizing as no sign of invasive disease on imaging    Procalcitonin 0.16  proBNP 10,600    Films:   CT Chest 1/29/2022    Mediastinum: No evidence of mediastinal lymphadenopathy.  The heart and   pericardium demonstrate no acute abnormality.  There is no acute abnormality   of the thoracic aorta.       Lungs/pleura: The lungs are without acute process. Cloyde Angel is a a 1 cm noncalcified nodule in the right lung base.  No evidence of pleural effusion or pneumothorax. Impression   No evidence of pulmonary embolism. Emphysema. There is a 1 cm noncalcified nodule in the right lung base.  This is new   compared to the prior exam of 01/17/2007.  Follow-up recommendations are   listed below. CTPA 2/10/22: no PE. Basilar atelectasis vs pneumonia and basilar mucus. Emphysema. RLL nodule the same and noted to be 9mm.     ASSESSMENT:  · Acute on chronic hypoxemic & hypercapnic respiratory failure; baseline 3-4 L O2   · COVID-19 pneumonia in an unvaccinated patient   · Severe COPD, with acute exacerbation - she was clearly improved when I saw her on 2/3/22 & 2/4/22; she was breathing comfortably on her home oxygen and her only complaint was chronic pain, she was asking to go home then markedly worsened shortly after leaving hospital, unclear precipitant  · Hyponatremia, slowly correcting, nephrology following  · RLL 9mm pulmonary nodule on CT 1/29/22 - concerning for bronchogenic carcinoma  · Anxiety, severe  · Chronic pain  · Tobacco abuse     PLAN:  Droplet Plus Airborne Precautions   Bipap QHS for now  Supplemental oxygen to maintain SaO2 >92%; wean as tolerated   Stopped Vancomycin and continue Cefepime D#6/7.  Prior admission completed 6 days Azithromycin, 7 days Ceftriaxone   Inhaled bronchodilators   Prednisone taper  Eventual PET scan vs biopsy vs resection for RLL nodule - could be addressed as an outpatient (now followed by Dr. Calvin Pinedo)

## 2022-02-10 NOTE — PROGRESS NOTES
pts WOB slightly increased at this time, but pt is refusing BIPAP. Attempted to NT suction pt, but was unsuccessful. Pt coached on deepening her cough and did well. Continuing to monitor pt.

## 2022-02-10 NOTE — PROGRESS NOTES
Shift assessment complete- see flow sheet. Patient is A/Ox4. VSS. Morning medications given without difficulty. Currently on 5 L HF NC, SpO2 91%. Lung sounds diminished with crackles. tachyonic with Complaints of shortness of breath, however, she says it is better than it was. .   Generalized pain 8/10. Prn pain meds given. Patient denies any further needs. Call light explained and in reach. Bed alarm on. Will continue to monitor.

## 2022-02-11 LAB
ANION GAP SERPL CALCULATED.3IONS-SCNC: 4 MMOL/L (ref 3–16)
BASOPHILS ABSOLUTE: 0 K/UL (ref 0–0.2)
BASOPHILS RELATIVE PERCENT: 0 %
BUN BLDV-MCNC: 44 MG/DL (ref 7–20)
CALCIUM SERPL-MCNC: 8.9 MG/DL (ref 8.3–10.6)
CHLORIDE BLD-SCNC: 98 MMOL/L (ref 99–110)
CO2: 37 MMOL/L (ref 21–32)
CREAT SERPL-MCNC: <0.5 MG/DL (ref 0.6–1.2)
EOSINOPHILS ABSOLUTE: 0 K/UL (ref 0–0.6)
EOSINOPHILS RELATIVE PERCENT: 0 %
GFR AFRICAN AMERICAN: >60
GFR NON-AFRICAN AMERICAN: >60
GLUCOSE BLD-MCNC: 109 MG/DL (ref 70–99)
GLUCOSE BLD-MCNC: 111 MG/DL (ref 70–99)
GLUCOSE BLD-MCNC: 125 MG/DL (ref 70–99)
GLUCOSE BLD-MCNC: 87 MG/DL (ref 70–99)
HCT VFR BLD CALC: 27.9 % (ref 36–48)
HEMOGLOBIN: 9.1 G/DL (ref 12–16)
LYMPHOCYTES ABSOLUTE: 3.2 K/UL (ref 1–5.1)
LYMPHOCYTES RELATIVE PERCENT: 16 %
MCH RBC QN AUTO: 30.4 PG (ref 26–34)
MCHC RBC AUTO-ENTMCNC: 32.8 G/DL (ref 31–36)
MCV RBC AUTO: 92.7 FL (ref 80–100)
MONOCYTES ABSOLUTE: 1 K/UL (ref 0–1.3)
MONOCYTES RELATIVE PERCENT: 5 %
NEUTROPHILS ABSOLUTE: 15.6 K/UL (ref 1.7–7.7)
NEUTROPHILS RELATIVE PERCENT: 79 %
PDW BLD-RTO: 13.5 % (ref 12.4–15.4)
PERFORMED ON: ABNORMAL
PLATELET # BLD: 486 K/UL (ref 135–450)
PLATELET SLIDE REVIEW: ABNORMAL
PMV BLD AUTO: 7.2 FL (ref 5–10.5)
POTASSIUM REFLEX MAGNESIUM: 4.3 MMOL/L (ref 3.5–5.1)
RBC # BLD: 3.01 M/UL (ref 4–5.2)
SLIDE REVIEW: ABNORMAL
SODIUM BLD-SCNC: 139 MMOL/L (ref 136–145)
WBC # BLD: 19.8 K/UL (ref 4–11)

## 2022-02-11 PROCEDURE — 6360000002 HC RX W HCPCS: Performed by: INTERNAL MEDICINE

## 2022-02-11 PROCEDURE — 94660 CPAP INITIATION&MGMT: CPT

## 2022-02-11 PROCEDURE — 80048 BASIC METABOLIC PNL TOTAL CA: CPT

## 2022-02-11 PROCEDURE — 6370000000 HC RX 637 (ALT 250 FOR IP): Performed by: INTERNAL MEDICINE

## 2022-02-11 PROCEDURE — 36415 COLL VENOUS BLD VENIPUNCTURE: CPT

## 2022-02-11 PROCEDURE — 2580000003 HC RX 258: Performed by: INTERNAL MEDICINE

## 2022-02-11 PROCEDURE — 99233 SBSQ HOSP IP/OBS HIGH 50: CPT | Performed by: INTERNAL MEDICINE

## 2022-02-11 PROCEDURE — 85025 COMPLETE CBC W/AUTO DIFF WBC: CPT

## 2022-02-11 PROCEDURE — 94640 AIRWAY INHALATION TREATMENT: CPT

## 2022-02-11 PROCEDURE — 2700000000 HC OXYGEN THERAPY PER DAY

## 2022-02-11 PROCEDURE — 94761 N-INVAS EAR/PLS OXIMETRY MLT: CPT

## 2022-02-11 PROCEDURE — 99232 SBSQ HOSP IP/OBS MODERATE 35: CPT | Performed by: INTERNAL MEDICINE

## 2022-02-11 PROCEDURE — 2060000000 HC ICU INTERMEDIATE R&B

## 2022-02-11 RX ADMIN — ENOXAPARIN SODIUM 30 MG: 100 INJECTION SUBCUTANEOUS at 20:39

## 2022-02-11 RX ADMIN — IPRATROPIUM BROMIDE AND ALBUTEROL 2 PUFF: 20; 100 SPRAY, METERED RESPIRATORY (INHALATION) at 22:48

## 2022-02-11 RX ADMIN — ASPIRIN 81 MG: 81 TABLET, COATED ORAL at 08:16

## 2022-02-11 RX ADMIN — BUSPIRONE HYDROCHLORIDE 10 MG: 10 TABLET ORAL at 20:38

## 2022-02-11 RX ADMIN — IPRATROPIUM BROMIDE AND ALBUTEROL 2 PUFF: 20; 100 SPRAY, METERED RESPIRATORY (INHALATION) at 15:33

## 2022-02-11 RX ADMIN — BUSPIRONE HYDROCHLORIDE 10 MG: 10 TABLET ORAL at 13:16

## 2022-02-11 RX ADMIN — IPRATROPIUM BROMIDE AND ALBUTEROL 2 PUFF: 20; 100 SPRAY, METERED RESPIRATORY (INHALATION) at 11:07

## 2022-02-11 RX ADMIN — CEFEPIME 2000 MG: 2 INJECTION, POWDER, FOR SOLUTION INTRAMUSCULAR; INTRAVENOUS at 11:31

## 2022-02-11 RX ADMIN — METOPROLOL TARTRATE 12.5 MG: 25 TABLET, FILM COATED ORAL at 08:16

## 2022-02-11 RX ADMIN — IPRATROPIUM BROMIDE AND ALBUTEROL 2 PUFF: 20; 100 SPRAY, METERED RESPIRATORY (INHALATION) at 19:21

## 2022-02-11 RX ADMIN — ENOXAPARIN SODIUM 30 MG: 100 INJECTION SUBCUTANEOUS at 08:18

## 2022-02-11 RX ADMIN — MONTELUKAST SODIUM 10 MG: 10 TABLET, COATED ORAL at 20:39

## 2022-02-11 RX ADMIN — CEFEPIME 2000 MG: 2 INJECTION, POWDER, FOR SOLUTION INTRAMUSCULAR; INTRAVENOUS at 23:16

## 2022-02-11 RX ADMIN — PREDNISONE 40 MG: 20 TABLET ORAL at 08:15

## 2022-02-11 RX ADMIN — SODIUM CHLORIDE 25 ML: 9 INJECTION, SOLUTION INTRAVENOUS at 23:15

## 2022-02-11 RX ADMIN — HYDROCODONE BITARTRATE AND ACETAMINOPHEN 1 TABLET: 5; 325 TABLET ORAL at 05:01

## 2022-02-11 RX ADMIN — Medication 10 ML: at 08:21

## 2022-02-11 RX ADMIN — LEVOTHYROXINE SODIUM 125 MCG: 0.03 TABLET ORAL at 05:01

## 2022-02-11 RX ADMIN — METOPROLOL TARTRATE 12.5 MG: 25 TABLET, FILM COATED ORAL at 20:39

## 2022-02-11 RX ADMIN — Medication 10 ML: at 20:39

## 2022-02-11 RX ADMIN — HYDROCODONE BITARTRATE AND ACETAMINOPHEN 1 TABLET: 5; 325 TABLET ORAL at 18:43

## 2022-02-11 RX ADMIN — BUSPIRONE HYDROCHLORIDE 10 MG: 10 TABLET ORAL at 08:15

## 2022-02-11 RX ADMIN — IPRATROPIUM BROMIDE AND ALBUTEROL 2 PUFF: 20; 100 SPRAY, METERED RESPIRATORY (INHALATION) at 08:09

## 2022-02-11 ASSESSMENT — PAIN SCALES - GENERAL
PAINLEVEL_OUTOF10: 7
PAINLEVEL_OUTOF10: 9
PAINLEVEL_OUTOF10: 9
PAINLEVEL_OUTOF10: 6
PAINLEVEL_OUTOF10: 6

## 2022-02-11 ASSESSMENT — PAIN DESCRIPTION - ONSET: ONSET: ON-GOING

## 2022-02-11 ASSESSMENT — PAIN DESCRIPTION - DESCRIPTORS: DESCRIPTORS: ACHING

## 2022-02-11 ASSESSMENT — PAIN DESCRIPTION - PAIN TYPE
TYPE: CHRONIC PAIN

## 2022-02-11 ASSESSMENT — PAIN DESCRIPTION - FREQUENCY: FREQUENCY: INTERMITTENT

## 2022-02-11 ASSESSMENT — PAIN SCALES - WONG BAKER
WONGBAKER_NUMERICALRESPONSE: 0
WONGBAKER_NUMERICALRESPONSE: 0

## 2022-02-11 ASSESSMENT — PAIN DESCRIPTION - LOCATION
LOCATION: GENERALIZED

## 2022-02-11 NOTE — PROGRESS NOTES
02/11/22 0328   NIV Type   Equipment Type V60   Mode Bilevel   Mask Type Full face mask   Mask Size Small   Settings/Measurements   IPAP 14 cmH20   CPAP/EPAP 7 cmH2O   Rate Ordered 14   Resp 17   FiO2  40 %   Vt Exhaled 543 mL   Mask Leak (lpm) 14 lpm   Comfort Level Good   Using Accessory Muscles No   SpO2 97   Alarm Settings   Alarms On Y

## 2022-02-11 NOTE — PLAN OF CARE
Nutrition Problem #1: Inadequate oral intake  Intervention: Food and/or Nutrient Delivery: Continue Current Diet,Start Oral Nutrition Supplement  Nutritional Goals: patient will accept and consume 50% or greater of meals on ADULT DIET;  Dysphagia - Soft and Bite Sized diet order x 3 meals per day + she will consume 50% or greater of Ensure high-protein with meals

## 2022-02-11 NOTE — PLAN OF CARE
Problem: Nutrition  Goal: Optimal nutrition therapy  Outcome: Ongoing  Goal: Understanding of nutritional guidelines  Outcome: Ongoing     Problem: Airway Clearance - Ineffective  Goal: Achieve or maintain patent airway  Outcome: Ongoing     Problem: Gas Exchange - Impaired  Goal: Absence of hypoxia  Outcome: Ongoing  Goal: Promote optimal lung function  Outcome: Ongoing     Problem: Breathing Pattern - Ineffective  Goal: Ability to achieve and maintain a regular respiratory rate  Outcome: Ongoing     Problem:  Body Temperature -  Risk of, Imbalanced  Goal: Ability to maintain a body temperature within defined limits  Outcome: Ongoing  Goal: Will regain or maintain usual level of consciousness  Outcome: Ongoing  Goal: Complications related to the disease process, condition or treatment will be avoided or minimized  Outcome: Ongoing     Problem: Isolation Precautions - Risk of Spread of Infection  Goal: Prevent transmission of infection  Outcome: Ongoing     Problem: Nutrition Deficits  Goal: Optimize nutritional status  Outcome: Ongoing     Problem: Risk for Fluid Volume Deficit  Goal: Maintain normal heart rhythm  Outcome: Ongoing  Goal: Maintain absence of muscle cramping  Outcome: Ongoing  Goal: Maintain normal serum potassium, sodium, calcium, phosphorus, and pH  Outcome: Ongoing     Problem: Loneliness or Risk for Loneliness  Goal: Demonstrate positive use of time alone when socialization is not possible  Outcome: Ongoing     Problem: Fatigue  Goal: Verbalize increase energy and improved vitality  Outcome: Ongoing     Problem: Patient Education: Go to Patient Education Activity  Goal: Patient/Family Education  Outcome: Ongoing     Problem: Non-Violent Restraints  Goal: Removal from restraints as soon as assessed to be safe  Outcome: Ongoing  Goal: No harm/injury to patient while restraints in use  Outcome: Ongoing  Goal: Patient's dignity will be maintained  Outcome: Ongoing     Problem: Skin Integrity:  Goal:

## 2022-02-11 NOTE — PROGRESS NOTES
Comprehensive Nutrition Assessment    Type and Reason for Visit:  Reassess    Nutrition Recommendations/Plan:   1. Continue ADULT DIET; Dysphagia - Soft and Bite Sized diet order - consistency changes, per SLP guidance. 2. Added Ensure high-protein with meals. 3. Monitor appetite, meal intake, and acceptance/intake of ONS. 4. Monitor nutrition-related labs, bowel function, and weight trends. Nutrition Assessment:  patient continues to decline from a nutritional standpoint AEB she was extubated on 2/8/22 and diet was advanced today so patient was NPO from 2/8/22-2/11/22 and no po intake is documented for today; she remains at risk for further compromise d/t inadequate nutrition intake, altered nutrition-related labs, and increased nutrition needs r/t COVID-19 virus; will continue ADULT DIET: Dysphagia - Soft and Bite Sized diet order + will add Ensure high-protein with meals    Malnutrition Assessment:  Malnutrition Status: At risk for malnutrition  Context:  Acute Illness     Findings of the 6 clinical characteristics of malnutrition:  Energy Intake:  1 - 75% or less of estimated energy requirements for 7 or more days  Weight Loss:  Unable to assess     Body Fat Loss:  Unable to assess (COVID-19 +)     Muscle Mass Loss:  Unable to assess (COVID-19 +)    Fluid Accumulation:  No significant fluid accumulation     Strength:  Not Performed    Estimated Daily Nutrient Needs:  Energy (kcal):  1280 - 1472 kcals based on 20-23 kcals/kg/CBW; Weight Used for Energy Requirements:  Current     Protein (g):  96 - 109 g protein based on 1.5-1.7 g/kg/CBW;  Weight Used for Protein Requirements:  Current        Fluid (ml/day):  1280 - 1472 ml; Method Used for Fluid Requirements:  1 ml/kcal      Nutrition Related Findings:  patient is A & O x 4; abdomen is soft, non-tender, and bowel sounds are active; patient has a loss of appetite; last documented BM was on 2/10/22; h/h and Cl are low; BUN is elevated Wounds:  None       Current Nutrition Therapies:    ADULT DIET; Dysphagia - Soft and Bite Sized  ADULT ORAL NUTRITION SUPPLEMENT; Breakfast, Lunch, Dinner; Low Calorie/High Protein Oral Supplement    Anthropometric Measures:  · Height: 5' 4\" (162.6 cm)  · Current Body Weight: 144 lb (65.3 kg) (obtained on 2/11/22; actual weight)   · Admission Body Weight: 130 lb 4.7 oz (59.1 kg) (obtained on 2/5/22; actual weight)    · Usual Body Weight: 130 lb 4.7 oz (59.1 kg) (obtained on 2/5/22; actual weight)     · Ideal Body Weight: 120 lbs; % Ideal Body Weight 120 %   · BMI: 24.7  · BMI Categories: Normal Weight (BMI 18.5-24. 9)       Nutrition Diagnosis:   · Inadequate oral intake related to inadequate protein-energy intake,impaired respiratory function,increase demand for energy/nutrients as evidenced by intake 0-25%,poor intake prior to admission,lab values,other (comment) (COVID-19 +)      Nutrition Interventions:   Food and/or Nutrient Delivery:  Continue Current Diet,Start Oral Nutrition Supplement  Nutrition Education/Counseling:  No recommendation at this time   Coordination of Nutrition Care:  Continue to monitor while inpatient,Interdisciplinary Rounds    Goals:  patient will accept and consume 50% or greater of meals on ADULT DIET; Dysphagia - Soft and Bite Sized diet order x 3 meals per day + she will consume 50% or greater of Ensure high-protein with meals       Nutrition Monitoring and Evaluation:   Behavioral-Environmental Outcomes:  None Identified   Food/Nutrient Intake Outcomes:  Food and Nutrient Intake,Supplement Intake  Physical Signs/Symptoms Outcomes:  Biochemical Data,Chewing or Swallowing,GI Status,Hemodynamic Status,Weight,Skin     Discharge Planning:     Too soon to determine     Electronically signed by Aldo Smalls RD, LD on 2/11/22 at 6:11 PM EST    Contact: [phone]

## 2022-02-11 NOTE — PROGRESS NOTES
Heart rate elevated into 140s irregular, pulse bounding. Patient denies chest pain, currently on bipap Vital signs stable. 2/11/2022 @ 0040    Heart rate back down to 70's appears to be in normal sinus rhythm on bedside monitor.  2/11/2022 @ [id number]

## 2022-02-11 NOTE — FLOWSHEET NOTE
02/11/22 1345   Encounter Summary   Services provided to: Patient   Referral/Consult From: [facility] Family members   Continue Visiting   (2/11 initial, sppt and prayer)   Complexity of Encounter Moderate   Length of Encounter 15 minutes   Spiritual Assessment Completed Yes   Spiritual/Jain   Type Spiritual support   Assessment Calm; Approachable;Coping; Hopeful   Intervention Active listening;Explored feelings, thoughts, concerns;Prayer;Discussed relationship with God;Discussed illness/injury and it's impact   Outcome Comfort;Expressed gratitude;Engaged in conversation;Expressed feelings/needs/concerns

## 2022-02-11 NOTE — PROGRESS NOTES
Pulmonary Progress Note    CC: COVID-19 pneumonia and COPD    Events of Last 24 hours:   States this is her best day    Invasive Lines: Peripheral    MV: 2/5-2/8/22    Vitals:  Blood pressure 111/64, pulse 88, temperature 97.7 °F (36.5 °C), temperature source Oral, resp. rate 22, height 5' 4\" (1.626 m), weight 144 lb (65.3 kg), SpO2 96 %, not currently breastfeeding. on 3 L  Constitutional:  No acute distress. Eyes: PERRL. Conjunctivae anicteric. ENT: Normal nose. Normal tongue. Neck:  Trachea is midline. No thyroid tenderness. Respiratory: + accessory muscle usage. decreased breath sounds. No wheezes. No rales. + Rhonchi. Cardiovascular: Normal S1S2. No digit clubbing. No digit cyanosis. No LE edema. Psychiatric: No anxiety or Agitation. Alert and Oriented to person, place and time.     Scheduled Meds:   metoprolol tartrate  12.5 mg Oral BID    predniSONE  40 mg Oral Daily    albuterol-ipratropium  2 puff Inhalation Q4H While awake    insulin lispro  0-6 Units SubCUTAneous Q4H    aspirin  81 mg Oral Daily    busPIRone  10 mg Oral TID    levothyroxine  125 mcg Oral QAM AC    montelukast  10 mg Oral Nightly    lidocaine  1 patch TransDERmal Daily    nicotine  1 patch TransDERmal Daily    enoxaparin  30 mg SubCUTAneous BID    sodium chloride flush  5-40 mL IntraVENous 2 times per day    cefepime  2,000 mg IntraVENous Q12H     PRN Meds:  albuterol-ipratropium, guaiFENesin-dextromethorphan, HYDROcodone-acetaminophen, polyethylene glycol, acetaminophen **OR** acetaminophen, sodium chloride flush, sodium chloride, glucose, glucagon (rDNA), dextrose, dextrose bolus (hypoglycemia) **OR** dextrose bolus (hypoglycemia)    Results:  CBC:   Recent Labs     02/09/22 0410 02/10/22  0509 02/11/22  0426   WBC 25.2* 28.1* 19.8*   HGB 9.9* 10.6* 9.1*   HCT 30.1* 32.4* 27.9*   MCV 92.7 93.2 92.7   * 544* 486*     BMP:   Recent Labs     02/09/22 0410 02/10/22  0509 02/11/22  0426    137 139   K 5.0 3.9 4.3   CL 94* 91* 98*   CO2 38* 38* 37*   BUN 31* 42* 44*   CREATININE <0.5* <0.5* <0.5*     LIVER PROFILE:   No results for input(s): AST, ALT, LIPASE, BILIDIR, BILITOT, ALKPHOS in the last 72 hours. Invalid input(s): AMYLASE,  ALB  Cultures:   1/29/2022 Strep pneumo & Legionella not detected  1/29/2022 BC NG  1/29/2022 COVID-19 detected  2/4/2022 BC NGTD  2/4/2022 urine NG  2/5/2022 tracheal aspirate: light growth aspergillus - likely colonizing as no sign of invasive disease on imaging    Procalcitonin 0.16  proBNP 10,600    Films:   CT Chest 1/29/2022    Mediastinum: No evidence of mediastinal lymphadenopathy.  The heart and   pericardium demonstrate no acute abnormality.  There is no acute abnormality   of the thoracic aorta.       Lungs/pleura: The lungs are without acute process. Valri Maid is a a 1 cm noncalcified nodule in the right lung base.  No evidence of pleural effusion or pneumothorax. Impression   No evidence of pulmonary embolism. Emphysema. There is a 1 cm noncalcified nodule in the right lung base.  This is new   compared to the prior exam of 01/17/2007.  Follow-up recommendations are   listed below. CTPA 2/10/22: no PE. Basilar atelectasis vs pneumonia and basilar mucus. Emphysema. RLL nodule the same and noted to be 9mm.     ASSESSMENT:  · Acute on chronic hypoxemic & hypercapnic respiratory failure; baseline 3-4 L O2   · COVID-19 pneumonia in an unvaccinated patient   · Severe COPD, with acute exacerbation - she was clearly improved when I saw her on 2/3/22 & 2/4/22; she was breathing comfortably on her home oxygen and her only complaint was chronic pain, she was asking to go home then markedly worsened shortly after leaving hospital, unclear precipitant  · Hyponatremia, slowly correcting, nephrology following  · RLL 9mm pulmonary nodule on CT 1/29/22 - concerning for bronchogenic carcinoma  · Anxiety, severe  · Chronic pain  · Tobacco abuse     PLAN:  Droplet Plus Airborne Precautions   Bipap QHS   Supplemental oxygen to maintain SaO2 >92%; wean as tolerated   Stopped Vancomycin and continue Cefepime D#7/7.  Prior admission completed 6 days Azithromycin, 7 days Ceftriaxone   Inhaled bronchodilators   Prednisone taper  Eventual PET scan vs biopsy vs resection for RLL nodule - could be addressed as an outpatient (now followed by Dr. Jeanie Felix)   Possible d.c tomorrow

## 2022-02-11 NOTE — CARE COORDINATION
INTERDISCIPLINARY PLAN OF CARE CONFERENCE    Date/Time: 2/11/2022 2:39 PM  Completed by: Rula Villanueva RN, Case Management      Patient Name:  Debbie Cartwright  YOB: 1960  Admitting Diagnosis: Hyponatremia [E87.1]  Elevated troponin [R77.8]  CO2 narcosis [R06.89]  Acute respiratory failure with hypoxia and hypercapnia ([address] [J96.01, J96.02]  Acute on chronic respiratory failure with hypoxia and hypercapnia ([address] [X19.04, J96.22]  COVID-19 virus infection [U07.1]     Admit Date/Time:  2/4/2022  6:36 PM    Chart reviewed. Interdisciplinary team contacted or reviewed plan related to patient progress and discharge plans. Disciplines included Case Management, Nursing, and Dietitian. Current Status:ICU LOC, inpatient, admit 02/04/22  PT/OT recommendation for discharge plan of care: SNF    Expected D/C Disposition:  Home with [place] [id number]  Confirmed plan with patient and/or family Yes,  Asif  Discharge Plan Comments: home with [facility] SOC with [facility].  to provide 24/7 assist.     Home O2 in place on admit: Yes- [employer] 4lpm  Pt informed of need to bring portable home O2 tank on day of discharge for nursing to connect prior to leaving:  Yes  Verbalized agreement/Understanding:   Yes

## 2022-02-11 NOTE — PROGRESS NOTES
RT Inhaler-Nebulizer Bronchodilator Protocol Note    There is a bronchodilator order in the chart from a provider indicating to follow the RT Bronchodilator Protocol and there is an Initiate RT Inhaler-Nebulizer Bronchodilator Protocol order as well (see protocol at bottom of note). CXR Findings:  No results found. The findings from the last RT Protocol Assessment were as follows:   History Pulmonary Disease: (P) Chronic pulmonary disease  Respiratory Pattern: (P) Use of accessory muscles, prolonged exhalation, or RR 26-30 bpm  Breath Sounds: (P) Intermittent or unilateral wheezes  Cough: (P) Weak, non-productive  Indication for Bronchodilator Therapy: (P) Decreased or absent breath sounds  Bronchodilator Assessment Score: (P) 15    Aerosolized bronchodilator medication orders have been revised according to the RT Inhaler-Nebulizer Bronchodilator Protocol below. Respiratory Therapist to perform RT Therapy Protocol Assessment initially then follow the protocol. Repeat RT Therapy Protocol Assessment PRN for score 0-3 or on second treatment, BID, and PRN for scores above 3. No Indications - adjust the frequency to every 6 hours PRN wheezing or bronchospasm, if no treatments needed after 48 hours then discontinue using Per Protocol order mode. If indication present, adjust the RT bronchodilator orders based on the Bronchodilator Assessment Score as indicated below. Use Inhaler orders unless patient has one or more of the following: on home nebulizer, not able to hold breath for 10 seconds, is not alert and oriented, cannot activate and use MDI correctly, or respiratory rate 25 breaths per minute or more, then use the equivalent nebulizer order(s) with same Frequency and PRN reasons based on the score. If a patient is on this medication at home then do not decrease Frequency below that used at home.     0-3 - enter or revise RT bronchodilator order(s) to equivalent RT Bronchodilator order with Frequency of every 4 hours PRN for wheezing or increased work of breathing using Per Protocol order mode. 4-6 - enter or revise RT Bronchodilator order(s) to two equivalent RT bronchodilator orders with one order with BID Frequency and one order with Frequency of every 4 hours PRN wheezing or increased work of breathing using Per Protocol order mode. 7-10 - enter or revise RT Bronchodilator order(s) to two equivalent RT bronchodilator orders with one order with TID Frequency and one order with Frequency of every 4 hours PRN wheezing or increased work of breathing using Per Protocol order mode. 11-13 - enter or revise RT Bronchodilator order(s) to one equivalent RT bronchodilator order with QID Frequency and an Albuterol order with Frequency of every 4 hours PRN wheezing or increased work of breathing using Per Protocol order mode. Greater than 13 - enter or revise RT Bronchodilator order(s) to one equivalent RT bronchodilator order with every 4 hours Frequency and an Albuterol order with Frequency of every 2 hours PRN wheezing or increased work of breathing using Per Protocol order mode.          Electronically signed by Adela Cotton RCP on 2/11/2022 at 11:12 AM

## 2022-02-11 NOTE — PROGRESS NOTES
02/10/22 2333   NIV Type   NIV Started/Stopped On   Equipment Type V60   Mode Bilevel   Mask Type Full face mask   Mask Size Small   Settings/Measurements   IPAP 14 cmH20   CPAP/EPAP 7 cmH2O   Rate Ordered 14   Resp 23   Insp Rise Time (%) 2 %   FiO2  40 %   I Time/ I Time % 1 s   Vt Exhaled 433 mL   Minute Volume 10 Liters   Mask Leak (lpm) 1 lpm   Comfort Level Good   Using Accessory Muscles No   SpO2 98

## 2022-02-11 NOTE — PROGRESS NOTES
Pt a/o. Am assessment completed see flow sheet. Pt denies any pain/ needs at this time. Call light within reach. Will continue to monitor. Patient is not able to demonstrated the ability to move from a reclining position to an upright position within the recliner. however patient is alert, oriented and able to provide informed consent     Bedside Mobility Assessment Tool (BMAT):     Assessment Level 1- Sit and Shake    1. From a semi-reclined position, ask patient to sit up and rotate to a seated position at the side of the bed. Can use the bedrail. 2. Ask patient to reach out and grab your hand and shake making sure patient reaches across his/her midline. Pass- Patient is able to come to a seated position, maintain core strength. Maintains seated balance while reaching across midline. Move on to Assessment Level 2. Assessment Level 2- Stretch and Point   1. With patient in seated position at the side of the bed, have patient place both feet on the floor (or stool) with knees no higher than hips. 2. Ask patient to stretch one leg and straighten the knee, then bend the ankle/flex and point the toes. If appropriate, repeat with the other leg. Fail- Patient is unable to complete task. Patient is MOBILITY LEVEL 2. Assessment Level 3- Stand   1. Ask patient to elevate off the bed or chair (seated to standing) using an assistive device (cane, bedrail). 2. Patient should be able to raise buttocks off be and hold for a count of five. May repeat once. Fail- Patient unable to demonstrate standing stability. Patient is MOBILITY LEVEL 3. Assessment Level 4- Walk   1. Ask patient to march in place at bedside. 2. Then ask patient to advance step and return each foot. Some medical conditions may render a patient from stepping backwards, use your best clinical judgement. Fail- Patient not able to complete tasks OR requires use of assistive device. Patient is MOBILITY LEVEL 3.        Mobility Level- 2

## 2022-02-11 NOTE — PROGRESS NOTES
Progress Note    Admit Date:  2/4/2022    61F admitted to [facility] 1/29-2/4/22 for acute on chronic hypoxic respiratory failure, COVID-19 pneumonia, and exacerbation of severe COPD discharged home on 4 L nasal cannula oxygen on 2/4/2022. Return to the ER later on 2/4/2022 in respiratory distress, immediately placed on BiPAP. Following morning patient with worsening breathing, increased fatigue, requesting to be intubated. Patient intubated by ER provider. Admitted to ICU    Extubated to bipap 2/8     Subjective:  Ms. Arely Alcala  Seen up in bed, used bipap overnight and now on 3 L o2  Feels some better and more energetic today   CTA with no PE    Back pain improved     Objective:   Patient Vitals for the past 4 hrs:   BP Pulse Resp SpO2   02/11/22 0745 111/64 88 22 94 %            Intake/Output Summary (Last 24 hours) at 2/11/2022 0756  Last data filed at 2/11/2022 0556  Gross per 24 hour   Intake 120 ml   Output 550 ml   Net -430 ml       Physical Exam:  Patient seen in droplet plus precautions  Gen:  Middle aged female seen up in bed, fully awake, alert   Eyes: PERRL. No sclera icterus. No conjunctival injection. .   Neck: No JVD. Trachea midline. Resp:  Improved bilateral  breath sounds . resolved wheeze   CV:  Regular rate and  rhythm. No murmur. No rub. No edema. Capillary Refill: Brisk,< 3 seconds   Peripheral Pulses: +2 palpable, equal bilaterally   GI: Non-tender. Non-distended. Normal bowel sounds. Skin: Warm and dry. No nodule on exposed extremities. No rash on exposed extremities. M/S: No cyanosis. No joint deformity. No clubbing.    Neuro: non focal      Scheduled Meds:   metoprolol tartrate  12.5 mg Oral BID    predniSONE  40 mg Oral Daily    albuterol-ipratropium  2 puff Inhalation Q4H While awake    insulin lispro  0-6 Units SubCUTAneous Q4H    aspirin  81 mg Oral Daily    busPIRone  10 mg Oral TID    levothyroxine  125 mcg Oral QAM AC    montelukast  10 mg Oral Nightly    lidocaine  1 patch TransDERmal Daily    nicotine  1 patch TransDERmal Daily    enoxaparin  30 mg SubCUTAneous BID    sodium chloride flush  5-40 mL IntraVENous 2 times per day    cefepime  2,000 mg IntraVENous Q12H       Continuous Infusions:   sodium chloride      dextrose         PRN Meds:  albuterol-ipratropium, guaiFENesin-dextromethorphan, HYDROcodone-acetaminophen, polyethylene glycol, acetaminophen **OR** acetaminophen, sodium chloride flush, sodium chloride, glucose, glucagon (rDNA), dextrose, dextrose bolus (hypoglycemia) **OR** dextrose bolus (hypoglycemia)      Data:  CBC:   Recent Labs     02/09/22  0410 02/10/22  0509 02/11/22 0426   WBC 25.2* 28.1* 19.8*   HGB 9.9* 10.6* 9.1*   HCT 30.1* 32.4* 27.9*   MCV 92.7 93.2 92.7   * 544* 486*     BMP:   Recent Labs     02/09/22  0410 02/10/22  0509 02/11/22  0426    137 139   K 5.0 3.9 4.3   CL 94* 91* 98*   CO2 38* 38* 37*   BUN 31* 42* 44*   CREATININE <0.5* <0.5* <0.5*       Blood: NGTD  Sputum: aspergillus     Urine: ordered      RADIOLOGY  CT CHEST PULMONARY EMBOLISM W CONTRAST   Final Result   Motion limited. No central pulmonary embolus identified. There is moderate   coronary artery calcification      Increased filling defects seen in the lower lobe airways bilaterally with   increased basilar consolidation, either atelectasis or pneumonia      Underlying emphysema. Dominant pulmonary nodule right lower lobe is not   significantly changed. Consider short-term follow-up chest CT versus PET-CT   is described previously      RECOMMENDATIONS:   Unavailable         XR CHEST PORTABLE   Final Result   No significant change         XR CHEST PORTABLE   Final Result   1. Endotracheal tube remains in appropriate position. 2. No new findings identified. XR CHEST PORTABLE   Final Result   Stable examination with findings of COPD. XR CHEST PORTABLE   Final Result   No acute process. Stable exam with stable lines and tubes.       Stable findings of COPD. XR CHEST 1 VIEW   Final Result   Endotracheal tube satisfactory position above the alley      OG side-port in gastric fundus, tip not visualized      Otherwise, no acute abnormalities seen in the chest         XR CHEST PORTABLE   Final Result   Probable scarring in the left midlung      Otherwise, no acute cardiopulmonary process         XR CHEST PORTABLE   Final Result   COPD with resolving central pulmonary congestion. Slowly resolving bibasilar opacities. Tiny right pleural effusion which is less prominent. CT chest PE 1/29/22  Impression   No evidence of pulmonary embolism.       Emphysema.       There is a 1 cm noncalcified nodule in the right lung base.  This is new   compared to the prior exam of 01/17/2007.  Follow-up recommendations are   listed below. Assessment/Plan:    #Acute on chronic hypoxic and hypercarbic respiratory failure  #COVID 19 Pneumonia   #Severe COPD with AE  - droplet + isolation   - tested positive for COVID 19 on 1/29/22. Admitted 1/29-2/4/22 treated with decadron, zithromax (6 days) and rocephin (7 days) at that time. Dc'd home on 4-5L and returned to ER later same day and placed on BiPAP with pH 7.1 and PCO2 140. - Initially improved with BiPAP on repeat ABG, but morning of 2/5 patient reported extreme fatigue from work of breathing and requested intubation. Patient intubated by ER MD on 2/5/22   - pulm c/s for vent management   - treated with Solumedrol 40 mg BID   - vanc and cefepime D7 for possible superimposed bacterial PNA   - inhaled bronchodilators   - improving symptoms , off vent , now using bipap prn   - repeat CTA neg for PE       #Elevated troponin   -0.09, 0.07, 0.04, 0.03, respectively  -Patient denies chest pain  -EKG with nonspecific EKG changes, cardiologist has reviewed EKGs, I reviewed with Dr. Nandini Good as well   -Suspect elevation secondary to demand mismatch in acute respiratory failure.   Continue to monitor on telemetry  - ASA added on admit, continue for now     #Severe anxiety   - cont buspar     #Hyponatremia   - [id number]  - improved after IV NS.    - nephro c/s    #Right lung nodule   - per pulm c/s last admit: PET scan vs biopsy vs resection for RLL nodule - could be addressed as an outpatient     #Chronic back pain   -  Was on PRN norco- lidocaine patch    #Hypothyroidism  - home dose of synthroid continued         #Tobacco dependence  - nicotine patch ordered    - cessation recommended    DVT Prophylaxis: Lovenox   Diet: ADULT DIET;  Dysphagia - Soft and Bite Sized  Code Status: Full Code     Start PT  Out of bed  Dc planning      Marisel Acosta MD, 2/11/2022 7:56 AM

## 2022-02-11 NOTE — PROGRESS NOTES
PM assessment completed. Scheduled medications given per MAR. VSS bipap, A/O x4, denies any needs at this time. Call light in reach, will monitor, bed alarm on.

## 2022-02-11 NOTE — PROGRESS NOTES
RT Inhaler-Nebulizer Bronchodilator Protocol Note    There is a bronchodilator order in the chart from a provider indicating to follow the RT Bronchodilator Protocol and there is an Initiate RT Inhaler-Nebulizer Bronchodilator Protocol order as well (see protocol at bottom of note). CXR Findings:  No results found. The findings from the last RT Protocol Assessment were as follows:   History Pulmonary Disease: Chronic pulmonary disease  Respiratory Pattern: Use of accessory muscles, prolonged exhalation, or RR 26-30 bpm  Breath Sounds: Slightly diminished and/or crackles  Cough: Weak, non-productive  Indication for Bronchodilator Therapy: Decreased or absent breath sounds  Bronchodilator Assessment Score: 13    Aerosolized bronchodilator medication orders have been revised according to the RT Inhaler-Nebulizer Bronchodilator Protocol below. Respiratory Therapist to perform RT Therapy Protocol Assessment initially then follow the protocol. Repeat RT Therapy Protocol Assessment PRN for score 0-3 or on second treatment, BID, and PRN for scores above 3. No Indications - adjust the frequency to every 6 hours PRN wheezing or bronchospasm, if no treatments needed after 48 hours then discontinue using Per Protocol order mode. If indication present, adjust the RT bronchodilator orders based on the Bronchodilator Assessment Score as indicated below. Use Inhaler orders unless patient has one or more of the following: on home nebulizer, not able to hold breath for 10 seconds, is not alert and oriented, cannot activate and use MDI correctly, or respiratory rate 25 breaths per minute or more, then use the equivalent nebulizer order(s) with same Frequency and PRN reasons based on the score. If a patient is on this medication at home then do not decrease Frequency below that used at home.     0-3 - enter or revise RT bronchodilator order(s) to equivalent RT Bronchodilator order with Frequency of every 4 hours PRN for wheezing or increased work of breathing using Per Protocol order mode. 4-6 - enter or revise RT Bronchodilator order(s) to two equivalent RT bronchodilator orders with one order with BID Frequency and one order with Frequency of every 4 hours PRN wheezing or increased work of breathing using Per Protocol order mode. 7-10 - enter or revise RT Bronchodilator order(s) to two equivalent RT bronchodilator orders with one order with TID Frequency and one order with Frequency of every 4 hours PRN wheezing or increased work of breathing using Per Protocol order mode. 11-13 - enter or revise RT Bronchodilator order(s) to one equivalent RT bronchodilator order with QID Frequency and an Albuterol order with Frequency of every 4 hours PRN wheezing or increased work of breathing using Per Protocol order mode. Greater than 13 - enter or revise RT Bronchodilator order(s) to one equivalent RT bronchodilator order with every 4 hours Frequency and an Albuterol order with Frequency of every 2 hours PRN wheezing or increased work of breathing using Per Protocol order mode.          Electronically signed by Rose Junior RCP on 2/10/2022 at 8:10 PM

## 2022-02-12 LAB
ANION GAP SERPL CALCULATED.3IONS-SCNC: 6 MMOL/L (ref 3–16)
BANDED NEUTROPHILS RELATIVE PERCENT: 2 % (ref 0–7)
BASOPHILS ABSOLUTE: 0 K/UL (ref 0–0.2)
BASOPHILS RELATIVE PERCENT: 0 %
BUN BLDV-MCNC: 38 MG/DL (ref 7–20)
CALCIUM SERPL-MCNC: 8.6 MG/DL (ref 8.3–10.6)
CHLORIDE BLD-SCNC: 98 MMOL/L (ref 99–110)
CO2: 31 MMOL/L (ref 21–32)
CREAT SERPL-MCNC: <0.5 MG/DL (ref 0.6–1.2)
EOSINOPHILS ABSOLUTE: 0 K/UL (ref 0–0.6)
EOSINOPHILS RELATIVE PERCENT: 0 %
GFR AFRICAN AMERICAN: >60
GFR NON-AFRICAN AMERICAN: >60
GLUCOSE BLD-MCNC: 112 MG/DL (ref 70–99)
HCT VFR BLD CALC: 27.9 % (ref 36–48)
HEMOGLOBIN: 8.8 G/DL (ref 12–16)
LYMPHOCYTES ABSOLUTE: 2.1 K/UL (ref 1–5.1)
LYMPHOCYTES RELATIVE PERCENT: 13 %
MCH RBC QN AUTO: 31.2 PG (ref 26–34)
MCHC RBC AUTO-ENTMCNC: 31.8 G/DL (ref 31–36)
MCV RBC AUTO: 98.2 FL (ref 80–100)
MONOCYTES ABSOLUTE: 0.7 K/UL (ref 0–1.3)
MONOCYTES RELATIVE PERCENT: 4 %
NEUTROPHILS ABSOLUTE: 13.6 K/UL (ref 1.7–7.7)
NEUTROPHILS RELATIVE PERCENT: 81 %
PDW BLD-RTO: 14 % (ref 12.4–15.4)
PLATELET # BLD: 281 K/UL (ref 135–450)
PLATELET SLIDE REVIEW: ADEQUATE
PMV BLD AUTO: 7 FL (ref 5–10.5)
POTASSIUM REFLEX MAGNESIUM: 3.8 MMOL/L (ref 3.5–5.1)
RBC # BLD: 2.84 M/UL (ref 4–5.2)
SLIDE REVIEW: ABNORMAL
SODIUM BLD-SCNC: 135 MMOL/L (ref 136–145)
WBC # BLD: 16.4 K/UL (ref 4–11)

## 2022-02-12 PROCEDURE — 85025 COMPLETE CBC W/AUTO DIFF WBC: CPT

## 2022-02-12 PROCEDURE — 99233 SBSQ HOSP IP/OBS HIGH 50: CPT | Performed by: INTERNAL MEDICINE

## 2022-02-12 PROCEDURE — 97530 THERAPEUTIC ACTIVITIES: CPT

## 2022-02-12 PROCEDURE — 94761 N-INVAS EAR/PLS OXIMETRY MLT: CPT

## 2022-02-12 PROCEDURE — 94660 CPAP INITIATION&MGMT: CPT

## 2022-02-12 PROCEDURE — 6370000000 HC RX 637 (ALT 250 FOR IP): Performed by: INTERNAL MEDICINE

## 2022-02-12 PROCEDURE — 87449 NOS EACH ORGANISM AG IA: CPT

## 2022-02-12 PROCEDURE — 2580000003 HC RX 258: Performed by: INTERNAL MEDICINE

## 2022-02-12 PROCEDURE — 6360000002 HC RX W HCPCS: Performed by: INTERNAL MEDICINE

## 2022-02-12 PROCEDURE — 2060000000 HC ICU INTERMEDIATE R&B

## 2022-02-12 PROCEDURE — 94640 AIRWAY INHALATION TREATMENT: CPT

## 2022-02-12 PROCEDURE — 36415 COLL VENOUS BLD VENIPUNCTURE: CPT

## 2022-02-12 PROCEDURE — 92526 ORAL FUNCTION THERAPY: CPT

## 2022-02-12 PROCEDURE — 2700000000 HC OXYGEN THERAPY PER DAY

## 2022-02-12 PROCEDURE — 80048 BASIC METABOLIC PNL TOTAL CA: CPT

## 2022-02-12 RX ORDER — VORICONAZOLE 200 MG/1
300 TABLET, FILM COATED ORAL 2 TIMES DAILY
Status: DISCONTINUED | OUTPATIENT
Start: 2022-02-13 | End: 2022-02-12

## 2022-02-12 RX ORDER — VORICONAZOLE 200 MG/1
400 TABLET, FILM COATED ORAL 2 TIMES DAILY
Status: DISCONTINUED | OUTPATIENT
Start: 2022-02-12 | End: 2022-02-12

## 2022-02-12 RX ADMIN — LEVOTHYROXINE SODIUM 125 MCG: 0.03 TABLET ORAL at 06:21

## 2022-02-12 RX ADMIN — HYDROCODONE BITARTRATE AND ACETAMINOPHEN 1 TABLET: 5; 325 TABLET ORAL at 10:10

## 2022-02-12 RX ADMIN — CEFEPIME 2000 MG: 2 INJECTION, POWDER, FOR SOLUTION INTRAMUSCULAR; INTRAVENOUS at 11:23

## 2022-02-12 RX ADMIN — Medication 10 ML: at 20:41

## 2022-02-12 RX ADMIN — BUSPIRONE HYDROCHLORIDE 10 MG: 10 TABLET ORAL at 09:30

## 2022-02-12 RX ADMIN — Medication 10 ML: at 11:28

## 2022-02-12 RX ADMIN — IPRATROPIUM BROMIDE AND ALBUTEROL 2 PUFF: 20; 100 SPRAY, METERED RESPIRATORY (INHALATION) at 15:41

## 2022-02-12 RX ADMIN — MONTELUKAST SODIUM 10 MG: 10 TABLET, COATED ORAL at 20:39

## 2022-02-12 RX ADMIN — BUSPIRONE HYDROCHLORIDE 10 MG: 10 TABLET ORAL at 20:39

## 2022-02-12 RX ADMIN — METOPROLOL TARTRATE 12.5 MG: 25 TABLET, FILM COATED ORAL at 20:39

## 2022-02-12 RX ADMIN — ENOXAPARIN SODIUM 30 MG: 100 INJECTION SUBCUTANEOUS at 20:39

## 2022-02-12 RX ADMIN — IPRATROPIUM BROMIDE AND ALBUTEROL 2 PUFF: 20; 100 SPRAY, METERED RESPIRATORY (INHALATION) at 18:44

## 2022-02-12 RX ADMIN — IPRATROPIUM BROMIDE AND ALBUTEROL 2 PUFF: 20; 100 SPRAY, METERED RESPIRATORY (INHALATION) at 11:18

## 2022-02-12 RX ADMIN — HYDROCODONE BITARTRATE AND ACETAMINOPHEN 1 TABLET: 5; 325 TABLET ORAL at 20:39

## 2022-02-12 RX ADMIN — ASPIRIN 81 MG: 81 TABLET, COATED ORAL at 09:31

## 2022-02-12 RX ADMIN — IPRATROPIUM BROMIDE AND ALBUTEROL 2 PUFF: 20; 100 SPRAY, METERED RESPIRATORY (INHALATION) at 22:37

## 2022-02-12 RX ADMIN — IPRATROPIUM BROMIDE AND ALBUTEROL 2 PUFF: 20; 100 SPRAY, METERED RESPIRATORY (INHALATION) at 08:15

## 2022-02-12 RX ADMIN — ENOXAPARIN SODIUM 30 MG: 100 INJECTION SUBCUTANEOUS at 09:30

## 2022-02-12 RX ADMIN — METOPROLOL TARTRATE 12.5 MG: 25 TABLET, FILM COATED ORAL at 09:31

## 2022-02-12 RX ADMIN — BUSPIRONE HYDROCHLORIDE 10 MG: 10 TABLET ORAL at 17:05

## 2022-02-12 RX ADMIN — PREDNISONE 40 MG: 20 TABLET ORAL at 09:30

## 2022-02-12 ASSESSMENT — PAIN DESCRIPTION - ONSET
ONSET: ON-GOING

## 2022-02-12 ASSESSMENT — PAIN DESCRIPTION - FREQUENCY
FREQUENCY: CONTINUOUS

## 2022-02-12 ASSESSMENT — PAIN DESCRIPTION - DESCRIPTORS
DESCRIPTORS: ACHING

## 2022-02-12 ASSESSMENT — PAIN SCALES - GENERAL
PAINLEVEL_OUTOF10: 6
PAINLEVEL_OUTOF10: 0
PAINLEVEL_OUTOF10: 5
PAINLEVEL_OUTOF10: 8
PAINLEVEL_OUTOF10: 3
PAINLEVEL_OUTOF10: 3

## 2022-02-12 ASSESSMENT — PAIN DESCRIPTION - ORIENTATION
ORIENTATION: MID

## 2022-02-12 ASSESSMENT — PAIN DESCRIPTION - PAIN TYPE
TYPE: CHRONIC PAIN

## 2022-02-12 ASSESSMENT — PAIN DESCRIPTION - PROGRESSION
CLINICAL_PROGRESSION: NOT CHANGED
CLINICAL_PROGRESSION: NOT CHANGED
CLINICAL_PROGRESSION: GRADUALLY WORSENING

## 2022-02-12 ASSESSMENT — PAIN DESCRIPTION - LOCATION
LOCATION: BACK

## 2022-02-12 ASSESSMENT — PAIN - FUNCTIONAL ASSESSMENT: PAIN_FUNCTIONAL_ASSESSMENT: PREVENTS OR INTERFERES SOME ACTIVE ACTIVITIES AND ADLS

## 2022-02-12 NOTE — PROGRESS NOTES
score.  If a patient is on this medication at home then do not decrease Frequency below that used at home. 0-3 - enter or revise RT bronchodilator order(s) to equivalent RT Bronchodilator order with Frequency of every 4 hours PRN for wheezing or increased work of breathing using Per Protocol order mode. 4-6 - enter or revise RT Bronchodilator order(s) to two equivalent RT bronchodilator orders with one order with BID Frequency and one order with Frequency of every 4 hours PRN wheezing or increased work of breathing using Per Protocol order mode. 7-10 - enter or revise RT Bronchodilator order(s) to two equivalent RT bronchodilator orders with one order with TID Frequency and one order with Frequency of every 4 hours PRN wheezing or increased work of breathing using Per Protocol order mode. 11-13 - enter or revise RT Bronchodilator order(s) to one equivalent RT bronchodilator order with QID Frequency and an Albuterol order with Frequency of every 4 hours PRN wheezing or increased work of breathing using Per Protocol order mode. Greater than 13 - enter or revise RT Bronchodilator order(s) to one equivalent RT bronchodilator order with every 4 hours Frequency and an Albuterol order with Frequency of every 2 hours PRN wheezing or increased work of breathing using Per Protocol order mode.          Electronically signed by Mita Frederick RCP on 2/11/2022 at 8:36 PM

## 2022-02-12 NOTE — FLOWSHEET NOTE
02/12/22 1010   Pain Assessment   Pain Assessment 0-10   Pain Level 8   Pain Type Chronic pain   Pain Location Back   Pain Orientation Mid   Pain Descriptors Aching   Pain Frequency Continuous   Pain Onset On-going   Clinical Progression Gradually worsening       Pain as shown. PRN Norco given per [facility] order. Pt. Assessment completed- see flow sheet. Pt. denies further needs at this time. Will continue to monitor. Call light is within reach.   Talia Boykin RN

## 2022-02-12 NOTE — PROGRESS NOTES
Speech Language Pathology  Facility/Department: [facility]  Dysphagia Daily Treatment Note    NAME: Nubia Peters  : 1960  MRN: [id number]    Patient Diagnosis(es):   Patient Active Problem List    Diagnosis Date Noted    Hyponatremia     Acute and chronic respiratory failure with hypoxia ([address]     Acute respiratory failure with hypoxia ([address]     COVID-19 virus infection     Pulmonary nodule     Chronic hypoxemic respiratory failure (HCC)     Anxiety     Acute on chronic respiratory failure with hypoxia and hypercapnia ([address] 2022    Pneumonia due to COVID-19 virus 2022    COVID 2022    Mucus plugging of bronchi     Acute pulmonary edema (HCC)     Sepsis ([address]     Acute on chronic respiratory failure ([address] 2021    Sepsis with acute hypercapnic respiratory failure without septic shock ([address]     Pneumonia due to organism 2020    Chronic bilateral pleural effusions     HCAP (healthcare-associated pneumonia)     Chest pain on breathing     COPD, severe ([address] 2019    Chronic respiratory failure with hypoxia ([address] 2019    Mild protein-calorie malnutrition ([address] 2018    Acute exacerbation of chronic obstructive pulmonary disease (COPD) (HCC)     Elevated troponin     SVT (supraventricular tachycardia) ([address]     Acute on chronic respiratory failure with hypoxia ([address] 2018    Community acquired bacterial pneumonia 2018    Acute respiratory failure with hypoxia and hypercapnia (HCC) 09/10/2018    COPD exacerbation ([address] 09/10/2018    Tobacco abuse 09/10/2018    Hypothyroidism 09/10/2018     Allergies: Allergies   Allergen Reactions    Promethazine Other (See Comments)     Extreme confusion (1/3/20)    Codeine Swelling     Onset Date: 22    Subjective: RN okayed SLP entry. Pain: none reported    Current Diet: ADULT DIET;  Dysphagia - Soft and Bite Sized  ADULT ORAL NUTRITION SUPPLEMENT; Breakfast, Lunch, Dinner; Low Calorie/High Protein Oral Supplement    Diet Tolerance:  Patient tolerating current diet level without signs/symptoms of penetration / aspiration. Dysphagia Treatment and Impressions:   Pt seen in room at bedside with RN permission   Chart Review/Interview: Pt denies solid PO trials d/t feeling full and \"not feeling good from breakfast.\" Pt denies feeling nauseous, stating she just has gas. Pt agreeable to few sips of juice. Pt reported eating about half of breakfast w/ no coughing during meal.   Respiratory Status: Pt with SPO2% of 100% on 2 LPM NC with RR of 18   Liquid PO Trials:    IDDSI 0 Thin:  Assessed via straw: no anterior bolus loss , suspect functional A-P bolus transit, swallow timing subjectively appears timely and no clinical s/s of aspiration   Solid PO Trials   Pt politely denied all solid PO trials     Education: SLP edu pt re: Role of SLP, Rationale for dysphagia tx, Recommended compensatory strategies and Aspiration precautions. Pt verbalized understanding   Assessment: Pt tolerating current diet with no clinical s/s of aspiration. Good carryover of recommended compensatory strategies   Recommendations: SLP recommends to continue with IDDSI 6 Soft and Bite Sized Solids; IDDSI 0 Thin Liquids; Meds whole in puree   Risk Management: upright for all intake, stay upright for at least 30 mins after intake, small bites/sips and assist feed. If the patient exhibits s/s of aspiration and/or worsening respiratory status, hold PO and contact SLP.     Dysphagia Goals:  Timeframe for Long-term Goals: 10 days (2/19/22)  Goal 1: Pt will demonstrate clincally safe swallow of soft solids and thin liquids without overt s/s aspiration or other respiratory distress 02/12; ongoing, progressing, see above     Short-term Goals  Timeframe for Short-term Goals: 7 days (2/16/22)  Dysphagia Goals:  Goal 1: The patient will tolerate recommended diet without observed clinical signs of aspiration, 02/12; ongoing, progressing, see above  Goal 2: The patient/caregiver will demonstrate understanding of compensatory strategies for improved swallowing safety. 02/12; ongoing, progressing, see above  Goal 3: The patient will tolerate mechanical soft foods 10/10. 02/12; not target, pt denied solid trials    Speech/Language/Cog Goals: N/A     Recommendations:  Solid Consistency: IDDSI 6 Soft and Bite Sized Solids  Liquid Consistency: IDDSI 0 Thin Liquids  Medication: Meds whole in puree    Patient/Family/Caregiver Education: See above    Compensatory Strategies: See above    Plan:    Continued Dysphagia treatment with goals per plan of care. Discharge Recommendations: TBD    If pt discharges from hospital prior to Speech/Swallowing discharge, this note serves as tx and discharge summary. Total Treatment Time / Charges     Time in Time out Total Time / units   Cognitive Tx         Speech Tx      Dysphagia Tx 1102 1108 8 min / 1 unit     Signature:  Kayy Umanzor M.A.  [id number] [facility]  Speech Language Pathologist

## 2022-02-12 NOTE — PROGRESS NOTES
RT Inhaler-Nebulizer Bronchodilator Protocol Note    There is a bronchodilator order in the chart from a provider indicating to follow the RT Bronchodilator Protocol and there is an Initiate RT Inhaler-Nebulizer Bronchodilator Protocol order as well (see protocol at bottom of note). CXR Findings:  No results found. The findings from the last RT Protocol Assessment were as follows:   History Pulmonary Disease: Chronic pulmonary disease  Respiratory Pattern: Dyspnea on exertion or RR 21-25 bpm  Breath Sounds: Slightly diminished and/or crackles  Cough: Weak, non-productive  Indication for Bronchodilator Therapy: Decreased or absent breath sounds  Bronchodilator Assessment Score: 9    Aerosolized bronchodilator medication orders have been revised according to the RT Inhaler-Nebulizer Bronchodilator Protocol below. Respiratory Therapist to perform RT Therapy Protocol Assessment initially then follow the protocol. Repeat RT Therapy Protocol Assessment PRN for score 0-3 or on second treatment, BID, and PRN for scores above 3. No Indications - adjust the frequency to every 6 hours PRN wheezing or bronchospasm, if no treatments needed after 48 hours then discontinue using Per Protocol order mode. If indication present, adjust the RT bronchodilator orders based on the Bronchodilator Assessment Score as indicated below. Use Inhaler orders unless patient has one or more of the following: on home nebulizer, not able to hold breath for 10 seconds, is not alert and oriented, cannot activate and use MDI correctly, or respiratory rate 25 breaths per minute or more, then use the equivalent nebulizer order(s) with same Frequency and PRN reasons based on the score. If a patient is on this medication at home then do not decrease Frequency below that used at home.     0-3 - enter or revise RT bronchodilator order(s) to equivalent RT Bronchodilator order with Frequency of every 4 hours PRN for wheezing or increased work of breathing using Per Protocol order mode. 4-6 - enter or revise RT Bronchodilator order(s) to two equivalent RT bronchodilator orders with one order with BID Frequency and one order with Frequency of every 4 hours PRN wheezing or increased work of breathing using Per Protocol order mode. 7-10 - enter or revise RT Bronchodilator order(s) to two equivalent RT bronchodilator orders with one order with TID Frequency and one order with Frequency of every 4 hours PRN wheezing or increased work of breathing using Per Protocol order mode. 11-13 - enter or revise RT Bronchodilator order(s) to one equivalent RT bronchodilator order with QID Frequency and an Albuterol order with Frequency of every 4 hours PRN wheezing or increased work of breathing using Per Protocol order mode. Greater than 13 - enter or revise RT Bronchodilator order(s) to one equivalent RT bronchodilator order with every 4 hours Frequency and an Albuterol order with Frequency of every 2 hours PRN wheezing or increased work of breathing using Per Protocol order mode. RT to enter RT Home Evaluation for COPD & MDI Assessment order using Per Protocol order mode.     Electronically signed by [employer] on 2/12/2022 at 8:16 AM

## 2022-02-12 NOTE — PROGRESS NOTES
Pulmonary Progress Note    CC: COVID-19 pneumonia and COPD    Events of Last 24 hours:   Some SOB after eating    Invasive Lines: Peripheral    MV: 2/5-2/8/22    Vitals:  Blood pressure 117/66, pulse 78, temperature 98 °F (36.7 °C), temperature source Oral, resp. rate 20, height 5' 4\" (1.626 m), weight 144 lb (65.3 kg), SpO2 98 %, not currently breastfeeding. on 3 L  Constitutional:  No acute distress. Eyes: PERRL. Conjunctivae anicteric. ENT: Normal nose. Normal tongue. Neck:  Trachea is midline. No thyroid tenderness. Respiratory: + accessory muscle usage. decreased breath sounds. No wheezes. No rales. + Rhonchi. Cardiovascular: Normal S1S2. No digit clubbing. No digit cyanosis. No LE edema. Psychiatric: No anxiety or Agitation. Alert and Oriented to person, place and time.     Scheduled Meds:   metoprolol tartrate  12.5 mg Oral BID    predniSONE  40 mg Oral Daily    albuterol-ipratropium  2 puff Inhalation Q4H While awake    aspirin  81 mg Oral Daily    busPIRone  10 mg Oral TID    levothyroxine  125 mcg Oral QAM AC    montelukast  10 mg Oral Nightly    lidocaine  1 patch TransDERmal Daily    nicotine  1 patch TransDERmal Daily    enoxaparin  30 mg SubCUTAneous BID    sodium chloride flush  5-40 mL IntraVENous 2 times per day    cefepime  2,000 mg IntraVENous Q12H     PRN Meds:  albuterol-ipratropium, guaiFENesin-dextromethorphan, HYDROcodone-acetaminophen, polyethylene glycol, acetaminophen **OR** acetaminophen, sodium chloride flush, sodium chloride, glucose, glucagon (rDNA), dextrose, dextrose bolus (hypoglycemia) **OR** dextrose bolus (hypoglycemia)    Results:  CBC:   Recent Labs     02/10/22  0509 02/11/22 0426 02/12/22 0426   WBC 28.1* 19.8* 16.4*   HGB 10.6* 9.1* 8.8*   HCT 32.4* 27.9* 27.9*   MCV 93.2 92.7 98.2   * 486* 281     BMP:   Recent Labs     02/10/22  0509 02/11/22 0426 02/12/22 0426    139 135*   K 3.9 4.3 3.8   CL 91* 98* 98*   CO2 38* 37* 31   BUN 42* 44* 38*   CREATININE <0.5* <0.5* <0.5*     LIVER PROFILE:   No results for input(s): AST, ALT, LIPASE, BILIDIR, BILITOT, ALKPHOS in the last 72 hours. Invalid input(s): AMYLASE,  ALB  Cultures:   1/29/2022 Strep pneumo & Legionella not detected  1/29/2022 BC NG  1/29/2022 COVID-19 detected  2/4/2022 BC NGTD  2/4/2022 urine NG  2/5/2022 tracheal aspirate: light growth aspergillus - likely colonizing as no sign of invasive disease on imaging    Procalcitonin 0.16  proBNP 10,600    Films:   CT Chest 1/29/2022    Mediastinum: No evidence of mediastinal lymphadenopathy.  The heart and   pericardium demonstrate no acute abnormality.  There is no acute abnormality   of the thoracic aorta.       Lungs/pleura: The lungs are without acute process. Valri Maid is a a 1 cm noncalcified nodule in the right lung base.  No evidence of pleural effusion or pneumothorax. Impression   No evidence of pulmonary embolism. Emphysema. There is a 1 cm noncalcified nodule in the right lung base.  This is new   compared to the prior exam of 01/17/2007.  Follow-up recommendations are   listed below. CTPA 2/10/22: no PE. Basilar atelectasis vs pneumonia and basilar mucus. Emphysema. RLL nodule the same and noted to be 9mm.     ASSESSMENT:  · Acute on chronic hypoxemic & hypercapnic respiratory failure; baseline 3-4 L O2   · COVID-19 pneumonia in an unvaccinated patient   · Severe COPD, with acute exacerbation - she was clearly improved when I saw her on 2/3/22 & 2/4/22; she was breathing comfortably on her home oxygen and her only complaint was chronic pain, she was asking to go home then markedly worsened shortly after leaving hospital, unclear precipitant  · Hyponatremia, slowly correcting, nephrology following  · RLL 9mm pulmonary nodule on CT 1/29/22 - concerning for bronchogenic carcinoma  · Anxiety, severe  · Chronic pain  · Tobacco abuse     PLAN:  Droplet Plus Airborne Precautions   Bipap QHS   Supplemental oxygen to maintain SaO2 >92%; wean as tolerated   Stopped Vancomycin and continue Cefepime D#7/7.  Prior admission completed 6 days Azithromycin, 7 days Ceftriaxone   Inhaled bronchodilators   Prednisone taper  Eventual PET scan vs biopsy vs resection for RLL nodule - could be addressed as an outpatient (now followed by Dr. Narcisa Coffey)   Get pt OOB and possibly d/c home today

## 2022-02-12 NOTE — PROGRESS NOTES
Inpatient Physical Therapy Daily Treatment Note    Unit: [facility]  Date:  2/12/2022  Patient Name:    Juarez Johnson  Admitting diagnosis:  Hyponatremia [E87.1]  Elevated troponin [R77.8]  CO2 narcosis [R06.89]  Acute respiratory failure with hypoxia and hypercapnia ([address] [J96.01, J96.02]  Acute on chronic respiratory failure with hypoxia and hypercapnia (HCC) [N14.23, J96.22]  COVID-19 virus infection [U07.1]  Admit Date:  2/4/2022  Precautions/Restrictions:  Fall risk, Lines -IV and Supplemental O2 (2L), Telemetry, Continuous pulse oximetry and Isolation Precautions: Droplet Plus - COVID      Discharge Recommendations: SNF  DME needs for discharge: defer to facility (RW)       Therapy recommendation for EMS Transport: can transport by wheelchair    Therapy recommendations for staff:   Assist of 1 with use of rolling walker (RW) for all transfers to/from [facility]  to/from [facility]    History of Present Illness: Per Dr. Villafana Pulse progress note 2/09: \"61F admitted to [facility] 1/29-2/4/22 for acute on chronic hypoxic respiratory failure, COVID-19 pneumonia, and exacerbation of severe COPD discharged home on 4 L nasal cannula oxygen on 2/4/2022. Jesus Lights to the ER later on 2/4/2022 in respiratory distress, immediately placed on BiPAP.  Following morning patient with worsening breathing, increased fatigue, requesting to be intubated.  Patient intubated by ER provider. Admitted to ICU\"  Intubated 2/05   Extubated 2/08    Home Health S4 Level Recommendation: Level 3 Safety  AM-PAC Mobility Score   AM-PAC Inpatient Mobility Raw Score : 17    Treatment Time:  8587-3914  Treatment number: 2  Timed Code Treatment Minutes: 25 minutes  Total Treatment Minutes:  25  minutes    Cognition    A&O x4   Able to follow 2 step commands    Subjective  Patient lying supine in bed with no family present   Pt agreeable to this PT tx. Pt stated, \"My goal is to go home. \"    Pain   No  Location:   Rating:    NA/10  Pain Medicine Status: No request made Bed Mobility   Supine to Sit:    SBA  Sit to Supine:   Not Tested  Rolling:   SBA  Scooting:   SBA    Transfer Training     Sit to stand:   Min A   Stand to sit:   Min A   Bed to Chair:   Min A  with use of No AD    Gait Training gait deferred due to pt too fatigued to attempt following standing; pt ambulated 0 ft. Distance:      0 ft  Deviations (firm surface/linoleum):  N/A  Assistive Device Used:    N/A  Level of Assist:    Not Tested  Comment:     Stair Training deferred, pt unsafe/not appropriate to complete stairs at this time    Therapeutic Exercise all completed bilaterally unless indicated  Ankle Pumps x 10 reps    Balance  Sitting:  Good ; SBA  Comments: ~ 5 min at EOB    Standing: Fair +; CGA  Comments: ~ 1 min with unilateral UE support    Patient Education      Role of PT, POC, Discharge recommendations, safety awareness, transfer techniques, pacing activity, HEP and calling for assist with mobility. Positioning Needs       Pt up in chair, no alarm needed, positioned in proper neutral alignment and pressure relief provided. Call light provided and all needs within reach    Activity Tolerance   Pt completed therapy session with SOB noted with mobility, able to recover with extended rest breaks. Other  N/A    Assessment :  Patient demonstrates improved functional activity tolerance from initial evaluation with progression to transfer training this date. Pt requiring min A for transfers this date. Attempted ambulation this date, pt unable to perform due to fatigue following transfer. Discussed concerns about functional activity tolerance, pt verbalized understanding. Recommending SNF upon discharge as patient functioning well below baseline, demonstrates good rehab potential and unable to return home due to burden of care beyond caregiver ability, home environment not conducive to patient recovery and limited safety awareness.  If pt chooses to discharge home, will require 24/7 assist, home PT and RW. Goals (all goals ongoing unless otherwise indicated)  To be met in 3 visits:  1). Independent with LE Ex x 10 reps     To be met in 6 visits:  1). Supine to/from sit: Min A - MET 2/12/22, upgrade: IND  2). Sit to/from stand: Min A - MET 2/12/22, upgrade: SBA  3). Bed to chair: Mod A - MET 2/12/22, upgrade: SBA  4). Gait: Ambulate 20 ft.   with  Min A  and use of LRAD (least restrictive assistive device)  5). Tolerate B LE exercises 3 sets of 10-15 reps    Plan   Continue with plan of care. Signature: Margi Walls, PT, DPT #[id number]    If patient discharges from this facility prior to next visit, this note will serve as the Discharge Summary.

## 2022-02-12 NOTE — PROGRESS NOTES
Occupational Therapy Daily Treatment Note    Unit: [facility]   Date:  2/12/2022  Patient Name:    Calista Rios  Admitting diagnosis:  Hyponatremia [E87.1]  Elevated troponin [R77.8]  CO2 narcosis [R06.89]  Acute respiratory failure with hypoxia and hypercapnia ([address] [J96.01, J96.02]  Acute on chronic respiratory failure with hypoxia and hypercapnia (HCC) [W22.45, J96.22]  COVID-19 virus infection [U07.1]  Admit Date:  2/4/2022  Precautions/Restrictions:  Fall risk, Bed/chair alarm, Lines -IV, Supplemental O2 (2L) and Purewick catheter, Telemetry, Continuous pulse oximetry and Isolation Precautions: Droplet Plus - COVID. Treatment Time:  8702-1737  Treatment number:  2   Total Treatment Time:   25 minutes    Patient Goals for Session:  \" to go home \"      Discharge Recommendations: SNF - Pt declining. Home with strict 24/7 assist, [address]     DME needs for discharge: RW       Therapy recommendations for staff:  Assist of 1 with use of rolling walker and gait belt for all transfers to/from BSC/chair    History of Present Illness: per H&P 1-29-22    61 y. o. female for shortness of breath. Onset was 1 week.  Context includes patient reports that she has had increasing shortness of breath particularly with exertion over the last week.  Patient reports that she has also had 2 increase her oxygen requirement at home. Gerardo Olsen states that she is typically on 3 L but was up to 4 at home.  Patient reports that with her increased shortness of breath she activated EMS and she was told that her saturations were in the 80s when EMS arrived. Gerardo Olsen was provided with a breathing treatment in route and had her oxygen at 6 L in the ambulance and states that she did feel better.  Patient denies chest pain. Alleviating factors include nothing. Aggravating factors include nothing.  Pain is 0/10.  Nothing has been used for pain today.     Pt was evaluated by OT on 2-2-22   Pt D/C from this hospital to home on 2-4-22 and pt readmitted later the same day and  Per EMS she was on 5 L and at 80%.  Currently on 10 L nonrebreather and given DuoNeb by respiratory on arrival. She is awake but not responding. Suspect CO2 retention and respiratory here starting her on bipap.    The history is provided by the EMS personnel  patient subsequently intubated by ER MD 2-5-22 extubated on 2-7-22 to [address] S4 Level Recommendation:  Level 3 Safety     AM-PAC Score: AM-PAC Inpatient Daily Activity Raw Score: 11  Pt scored a 11/24 on the AM-PAC ADL Inpatient form. Current research shows that an AM-PAC score of 17 or less is typically not associated with a discharge to the patient's home setting. Subjective:  Pt supine in bed upon therapist arrival. Pt agreeable to work with therapy this date. Pain   No      Cognition:    A&O x4   Able to follow 2 step commands, consistently. Balance:  Functional Sitting Balance:  WFL   Comments: at  EOB  Functional Standing Balance:Diminished   Comments: needs RW     Bed Mobility   Supine to Sit:               SBA  Sit to Supine:               Not Tested  Rolling:                        SBA  Scooting:                     SBA     Transfer Training                    Sit to stand:                 Min A   Stand to sit:                 Min A   Bed to Chair:               Min A  with use of No AD  Activities of Daily Living:  Not tested. Session focused on transfer, fatigue mgmt, and dc conversation. Activity Tolerance:   Pt completed therapy session with SOB noted with activity. Therapeutic Exercise:   N/A    Patient Education:   Role of OT  Recommendations for DC    Positioning Needs:   Up in chair, call light and needs in reach. Family Present:  No    Assessment: Pt with good progress. Able to tolerate transfer. Pt may benefit from continued skilled occupational therapy while in the hospital in order to progress to a safe and more indpt level of functioning. GOALS  To be met in 3 Visits:  1).  Bed to toilet/BSC: Mod A      To be met in 5 Visits:  1). Supine to/from Sit:             Min A   2). Upper Body Bathing:         Min A   3). Lower Body Bathing:         Min A  and Mod A   4). Upper Body Dressing:       Min A   5). Lower Body Dressing: Mod A  and Max A   6).  Pt to demonstrate UE exs x 15 reps with minimal cues      Plan: cont with POC      Chito Mir, MOT, OTR/L   [id number]       If patient discharges from this facility prior to next visit, this note will serve as the Discharge Summary

## 2022-02-12 NOTE — PROGRESS NOTES
Progress Note    Admit Date:  2/4/2022    61F admitted to [facility] 1/29-2/4/22 for acute on chronic hypoxic respiratory failure, COVID-19 pneumonia, and exacerbation of severe COPD discharged home on 4 L nasal cannula oxygen on 2/4/2022. Return to the ER later on 2/4/2022 in respiratory distress, immediately placed on BiPAP. Following morning patient with worsening breathing, increased fatigue, requesting to be intubated. Patient intubated by ER provider. Admitted to ICU    Extubated to bipap 2/8     Subjective:  Weak, did not do very well with therapy. Breathing better. Objective:   Patient Vitals for the past 4 hrs:   Resp SpO2   02/12/22 1119 20 98 %            Intake/Output Summary (Last 24 hours) at 2/12/2022 1412  Last data filed at 2/12/2022 1200  Gross per 24 hour   Intake 480 ml   Output 1425 ml   Net -945 ml       Physical Exam:  Patient seen in droplet plus precautions  Gen:  Middle aged female seen up in bed, fully awake, alert   Eyes: PERRL. No sclera icterus. No conjunctival injection. .   Neck: No JVD. Trachea midline. Resp:  Improved bilateral  breath sounds . resolved wheeze   CV:  Regular rate and  rhythm. No murmur. No rub. No edema. Capillary Refill: Brisk,< 3 seconds   Peripheral Pulses: +2 palpable, equal bilaterally   GI: Non-tender. Non-distended. Normal bowel sounds. Skin: Warm and dry. No nodule on exposed extremities. No rash on exposed extremities. M/S: No cyanosis. No joint deformity. No clubbing.    Neuro: non focal      Scheduled Meds:   [START ON 2/13/2022] predniSONE  30 mg Oral Daily    voriconazole  400 mg Oral BID    [START ON 2/13/2022] voriconazole  300 mg Oral BID    metoprolol tartrate  12.5 mg Oral BID    albuterol-ipratropium  2 puff Inhalation Q4H While awake    aspirin  81 mg Oral Daily    busPIRone  10 mg Oral TID    levothyroxine  125 mcg Oral QAM AC    montelukast  10 mg Oral Nightly    lidocaine  1 patch TransDERmal Daily    nicotine  1 patch TransDERmal Daily    enoxaparin  30 mg SubCUTAneous BID    sodium chloride flush  5-40 mL IntraVENous 2 times per day    cefepime  2,000 mg IntraVENous Q12H       Continuous Infusions:   sodium chloride 25 mL (02/11/22 7016)    dextrose         PRN Meds:  albuterol-ipratropium, guaiFENesin-dextromethorphan, HYDROcodone-acetaminophen, polyethylene glycol, acetaminophen **OR** acetaminophen, sodium chloride flush, sodium chloride, glucose, glucagon (rDNA), dextrose, dextrose bolus (hypoglycemia) **OR** dextrose bolus (hypoglycemia)      Data:  CBC:   Recent Labs     02/10/22  0509 02/11/22 0426 02/12/22 0426   WBC 28.1* 19.8* 16.4*   HGB 10.6* 9.1* 8.8*   HCT 32.4* 27.9* 27.9*   MCV 93.2 92.7 98.2   * 486* 281     BMP:   Recent Labs     02/10/22  0509 02/11/22 0426 02/12/22 0426    139 135*   K 3.9 4.3 3.8   CL 91* 98* 98*   CO2 38* 37* 31   BUN 42* 44* 38*   CREATININE <0.5* <0.5* <0.5*       Blood: NGTD  Sputum: aspergillus     Urine: ordered      RADIOLOGY  CT CHEST PULMONARY EMBOLISM W CONTRAST   Final Result   Motion limited. No central pulmonary embolus identified. There is moderate   coronary artery calcification      Increased filling defects seen in the lower lobe airways bilaterally with   increased basilar consolidation, either atelectasis or pneumonia      Underlying emphysema. Dominant pulmonary nodule right lower lobe is not   significantly changed. Consider short-term follow-up chest CT versus PET-CT   is described previously      RECOMMENDATIONS:   Unavailable         XR CHEST PORTABLE   Final Result   No significant change         XR CHEST PORTABLE   Final Result   1. Endotracheal tube remains in appropriate position. 2. No new findings identified. XR CHEST PORTABLE   Final Result   Stable examination with findings of COPD. XR CHEST PORTABLE   Final Result   No acute process. Stable exam with stable lines and tubes. Stable findings of COPD. XR CHEST 1 VIEW   Final Result   Endotracheal tube satisfactory position above the alley      OG side-port in gastric fundus, tip not visualized      Otherwise, no acute abnormalities seen in the chest         XR CHEST PORTABLE   Final Result   Probable scarring in the left midlung      Otherwise, no acute cardiopulmonary process         XR CHEST PORTABLE   Final Result   COPD with resolving central pulmonary congestion. Slowly resolving bibasilar opacities. Tiny right pleural effusion which is less prominent. CT chest PE 1/29/22  Impression   No evidence of pulmonary embolism.       Emphysema.       There is a 1 cm noncalcified nodule in the right lung base.  This is new   compared to the prior exam of 01/17/2007.  Follow-up recommendations are   listed below. Assessment/Plan:    #Acute on chronic hypoxic and hypercarbic respiratory failure  #COVID 19 Pneumonia   #Severe COPD with AE  - droplet + isolation   - tested positive for COVID 19 on 1/29/22. Admitted 1/29-2/4/22 treated with decadron, zithromax (6 days) and rocephin (7 days) at that time. Dc'd home on 4-5L and returned to ER later same day and placed on BiPAP with pH 7.1 and PCO2 140. - Initially improved with BiPAP on repeat ABG, but morning of 2/5 patient reported extreme fatigue from work of breathing and requested intubation. Patient intubated by ER MD on 2/5/22   - pulm c/s  - treated with Solumedrol 40 mg BID   - vanc and cefepime D8 ( it appears vanc has been stopped) for possible superimposed bacterial PNA   - inhaled bronchodilators   - improving symptoms , off vent , now using bipap prn   - repeat CTA neg for PE   - resp culture from 2/5 - aspergillus - ? If colonizer or invasive - no signs of bronchopulmonary aspergillosis on imaging.     - will check beta d glucan.    - discussed with pulmonology.           #Elevated troponin   -0.09, 0.07, 0.04, 0.03, respectively  -Patient denies chest pain  -EKG with nonspecific EKG changes, cardiologist has reviewed EKGs, I reviewed with Dr. Meghann Rodriguez as well   -Suspect elevation secondary to demand mismatch in acute respiratory failure. Continue to monitor on telemetry  - ASA added on admit, continue for now     #Severe anxiety   - cont buspar     #Hyponatremia   - [id number]  - improved after IV NS.    - nephro c/s    #Right lung nodule   - per pulm c/s last admit: PET scan vs biopsy vs resection for RLL nodule - could be addressed as an outpatient     #Chronic back pain   -  Was on PRN norco- lidocaine patch    #Hypothyroidism  - home dose of synthroid continued         #Tobacco dependence  - nicotine patch ordered    - cessation recommended    DVT Prophylaxis: Lovenox   Diet: ADULT DIET; Dysphagia - Soft and Bite Sized  ADULT ORAL NUTRITION SUPPLEMENT; Breakfast, Lunch, Dinner; Low Calorie/High Protein Oral Supplement  Code Status: Full Code     Cont PT efforts. Home vs ECF next 24 hrs.      Jessica Lau MD, 2/12/2022 2:12 PM

## 2022-02-12 NOTE — PROGRESS NOTES
02/11/22 2258   NIV Type   Equipment Type v60   Mode Bilevel   Mask Type Full face mask   Mask Size Small   Settings/Measurements   IPAP 14 cmH20   CPAP/EPAP 7 cmH2O   Rate Ordered 14   Resp 22   FiO2  40 %   Vt Exhaled 550 mL   Minute Volume 12 Liters   Mask Leak (lpm) 0 lpm   Comfort Level Good   Using Accessory Muscles No   SpO2 98   Patient Observation   Observations spo2 98% on 40% bipap

## 2022-02-13 VITALS
HEART RATE: 96 BPM | SYSTOLIC BLOOD PRESSURE: 111 MMHG | WEIGHT: 138.8 LBS | TEMPERATURE: 98 F | DIASTOLIC BLOOD PRESSURE: 57 MMHG | RESPIRATION RATE: 20 BRPM | BODY MASS INDEX: 23.7 KG/M2 | HEIGHT: 64 IN | OXYGEN SATURATION: 99 %

## 2022-02-13 LAB
ANION GAP SERPL CALCULATED.3IONS-SCNC: 7 MMOL/L (ref 3–16)
BASOPHILS ABSOLUTE: 0 K/UL (ref 0–0.2)
BASOPHILS RELATIVE PERCENT: 0 %
BUN BLDV-MCNC: 30 MG/DL (ref 7–20)
CALCIUM SERPL-MCNC: 8.6 MG/DL (ref 8.3–10.6)
CHLORIDE BLD-SCNC: 96 MMOL/L (ref 99–110)
CO2: 31 MMOL/L (ref 21–32)
CREAT SERPL-MCNC: <0.5 MG/DL (ref 0.6–1.2)
EOSINOPHILS ABSOLUTE: 0 K/UL (ref 0–0.6)
EOSINOPHILS RELATIVE PERCENT: 0 %
GFR AFRICAN AMERICAN: >60
GFR NON-AFRICAN AMERICAN: >60
GLUCOSE BLD-MCNC: 82 MG/DL (ref 70–99)
HCT VFR BLD CALC: 26.5 % (ref 36–48)
HEMOGLOBIN: 8.6 G/DL (ref 12–16)
LYMPHOCYTES ABSOLUTE: 2.4 K/UL (ref 1–5.1)
LYMPHOCYTES RELATIVE PERCENT: 12 %
MCH RBC QN AUTO: 30.5 PG (ref 26–34)
MCHC RBC AUTO-ENTMCNC: 32.5 G/DL (ref 31–36)
MCV RBC AUTO: 93.9 FL (ref 80–100)
MONOCYTES ABSOLUTE: 0.4 K/UL (ref 0–1.3)
MONOCYTES RELATIVE PERCENT: 2 %
NEUTROPHILS ABSOLUTE: 17.1 K/UL (ref 1.7–7.7)
NEUTROPHILS RELATIVE PERCENT: 86 %
PDW BLD-RTO: 13.7 % (ref 12.4–15.4)
PLATELET # BLD: 411 K/UL (ref 135–450)
PLATELET SLIDE REVIEW: ADEQUATE
PMV BLD AUTO: 7.8 FL (ref 5–10.5)
POTASSIUM REFLEX MAGNESIUM: 3.8 MMOL/L (ref 3.5–5.1)
RBC # BLD: 2.82 M/UL (ref 4–5.2)
SLIDE REVIEW: ABNORMAL
SODIUM BLD-SCNC: 134 MMOL/L (ref 136–145)
WBC # BLD: 19.9 K/UL (ref 4–11)

## 2022-02-13 PROCEDURE — 94761 N-INVAS EAR/PLS OXIMETRY MLT: CPT

## 2022-02-13 PROCEDURE — 99233 SBSQ HOSP IP/OBS HIGH 50: CPT | Performed by: INTERNAL MEDICINE

## 2022-02-13 PROCEDURE — 6360000002 HC RX W HCPCS: Performed by: INTERNAL MEDICINE

## 2022-02-13 PROCEDURE — 80048 BASIC METABOLIC PNL TOTAL CA: CPT

## 2022-02-13 PROCEDURE — 6370000000 HC RX 637 (ALT 250 FOR IP): Performed by: INTERNAL MEDICINE

## 2022-02-13 PROCEDURE — 2700000000 HC OXYGEN THERAPY PER DAY

## 2022-02-13 PROCEDURE — 36415 COLL VENOUS BLD VENIPUNCTURE: CPT

## 2022-02-13 PROCEDURE — 94640 AIRWAY INHALATION TREATMENT: CPT

## 2022-02-13 PROCEDURE — 85025 COMPLETE CBC W/AUTO DIFF WBC: CPT

## 2022-02-13 PROCEDURE — 94660 CPAP INITIATION&MGMT: CPT

## 2022-02-13 PROCEDURE — 99239 HOSP IP/OBS DSCHRG MGMT >30: CPT | Performed by: INTERNAL MEDICINE

## 2022-02-13 PROCEDURE — 2580000003 HC RX 258: Performed by: INTERNAL MEDICINE

## 2022-02-13 RX ORDER — PREDNISONE 10 MG/1
TABLET ORAL
Qty: 14 TABLET | Refills: 0 | Status: SHIPPED | OUTPATIENT
Start: 2022-02-13

## 2022-02-13 RX ADMIN — ENOXAPARIN SODIUM 30 MG: 100 INJECTION SUBCUTANEOUS at 09:02

## 2022-02-13 RX ADMIN — IPRATROPIUM BROMIDE AND ALBUTEROL 2 PUFF: 20; 100 SPRAY, METERED RESPIRATORY (INHALATION) at 12:45

## 2022-02-13 RX ADMIN — BUSPIRONE HYDROCHLORIDE 10 MG: 10 TABLET ORAL at 09:00

## 2022-02-13 RX ADMIN — CEFEPIME 2000 MG: 2 INJECTION, POWDER, FOR SOLUTION INTRAMUSCULAR; INTRAVENOUS at 12:21

## 2022-02-13 RX ADMIN — CEFEPIME 2000 MG: 2 INJECTION, POWDER, FOR SOLUTION INTRAMUSCULAR; INTRAVENOUS at 00:48

## 2022-02-13 RX ADMIN — LEVOTHYROXINE SODIUM 125 MCG: 0.03 TABLET ORAL at 06:37

## 2022-02-13 RX ADMIN — PREDNISONE 30 MG: 20 TABLET ORAL at 09:01

## 2022-02-13 RX ADMIN — ASPIRIN 81 MG: 81 TABLET, COATED ORAL at 09:02

## 2022-02-13 RX ADMIN — METOPROLOL TARTRATE 12.5 MG: 25 TABLET, FILM COATED ORAL at 09:01

## 2022-02-13 RX ADMIN — Medication 10 ML: at 09:45

## 2022-02-13 RX ADMIN — IPRATROPIUM BROMIDE AND ALBUTEROL 2 PUFF: 20; 100 SPRAY, METERED RESPIRATORY (INHALATION) at 07:00

## 2022-02-13 ASSESSMENT — PAIN DESCRIPTION - LOCATION
LOCATION: BACK
LOCATION: BACK

## 2022-02-13 ASSESSMENT — PAIN DESCRIPTION - DESCRIPTORS
DESCRIPTORS: ACHING
DESCRIPTORS: ACHING

## 2022-02-13 ASSESSMENT — PAIN SCALES - GENERAL
PAINLEVEL_OUTOF10: 3
PAINLEVEL_OUTOF10: 0
PAINLEVEL_OUTOF10: 3

## 2022-02-13 ASSESSMENT — PAIN DESCRIPTION - PROGRESSION
CLINICAL_PROGRESSION: NOT CHANGED
CLINICAL_PROGRESSION: NOT CHANGED

## 2022-02-13 ASSESSMENT — PAIN - FUNCTIONAL ASSESSMENT
PAIN_FUNCTIONAL_ASSESSMENT: PREVENTS OR INTERFERES SOME ACTIVE ACTIVITIES AND ADLS
PAIN_FUNCTIONAL_ASSESSMENT: PREVENTS OR INTERFERES SOME ACTIVE ACTIVITIES AND ADLS

## 2022-02-13 ASSESSMENT — PAIN DESCRIPTION - ORIENTATION
ORIENTATION: MID
ORIENTATION: MID

## 2022-02-13 ASSESSMENT — PAIN DESCRIPTION - FREQUENCY
FREQUENCY: CONTINUOUS
FREQUENCY: CONTINUOUS

## 2022-02-13 ASSESSMENT — PAIN DESCRIPTION - ONSET
ONSET: ON-GOING
ONSET: ON-GOING

## 2022-02-13 ASSESSMENT — PAIN DESCRIPTION - PAIN TYPE
TYPE: CHRONIC PAIN
TYPE: CHRONIC PAIN

## 2022-02-13 NOTE — PLAN OF CARE
Problem: Nutrition  Goal: Optimal nutrition therapy  Outcome: Ongoing  Goal: Understanding of nutritional guidelines  Outcome: Ongoing     Problem: Airway Clearance - Ineffective  Goal: Achieve or maintain patent airway  Outcome: Ongoing     Problem: Gas Exchange - Impaired  Goal: Absence of hypoxia  Outcome: Ongoing  Goal: Promote optimal lung function  Outcome: Ongoing     Problem: Breathing Pattern - Ineffective  Goal: Ability to achieve and maintain a regular respiratory rate  Outcome: Ongoing     Problem: Body Temperature -  Risk of, Imbalanced  Goal: Ability to maintain a body temperature within defined limits  Outcome: Ongoing  Goal: Will regain or maintain usual level of consciousness  Outcome: Ongoing  Goal: Complications related to the disease process, condition or treatment will be avoided or minimized  Outcome: Ongoing     Problem: Nutrition Deficits  Goal: Optimize nutritional status  Outcome: Ongoing     Problem: Risk for Fluid Volume Deficit  Goal: Maintain normal heart rhythm  Outcome: Ongoing  Goal: Maintain absence of muscle cramping  Outcome: Ongoing  Goal: Maintain normal serum potassium, sodium, calcium, phosphorus, and pH  Outcome: Ongoing     Continue with plan of care.

## 2022-02-13 NOTE — PROGRESS NOTES
Pulmonary Progress Note    CC: COVID-19 pneumonia and COPD    Events of Last 24 hours:   SOB remains improved    Invasive Lines: Peripheral    MV: 2/5-2/8/22    Vitals:  Blood pressure (!) 111/57, pulse 96, temperature 98 °F (36.7 °C), temperature source Axillary, resp. rate 20, height 5' 4\" (1.626 m), weight 138 lb 12.8 oz (63 kg), SpO2 100 %, not currently breastfeeding. on 3 L  Constitutional:  No acute distress. Eyes: PERRL. Conjunctivae anicteric. ENT: Normal nose. Normal tongue. Neck:  Trachea is midline. No thyroid tenderness. Respiratory: + accessory muscle usage. decreased breath sounds. No wheezes. No rales. + Rhonchi. Cardiovascular: Normal S1S2. No digit clubbing. No digit cyanosis. No LE edema. Psychiatric: No anxiety or Agitation. Alert and Oriented to person, place and time.     Scheduled Meds:   predniSONE  30 mg Oral Daily    metoprolol tartrate  12.5 mg Oral BID    albuterol-ipratropium  2 puff Inhalation Q4H While awake    aspirin  81 mg Oral Daily    busPIRone  10 mg Oral TID    levothyroxine  125 mcg Oral QAM AC    montelukast  10 mg Oral Nightly    lidocaine  1 patch TransDERmal Daily    nicotine  1 patch TransDERmal Daily    enoxaparin  30 mg SubCUTAneous BID    sodium chloride flush  5-40 mL IntraVENous 2 times per day    cefepime  2,000 mg IntraVENous Q12H     PRN Meds:  albuterol-ipratropium, guaiFENesin-dextromethorphan, HYDROcodone-acetaminophen, polyethylene glycol, acetaminophen **OR** acetaminophen, sodium chloride flush, sodium chloride, glucose, glucagon (rDNA), dextrose, dextrose bolus (hypoglycemia) **OR** dextrose bolus (hypoglycemia)    Results:  CBC:   Recent Labs     02/11/22 0426 02/12/22 0426 02/13/22  0511   WBC 19.8* 16.4* 19.9*   HGB 9.1* 8.8* 8.6*   HCT 27.9* 27.9* 26.5*   MCV 92.7 98.2 93.9   * 281 411     BMP:   Recent Labs     02/11/22 0426 02/12/22 0426 02/13/22  0511    135* 134*   K 4.3 3.8 3.8   CL 98* 98* 96*   CO2 37* 31 31   BUN 44* 38* 30*   CREATININE <0.5* <0.5* <0.5*     LIVER PROFILE:   No results for input(s): AST, ALT, LIPASE, BILIDIR, BILITOT, ALKPHOS in the last 72 hours. Invalid input(s): AMYLASE,  ALB  Cultures:   1/29/2022 Strep pneumo & Legionella not detected  1/29/2022 BC NG  1/29/2022 COVID-19 detected  2/4/2022 BC NGTD  2/4/2022 urine NG  2/5/2022 tracheal aspirate: light growth aspergillus - likely colonizing as no sign of invasive disease on imaging    Procalcitonin 0.16  proBNP 10,600    Films:   CT Chest 1/29/2022    Mediastinum: No evidence of mediastinal lymphadenopathy.  The heart and   pericardium demonstrate no acute abnormality.  There is no acute abnormality   of the thoracic aorta.       Lungs/pleura: The lungs are without acute process. [place] is a a 1 cm noncalcified nodule in the right lung base.  No evidence of pleural effusion or pneumothorax. Impression   No evidence of pulmonary embolism. Emphysema. There is a 1 cm noncalcified nodule in the right lung base.  This is new   compared to the prior exam of 01/17/2007.  Follow-up recommendations are   listed below. CTPA 2/10/22: no PE. Basilar atelectasis vs pneumonia and basilar mucus. Emphysema. RLL nodule the same and noted to be 9mm.     ASSESSMENT:  · Acute on chronic hypoxemic & hypercapnic respiratory failure; baseline 3-4 L O2   · COVID-19 pneumonia in an unvaccinated patient   · Severe COPD, with acute exacerbation - she was clearly improved when I saw her on 2/3/22 & 2/4/22; she was breathing comfortably on her home oxygen and her only complaint was chronic pain, she was asking to go home then markedly worsened shortly after leaving hospital, unclear precipitant  · Hyponatremia, slowly correcting, nephrology following  · RLL 9mm pulmonary nodule on CT 1/29/22 - concerning for bronchogenic carcinoma  · Anxiety, severe  · Chronic pain  · Tobacco abuse     PLAN:  Droplet Plus Airborne Precautions   Supplemental oxygen to maintain SaO2 >92%; wean as tolerated   Stopped Vancomycin and continue Cefepime D#7/7.  Prior admission completed 6 days Azithromycin, 7 days Ceftriaxone   Inhaled bronchodilators   Prednisone taper  Eventual PET scan vs biopsy vs resection for RLL nodule - could be addressed as an outpatient (now followed by Dr. Andreas Celestin)   PT/OT

## 2022-02-13 NOTE — PROGRESS NOTES
Pt is resting in bed. Call light is within reach, bed locked in lowest position for safety. No needs expressed at this time.

## 2022-02-13 NOTE — CARE COORDINATION
DISCHARGE ORDER  Date/Time 2022 3:08 PM  Completed by: Masoud Rogers RN, Case Management    Patient Name: Debbie Cartwright      : 1960  Admitting Diagnosis: Hyponatremia [E87.1]  Elevated troponin [R77.8]  CO2 narcosis [R06.89]  Acute respiratory failure with hypoxia and hypercapnia ([address] [J96.01, J96.02]  Acute on chronic respiratory failure with hypoxia and hypercapnia ([address] [J96.21, J96.22]  COVID-19 virus infection [U07.1]      Admit order Date and Status:2022  (verify MD's last order for status of admission)      Noted discharge order. If applicable PT/OT recommendation at Discharge: SNF  DME recommendation by PT/OT:(RW)  Confirmed discharge plan: Yes  with whom_pt and spouse______________  If pt confirmed DC plan does family need to be contacted by CM No if yes who______  Discharge Plan: Chart reviewed. Spoke with pt and she continues to refuse STR and is returning home with her spouse who will provide / assistance. Spouse told writer yesterday that he wants pt to return home as [facility] does not do well at rehab. \" Pt remains agreeable to [facility] Bueno with [facility]. TC to [phone] spoke with Ruby Alegria and all documentaiton faxed to [facility] at this time for resumption of care. Discussed with pt PT recommendation of RW and pt states she has rollator that she wants to use. Pt is refusing RW at this time. Pt has POC at bedside, active with [facility], on 3L currenlty. Reviewed chart. Role of discharge planner explained and patient verbalized understanding. Discharge order is noted. Has Home O2 in place on admit:  Yes  Informed of need to bring portable home O2 tank on day of discharge for nursing to connect prior to leaving:   Yes  Verbalized agreement/Understanding:   Yes  Pt is being d/c'd to home today. Pt's O2 sats are 99% on 3L. All discharge needs and concerns addressed.

## 2022-02-13 NOTE — DISCHARGE SUMMARY
Hospital Medicine Discharge Summary    Patient ID: Sherlyn Garzon      Patient's PCP: Pamela Davalos MD    Admit Date: 2/4/2022     Discharge Date:   2/13/2022    Admitting Provider: Lg Obregon MD     Discharge Provider: Joce Aaron MD     Discharge Diagnoses: Active Hospital Problems    Diagnosis     Acute on chronic respiratory failure with hypoxia and hypercapnia (HCC) [J96.21, J96.22]     Elevated troponin [R77.8]     Acute respiratory failure with hypoxia and hypercapnia (HCC) [J96.01, J96.02]        The patient was seen and examined on day of discharge and this discharge summary is in conjunction with any daily progress note from day of discharge. Hospital Course: 61F admitted to [facility] 1/29-2/4/22 for acute on chronic hypoxic respiratory failure, COVID-19 pneumonia, and exacerbation of severe COPD discharged home on 4 L nasal cannula oxygen on 2/4/2022. Return to the ER later on 2/4/2022 in respiratory distress, immediately placed on BiPAP. Following morning patient with worsening breathing, increased fatigue, requesting to be intubated. Patient intubated by ER provider. Admitted to ICU    #Acute on chronic hypoxic and hypercarbic respiratory failure  #COVID 19 Pneumonia   #Severe COPD with AE  - droplet + isolation   - tested positive for COVID 19 on 1/29/22. Admitted 1/29-2/4/22 treated with decadron, zithromax (6 days) and rocephin (7 days) at that time. Dc'd home on 4-5L and returned to ER later same day and placed on BiPAP with pH 7.1 and PCO2 140. - Initially improved with BiPAP on repeat ABG, but morning of 2/5 patient reported extreme fatigue from work of breathing and requested intubation.   Patient intubated by ER MD on 2/5/22   - pulm c/s  - treated with Solumedrol 40 mg BID   - vanc and cefepime D8 ( it appears vanc has been stopped) for possible superimposed bacterial PNA   - inhaled bronchodilators   - improving symptoms , off vent , now using bipap prn   - repeat CTA neg for PE              - resp culture from 2/5 - aspergillus - ? If colonizer or invasive - no signs of bronchopulmonary aspergillosis on imaging.                - will check beta d glucan.               - discussed with pulmonology.    finished Abx course in house.           #Elevated troponin   -0.09, 0.07, 0.04, 0.03, respectively  -Patient denies chest pain  -EKG with nonspecific EKG changes, cardiologist has reviewed EKGs, I reviewed with Dr. Nidhi Washington as well   -Suspect elevation secondary to demand mismatch in acute respiratory failure. Continue to monitor on telemetry  - ASA added on admit, continue for now      #Severe anxiety   - cont buspar      #Hyponatremia   - 324>898>588  - improved after IV NS.    - nephro c/s     #Right lung nodule   - per pulm c/s last admit: PET scan vs biopsy vs resection for RLL nodule - could be addressed as an outpatient      #Chronic back pain   -  Was on PRN norco- lidocaine patch     #Hypothyroidism  - home dose of synthroid continued            #Tobacco dependence  - nicotine patch ordered    - cessation recommended       Pulm Nodule - OP work up per pulm team.       Physical Exam Performed:     BP (!) 111/57   Pulse 96   Temp 98 °F (36.7 °C) (Axillary)   Resp 20   Ht 5' 4\" (1.626 m)   Wt 138 lb 12.8 oz (63 kg)   LMP  (LMP Unknown)   SpO2 100%   BMI 23.82 kg/m²       General appearance:  No apparent distress, appears stated age and cooperative. HEENT:  Normal cephalic, atraumatic without obvious deformity. Pupils equal, round, and reactive to light. Extra ocular muscles intact. Conjunctivae/corneas clear. Neck: Supple, with full range of motion. No jugular venous distention. Trachea midline. Respiratory:  Normal respiratory effort. Clear to auscultation, bilaterally without Rales/Wheezes/Rhonchi. Cardiovascular:  Regular rate and rhythm with normal S1/S2 without murmurs, rubs or gallops.   Abdomen: Soft, non-tender, non-distended with normal bowel sounds. Musculoskeletal:  No clubbing, cyanosis or edema bilaterally. Full range of motion without deformity. Skin: Skin color, texture, turgor normal.  No rashes or lesions. Neurologic:  Neurovascularly intact without any focal sensory/motor deficits. Cranial nerves: II-XII intact, grossly non-focal.  Psychiatric:  Alert and oriented, thought content appropriate, normal insight  Capillary Refill: Brisk,< 3 seconds   Peripheral Pulses: +2 palpable, equal bilaterally       Labs: For convenience and continuity at follow-up the following most recent labs are provided:      CBC:    Lab Results   Component Value Date    WBC 19.9 02/13/2022    HGB 8.6 02/13/2022    HCT 26.5 02/13/2022     02/13/2022       Renal:    Lab Results   Component Value Date     02/13/2022    K 3.8 02/13/2022    CL 96 02/13/2022    CO2 31 02/13/2022    BUN 30 02/13/2022    CREATININE <0.5 02/13/2022    CALCIUM 8.6 02/13/2022    PHOS 4.5 02/06/2022         Significant Diagnostic Studies    Radiology:   CT CHEST PULMONARY EMBOLISM W CONTRAST   Final Result   Motion limited. No central pulmonary embolus identified. There is moderate   coronary artery calcification      Increased filling defects seen in the lower lobe airways bilaterally with   increased basilar consolidation, either atelectasis or pneumonia      Underlying emphysema. Dominant pulmonary nodule right lower lobe is not   significantly changed. Consider short-term follow-up chest CT versus PET-CT   is described previously      RECOMMENDATIONS:   Unavailable         XR CHEST PORTABLE   Final Result   No significant change         XR CHEST PORTABLE   Final Result   1. Endotracheal tube remains in appropriate position. 2. No new findings identified. XR CHEST PORTABLE   Final Result   Stable examination with findings of COPD. XR CHEST PORTABLE   Final Result   No acute process. Stable exam with stable lines and tubes.       Stable findings of COPD.         XR CHEST 1 VIEW   Final Result   Endotracheal tube satisfactory position above the alley      OG side-port in gastric fundus, tip not visualized      Otherwise, no acute abnormalities seen in the chest         XR CHEST PORTABLE   Final Result   Probable scarring in the left midlung      Otherwise, no acute cardiopulmonary process         XR CHEST PORTABLE   Final Result   COPD with resolving central pulmonary congestion. Slowly resolving bibasilar opacities. Tiny right pleural effusion which is less prominent. Consults:     PHARMACY TO DOSE VANCOMYCIN  PHARMACY TO DOSE VANCOMYCIN  IP CONSULT TO PULMONOLOGY  IP CONSULT TO NEPHROLOGY  IP CONSULT TO DIETITIAN  IP CONSULT TO HOME CARE NEEDS    Disposition:  home    Condition at Discharge: Stable    Discharge Instructions/Follow-up:  PCP, Pulm 1-2 weeks. Code Status:  Full Code     Activity: activity as tolerated    Diet: regular diet      Discharge Medications:     Current Discharge Medication List           Details   metoprolol tartrate (LOPRESSOR) 25 MG tablet Take 0.5 tablets by mouth 2 times daily  Qty: 60 tablet, Refills: 3              Details   ! ! predniSONE (DELTASONE) 10 MG tablet Take 2 tabs by mouth once daily for 4 days. Then take 1 tab by mouth once daily for 4 days. Then take 1/2 tab by mouth once daily for 4 days. Then resume chronic prednisone. Qty: 14 tablet, Refills: 0       !! - Potential duplicate medications found. Please discuss with provider.            Details   busPIRone (BUSPAR) 10 MG tablet Take 1 tablet by mouth 3 times daily  Qty: 90 tablet, Refills: 1      !! predniSONE (DELTASONE) 5 MG tablet Take 1 tablet by mouth daily      guaiFENesin-dextromethorphan (ROBITUSSIN DM) 100-10 MG/5ML syrup Take 5 mLs by mouth every 4 hours as needed for Cough  Qty: 120 mL, Refills: 0      lidocaine 4 % external patch Place 1 patch onto the skin daily  Qty: 30 patch, Refills: 1      nicotine (NICODERM CQ) 21 MG/24HR Place 1 patch onto the skin daily  Qty: 30 patch, Refills: 1      albuterol (PROVENTIL) (2.5 MG/3ML) 0.083% nebulizer solution INHALE THE CONTENTS OF 1 VIAL VIA NEBULIZER EVERY 6 HOURS AS NEEDED FOR SHORTNESS OF BREATH  OR  WHEEZING  Qty: 360 mL, Refills: 1    Associated Diagnoses: COPD, severe (HCC)      guaiFENesin (MUCINEX) 600 MG extended release tablet Take 1 tablet by mouth 2 times daily  Qty: 30 tablet, Refills: 0      fluticasone (FLONASE) 50 MCG/ACT nasal spray 1 spray by Each Nostril route daily  Qty: 1 Bottle, Refills: 3      levothyroxine (SYNTHROID) 125 MCG tablet Take 1 tablet by mouth every morning (before breakfast)  Qty: 30 tablet, Refills: 3      aspirin 81 MG EC tablet Take 1 tablet by mouth daily  Qty: 30 tablet, Refills: 3      montelukast (SINGULAIR) 10 MG tablet Take 1 tablet by mouth nightly  Qty: 30 tablet, Refills: 3      Multiple Vitamin (MULTIVITAMIN) tablet Take 1 tablet by mouth daily  Qty: 30 tablet, Refills: 3      Fluticasone-Umeclidin-Vilant (TRELEGY ELLIPTA) 100-62.5-25 MCG/INH AEPB Inhale 1 puff into the lungs daily      albuterol sulfate  (90 Base) MCG/ACT inhaler Inhale 2 puffs into the lungs every 6 hours as needed for Wheezing  Qty: 1 Inhaler, Refills: 3       !! - Potential duplicate medications found. Please discuss with provider. Time Spent on discharge is more than 30 minutes in the examination, evaluation, counseling and review of medications and discharge plan. Signed:    Isaac Deleon MD   2/13/2022      Thank you Paco Vann MD for the opportunity to be involved in this patient's care. If you have any questions or concerns please feel free to contact me at [phone].

## 2022-02-13 NOTE — FLOWSHEET NOTE
02/12/22 1945   Vitals   Temp 97.4 °F (36.3 °C)   Temp Source Oral   Pulse 81   Heart Rate Source Monitor   Resp 20   /68   MAP (mmHg) 86   BP Location Left upper arm   BP Upper/Lower Upper   BP Method Automatic   Patient Position Supine; Turns self   Level of Consciousness Alert (0)   MEWS Score 1   Cardiac Rhythm Sinus rhythm   Pain Assessment   Pain Assessment 0-10   POSS Score (Patient Ctrl Analgesia) Awake and Alert   Pain Level 5   Patient's Stated Pain Goal No pain   Pain Type Chronic pain   Pain Location Back   Pain Orientation Mid   Pain Descriptors Aching   Pain Frequency Continuous   Pain Onset On-going   Clinical Progression Not changed   Functional Pain Assessment Prevents or interferes some active activities and ADLs   Non-Pharmaceutical Pain Intervention(s) Relaxation techniques;Repositioned; Rest   Multiple Pain Sites No   Oxygen Therapy   SpO2 97 %   Pulse Oximeter Device Mode Continuous   Pulse Oximeter Device Location Left;Finger   O2 Device Nasal cannula   O2 Flow Rate (L/min) 3 L/min     Patient remains alert and oriented x4. Able to stand and pivot to bedside commode with assist x1. Plan of care reviewed and mutually agreed upon by patient. Call light and personal belongings within reach. Fall precautions, including bed alarm remain in place. Continue with hourly rounding.

## 2022-02-13 NOTE — PROGRESS NOTES
02/13/22 0000   RT Protocol   History Pulmonary Disease 2   Respiratory pattern 2   Breath sounds 2   Cough 3   Indications for Bronchodilator Therapy Decreased or absent breath sounds   Bronchodilator Assessment Score 9   RT Inhaler-Nebulizer Bronchodilator Protocol Note    There is a bronchodilator order in the chart from a provider indicating to follow the RT Bronchodilator Protocol and there is an Initiate RT Inhaler-Nebulizer Bronchodilator Protocol order as well (see protocol at bottom of note). CXR Findings:  No results found. The findings from the last RT Protocol Assessment were as follows:   History Pulmonary Disease: Chronic pulmonary disease  Respiratory Pattern: Dyspnea on exertion or RR 21-25 bpm  Breath Sounds: Slightly diminished and/or crackles  Cough: Weak, non-productive  Indication for Bronchodilator Therapy: Decreased or absent breath sounds  Bronchodilator Assessment Score: 9    Aerosolized bronchodilator medication orders have been revised according to the RT Inhaler-Nebulizer Bronchodilator Protocol below. Respiratory Therapist to perform RT Therapy Protocol Assessment initially then follow the protocol. Repeat RT Therapy Protocol Assessment PRN for score 0-3 or on second treatment, BID, and PRN for scores above 3. No Indications - adjust the frequency to every 6 hours PRN wheezing or bronchospasm, if no treatments needed after 48 hours then discontinue using Per Protocol order mode. If indication present, adjust the RT bronchodilator orders based on the Bronchodilator Assessment Score as indicated below. Use Inhaler orders unless patient has one or more of the following: on home nebulizer, not able to hold breath for 10 seconds, is not alert and oriented, cannot activate and use MDI correctly, or respiratory rate 25 breaths per minute or more, then use the equivalent nebulizer order(s) with same Frequency and PRN reasons based on the score.   If a patient is on this medication at home then do not decrease Frequency below that used at home. 0-3 - enter or revise RT bronchodilator order(s) to equivalent RT Bronchodilator order with Frequency of every 4 hours PRN for wheezing or increased work of breathing using Per Protocol order mode. 4-6 - enter or revise RT Bronchodilator order(s) to two equivalent RT bronchodilator orders with one order with BID Frequency and one order with Frequency of every 4 hours PRN wheezing or increased work of breathing using Per Protocol order mode. 7-10 - enter or revise RT Bronchodilator order(s) to two equivalent RT bronchodilator orders with one order with TID Frequency and one order with Frequency of every 4 hours PRN wheezing or increased work of breathing using Per Protocol order mode. 11-13 - enter or revise RT Bronchodilator order(s) to one equivalent RT bronchodilator order with QID Frequency and an Albuterol order with Frequency of every 4 hours PRN wheezing or increased work of breathing using Per Protocol order mode. Greater than 13 - enter or revise RT Bronchodilator order(s) to one equivalent RT bronchodilator order with every 4 hours Frequency and an Albuterol order with Frequency of every 2 hours PRN wheezing or increased work of breathing using Per Protocol order mode.          Electronically signed by Jacey Hernandez RCP on 2/13/2022 at 12:30 AM

## 2022-02-13 NOTE — PROGRESS NOTES
Pt. Assisted from chair to bedside commode then back to bed. Pt. Very weak. Pt. Zoraida Fried that she can not go to rehab. States that she can not afford it and she will be going home with her  who can help her some.  Breanne Banks RN

## 2022-02-13 NOTE — PROGRESS NOTES
Patient awake, denies any needs at this time. Tolerating bipap. IV antibiotics started. Continue to monitor.

## 2022-02-13 NOTE — DISCHARGE INSTR - COC
Continuity of Care Form    Patient Name: Kendra Rivera   :  1960  MRN:  [id number]    Admit date:  2022  Discharge date:  2022    Code Status Order: Full Code   Advance Directives:      Admitting Physician:  Morena Sage MD  PCP: Sancho Taylor MD    Discharging Nurse: [facility] Unit/Room#: 3315/5218-41  Discharging Unit Phone Number: ***    Emergency Contact:   Extended Emergency Contact Information  Primary Emergency Contact: Miranda Hull  Address: [address] [place] [address] Phone: [phone]  Mobile Phone: [phone]  Relation: Spouse  Secondary Emergency Contact: Candy Philip [address] Phone: [phone]  Relation: Child    Past Surgical History:  Past Surgical History:   Procedure Laterality Date     SECTION      x2    CHOLECYSTECTOMY         Immunization History:   Immunization History   Administered Date(s) Administered    Influenza Vaccine, unspecified formulation 2015, 2017, 2017    Influenza Virus Vaccine 2015, 2017, 2017, 2019, 2021    Influenza, Chavez Cozier, 6 mo and older, IM, PF (Flulaval, Fluarix) 2019    Influenza, Quadv, Recombinant, IM PF (Flublok 18 yrs and older) 2020    Pneumococcal Polysaccharide ([id number]) 2018    Tdap (Boostrix, Adacel) 2014       Active Problems:  Patient Active Problem List   Diagnosis Code    Acute respiratory failure with hypoxia and hypercapnia (HCC) J96.01, J96.02    COPD exacerbation ([address] J44.1    Tobacco abuse Z72.0    Hypothyroidism E03.9    Acute on chronic respiratory failure with hypoxia ([address] J96.21    Community acquired bacterial pneumonia J15.9    Pneumonia due to organism J18.9    Acute exacerbation of chronic obstructive pulmonary disease (COPD) ([address] J44.1    Elevated troponin R77.8    SVT (supraventricular tachycardia) ([facility]) I47.1    Mild protein-calorie 0  Last Weight:   Wt Readings from Last 1 Encounters:   22 138 lb 12.8 oz (63 kg)     Mental Status:  {IP PT MENTAL STATUS:}    IV Access:  { HIRAM IV ACCESS:520408452}    Nursing Mobility/ADLs:  Walking   {CHP DME SQUC:641223345}  Transfer  {CHP DME BFEW:389223913}  Bathing  {CHP DME DBWF:225837226}  Dressing  {CHP DME IRDU:794338882}  Toileting  {CHP DME WSLZ:866377508}  Feeding  {CHP DME IBVK:294561579}  Med Admin  {CHP DME RPPO:854207281}  Med Delivery   { HIRAM MED Delivery:088296734}    Wound Care Documentation and Therapy:        Elimination:  Continence: Bowel: {YES / RK:08669}  Bladder: {YES / VE:79773}  Urinary Catheter: {Urinary Catheter:900463629}   Colostomy/Ileostomy/Ileal Conduit: {YES / EE:30026}       Date of Last BM: ***    Intake/Output Summary (Last 24 hours) at 2022 1311  Last data filed at 2022 0800  Gross per 24 hour   Intake 839.39 ml   Output 350 ml   Net 489.39 ml     I/O last 3 completed shifts: In: 1060 [P.O.:1000;  I.V.:10; IV Piggyback:50]  Out: 7673 [Urine:1175]    Safety Concerns:     [employer] Safety Concerns:131782571}    Impairments/Disabilities:      [employer] Impairments/Disabilities:269818572}    Nutrition Therapy:  Current Nutrition Therapy:   [employer] Diet List:808368310}    Routes of Feeding: {CHP DME Other Feedings:041326144}  Liquids: {Slp liquid thickness:40467}  Daily Fluid Restriction: {CHP DME Yes amt example:999688285}  Last Modified Barium Swallow with Video (Video Swallowing Test): {Done Not Done RBNJ:517492152}    Treatments at the Time of Hospital Discharge:   Respiratory Treatments: ***  Oxygen Therapy:  {Therapy; copd oxygen:76053}  Ventilator:    { CC Vent IKGA:183100979}    Rehab Therapies: {THERAPEUTIC INTERVENTION:2968692781}  Weight Bearing Status/Restrictions: [address] Weight Bearin}  Other Medical Equipment (for information only, NOT a DME order):  {EQUIPMENT:245477971}  Other Treatments: ***    Patient's personal belongings (please select all that are sent with patient):  {CHP DME Belongings:066939338}    RN SIGNATURE:  {Esignature:747062601}    CASE MANAGEMENT/SOCIAL WORK SECTION    Inpatient Status Date: 2/4/2022    Readmission Risk Assessment Score:  Readmission Risk              Risk of Unplanned Readmission:  28           Discharging to Facility/ Agency   Name: [facility]  Address:  [id number]  Fax:    Dialysis Facility (if applicable)   Name:  Address:  Dialysis Schedule:  Phone:  Fax:    / signature: Electronically signed by Bernice Fischer RN on 2/13/22 at 3:12 PM EST    PHYSICIAN SECTION    Prognosis: Good    Condition at Discharge: Stable    Rehab Potential (if transferring to Rehab): Fair    Recommended Labs or Other Treatments After Discharge:     Resume Home O2 orders. Home PTOT homecare    Physician Certification: I certify the above information and transfer of Galileo Hernandez  is necessary for the continuing treatment of the diagnosis listed and that she requires Home Care for less 30 days.      Update Admission H&P: No change in H&P    PHYSICIAN SIGNATURE:  Electronically signed by Pricilla Torres MD on 2/13/22 at 1:12 PM EST

## 2022-02-13 NOTE — PROGRESS NOTES
02/12/22 2247   NIV Type   Equipment Type v60   Mode Bilevel   Mask Type Full face mask   Mask Size Small   Settings/Measurements   IPAP 14 cmH20   CPAP/EPAP 7 cmH2O   Rate Ordered 14   Resp 25   FiO2  30 %   Vt Exhaled 502 mL   Minute Volume 13 Liters   Mask Leak (lpm) 0 lpm   Comfort Level Good   Using Accessory Muscles No   SpO2 97   Patient Observation   Observations spo2 97% on 30% bipap

## 2022-02-13 NOTE — PLAN OF CARE
Problem: Nutrition  Goal: Optimal nutrition therapy  2/13/2022 0943 by Vianney Hernandez RN  Outcome: Ongoing  2/12/2022 2233 by Kirsty Hamm RN  Outcome: Ongoing  Goal: Understanding of nutritional guidelines  2/13/2022 0943 by Vianney Hernandez RN  Outcome: Ongoing  2/12/2022 2233 by Kirsty Hamm RN  Outcome: Ongoing     Problem: Airway Clearance - Ineffective  Goal: Achieve or maintain patent airway  2/13/2022 0943 by Vianney Hernandez RN  Outcome: Ongoing  2/12/2022 2233 by Kirsty Hamm RN  Outcome: Ongoing     Problem: Gas Exchange - Impaired  Goal: Absence of hypoxia  2/13/2022 0943 by Vianney Hernandez RN  Outcome: Ongoing  2/12/2022 2233 by Kirsty Hamm RN  Outcome: Ongoing  Goal: Promote optimal lung function  2/13/2022 0943 by Vianney Hernandez RN  Outcome: Ongoing  2/12/2022 2233 by Kirsty Hamm RN  Outcome: Ongoing

## 2022-02-13 NOTE — PROGRESS NOTES
02/12/22 2246   Treatment   Treatment Type MDI   $Treatment Type $Inhaled Therapy/Meds   Medications Albuterol/Ipratropium   Pre-Tx Pulse 68   Pre-Tx Resps 18   Breath Sounds Pre-Tx ELLA Diminished   Breath Sounds Pre-Tx LLL Diminished   Breath Sounds Pre-Tx RUL Diminished   Breath Sounds Pre-Tx RML Diminished   Breath Sounds Pre-Tx RLL Diminished   Position Woods's   Tx Tolerance Well   Patient Observation   Observations spo2 99% on 2L n/c

## 2022-02-14 ENCOUNTER — CARE COORDINATION (OUTPATIENT)
Dept: CASE MANAGEMENT | Age: 62
End: 2022-02-14

## 2022-02-14 NOTE — CARE COORDINATION
[facility] Transitions Initial Follow Up Call    Call within 2 business days of discharge: Yes    Patient: Galileo Hernandez Patient : 1960   MRN: <[id number]>  Reason for Admission: COVID, severe COPD exacerbation, elevated troponin, (R) lung nodule, hyponatremia, chronic back pain  Discharge Date: 22 RARS: Readmission Risk Score: 21.8 ( )      Last Discharge [facility]       Complaint Diagnosis Description Type Department Provider    22 Shortness of Breath Acute respiratory failure with hypoxia and hypercapnia ([facility] . .. ED to Hosp-Admission (Discharged) (ADMITTED) Sherif Velazquez MD; Ca Buckley. .. Spoke with: CARIN    Facility: [facility]    Attempted to reach patient via phone for initial post hospital transition call. VM left stating purpose of call along with my contact information requesting a return call. Spoke with Vi at [facility] who states that patient is pending and LUCIANA should begin very soon. Cholo Fisher LPN [address] [place] [address]  Care Transitions  [phone]    Care Transitions 24 Hour Call    Care Transitions Interventions         Follow Up  No future appointments.     Cholo Fisher LPN

## 2022-02-15 ENCOUNTER — CARE COORDINATION (OUTPATIENT)
Dept: CASE MANAGEMENT | Age: 62
End: 2022-02-15

## 2022-02-15 LAB
(1,3)-BETA-D-GLUCAN (FUNGITELL) INTERPRETATION: NEGATIVE
(1,3)-BETA-D-GLUCAN (FUNGITELL): <31 PG/ML

## 2022-02-15 NOTE — CARE COORDINATION
Patient contacted regarding COVID-19 diagnosis. Discussed COVID-19 related testing which was available at this time. COVID-19 Detected on 1/29/2022. Patient informed of results, if available? Yes    Call within 2 business days of discharge: Yes  Discharge Date: 2/13/22   RARS: Readmission Risk Score: 21.8 ( )    Was this an external facility discharge? No Discharge Facility: [facility] Transition Nurse contacted the patient by telephone to perform post discharge assessment. Provided introduction to self, and explanation of the CTN role, and reason for call due to risk factors for infection and/or exposure to COVID-19. Symptoms reviewed with patient who verbalized the following symptoms: fatigue, no new symptoms and no worsening symptoms. Due to no new or worsening symptoms encounter was not routed to provider for escalation. Discussed follow-up appointments. If no appointment was previously scheduled, appointment scheduling offered: scheduled by patient  [facility] follow up appointment(s): No future appointments. Non-[facility] follow up appointment(s): PCP - Dr Gary Gaspar next week    Non-face-to-face services provided:  Obtained and reviewed discharge summary and/or continuity of care documents    Advance Care Planning:   Does patient have an Advance Directive:  decision maker updated. Patient was given an opportunity to verbalize any questions and concerns and agrees to contact the CTN or health care provider for questions related to their healthcare. Reviewed and educated patient on any new and changed medications related to discharge diagnosis. Was patient discharged with a pulse oximeter? Yes CTN discussed pulse oximeter instructions and when to notify provider or seek emergency care. Wearing oxygen at 4lpm. Her home pulse ox is not working well so unable to check sats during conversation. Reviewed warming her hands and replacing batteries. She can order new one if needed.  She is in no distress, not short of breath or coughing in conversation. Reports she overall feels better now that she is home. States she will follow up with Dr Papo Marquez for POC. Has PCP appt with Dr Ramy Herrera next week. She will confer with him regarding continuing the Buspar. Uses BioFreeze at home in lieu of Lidoderm. Has not heard from [facility] yet. Noted referral confirmed as received yesterday. CTN will follow up with liaison and ask them to contact patient for [facility]. She is in agreement with plan. CTN provided contact information for future needs. Outreach to [facility] to ask for [facility] ASAP as patient had 15 total days of admission including back to back readmission on same day. Per liaison, referral received 2/13, care call placed 2/14 and scheduling left message and will make 2nd attempt today. No follow up based on severity of symptoms and risk factors. Patient has CTN contact info for future needs.      Chandrika Fernandez RN  Care Transitions Nurse  [phone] mobile

## 2022-03-09 DIAGNOSIS — J44.9 COPD, SEVERE (HCC): ICD-10-CM

## 2022-03-09 RX ORDER — ALBUTEROL SULFATE 2.5 MG/3ML
2.5 SOLUTION RESPIRATORY (INHALATION) EVERY 6 HOURS PRN
Qty: 360 ML | Refills: 2 | Status: SHIPPED | OUTPATIENT
Start: 2022-03-09

## 2022-07-08 NOTE — PROGRESS NOTES
05/12/21 2332   NIV Type   Skin Protection for O2 Device Yes   NIV Started/Stopped On   Equipment Type v60   Mode Bilevel   Mask Type Full face mask   Mask Size Medium   Settings/Measurements   IPAP 16 cmH20   CPAP/EPAP 8 cmH2O   Rate Ordered 14   Resp 24   FiO2  30 %   I Time/ I Time % 0.69 s   Vt Exhaled 523 mL   Minute Volume 13 Liters   Mask Leak (lpm) 3 lpm   Comfort Level Good   Using Accessory Muscles No   SpO2 94   Alarm Settings   Alarms On Y   Press Low Alarm 5 cmH2O   High Pressure Alarm 30 cmH2O   Apnea (secs) 20 secs   Resp Rate Low Alarm 14   High Respiratory Rate 40 br/min Principal Discharge DX:	Normal vaginal delivery  Assessment and plan of treatment:	follow up 6 weeks   1

## 2023-03-16 NOTE — PROGRESS NOTES
12/26/19 2248   Vent Information   Skin Assessment Clean, dry, & intact   Vent Type 840   Vent Mode AC/VC   Vt Ordered 500 mL   Rate Set 16 bmp   Peak Flow 55 L/min   FiO2  30 %   Sensitivity 2   PEEP/CPAP 5   Humidification Source Heated wire   Humidification Temp 36.8   Circuit Condensation Drained   Vent Patient Data   Peak Inspiratory Pressure 42 cmH2O   Mean Airway Pressure 14 cmH20   Rate Measured 16 br/min   Vt Exhaled 527 mL   Minute Volume 8.41 Liters   I:E Ratio 1;2.8   Cough/Sputum   Sputum How Obtained Endotracheal;Suctioned   Cough Non-productive   Sputum Amount None   Spontaneous Breathing Trial (SBT) RT Doc   Pulse 66   SpO2 94 %   Breath Sounds   Right Upper Lobe Expiratory Wheezes   Right Middle Lobe Expiratory Wheezes   Right Lower Lobe Expiratory Wheezes   Left Upper Lobe Expiratory Wheezes   Left Lower Lobe Expiratory Wheezes   Additional Respiratory  Assessments   Resp 16   Position Semi-Woods's   Alarm Settings   High Pressure Alarm 45 cmH2O   Low Minute Volume Alarm 5 L/min   Apnea (secs) 20 secs   High Respiratory Rate 35 br/min   Low Exhaled Vt  400 mL   Patient Observation   Observations ETT SIZE 7.5, SECURED AT 23 LIP LINE. AMBU BAG ATHEAD OF BED. WATER BAG GOOD. See scanned Qtel report.

## 2025-04-17 NOTE — PROGRESS NOTES
Occupational Therapy Daily Treatment Note    Unit: ICU  Date:  5/12/2021  Patient Name:    Kandice Morales  Admitting diagnosis:  Acute on chronic respiratory failure [facility]) [J96.20]  Admit Date:  4/25/2021  Precautions/Restrictions:  Fall risk, Bed/chair alarm, Lines -IV, Supplemental O2 (4L), Baugh catheter and RIJ, Telemetry and Continuous pulse oximetry        Discharge Recommendations: SNF  DME needs for discharge: defer to facility       Therapy recommendations for staff:   Assist of 2 with bed mobility    AM-PAC Score: AM-PAC Inpatient Daily Activity Raw Score: 9  Home Health S4 Level: NA       Treatment Time:  8452-3936  Treatment number:  2    Timed code treatment minutes: 39 minutes  Total treatment minutes:   39 minutes    History of Present Illness: per Dr Brenda Yin H&P 4/25/21:  \"The patient is a 56 y.o. female with COPD/asthma, chronic respiratory failure on home oxygen 3 L, continued tobacco abuse and hypothyroidism who presents to [facility] with c/o shortness of breath and chest pain. Ongoing for the last 2 days. Her symptoms were worsening. She called 911. She was started on a Z-pack yesterday.       Her BP is elevated. She is tachypneic and tachycardic. She is requiring BiPAP. Labs with leukocytosis. Initial Venous blood gas with pH 7.2 and PCO2 92. CXR with pulmonary edema with bibasilar atelectasis versus pneumonia. Admitted to ICU on BiPAP. Pulmonology consulted.       Patient seen in ICU. She is currently on BiPAP, still in some respiratory distress, tolerating BiPAP well. Oxygen saturations are stable. ABG improving pH is up to 7.29, PCO2 is down to 74. She is awake and alert and well-oriented.      She describes being ill for 2 to 3 days now, with increasing shortness of breath and wheezing, increasing cough and coughing up some sputum. No fevers. She is normally on home oxygen 3 L. She continues to smoke tobacco down to about 4 to 5 cigarettes/day.  .     Her Covid test on admission was negative   She has not had her Covid vaccine.     I spoke to patient's  at bedside. Spoke to patient's ICU nurse\"     Subjective:  Patient in bed, drowsy. Pain   No  Rating: NA  Location:  Pain Medicine Status: No request made      Bed Mobility:   Supine to Sit:  Not Tested  Sit to Supine:  Not Tested  Rolling: Total A and 2 persons  Scooting: Total A and 2 persons    Transfer Training:   Sit to stand:   Not Tested  Stand to sit:  Not Tested  Bed to Chair:  Not Tested  Bed to [facility]:   Not Tested  Standard toilet:   Not Tested     Unable to progress to EOB/OOB due to patient status, lethargic this pm.  RN notified. Activity Tolerance   Pt completed therapy session with No adverse symptoms noted w/activity, lethargic throughout with low voice volume. SpO2: 95% on 4L/min of O2  HR: 111 bpm  BP: 144/79    ADL Training:   Upper body dressing: Total A  Upper body bathing:  Not Tested  Lower body dressing: Total A  Lower body bathing: Total A  Toileting: Total A and 2 persons  Grooming/Hygiene:  Max A  oral hygiene    Therapeutic Exercise: LUE PROM  Hand flex/ext:  x5  Wrist flex/ext:  x5  Forearm sup/pronation:  x5     Noted patient with large loose BM and patient unaware, stopped UE exercises to assist with toileting. Patient too fatigued after toileting (rolling x 6 in bed) to tolerated further therapy. Patient Education:   Role of OT  Recommendations for DC   Reorientation-patient reported seeing her dogs in the room during session, reoriented to place and that no dogs are present. Positioning Needs:   Pt in bed, alarm set, positioned in proper neutral alignment and pressure relief provided. Call light provided and all needs within reach    Family Present:  Yes, spouse    Assessment: Patient limited by lethargy this date, continue as tolerated. GOALS  To be met in 3 Visits:  1). Bed to toilet/BSC: Total A via Danna     To be met in 5 Visits:  1).  Supine Home

## 2025-07-01 ENCOUNTER — HOSPITAL ENCOUNTER (OUTPATIENT)
Dept: CARDIAC REHAB | Age: 65
Setting detail: THERAPIES SERIES
Discharge: HOME | End: 2025-07-01
Payer: MEDICARE

## 2025-07-01 PROCEDURE — 94625 PHY/QHP OP PULM RHB W/O MNTR: CPT

## 2025-07-01 SDOH — ECONOMIC STABILITY: FOOD INSECURITY: WITHIN THE PAST 12 MONTHS, YOU WORRIED THAT YOUR FOOD WOULD RUN OUT BEFORE YOU GOT MONEY TO BUY MORE.: NEVER TRUE

## 2025-07-01 SDOH — ECONOMIC STABILITY: INCOME INSECURITY: IN THE LAST 12 MONTHS, WAS THERE A TIME WHEN YOU WERE NOT ABLE TO PAY THE MORTGAGE OR RENT ON TIME?: NO

## 2025-07-01 SDOH — ECONOMIC STABILITY: TRANSPORTATION INSECURITY
IN THE PAST 12 MONTHS, HAS THE LACK OF TRANSPORTATION KEPT YOU FROM MEDICAL APPOINTMENTS OR FROM GETTING MEDICATIONS?: NO

## 2025-07-01 SDOH — ECONOMIC STABILITY: FOOD INSECURITY: WITHIN THE PAST 12 MONTHS, THE FOOD YOU BOUGHT JUST DIDN'T LAST AND YOU DIDN'T HAVE MONEY TO GET MORE.: NEVER TRUE

## 2025-07-01 SDOH — HEALTH STABILITY: PHYSICAL HEALTH: ON AVERAGE, HOW MANY MINUTES DO YOU ENGAGE IN EXERCISE AT THIS LEVEL?: 0 MIN

## 2025-07-01 SDOH — HEALTH STABILITY: PHYSICAL HEALTH: ON AVERAGE, HOW MANY DAYS PER WEEK DO YOU ENGAGE IN MODERATE TO STRENUOUS EXERCISE (LIKE A BRISK WALK)?: 0 DAYS

## 2025-07-01 SDOH — ECONOMIC STABILITY: TRANSPORTATION INSECURITY
IN THE PAST 12 MONTHS, HAS LACK OF TRANSPORTATION KEPT YOU FROM MEETINGS, WORK, OR FROM GETTING THINGS NEEDED FOR DAILY LIVING?: NO

## 2025-07-01 ASSESSMENT — SOCIAL DETERMINANTS OF HEALTH (SDOH)
HOW OFTEN DO YOU ATTENT MEETINGS OF THE CLUB OR ORGANIZATION YOU BELONG TO?: NEVER
DO YOU BELONG TO ANY CLUBS OR ORGANIZATIONS SUCH AS CHURCH GROUPS UNIONS, FRATERNAL OR ATHLETIC GROUPS, OR SCHOOL GROUPS?: NO
HOW HARD IS IT FOR YOU TO PAY FOR THE VERY BASICS LIKE FOOD, HOUSING, MEDICAL CARE, AND HEATING?: SOMEWHAT HARD
HOW OFTEN DO YOU GET TOGETHER WITH FRIENDS OR RELATIVES?: MORE THAN THREE TIMES A WEEK
WITHIN THE LAST YEAR, HAVE YOU BEEN KICKED, HIT, SLAPPED, OR OTHERWISE PHYSICALLY HURT BY YOUR PARTNER OR EX-PARTNER?: NO
HOW OFTEN DO YOU ATTEND CHURCH OR RELIGIOUS SERVICES?: NEVER
IN A TYPICAL WEEK, HOW MANY TIMES DO YOU TALK ON THE PHONE WITH FAMILY, FRIENDS, OR NEIGHBORS?: MORE THAN THREE TIMES A WEEK
WITHIN THE LAST YEAR, HAVE TO BEEN RAPED OR FORCED TO HAVE ANY KIND OF SEXUAL ACTIVITY BY YOUR PARTNER OR EX-PARTNER?: NO
WITHIN THE LAST YEAR, HAVE YOU BEEN AFRAID OF YOUR PARTNER OR EX-PARTNER?: NO
WITHIN THE LAST YEAR, HAVE YOU BEEN HUMILIATED OR EMOTIONALLY ABUSED IN OTHER WAYS BY YOUR PARTNER OR EX-PARTNER?: NO

## 2025-07-01 ASSESSMENT — PATIENT HEALTH QUESTIONNAIRE - PHQ9
SUM OF ALL RESPONSES TO PHQ QUESTIONS 1-9: 4
5. POOR APPETITE OR OVEREATING: NOT AT ALL
3. TROUBLE FALLING OR STAYING ASLEEP: SEVERAL DAYS
10. IF YOU CHECKED OFF ANY PROBLEMS, HOW DIFFICULT HAVE THESE PROBLEMS MADE IT FOR YOU TO DO YOUR WORK, TAKE CARE OF THINGS AT HOME, OR GET ALONG WITH OTHER PEOPLE: NOT DIFFICULT AT ALL
6. FEELING BAD ABOUT YOURSELF - OR THAT YOU ARE A FAILURE OR HAVE LET YOURSELF OR YOUR FAMILY DOWN: SEVERAL DAYS
2. FEELING DOWN, DEPRESSED OR HOPELESS: NOT AT ALL
8. MOVING OR SPEAKING SO SLOWLY THAT OTHER PEOPLE COULD HAVE NOTICED. OR THE OPPOSITE, BEING SO FIGETY OR RESTLESS THAT YOU HAVE BEEN MOVING AROUND A LOT MORE THAN USUAL: NOT AT ALL
1. LITTLE INTEREST OR PLEASURE IN DOING THINGS: NOT AT ALL
SUM OF ALL RESPONSES TO PHQ QUESTIONS 1-9: 4
4. FEELING TIRED OR HAVING LITTLE ENERGY: MORE THAN HALF THE DAYS
SUM OF ALL RESPONSES TO PHQ QUESTIONS 1-9: 4
SUM OF ALL RESPONSES TO PHQ QUESTIONS 1-9: 4
7. TROUBLE CONCENTRATING ON THINGS, SUCH AS READING THE NEWSPAPER OR WATCHING TELEVISION: NOT AT ALL
9. THOUGHTS THAT YOU WOULD BE BETTER OFF DEAD, OR OF HURTING YOURSELF: NOT AT ALL

## 2025-07-01 ASSESSMENT — LIFESTYLE VARIABLES
ALCOHOL_USE: NO
HOW MANY STANDARD DRINKS CONTAINING ALCOHOL DO YOU HAVE ON A TYPICAL DAY: PATIENT DOES NOT DRINK
HOW OFTEN DO YOU HAVE A DRINK CONTAINING ALCOHOL: MONTHLY OR LESS

## 2025-07-01 ASSESSMENT — PULMONARY FUNCTION TESTS
FEV1/FVC: 40
FEV1 (%PREDICTED): 51
FVC (LITERS): 1.69

## 2025-07-01 NOTE — PLAN OF CARE
Pulmonary Rehab Treatment Plan   Name: Emily Mcneal Assessment Date: 2025   : 1960 Primary Diagnosis: COPD   Age: 64 y.o. Referring Physician: Anuj Gomez   MRN: [id number] Primary Care Physician: Odilon Snyder MD       Treatment Diagnosis  Treatment Diagnosis 1: COPD  Treatment Diagnosis 2: Asthma  Referral Date: 25  Gold Grade: GOLD Grade 3 Severe  PFT Date: 01/10/19  FVC (Liters): 1.69 liters  FEV1 (%PRED): 51  FEV1/FVC: 40  DLCO (mL/mmHg sec): 8.7 ml/mmHg sec  Co-morbidities: Pulmonary disease    Retired, Read Only Oxygen Saturation / Titration   Stages of Change : Precontemplation    Oxygen Assessment  Stages of Change : Precontemplation  Oxygen Type : Nasal canula  Oxygen Compliant: Yes  O2 Flow Rate (l/min) - Rest: 2.5 l/min  O2 Flow Rate (l/min) - Exercise: 0 l/min (2-4L)  O2 Flow Rate (l/min) - Sleep: 0 l/min (2-4L)  Breath Sounds: Diminished  O2 Sat Greater Than or Equal to 90%: Yes    Individual Treatment Plan  ITP Visit Type: Initial assessment  1st Date of Exercise: 25  Visit #/Total Visits:     COPD Assessment Test (CAT)  Cough : 4  Phlegm (mucus): 4  Chest Tightness: 4  Breathless When Walking Up a Hill or Flight of Steps: 4  Activities at Home: 1  Confident Leaving Home Due to Lung Condition: 1  Sleep Soundly: 1  Energy Level: 4  CAT Total Score : 23     [employer] Shortness of Breath Questionnaire  Resting, Lying Down: 1  Walking on a Level at Your Own Pace: 1  Walking on a Level with Others Your Age: 2  Walking Up a Hill: 3  Walking Up Stairs: 3  While Eatin  Standing Up From a Chair: 0  Brushing Teeth: 0  Shaving and/or Brushing Hair: 0  Showering/Bathin  Dressin  Picking Up and Straightenin  Doing Dishes: 1  Sweeping/Vacuuming: 3  Making Bed: 3  Shoppin  Doing Laundry: 2  Washing Car: 4  Mowing Lawn: 5  Watering Lawn: 3  Sexual Activities: 5  How Much Does Shortness of Breath Limit You In Your Daily Life: 2  How Much Does the Fear

## 2025-07-17 ENCOUNTER — HOSPITAL ENCOUNTER (OUTPATIENT)
Dept: CARDIAC REHAB | Age: 65
Setting detail: THERAPIES SERIES
Discharge: HOME | End: 2025-07-17
Payer: MEDICARE

## 2025-07-17 PROCEDURE — 94625 PHY/QHP OP PULM RHB W/O MNTR: CPT

## 2025-07-22 ENCOUNTER — HOSPITAL ENCOUNTER (OUTPATIENT)
Dept: CARDIAC REHAB | Age: 65
Setting detail: THERAPIES SERIES
Discharge: HOME | End: 2025-07-22
Payer: MEDICARE

## 2025-07-22 PROCEDURE — 94625 PHY/QHP OP PULM RHB W/O MNTR: CPT

## 2025-07-24 ENCOUNTER — HOSPITAL ENCOUNTER (OUTPATIENT)
Dept: CARDIAC REHAB | Age: 65
Setting detail: THERAPIES SERIES
Discharge: HOME | End: 2025-07-24
Payer: MEDICARE

## 2025-07-24 PROCEDURE — 94625 PHY/QHP OP PULM RHB W/O MNTR: CPT

## 2025-07-29 ENCOUNTER — HOSPITAL ENCOUNTER (OUTPATIENT)
Dept: CARDIAC REHAB | Age: 65
Setting detail: THERAPIES SERIES
Discharge: HOME | End: 2025-07-29
Payer: MEDICARE

## 2025-07-29 PROCEDURE — 94625 PHY/QHP OP PULM RHB W/O MNTR: CPT

## 2025-07-29 NOTE — PLAN OF CARE
Life: 2  How Much Does the Fear of \"Hurting Myself\" by Overexerting Limit You in Your Daily Life: 0  How Much does the Fear of Future Shortness of Breath Limit You in Your Daily Life: 0  UCSD Shortness of Breath Questionnaire Total Score: 45    EXERCISE     Data Measured Before Test  Heart Rate: 86  Resting Blood Pressure: 120/72  SpO2: 92  O2 Device: Nasal cannula  O2 Flow Rate (l/min): 3 l/min  RPD: 2    Data Measured During Test  Indicate Mode of RPE: 6 minutes/submax  Modality: Track  Track Distance: 115  Blood Pressure: 142/70  Lowest SpO2: (!) 87 %  O2 Device: Nasal cannula  RPD: 2  RPE: 11    Data Measured at Peak  Distance Calculated in : ft  Distance (Feet-Actual): 575 ft  Peak Heart Rate: 107  Peak Blood Pressure: 142/70  RPD: 2  Peak RPE: 11  SpO2: 87  O2 Flow Rate (L/min): 3 l/min    Data Measured at 5 Minutes After Test  Heart Rate: 104  Blood Pressure: 122/70  RPD: 2  SpO2: 97 %  O2 Device: Nasal cannula  O2 Flow Rate (l/min): 3 l/min    Retired, Read Only Exercise Prescription  Mode: Treadmill; Track; Bike; Stepper; Ergometer; Elliptical; Rower  Frequency per week: 2-3 supervised sessions weekly  Duration Per Session: 20-42+ minutes of steady aerobic exercise  Intensity METS      : 3-6 (Increase workloads 0.5-1.0 METS/weekly) RPD 3-4 RPE 11-14  Progression: As RPE/DR stabilize, increase workloads gradually (start at low level) and progress to higher levels a few minutes at a time and then return to lower level until can maintain desired level for duration of an exercise (intermittent low-intensity interval training).  O2 Device: Nasal cannula  O2 Flow Rate (l/min): 3 l/min  Resistance Training: Yes  Assisted Devices: None (pt has a walker, but uses it to hold portable 02. Pt did not use walker during test)    Retired, Read Only Exercise Intervention  Type: none  Resistance Training: No    Retired, Read Only Exercise Intervention  Type: none  Resistance Training: No    PSYCHOSOCIAL      7/1/2025

## 2025-07-31 ENCOUNTER — HOSPITAL ENCOUNTER (OUTPATIENT)
Dept: CARDIAC REHAB | Age: 65
Setting detail: THERAPIES SERIES
Discharge: HOME | End: 2025-07-31
Payer: MEDICARE

## 2025-07-31 VITALS — WEIGHT: 155.8 LBS | HEIGHT: 64 IN | BODY MASS INDEX: 26.6 KG/M2

## 2025-07-31 PROCEDURE — 94625 PHY/QHP OP PULM RHB W/O MNTR: CPT

## 2025-08-05 ENCOUNTER — HOSPITAL ENCOUNTER (OUTPATIENT)
Dept: CARDIAC REHAB | Age: 65
Setting detail: THERAPIES SERIES
Discharge: HOME | End: 2025-08-05
Payer: MEDICARE

## 2025-08-05 PROCEDURE — 94625 PHY/QHP OP PULM RHB W/O MNTR: CPT

## 2025-08-07 ENCOUNTER — HOSPITAL ENCOUNTER (OUTPATIENT)
Dept: CARDIAC REHAB | Age: 65
Setting detail: THERAPIES SERIES
Discharge: HOME | End: 2025-08-07
Payer: MEDICARE

## 2025-08-07 VITALS — BODY MASS INDEX: 26.61 KG/M2 | WEIGHT: 155 LBS

## 2025-08-07 PROCEDURE — 94625 PHY/QHP OP PULM RHB W/O MNTR: CPT

## 2025-08-08 ENCOUNTER — HOSPITAL ENCOUNTER (EMERGENCY)
Age: 65
Discharge: HOME | End: 2025-08-09
Payer: MEDICARE

## 2025-08-08 ENCOUNTER — APPOINTMENT (OUTPATIENT)
Dept: GENERAL RADIOLOGY | Age: 65
End: 2025-08-08
Payer: MEDICARE

## 2025-08-08 DIAGNOSIS — U07.1 COVID: Primary | ICD-10-CM

## 2025-08-08 LAB
ALBUMIN SERPL-MCNC: 4.1 G/DL (ref 3.4–5)
ALBUMIN/GLOB SERPL: 1.8 {RATIO} (ref 1.1–2.2)
ALP SERPL-CCNC: 71 U/L (ref 40–129)
ALT SERPL-CCNC: 20 U/L (ref 10–40)
ANION GAP SERPL CALCULATED.3IONS-SCNC: 13 MMOL/L (ref 3–16)
AST SERPL-CCNC: 28 U/L (ref 15–37)
BASOPHILS # BLD: 0.1 K/UL (ref 0–0.2)
BASOPHILS NFR BLD: 1.1 %
BILIRUB SERPL-MCNC: 0.3 MG/DL (ref 0–1)
BUN SERPL-MCNC: 15 MG/DL (ref 7–20)
CALCIUM SERPL-MCNC: 9.8 MG/DL (ref 8.3–10.6)
CHLORIDE SERPL-SCNC: 96 MMOL/L (ref 99–110)
CO2 SERPL-SCNC: 25 MMOL/L (ref 21–32)
CREAT SERPL-MCNC: 0.8 MG/DL (ref 0.6–1.2)
DEPRECATED RDW RBC AUTO: 14.1 % (ref 12.4–15.4)
EOSINOPHIL # BLD: 0.1 K/UL (ref 0–0.6)
EOSINOPHIL NFR BLD: 0.7 %
FLUAV RNA RESP QL NAA+PROBE: NOT DETECTED
FLUBV RNA RESP QL NAA+PROBE: NOT DETECTED
GFR SERPLBLD CREATININE-BSD FMLA CKD-EPI: 81 ML/MIN/{1.73_M2}
GLUCOSE SERPL-MCNC: 106 MG/DL (ref 70–99)
HCT VFR BLD AUTO: 39.8 % (ref 36–48)
HGB BLD-MCNC: 13.3 G/DL (ref 12–16)
LACTATE BLDV-SCNC: 1 MMOL/L (ref 0.4–2)
LIPASE SERPL-CCNC: 24 U/L (ref 13–60)
LYMPHOCYTES # BLD: 0.9 K/UL (ref 1–5.1)
LYMPHOCYTES NFR BLD: 9.4 %
MCH RBC QN AUTO: 30.7 PG (ref 26–34)
MCHC RBC AUTO-ENTMCNC: 33.5 G/DL (ref 31–36)
MCV RBC AUTO: 91.7 FL (ref 80–100)
MONOCYTES # BLD: 1.3 K/UL (ref 0–1.3)
MONOCYTES NFR BLD: 13.4 %
NEUTROPHILS # BLD: 7.6 K/UL (ref 1.7–7.7)
NEUTROPHILS NFR BLD: 75.4 %
PLATELET # BLD AUTO: 247 K/UL (ref 135–450)
PMV BLD AUTO: 8.8 FL (ref 5–10.5)
POTASSIUM SERPL-SCNC: 3.6 MMOL/L (ref 3.5–5.1)
PROT SERPL-MCNC: 6.4 G/DL (ref 6.4–8.2)
RBC # BLD AUTO: 4.35 M/UL (ref 4–5.2)
SARS-COV-2 RNA RESP QL NAA+PROBE: DETECTED
SODIUM SERPL-SCNC: 134 MMOL/L (ref 136–145)
WBC # BLD AUTO: 10.1 K/UL (ref 4–11)

## 2025-08-08 PROCEDURE — 99284 EMERGENCY DEPT VISIT MOD MDM: CPT

## 2025-08-08 PROCEDURE — 83605 ASSAY OF LACTIC ACID: CPT

## 2025-08-08 PROCEDURE — 85025 COMPLETE CBC W/AUTO DIFF WBC: CPT

## 2025-08-08 PROCEDURE — 36415 COLL VENOUS BLD VENIPUNCTURE: CPT

## 2025-08-08 PROCEDURE — 71045 X-RAY EXAM CHEST 1 VIEW: CPT

## 2025-08-08 PROCEDURE — 96374 THER/PROPH/DIAG INJ IV PUSH: CPT

## 2025-08-08 PROCEDURE — 87636 SARSCOV2 & INF A&B AMP PRB: CPT

## 2025-08-08 PROCEDURE — 6360000002 HC RX W HCPCS: Performed by: PHYSICIAN ASSISTANT

## 2025-08-08 PROCEDURE — 83690 ASSAY OF LIPASE: CPT

## 2025-08-08 PROCEDURE — 80053 COMPREHEN METABOLIC PANEL: CPT

## 2025-08-08 PROCEDURE — 2580000003 HC RX 258: Performed by: PHYSICIAN ASSISTANT

## 2025-08-08 RX ORDER — ONDANSETRON 2 MG/ML
4 INJECTION INTRAMUSCULAR; INTRAVENOUS EVERY 6 HOURS PRN
Status: DISCONTINUED | OUTPATIENT
Start: 2025-08-08 | End: 2025-08-09 | Stop reason: HOSPADM

## 2025-08-08 RX ORDER — 0.9 % SODIUM CHLORIDE 0.9 %
1000 INTRAVENOUS SOLUTION INTRAVENOUS ONCE
Status: COMPLETED | OUTPATIENT
Start: 2025-08-08 | End: 2025-08-08

## 2025-08-08 RX ADMIN — SODIUM CHLORIDE 1000 ML: 0.9 INJECTION, SOLUTION INTRAVENOUS at 21:52

## 2025-08-08 RX ADMIN — ONDANSETRON 4 MG: 2 INJECTION, SOLUTION INTRAMUSCULAR; INTRAVENOUS at 23:12

## 2025-08-08 ASSESSMENT — PAIN - FUNCTIONAL ASSESSMENT: PAIN_FUNCTIONAL_ASSESSMENT: 0-10

## 2025-08-08 ASSESSMENT — PAIN SCALES - GENERAL: PAINLEVEL_OUTOF10: 2

## 2025-08-08 ASSESSMENT — PAIN DESCRIPTION - LOCATION: LOCATION: BACK

## 2025-08-08 ASSESSMENT — LIFESTYLE VARIABLES
HOW MANY STANDARD DRINKS CONTAINING ALCOHOL DO YOU HAVE ON A TYPICAL DAY: PATIENT DOES NOT DRINK
HOW OFTEN DO YOU HAVE A DRINK CONTAINING ALCOHOL: NEVER

## 2025-08-08 ASSESSMENT — PAIN DESCRIPTION - DESCRIPTORS: DESCRIPTORS: DISCOMFORT

## 2025-08-09 VITALS
WEIGHT: 155 LBS | HEART RATE: 88 BPM | OXYGEN SATURATION: 92 % | RESPIRATION RATE: 16 BRPM | DIASTOLIC BLOOD PRESSURE: 54 MMHG | BODY MASS INDEX: 26.46 KG/M2 | TEMPERATURE: 99.6 F | SYSTOLIC BLOOD PRESSURE: 139 MMHG | HEIGHT: 64 IN

## 2025-08-09 LAB
BACTERIA URNS QL MICRO: ABNORMAL /HPF
BILIRUB UR QL STRIP.AUTO: NEGATIVE
CLARITY UR: CLEAR
COLOR UR: YELLOW
EPI CELLS #/AREA URNS HPF: ABNORMAL /HPF (ref 0–5)
GLUCOSE UR STRIP.AUTO-MCNC: NEGATIVE MG/DL
HGB UR QL STRIP.AUTO: ABNORMAL
KETONES UR STRIP.AUTO-MCNC: ABNORMAL MG/DL
LEUKOCYTE ESTERASE UR QL STRIP.AUTO: NEGATIVE
MUCOUS THREADS #/AREA URNS LPF: ABNORMAL /LPF
NITRITE UR QL STRIP.AUTO: NEGATIVE
PH UR STRIP.AUTO: 7.5 [PH] (ref 5–8)
PROT UR STRIP.AUTO-MCNC: NEGATIVE MG/DL
RBC #/AREA URNS HPF: ABNORMAL /HPF (ref 0–4)
SP GR UR STRIP.AUTO: 1.02 (ref 1–1.03)
UA COMPLETE W REFLEX CULTURE PNL UR: ABNORMAL
UA DIPSTICK W REFLEX MICRO PNL UR: YES
URN SPEC COLLECT METH UR: ABNORMAL
UROBILINOGEN UR STRIP-ACNC: 0.2 E.U./DL
WBC #/AREA URNS HPF: ABNORMAL /HPF (ref 0–5)

## 2025-08-09 PROCEDURE — 81003 URINALYSIS AUTO W/O SCOPE: CPT

## 2025-08-09 PROCEDURE — 6370000000 HC RX 637 (ALT 250 FOR IP): Performed by: PHYSICIAN ASSISTANT

## 2025-08-09 RX ORDER — FLUTICASONE PROPIONATE 50 MCG
2 SPRAY, SUSPENSION (ML) NASAL DAILY
Qty: 16 G | Refills: 0 | Status: SHIPPED | OUTPATIENT
Start: 2025-08-09

## 2025-08-09 RX ORDER — ONDANSETRON 4 MG/1
4 TABLET, ORALLY DISINTEGRATING ORAL ONCE
Status: DISCONTINUED | OUTPATIENT
Start: 2025-08-09 | End: 2025-08-09 | Stop reason: HOSPADM

## 2025-08-09 RX ORDER — IBUPROFEN 600 MG/1
600 TABLET, FILM COATED ORAL ONCE
Status: COMPLETED | OUTPATIENT
Start: 2025-08-09 | End: 2025-08-09

## 2025-08-09 RX ORDER — GUAIFENESIN 600 MG/1
600 TABLET, EXTENDED RELEASE ORAL 2 TIMES DAILY
Qty: 30 TABLET | Refills: 0 | Status: SHIPPED | OUTPATIENT
Start: 2025-08-09 | End: 2025-08-24

## 2025-08-09 RX ORDER — BENZONATATE 100 MG/1
100 CAPSULE ORAL 3 TIMES DAILY PRN
Qty: 30 CAPSULE | Refills: 0 | Status: SHIPPED | OUTPATIENT
Start: 2025-08-09 | End: 2025-08-19

## 2025-08-09 RX ORDER — ACETAMINOPHEN 325 MG/1
650 TABLET ORAL ONCE
Status: COMPLETED | OUTPATIENT
Start: 2025-08-09 | End: 2025-08-09

## 2025-08-09 RX ADMIN — ACETAMINOPHEN 650 MG: 325 TABLET ORAL at 00:51

## 2025-08-09 RX ADMIN — IBUPROFEN 600 MG: 600 TABLET ORAL at 00:51

## 2025-08-12 ENCOUNTER — HOSPITAL ENCOUNTER (OUTPATIENT)
Dept: CARDIAC REHAB | Age: 65
Setting detail: THERAPIES SERIES
Discharge: HOME | End: 2025-08-12
Payer: MEDICARE

## 2025-08-14 ENCOUNTER — APPOINTMENT (OUTPATIENT)
Dept: CARDIAC REHAB | Age: 65
End: 2025-08-14
Payer: MEDICARE

## 2025-08-19 ENCOUNTER — HOSPITAL ENCOUNTER (OUTPATIENT)
Dept: CARDIAC REHAB | Age: 65
Setting detail: THERAPIES SERIES
Discharge: HOME | End: 2025-08-19
Payer: MEDICARE

## 2025-08-21 ENCOUNTER — HOSPITAL ENCOUNTER (OUTPATIENT)
Dept: CARDIAC REHAB | Age: 65
Setting detail: THERAPIES SERIES
Discharge: HOME | End: 2025-08-21
Payer: MEDICARE

## 2025-08-21 PROCEDURE — 94625 PHY/QHP OP PULM RHB W/O MNTR: CPT

## 2025-08-26 ENCOUNTER — HOSPITAL ENCOUNTER (OUTPATIENT)
Dept: CARDIAC REHAB | Age: 65
Setting detail: THERAPIES SERIES
Discharge: HOME | End: 2025-08-26
Payer: MEDICARE

## 2025-08-26 PROCEDURE — 94625 PHY/QHP OP PULM RHB W/O MNTR: CPT

## 2025-08-28 ENCOUNTER — HOSPITAL ENCOUNTER (OUTPATIENT)
Dept: CARDIAC REHAB | Age: 65
Setting detail: THERAPIES SERIES
Discharge: HOME | End: 2025-08-28
Payer: MEDICARE

## 2025-09-02 ENCOUNTER — HOSPITAL ENCOUNTER (OUTPATIENT)
Dept: CARDIAC REHAB | Age: 65
Setting detail: THERAPIES SERIES
Discharge: HOME | End: 2025-09-02
Payer: MEDICARE

## 2025-09-02 VITALS — BODY MASS INDEX: 26.09 KG/M2 | WEIGHT: 152 LBS

## 2025-09-02 PROCEDURE — 94625 PHY/QHP OP PULM RHB W/O MNTR: CPT

## 2025-09-04 ENCOUNTER — HOSPITAL ENCOUNTER (OUTPATIENT)
Dept: CARDIAC REHAB | Age: 65
Setting detail: THERAPIES SERIES
Discharge: HOME | End: 2025-09-04
Payer: MEDICARE

## 2025-09-04 PROCEDURE — G0239 OTH RESP PROC, GROUP: HCPCS
